# Patient Record
Sex: FEMALE | Race: BLACK OR AFRICAN AMERICAN | Employment: OTHER | URBAN - METROPOLITAN AREA
[De-identification: names, ages, dates, MRNs, and addresses within clinical notes are randomized per-mention and may not be internally consistent; named-entity substitution may affect disease eponyms.]

---

## 2017-01-04 RX ORDER — OXYCODONE HYDROCHLORIDE 5 MG/1
5 TABLET ORAL EVERY 8 HOURS PRN
Qty: 40 TABLET | Refills: 0 | Status: SHIPPED | OUTPATIENT
Start: 2017-01-04 | End: 2017-02-03 | Stop reason: SDUPTHER

## 2017-02-03 RX ORDER — OXYCODONE HYDROCHLORIDE 5 MG/1
5 TABLET ORAL EVERY 8 HOURS PRN
Qty: 40 TABLET | Refills: 0 | Status: SHIPPED | OUTPATIENT
Start: 2017-02-03 | End: 2017-02-06 | Stop reason: SDUPTHER

## 2017-02-06 RX ORDER — OXYCODONE HYDROCHLORIDE 5 MG/1
5 TABLET ORAL EVERY 8 HOURS PRN
Qty: 40 TABLET | Refills: 0 | Status: SHIPPED | OUTPATIENT
Start: 2017-02-06 | End: 2017-02-13 | Stop reason: CLARIF

## 2017-04-04 ENCOUNTER — TELEPHONE (OUTPATIENT)
Dept: ADMINISTRATIVE | Age: 72
End: 2017-04-04

## 2017-04-12 ENCOUNTER — OFFICE VISIT (OUTPATIENT)
Dept: ORTHOPEDIC SURGERY | Age: 72
End: 2017-04-12
Payer: MEDICARE

## 2017-04-12 VITALS — WEIGHT: 268 LBS | HEIGHT: 65 IN | BODY MASS INDEX: 44.65 KG/M2

## 2017-04-12 DIAGNOSIS — Z96.653 STATUS POST TOTAL BILATERAL KNEE REPLACEMENT: Primary | ICD-10-CM

## 2017-04-12 PROCEDURE — 4040F PNEUMOC VAC/ADMIN/RCVD: CPT | Performed by: ORTHOPAEDIC SURGERY

## 2017-04-12 PROCEDURE — 3014F SCREEN MAMMO DOC REV: CPT | Performed by: ORTHOPAEDIC SURGERY

## 2017-04-12 PROCEDURE — 3017F COLORECTAL CA SCREEN DOC REV: CPT | Performed by: ORTHOPAEDIC SURGERY

## 2017-04-12 PROCEDURE — G8427 DOCREV CUR MEDS BY ELIG CLIN: HCPCS | Performed by: ORTHOPAEDIC SURGERY

## 2017-04-12 PROCEDURE — 1036F TOBACCO NON-USER: CPT | Performed by: ORTHOPAEDIC SURGERY

## 2017-04-12 PROCEDURE — 1123F ACP DISCUSS/DSCN MKR DOCD: CPT | Performed by: ORTHOPAEDIC SURGERY

## 2017-04-12 PROCEDURE — G8400 PT W/DXA NO RESULTS DOC: HCPCS | Performed by: ORTHOPAEDIC SURGERY

## 2017-04-12 PROCEDURE — 1090F PRES/ABSN URINE INCON ASSESS: CPT | Performed by: ORTHOPAEDIC SURGERY

## 2017-04-12 PROCEDURE — 99212 OFFICE O/P EST SF 10 MIN: CPT | Performed by: ORTHOPAEDIC SURGERY

## 2017-04-12 PROCEDURE — G8417 CALC BMI ABV UP PARAM F/U: HCPCS | Performed by: ORTHOPAEDIC SURGERY

## 2017-04-12 RX ORDER — OXYCODONE HYDROCHLORIDE 5 MG/1
5 TABLET ORAL EVERY 8 HOURS PRN
Qty: 40 TABLET | Refills: 0 | Status: SHIPPED | OUTPATIENT
Start: 2017-04-12 | End: 2017-05-15 | Stop reason: SDUPTHER

## 2017-04-19 RX ORDER — DULOXETIN HYDROCHLORIDE 30 MG/1
CAPSULE, DELAYED RELEASE ORAL
Qty: 180 CAPSULE | Refills: 0 | Status: SHIPPED | OUTPATIENT
Start: 2017-04-19 | End: 2017-07-25 | Stop reason: SDUPTHER

## 2017-04-19 RX ORDER — OXYBUTYNIN CHLORIDE 10 MG/1
TABLET, EXTENDED RELEASE ORAL
Qty: 90 TABLET | Refills: 0 | Status: SHIPPED | OUTPATIENT
Start: 2017-04-19 | End: 2018-03-08

## 2017-04-28 ENCOUNTER — HOSPITAL ENCOUNTER (OUTPATIENT)
Dept: PHYSICAL THERAPY | Age: 72
Setting detail: THERAPIES SERIES
Discharge: HOME | End: 2017-04-28
Payer: MEDICARE

## 2017-04-28 PROCEDURE — G8978 MOBILITY CURRENT STATUS: HCPCS

## 2017-04-28 PROCEDURE — G8979 MOBILITY GOAL STATUS: HCPCS

## 2017-04-28 PROCEDURE — 97162 PT EVAL MOD COMPLEX 30 MIN: CPT

## 2017-04-28 ASSESSMENT — PAIN DESCRIPTION - ORIENTATION: ORIENTATION: RIGHT;LEFT

## 2017-04-28 ASSESSMENT — PAIN SCALES - GENERAL: PAINLEVEL_OUTOF10: 3

## 2017-04-28 ASSESSMENT — PAIN DESCRIPTION - DESCRIPTORS: DESCRIPTORS: ACHING

## 2017-04-28 ASSESSMENT — PAIN DESCRIPTION - FREQUENCY: FREQUENCY: INTERMITTENT

## 2017-04-28 ASSESSMENT — PAIN DESCRIPTION - LOCATION: LOCATION: KNEE

## 2017-05-01 DIAGNOSIS — Z96.653 STATUS POST TOTAL BILATERAL KNEE REPLACEMENT: Primary | ICD-10-CM

## 2017-05-04 ENCOUNTER — HOSPITAL ENCOUNTER (OUTPATIENT)
Dept: PHYSICAL THERAPY | Age: 72
Setting detail: THERAPIES SERIES
Discharge: HOME | End: 2017-05-04
Payer: MEDICARE

## 2017-05-04 PROCEDURE — 97113 AQUATIC THERAPY/EXERCISES: CPT

## 2017-05-04 ASSESSMENT — PAIN SCALES - GENERAL: PAINLEVEL_OUTOF10: 6

## 2017-05-04 ASSESSMENT — PAIN DESCRIPTION - ORIENTATION: ORIENTATION: LOWER

## 2017-05-04 ASSESSMENT — PAIN DESCRIPTION - LOCATION: LOCATION: BACK

## 2017-05-10 ENCOUNTER — APPOINTMENT (OUTPATIENT)
Dept: PHYSICAL THERAPY | Age: 72
End: 2017-05-10
Payer: MEDICARE

## 2017-05-10 ENCOUNTER — HOSPITAL ENCOUNTER (OUTPATIENT)
Dept: PHYSICAL THERAPY | Age: 72
Setting detail: THERAPIES SERIES
Discharge: HOME | End: 2017-05-10
Payer: MEDICARE

## 2017-05-10 PROCEDURE — 97113 AQUATIC THERAPY/EXERCISES: CPT

## 2017-05-10 ASSESSMENT — PAIN SCALES - GENERAL: PAINLEVEL_OUTOF10: 5

## 2017-05-10 ASSESSMENT — PAIN DESCRIPTION - ORIENTATION: ORIENTATION: LOWER

## 2017-05-10 ASSESSMENT — PAIN DESCRIPTION - LOCATION: LOCATION: BACK

## 2017-05-10 ASSESSMENT — PAIN DESCRIPTION - PAIN TYPE: TYPE: CHRONIC PAIN

## 2017-05-15 ENCOUNTER — HOSPITAL ENCOUNTER (OUTPATIENT)
Dept: PHYSICAL THERAPY | Age: 72
Setting detail: THERAPIES SERIES
Discharge: HOME | End: 2017-05-15
Payer: MEDICARE

## 2017-05-15 PROCEDURE — 97113 AQUATIC THERAPY/EXERCISES: CPT

## 2017-05-15 RX ORDER — OXYCODONE HYDROCHLORIDE 5 MG/1
5 TABLET ORAL EVERY 8 HOURS PRN
Qty: 40 TABLET | Refills: 0 | Status: SHIPPED | OUTPATIENT
Start: 2017-05-15 | End: 2017-06-19 | Stop reason: SDUPTHER

## 2017-05-15 RX ORDER — DIAZEPAM 5 MG/1
5 TABLET ORAL EVERY 8 HOURS PRN
Qty: 30 TABLET | Refills: 0 | Status: SHIPPED | OUTPATIENT
Start: 2017-05-15 | End: 2018-03-08

## 2017-05-15 ASSESSMENT — PAIN SCALES - GENERAL: PAINLEVEL_OUTOF10: 7

## 2017-05-15 ASSESSMENT — PAIN DESCRIPTION - LOCATION: LOCATION: BACK

## 2017-05-15 ASSESSMENT — PAIN DESCRIPTION - PAIN TYPE: TYPE: CHRONIC PAIN

## 2017-05-15 ASSESSMENT — PAIN DESCRIPTION - ORIENTATION: ORIENTATION: LOWER

## 2017-05-16 DIAGNOSIS — Z13.9 SCREENING: ICD-10-CM

## 2017-05-16 DIAGNOSIS — J06.9 VIRAL UPPER RESPIRATORY TRACT INFECTION: ICD-10-CM

## 2017-05-17 ENCOUNTER — HOSPITAL ENCOUNTER (OUTPATIENT)
Dept: PHYSICAL THERAPY | Age: 72
Setting detail: THERAPIES SERIES
Discharge: HOME | End: 2017-05-17
Payer: MEDICARE

## 2017-05-17 PROCEDURE — 97113 AQUATIC THERAPY/EXERCISES: CPT

## 2017-05-17 RX ORDER — LORATADINE 10 MG/1
TABLET ORAL
Qty: 90 TABLET | Refills: 1 | Status: SHIPPED | OUTPATIENT
Start: 2017-05-17 | End: 2018-05-16 | Stop reason: ALTCHOICE

## 2017-05-17 ASSESSMENT — PAIN DESCRIPTION - ORIENTATION: ORIENTATION: LOWER

## 2017-05-17 ASSESSMENT — PAIN DESCRIPTION - LOCATION: LOCATION: BACK

## 2017-05-17 ASSESSMENT — PAIN DESCRIPTION - DIRECTION: RADIATING_TOWARDS: (B) HIPS

## 2017-05-17 ASSESSMENT — PAIN SCALES - GENERAL: PAINLEVEL_OUTOF10: 5

## 2017-05-17 ASSESSMENT — PAIN DESCRIPTION - PAIN TYPE: TYPE: CHRONIC PAIN

## 2017-05-18 ENCOUNTER — HOSPITAL ENCOUNTER (OUTPATIENT)
Dept: PHYSICAL THERAPY | Age: 72
Setting detail: THERAPIES SERIES
Discharge: HOME | End: 2017-05-18
Payer: MEDICARE

## 2017-05-22 ENCOUNTER — HOSPITAL ENCOUNTER (OUTPATIENT)
Dept: PHYSICAL THERAPY | Age: 72
Setting detail: THERAPIES SERIES
Discharge: HOME | End: 2017-05-22
Payer: MEDICARE

## 2017-05-22 DIAGNOSIS — M25.562 PAIN IN BOTH KNEES, UNSPECIFIED CHRONICITY: Primary | ICD-10-CM

## 2017-05-22 DIAGNOSIS — M25.561 PAIN IN BOTH KNEES, UNSPECIFIED CHRONICITY: Primary | ICD-10-CM

## 2017-05-22 PROCEDURE — 97113 AQUATIC THERAPY/EXERCISES: CPT

## 2017-05-22 PROCEDURE — 97164 PT RE-EVAL EST PLAN CARE: CPT

## 2017-05-22 PROCEDURE — G8979 MOBILITY GOAL STATUS: HCPCS

## 2017-05-22 PROCEDURE — G8978 MOBILITY CURRENT STATUS: HCPCS

## 2017-05-22 ASSESSMENT — PAIN DESCRIPTION - PAIN TYPE: TYPE: CHRONIC PAIN

## 2017-05-22 ASSESSMENT — PAIN SCALES - GENERAL
PAINLEVEL_OUTOF10: 7
PAINLEVEL_OUTOF10: 6

## 2017-05-22 ASSESSMENT — PAIN DESCRIPTION - DESCRIPTORS: DESCRIPTORS: DULL;CONSTANT

## 2017-05-22 ASSESSMENT — PAIN DESCRIPTION - LOCATION
LOCATION: BACK;HIP
LOCATION: BACK

## 2017-05-22 ASSESSMENT — PAIN DESCRIPTION - DIRECTION: RADIATING_TOWARDS: R HIP/BUTTOCK

## 2017-05-22 ASSESSMENT — PAIN DESCRIPTION - ORIENTATION
ORIENTATION: RIGHT;LOWER
ORIENTATION: LOWER;RIGHT

## 2017-05-22 ASSESSMENT — PAIN DESCRIPTION - FREQUENCY: FREQUENCY: CONTINUOUS

## 2017-05-23 ENCOUNTER — HOSPITAL ENCOUNTER (OUTPATIENT)
Dept: PHYSICAL THERAPY | Age: 72
Setting detail: THERAPIES SERIES
Discharge: HOME | End: 2017-05-23
Payer: MEDICARE

## 2017-05-23 PROCEDURE — 97113 AQUATIC THERAPY/EXERCISES: CPT

## 2017-05-23 ASSESSMENT — PAIN DESCRIPTION - ORIENTATION: ORIENTATION: LOWER;RIGHT

## 2017-05-23 ASSESSMENT — PAIN SCALES - GENERAL: PAINLEVEL_OUTOF10: 7

## 2017-05-23 ASSESSMENT — PAIN DESCRIPTION - PAIN TYPE: TYPE: CHRONIC PAIN

## 2017-05-23 ASSESSMENT — PAIN DESCRIPTION - LOCATION: LOCATION: BACK;HIP

## 2017-05-25 ENCOUNTER — HOSPITAL ENCOUNTER (OUTPATIENT)
Dept: PHYSICAL THERAPY | Age: 72
Setting detail: THERAPIES SERIES
Discharge: HOME | End: 2017-05-25
Payer: MEDICARE

## 2017-05-25 PROCEDURE — 97113 AQUATIC THERAPY/EXERCISES: CPT

## 2017-05-25 ASSESSMENT — PAIN DESCRIPTION - PAIN TYPE: TYPE: CHRONIC PAIN

## 2017-05-25 ASSESSMENT — PAIN SCALES - GENERAL: PAINLEVEL_OUTOF10: 3

## 2017-05-25 ASSESSMENT — PAIN DESCRIPTION - ORIENTATION: ORIENTATION: LOWER;RIGHT

## 2017-05-25 ASSESSMENT — PAIN DESCRIPTION - LOCATION: LOCATION: BACK

## 2017-05-31 ENCOUNTER — HOSPITAL ENCOUNTER (OUTPATIENT)
Dept: PHYSICAL THERAPY | Age: 72
Setting detail: THERAPIES SERIES
Discharge: HOME | End: 2017-05-31
Payer: MEDICARE

## 2017-05-31 ENCOUNTER — TELEPHONE (OUTPATIENT)
Dept: ORTHOPEDIC SURGERY | Age: 72
End: 2017-05-31

## 2017-06-01 ENCOUNTER — OFFICE VISIT (OUTPATIENT)
Dept: ORTHOPEDIC SURGERY | Age: 72
End: 2017-06-01
Payer: MEDICARE

## 2017-06-01 ENCOUNTER — HOSPITAL ENCOUNTER (OUTPATIENT)
Dept: PHYSICAL THERAPY | Age: 72
Setting detail: THERAPIES SERIES
Discharge: HOME | End: 2017-06-01
Payer: MEDICARE

## 2017-06-01 VITALS — BODY MASS INDEX: 44.65 KG/M2 | WEIGHT: 268 LBS | HEIGHT: 65 IN

## 2017-06-01 DIAGNOSIS — D17.23 LIPOMA OF RIGHT LOWER EXTREMITY: Primary | ICD-10-CM

## 2017-06-01 PROCEDURE — 1123F ACP DISCUSS/DSCN MKR DOCD: CPT | Performed by: ORTHOPAEDIC SURGERY

## 2017-06-01 PROCEDURE — 4040F PNEUMOC VAC/ADMIN/RCVD: CPT | Performed by: ORTHOPAEDIC SURGERY

## 2017-06-01 PROCEDURE — 99212 OFFICE O/P EST SF 10 MIN: CPT | Performed by: ORTHOPAEDIC SURGERY

## 2017-06-01 PROCEDURE — 1090F PRES/ABSN URINE INCON ASSESS: CPT | Performed by: ORTHOPAEDIC SURGERY

## 2017-06-01 PROCEDURE — 1036F TOBACCO NON-USER: CPT | Performed by: ORTHOPAEDIC SURGERY

## 2017-06-01 PROCEDURE — G8417 CALC BMI ABV UP PARAM F/U: HCPCS | Performed by: ORTHOPAEDIC SURGERY

## 2017-06-01 PROCEDURE — G8400 PT W/DXA NO RESULTS DOC: HCPCS | Performed by: ORTHOPAEDIC SURGERY

## 2017-06-01 PROCEDURE — 3017F COLORECTAL CA SCREEN DOC REV: CPT | Performed by: ORTHOPAEDIC SURGERY

## 2017-06-01 PROCEDURE — G8427 DOCREV CUR MEDS BY ELIG CLIN: HCPCS | Performed by: ORTHOPAEDIC SURGERY

## 2017-06-01 PROCEDURE — 3014F SCREEN MAMMO DOC REV: CPT | Performed by: ORTHOPAEDIC SURGERY

## 2017-06-01 RX ORDER — PREDNISONE 10 MG/1
TABLET ORAL
Refills: 0 | Status: ON HOLD | COMMUNITY
Start: 2017-05-18 | End: 2017-08-16 | Stop reason: HOSPADM

## 2017-06-12 ENCOUNTER — TELEPHONE (OUTPATIENT)
Dept: ORTHOPEDIC SURGERY | Age: 72
End: 2017-06-12

## 2017-06-12 DIAGNOSIS — M17.0 BILATERAL PRIMARY OSTEOARTHRITIS OF KNEE: ICD-10-CM

## 2017-06-12 DIAGNOSIS — M25.561 PAIN IN BOTH KNEES, UNSPECIFIED CHRONICITY: Primary | ICD-10-CM

## 2017-06-12 DIAGNOSIS — M25.562 PAIN IN BOTH KNEES, UNSPECIFIED CHRONICITY: Primary | ICD-10-CM

## 2017-06-14 ENCOUNTER — HOSPITAL ENCOUNTER (OUTPATIENT)
Dept: CT IMAGING | Age: 72
Discharge: HOME | End: 2017-06-14
Payer: MEDICARE

## 2017-06-14 DIAGNOSIS — J47.9 ACQUIRED BRONCHIECTASIS (HCC): ICD-10-CM

## 2017-06-14 PROCEDURE — 71250 CT THORAX DX C-: CPT

## 2017-06-22 RX ORDER — OXYCODONE HYDROCHLORIDE 5 MG/1
5 TABLET ORAL EVERY 8 HOURS PRN
Qty: 40 TABLET | Refills: 0 | Status: SHIPPED | OUTPATIENT
Start: 2017-06-22 | End: 2017-07-24 | Stop reason: SDUPTHER

## 2017-06-26 ENCOUNTER — TELEPHONE (OUTPATIENT)
Dept: INTERNAL MEDICINE CLINIC | Age: 72
End: 2017-06-26

## 2017-06-26 RX ORDER — BUPROPION HYDROCHLORIDE 300 MG/1
300 TABLET ORAL EVERY MORNING
Qty: 30 TABLET | Refills: 3 | Status: ON HOLD | OUTPATIENT
Start: 2017-06-26 | End: 2017-08-16 | Stop reason: HOSPADM

## 2017-07-05 ENCOUNTER — TELEPHONE (OUTPATIENT)
Dept: ORTHOPEDIC SURGERY | Age: 72
End: 2017-07-05

## 2017-07-24 RX ORDER — OXYCODONE HYDROCHLORIDE 5 MG/1
5 TABLET ORAL EVERY 8 HOURS PRN
Qty: 40 TABLET | Refills: 0 | Status: SHIPPED | OUTPATIENT
Start: 2017-07-24 | End: 2017-08-10 | Stop reason: SDUPTHER

## 2017-07-26 RX ORDER — DULOXETIN HYDROCHLORIDE 30 MG/1
CAPSULE, DELAYED RELEASE ORAL
Qty: 180 CAPSULE | Refills: 0 | Status: SHIPPED | OUTPATIENT
Start: 2017-07-26 | End: 2017-12-08 | Stop reason: SDUPTHER

## 2017-08-11 RX ORDER — OXYCODONE HYDROCHLORIDE 5 MG/1
5 TABLET ORAL EVERY 8 HOURS PRN
Qty: 40 TABLET | Refills: 0 | Status: SHIPPED | OUTPATIENT
Start: 2017-08-11 | End: 2017-09-12 | Stop reason: SDUPTHER

## 2017-08-16 ENCOUNTER — HOSPITAL ENCOUNTER (OUTPATIENT)
Age: 72
Setting detail: OUTPATIENT SURGERY
Discharge: HOME | End: 2017-08-16
Attending: INTERNAL MEDICINE | Admitting: INTERNAL MEDICINE
Payer: MEDICARE

## 2017-08-16 VITALS
BODY MASS INDEX: 44.65 KG/M2 | TEMPERATURE: 97.5 F | RESPIRATION RATE: 16 BRPM | DIASTOLIC BLOOD PRESSURE: 50 MMHG | WEIGHT: 268 LBS | HEART RATE: 77 BPM | SYSTOLIC BLOOD PRESSURE: 132 MMHG | OXYGEN SATURATION: 96 % | HEIGHT: 65 IN

## 2017-08-16 PROBLEM — J47.0 BRONCHIECTASIS WITH ACUTE LOWER RESPIRATORY INFECTION (HCC): Status: ACTIVE | Noted: 2017-08-16

## 2017-08-16 LAB
BAL DIFF COMMENT: ABNORMAL
COLUMNAR EPIS BAL: PRESENT
EOSINOPHILS BAL: 3 % (ref 0–1)
LYMPHOCYTES, BAL: 25 % (ref 8–12)
MACROPHAGES, BAL: 27 % (ref 85–95)
SEGMENTED NEUTROPHILS, BAL: 45 % (ref 0–10)

## 2017-08-16 PROCEDURE — 88312 SPECIAL STAINS GROUP 1: CPT

## 2017-08-16 PROCEDURE — 3609010800 HC BRONCHOSCOPY ALVEOLAR LAVAGE: Performed by: INTERNAL MEDICINE

## 2017-08-16 PROCEDURE — 6360000002 HC RX W HCPCS: Performed by: INTERNAL MEDICINE

## 2017-08-16 PROCEDURE — 87205 SMEAR GRAM STAIN: CPT

## 2017-08-16 PROCEDURE — 88305 TISSUE EXAM BY PATHOLOGIST: CPT

## 2017-08-16 PROCEDURE — 94664 DEMO&/EVAL PT USE INHALER: CPT

## 2017-08-16 PROCEDURE — 88112 CYTOPATH CELL ENHANCE TECH: CPT

## 2017-08-16 PROCEDURE — 2580000003 HC RX 258: Performed by: INTERNAL MEDICINE

## 2017-08-16 PROCEDURE — 94640 AIRWAY INHALATION TREATMENT: CPT

## 2017-08-16 PROCEDURE — 87015 SPECIMEN INFECT AGNT CONCNTJ: CPT

## 2017-08-16 PROCEDURE — 89051 BODY FLUID CELL COUNT: CPT

## 2017-08-16 PROCEDURE — 2500000003 HC RX 250 WO HCPCS: Performed by: INTERNAL MEDICINE

## 2017-08-16 PROCEDURE — 87116 MYCOBACTERIA CULTURE: CPT

## 2017-08-16 PROCEDURE — 87206 SMEAR FLUORESCENT/ACID STAI: CPT

## 2017-08-16 PROCEDURE — 7100000010 HC PHASE II RECOVERY - FIRST 15 MIN: Performed by: INTERNAL MEDICINE

## 2017-08-16 PROCEDURE — 7100000011 HC PHASE II RECOVERY - ADDTL 15 MIN: Performed by: INTERNAL MEDICINE

## 2017-08-16 PROCEDURE — 88108 CYTOPATH CONCENTRATE TECH: CPT

## 2017-08-16 PROCEDURE — 6370000000 HC RX 637 (ALT 250 FOR IP)

## 2017-08-16 PROCEDURE — 99152 MOD SED SAME PHYS/QHP 5/>YRS: CPT | Performed by: INTERNAL MEDICINE

## 2017-08-16 PROCEDURE — 87070 CULTURE OTHR SPECIMN AEROBIC: CPT

## 2017-08-16 PROCEDURE — 87102 FUNGUS ISOLATION CULTURE: CPT

## 2017-08-16 RX ORDER — GLYCOPYRROLATE 0.2 MG/ML
0.1 INJECTION INTRAMUSCULAR; INTRAVENOUS ONCE
Status: COMPLETED | OUTPATIENT
Start: 2017-08-16 | End: 2017-08-16

## 2017-08-16 RX ORDER — ALBUTEROL SULFATE 2.5 MG/3ML
2.5 SOLUTION RESPIRATORY (INHALATION) ONCE
Status: COMPLETED | OUTPATIENT
Start: 2017-08-16 | End: 2017-08-16

## 2017-08-16 RX ORDER — SODIUM CHLORIDE, SODIUM LACTATE, POTASSIUM CHLORIDE, CALCIUM CHLORIDE 600; 310; 30; 20 MG/100ML; MG/100ML; MG/100ML; MG/100ML
INJECTION, SOLUTION INTRAVENOUS CONTINUOUS
Status: DISCONTINUED | OUTPATIENT
Start: 2017-08-16 | End: 2017-08-16 | Stop reason: HOSPADM

## 2017-08-16 RX ORDER — LIDOCAINE HYDROCHLORIDE 40 MG/ML
4 INJECTION, SOLUTION RETROBULBAR; TOPICAL ONCE
Status: COMPLETED | OUTPATIENT
Start: 2017-08-16 | End: 2017-08-16

## 2017-08-16 RX ORDER — MIDAZOLAM HYDROCHLORIDE 1 MG/ML
INJECTION INTRAMUSCULAR; INTRAVENOUS PRN
Status: DISCONTINUED | OUTPATIENT
Start: 2017-08-16 | End: 2017-08-16 | Stop reason: HOSPADM

## 2017-08-16 RX ORDER — LIDOCAINE HYDROCHLORIDE 10 MG/ML
INJECTION, SOLUTION INFILTRATION; PERINEURAL PRN
Status: DISCONTINUED | OUTPATIENT
Start: 2017-08-16 | End: 2017-08-16 | Stop reason: HOSPADM

## 2017-08-16 RX ADMIN — GLYCOPYRROLATE 0.1 MG: 0.2 INJECTION, SOLUTION INTRAMUSCULAR; INTRAVENOUS at 11:01

## 2017-08-16 RX ADMIN — LIDOCAINE HYDROCHLORIDE 4 ML: 40 INJECTION, SOLUTION RETROBULBAR; TOPICAL at 11:05

## 2017-08-16 RX ADMIN — ALBUTEROL SULFATE 2.5 MG: 2.5 SOLUTION RESPIRATORY (INHALATION) at 11:05

## 2017-08-16 RX ADMIN — SODIUM CHLORIDE, POTASSIUM CHLORIDE, SODIUM LACTATE AND CALCIUM CHLORIDE: 600; 310; 30; 20 INJECTION, SOLUTION INTRAVENOUS at 11:01

## 2017-08-16 ASSESSMENT — PAIN SCALES - GENERAL
PAINLEVEL_OUTOF10: 0
PAINLEVEL_OUTOF10: 0

## 2017-08-16 ASSESSMENT — PAIN - FUNCTIONAL ASSESSMENT: PAIN_FUNCTIONAL_ASSESSMENT: 0-10

## 2017-08-17 LAB
DIRECT EXAM: NORMAL
DIRECT EXAM: NORMAL
FLUID DIFF COMMENT: NORMAL
Lab: NORMAL
RBC, BAL: 900 /MM3
SPECIMEN DESCRIPTION: NORMAL
SPECIMEN DESCRIPTION: NORMAL
SPECIMEN TYPE: NORMAL
STATUS: NORMAL
SURGICAL PATHOLOGY REPORT: NORMAL
WBC, BAL: 1300 /MM3

## 2017-08-18 LAB
CULTURE: ABNORMAL
CULTURE: ABNORMAL
DIRECT EXAM: ABNORMAL
DIRECT EXAM: ABNORMAL
Lab: ABNORMAL
SPECIMEN DESCRIPTION: ABNORMAL
SPECIMEN DESCRIPTION: ABNORMAL
STATUS: ABNORMAL

## 2017-08-31 ENCOUNTER — HOSPITAL ENCOUNTER (OUTPATIENT)
Dept: CT IMAGING | Age: 72
Discharge: HOME | End: 2017-08-31
Payer: MEDICARE

## 2017-08-31 DIAGNOSIS — J47.9 BRONCHIECTASIS WITHOUT COMPLICATION (HCC): ICD-10-CM

## 2017-08-31 DIAGNOSIS — J30.89 ALLERGIC RHINITIS DUE TO OTHER ALLERGEN: ICD-10-CM

## 2017-08-31 PROCEDURE — 70486 CT MAXILLOFACIAL W/O DYE: CPT

## 2017-09-01 ENCOUNTER — TELEPHONE (OUTPATIENT)
Dept: PRIMARY CARE CLINIC | Age: 72
End: 2017-09-01

## 2017-09-13 RX ORDER — OXYCODONE HYDROCHLORIDE 5 MG/1
5 TABLET ORAL EVERY 8 HOURS PRN
Qty: 40 TABLET | Refills: 0 | Status: SHIPPED | OUTPATIENT
Start: 2017-09-13 | End: 2017-10-03 | Stop reason: SDUPTHER

## 2017-09-18 LAB
CULTURE: NORMAL
CULTURE: NORMAL
Lab: NORMAL
SPECIMEN DESCRIPTION: NORMAL
SPECIMEN DESCRIPTION: NORMAL
STATUS: NORMAL

## 2017-09-23 ENCOUNTER — TELEPHONE (OUTPATIENT)
Dept: INTERNAL MEDICINE CLINIC | Age: 72
End: 2017-09-23

## 2017-10-02 LAB
CULTURE: NORMAL
CULTURE: NORMAL
DIRECT EXAM: NORMAL
Lab: NORMAL
SPECIMEN DESCRIPTION: NORMAL
SPECIMEN DESCRIPTION: NORMAL
STATUS: NORMAL

## 2017-10-04 RX ORDER — OXYCODONE HYDROCHLORIDE 5 MG/1
5 TABLET ORAL EVERY 8 HOURS PRN
Qty: 40 TABLET | Refills: 0 | Status: SHIPPED | OUTPATIENT
Start: 2017-10-04 | End: 2017-10-09 | Stop reason: SDUPTHER

## 2017-10-09 RX ORDER — OXYCODONE HYDROCHLORIDE 5 MG/1
5 TABLET ORAL EVERY 8 HOURS PRN
Qty: 10 TABLET | Refills: 0 | Status: SHIPPED | OUTPATIENT
Start: 2017-10-09 | End: 2017-11-20 | Stop reason: SDUPTHER

## 2017-10-09 RX ORDER — OXYCODONE HYDROCHLORIDE 5 MG/1
TABLET ORAL
Qty: 40 TABLET | Refills: 0 | OUTPATIENT
Start: 2017-10-09

## 2017-10-09 NOTE — TELEPHONE ENCOUNTER
Patient's last refill was sent to her mail in prescription service.  Patient is requesting enough medication to get her through until the mailed prescription arrives

## 2017-11-20 RX ORDER — OXYCODONE HYDROCHLORIDE 5 MG/1
5 TABLET ORAL 2 TIMES DAILY
Qty: 40 TABLET | Refills: 0 | Status: SHIPPED | OUTPATIENT
Start: 2017-11-20 | End: 2017-12-15 | Stop reason: SDUPTHER

## 2017-12-08 ENCOUNTER — OFFICE VISIT (OUTPATIENT)
Dept: INTERNAL MEDICINE CLINIC | Age: 72
End: 2017-12-08
Payer: MEDICARE

## 2017-12-08 VITALS
DIASTOLIC BLOOD PRESSURE: 82 MMHG | SYSTOLIC BLOOD PRESSURE: 138 MMHG | HEART RATE: 85 BPM | HEIGHT: 65 IN | WEIGHT: 268 LBS | RESPIRATION RATE: 21 BRPM | BODY MASS INDEX: 44.65 KG/M2 | OXYGEN SATURATION: 97 %

## 2017-12-08 DIAGNOSIS — J44.9 CHRONIC OBSTRUCTIVE PULMONARY DISEASE, UNSPECIFIED COPD TYPE (HCC): Primary | ICD-10-CM

## 2017-12-08 DIAGNOSIS — M48.061 SPINAL STENOSIS, LUMBAR REGION, WITHOUT NEUROGENIC CLAUDICATION: Chronic | ICD-10-CM

## 2017-12-08 DIAGNOSIS — E66.01 MORBID OBESITY WITH BMI OF 40.0-44.9, ADULT (HCC): ICD-10-CM

## 2017-12-08 DIAGNOSIS — F32.89 OTHER DEPRESSION: ICD-10-CM

## 2017-12-08 DIAGNOSIS — Z12.39 SCREENING BREAST EXAMINATION: ICD-10-CM

## 2017-12-08 DIAGNOSIS — J84.10 PULMONARY FIBROSIS (HCC): ICD-10-CM

## 2017-12-08 DIAGNOSIS — G47.33 OSA TREATED WITH BIPAP: ICD-10-CM

## 2017-12-08 DIAGNOSIS — Z99.81 SUPPLEMENTAL OXYGEN DEPENDENT: ICD-10-CM

## 2017-12-08 DIAGNOSIS — M79.7 FIBROMYALGIA: ICD-10-CM

## 2017-12-08 DIAGNOSIS — Z79.891 CHRONIC PRESCRIPTION OPIATE USE: ICD-10-CM

## 2017-12-08 DIAGNOSIS — Z78.0 POST-MENOPAUSAL: ICD-10-CM

## 2017-12-08 DIAGNOSIS — G89.4 CHRONIC PAIN SYNDROME: ICD-10-CM

## 2017-12-08 DIAGNOSIS — I50.9 CHRONIC CONGESTIVE HEART FAILURE, UNSPECIFIED CONGESTIVE HEART FAILURE TYPE: ICD-10-CM

## 2017-12-08 PROCEDURE — 1090F PRES/ABSN URINE INCON ASSESS: CPT | Performed by: FAMILY MEDICINE

## 2017-12-08 PROCEDURE — 1036F TOBACCO NON-USER: CPT | Performed by: FAMILY MEDICINE

## 2017-12-08 PROCEDURE — G8419 CALC BMI OUT NRM PARAM NOF/U: HCPCS | Performed by: FAMILY MEDICINE

## 2017-12-08 PROCEDURE — G8427 DOCREV CUR MEDS BY ELIG CLIN: HCPCS | Performed by: FAMILY MEDICINE

## 2017-12-08 PROCEDURE — 99214 OFFICE O/P EST MOD 30 MIN: CPT | Performed by: FAMILY MEDICINE

## 2017-12-08 PROCEDURE — 3017F COLORECTAL CA SCREEN DOC REV: CPT | Performed by: FAMILY MEDICINE

## 2017-12-08 PROCEDURE — G8400 PT W/DXA NO RESULTS DOC: HCPCS | Performed by: FAMILY MEDICINE

## 2017-12-08 PROCEDURE — 3014F SCREEN MAMMO DOC REV: CPT | Performed by: FAMILY MEDICINE

## 2017-12-08 PROCEDURE — 1123F ACP DISCUSS/DSCN MKR DOCD: CPT | Performed by: FAMILY MEDICINE

## 2017-12-08 PROCEDURE — G8926 SPIRO NO PERF OR DOC: HCPCS | Performed by: FAMILY MEDICINE

## 2017-12-08 PROCEDURE — G8484 FLU IMMUNIZE NO ADMIN: HCPCS | Performed by: FAMILY MEDICINE

## 2017-12-08 PROCEDURE — 4040F PNEUMOC VAC/ADMIN/RCVD: CPT | Performed by: FAMILY MEDICINE

## 2017-12-08 PROCEDURE — 3023F SPIROM DOC REV: CPT | Performed by: FAMILY MEDICINE

## 2017-12-08 RX ORDER — OXYBUTYNIN CHLORIDE 10 MG/1
10 TABLET, EXTENDED RELEASE ORAL DAILY
Qty: 90 TABLET | Refills: 0 | Status: CANCELLED | OUTPATIENT
Start: 2017-12-08

## 2017-12-08 RX ORDER — FLUTICASONE PROPIONATE 50 MCG
SPRAY, SUSPENSION (ML) NASAL
Refills: 0 | COMMUNITY
Start: 2017-09-07 | End: 2018-07-23

## 2017-12-08 RX ORDER — DULOXETIN HYDROCHLORIDE 30 MG/1
CAPSULE, DELAYED RELEASE ORAL
Qty: 180 CAPSULE | Refills: 0 | Status: SHIPPED | OUTPATIENT
Start: 2017-12-08 | End: 2018-03-13 | Stop reason: SDUPTHER

## 2017-12-08 RX ORDER — BUPROPION HYDROCHLORIDE 300 MG/1
TABLET ORAL
Refills: 0 | COMMUNITY
Start: 2017-09-13 | End: 2018-03-08

## 2017-12-08 RX ORDER — METOPROLOL SUCCINATE 50 MG
50 TABLET, EXTENDED RELEASE 24 HR ORAL 2 TIMES DAILY
COMMUNITY
Start: 2017-10-24 | End: 2018-11-02 | Stop reason: ALTCHOICE

## 2017-12-08 ASSESSMENT — ENCOUNTER SYMPTOMS
COUGH: 1
GASTROINTESTINAL NEGATIVE: 1
EYES NEGATIVE: 1

## 2017-12-08 ASSESSMENT — PATIENT HEALTH QUESTIONNAIRE - PHQ9
1. LITTLE INTEREST OR PLEASURE IN DOING THINGS: 0
SUM OF ALL RESPONSES TO PHQ QUESTIONS 1-9: 0
SUM OF ALL RESPONSES TO PHQ9 QUESTIONS 1 & 2: 0
2. FEELING DOWN, DEPRESSED OR HOPELESS: 0

## 2017-12-08 NOTE — PROGRESS NOTES
Subjective:      Patient ID: Lurdes Nick is a 67 y.o. female. Cough   This is a chronic problem. The current episode started more than 1 month ago. The problem has been waxing and waning. The cough is non-productive. Associated symptoms include nasal congestion and postnasal drip. The symptoms are aggravated by dust, pollens and stress. She has tried steroid inhaler for the symptoms. The treatment provided moderate relief. Her past medical history is significant for asthma and environmental allergies. Pharyngitis   Associated symptoms include coughing. Review of Systems   Constitutional: Negative. HENT: Positive for postnasal drip. Eyes: Negative. Respiratory: Positive for cough. Cardiovascular: Negative. Gastrointestinal: Negative. Endocrine: Negative. Musculoskeletal: Negative. Skin: Negative. Allergic/Immunologic: Positive for environmental allergies. Neurological: Negative. Hematological: Negative. Psychiatric/Behavioral: Positive for dysphoric mood. The patient is nervous/anxious. Past family and social history unremarkable. Objective:   Physical Exam   Constitutional: She is oriented to person, place, and time. She appears well-developed and well-nourished. HENT:   Head: Normocephalic and atraumatic. Right Ear: External ear normal.   Left Ear: External ear normal.   Mouth/Throat: Oropharynx is clear and moist.   Eyes: Conjunctivae and EOM are normal. Pupils are equal, round, and reactive to light. Neck: Normal range of motion. Neck supple. Cardiovascular: Normal rate, regular rhythm, normal heart sounds and intact distal pulses. Pulmonary/Chest: Effort normal and breath sounds normal.   Abdominal: Soft. Bowel sounds are normal.   Genitourinary: Vagina normal and uterus normal.   Musculoskeletal: Normal range of motion. Neurological: She is alert and oriented to person, place, and time. She has normal reflexes. Skin: Skin is warm and dry. follow-up with pain management. She is opiate dependent. Avoid constipation. Morbid obesity with sleep apnea. Advised to comply with BiPAP. Anxiety and depression. Continue current regimen of medication. I advised her in view of underlying comorbidities and chronic pain syndrome, she will benefit from establishing with mental health services. Med list reviewed advised to continue  Observe and call for any concern  Further recommendations to follow labs  This note is created with a voice recognition program and while intend to generate a document that accurately reflects the content of the visit, no guarantee can be provided that every mistake has been identified and corrected by editing.

## 2017-12-08 NOTE — PROGRESS NOTES
Chronic Disease Visit Information    BP Readings from Last 3 Encounters:   12/08/17 138/82   08/16/17 (!) 132/50   11/08/16 140/80          Hemoglobin A1C (%)   Date Value   11/08/2016 5.3   02/24/2016 5.4   05/25/2012 5.7     LDL Cholesterol (mg/dL)   Date Value   06/07/2015 144 (H)     HDL (mg/dL)   Date Value   06/07/2015 40 (L)     BUN (mg/dL)   Date Value   07/15/2016 16     CREATININE (mg/dL)   Date Value   07/15/2016 1.02 (H)     Glucose (mg/dL)   Date Value   07/15/2016 91   05/26/2012 105            Have you changed or started any medications since your last visit including any over-the-counter medicines, vitamins, or herbal medicines? no   Are you having any side effects from any of your medications? -  no  Have you stopped taking any of your medications? Is so, why? -  no    Have you seen any other physician or provider since your last visit? No  Have you had any other diagnostic tests since your last visit? No  Have you been seen in the emergency room and/or had an admission to a hospital since we last saw you? No  Have you had your annual diabetic retinal (eye) exam? Yes - Records Requested  Have you had your routine dental cleaning in the past 6 months? no    Have you activated your [employer] account? If not, what are your barriers?  Yes     Patient Care Team:  Schuyler Zee MD as PCP - General (Family Medicine)  Schuyler Zee MD as PCP - S Attributed Provider  Luz Elena Duarte MD as Consulting Physician (Gastroenterology)         Medical History Review  Past Medical, Family, and Social History reviewed and does contribute to the patient presenting condition    Health Maintenance   Topic Date Due    DTaP/Tdap/Td vaccine (1 - Tdap) 11/17/1963    Breast cancer screen  11/17/1994    Zostavax vaccine  11/17/2004    DEXA (modify frequency per FRAX score)  11/17/2009    Pneumococcal low/med risk (2 of 2 - PCV13) 05/27/2013    Flu vaccine (1) 09/01/2017    Lipid screen  06/07/2020    Colon cancer screen colonoscopy  04/28/2024

## 2017-12-15 RX ORDER — OXYCODONE HYDROCHLORIDE 5 MG/1
5 TABLET ORAL 2 TIMES DAILY
Qty: 40 TABLET | Refills: 0 | Status: SHIPPED | OUTPATIENT
Start: 2017-12-15 | End: 2018-01-10 | Stop reason: SDUPTHER

## 2018-01-02 ENCOUNTER — TELEPHONE (OUTPATIENT)
Dept: ORTHOPEDIC SURGERY | Age: 73
End: 2018-01-02

## 2018-01-02 DIAGNOSIS — R29.898 WEAKNESS OF BOTH ARMS: ICD-10-CM

## 2018-01-02 DIAGNOSIS — R29.898 WEAKNESS OF BOTH LOWER EXTREMITIES: ICD-10-CM

## 2018-01-02 DIAGNOSIS — M17.12 PRIMARY OSTEOARTHRITIS OF LEFT KNEE: ICD-10-CM

## 2018-01-02 DIAGNOSIS — M16.0 PRIMARY OSTEOARTHRITIS OF BOTH HIPS: Primary | ICD-10-CM

## 2018-01-02 DIAGNOSIS — M79.7 FIBROMYALGIA: ICD-10-CM

## 2018-01-02 NOTE — TELEPHONE ENCOUNTER
Patient would like Dr. Trenton Herron to initiate in home physical therapy. Is very difficult for patient to exit her home due to stairs. Has some exercise equipment at home for upper body strength but doesn't feel is able to use safely and would like a therapist to instruct her in the use. Patient has incontinence issues and a chronic cough also contributing to her inability to leave home. Patient just wants to walk again.

## 2018-01-10 DIAGNOSIS — Z96.653 HISTORY OF TOTAL BILATERAL KNEE REPLACEMENT: Primary | ICD-10-CM

## 2018-01-10 RX ORDER — OXYCODONE HYDROCHLORIDE 5 MG/1
5 TABLET ORAL 2 TIMES DAILY
Qty: 60 TABLET | Refills: 0 | Status: SHIPPED | OUTPATIENT
Start: 2018-01-10 | End: 2018-02-14 | Stop reason: SDUPTHER

## 2018-02-14 DIAGNOSIS — Z96.653 HISTORY OF TOTAL BILATERAL KNEE REPLACEMENT: ICD-10-CM

## 2018-02-14 RX ORDER — OXYCODONE HYDROCHLORIDE 5 MG/1
5 TABLET ORAL 2 TIMES DAILY
Qty: 60 TABLET | Refills: 0 | Status: SHIPPED | OUTPATIENT
Start: 2018-02-14 | End: 2018-03-15 | Stop reason: SDUPTHER

## 2018-02-28 ENCOUNTER — TELEPHONE (OUTPATIENT)
Dept: ORTHOPEDIC SURGERY | Age: 73
End: 2018-02-28

## 2018-02-28 DIAGNOSIS — M48.061 SPINAL STENOSIS, LUMBAR REGION, WITHOUT NEUROGENIC CLAUDICATION: Primary | Chronic | ICD-10-CM

## 2018-03-08 ENCOUNTER — HOSPITAL ENCOUNTER (OUTPATIENT)
Age: 73
Discharge: HOME | End: 2018-03-10
Payer: MEDICARE

## 2018-03-08 ENCOUNTER — HOSPITAL ENCOUNTER (OUTPATIENT)
Dept: PAIN MANAGEMENT | Age: 73
Discharge: HOME | End: 2018-03-08
Payer: MEDICARE

## 2018-03-08 ENCOUNTER — HOSPITAL ENCOUNTER (OUTPATIENT)
Dept: GENERAL RADIOLOGY | Age: 73
Discharge: HOME | End: 2018-03-10
Payer: MEDICARE

## 2018-03-08 VITALS
HEIGHT: 64 IN | HEART RATE: 71 BPM | BODY MASS INDEX: 45.75 KG/M2 | DIASTOLIC BLOOD PRESSURE: 72 MMHG | SYSTOLIC BLOOD PRESSURE: 151 MMHG | RESPIRATION RATE: 18 BRPM | WEIGHT: 268 LBS | TEMPERATURE: 98.3 F

## 2018-03-08 DIAGNOSIS — M48.061 SPINAL STENOSIS, LUMBAR REGION, WITHOUT NEUROGENIC CLAUDICATION: Chronic | ICD-10-CM

## 2018-03-08 DIAGNOSIS — M48.061 SPINAL STENOSIS, LUMBAR REGION, WITHOUT NEUROGENIC CLAUDICATION: Primary | Chronic | ICD-10-CM

## 2018-03-08 PROCEDURE — 99214 OFFICE O/P EST MOD 30 MIN: CPT | Performed by: PAIN MEDICINE

## 2018-03-08 PROCEDURE — 72120 X-RAY BEND ONLY L-S SPINE: CPT

## 2018-03-08 PROCEDURE — 99203 OFFICE O/P NEW LOW 30 MIN: CPT

## 2018-03-08 ASSESSMENT — ENCOUNTER SYMPTOMS
COUGH: 0
BACK PAIN: 1
NAIL CHANGES: 0

## 2018-03-08 NOTE — PROGRESS NOTES
including physical therapy and the pain is worsening, has done well with epidurals in the past.   Will obtain updated X-ray L spine and set her up with caudal epidural.  If no benefit, consider MRI L spine.       Recommendations to be sent to referring physician: Dr Flip Price M.D.

## 2018-03-14 RX ORDER — DULOXETIN HYDROCHLORIDE 30 MG/1
CAPSULE, DELAYED RELEASE ORAL
Qty: 180 CAPSULE | Refills: 0 | Status: SHIPPED | OUTPATIENT
Start: 2018-03-14 | End: 2018-07-30 | Stop reason: SDUPTHER

## 2018-03-15 ENCOUNTER — TELEPHONE (OUTPATIENT)
Dept: FAMILY MEDICINE CLINIC | Age: 73
End: 2018-03-15

## 2018-03-15 DIAGNOSIS — Z96.653 HISTORY OF TOTAL BILATERAL KNEE REPLACEMENT: ICD-10-CM

## 2018-03-15 RX ORDER — OXYCODONE HYDROCHLORIDE 5 MG/1
5 TABLET ORAL 2 TIMES DAILY
Qty: 60 TABLET | Refills: 0 | Status: SHIPPED | OUTPATIENT
Start: 2018-03-15 | End: 2018-04-13 | Stop reason: SDUPTHER

## 2018-03-15 NOTE — TELEPHONE ENCOUNTER
Telephone Outcome: Other - see note. Other - see note. Pt is in therapy due to inability to ambulate, declined current appt. Telephone Outcome: Other - See note regarding therapy.

## 2018-03-23 ENCOUNTER — TELEPHONE (OUTPATIENT)
Dept: ORTHOPEDIC SURGERY | Age: 73
End: 2018-03-23

## 2018-03-28 ENCOUNTER — HOSPITAL ENCOUNTER (OUTPATIENT)
Dept: PAIN MANAGEMENT | Age: 73
Discharge: HOME | End: 2018-03-28
Payer: MEDICARE

## 2018-03-28 VITALS
HEIGHT: 64 IN | OXYGEN SATURATION: 97 % | BODY MASS INDEX: 45.75 KG/M2 | RESPIRATION RATE: 16 BRPM | HEART RATE: 75 BPM | TEMPERATURE: 97.9 F | DIASTOLIC BLOOD PRESSURE: 84 MMHG | WEIGHT: 268 LBS | SYSTOLIC BLOOD PRESSURE: 164 MMHG

## 2018-03-28 PROCEDURE — 6360000002 HC RX W HCPCS

## 2018-03-28 PROCEDURE — 2500000003 HC RX 250 WO HCPCS

## 2018-03-28 PROCEDURE — 62323 NJX INTERLAMINAR LMBR/SAC: CPT | Performed by: ANESTHESIOLOGY

## 2018-03-28 PROCEDURE — 62323 NJX INTERLAMINAR LMBR/SAC: CPT

## 2018-03-28 PROCEDURE — 2580000003 HC RX 258: Performed by: ANESTHESIOLOGY

## 2018-03-28 PROCEDURE — 6360000002 HC RX W HCPCS: Performed by: ANESTHESIOLOGY

## 2018-03-28 RX ORDER — MIDAZOLAM HYDROCHLORIDE 1 MG/ML
0.5 INJECTION INTRAMUSCULAR; INTRAVENOUS 4 TIMES DAILY PRN
Status: DISCONTINUED | OUTPATIENT
Start: 2018-03-28 | End: 2018-03-29 | Stop reason: HOSPADM

## 2018-03-28 RX ORDER — LIDOCAINE HYDROCHLORIDE 10 MG/ML
0.3 INJECTION, SOLUTION EPIDURAL; INFILTRATION; INTRACAUDAL; PERINEURAL ONCE
Status: DISCONTINUED | OUTPATIENT
Start: 2018-03-28 | End: 2018-03-29 | Stop reason: HOSPADM

## 2018-03-28 RX ORDER — SODIUM CHLORIDE, SODIUM LACTATE, POTASSIUM CHLORIDE, CALCIUM CHLORIDE 600; 310; 30; 20 MG/100ML; MG/100ML; MG/100ML; MG/100ML
500 INJECTION, SOLUTION INTRAVENOUS CONTINUOUS
Status: DISCONTINUED | OUTPATIENT
Start: 2018-03-28 | End: 2018-03-29 | Stop reason: HOSPADM

## 2018-03-28 RX ORDER — MIDAZOLAM HYDROCHLORIDE 1 MG/ML
INJECTION INTRAMUSCULAR; INTRAVENOUS
Status: COMPLETED | OUTPATIENT
Start: 2018-03-28 | End: 2018-03-28

## 2018-03-28 RX ORDER — FENTANYL CITRATE 50 UG/ML
INJECTION, SOLUTION INTRAMUSCULAR; INTRAVENOUS
Status: COMPLETED | OUTPATIENT
Start: 2018-03-28 | End: 2018-03-28

## 2018-03-28 RX ORDER — FENTANYL CITRATE 50 UG/ML
50 INJECTION, SOLUTION INTRAMUSCULAR; INTRAVENOUS PRN
Status: DISCONTINUED | OUTPATIENT
Start: 2018-03-28 | End: 2018-03-29 | Stop reason: HOSPADM

## 2018-03-28 RX ADMIN — FENTANYL CITRATE 25 MCG: 50 INJECTION INTRAMUSCULAR; INTRAVENOUS at 09:52

## 2018-03-28 RX ADMIN — MIDAZOLAM HYDROCHLORIDE 0.5 MG: 1 INJECTION, SOLUTION INTRAMUSCULAR; INTRAVENOUS at 09:54

## 2018-03-28 RX ADMIN — FENTANYL CITRATE 50 MCG: 50 INJECTION INTRAMUSCULAR; INTRAVENOUS at 09:33

## 2018-03-28 RX ADMIN — MIDAZOLAM HYDROCHLORIDE 1 MG: 1 INJECTION, SOLUTION INTRAMUSCULAR; INTRAVENOUS at 09:34

## 2018-03-28 RX ADMIN — FENTANYL CITRATE 25 MCG: 50 INJECTION INTRAMUSCULAR; INTRAVENOUS at 09:54

## 2018-03-28 RX ADMIN — SODIUM CHLORIDE, POTASSIUM CHLORIDE, SODIUM LACTATE AND CALCIUM CHLORIDE 500 ML: 600; 310; 30; 20 INJECTION, SOLUTION INTRAVENOUS at 09:22

## 2018-03-28 RX ADMIN — MIDAZOLAM HYDROCHLORIDE 0.5 MG: 1 INJECTION, SOLUTION INTRAMUSCULAR; INTRAVENOUS at 09:52

## 2018-03-28 ASSESSMENT — PAIN - FUNCTIONAL ASSESSMENT: PAIN_FUNCTIONAL_ASSESSMENT: 0-10

## 2018-03-28 ASSESSMENT — PAIN DESCRIPTION - DESCRIPTORS: DESCRIPTORS: ACHING;SHARP

## 2018-03-29 NOTE — PROCEDURES
circumstances. Her ASA classification  is 4 and Mallampati score 3.         Aleksandar Lewis    D: 03/28/2018 10:12:10       T: 03/28/2018 12:45:54     [email or web address]  Job#: [id number]     Doc#: [id number]    CC:  Amy Desai

## 2018-04-11 ENCOUNTER — OFFICE VISIT (OUTPATIENT)
Dept: ORTHOPEDIC SURGERY | Age: 73
End: 2018-04-11
Payer: MEDICARE

## 2018-04-11 DIAGNOSIS — M25.522 LEFT ELBOW PAIN: Primary | ICD-10-CM

## 2018-04-11 PROCEDURE — 99213 OFFICE O/P EST LOW 20 MIN: CPT | Performed by: STUDENT IN AN ORGANIZED HEALTH CARE EDUCATION/TRAINING PROGRAM

## 2018-04-11 RX ORDER — IBUPROFEN 600 MG/1
600 TABLET ORAL
Qty: 120 TABLET | Refills: 2 | Status: ON HOLD | OUTPATIENT
Start: 2018-04-11 | End: 2018-06-16 | Stop reason: HOSPADM

## 2018-04-11 ASSESSMENT — ENCOUNTER SYMPTOMS
BACK PAIN: 1
COLOR CHANGE: 0
COUGH: 0
ABDOMINAL PAIN: 0
SHORTNESS OF BREATH: 0

## 2018-04-13 DIAGNOSIS — Z96.653 HISTORY OF TOTAL BILATERAL KNEE REPLACEMENT: ICD-10-CM

## 2018-04-13 RX ORDER — OXYCODONE HYDROCHLORIDE 5 MG/1
5 TABLET ORAL 2 TIMES DAILY
Qty: 60 TABLET | Refills: 0 | Status: SHIPPED | OUTPATIENT
Start: 2018-04-13 | End: 2018-05-14 | Stop reason: SDUPTHER

## 2018-04-18 ENCOUNTER — HOSPITAL ENCOUNTER (OUTPATIENT)
Dept: CT IMAGING | Age: 73
Discharge: HOME | End: 2018-04-20
Payer: MEDICARE

## 2018-04-18 DIAGNOSIS — Z77.090 ASBESTOS EXPOSURE: ICD-10-CM

## 2018-04-18 PROCEDURE — 71250 CT THORAX DX C-: CPT

## 2018-04-20 ENCOUNTER — HOSPITAL ENCOUNTER (OUTPATIENT)
Dept: PAIN MANAGEMENT | Age: 73
Discharge: HOME | End: 2018-04-20
Payer: MEDICARE

## 2018-04-20 VITALS
HEART RATE: 70 BPM | HEIGHT: 64 IN | WEIGHT: 268 LBS | DIASTOLIC BLOOD PRESSURE: 61 MMHG | BODY MASS INDEX: 45.75 KG/M2 | SYSTOLIC BLOOD PRESSURE: 106 MMHG

## 2018-04-20 DIAGNOSIS — M48.061 SPINAL STENOSIS, LUMBAR REGION, WITHOUT NEUROGENIC CLAUDICATION: Primary | Chronic | ICD-10-CM

## 2018-04-20 PROCEDURE — 99215 OFFICE O/P EST HI 40 MIN: CPT

## 2018-04-20 PROCEDURE — 99214 OFFICE O/P EST MOD 30 MIN: CPT | Performed by: NURSE PRACTITIONER

## 2018-04-20 ASSESSMENT — ENCOUNTER SYMPTOMS
CONSTIPATION: 0
BACK PAIN: 1
COUGH: 0
SHORTNESS OF BREATH: 0

## 2018-05-02 ENCOUNTER — TELEPHONE (OUTPATIENT)
Dept: ORTHOPEDIC SURGERY | Age: 73
End: 2018-05-02

## 2018-05-14 DIAGNOSIS — Z96.653 HISTORY OF TOTAL BILATERAL KNEE REPLACEMENT: ICD-10-CM

## 2018-05-15 RX ORDER — OXYCODONE HYDROCHLORIDE 5 MG/1
TABLET ORAL
Qty: 60 TABLET | Refills: 0 | Status: ON HOLD | OUTPATIENT
Start: 2018-05-15 | End: 2018-06-16 | Stop reason: HOSPADM

## 2018-05-16 ENCOUNTER — HOSPITAL ENCOUNTER (OUTPATIENT)
Dept: PAIN MANAGEMENT | Age: 73
Discharge: HOME | End: 2018-05-16
Payer: MEDICARE

## 2018-05-16 VITALS
SYSTOLIC BLOOD PRESSURE: 149 MMHG | TEMPERATURE: 98.5 F | DIASTOLIC BLOOD PRESSURE: 74 MMHG | HEART RATE: 68 BPM | RESPIRATION RATE: 18 BRPM

## 2018-05-16 DIAGNOSIS — M48.061 SPINAL STENOSIS, LUMBAR REGION, WITHOUT NEUROGENIC CLAUDICATION: Primary | Chronic | ICD-10-CM

## 2018-05-16 PROCEDURE — 99213 OFFICE O/P EST LOW 20 MIN: CPT | Performed by: ANESTHESIOLOGY

## 2018-05-16 PROCEDURE — 99215 OFFICE O/P EST HI 40 MIN: CPT

## 2018-05-16 PROCEDURE — 80307 DRUG TEST PRSMV CHEM ANLYZR: CPT

## 2018-05-16 RX ORDER — ALBUTEROL SULFATE 90 UG/1
2 AEROSOL, METERED RESPIRATORY (INHALATION) 4 TIMES DAILY PRN
COMMUNITY
End: 2020-04-20 | Stop reason: SDUPTHER

## 2018-05-16 ASSESSMENT — PAIN DESCRIPTION - ONSET: ONSET: PROGRESSIVE

## 2018-05-16 ASSESSMENT — PAIN DESCRIPTION - DESCRIPTORS: DESCRIPTORS: CONSTANT;SHOOTING;THROBBING

## 2018-05-16 ASSESSMENT — PAIN DESCRIPTION - LOCATION: LOCATION: BACK;ELBOW;KNEE

## 2018-05-16 ASSESSMENT — PAIN DESCRIPTION - PAIN TYPE: TYPE: CHRONIC PAIN

## 2018-05-16 ASSESSMENT — PAIN SCALES - GENERAL: PAINLEVEL_OUTOF10: 9

## 2018-05-16 ASSESSMENT — PAIN DESCRIPTION - PROGRESSION: CLINICAL_PROGRESSION: GRADUALLY WORSENING

## 2018-05-16 ASSESSMENT — PAIN DESCRIPTION - FREQUENCY: FREQUENCY: CONTINUOUS

## 2018-05-16 ASSESSMENT — PAIN DESCRIPTION - ORIENTATION: ORIENTATION: RIGHT;LEFT;LOWER

## 2018-05-18 LAB
6-ACETYLMORPHINE, UR: NOT DETECTED
7-AMINOCLONAZEPAM, URINE: NOT DETECTED
ALPHA-OH-ALPRAZ, URINE: NOT DETECTED
ALPRAZOLAM, URINE: NOT DETECTED
AMPHETAMINES, URINE: NOT DETECTED
BARBITURATES, URINE: NOT DETECTED
BENZOYLECGONINE, UR: NOT DETECTED
BUPRENORPHINE URINE: NOT DETECTED
CARISOPRODOL, UR: NOT DETECTED
CLONAZEPAM, URINE: NOT DETECTED
CODEINE, URINE: NOT DETECTED
CREATININE URINE: 71 MG/DL (ref 20–400)
DIAZEPAM, URINE: NOT DETECTED
EER PAIN MGT DRUG PANEL, HIGH RES/EMIT U: NORMAL
ETHYL GLUCURONIDE UR: NOT DETECTED
FENTANYL URINE: NOT DETECTED
HYDROCODONE, URINE: NOT DETECTED
HYDROMORPHONE, URINE: NOT DETECTED
LORAZEPAM, URINE: NOT DETECTED
MARIJUANA METAB, UR: NOT DETECTED
MDA, UR: NOT DETECTED
MDEA, EVE, UR: NOT DETECTED
MDMA URINE: NOT DETECTED
MEPERIDINE METAB, UR: NOT DETECTED
METHADONE, URINE: NOT DETECTED
METHAMPHETAMINE, URINE: NOT DETECTED
METHYLPHENIDATE: NOT DETECTED
MIDAZOLAM, URINE: NOT DETECTED
MORPHINE URINE: NOT DETECTED
NORBUPRENORPHINE, URINE: NOT DETECTED
NORDIAZEPAM, URINE: NOT DETECTED
NORFENTANYL, URINE: NOT DETECTED
NORHYDROCODONE, URINE: NOT DETECTED
NOROXYCODONE, URINE: PRESENT
NOROXYMORPHONE, URINE: PRESENT
OXAZEPAM, URINE: NOT DETECTED
OXYCODONE URINE: PRESENT
OXYMORPHONE, URINE: NOT DETECTED
PAIN MGT DRUG PANEL, HI RES, UR: NORMAL
PCP,URINE: NOT DETECTED
PHENTERMINE, UR: NOT DETECTED
PROPOXYPHENE, URINE: NOT DETECTED
TAPENTADOL, URINE: NOT DETECTED
TAPENTADOL-O-SULFATE, URINE: NOT DETECTED
TEMAZEPAM, URINE: NOT DETECTED
TRAMADOL, URINE: NOT DETECTED
ZOLPIDEM, URINE: NOT DETECTED

## 2018-05-23 ENCOUNTER — HOSPITAL ENCOUNTER (OUTPATIENT)
Dept: PAIN MANAGEMENT | Age: 73
Discharge: HOME | End: 2018-05-23
Payer: MEDICARE

## 2018-05-23 VITALS
HEIGHT: 64 IN | OXYGEN SATURATION: 95 % | SYSTOLIC BLOOD PRESSURE: 173 MMHG | BODY MASS INDEX: 47.8 KG/M2 | TEMPERATURE: 97.8 F | HEART RATE: 73 BPM | RESPIRATION RATE: 16 BRPM | DIASTOLIC BLOOD PRESSURE: 89 MMHG | WEIGHT: 280 LBS

## 2018-05-23 DIAGNOSIS — M54.9 BACK PAIN, UNSPECIFIED BACK LOCATION, UNSPECIFIED BACK PAIN LATERALITY, UNSPECIFIED CHRONICITY: ICD-10-CM

## 2018-05-23 PROCEDURE — 2580000003 HC RX 258: Performed by: ANESTHESIOLOGY

## 2018-05-23 PROCEDURE — 62323 NJX INTERLAMINAR LMBR/SAC: CPT

## 2018-05-23 PROCEDURE — 6360000002 HC RX W HCPCS: Performed by: ANESTHESIOLOGY

## 2018-05-23 PROCEDURE — 62323 NJX INTERLAMINAR LMBR/SAC: CPT | Performed by: ANESTHESIOLOGY

## 2018-05-23 RX ORDER — MIDAZOLAM HYDROCHLORIDE 1 MG/ML
0.5 INJECTION INTRAMUSCULAR; INTRAVENOUS 3 TIMES DAILY PRN
Status: DISCONTINUED | OUTPATIENT
Start: 2018-05-23 | End: 2018-05-24 | Stop reason: HOSPADM

## 2018-05-23 RX ORDER — FENTANYL CITRATE 50 UG/ML
50 INJECTION, SOLUTION INTRAMUSCULAR; INTRAVENOUS
Status: DISCONTINUED | OUTPATIENT
Start: 2018-05-23 | End: 2018-05-24 | Stop reason: HOSPADM

## 2018-05-23 RX ORDER — SODIUM CHLORIDE, SODIUM LACTATE, POTASSIUM CHLORIDE, CALCIUM CHLORIDE 600; 310; 30; 20 MG/100ML; MG/100ML; MG/100ML; MG/100ML
500 INJECTION, SOLUTION INTRAVENOUS CONTINUOUS
Status: DISCONTINUED | OUTPATIENT
Start: 2018-05-23 | End: 2018-05-24 | Stop reason: HOSPADM

## 2018-05-23 RX ADMIN — SODIUM CHLORIDE, POTASSIUM CHLORIDE, SODIUM LACTATE AND CALCIUM CHLORIDE 500 ML: 600; 310; 30; 20 INJECTION, SOLUTION INTRAVENOUS at 11:57

## 2018-05-23 RX ADMIN — MIDAZOLAM HYDROCHLORIDE 1 MG: 1 INJECTION, SOLUTION INTRAMUSCULAR; INTRAVENOUS at 12:11

## 2018-05-23 RX ADMIN — FENTANYL CITRATE 50 MCG: 50 INJECTION, SOLUTION INTRAMUSCULAR; INTRAVENOUS at 12:11

## 2018-05-23 ASSESSMENT — PAIN - FUNCTIONAL ASSESSMENT
PAIN_FUNCTIONAL_ASSESSMENT: 0-10

## 2018-05-23 ASSESSMENT — PAIN DESCRIPTION - DESCRIPTORS: DESCRIPTORS: CONSTANT;ACHING;THROBBING

## 2018-06-13 ENCOUNTER — HOSPITAL ENCOUNTER (OUTPATIENT)
Dept: PAIN MANAGEMENT | Age: 73
Discharge: HOME | End: 2018-06-13
Payer: MEDICARE

## 2018-06-13 VITALS
WEIGHT: 278 LBS | DIASTOLIC BLOOD PRESSURE: 54 MMHG | TEMPERATURE: 97.9 F | HEIGHT: 65 IN | RESPIRATION RATE: 16 BRPM | SYSTOLIC BLOOD PRESSURE: 117 MMHG | HEART RATE: 83 BPM | BODY MASS INDEX: 46.32 KG/M2

## 2018-06-13 DIAGNOSIS — Z96.653 STATUS POST TOTAL BILATERAL KNEE REPLACEMENT: ICD-10-CM

## 2018-06-13 DIAGNOSIS — Z96.653 HISTORY OF TOTAL BILATERAL KNEE REPLACEMENT: ICD-10-CM

## 2018-06-13 DIAGNOSIS — Z51.81 MEDICATION MONITORING ENCOUNTER: Chronic | ICD-10-CM

## 2018-06-13 DIAGNOSIS — M48.061 SPINAL STENOSIS, LUMBAR REGION, WITHOUT NEUROGENIC CLAUDICATION: Primary | Chronic | ICD-10-CM

## 2018-06-13 PROCEDURE — 99214 OFFICE O/P EST MOD 30 MIN: CPT

## 2018-06-13 PROCEDURE — 99214 OFFICE O/P EST MOD 30 MIN: CPT | Performed by: NURSE PRACTITIONER

## 2018-06-13 RX ORDER — OXYCODONE HYDROCHLORIDE 5 MG/1
5 TABLET ORAL EVERY 8 HOURS PRN
Qty: 90 TABLET | Refills: 0 | Status: SHIPPED | OUTPATIENT
Start: 2018-06-13 | End: 2018-07-17 | Stop reason: SDUPTHER

## 2018-06-13 RX ORDER — GABAPENTIN 100 MG/1
100 CAPSULE ORAL 3 TIMES DAILY
Qty: 90 CAPSULE | Refills: 3 | Status: SHIPPED | OUTPATIENT
Start: 2018-06-13 | End: 2018-07-23

## 2018-06-13 ASSESSMENT — ENCOUNTER SYMPTOMS
CONSTIPATION: 0
BACK PAIN: 1
COUGH: 0
SHORTNESS OF BREATH: 0

## 2018-06-14 ENCOUNTER — HOSPITAL ENCOUNTER (INPATIENT)
Age: 73
LOS: 2 days | Discharge: HOME HEALTH | DRG: 292 | End: 2018-06-16
Attending: EMERGENCY MEDICINE | Admitting: INTERNAL MEDICINE
Payer: MEDICARE

## 2018-06-14 ENCOUNTER — APPOINTMENT (OUTPATIENT)
Dept: GENERAL RADIOLOGY | Age: 73
DRG: 292 | End: 2018-06-14
Payer: MEDICARE

## 2018-06-14 DIAGNOSIS — N30.00 ACUTE CYSTITIS WITHOUT HEMATURIA: ICD-10-CM

## 2018-06-14 DIAGNOSIS — I50.9 ACUTE ON CHRONIC CONGESTIVE HEART FAILURE, UNSPECIFIED CONGESTIVE HEART FAILURE TYPE: Primary | ICD-10-CM

## 2018-06-14 LAB
-: ABNORMAL
ABSOLUTE EOS #: 0.3 K/UL (ref 0–0.4)
ABSOLUTE IMMATURE GRANULOCYTE: ABNORMAL K/UL (ref 0–0.3)
ABSOLUTE LYMPH #: 2.2 K/UL (ref 1–4.8)
ABSOLUTE MONO #: 0.6 K/UL (ref 0.1–1.3)
AMORPHOUS: ABNORMAL
ANION GAP SERPL CALCULATED.3IONS-SCNC: 11 MMOL/L (ref 9–17)
BACTERIA: ABNORMAL
BASOPHILS # BLD: 1 % (ref 0–2)
BASOPHILS ABSOLUTE: 0 K/UL (ref 0–0.2)
BILIRUBIN URINE: NEGATIVE
BNP INTERPRETATION: ABNORMAL
BUN BLDV-MCNC: 25 MG/DL (ref 8–23)
BUN/CREAT BLD: ABNORMAL (ref 9–20)
CALCIUM SERPL-MCNC: 9.4 MG/DL (ref 8.6–10.4)
CASTS UA: ABNORMAL /LPF
CHLORIDE BLD-SCNC: 103 MMOL/L (ref 98–107)
CO2: 26 MMOL/L (ref 20–31)
COLOR: YELLOW
COMMENT UA: ABNORMAL
CREAT SERPL-MCNC: 1.1 MG/DL (ref 0.5–0.9)
CRYSTALS, UA: ABNORMAL /HPF
DIFFERENTIAL TYPE: ABNORMAL
EKG ATRIAL RATE: 65 BPM
EKG P AXIS: 61 DEGREES
EKG P-R INTERVAL: 176 MS
EKG Q-T INTERVAL: 456 MS
EKG QRS DURATION: 82 MS
EKG QTC CALCULATION (BAZETT): 474 MS
EKG R AXIS: -11 DEGREES
EKG T AXIS: 50 DEGREES
EKG VENTRICULAR RATE: 65 BPM
EOSINOPHILS RELATIVE PERCENT: 5 % (ref 0–4)
EPITHELIAL CELLS UA: ABNORMAL /HPF
GFR AFRICAN AMERICAN: 59 ML/MIN
GFR NON-AFRICAN AMERICAN: 49 ML/MIN
GFR SERPL CREATININE-BSD FRML MDRD: ABNORMAL ML/MIN/{1.73_M2}
GFR SERPL CREATININE-BSD FRML MDRD: ABNORMAL ML/MIN/{1.73_M2}
GLUCOSE BLD-MCNC: 90 MG/DL (ref 70–99)
GLUCOSE URINE: NEGATIVE
HCT VFR BLD CALC: 34.9 % (ref 36–46)
HEMOGLOBIN: 11.7 G/DL (ref 12–16)
IMMATURE GRANULOCYTES: ABNORMAL %
KETONES, URINE: NEGATIVE
LEUKOCYTE ESTERASE, URINE: ABNORMAL
LYMPHOCYTES # BLD: 37 % (ref 24–44)
MCH RBC QN AUTO: 27.1 PG (ref 26–34)
MCHC RBC AUTO-ENTMCNC: 33.6 G/DL (ref 31–37)
MCV RBC AUTO: 80.5 FL (ref 80–100)
MONOCYTES # BLD: 9 % (ref 1–7)
MUCUS: ABNORMAL
NITRITE, URINE: NEGATIVE
NRBC AUTOMATED: ABNORMAL PER 100 WBC
OTHER OBSERVATIONS UA: ABNORMAL
PDW BLD-RTO: 15.3 % (ref 11.5–14.9)
PH UA: 6.5 (ref 5–8)
PLATELET # BLD: 283 K/UL (ref 150–450)
PLATELET ESTIMATE: ABNORMAL
PMV BLD AUTO: 8.9 FL (ref 6–12)
POTASSIUM SERPL-SCNC: 4.3 MMOL/L (ref 3.7–5.3)
PRO-BNP: 1640 PG/ML
PROTEIN UA: NEGATIVE
RBC # BLD: 4.33 M/UL (ref 4–5.2)
RBC # BLD: ABNORMAL 10*6/UL
RBC UA: ABNORMAL /HPF
RENAL EPITHELIAL, UA: ABNORMAL /HPF
SEG NEUTROPHILS: 48 % (ref 36–66)
SEGMENTED NEUTROPHILS ABSOLUTE COUNT: 2.9 K/UL (ref 1.3–9.1)
SODIUM BLD-SCNC: 140 MMOL/L (ref 135–144)
SPECIFIC GRAVITY UA: 1.01 (ref 1–1.03)
TRICHOMONAS: ABNORMAL
TROPONIN INTERP: NORMAL
TROPONIN INTERP: NORMAL
TROPONIN T: <0.03 NG/ML
TROPONIN T: <0.03 NG/ML
TURBIDITY: ABNORMAL
URINE HGB: NEGATIVE
UROBILINOGEN, URINE: NORMAL
WBC # BLD: 6.1 K/UL (ref 3.5–11)
WBC # BLD: ABNORMAL 10*3/UL
WBC UA: ABNORMAL /HPF
YEAST: ABNORMAL

## 2018-06-14 PROCEDURE — 6370000000 HC RX 637 (ALT 250 FOR IP): Performed by: EMERGENCY MEDICINE

## 2018-06-14 PROCEDURE — 6360000002 HC RX W HCPCS: Performed by: EMERGENCY MEDICINE

## 2018-06-14 PROCEDURE — 81001 URINALYSIS AUTO W/SCOPE: CPT

## 2018-06-14 PROCEDURE — 99285 EMERGENCY DEPT VISIT HI MDM: CPT

## 2018-06-14 PROCEDURE — 80048 BASIC METABOLIC PNL TOTAL CA: CPT

## 2018-06-14 PROCEDURE — 6370000000 HC RX 637 (ALT 250 FOR IP): Performed by: INTERNAL MEDICINE

## 2018-06-14 PROCEDURE — 96374 THER/PROPH/DIAG INJ IV PUSH: CPT

## 2018-06-14 PROCEDURE — 84484 ASSAY OF TROPONIN QUANT: CPT

## 2018-06-14 PROCEDURE — 93005 ELECTROCARDIOGRAM TRACING: CPT

## 2018-06-14 PROCEDURE — 83880 ASSAY OF NATRIURETIC PEPTIDE: CPT

## 2018-06-14 PROCEDURE — 96365 THER/PROPH/DIAG IV INF INIT: CPT

## 2018-06-14 PROCEDURE — 36415 COLL VENOUS BLD VENIPUNCTURE: CPT

## 2018-06-14 PROCEDURE — 1200000000 HC SEMI PRIVATE

## 2018-06-14 PROCEDURE — 6360000002 HC RX W HCPCS: Performed by: INTERNAL MEDICINE

## 2018-06-14 PROCEDURE — 96372 THER/PROPH/DIAG INJ SC/IM: CPT

## 2018-06-14 PROCEDURE — G0378 HOSPITAL OBSERVATION PER HR: HCPCS

## 2018-06-14 PROCEDURE — 85025 COMPLETE CBC W/AUTO DIFF WBC: CPT

## 2018-06-14 PROCEDURE — 2580000003 HC RX 258: Performed by: INTERNAL MEDICINE

## 2018-06-14 PROCEDURE — 71046 X-RAY EXAM CHEST 2 VIEWS: CPT

## 2018-06-14 PROCEDURE — 96375 TX/PRO/DX INJ NEW DRUG ADDON: CPT

## 2018-06-14 PROCEDURE — 94762 N-INVAS EAR/PLS OXIMTRY CONT: CPT

## 2018-06-14 RX ORDER — FUROSEMIDE 10 MG/ML
20 INJECTION INTRAMUSCULAR; INTRAVENOUS ONCE
Status: COMPLETED | OUTPATIENT
Start: 2018-06-14 | End: 2018-06-14

## 2018-06-14 RX ORDER — SODIUM CHLORIDE 0.9 % (FLUSH) 0.9 %
10 SYRINGE (ML) INJECTION EVERY 12 HOURS SCHEDULED
Status: DISCONTINUED | OUTPATIENT
Start: 2018-06-14 | End: 2018-06-16 | Stop reason: HOSPADM

## 2018-06-14 RX ORDER — POTASSIUM CHLORIDE 7.45 MG/ML
10 INJECTION INTRAVENOUS PRN
Status: DISCONTINUED | OUTPATIENT
Start: 2018-06-14 | End: 2018-06-16 | Stop reason: HOSPADM

## 2018-06-14 RX ORDER — ASPIRIN 81 MG/1
81 TABLET ORAL DAILY
Status: DISCONTINUED | OUTPATIENT
Start: 2018-06-14 | End: 2018-06-16 | Stop reason: HOSPADM

## 2018-06-14 RX ORDER — SODIUM CHLORIDE 0.9 % (FLUSH) 0.9 %
10 SYRINGE (ML) INJECTION PRN
Status: DISCONTINUED | OUTPATIENT
Start: 2018-06-14 | End: 2018-06-16 | Stop reason: HOSPADM

## 2018-06-14 RX ORDER — CEPHALEXIN 250 MG/1
500 CAPSULE ORAL ONCE
Status: COMPLETED | OUTPATIENT
Start: 2018-06-14 | End: 2018-06-14

## 2018-06-14 RX ORDER — POTASSIUM CHLORIDE 20MEQ/15ML
40 LIQUID (ML) ORAL PRN
Status: DISCONTINUED | OUTPATIENT
Start: 2018-06-14 | End: 2018-06-16 | Stop reason: HOSPADM

## 2018-06-14 RX ORDER — FUROSEMIDE 10 MG/ML
20 INJECTION INTRAMUSCULAR; INTRAVENOUS DAILY
Status: DISCONTINUED | OUTPATIENT
Start: 2018-06-15 | End: 2018-06-16 | Stop reason: HOSPADM

## 2018-06-14 RX ORDER — ACETAMINOPHEN 325 MG/1
650 TABLET ORAL EVERY 4 HOURS PRN
Status: DISCONTINUED | OUTPATIENT
Start: 2018-06-14 | End: 2018-06-16 | Stop reason: HOSPADM

## 2018-06-14 RX ORDER — METOPROLOL SUCCINATE 50 MG/1
50 TABLET, EXTENDED RELEASE ORAL DAILY
Status: DISCONTINUED | OUTPATIENT
Start: 2018-06-14 | End: 2018-06-16 | Stop reason: HOSPADM

## 2018-06-14 RX ORDER — POTASSIUM CHLORIDE 7.45 MG/ML
20 INJECTION INTRAVENOUS PRN
Status: DISCONTINUED | OUTPATIENT
Start: 2018-06-14 | End: 2018-06-16 | Stop reason: HOSPADM

## 2018-06-14 RX ORDER — GABAPENTIN 100 MG/1
100 CAPSULE ORAL 3 TIMES DAILY
Status: DISCONTINUED | OUTPATIENT
Start: 2018-06-14 | End: 2018-06-16 | Stop reason: HOSPADM

## 2018-06-14 RX ORDER — DULOXETIN HYDROCHLORIDE 30 MG/1
30 CAPSULE, DELAYED RELEASE ORAL DAILY
Status: DISCONTINUED | OUTPATIENT
Start: 2018-06-14 | End: 2018-06-16 | Stop reason: HOSPADM

## 2018-06-14 RX ORDER — OXYCODONE HYDROCHLORIDE 5 MG/1
5 TABLET ORAL EVERY 8 HOURS PRN
Status: DISCONTINUED | OUTPATIENT
Start: 2018-06-14 | End: 2018-06-16 | Stop reason: HOSPADM

## 2018-06-14 RX ORDER — POTASSIUM CHLORIDE 20 MEQ/1
40 TABLET, EXTENDED RELEASE ORAL PRN
Status: DISCONTINUED | OUTPATIENT
Start: 2018-06-14 | End: 2018-06-16 | Stop reason: HOSPADM

## 2018-06-14 RX ADMIN — ENOXAPARIN SODIUM 30 MG: 30 INJECTION SUBCUTANEOUS at 23:32

## 2018-06-14 RX ADMIN — DULOXETINE HYDROCHLORIDE 30 MG: 30 CAPSULE, DELAYED RELEASE ORAL at 23:32

## 2018-06-14 RX ADMIN — METOPROLOL SUCCINATE 50 MG: 50 TABLET, EXTENDED RELEASE ORAL at 23:32

## 2018-06-14 RX ADMIN — OXYCODONE HYDROCHLORIDE 5 MG: 5 TABLET ORAL at 23:32

## 2018-06-14 RX ADMIN — CEPHALEXIN 500 MG: 250 CAPSULE ORAL at 19:16

## 2018-06-14 RX ADMIN — CEFTRIAXONE SODIUM 1 G: 1 INJECTION, POWDER, FOR SOLUTION INTRAMUSCULAR; INTRAVENOUS at 23:33

## 2018-06-14 RX ADMIN — Medication 10 ML: at 23:32

## 2018-06-14 RX ADMIN — FUROSEMIDE 20 MG: 10 INJECTION, SOLUTION INTRAVENOUS at 18:42

## 2018-06-14 ASSESSMENT — ENCOUNTER SYMPTOMS
ABDOMINAL PAIN: 0
WHEEZING: 0
SHORTNESS OF BREATH: 1
COUGH: 1
COLOR CHANGE: 0
NAUSEA: 0
RHINORRHEA: 1
VOMITING: 0

## 2018-06-14 ASSESSMENT — PAIN SCALES - GENERAL
PAINLEVEL_OUTOF10: 6
PAINLEVEL_OUTOF10: 6

## 2018-06-15 LAB
ANION GAP SERPL CALCULATED.3IONS-SCNC: 11 MMOL/L (ref 9–17)
BUN BLDV-MCNC: 23 MG/DL (ref 8–23)
BUN/CREAT BLD: ABNORMAL (ref 9–20)
CALCIUM SERPL-MCNC: 8.7 MG/DL (ref 8.6–10.4)
CHLORIDE BLD-SCNC: 102 MMOL/L (ref 98–107)
CO2: 26 MMOL/L (ref 20–31)
CREAT SERPL-MCNC: 1.11 MG/DL (ref 0.5–0.9)
GFR AFRICAN AMERICAN: 58 ML/MIN
GFR NON-AFRICAN AMERICAN: 48 ML/MIN
GFR SERPL CREATININE-BSD FRML MDRD: ABNORMAL ML/MIN/{1.73_M2}
GFR SERPL CREATININE-BSD FRML MDRD: ABNORMAL ML/MIN/{1.73_M2}
GLUCOSE BLD-MCNC: 113 MG/DL (ref 70–99)
LV EF: 53 %
LVEF MODALITY: NORMAL
POTASSIUM SERPL-SCNC: 4.3 MMOL/L (ref 3.7–5.3)
SODIUM BLD-SCNC: 139 MMOL/L (ref 135–144)

## 2018-06-15 PROCEDURE — 2580000003 HC RX 258: Performed by: INTERNAL MEDICINE

## 2018-06-15 PROCEDURE — 6360000004 HC RX CONTRAST MEDICATION: Performed by: INTERNAL MEDICINE

## 2018-06-15 PROCEDURE — 36415 COLL VENOUS BLD VENIPUNCTURE: CPT

## 2018-06-15 PROCEDURE — G0378 HOSPITAL OBSERVATION PER HR: HCPCS

## 2018-06-15 PROCEDURE — C8929 TTE W OR WO FOL WCON,DOPPLER: HCPCS

## 2018-06-15 PROCEDURE — 93306 TTE W/DOPPLER COMPLETE: CPT

## 2018-06-15 PROCEDURE — 6360000002 HC RX W HCPCS: Performed by: EMERGENCY MEDICINE

## 2018-06-15 PROCEDURE — 6370000000 HC RX 637 (ALT 250 FOR IP): Performed by: INTERNAL MEDICINE

## 2018-06-15 PROCEDURE — 99223 1ST HOSP IP/OBS HIGH 75: CPT | Performed by: INTERNAL MEDICINE

## 2018-06-15 PROCEDURE — 96372 THER/PROPH/DIAG INJ SC/IM: CPT

## 2018-06-15 PROCEDURE — 96376 TX/PRO/DX INJ SAME DRUG ADON: CPT

## 2018-06-15 PROCEDURE — 6360000002 HC RX W HCPCS: Performed by: INTERNAL MEDICINE

## 2018-06-15 PROCEDURE — 1200000000 HC SEMI PRIVATE

## 2018-06-15 PROCEDURE — 80048 BASIC METABOLIC PNL TOTAL CA: CPT

## 2018-06-15 RX ADMIN — Medication 10 ML: at 22:05

## 2018-06-15 RX ADMIN — METOPROLOL SUCCINATE 50 MG: 50 TABLET, EXTENDED RELEASE ORAL at 08:52

## 2018-06-15 RX ADMIN — OXYCODONE HYDROCHLORIDE 5 MG: 5 TABLET ORAL at 22:04

## 2018-06-15 RX ADMIN — OXYCODONE HYDROCHLORIDE 5 MG: 5 TABLET ORAL at 09:07

## 2018-06-15 RX ADMIN — CEFTRIAXONE SODIUM 1 G: 1 INJECTION, POWDER, FOR SOLUTION INTRAMUSCULAR; INTRAVENOUS at 11:21

## 2018-06-15 RX ADMIN — ENOXAPARIN SODIUM 30 MG: 30 INJECTION SUBCUTANEOUS at 08:53

## 2018-06-15 RX ADMIN — Medication 10 ML: at 08:53

## 2018-06-15 RX ADMIN — ASPIRIN 81 MG: 81 TABLET, COATED ORAL at 08:52

## 2018-06-15 RX ADMIN — DULOXETINE HYDROCHLORIDE 30 MG: 30 CAPSULE, DELAYED RELEASE ORAL at 08:52

## 2018-06-15 RX ADMIN — GABAPENTIN 100 MG: 100 CAPSULE ORAL at 15:36

## 2018-06-15 RX ADMIN — PERFLUTREN 2.2 MG: 6.52 INJECTION, SUSPENSION INTRAVENOUS at 11:05

## 2018-06-15 RX ADMIN — ENOXAPARIN SODIUM 30 MG: 30 INJECTION SUBCUTANEOUS at 22:04

## 2018-06-15 RX ADMIN — FUROSEMIDE 20 MG: 10 INJECTION, SOLUTION INTRAVENOUS at 08:52

## 2018-06-15 ASSESSMENT — PAIN SCALES - GENERAL
PAINLEVEL_OUTOF10: 6
PAINLEVEL_OUTOF10: 0
PAINLEVEL_OUTOF10: 0
PAINLEVEL_OUTOF10: 6
PAINLEVEL_OUTOF10: 0

## 2018-06-15 ASSESSMENT — PAIN DESCRIPTION - DESCRIPTORS
DESCRIPTORS: ACHING
DESCRIPTORS_2: HEADACHE
DESCRIPTORS: CRAMPING;DULL;ACHING
DESCRIPTORS: CRAMPING;DULL;ACHING

## 2018-06-15 ASSESSMENT — PAIN DESCRIPTION - LOCATION
LOCATION_2: HEAD
LOCATION: BACK

## 2018-06-15 ASSESSMENT — PAIN DESCRIPTION - FREQUENCY: FREQUENCY: CONTINUOUS

## 2018-06-15 ASSESSMENT — PAIN DESCRIPTION - PAIN TYPE
TYPE: CHRONIC PAIN

## 2018-06-15 ASSESSMENT — PAIN DESCRIPTION - INTENSITY: RATING_2: 6

## 2018-06-16 VITALS
WEIGHT: 270 LBS | RESPIRATION RATE: 14 BRPM | BODY MASS INDEX: 46.1 KG/M2 | HEIGHT: 64 IN | HEART RATE: 77 BPM | TEMPERATURE: 97.8 F | OXYGEN SATURATION: 98 % | SYSTOLIC BLOOD PRESSURE: 143 MMHG | DIASTOLIC BLOOD PRESSURE: 70 MMHG

## 2018-06-16 PROBLEM — N30.00 ACUTE CYSTITIS WITHOUT HEMATURIA: Status: ACTIVE | Noted: 2018-06-16

## 2018-06-16 LAB
ANION GAP SERPL CALCULATED.3IONS-SCNC: 12 MMOL/L (ref 9–17)
BUN BLDV-MCNC: 23 MG/DL (ref 8–23)
BUN/CREAT BLD: ABNORMAL (ref 9–20)
CALCIUM SERPL-MCNC: 8.9 MG/DL (ref 8.6–10.4)
CHLORIDE BLD-SCNC: 102 MMOL/L (ref 98–107)
CO2: 26 MMOL/L (ref 20–31)
CREAT SERPL-MCNC: 1.04 MG/DL (ref 0.5–0.9)
GFR AFRICAN AMERICAN: >60 ML/MIN
GFR NON-AFRICAN AMERICAN: 52 ML/MIN
GFR SERPL CREATININE-BSD FRML MDRD: ABNORMAL ML/MIN/{1.73_M2}
GFR SERPL CREATININE-BSD FRML MDRD: ABNORMAL ML/MIN/{1.73_M2}
GLUCOSE BLD-MCNC: 111 MG/DL (ref 70–99)
POTASSIUM SERPL-SCNC: 4.3 MMOL/L (ref 3.7–5.3)
SODIUM BLD-SCNC: 140 MMOL/L (ref 135–144)

## 2018-06-16 PROCEDURE — 80048 BASIC METABOLIC PNL TOTAL CA: CPT

## 2018-06-16 PROCEDURE — 6360000002 HC RX W HCPCS: Performed by: INTERNAL MEDICINE

## 2018-06-16 PROCEDURE — 6360000002 HC RX W HCPCS: Performed by: EMERGENCY MEDICINE

## 2018-06-16 PROCEDURE — 2580000003 HC RX 258: Performed by: INTERNAL MEDICINE

## 2018-06-16 PROCEDURE — 99239 HOSP IP/OBS DSCHRG MGMT >30: CPT | Performed by: INTERNAL MEDICINE

## 2018-06-16 PROCEDURE — 96372 THER/PROPH/DIAG INJ SC/IM: CPT

## 2018-06-16 PROCEDURE — 6370000000 HC RX 637 (ALT 250 FOR IP): Performed by: INTERNAL MEDICINE

## 2018-06-16 PROCEDURE — 96376 TX/PRO/DX INJ SAME DRUG ADON: CPT

## 2018-06-16 PROCEDURE — 36415 COLL VENOUS BLD VENIPUNCTURE: CPT

## 2018-06-16 PROCEDURE — G0378 HOSPITAL OBSERVATION PER HR: HCPCS

## 2018-06-16 RX ORDER — FUROSEMIDE 20 MG/1
20 TABLET ORAL EVERY OTHER DAY
Qty: 60 TABLET | Refills: 3 | Status: SHIPPED | OUTPATIENT
Start: 2018-06-16 | End: 2018-10-31 | Stop reason: DRUGHIGH

## 2018-06-16 RX ORDER — CEPHALEXIN 500 MG/1
500 CAPSULE ORAL 3 TIMES DAILY
Qty: 15 CAPSULE | Refills: 0 | Status: SHIPPED | OUTPATIENT
Start: 2018-06-16 | End: 2018-08-23 | Stop reason: ALTCHOICE

## 2018-06-16 RX ADMIN — DULOXETINE HYDROCHLORIDE 30 MG: 30 CAPSULE, DELAYED RELEASE ORAL at 08:34

## 2018-06-16 RX ADMIN — METOPROLOL SUCCINATE 50 MG: 50 TABLET, EXTENDED RELEASE ORAL at 08:33

## 2018-06-16 RX ADMIN — CEFTRIAXONE SODIUM 1 G: 1 INJECTION, POWDER, FOR SOLUTION INTRAMUSCULAR; INTRAVENOUS at 12:07

## 2018-06-16 RX ADMIN — Medication 10 ML: at 08:35

## 2018-06-16 RX ADMIN — ENOXAPARIN SODIUM 30 MG: 30 INJECTION SUBCUTANEOUS at 08:33

## 2018-06-16 RX ADMIN — OXYCODONE HYDROCHLORIDE 5 MG: 5 TABLET ORAL at 08:41

## 2018-06-16 RX ADMIN — GABAPENTIN 100 MG: 100 CAPSULE ORAL at 08:33

## 2018-06-16 RX ADMIN — GABAPENTIN 100 MG: 100 CAPSULE ORAL at 14:44

## 2018-06-16 RX ADMIN — FUROSEMIDE 20 MG: 10 INJECTION, SOLUTION INTRAVENOUS at 08:34

## 2018-06-16 RX ADMIN — ASPIRIN 81 MG: 81 TABLET, COATED ORAL at 08:33

## 2018-06-16 ASSESSMENT — PAIN DESCRIPTION - LOCATION: LOCATION: BACK

## 2018-06-16 ASSESSMENT — PAIN DESCRIPTION - PAIN TYPE: TYPE: CHRONIC PAIN

## 2018-06-16 ASSESSMENT — PAIN SCALES - GENERAL: PAINLEVEL_OUTOF10: 4

## 2018-06-19 ENCOUNTER — TELEPHONE (OUTPATIENT)
Dept: INTERNAL MEDICINE CLINIC | Age: 73
End: 2018-06-19

## 2018-06-25 ENCOUNTER — TELEPHONE (OUTPATIENT)
Dept: INTERNAL MEDICINE CLINIC | Age: 73
End: 2018-06-25

## 2018-07-17 ENCOUNTER — APPOINTMENT (OUTPATIENT)
Dept: GENERAL RADIOLOGY | Age: 73
End: 2018-07-17
Payer: MEDICARE

## 2018-07-17 ENCOUNTER — HOSPITAL ENCOUNTER (EMERGENCY)
Age: 73
Discharge: HOME | End: 2018-07-17
Attending: EMERGENCY MEDICINE
Payer: MEDICARE

## 2018-07-17 ENCOUNTER — HOSPITAL ENCOUNTER (OUTPATIENT)
Dept: PAIN MANAGEMENT | Age: 73
Discharge: HOME | End: 2018-07-17
Payer: MEDICARE

## 2018-07-17 VITALS
HEART RATE: 65 BPM | OXYGEN SATURATION: 98 % | DIASTOLIC BLOOD PRESSURE: 59 MMHG | SYSTOLIC BLOOD PRESSURE: 114 MMHG | TEMPERATURE: 97.8 F | RESPIRATION RATE: 19 BRPM

## 2018-07-17 DIAGNOSIS — Z96.653 HISTORY OF TOTAL BILATERAL KNEE REPLACEMENT: ICD-10-CM

## 2018-07-17 DIAGNOSIS — I49.3 PVC'S (PREMATURE VENTRICULAR CONTRACTIONS): Primary | ICD-10-CM

## 2018-07-17 LAB
ABSOLUTE EOS #: 0.31 K/UL (ref 0–0.4)
ABSOLUTE IMMATURE GRANULOCYTE: 0 K/UL (ref 0–0.3)
ABSOLUTE LYMPH #: 2.86 K/UL (ref 1–4.8)
ABSOLUTE MONO #: 0.49 K/UL (ref 0.1–0.8)
ANION GAP SERPL CALCULATED.3IONS-SCNC: 13 MMOL/L (ref 9–17)
BASOPHILS # BLD: 1 % (ref 0–2)
BASOPHILS ABSOLUTE: 0.06 K/UL (ref 0–0.2)
BUN BLDV-MCNC: 28 MG/DL (ref 8–23)
BUN/CREAT BLD: ABNORMAL (ref 9–20)
CALCIUM SERPL-MCNC: 8.9 MG/DL (ref 8.6–10.4)
CHLORIDE BLD-SCNC: 101 MMOL/L (ref 98–107)
CO2: 22 MMOL/L (ref 20–31)
CREAT SERPL-MCNC: 1.23 MG/DL (ref 0.5–0.9)
DIFFERENTIAL TYPE: ABNORMAL
EKG ATRIAL RATE: 70 BPM
EKG P AXIS: 69 DEGREES
EKG P-R INTERVAL: 174 MS
EKG Q-T INTERVAL: 430 MS
EKG QRS DURATION: 88 MS
EKG QTC CALCULATION (BAZETT): 464 MS
EKG R AXIS: -4 DEGREES
EKG T AXIS: 58 DEGREES
EKG VENTRICULAR RATE: 70 BPM
EOSINOPHILS RELATIVE PERCENT: 5 % (ref 1–4)
GFR AFRICAN AMERICAN: 52 ML/MIN
GFR NON-AFRICAN AMERICAN: 43 ML/MIN
GFR SERPL CREATININE-BSD FRML MDRD: ABNORMAL ML/MIN/{1.73_M2}
GFR SERPL CREATININE-BSD FRML MDRD: ABNORMAL ML/MIN/{1.73_M2}
GLUCOSE BLD-MCNC: 82 MG/DL (ref 70–99)
HCT VFR BLD CALC: 36.2 % (ref 36.3–47.1)
HEMOGLOBIN: 11.1 G/DL (ref 11.9–15.1)
IMMATURE GRANULOCYTES: 0 %
LYMPHOCYTES # BLD: 47 % (ref 24–44)
MCH RBC QN AUTO: 24.7 PG (ref 25.2–33.5)
MCHC RBC AUTO-ENTMCNC: 30.7 G/DL (ref 28.4–34.8)
MCV RBC AUTO: 80.6 FL (ref 82.6–102.9)
MONOCYTES # BLD: 8 % (ref 3–12)
MORPHOLOGY: NORMAL
NRBC AUTOMATED: 0 PER 100 WBC
PDW BLD-RTO: 14.4 % (ref 11.8–14.4)
PLATELET # BLD: ABNORMAL K/UL (ref 138–453)
PLATELET ESTIMATE: ABNORMAL
PLATELET, FLUORESCENCE: NORMAL K/UL (ref 138–453)
PLATELET, IMMATURE FRACTION: NORMAL % (ref 1.1–10.3)
PMV BLD AUTO: ABNORMAL FL (ref 8.1–13.5)
POC TROPONIN I: 0 NG/ML (ref 0–0.1)
POC TROPONIN I: 0.01 NG/ML (ref 0–0.1)
POC TROPONIN INTERP: NORMAL
POC TROPONIN INTERP: NORMAL
POTASSIUM SERPL-SCNC: 4.4 MMOL/L (ref 3.7–5.3)
RBC # BLD: 4.49 M/UL (ref 3.95–5.11)
RBC # BLD: ABNORMAL 10*6/UL
SEG NEUTROPHILS: 39 % (ref 36–65)
SEGMENTED NEUTROPHILS ABSOLUTE COUNT: 2.38 K/UL (ref 1.8–7.7)
SODIUM BLD-SCNC: 136 MMOL/L (ref 135–144)
WBC # BLD: 6.1 K/UL (ref 3.5–11.3)
WBC # BLD: ABNORMAL 10*3/UL

## 2018-07-17 PROCEDURE — 93005 ELECTROCARDIOGRAM TRACING: CPT

## 2018-07-17 PROCEDURE — 2580000003 HC RX 258: Performed by: EMERGENCY MEDICINE

## 2018-07-17 PROCEDURE — 84484 ASSAY OF TROPONIN QUANT: CPT

## 2018-07-17 PROCEDURE — 99212 OFFICE O/P EST SF 10 MIN: CPT

## 2018-07-17 PROCEDURE — 71045 X-RAY EXAM CHEST 1 VIEW: CPT

## 2018-07-17 PROCEDURE — 6370000000 HC RX 637 (ALT 250 FOR IP): Performed by: EMERGENCY MEDICINE

## 2018-07-17 PROCEDURE — 85055 RETICULATED PLATELET ASSAY: CPT

## 2018-07-17 PROCEDURE — G0384 LEV 5 HOSP TYPE B ED VISIT: HCPCS

## 2018-07-17 PROCEDURE — 85025 COMPLETE CBC W/AUTO DIFF WBC: CPT

## 2018-07-17 PROCEDURE — 80048 BASIC METABOLIC PNL TOTAL CA: CPT

## 2018-07-17 RX ORDER — ASPIRIN 81 MG/1
81 TABLET, CHEWABLE ORAL ONCE
Status: COMPLETED | OUTPATIENT
Start: 2018-07-17 | End: 2018-07-17

## 2018-07-17 RX ORDER — OXYCODONE HYDROCHLORIDE 5 MG/1
5 TABLET ORAL EVERY 8 HOURS PRN
Qty: 90 TABLET | Refills: 0 | Status: SHIPPED | OUTPATIENT
Start: 2018-07-23 | End: 2018-07-24 | Stop reason: DRUGHIGH

## 2018-07-17 RX ORDER — 0.9 % SODIUM CHLORIDE 0.9 %
1000 INTRAVENOUS SOLUTION INTRAVENOUS ONCE
Status: COMPLETED | OUTPATIENT
Start: 2018-07-17 | End: 2018-07-17

## 2018-07-17 RX ADMIN — SODIUM CHLORIDE 1000 ML: 9 INJECTION, SOLUTION INTRAVENOUS at 16:34

## 2018-07-17 RX ADMIN — ASPIRIN 81 MG 81 MG: 81 TABLET ORAL at 16:34

## 2018-07-17 ASSESSMENT — ENCOUNTER SYMPTOMS
BACK PAIN: 0
CHEST TIGHTNESS: 0
SHORTNESS OF BREATH: 1
CONSTIPATION: 0
RHINORRHEA: 0
ABDOMINAL PAIN: 0
BLOOD IN STOOL: 0
DIARRHEA: 0
VOMITING: 0
NAUSEA: 0
SORE THROAT: 0

## 2018-07-17 NOTE — ED PROVIDER NOTES
QTc Calculation (Bazett) 464 ms    P Axis 69 degrees    R Axis -4 degrees    T Axis 58 degrees       IMPRESSION: 71-year-old female with history of abnormal heart rhythm presents with complaints of abnormal heart rhythm with noted bigeminy while she was in pain clinic. Patient denies any associated new symptoms such as chest pain or shortness of breath. Stable vitals. Patient does not appear to be in acute distress. Benign exam.  We'll obtain CBC, BMP, troponin, chest x-ray and EKG. We'll give patient saline and will give her her home dose of aspirin. Patient without any complaints at this time. We'll give patient her daily 81 mg of aspirin and will give her saline. We'll obtain chest x-ray, EKG, troponin ×2, CBC, BMP. EKG remarkable for PVCs and creatinine is slightly elevated at 1.23 but no other acute abnormalities noted except for cardiomegaly on chest x-ray without evidence of heart failure. Patient updated on results and plans for discharge and is in agreement. Patient still without any complaints at this time. Patient advised to follow up with PCP and cardiologist and to return to the ED for any worsening symptoms. Patient voiced understanding and agreed with plan. RADIOLOGY:  Xr Chest Portable    Result Date: 7/17/2018  EXAMINATION: SINGLE XRAY VIEW OF THE CHEST 7/17/2018 5:05 pm COMPARISON: 06/14/2018 HISTORY: ORDERING SYSTEM PROVIDED HISTORY: sob TECHNOLOGIST PROVIDED HISTORY: Reason for exam:->sob FINDINGS: Body habitus and portable technique limit evaluation. The heart appears mildly enlarged. There is no pulmonary vascular congestion. No large pleural effusion identified, and there is no obvious pneumothorax. Cardiomegaly. No x-ray evidence of failure however body habitus and portable technique moderately limit this evaluation.        EKG  EKG Interpretation    Interpreted by me    Rhythm: normal sinus with frequent PVCs  Rate: normal  Axis: normal  Ectopy: none  Conduction: normal  ST Segments: no acute change  T Waves: no acute change  Q Waves: none    Clinical Impression: Normal sinus rhythm with frequent PVCs    All EKG's are interpreted by the Emergency Department Physician who either signs or Co-signs this chart in the absence of a cardiologist.    EMERGENCY DEPARTMENT COURSE:  Refer to [facility]    PROCEDURES:  None    CONSULTS:  None    CRITICAL CARE:  None    FINAL IMPRESSION      1. PVC's (premature ventricular contractions)          DISPOSITION / PLAN     DISPOSITION Decision To Discharge 07/17/2018 07:14:18 PM      PATIENT REFERRED TO:  Trenton Dunlap MD   [address]  [phone]    Call in 1 day  As needed, If symptoms worsen      DISCHARGE MEDICATIONS:  Discharge Medication List as of 7/17/2018  7:16 PM          Amrik Pantoja MD  Emergency Medicine Resident    (Please note that portions of this note were completed with a voice recognition program.  Efforts were made to edit the dictations but occasionally words are mis-transcribed.)       Amrik Pantoja MD  Resident  07/17/18 [id number]

## 2018-07-17 NOTE — ED PROVIDER NOTES
SENT TO ED FROM PAIN MANAGEMENT WITH FREQ VPC'S AND SILVA STALEY, MILD SOB(CHRONIC), NO CHEST PAIN. PATIENT INDICATED THESE SX ARE 'NORMAL' FOR HER. W/U REVIEWED-MILD CHRONIC ANEMIA, MILD ANNI. NO OTHER CLINICALLY SIGNIFICANT ABNORMALITIES. SECOND TROP PENDING, PROB DISCHARGE. Maryellen Cyr MD  07/17/18 [id number]       Maryellen Cyr MD  07/17/18 0491  SECOND TROP-0  DISCHARGE. F/U WITH DR. DE LEON-CALL IN AM. RETURN IF SX WORSEN OR PROGRESS.      Maryellen Cyr MD  07/17/18 [phone]

## 2018-07-23 ENCOUNTER — OFFICE VISIT (OUTPATIENT)
Dept: INTERNAL MEDICINE CLINIC | Age: 73
End: 2018-07-23
Payer: MEDICARE

## 2018-07-23 VITALS
DIASTOLIC BLOOD PRESSURE: 80 MMHG | WEIGHT: 270 LBS | SYSTOLIC BLOOD PRESSURE: 139 MMHG | BODY MASS INDEX: 44.98 KG/M2 | HEIGHT: 65 IN

## 2018-07-23 DIAGNOSIS — I10 ESSENTIAL HYPERTENSION: ICD-10-CM

## 2018-07-23 DIAGNOSIS — G47.33 OBSTRUCTIVE SLEEP APNEA SYNDROME: ICD-10-CM

## 2018-07-23 DIAGNOSIS — Z23 NEED FOR VACCINATION: Primary | ICD-10-CM

## 2018-07-23 DIAGNOSIS — J47.9 BRONCHIECTASIS WITHOUT COMPLICATION (HCC): ICD-10-CM

## 2018-07-23 DIAGNOSIS — M54.50 CHRONIC MIDLINE LOW BACK PAIN WITHOUT SCIATICA: ICD-10-CM

## 2018-07-23 DIAGNOSIS — R05.3 CHRONIC COUGH: ICD-10-CM

## 2018-07-23 DIAGNOSIS — E66.01 MORBID OBESITY WITH BMI OF 45.0-49.9, ADULT (HCC): ICD-10-CM

## 2018-07-23 DIAGNOSIS — N18.9 CHRONIC KIDNEY DISEASE, UNSPECIFIED CKD STAGE: ICD-10-CM

## 2018-07-23 DIAGNOSIS — Z77.090 ASBESTOS EXPOSURE: ICD-10-CM

## 2018-07-23 DIAGNOSIS — G89.29 CHRONIC MIDLINE LOW BACK PAIN WITHOUT SCIATICA: ICD-10-CM

## 2018-07-23 DIAGNOSIS — M79.7 FIBROMYALGIA: ICD-10-CM

## 2018-07-23 PROCEDURE — G0009 ADMIN PNEUMOCOCCAL VACCINE: HCPCS | Performed by: INTERNAL MEDICINE

## 2018-07-23 PROCEDURE — 90670 PCV13 VACCINE IM: CPT | Performed by: INTERNAL MEDICINE

## 2018-07-23 PROCEDURE — 99204 OFFICE O/P NEW MOD 45 MIN: CPT | Performed by: INTERNAL MEDICINE

## 2018-07-23 RX ORDER — GABAPENTIN 100 MG/1
100 CAPSULE ORAL DAILY
COMMUNITY
End: 2018-10-22

## 2018-07-23 ASSESSMENT — ENCOUNTER SYMPTOMS
ABDOMINAL PAIN: 0
CHOKING: 0
EYE ITCHING: 0
APNEA: 0
COLOR CHANGE: 0
BACK PAIN: 0
EYE DISCHARGE: 0
ABDOMINAL DISTENTION: 0
WHEEZING: 1
CONSTIPATION: 0
DIARRHEA: 0
CHEST TIGHTNESS: 0
SHORTNESS OF BREATH: 1
EYE PAIN: 0
EYE REDNESS: 0
COUGH: 1
BLOOD IN STOOL: 0

## 2018-07-23 NOTE — PROGRESS NOTES
Right eye exhibits no discharge. Left eye exhibits no discharge. No scleral icterus. Neck: Normal range of motion. Neck supple. No JVD present. No tracheal deviation present. No thyromegaly present. Cardiovascular: Normal rate and normal heart sounds. Exam reveals no gallop. No murmur heard. Pulmonary/Chest: Effort normal and breath sounds normal. No stridor. No respiratory distress. She has no wheezes. She has no rales. She exhibits no tenderness. Abdominal: Soft. Bowel sounds are normal. She exhibits no distension. There is no tenderness. There is no rebound and no guarding. Musculoskeletal: Normal range of motion. She exhibits no edema. Neurological: She is alert and oriented to person, place, and time. Skin: She is not diaphoretic.        Assessment / Plan:

## 2018-07-23 NOTE — PROGRESS NOTES
Subjective:      Patient ID: Fred Saez is a 67 y.o. female. Chronic Disease Visit Information    BP Readings from Last 3 Encounters:   07/17/18 (!) 114/59   06/16/18 (!) 143/70   06/13/18 (!) 117/54          Hemoglobin A1C (%)   Date Value   11/08/2016 5.3   02/24/2016 5.4   05/25/2012 5.7     LDL Cholesterol (mg/dL)   Date Value   06/07/2015 144 (H)     HDL (mg/dL)   Date Value   06/07/2015 40 (L)     BUN (mg/dL)   Date Value   07/17/2018 28 (H)     CREATININE (mg/dL)   Date Value   07/17/2018 1.23 (H)     Glucose (mg/dL)   Date Value   07/17/2018 82   05/26/2012 105            Have you changed or started any medications since your last visit including any over-the-counter medicines, vitamins, or herbal medicines? no   Are you having any side effects from any of your medications? -  no  Have you stopped taking any of your medications? Is so, why? -  no    Have you seen any other physician or provider since your last visit? No  Have you had any other diagnostic tests since your last visit? No  Have you been seen in the emergency room and/or had an admission to a hospital since we last saw you? No  Have you had your annual diabetic retinal (eye) exam? No  Have you had your routine dental cleaning in the past 6 months? no    Have you activated your [employer] account? If not, what are your barriers?  Yes     Patient Care Team:  Migdalia Brady MD as PCP - General (Internal Medicine)  Florence Mejia MD as Consulting Physician (Gastroenterology)  Adele Barahona MD as Consulting Physician (Cardiology)  Amy Duke MD as Consulting Physician (Pulmonology)         Medical History Review  Past Medical, Family, and Social History reviewed and does contribute to the patient presenting condition  Chief Complaint   Patient presents with    Establish Care    Hypertension     management     COPD     baseline sob     Edema     lower extremity edema      Health Maintenance   Topic Date Due    Shingles Vaccine (1 of 2 - Psychiatric/Behavioral: Negative for agitation, behavioral problems, confusion, decreased concentration and dysphoric mood. Objective:   Physical Exam   Constitutional: She is oriented to person, place, and time. She appears well-developed. Morbid obesity present    HENT:   Head: Normocephalic and atraumatic. Mouth/Throat: No oropharyngeal exudate. Eyes: Conjunctivae are normal. Pupils are equal, round, and reactive to light. Right eye exhibits no discharge. Left eye exhibits no discharge. No scleral icterus. Neck: Normal range of motion. Neck supple. No JVD present. No tracheal deviation present. No thyromegaly present. Cardiovascular: Normal rate and normal heart sounds. Exam reveals no gallop. No murmur heard. Pulmonary/Chest: Effort normal and breath sounds normal. No stridor. No respiratory distress. She has no wheezes. She has no rales. She exhibits no tenderness. Abdominal: Soft. Bowel sounds are normal. She exhibits no distension. There is no tenderness. There is no rebound and no guarding. Musculoskeletal: Normal range of motion. She exhibits no edema. Neurological: She is alert and oriented to person, place, and time. Skin: She is not diaphoretic. Social History     Social History    Marital status:      Spouse name: N/A    Number of children: N/A    Years of education: N/A     Social History Main Topics    Smoking status: Former Smoker     Packs/day: 1.00     Years: 18.00     Quit date: 5/13/1993    Smokeless tobacco: Never Used    Alcohol use No    Drug use: No    Sexual activity: Not Currently     Other Topics Concern    None     Social History Narrative    None        Family History   Problem Relation Age of Onset    Stomach Cancer Brother         stomach    High Blood Pressure Mother     Heart Disease Sister         irregular heartbeat          Assessment / Plan:   1. Need for vaccination    - Pneumococcal conjugate vaccine 13-valent    2.  Morbid

## 2018-07-24 ENCOUNTER — HOSPITAL ENCOUNTER (OUTPATIENT)
Dept: PAIN MANAGEMENT | Age: 73
Discharge: HOME | End: 2018-07-24
Payer: MEDICARE

## 2018-07-24 VITALS
HEART RATE: 80 BPM | RESPIRATION RATE: 20 BRPM | BODY MASS INDEX: 44.98 KG/M2 | DIASTOLIC BLOOD PRESSURE: 82 MMHG | SYSTOLIC BLOOD PRESSURE: 114 MMHG | TEMPERATURE: 97.1 F | HEIGHT: 65 IN | WEIGHT: 270 LBS

## 2018-07-24 DIAGNOSIS — Z99.81 SUPPLEMENTAL OXYGEN DEPENDENT: ICD-10-CM

## 2018-07-24 DIAGNOSIS — Z51.81 MEDICATION MONITORING ENCOUNTER: Chronic | ICD-10-CM

## 2018-07-24 DIAGNOSIS — M48.061 SPINAL STENOSIS, LUMBAR REGION, WITHOUT NEUROGENIC CLAUDICATION: Chronic | ICD-10-CM

## 2018-07-24 DIAGNOSIS — G47.33 OBSTRUCTIVE SLEEP APNEA SYNDROME: ICD-10-CM

## 2018-07-24 DIAGNOSIS — M79.7 FIBROMYALGIA: Primary | ICD-10-CM

## 2018-07-24 DIAGNOSIS — E66.01 MORBID OBESITY WITH BMI OF 45.0-49.9, ADULT (HCC): ICD-10-CM

## 2018-07-24 PROCEDURE — 99214 OFFICE O/P EST MOD 30 MIN: CPT | Performed by: ANESTHESIOLOGY

## 2018-07-24 PROCEDURE — 99214 OFFICE O/P EST MOD 30 MIN: CPT

## 2018-07-24 ASSESSMENT — PAIN DESCRIPTION - LOCATION: LOCATION: BACK

## 2018-07-24 ASSESSMENT — PAIN DESCRIPTION - FREQUENCY: FREQUENCY: CONTINUOUS

## 2018-07-24 ASSESSMENT — PAIN DESCRIPTION - PAIN TYPE: TYPE: CHRONIC PAIN

## 2018-07-24 ASSESSMENT — PAIN DESCRIPTION - ORIENTATION: ORIENTATION: LOWER

## 2018-07-24 ASSESSMENT — ENCOUNTER SYMPTOMS: BACK PAIN: 1

## 2018-07-24 ASSESSMENT — PAIN DESCRIPTION - ONSET: ONSET: PROGRESSIVE

## 2018-07-24 ASSESSMENT — PAIN DESCRIPTION - DESCRIPTORS: DESCRIPTORS: DULL;ACHING;THROBBING

## 2018-07-24 ASSESSMENT — PAIN DESCRIPTION - PROGRESSION: CLINICAL_PROGRESSION: GRADUALLY WORSENING

## 2018-07-24 ASSESSMENT — PAIN SCALES - GENERAL: PAINLEVEL_OUTOF10: 8

## 2018-07-24 NOTE — PROGRESS NOTES
[facility]  Patient Pain Assessment   Follow Up  No att. providers found    Primary Care Physician: Edwina Castellano MD    Chief complaint: No chief complaint on file. Chari Butterfield HISTORY OF PRESENT ILLNESS:  Pop Fernandez is 67 y.o. female with    HPI         Current Pain Assessment  Pain Assessment  Pain Assessment: 0-10  Pain Level: 8  Pain Type: Chronic pain  Pain Location: Back  Pain Orientation: Lower  Pain Radiating Towards: back into buttocks  Pain Descriptors: Dull, Aching, Throbbing  Pain Frequency: Continuous  Pain Onset: Progressive  Clinical Progression: Gradually worsening  Effect of Pain on Daily Activities: 3/5  using walker  has aide 3x a week        Associated Symptoms  1 Associated symptoms: {Numbness:33900}  2. Red Flags:    Chills {YES/NO:19732}              Weight Loss :{YES/NO:19732}              Loss of Bladder Control :{YES/NO:19732}              Loss of Bowel Control: {YES/NO:19732}  3. BMI 45.7      Previous management history:  1. Previous diagnostic workup: X-ray  3/2018  2. Previous non interventional treatments tried:                  Physical Therapy: Yes  Still going for knees               Chiropractic Treatments: No  3. Previous Medications tried:  - NSAID's : No  - Neurontin: Yes  100mg  qd  - Lyrica :No  - Trycyclic antidepressant: Shabnam Basil / Pamelor): No   - Cymbalta: Yes 30mg bid  - Opioids (Ultram / Vicodin / Percocet / Morphine / Dilaudid / Oramorph/ Fentanyl etc.):Yes  bouchra 5mg tid  4. Previous Interventional pain procedures tried: 5/2018  caudal  5. Legal Issues related to pain complaint:No  6. Last UDS :5/2018  7. Was it as anticipated? :Yes          Past Medical History      Diagnosis Date    Bronchitis     Fibromyalgia     GERD (gastroesophageal reflux disease)     Hx of blood clots     left leg and lung    Hyperlipidemia     Hypertension     Incontinence of urine     Irregular heartbeat     DR. [place]    Osteoarthritis     Sleep apnea     no machine    SOB (shortness of breath)     Urine frequency     UTI (urinary tract infection)     hospitalized early july 2014 for kidney infection       Surgical History  Past Surgical History:   Procedure Laterality Date    BRONCHOSCOPY Left 8/16/2017    BRONCHOSCOPY ALVEOLAR LAVAGE LOWER LOBE performed by Glenice Goodell, MD at [address]  x 3    no stents    COLONOSCOPY  4 28 14    polyps biopsies     FOOT SURGERY Left     calcium deposit removal from top of foot    HYSTERECTOMY      JOINT REPLACEMENT Bilateral 5/27/2016    bilat total knees    NERVE BLOCK  5/13/2013    caudal celestone 6mg    NERVE BLOCK  5/28/13    Caudal #2, Celestone 9mg    NERVE BLOCK  2/14/14    rt hip inj celestone 9 mg    NERVE BLOCK  07/14/2014    caudal# 3- celestone 9 mg    NERVE BLOCK  7-21-14    caudal epidural #2, decadron 7mg, fentanyl 25mcg    NERVE BLOCK  10/2/14    duramorph 1.5  decadron 5mg    NERVE BLOCK  11/9/2015    caudal# 1 celestone 9mg    NERVE BLOCK  11/16/15    caudal #2  decadron 10mg    NERVE BLOCK  11/23/15    duramorph 1mg celestone 9mg    NERVE BLOCK  03/28/2018    CAUDAL EPIDURAL STEROID BLOCK  DECADRON 10 MG     NERVE BLOCK  05/23/2018    caudal # decadron 7mg    SINUS SURGERY      TOTAL HIP ARTHROPLASTY Right 5/14/15    total hip replacement       Medications  Current Outpatient Prescriptions   Medication Sig Dispense Refill    gabapentin (NEURONTIN) 100 MG capsule Take 100 mg by mouth daily. Dhara Sarmiento oxyCODONE (ROXICODONE) 5 MG immediate release tablet Take 1 tablet by mouth every 8 hours as needed for Pain for up to 30 days. Fill 07-23-18.  Earliest Fill Date: 7/23/18 90 tablet 0    furosemide (LASIX) 20 MG tablet Take 1 tablet by mouth every other day 60 tablet 3    albuterol sulfate  (90 Base) MCG/ACT inhaler Inhale 2 puffs into the lungs 4 times daily as needed for Wheezing       DULoxetine (CYMBALTA) 30 MG extended release capsule TAKE 1 CAPSULE TWICE

## 2018-07-24 NOTE — PROGRESS NOTES
The patient is a 67 y.o. Non-/non  female who presents with chronic pain. Subjective    76-year-old woman with chronic knee and chronic lower back pain  Past medical history significant for morbid obesity, obstructive sleep apnea and supplemental oxygen dependency  He has been on chronic opioid therapy and have received caudal epidural injection in past    On her last prescription refill her opioid dose was escalated from twice a day to 3 times a day. Today she reports her average pain score is 8 out of 10  She states that these medication takes the edge off  She denies any side effects of the medication    Pain is mainly located in the lumbar area with radiation down both legs. She is minimally ambulatory with the help of a walker. No previous physical therapy for many years  Only diagnostic workup is about 6 years ago which showed severe multilevel lumbar spinal stenosis    She had 2 epidural caudal injection one in March 2018 and one May 2018. She is insisting on a repeat caudal epidural injection    Past Medical History:   Diagnosis Date    Bronchitis     Fibromyalgia     GERD (gastroesophageal reflux disease)     Hx of blood clots     left leg and lung    Hyperlipidemia     Hypertension     Incontinence of urine     Irregular heartbeat     DR. [place]    Osteoarthritis     Sleep apnea     no machine    SOB (shortness of breath)     Urine frequency     UTI (urinary tract infection)     hospitalized early july 2014 for kidney infection   ,  Past Surgical History:   Procedure Laterality Date    BRONCHOSCOPY Left 8/16/2017    BRONCHOSCOPY ALVEOLAR LAVAGE LOWER LOBE performed by Zach Escobar MD at [address]  x 3    no stents    COLONOSCOPY  4 28 14    polyps biopsies     FOOT SURGERY Left     calcium deposit removal from top of foot    HYSTERECTOMY      JOINT REPLACEMENT Bilateral 5/27/2016    bilat total knees    NERVE BLOCK  5/13/2013 Current Outpatient Prescriptions   Medication Sig Dispense Refill    gabapentin (NEURONTIN) 100 MG capsule Take 100 mg by mouth daily. [place] furosemide (LASIX) 20 MG tablet Take 1 tablet by mouth every other day 60 tablet 3    albuterol sulfate  (90 Base) MCG/ACT inhaler Inhale 2 puffs into the lungs 4 times daily as needed for Wheezing       DULoxetine (CYMBALTA) 30 MG extended release capsule TAKE 1 CAPSULE TWICE A  capsule 0    TOPROL XL 50 MG extended release tablet Take 50 mg by mouth 2 times daily       aspirin 81 MG tablet Take 81 mg by mouth daily      cephALEXin (KEFLEX) 500 MG capsule Take 1 capsule by mouth 3 times daily 15 capsule 0     No current facility-administered medications for this encounter. ROS    Objective   Assessment & Plan     Very high-risk patient for opioid therapy considering her advanced age, morbid obesity, obstructive sleep apnea and significant pulmonary disease with oxygen supplementation dependency    She continued to report very high pain score despite of recent escalation in the dose of opioids. He is insisting on a repeat epidural injection. 1. Fibromyalgia    2. Spinal stenosis, lumbar region, without neurogenic claudication    3. Medication monitoring encounter    4. Supplemental oxygen dependent    5. Obstructive sleep apnea syndrome    6. Morbid obesity with BMI of 45.0-49.9, adult ([facility] [address]      I had a very lengthy discussion with patient trying to explain it to her the risk associated with continuation of opioid in her situation. She passionately argued for continuation of opioids. Despite of my numerous attempt to explain her the medical reasoning behind this decisions she continued to argue for opioids. I have explained to the patient I will not support the continuation of opioids that the higher dose we will reduce the dose back to twice a day.     With regard to epidural injection I explained to the patient that she already had 2 injection within last 4 months therefore I would recommend to wait for a few months but she got upset about it therefore we agreed to schedule her for a epidural injection considering her multilevel severe spinal stenosis most severe at L3 4 level therefore I would recommend a bilateral L4 epidural steroid injection       Electronically signed by Perlita Miles MD on 7/24/2018 at 1:03 PM

## 2018-07-30 RX ORDER — DULOXETIN HYDROCHLORIDE 30 MG/1
CAPSULE, DELAYED RELEASE ORAL
Qty: 180 CAPSULE | Refills: 3 | Status: SHIPPED | OUTPATIENT
Start: 2018-07-30 | End: 2019-11-07 | Stop reason: SDUPTHER

## 2018-07-30 NOTE — TELEPHONE ENCOUNTER
Medication: duloxetine  Last visit: 7/23/18  Next visit: 9/24/2018  Last refill: previous PCP  Pharmacy: [facility]

## 2018-07-31 ENCOUNTER — TELEPHONE (OUTPATIENT)
Dept: ORTHOPEDIC SURGERY | Age: 73
End: 2018-07-31

## 2018-07-31 DIAGNOSIS — M17.12 PRIMARY OSTEOARTHRITIS OF LEFT KNEE: ICD-10-CM

## 2018-07-31 DIAGNOSIS — M48.061 SPINAL STENOSIS, LUMBAR REGION, WITHOUT NEUROGENIC CLAUDICATION: Chronic | ICD-10-CM

## 2018-07-31 DIAGNOSIS — Z96.653 HISTORY OF TOTAL BILATERAL KNEE REPLACEMENT: Primary | ICD-10-CM

## 2018-07-31 DIAGNOSIS — M16.0 PRIMARY OSTEOARTHRITIS OF BOTH HIPS: ICD-10-CM

## 2018-07-31 DIAGNOSIS — E66.01 MORBID OBESITY WITH BMI OF 45.0-49.9, ADULT (HCC): ICD-10-CM

## 2018-08-22 ENCOUNTER — HOSPITAL ENCOUNTER (OUTPATIENT)
Dept: PAIN MANAGEMENT | Age: 73
Discharge: HOME | End: 2018-08-22
Payer: MEDICARE

## 2018-08-22 VITALS — HEART RATE: 68 BPM | RESPIRATION RATE: 18 BRPM

## 2018-08-22 DIAGNOSIS — Z51.81 MEDICATION MONITORING ENCOUNTER: Chronic | ICD-10-CM

## 2018-08-22 DIAGNOSIS — Z96.653 HISTORY OF TOTAL BILATERAL KNEE REPLACEMENT: ICD-10-CM

## 2018-08-22 DIAGNOSIS — M48.061 SPINAL STENOSIS, LUMBAR REGION, WITHOUT NEUROGENIC CLAUDICATION: Primary | Chronic | ICD-10-CM

## 2018-08-22 PROCEDURE — 99214 OFFICE O/P EST MOD 30 MIN: CPT | Performed by: NURSE PRACTITIONER

## 2018-08-22 PROCEDURE — 99213 OFFICE O/P EST LOW 20 MIN: CPT

## 2018-08-22 PROCEDURE — 99214 OFFICE O/P EST MOD 30 MIN: CPT

## 2018-08-22 RX ORDER — OXYCODONE HYDROCHLORIDE 5 MG/1
5 TABLET ORAL 2 TIMES DAILY PRN
Qty: 60 TABLET | Refills: 0 | Status: SHIPPED | OUTPATIENT
Start: 2018-08-23 | End: 2018-09-12 | Stop reason: SDUPTHER

## 2018-08-22 RX ORDER — OXYCODONE HYDROCHLORIDE 5 MG/1
5 CAPSULE ORAL 3 TIMES DAILY PRN
COMMUNITY
End: 2018-08-22 | Stop reason: SDUPTHER

## 2018-08-22 ASSESSMENT — ENCOUNTER SYMPTOMS
SHORTNESS OF BREATH: 0
COUGH: 0
BACK PAIN: 1
CONSTIPATION: 0
BOWEL INCONTINENCE: 0

## 2018-08-22 NOTE — BH NOTE
appetite. Further stated that they receive social support from her sisters and her brother as well as her children  and that they find enjoyment in playing cards, decorating her home, watching her great grand children  and studying scripture. Reported that She  is not currently employed. Reported that pain medication is  Helpful, \"when I have enough. \".  Patient reports functioning is improved by medication. 4) Current Medications:   Prior to Admission medications    Medication Sig Start Date End Date Taking? Authorizing Provider   DULoxetine (CYMBALTA) 30 MG extended release capsule TAKE 1 CAPSULE TWICE A DAY 7/30/18   Jessica Frazier MD   gabapentin (NEURONTIN) 100 MG capsule Take 100 mg by mouth daily. Rocío Root Historical Provider, MD   furosemide (LASIX) 20 MG tablet Take 1 tablet by mouth every other day 6/16/18   Virgilio Curry MD   cephALEXin (KEFLEX) 500 MG capsule Take 1 capsule by mouth 3 times daily 6/16/18   Virgilio Curry MD   albuterol sulfate  (90 Base) MCG/ACT inhaler Inhale 2 puffs into the lungs 4 times daily as needed for Wheezing     Historical Provider, MD   TOPROL XL 50 MG extended release tablet Take 50 mg by mouth 2 times daily  10/24/17   Historical Provider, MD   aspirin 81 MG tablet Take 81 mg by mouth daily    Historical Provider, MD       5) Other substance Use:  Use in file:  reports that she quit smoking about 25 years ago. She has a 18.00 pack-year smoking history. She has never used smokeless tobacco. She reports that she does not drink alcohol or use drugs. Reported today: Described alcohol consumption as occurring not at all. Reported that last alcohol consumption was about 30 years ago. Patient denied use of nicotine products, current use is: none. Caffeine use is 0 cups per day. Patient denied current illicit drug use and reported history of illicit drug use to be: none. Patient denied history of alcohol or drug-related issues.   Patient denied history of alcohol or drug-related treatment. The patient states that they \"very rarely\" run out of pain medication early at the end of the month. Patient does not report a desire to take medication in higher doses or more frequently than prescribed, compulsive use, loss of control, and continued use despite harm. 6) She reports that they do not currently engage in counseling services. Further they reported a history of mental health treatment and denied a history of psychiatric hospitalization. 7) Given the information at hand and it should be noted that this evaluation is based only on the patient's self-report. She will be assigned at a care level of medium (yellow) due to a lack of recent history with her. If there are no problems that arise in the next three months consideration should be given to changing her care level to low. .     It should be noted that the above is provided by Pt. Self report. RECOMMENDATIONS:      1)  She should be scheduled for their next followup appointment in 3 months.                       Electronically signed by Melvenia Curling, PhD on 8/22/2018 at 1:28 PM

## 2018-08-22 NOTE — PROGRESS NOTES
Patient is here today to review medication contract. Chief Complaint: back pain    PMH: Pt reports chronic history of low back pain that radiates into right hip and buttocks at times She is s/p bilateral knee and right hip replaced. She has tried NSAIDS heat chiropractic care and with little relief. Currently in home PT to increase strength and help with ambulation and balance. She has had injections in the past that were helpful and most recently had a caudal injection. She reports 40%. Feels duramorph that she has had in the past was more effective, but did not receive this time due to history of sleep apnea and non compliance with Cpap. Per Dr. Merary Arellano last POC, dose of oxycodone was to be reduced to BID at last visit, however TID dose was written. Will adjust today. She was scheduled for [facility] last month but canceled the appointment. She would like to reschedule this today. Back Pain   This is a chronic problem. The current episode started more than 1 year ago. The problem occurs constantly. The problem has been gradually worsening since onset. The pain is present in the lumbar spine and sacro-iliac. The quality of the pain is described as aching. The pain does not radiate. The pain is at a severity of 7/10. The pain is moderate. The symptoms are aggravated by bending, lying down, standing and twisting. Pertinent negatives include no bladder incontinence, bowel incontinence, chest pain, fever, leg pain, numbness or tingling. Risk factors include lack of exercise and sedentary lifestyle. She has tried ice, heat and NSAIDs for the symptoms. The treatment provided mild relief. Patient denies any new neurological symptoms. No bowel or bladder incontinence, no weakness, and no falling. Pill count: appropriate    Morphine equivalent: 15    Controlled Substances Monitoring:     RX Monitoring 8/22/2018   Attestation The Prescription Monitoring Report for this patient was reviewed today. Documentation Possible medication side effects, risk of tolerance/dependence & alternative treatments discussed. ;No signs of potential drug abuse or diversion identified. Medication Contracts Existing medication contract. Past Medical History:   Diagnosis Date    Bronchitis     Fibromyalgia     GERD (gastroesophageal reflux disease)     Hx of blood clots     left leg and lung    Hyperlipidemia     Hypertension     Incontinence of urine     Irregular heartbeat     DR. Cady Dixon    Osteoarthritis     Sleep apnea     no machine    SOB (shortness of breath)     Urine frequency     UTI (urinary tract infection)     hospitalized early july 2014 for kidney infection       Past Surgical History:   Procedure Laterality Date    BRONCHOSCOPY Left 8/16/2017    BRONCHOSCOPY ALVEOLAR LAVAGE LOWER LOBE performed by Glenice Goodell, MD at [address]  x 3    no stents    COLONOSCOPY  4 28 14    polyps biopsies     FOOT SURGERY Left     calcium deposit removal from top of foot    HYSTERECTOMY      JOINT REPLACEMENT Bilateral 5/27/2016    bilat total knees    NERVE BLOCK  5/13/2013    caudal celestone 6mg    NERVE BLOCK  5/28/13    Caudal #2, Celestone 9mg    NERVE BLOCK  2/14/14    rt hip inj celestone 9 mg    NERVE BLOCK  07/14/2014    caudal# 3- celestone 9 mg    NERVE BLOCK  7-21-14    caudal epidural #2, decadron 7mg, fentanyl 25mcg    NERVE BLOCK  10/2/14    duramorph 1.5  decadron 5mg    NERVE BLOCK  11/9/2015    caudal# 1 celestone 9mg    NERVE BLOCK  11/16/15    caudal #2  decadron 10mg    NERVE BLOCK  11/23/15    duramorph 1mg celestone 9mg    NERVE BLOCK  03/28/2018    CAUDAL EPIDURAL STEROID BLOCK  DECADRON 10 MG     NERVE BLOCK  05/23/2018    caudal # decadron 7mg    SINUS SURGERY      TOTAL HIP ARTHROPLASTY Right 5/14/15    total hip replacement       Allergies   Allergen Reactions    Rosuvastatin Calcium Anaphylaxis     Hair fell out    Statins Anaphylaxis Hair fell out    Bactrim [Sulfamethoxazole-Trimethoprim] Diarrhea    Caffeine Other (See Comments)     pain  pain    Dye [Iodides] Other (See Comments)     Iv dye= blood clots in arm    Ioxaglate Other (See Comments)     Iv dye= blood clots in arm    Pcn [Penicillins] Rash         Current Outpatient Prescriptions:     oxyCODONE 5 MG capsule, Take 5 mg by mouth 3 times daily as needed for Pain. ., Disp: , Rfl:     DULoxetine (CYMBALTA) 30 MG extended release capsule, TAKE 1 CAPSULE TWICE A DAY, Disp: 180 capsule, Rfl: 3    gabapentin (NEURONTIN) 100 MG capsule, Take 100 mg by mouth daily. ., Disp: , Rfl:     furosemide (LASIX) 20 MG tablet, Take 1 tablet by mouth every other day, Disp: 60 tablet, Rfl: 3    cephALEXin (KEFLEX) 500 MG capsule, Take 1 capsule by mouth 3 times daily, Disp: 15 capsule, Rfl: 0    albuterol sulfate  (90 Base) MCG/ACT inhaler, Inhale 2 puffs into the lungs 4 times daily as needed for Wheezing , Disp: , Rfl:     TOPROL XL 50 MG extended release tablet, Take 50 mg by mouth 2 times daily , Disp: , Rfl:     aspirin 81 MG tablet, Take 81 mg by mouth daily, Disp: , Rfl:     Family History   Problem Relation Age of Onset    Stomach Cancer Brother         stomach    High Blood Pressure Mother     Heart Disease Sister         irregular heartbeat       Social History     Social History    Marital status:      Spouse name: N/A    Number of children: N/A    Years of education: N/A     Occupational History    Not on file. Social History Main Topics    Smoking status: Former Smoker     Packs/day: 1.00     Years: 18.00     Quit date: 5/13/1993    Smokeless tobacco: Never Used    Alcohol use No    Drug use: No    Sexual activity: Not Currently     Other Topics Concern    Not on file     Social History Narrative    No narrative on file       Review of Systems:  Review of Systems   Constitution: Negative for chills and fever.    Cardiovascular: Negative for chest pain and irregular heartbeat. Respiratory: Negative for cough and shortness of breath. Musculoskeletal: Positive for back pain. Gastrointestinal: Negative for bowel incontinence and constipation. Genitourinary: Negative for bladder incontinence. Neurological: Negative for disturbances in coordination, loss of balance, numbness and tingling. Physical Exam:  Pulse 68   Resp 18 /67  T 97.4    Physical Exam   Constitutional: She is oriented to person, place, and time and well-developed, well-nourished, and in no distress. HENT:   Head: Normocephalic. Eyes: EOM are normal.   Neck: Normal range of motion. Pulmonary/Chest: Effort normal.   Musculoskeletal: Normal range of motion. Lumbar back: She exhibits tenderness and pain. Neurological: She is alert and oriented to person, place, and time. Skin: Skin is warm and dry. Psychiatric: Affect normal.       Record/Diagnostics Review:    XR Lumbar 2018 -      Moderate degenerative change of the lumbar spine is seen throughout. Evaluation is limited due to technical factors.  In particular, the flexion  view is nondiagnostic.  Cross-sectional imaging may be of value in this  patient.     Last DEYVI 5/2018 - appropriate    Assessment:  Problem List Items Addressed This Visit     Spinal stenosis, lumbar region, without neurogenic claudication - Primary (Chronic)    Medication monitoring encounter (Chronic)    History of total bilateral knee replacement    Relevant Medications    oxyCODONE (ROXICODONE) 5 MG immediate release tablet (Start on 8/23/2018)          Treatment Plan:  DISCUSSION: Treatment options discussed with patient and all questions answered to patient's satisfaction. Patient relates current medications are helping the pain. Patient reports taking pain medications as prescribed, denies obtaining medications from different sources and denies use of illegal drugs.  Patient denies side effects from medications like nausea, vomiting, constipation or drowsiness. Patient reports current activities of daily living are possible due to medications and would like to continue them. As always, we encourage daily stretching and strengthening exercises, and recommend minimizing use of pain medications unless patient cannot get through daily activities due to pain. TREATMENT OPTIONS:   Contract requirements met. Continue opioid therapy. Script written for oxycodone BID  Pt is medium risk with stress care. Due 11/2018 for next evaluation  Bilateral L4 epidural steroid injection. Pre-procedural instructions are reviewed and signed. Procedure is scheduled.   Follow up appointment made for 4 weeks

## 2018-08-23 ENCOUNTER — OFFICE VISIT (OUTPATIENT)
Dept: INTERNAL MEDICINE CLINIC | Age: 73
End: 2018-08-23
Payer: MEDICARE

## 2018-08-23 VITALS
WEIGHT: 268 LBS | SYSTOLIC BLOOD PRESSURE: 136 MMHG | BODY MASS INDEX: 44.65 KG/M2 | DIASTOLIC BLOOD PRESSURE: 78 MMHG | HEIGHT: 65 IN

## 2018-08-23 DIAGNOSIS — J42 CHRONIC BRONCHITIS, UNSPECIFIED CHRONIC BRONCHITIS TYPE (HCC): ICD-10-CM

## 2018-08-23 DIAGNOSIS — J42 ACUTE EXACERBATION OF CHRONIC BRONCHITIS (HCC): ICD-10-CM

## 2018-08-23 DIAGNOSIS — Z74.09 MOBILITY IMPAIRED: Primary | ICD-10-CM

## 2018-08-23 DIAGNOSIS — J20.9 ACUTE EXACERBATION OF CHRONIC BRONCHITIS (HCC): ICD-10-CM

## 2018-08-23 PROCEDURE — 99213 OFFICE O/P EST LOW 20 MIN: CPT | Performed by: INTERNAL MEDICINE

## 2018-08-23 ASSESSMENT — PATIENT HEALTH QUESTIONNAIRE - PHQ9
SUM OF ALL RESPONSES TO PHQ QUESTIONS 1-9: 0
SUM OF ALL RESPONSES TO PHQ9 QUESTIONS 1 & 2: 0
SUM OF ALL RESPONSES TO PHQ QUESTIONS 1-9: 0
2. FEELING DOWN, DEPRESSED OR HOPELESS: 0
1. LITTLE INTEREST OR PLEASURE IN DOING THINGS: 0

## 2018-08-23 ASSESSMENT — ENCOUNTER SYMPTOMS
WHEEZING: 1
EYE REDNESS: 0
BACK PAIN: 1
EYE REDNESS: 0
BACK PAIN: 0
ABDOMINAL DISTENTION: 0
APNEA: 0
EYE DISCHARGE: 0
CONSTIPATION: 0
EYE ITCHING: 0
CHEST TIGHTNESS: 0
CHEST TIGHTNESS: 0
CHOKING: 0
DIARRHEA: 0
CONSTIPATION: 0
COUGH: 1
EYE PAIN: 0
SHORTNESS OF BREATH: 0
ABDOMINAL PAIN: 0
COUGH: 0
BLOOD IN STOOL: 0
EYE ITCHING: 0
APNEA: 0
COLOR CHANGE: 0
EYE PAIN: 0
COLOR CHANGE: 0
ABDOMINAL DISTENTION: 0
DIARRHEA: 0
BLOOD IN STOOL: 0
EYE DISCHARGE: 0
ABDOMINAL PAIN: 0
CHOKING: 0
SHORTNESS OF BREATH: 1

## 2018-08-23 NOTE — PROGRESS NOTES
Subjective:      Patient ID: Danette Awan is a 67 y.o. female. Chronic Disease Visit Information    BP Readings from Last 3 Encounters:   07/24/18 114/82   07/23/18 139/80   07/17/18 (!) 114/59          Hemoglobin A1C (%)   Date Value   11/08/2016 5.3   02/24/2016 5.4   05/25/2012 5.7     LDL Cholesterol (mg/dL)   Date Value   06/07/2015 144 (H)     HDL (mg/dL)   Date Value   06/07/2015 40 (L)     BUN (mg/dL)   Date Value   07/17/2018 28 (H)     CREATININE (mg/dL)   Date Value   07/17/2018 1.23 (H)     Glucose (mg/dL)   Date Value   07/17/2018 82   05/26/2012 105            Have you changed or started any medications since your last visit including any over-the-counter medicines, vitamins, or herbal medicines? no   Are you having any side effects from any of your medications? -  no  Have you stopped taking any of your medications? Is so, why? -  no    Have you seen any other physician or provider since your last visit? No  Have you had any other diagnostic tests since your last visit? No  Have you been seen in the emergency room and/or had an admission to a hospital since we last saw you? No  Have you had your annual diabetic retinal (eye) exam? No  Have you had your routine dental cleaning in the past 6 months? no    Have you activated your [email or web address] account? If not, what are your barriers? Yes     Patient Care Team:  Edyta Gao MD as PCP - General (Internal Medicine)  Eneida Coughlin MD as PCP - S Attributed Provider  Yessi Ibrahim MD as Consulting Physician (Gastroenterology)  Chuck Keith MD as Consulting Physician (Cardiology)  Latoya Barragan MD as Consulting Physician (Pulmonology)     Chief Complaint   Patient presents with    COPD     pt is here for a power mobility exam. pt would like a power scooter. She is capable of using the power scooter in and out of her home. Home is handicap accessable. pt is unable to use a cane, walker or manual wheelchair due to lack of upper body strength.  pt has had

## 2018-08-28 ENCOUNTER — HOSPITAL ENCOUNTER (OUTPATIENT)
Dept: PAIN MANAGEMENT | Age: 73
Discharge: HOME | End: 2018-08-28
Payer: MEDICARE

## 2018-08-28 VITALS
HEIGHT: 64 IN | HEART RATE: 54 BPM | OXYGEN SATURATION: 96 % | BODY MASS INDEX: 45.75 KG/M2 | WEIGHT: 268 LBS | TEMPERATURE: 99.3 F | RESPIRATION RATE: 16 BRPM | SYSTOLIC BLOOD PRESSURE: 136 MMHG | DIASTOLIC BLOOD PRESSURE: 59 MMHG

## 2018-08-28 DIAGNOSIS — G89.29 CHRONIC BACK PAIN, UNSPECIFIED BACK LOCATION, UNSPECIFIED BACK PAIN LATERALITY: ICD-10-CM

## 2018-08-28 DIAGNOSIS — M54.9 CHRONIC BACK PAIN, UNSPECIFIED BACK LOCATION, UNSPECIFIED BACK PAIN LATERALITY: ICD-10-CM

## 2018-08-28 PROCEDURE — 6360000002 HC RX W HCPCS: Performed by: ANESTHESIOLOGY

## 2018-08-28 PROCEDURE — 64483 NJX AA&/STRD TFRM EPI L/S 1: CPT

## 2018-08-28 PROCEDURE — 64483 NJX AA&/STRD TFRM EPI L/S 1: CPT | Performed by: ANESTHESIOLOGY

## 2018-08-28 PROCEDURE — 99152 MOD SED SAME PHYS/QHP 5/>YRS: CPT | Performed by: ANESTHESIOLOGY

## 2018-08-28 RX ORDER — FENTANYL CITRATE 50 UG/ML
INJECTION, SOLUTION INTRAMUSCULAR; INTRAVENOUS
Status: COMPLETED | OUTPATIENT
Start: 2018-08-28 | End: 2018-08-28

## 2018-08-28 RX ADMIN — FENTANYL CITRATE 25 MCG: 50 INJECTION INTRAMUSCULAR; INTRAVENOUS at 10:11

## 2018-08-28 ASSESSMENT — PAIN DESCRIPTION - PROGRESSION: CLINICAL_PROGRESSION: GRADUALLY WORSENING

## 2018-08-28 ASSESSMENT — PAIN DESCRIPTION - PAIN TYPE: TYPE: CHRONIC PAIN

## 2018-08-28 ASSESSMENT — PAIN SCALES - GENERAL: PAINLEVEL_OUTOF10: 7

## 2018-08-28 ASSESSMENT — PAIN DESCRIPTION - LOCATION: LOCATION: BACK

## 2018-08-28 ASSESSMENT — PAIN - FUNCTIONAL ASSESSMENT
PAIN_FUNCTIONAL_ASSESSMENT: 0-10
PAIN_FUNCTIONAL_ASSESSMENT: 0-10

## 2018-08-28 ASSESSMENT — PAIN DESCRIPTION - FREQUENCY: FREQUENCY: CONTINUOUS

## 2018-08-28 ASSESSMENT — PAIN DESCRIPTION - ORIENTATION: ORIENTATION: LOWER

## 2018-08-28 ASSESSMENT — PAIN DESCRIPTION - ONSET: ONSET: PROGRESSIVE

## 2018-08-28 NOTE — OP NOTE
Patient Name: Shadia Garza   YOB: 1945  Room/Bed: Room/bed info not found  Medical Record Number: [id number]  Date: 8/28/2018       Preoperative Diagnosis:    Lumbar spinal stenosis with neurogenic claudication    Postoperative diagnosis:   Lumbar spinal stenosis with neurogenic claudication    Procedure Performed:  Transforaminal lumbar epidural steroid injection at the levels of L3 on the Bilateral side under fluoroscopic guidance. Procedure: The Patient was seen in the preop area, chart was reviewed, informed consent was obtained. Patient was taken to procedure room and was placed in prone position. Vital signs were monitored through out the  Procedure. A time out was completed. The skin over the back was prepped and draped in sterile manner. The target point was marked in ipsilateral obliques just below the pedicle at the target levels. Skin and deep tissues were anesthetized with 1 % lidocaine. A 20-gauge, spinal needle was advanced  under fluoroscopy guidance in AP / Oblique and lateral views, such that the tip of the needle lies in the neuroforamina at about the 6 o'clock position under the pedicle of the target vertebra. This was confirmed with AP and lateral views of the fluoroscopy. Then after negative aspiration contrast dye Omnipaque-180 was injected with live fluoroscopy in AP views that showed  spread of the contrast in the epidural space  and no vascular runoff or intrathecal spread. Finally 3 ml of treatment solution containing 5 ml of  1 % PF lidocaine and 1 ml of dexamethasone 10 mg / ml was injected. The same procedure was then repeated at left at L 3level with same technique. The remaining 3 ml of treatment solution was injected at that. The total dose of steroid injected today was dexamethasone 10 mg. The needle was removed and a Band-Aid was placed over the needle  insertion site.   The patient's vital signs remained stable and the patient tolerated the

## 2018-09-12 ENCOUNTER — HOSPITAL ENCOUNTER (OUTPATIENT)
Dept: PAIN MANAGEMENT | Age: 73
Discharge: HOME | End: 2018-09-12
Payer: MEDICARE

## 2018-09-12 VITALS
SYSTOLIC BLOOD PRESSURE: 135 MMHG | TEMPERATURE: 98.3 F | BODY MASS INDEX: 44.39 KG/M2 | WEIGHT: 260 LBS | DIASTOLIC BLOOD PRESSURE: 61 MMHG | RESPIRATION RATE: 18 BRPM | HEART RATE: 54 BPM | HEIGHT: 64 IN

## 2018-09-12 DIAGNOSIS — M48.062 SPINAL STENOSIS OF LUMBAR REGION WITH NEUROGENIC CLAUDICATION: ICD-10-CM

## 2018-09-12 DIAGNOSIS — Z51.81 MEDICATION MONITORING ENCOUNTER: Primary | Chronic | ICD-10-CM

## 2018-09-12 DIAGNOSIS — Z96.653 HISTORY OF TOTAL BILATERAL KNEE REPLACEMENT: ICD-10-CM

## 2018-09-12 PROCEDURE — 99213 OFFICE O/P EST LOW 20 MIN: CPT | Performed by: NURSE PRACTITIONER

## 2018-09-12 PROCEDURE — 99214 OFFICE O/P EST MOD 30 MIN: CPT

## 2018-09-12 RX ORDER — OXYCODONE HYDROCHLORIDE 5 MG/1
5 TABLET ORAL 2 TIMES DAILY PRN
Qty: 60 TABLET | Refills: 0 | Status: SHIPPED | OUTPATIENT
Start: 2018-09-22 | End: 2018-10-22 | Stop reason: SDUPTHER

## 2018-09-12 ASSESSMENT — ENCOUNTER SYMPTOMS
BACK PAIN: 1
COUGH: 0
CONSTIPATION: 0
SHORTNESS OF BREATH: 0

## 2018-09-12 NOTE — PROGRESS NOTES
Allergen Reactions    Rosuvastatin Calcium Anaphylaxis     Hair fell out    Statins Anaphylaxis     Hair fell out    Bactrim [Sulfamethoxazole-Trimethoprim] Diarrhea    Caffeine Other (See Comments)     pain  pain    Dye [Iodides] Other (See Comments)     Iv dye= blood clots in arm    Ioxaglate Other (See Comments)     Iv dye= blood clots in arm    Dicloxacillin Rash    Pcn [Penicillins] Rash         Current Outpatient Prescriptions:     oxyCODONE (ROXICODONE) 5 MG immediate release tablet, Take 1 tablet by mouth 2 times daily as needed for Pain for up to 30 days. ., Disp: 60 tablet, Rfl: 0    DULoxetine (CYMBALTA) 30 MG extended release capsule, TAKE 1 CAPSULE TWICE A DAY, Disp: 180 capsule, Rfl: 3    gabapentin (NEURONTIN) 100 MG capsule, Take 100 mg by mouth daily. ., Disp: , Rfl:     furosemide (LASIX) 20 MG tablet, Take 1 tablet by mouth every other day, Disp: 60 tablet, Rfl: 3    albuterol sulfate  (90 Base) MCG/ACT inhaler, Inhale 2 puffs into the lungs 4 times daily as needed for Wheezing , Disp: , Rfl:     TOPROL XL 50 MG extended release tablet, Take 50 mg by mouth 2 times daily , Disp: , Rfl:     aspirin 81 MG tablet, Take 81 mg by mouth daily, Disp: , Rfl:     Family History   Problem Relation Age of Onset    Stomach Cancer Brother         stomach    High Blood Pressure Mother     Heart Disease Sister         irregular heartbeat       Social History     Social History    Marital status:      Spouse name: N/A    Number of children: N/A    Years of education: N/A     Occupational History    Not on file.      Social History Main Topics    Smoking status: Former Smoker     Packs/day: 1.00     Years: 18.00     Quit date: 5/13/1993    Smokeless tobacco: Never Used    Alcohol use No    Drug use: No    Sexual activity: Not Currently     Other Topics Concern    Not on file     Social History Narrative    No narrative on file       Review of Systems:  Review of Systems

## 2018-09-20 ENCOUNTER — TELEPHONE (OUTPATIENT)
Dept: INTERNAL MEDICINE CLINIC | Age: 73
End: 2018-09-20

## 2018-09-28 ENCOUNTER — HOSPITAL ENCOUNTER (EMERGENCY)
Age: 73
Discharge: HOME | End: 2018-09-28
Attending: EMERGENCY MEDICINE
Payer: MEDICARE

## 2018-09-28 ENCOUNTER — APPOINTMENT (OUTPATIENT)
Dept: GENERAL RADIOLOGY | Age: 73
End: 2018-09-28
Payer: MEDICARE

## 2018-09-28 ENCOUNTER — APPOINTMENT (OUTPATIENT)
Dept: CT IMAGING | Age: 73
End: 2018-09-28
Payer: MEDICARE

## 2018-09-28 VITALS
TEMPERATURE: 98 F | DIASTOLIC BLOOD PRESSURE: 63 MMHG | RESPIRATION RATE: 20 BRPM | BODY MASS INDEX: 44.39 KG/M2 | OXYGEN SATURATION: 98 % | HEART RATE: 69 BPM | WEIGHT: 260 LBS | HEIGHT: 64 IN | SYSTOLIC BLOOD PRESSURE: 151 MMHG

## 2018-09-28 DIAGNOSIS — S32.018A OTHER CLOSED FRACTURE OF FIRST LUMBAR VERTEBRA, INITIAL ENCOUNTER (HCC): Primary | ICD-10-CM

## 2018-09-28 PROCEDURE — 71101 X-RAY EXAM UNILAT RIBS/CHEST: CPT

## 2018-09-28 PROCEDURE — 70450 CT HEAD/BRAIN W/O DYE: CPT

## 2018-09-28 PROCEDURE — 72131 CT LUMBAR SPINE W/O DYE: CPT

## 2018-09-28 PROCEDURE — 72128 CT CHEST SPINE W/O DYE: CPT

## 2018-09-28 PROCEDURE — 72125 CT NECK SPINE W/O DYE: CPT

## 2018-09-28 PROCEDURE — 6370000000 HC RX 637 (ALT 250 FOR IP): Performed by: EMERGENCY MEDICINE

## 2018-09-28 PROCEDURE — 73502 X-RAY EXAM HIP UNI 2-3 VIEWS: CPT

## 2018-09-28 PROCEDURE — 99284 EMERGENCY DEPT VISIT MOD MDM: CPT

## 2018-09-28 RX ORDER — HYDROCODONE BITARTRATE AND ACETAMINOPHEN 5; 325 MG/1; MG/1
1 TABLET ORAL ONCE
Status: COMPLETED | OUTPATIENT
Start: 2018-09-28 | End: 2018-09-28

## 2018-09-28 RX ADMIN — HYDROCODONE BITARTRATE AND ACETAMINOPHEN 1 TABLET: 5; 325 TABLET ORAL at 16:13

## 2018-09-28 ASSESSMENT — ENCOUNTER SYMPTOMS
SORE THROAT: 0
COLOR CHANGE: 0
VOMITING: 0
COUGH: 0
ABDOMINAL PAIN: 0
BACK PAIN: 1
NAUSEA: 0
SHORTNESS OF BREATH: 0
RHINORRHEA: 0
EYE PAIN: 0

## 2018-09-28 ASSESSMENT — PAIN DESCRIPTION - LOCATION
LOCATION: CHEST;RIB CAGE
LOCATION: CHEST;RIB CAGE

## 2018-09-28 ASSESSMENT — PAIN DESCRIPTION - ORIENTATION
ORIENTATION: LEFT
ORIENTATION: LEFT

## 2018-09-28 ASSESSMENT — PAIN SCALES - GENERAL
PAINLEVEL_OUTOF10: 10
PAINLEVEL_OUTOF10: 5
PAINLEVEL_OUTOF10: 10
PAINLEVEL_OUTOF10: 10

## 2018-09-28 ASSESSMENT — PAIN DESCRIPTION - FREQUENCY: FREQUENCY: INTERMITTENT

## 2018-09-28 ASSESSMENT — PAIN DESCRIPTION - PAIN TYPE
TYPE: ACUTE PAIN
TYPE: ACUTE PAIN

## 2018-09-28 ASSESSMENT — PAIN DESCRIPTION - DESCRIPTORS: DESCRIPTORS: SHARP

## 2018-09-28 NOTE — ED NOTES
Writer noted that Pt is off the floor for testing at this time.       Tessa Mortensen RN  09/28/18 1614

## 2018-09-28 NOTE — ED PROVIDER NOTES
[address]  Emergency Department Encounter  Emergency Medicine Resident     Pt Name: Mateus Holt  MRN: [id number]  [place] 1945  Date of evaluation: 9/28/18  PCP:  Antoni Soto MD    [address] [place] [address]       Chief Complaint   Patient presents with   [facility] Fall    Back Pain    Rib Injury       HISTORY OF PRESENT ILLNESS  (Location/Symptom, Timing/Onset, Context/Setting, Quality, Duration, Modifying Factors, Severity.)      Mateus Holt is a 67 y.o. female who presents with chief complaint of Left chest pain, neck pain and lower back pain. States that at about 12:30 this afternoon, while ambulating with her walker, she got tripped up and some carpet and fell forward. Patient cannot recall any trauma. Denies any loss of consciousness. Denies any weakness, numbness or tingling to her upper or lower extremities. Currently complaining of neck pain and lower back pain. Denies any history of surgery to her spine. Also complaining of pain to the left chest, which started after the fall. Denies any saddle anesthesia. Has been able to urinate without difficulty since the fall. PAST MEDICAL / SURGICAL / SOCIAL / FAMILY HISTORY      has a past medical history of Abnormality of rib determined by X-ray; Acute cystitis without hematuria; Acute exacerbation of chronic bronchitis ([address] Acute on chronic diastolic heart failure ([address] Adjustment disorder with mixed anxiety and depressed mood; Anemia; Back pain; Bronchiectasis with acute lower respiratory infection ([address] Bronchitis; Chronic bronchitis ([address] Chronic cough; Degenerative joint disease (DJD) of hip; Dyslipidemia; Encephalopathy acute; Essential hypertension; Fibromyalgia; GERD (gastroesophageal reflux disease); History of total bilateral knee replacement; Hx of blood clots; Hyperlipidemia; Hypertension; Incontinence of urine; Irregular heartbeat; Medication monitoring encounter;  Morbid obesity with BMI of 45.0-49.9, adult ([address] Obstructive sleep apnea syndrome; Osteoarthritis; Primary osteoarthritis of left knee; PVC (premature ventricular contraction); S/P right and left heart catheterization; Sleep apnea; SOB (shortness of breath); Spinal stenosis of lumbar region with neurogenic claudication; Supplemental oxygen dependent; Urine frequency; and UTI (urinary tract infection). has a past surgical history that includes Hysterectomy; Nerve Block (5/13/2013); Nerve Block (5/28/13); sinus surgery; Foot surgery (Left); Nerve Block (2/14/14); Colonoscopy (4 28 14); Nerve Block (07/14/2014); Nerve Block (7-21-14); Nerve Block (10/2/14); Nerve Block (11/9/2015); Nerve Block (11/16/15); Nerve Block (11/23/15); Total hip arthroplasty (Right, 5/14/15); bronchoscopy (Left, 8/16/2017); Cardiac catheterization (x 3); joint replacement (Bilateral, 5/27/2016); Nerve Block (03/28/2018); Nerve Block (05/23/2018); and Nerve Block (08/28/2018). Social History     Social History    Marital status:      Spouse name: N/A    Number of children: N/A    Years of education: N/A     Occupational History    Not on file. Social History Main Topics    Smoking status: Former Smoker     Packs/day: 1.00     Years: 18.00     Quit date: 5/13/1993    Smokeless tobacco: Never Used    Alcohol use No    Drug use: No    Sexual activity: Not Currently     Other Topics Concern    Not on file     Social History Narrative    No narrative on file       Family History   Problem Relation Age of Onset    Stomach Cancer Brother         stomach    High Blood Pressure Mother     Heart Disease Sister         irregular heartbeat       Allergies:  Rosuvastatin calcium; Statins; Bactrim [sulfamethoxazole-trimethoprim]; Caffeine; Dye [iodides]; Ioxaglate; Dicloxacillin; and Pcn [penicillins]    Home Medications:  Prior to Admission medications    Medication Sig Start Date End Date Taking?  Authorizing Provider   oxyCODONE (ROXICODONE) 5 MG immediate release tablet Take 1 tablet by mouth 2 times daily as needed for Pain for up to 30 days. . 9/22/18 10/22/18   Radha KARIN Mckeon - CNP   DULoxetine (CYMBALTA) 30 MG extended release capsule TAKE 1 CAPSULE TWICE A DAY 7/30/18   Gustavo Spence MD   gabapentin (NEURONTIN) 100 MG capsule Take 100 mg by mouth daily. Ernesto Frankymontez Historical Provider, MD   furosemide (LASIX) 20 MG tablet Take 1 tablet by mouth every other day 6/16/18   Celine Ennis MD   albuterol sulfate  (90 Base) MCG/ACT inhaler Inhale 2 puffs into the lungs 4 times daily as needed for Wheezing     Historical Provider, MD   TOPROL XL 50 MG extended release tablet Take 50 mg by mouth 2 times daily  10/24/17   Historical Provider, MD   aspirin 81 MG tablet Take 81 mg by mouth daily    Historical Provider, MD       REVIEW OF SYSTEMS    (2-9 systems for level 4, 10 or more for level 5)      Review of Systems   Constitutional: Negative for chills and fever. HENT: Negative for rhinorrhea and sore throat. Eyes: Negative for pain and visual disturbance. Respiratory: Negative for cough and shortness of breath. Cardiovascular: Negative for chest pain and palpitations. Gastrointestinal: Negative for abdominal pain, nausea and vomiting. Genitourinary: Negative for difficulty urinating and dysuria. Musculoskeletal: Positive for back pain and neck pain. Negative for arthralgias and myalgias. Skin: Negative for color change and wound. Neurological: Negative for weakness, numbness and headaches. Psychiatric/Behavioral: Negative for behavioral problems and dysphoric mood. PHYSICAL EXAM   (up to 7 for level 4, 8 or more for level 5)      INITIAL VITALS:   BP (!) 151/63   Pulse 69   Temp 98 °F (36.7 °C) (Temporal)   Resp 20   Ht 5' 4\" (1.626 m)   Wt 260 lb (117.9 kg)   SpO2 98%   BMI 44.63 kg/m²     Physical Exam   Constitutional: She is oriented to person, place, and time. She appears well-developed and well-nourished. No distress.    HENT: history of acute fall, back pain, and rib pain FINDINGS: CT head: BRAIN/VENTRICLES: Mild diffuse prominence of the sulci, cisterns, and extra-axial spaces. Atherosclerotic calcification of the bilateral distal vertebral and parasellar carotid arteries. Foramen magnum and 4th ventricle appear patent. Mild enlargement of the ventricles which are midline in position. No abnormal extra-axial fluid collections identified. No clear evidence for acute intracranial hemorrhage, mass, mass effect, or midline shift. Dolichoectasia of the vertebrobasilar system. Mild periventricular and subcortical white matter hypoattenuation, most consistent with mild chronic small vessel ischemic white matter disease ORBITS: The visualized portion of the orbits demonstrate no acute abnormality. SINUSES: Mild opacity of the inferior left mastoid air cells. Visualized paranasal sinuses are clear. Visualized right-sided mastoid air cells well pneumatized. SOFT TISSUES/SKULL: No acute abnormality of the visualized skull or soft tissues. CT cervical spine: Cervical spine limited in evaluation due to streak artifact primarily at the C4 to T1 levels. BONES/ALIGNMENT: Cervical spine imaged from the skull base to the mid T3 vertebral body level. Relative preservation of the vertebral body heights. Odontoid appears grossly intact. Lateral masses are symmetric in appearance. Occipital condyles articulate properly with the lateral masses. Articular pillars appear grossly intact on the coronal reconstructions. Axial images demonstrate no clear evidence for acute fracture within the cervical spine. DEGENERATIVE CHANGES: Severe multilevel intervertebral disc space narrowing with moderate to severe multilevel hypertrophic osteophyte spur formation. Mild kyphosis of the cervical spine. There is 4 mm anterolisthesis of C2 on C3. Diffuse osteopenia. Mild to moderate multilevel facet arthrosis.  SOFT TISSUES: There is no prevertebral soft tissue kyphosis of the cervical spine. There is 4 mm anterolisthesis of C2 on C3. Diffuse osteopenia. Mild to moderate multilevel facet arthrosis. SOFT TISSUES: There is no prevertebral soft tissue swelling. Atherosclerotic calcification of the thoracic aorta and proximal great vessels. Atherosclerotic calcification of the bilateral carotid bifurcations. Mild atelectasis and respiratory motion at the visualized lung apices. CT head: 1. No clear evidence for acute intracranial hemorrhage or midline shift. 2. Mild atrophy. Mild chronic small vessel ischemic white matter disease. Atherosclerotic calcification of the vasculature. 3. Mild opacity of the inferior left mastoid air cells. CT cervical spine: 1. Kyphosis of the cervical spine. There is 4 mm anterolisthesis of C2 on C3. 2. Moderate to severe degenerative changes in the cervical spine. Diffuse osteopenia. 3. No clear evidence for acute fracture within the cervical spine. 4. Atherosclerotic calcification of the vasculature. Ct Thoracic Spine Wo Contrast    Result Date: 9/28/2018  EXAMINATION: CT OF THE LUMBAR SPINE WITHOUT CONTRAST; CT OF THE THORACIC SPINE WITHOUT CONTRAST  9/28/2018 TECHNIQUE: CT of the lumbar spine was performed without the administration of intravenous contrast. Multiplanar reformatted images are provided for review. Dose modulation, iterative reconstruction, and/or weight based adjustment of the mA/kV was utilized to reduce the radiation dose to as low as reasonably achievable.; CT of the thoracic spine was performed without the administration of intravenous contrast. Multiplanar reformatted images are provided for review. Dose modulation, iterative reconstruction, and/or weight based adjustment of the mA/kV was utilized to reduce the radiation dose to as low as reasonably achievable.  COMPARISON: None HISTORY: ORDERING SYSTEM PROVIDED HISTORY: fall TECHNOLOGIST PROVIDED HISTORY: fall Ordering Physician Provided Reason for Exam: S/P Superior endplate compression of L1. Although this was present on prior plain film imaging in March 2018, it has worsened, and there are findings suggesting acute exacerbation of the pre-existing compression. Severe degenerative disc disease is noted with severe bilateral neural foraminal narrowing and canal stenosis as above. CT THORACIC SPINE: Grade 1 anterolisthesis T2 upon T3. Diffuse moderate degenerative and degenerative disc changes without evidence of acute fracture. Bilateral neural foraminal narrowing T3-T4 and T4-T5. Ct Lumbar Spine Wo Contrast    Result Date: 9/28/2018  EXAMINATION: CT OF THE LUMBAR SPINE WITHOUT CONTRAST; CT OF THE THORACIC SPINE WITHOUT CONTRAST  9/28/2018 TECHNIQUE: CT of the lumbar spine was performed without the administration of intravenous contrast. Multiplanar reformatted images are provided for review. Dose modulation, iterative reconstruction, and/or weight based adjustment of the mA/kV was utilized to reduce the radiation dose to as low as reasonably achievable.; CT of the thoracic spine was performed without the administration of intravenous contrast. Multiplanar reformatted images are provided for review. Dose modulation, iterative reconstruction, and/or weight based adjustment of the mA/kV was utilized to reduce the radiation dose to as low as reasonably achievable. COMPARISON: None HISTORY: ORDERING SYSTEM PROVIDED HISTORY: fall TECHNOLOGIST PROVIDED HISTORY: fall Ordering Physician Provided Reason for Exam: S/P Fall -  Back Pain and Rib Pain. Acuity: Acute Type of Exam: Initial Relevant Medical/Surgical History: No relevant surgical hx to area. FINDINGS: CT LUMBAR SPINE: BONES/ALIGNMENT: An 8 mm anterolisthesis L4 upon L5 is noted. Osteopenia limits assessment.   There is 20% superior endplate compression of L1 is noted increased over prior plain film of the lumbar spine of March 8, 2018, with faint lucency in this area on axial images suspicious for acute

## 2018-10-02 ENCOUNTER — OFFICE VISIT (OUTPATIENT)
Dept: ORTHOPEDIC SURGERY | Age: 73
End: 2018-10-02
Payer: MEDICARE

## 2018-10-02 VITALS — BODY MASS INDEX: 43.32 KG/M2 | HEIGHT: 65 IN | WEIGHT: 260 LBS

## 2018-10-02 DIAGNOSIS — M54.2 NECK PAIN: ICD-10-CM

## 2018-10-02 DIAGNOSIS — M43.10 ACQUIRED SPONDYLOLISTHESIS: ICD-10-CM

## 2018-10-02 DIAGNOSIS — M54.5 ACUTE LOW BACK PAIN, UNSPECIFIED BACK PAIN LATERALITY, WITH SCIATICA PRESENCE UNSPECIFIED: ICD-10-CM

## 2018-10-02 DIAGNOSIS — S32.010A CLOSED COMPRESSION FRACTURE OF FIRST LUMBAR VERTEBRA, INITIAL ENCOUNTER: ICD-10-CM

## 2018-10-02 DIAGNOSIS — M43.22 CERVICAL SPINE ANKYLOSIS: ICD-10-CM

## 2018-10-02 DIAGNOSIS — M48.02 CERVICAL SPINAL STENOSIS: Primary | ICD-10-CM

## 2018-10-02 DIAGNOSIS — M48.061 SPINAL STENOSIS OF LUMBAR REGION WITHOUT NEUROGENIC CLAUDICATION: ICD-10-CM

## 2018-10-02 PROBLEM — M54.50 ACUTE LOW BACK PAIN: Status: ACTIVE | Noted: 2018-10-02

## 2018-10-02 PROCEDURE — 99204 OFFICE O/P NEW MOD 45 MIN: CPT | Performed by: ORTHOPAEDIC SURGERY

## 2018-10-02 ASSESSMENT — ENCOUNTER SYMPTOMS: BACK PAIN: 1

## 2018-10-08 ENCOUNTER — TELEPHONE (OUTPATIENT)
Dept: ORTHOPEDIC SURGERY | Age: 73
End: 2018-10-08

## 2018-10-08 NOTE — TELEPHONE ENCOUNTER
Patients MRI Cervical and Lumbar both denied.  Scheduled a [id number] for tomorrow 10/9 at 9:15 with Dr. Almaz Dan

## 2018-10-10 ENCOUNTER — HOSPITAL ENCOUNTER (OUTPATIENT)
Dept: PAIN MANAGEMENT | Age: 73
Discharge: HOME | End: 2018-10-10
Payer: MEDICARE

## 2018-10-10 VITALS
SYSTOLIC BLOOD PRESSURE: 136 MMHG | HEART RATE: 67 BPM | RESPIRATION RATE: 16 BRPM | DIASTOLIC BLOOD PRESSURE: 68 MMHG | TEMPERATURE: 98.6 F

## 2018-10-10 DIAGNOSIS — S32.010S CLOSED COMPRESSION FRACTURE OF FIRST LUMBAR VERTEBRA, SEQUELA: ICD-10-CM

## 2018-10-10 DIAGNOSIS — Z99.81 SUPPLEMENTAL OXYGEN DEPENDENT: ICD-10-CM

## 2018-10-10 DIAGNOSIS — G89.29 ENCOUNTER FOR CHRONIC PAIN MANAGEMENT: Chronic | ICD-10-CM

## 2018-10-10 DIAGNOSIS — M48.062 SPINAL STENOSIS OF LUMBAR REGION WITH NEUROGENIC CLAUDICATION: Chronic | ICD-10-CM

## 2018-10-10 DIAGNOSIS — Z79.891 CHRONIC PRESCRIPTION OPIATE USE: Chronic | ICD-10-CM

## 2018-10-10 DIAGNOSIS — G47.33 OBSTRUCTIVE SLEEP APNEA SYNDROME: ICD-10-CM

## 2018-10-10 DIAGNOSIS — E66.01 MORBID OBESITY WITH BMI OF 45.0-49.9, ADULT (HCC): Primary | ICD-10-CM

## 2018-10-10 PROCEDURE — 99214 OFFICE O/P EST MOD 30 MIN: CPT

## 2018-10-10 PROCEDURE — 99214 OFFICE O/P EST MOD 30 MIN: CPT | Performed by: ANESTHESIOLOGY

## 2018-10-10 ASSESSMENT — PAIN DESCRIPTION - ONSET: ONSET: ON-GOING

## 2018-10-10 ASSESSMENT — PAIN SCALES - GENERAL: PAINLEVEL_OUTOF10: 8

## 2018-10-10 ASSESSMENT — PAIN DESCRIPTION - DESCRIPTORS: DESCRIPTORS: ACHING;SHARP;CONSTANT

## 2018-10-10 ASSESSMENT — PAIN DESCRIPTION - PROGRESSION: CLINICAL_PROGRESSION: GRADUALLY WORSENING

## 2018-10-10 ASSESSMENT — PAIN DESCRIPTION - PAIN TYPE: TYPE: CHRONIC PAIN

## 2018-10-10 ASSESSMENT — PAIN DESCRIPTION - FREQUENCY: FREQUENCY: CONTINUOUS

## 2018-10-10 ASSESSMENT — PAIN DESCRIPTION - LOCATION: LOCATION: BACK

## 2018-10-10 ASSESSMENT — PAIN DESCRIPTION - ORIENTATION: ORIENTATION: LOWER

## 2018-10-10 ASSESSMENT — ENCOUNTER SYMPTOMS
APNEA: 0
ABDOMINAL DISTENTION: 0

## 2018-10-10 NOTE — PROGRESS NOTES
prescribed dose and today she is out of her medication 12 days had offered her refill date. Current Pain Assessment  Pain Assessment  Pain Assessment: 0-10  Pain Level: 8  Pain Type: Chronic pain  Pain Location: Back  Pain Orientation: Lower  Pain Descriptors: Aching, Sharp, Constant  Pain Frequency: Continuous  Pain Onset: On-going  Clinical Progression: Gradually worsening        Associated Symptoms  1 Associated symptoms: weakness  2. Red Flags:    Chills NA              Weight Loss :No              Loss of Bladder Control :No              Loss of BowelControl: No  3. BMI 44.1       Previous management history:  1. Previous diagnostic workup: MRI has to get neck and back mri from fall on the 28th   2. Previous non interventionaltreatments tried:                  Physical Therapy:Yes stopped after fall                Chiropractic Treatments: NA  3. Previous Medications tried:  - NSAID's: Yes  - Neurontin: Yes  -Lyrica :No  - Trycyclic antidepressant:(Ellavil / Pamelor): Yes   - Cymbalta:Yes on   - Opioids (Ultram / Vicodin / Percocet / Morphine / Dilaudid / Oramorph/ Fentanyl etc.):Yes  4. Previous Interventional pain procedures tried: natalia transforminal 8/28 2 days help  5. Legal Issues related to pain complaint:NA  6. Last UDS :5/2018   7. Was it as anticipated?:No          Past Medical History      Diagnosis Date    Abnormality of rib determined by X-ray 1/28/2016    Acute cystitis without hematuria 6/16/2018    Acute exacerbation of chronic bronchitis (HCC)     Acute on chronic diastolic heart failure ([facility] 1/28/2016    Adjustment disorder with mixed anxiety and depressed mood 6/26/2015    Mild     Anemia 3/5/2014    Back pain     Bronchiectasis with acute lower respiratory infection ([facility] 8/16/2017    Bronchitis     Chronic bronchitis (HCC) 1/28/2016    Chronic cough 7/23/2018    Degenerative joint disease (DJD) of hip 5/14/2015    Dyslipidemia 3/5/2014    Encephalopathy acute 5/29/2016    Essential hypertension 1/28/2016    Fibromyalgia     GERD (gastroesophageal reflux disease)     History of total bilateral knee replacement 5/28/2016    Hx of blood clots     left leg and lung    Hyperlipidemia     Hypertension     Incontinence of urine     Irregular heartbeat     DR. Debbie Rebolledo Medication monitoring encounter 6/13/2018    Morbid obesity with BMI of 45.0-49.9, adult ([facility] 1/28/2016    Obstructive sleep apnea syndrome     Osteoarthritis     Primary osteoarthritis of left knee 5/27/2016    PVC (premature ventricular contraction) 1/28/2016    S/P right and left heart catheterization 10/2/2015    Sleep apnea     no machine    SOB (shortness of breath)     Spinal stenosis of lumbar region with neurogenic claudication 5/13/2013    Supplemental oxygen dependent 12/8/2017    Urine frequency     UTI (urinary tract infection)     hospitalized early july 2014 for kidney infection       Surgical History  Past Surgical History:   Procedure Laterality Date    BRONCHOSCOPY Left 8/16/2017    BRONCHOSCOPY ALVEOLAR LAVAGE LOWER LOBE performed by Mary Richards MD at [address]  x 3    no stents    COLONOSCOPY  4 28 14    polyps biopsies     FOOT SURGERY Left     calcium deposit removal from top of foot    HYSTERECTOMY      JOINT REPLACEMENT Bilateral 5/27/2016    bilat total knees    NERVE BLOCK  5/13/2013    caudal celestone 6mg    NERVE BLOCK  5/28/13    Caudal #2, Celestone 9mg    NERVE BLOCK  2/14/14    rt hip inj celestone 9 mg    NERVE BLOCK  07/14/2014    caudal# 3- celestone 9 mg    NERVE BLOCK  7-21-14    caudal epidural #2, decadron 7mg, fentanyl 25mcg    NERVE BLOCK  10/2/14    duramorph 1.5  decadron 5mg    NERVE BLOCK  11/9/2015    caudal# 1 celestone 9mg    NERVE BLOCK  11/16/15    caudal #2  decadron 10mg    NERVE BLOCK  11/23/15    duramorph 1mg celestone 9mg    NERVE BLOCK  03/28/2018    CAUDAL EPIDURAL STEROID BLOCK  DECADRON 10 MG     Negative for apnea. Gastrointestinal: Negative for abdominal distention. Endocrine: Negative for cold intolerance. Neurological: Negative for dizziness. Psychiatric/Behavioral: Negative for agitation. Objective    n/a  Assessment & Plan     1. Morbid obesity with BMI of 45.0-49.9, adult ([facility]    2. Obstructive sleep apnea syndrome    3. Closed compression fracture of first lumbar vertebra, sequela    4. Spinal stenosis of lumbar region with neurogenic claudication    5. Supplemental oxygen dependent    6. Chronic prescription opiate use    7. Encounter for chronic pain management      - Again today I discussed in detail with patient that she is a high-risk patient for opioid-related complication considering for pulmonary reserve, obstructive sleep apnea, advanced age and morbid obesity.    - Refill is not due until September 22, I have expanded the patient I will not provide any early refill, she will only be prescribed oxycodone 5 mg twice a day, I do not recommend any further dose escalation of opioid    - She is advised to follow up with the spine surgeon to discuss kyphoplasty for L1 compression fracture and cervical and lumbar spine interventions. I think the a safer way to manage her pain will be an intrathecal pain pump option which she is advised to discuss with Dr. Alicja Grey    This note was created using voice recognition software. There may be inaccuracies of transcription  that are inadvertently overlooked prior to the signature. There is anyquestions about the transcription please contact me.

## 2018-10-10 NOTE — SIGNIFICANT EVENT
Dr Clinton Gutierrez saw patient today does not agree with increasing pain meds patient no longer wants to come here contract terminated as of today  The patient will follow up with  for possible kyphoplasty and neck surgery in near future

## 2018-10-16 ENCOUNTER — TELEPHONE (OUTPATIENT)
Dept: ORTHOPEDIC SURGERY | Age: 73
End: 2018-10-16

## 2018-10-16 ENCOUNTER — TELEPHONE (OUTPATIENT)
Dept: INTERNAL MEDICINE CLINIC | Age: 73
End: 2018-10-16

## 2018-10-22 ENCOUNTER — HOSPITAL ENCOUNTER (OUTPATIENT)
Dept: PAIN MANAGEMENT | Age: 73
Discharge: HOME | End: 2018-10-22
Payer: MEDICARE

## 2018-10-22 ENCOUNTER — TELEPHONE (OUTPATIENT)
Dept: INTERNAL MEDICINE CLINIC | Age: 73
End: 2018-10-22

## 2018-10-22 VITALS
OXYGEN SATURATION: 98 % | DIASTOLIC BLOOD PRESSURE: 84 MMHG | SYSTOLIC BLOOD PRESSURE: 147 MMHG | RESPIRATION RATE: 20 BRPM

## 2018-10-22 DIAGNOSIS — S32.010A CLOSED COMPRESSION FRACTURE OF FIRST LUMBAR VERTEBRA, INITIAL ENCOUNTER: Primary | ICD-10-CM

## 2018-10-22 DIAGNOSIS — Z96.653 STATUS POST TOTAL BILATERAL KNEE REPLACEMENT: ICD-10-CM

## 2018-10-22 DIAGNOSIS — M47.817 LUMBOSACRAL SPONDYLOSIS WITHOUT MYELOPATHY: ICD-10-CM

## 2018-10-22 DIAGNOSIS — M51.36 DDD (DEGENERATIVE DISC DISEASE), LUMBAR: ICD-10-CM

## 2018-10-22 DIAGNOSIS — M48.062 SPINAL STENOSIS OF LUMBAR REGION WITH NEUROGENIC CLAUDICATION: ICD-10-CM

## 2018-10-22 DIAGNOSIS — M80.00XA AGE-RELATED OSTEOPOROSIS WITH CURRENT PATHOLOGICAL FRACTURE, INITIAL ENCOUNTER: ICD-10-CM

## 2018-10-22 DIAGNOSIS — Z87.09 HISTORY OF ASBESTOSIS: ICD-10-CM

## 2018-10-22 DIAGNOSIS — M79.7 FIBROMYALGIA: ICD-10-CM

## 2018-10-22 DIAGNOSIS — Z96.653 HISTORY OF TOTAL BILATERAL KNEE REPLACEMENT: ICD-10-CM

## 2018-10-22 DIAGNOSIS — E66.01 MORBID OBESITY WITH BMI OF 45.0-49.9, ADULT (HCC): ICD-10-CM

## 2018-10-22 PROCEDURE — 99204 OFFICE O/P NEW MOD 45 MIN: CPT

## 2018-10-22 PROCEDURE — 99204 OFFICE O/P NEW MOD 45 MIN: CPT | Performed by: PAIN MEDICINE

## 2018-10-22 RX ORDER — OXYCODONE HYDROCHLORIDE 5 MG/1
5 TABLET ORAL EVERY 8 HOURS PRN
Qty: 90 TABLET | Refills: 0 | Status: SHIPPED | OUTPATIENT
Start: 2018-10-22 | End: 2018-11-12 | Stop reason: SDUPTHER

## 2018-10-22 RX ORDER — GABAPENTIN 100 MG/1
CAPSULE ORAL
COMMUNITY
End: 2018-10-22

## 2018-10-22 RX ORDER — OFLOXACIN 3 MG/ML
SOLUTION/ DROPS OPHTHALMIC
Refills: 0 | COMMUNITY
Start: 2018-09-15 | End: 2018-10-31

## 2018-10-22 RX ORDER — OXYCODONE HYDROCHLORIDE 5 MG/1
5 TABLET ORAL EVERY 8 HOURS PRN
Qty: 90 TABLET | Refills: 0 | Status: SHIPPED | OUTPATIENT
Start: 2018-10-22 | End: 2018-10-31 | Stop reason: SDUPTHER

## 2018-10-22 RX ORDER — IBUPROFEN 600 MG/1
600 TABLET ORAL EVERY 6 HOURS PRN
COMMUNITY
End: 2020-02-03

## 2018-10-22 ASSESSMENT — ENCOUNTER SYMPTOMS
TROUBLE SWALLOWING: 0
CHEST TIGHTNESS: 0
PHOTOPHOBIA: 0
CHOKING: 0
VOMITING: 0
SINUS PRESSURE: 1
EYE ITCHING: 0
NAUSEA: 0
SINUS PAIN: 1
SORE THROAT: 1
ANAL BLEEDING: 0
EYE DISCHARGE: 0
RECTAL PAIN: 0
EYE REDNESS: 0
BACK PAIN: 1
COUGH: 1
VOICE CHANGE: 0
WHEEZING: 1
SHORTNESS OF BREATH: 1
DIARRHEA: 0
APNEA: 0
ABDOMINAL DISTENTION: 0
STRIDOR: 0
BLOOD IN STOOL: 0
FACIAL SWELLING: 0
RHINORRHEA: 1
CONSTIPATION: 0
ABDOMINAL PAIN: 0
COLOR CHANGE: 0
EYE PAIN: 0

## 2018-10-22 ASSESSMENT — PAIN DESCRIPTION - PROGRESSION: CLINICAL_PROGRESSION: GRADUALLY WORSENING

## 2018-10-22 ASSESSMENT — PAIN DESCRIPTION - FREQUENCY: FREQUENCY: CONTINUOUS

## 2018-10-22 ASSESSMENT — PAIN DESCRIPTION - LOCATION: LOCATION: BACK

## 2018-10-22 ASSESSMENT — PAIN SCALES - GENERAL: PAINLEVEL_OUTOF10: 8

## 2018-10-22 ASSESSMENT — PAIN DESCRIPTION - PAIN TYPE: TYPE: CHRONIC PAIN

## 2018-10-22 ASSESSMENT — PAIN DESCRIPTION - ONSET: ONSET: ON-GOING

## 2018-10-22 ASSESSMENT — PAIN DESCRIPTION - ORIENTATION: ORIENTATION: LOWER

## 2018-10-22 NOTE — PROGRESS NOTES
approximately 50% pain relief for up to one year duration. Injections were all prior to back surgery. Patient has not received an injection since back surgery. Patient interested in kyphoplasty. States that she discussed this option with Dr. Rosario Maya but that he recommended cervical surgery prior to kyphoplasty. Patient is uninterested in neck surgery at this time and wishes to proceed with kyphoplasty. Patient acknowledges that she was supposed to complete an MRI scan but could not due to upper respiratory symptoms. Patient states that she is not on any blood thinners and that was told to stop aspirin after her fall on 9/28. OARRS compliant? yes  Concern for prescription abuse?no    Current Pain Assessment  Pain Assessment  Pain Assessment: 0-10  Pain Level: 8  Pain Type: Chronic pain  Pain Location: Back  Pain Orientation: Lower  Pain Descriptors: Aching, Constant, Cramping, Crushing, Discomfort, Dull, Numbness, Pressure, Radiating, Sharp  Pain Frequency: Continuous  Pain Onset: On-going  Clinical Progression: Gradually worsening  Effect of Pain on Daily Activities:  (unable to walk without having pain)  Patient's Stated Pain Goal: 2  Pain Intervention(s): Medication (see eMar), Heat applied  RASS Score (Ventilated): Alert and calm  POSS Score (Patient Ctrl Analgesia): 1                    ADVERSE MEDICATION EFFECTS:   Constipation: no  Bowel Regimen: No  Diet: common adult  Appetite:  ok  Sedation:  no  Urinary Retention: no    FOCUSED PAIN SCALE:  Highest : 10  Lowest : 7  Average: Range-varies with activity  When and What  was your last procedure:   Had injections in the past   Was your procedureeffective: only lasted a short time    ACTIVITY/SOCIAL/EMOTIONAL:  Sleep Pattern: 6 hours per night. difficulty falling asleep  Energy Level: Tired/Fatigued  Currently attending Physical Therapy:  No  Home Exercises: none due to pain  Mobility: uses a walker and wheelchair  Currently seeing a 5/27/2016    PVC (premature ventricular contraction) 1/28/2016    S/P right and left heart catheterization 10/2/2015    Sleep apnea     no machine    SOB (shortness of breath)     Spinal stenosis of lumbar region with neurogenic claudication 5/13/2013    Supplemental oxygen dependent 12/8/2017    Urine frequency     UTI (urinary tract infection)     hospitalized early july 2014 for kidney infection       Surgical History  Past Surgical History:   Procedure Laterality Date    BRONCHOSCOPY Left 8/16/2017    BRONCHOSCOPY ALVEOLAR LAVAGE LOWER LOBE performed by Lio Cuevas [facility]  x 3    no stents    COLONOSCOPY  4 28 14    polyps biopsies     FOOT SURGERY Left     calcium deposit removal from top of foot    HYSTERECTOMY      JOINT REPLACEMENT Bilateral 5/27/2016    bilat total knees    NERVE BLOCK  5/13/2013    caudal celestone 6mg    NERVE BLOCK  5/28/13    Caudal #2, Celestone 9mg    NERVE BLOCK  2/14/14    rt hip inj celestone 9 mg    NERVE BLOCK  07/14/2014    caudal# 3- celestone 9 mg    NERVE BLOCK  7-21-14    caudal epidural #2, decadron 7mg, fentanyl 25mcg    NERVE BLOCK  10/2/14    duramorph 1.5  decadron 5mg    NERVE BLOCK  11/9/2015    caudal# 1 celestone 9mg    NERVE BLOCK  11/16/15    caudal #2  decadron 10mg    NERVE BLOCK  11/23/15    duramorph 1mg celestone 9mg    NERVE BLOCK  03/28/2018    CAUDAL EPIDURAL STEROID BLOCK  DECADRON 10 MG     NERVE BLOCK  05/23/2018    caudal # decadron 7mg    NERVE BLOCK  08/28/2018    natalia transforminal # 1, decadron 10mg gadoteridol, LONG NEEDLES    SINUS SURGERY      TOTAL HIP ARTHROPLASTY Right 5/14/15    total hip replacement       Medications  Current Outpatient Prescriptions   Medication Sig Dispense Refill    ibuprofen (ADVIL;MOTRIN) 600 MG tablet Take 600 mg by mouth every 6 hours as needed for Pain      ofloxacin (OCUFLOX) 0.3 % solution   0    oxyCODONE (ROXICODONE) 5 MG immediate release prescribers. Patient is  forthcoming regarding prescriptions for pain medication in the past  Controlled Substances Monitoring: The following screens were also reviewed  SOAPP- the score is 1. (Values:cates patient is  <4minimal potential  4-7 Moderate potential  >7 High potential  for drug addiction  Counselling/Preventive measures for pain  Control:    [x]  Spine strengthening exercises are discussed with patient in detail. No orders of the defined types were placed in this encounter. Decision Making Process : Patient's health history and referral records thoroughly reviewed before focused physical examination and discussion with patient. Over 50% of today's visit is spent on examining the patient and counseling. Level of complexity of date to be reviewed is Moderate. The chart date reviewed include the following: Imaging Reports. Summary of Care. Time spent reviewing with patient the below reports:   Medication safety, Treatment options. Level of diagnosis and management options of this case is multiple: involving the following management options: Interventions as needed, medication management as appropriate, future visits, activity modification, heat/ice as needed, Urine drug screen as required. [x]The patient's questions were answered to the best of my abilities. Schedule kyphoplasty at L1. Follow-up/Return in  2 weeks  with Usman Pantoja M.D.  for further plan of treatment. This note was created using voice recognition software. There may be inaccuracies of transcription  that are inadvertently overlooked prior to the signature. There is any questions about the transcription please contact me. Electronically signed by Nika Mann MD on 10/22/2018 at 11:26 AM    NAME:  Rell Childress  MRN: [id number]   YOB: 1945   Date: 10/22/2018   Age: 67 y.o.   Gender: female       Rell Childress is 67 y.o. ,female, referred because of Back Pain        I Performed a history and physical examination of the patient and discussed management with the resident/ Physician Assistant/ Nurse Practitioner. I reviewed the Resident/ Physician Assistant/ Nurse Practitioner note and agree with the documented findings and plan of care. Any areas of disagreement are noted on the chart. I was present for any key portions of any procedure. I have documented in the chart those procedures where I was not present during key Portions. I agree with the chief complaint, past medical history, past surgical history, Social & family history, Allergies, medications as documented unless noted below. I have personally evaluated this patient and have completed at least one if not all key elements of the (History, physical exam and plan of care). Additional findings are added to the note above    Diagnosis:   1. Closed compression fracture of first lumbar vertebra, initial encounter ([facility] [address]    2. Spinal stenosis of lumbar region with neurogenic claudication    3. DDD (degenerative disc disease), lumbar    4. Lumbosacral spondylosis without myelopathy    5. Morbid obesity with BMI of 45.0-49.9, adult ([facility] [address]    6. History of asbestosis    7. Fibromyalgia    8. Status post total bilateral knee replacement    9. History of total bilateral knee replacement    10. Age-related osteoporosis with current pathological fracture, initial encounter        Plan of Care:   Patient's   [] x-ray    [x] CT scan    [] MRI  Were/was  Reviewed. These findings are consistent with the patient's symptoms and physical examination.       [x] Patient's findings on the x-ray were explained to the patient using a bone modal.    Other reports reviewed include    [] Bone scan   [] EMG and nerve conduction studies   [x] Referral reports-  I also discussed with him the following treatment options Including advantages and disadvantages of each:    [x] Physical therapy    [x] Interventional pain treatment    [] Medication

## 2018-10-23 ENCOUNTER — OFFICE VISIT (OUTPATIENT)
Dept: PULMONOLOGY | Age: 73
End: 2018-10-23
Payer: MEDICARE

## 2018-10-23 VITALS
WEIGHT: 259.92 LBS | RESPIRATION RATE: 14 BRPM | HEART RATE: 97 BPM | SYSTOLIC BLOOD PRESSURE: 132 MMHG | BODY MASS INDEX: 44.38 KG/M2 | DIASTOLIC BLOOD PRESSURE: 72 MMHG | HEIGHT: 64 IN | OXYGEN SATURATION: 95 %

## 2018-10-23 DIAGNOSIS — J96.11 CHRONIC RESPIRATORY FAILURE WITH HYPOXIA (HCC): ICD-10-CM

## 2018-10-23 DIAGNOSIS — R06.09 DYSPNEA ON EXERTION: ICD-10-CM

## 2018-10-23 DIAGNOSIS — G47.33 OBSTRUCTIVE SLEEP APNEA: Primary | ICD-10-CM

## 2018-10-23 DIAGNOSIS — J47.9 BRONCHIECTASIS WITHOUT COMPLICATION (HCC): ICD-10-CM

## 2018-10-23 DIAGNOSIS — E66.01 MORBID OBESITY WITH BMI OF 40.0-44.9, ADULT (HCC): ICD-10-CM

## 2018-10-23 PROCEDURE — 99205 OFFICE O/P NEW HI 60 MIN: CPT | Performed by: INTERNAL MEDICINE

## 2018-10-23 NOTE — PROGRESS NOTES
purulent sputum, she claims that when she has cough and is mostly dry without much sputum production, she denies waking up at night with wheezing chest tightness cough or shortness of breath. She is on home oxygen and she claims that she does not use oxygen all the time most of the time and she use oxygen it is at night and occasionally during the daytime, according to patient she had methacholine challenge test done in the past and she was told that she does not have any asthma     She had pulmonary function test done last year which shows normal spirometry and normal lung volume but diffusion capacity was reduced and according to report diffusion capacity was normal when corrected for alveolar volume. She had echo done last year and according to echocardiogram she has normal LV function and she has normal RV systolic function. She had CT scan of the chest done multiple times in the past and previous CT scan of the chest from 2015 2017 and 2018 shows chronic changes in the lungs mostly in the left lower lung and lingula with areas of bronchiectasis in left lower lobe and slightly in right lower lobe.       Past Medical History:        Diagnosis Date    Abnormality of rib determined by X-ray 1/28/2016    Acute cystitis without hematuria 6/16/2018    Acute exacerbation of chronic bronchitis (HCC)     Acute on chronic diastolic heart failure ([address] 1/28/2016    Adjustment disorder with mixed anxiety and depressed mood 6/26/2015    Mild     Anemia 3/5/2014    Back pain     Bronchiectasis with acute lower respiratory infection ([address] 8/16/2017    Bronchitis     Chronic bronchitis (HCC) 1/28/2016    Chronic cough 7/23/2018    Degenerative joint disease (DJD) of hip 5/14/2015    Dyslipidemia 3/5/2014    Encephalopathy acute 5/29/2016    Essential hypertension 1/28/2016    Fibromyalgia     GERD (gastroesophageal reflux disease)     History of total bilateral knee replacement 5/28/2016    Hx of blood Disease Sister         irregular heartbeat       Immunizations:    Immunization History   Administered Date(s) Administered    Pneumococcal 13-valent Conjugate ([id number]) 07/23/2018    Pneumococcal Polysaccharide ([id number]) 05/27/2012         REVIEW OF SYSTEMS:  CONSTITUTIONAL:  positive for  fatigue  negative for  fevers, chills, sweats, malaise, anorexia and weight loss  EYES:  negative for  double vision, blurred vision, dry eyes, eye discharge, visual disturbance, irritation, redness and icterus  HEENT:  negative for  hearing loss, tinnitus, nasal congestion, epistaxis, sore mouth, sore throat, hoarseness and voice change  RESPIRATORY:  positive for  dry cough and dyspnea  negative for  cough with sputum, wheezing, hemoptysis, chest pain, pleuritic pain and cyanosis  CARDIOVASCULAR:  positive for  dyspnea, orthopnea  negative for  chest pain, palpitations, PND, exertional chest pressure/discomfort, early saiety, edema, syncope  GASTROINTESTINAL:  negative for nausea, vomiting, change in bowel habits, diarrhea, constipation, abdominal pain, abdominal distention, jaundice, dysphagia, reflux, odynophagia, hematemesis and hemtochezia  GENITOURINARY:  negative for frequency, dysuria, nocturia, urinary incontinence, hesitancy, decreased stream and hematuria  HEMATOLOGIC/LYMPHATIC:  negative for easy bruising, bleeding, lymphadenopathy and petechiae  ALLERGIC/IMMUNOLOGIC:  negative for recurrent infections, urticaria, hay fever, angioedema, anaphylaxis and drug reactions  ENDOCRINE:  negative for heat intolerance, cold intolerance, weight changes and change in bowel habits  MUSCULOSKELETAL:  Positive for back pain, knee pain, joints pain, gait abnormalities, muscle weakness negative for  myalgias, arthralgias and joint swelling  NEUROLOGICAL:  negative for headaches, dizziness, seizures, memory problems, speech problems, visual disturbance, coordination problems, tremor, dysphagia, numbness, syncope and

## 2018-10-26 ENCOUNTER — TELEPHONE (OUTPATIENT)
Dept: ORTHOPEDIC SURGERY | Age: 73
End: 2018-10-26

## 2018-10-29 ENCOUNTER — HOSPITAL ENCOUNTER (OUTPATIENT)
Age: 73
Discharge: HOME | End: 2018-10-30
Attending: PAIN MEDICINE | Admitting: PAIN MEDICINE
Payer: MEDICARE

## 2018-10-29 ENCOUNTER — ANESTHESIA EVENT (OUTPATIENT)
Dept: OPERATING ROOM | Age: 73
End: 2018-10-29
Payer: MEDICARE

## 2018-10-29 ENCOUNTER — APPOINTMENT (OUTPATIENT)
Dept: GENERAL RADIOLOGY | Age: 73
End: 2018-10-29
Attending: PAIN MEDICINE
Payer: MEDICARE

## 2018-10-29 ENCOUNTER — ANESTHESIA (OUTPATIENT)
Dept: OPERATING ROOM | Age: 73
End: 2018-10-29
Payer: MEDICARE

## 2018-10-29 VITALS — DIASTOLIC BLOOD PRESSURE: 62 MMHG | SYSTOLIC BLOOD PRESSURE: 116 MMHG | OXYGEN SATURATION: 62 %

## 2018-10-29 DIAGNOSIS — S32.010G CLOSED COMPRESSION FRACTURE OF L1 LUMBAR VERTEBRA WITH DELAYED HEALING, SUBSEQUENT ENCOUNTER: Primary | ICD-10-CM

## 2018-10-29 DIAGNOSIS — M80.00XP AGE-RELATED OSTEOPOROSIS WITH CURRENT PATHOLOGICAL FRACTURE WITH MALUNION, SUBSEQUENT ENCOUNTER: ICD-10-CM

## 2018-10-29 LAB
ANION GAP SERPL CALCULATED.3IONS-SCNC: 12 MMOL/L (ref 9–17)
BUN BLDV-MCNC: 27 MG/DL (ref 8–23)
BUN/CREAT BLD: ABNORMAL (ref 9–20)
CALCIUM SERPL-MCNC: 9.2 MG/DL (ref 8.6–10.4)
CHLORIDE BLD-SCNC: 103 MMOL/L (ref 98–107)
CO2: 24 MMOL/L (ref 20–31)
CREAT SERPL-MCNC: 1.06 MG/DL (ref 0.5–0.9)
EKG ATRIAL RATE: 82 BPM
EKG P AXIS: 66 DEGREES
EKG P-R INTERVAL: 186 MS
EKG Q-T INTERVAL: 422 MS
EKG QRS DURATION: 88 MS
EKG QTC CALCULATION (BAZETT): 493 MS
EKG R AXIS: -6 DEGREES
EKG T AXIS: 60 DEGREES
EKG VENTRICULAR RATE: 82 BPM
GFR AFRICAN AMERICAN: >60 ML/MIN
GFR NON-AFRICAN AMERICAN: 51 ML/MIN
GFR SERPL CREATININE-BSD FRML MDRD: ABNORMAL ML/MIN/{1.73_M2}
GFR SERPL CREATININE-BSD FRML MDRD: ABNORMAL ML/MIN/{1.73_M2}
GLUCOSE BLD-MCNC: 129 MG/DL (ref 70–99)
HCT VFR BLD CALC: 34.9 % (ref 36–46)
HEMOGLOBIN: 11.5 G/DL (ref 12–16)
MCH RBC QN AUTO: 26 PG (ref 26–34)
MCHC RBC AUTO-ENTMCNC: 33 G/DL (ref 31–37)
MCV RBC AUTO: 78.6 FL (ref 80–100)
NRBC AUTOMATED: ABNORMAL PER 100 WBC
PDW BLD-RTO: 15.4 % (ref 11.5–14.9)
PLATELET # BLD: 289 K/UL (ref 150–450)
PMV BLD AUTO: 9.6 FL (ref 6–12)
POTASSIUM SERPL-SCNC: 4.5 MMOL/L (ref 3.7–5.3)
RBC # BLD: 4.44 M/UL (ref 4–5.2)
SODIUM BLD-SCNC: 139 MMOL/L (ref 135–144)
WBC # BLD: 4.4 K/UL (ref 3.5–11)

## 2018-10-29 PROCEDURE — 2580000003 HC RX 258: Performed by: PAIN MEDICINE

## 2018-10-29 PROCEDURE — 85027 COMPLETE CBC AUTOMATED: CPT

## 2018-10-29 PROCEDURE — C1713 ANCHOR/SCREW BN/BN,TIS/BN: HCPCS | Performed by: PAIN MEDICINE

## 2018-10-29 PROCEDURE — 72100 X-RAY EXAM L-S SPINE 2/3 VWS: CPT

## 2018-10-29 PROCEDURE — 36415 COLL VENOUS BLD VENIPUNCTURE: CPT

## 2018-10-29 PROCEDURE — 6360000002 HC RX W HCPCS: Performed by: NURSE ANESTHETIST, CERTIFIED REGISTERED

## 2018-10-29 PROCEDURE — 2500000003 HC RX 250 WO HCPCS: Performed by: NURSE ANESTHETIST, CERTIFIED REGISTERED

## 2018-10-29 PROCEDURE — 80048 BASIC METABOLIC PNL TOTAL CA: CPT

## 2018-10-29 PROCEDURE — 3209999900 FLUORO FOR SURGICAL PROCEDURES

## 2018-10-29 PROCEDURE — 6370000000 HC RX 637 (ALT 250 FOR IP): Performed by: PAIN MEDICINE

## 2018-10-29 PROCEDURE — 6360000002 HC RX W HCPCS: Performed by: PAIN MEDICINE

## 2018-10-29 PROCEDURE — 3700000000 HC ANESTHESIA ATTENDED CARE: Performed by: PAIN MEDICINE

## 2018-10-29 PROCEDURE — 7100000001 HC PACU RECOVERY - ADDTL 15 MIN: Performed by: PAIN MEDICINE

## 2018-10-29 PROCEDURE — 3600000012 HC SURGERY LEVEL 2 ADDTL 15MIN: Performed by: PAIN MEDICINE

## 2018-10-29 PROCEDURE — A9579 GAD-BASE MR CONTRAST NOS,1ML: HCPCS | Performed by: PAIN MEDICINE

## 2018-10-29 PROCEDURE — 2500000003 HC RX 250 WO HCPCS: Performed by: PAIN MEDICINE

## 2018-10-29 PROCEDURE — C1894 INTRO/SHEATH, NON-LASER: HCPCS | Performed by: PAIN MEDICINE

## 2018-10-29 PROCEDURE — 88311 DECALCIFY TISSUE: CPT

## 2018-10-29 PROCEDURE — 3700000001 HC ADD 15 MINUTES (ANESTHESIA): Performed by: PAIN MEDICINE

## 2018-10-29 PROCEDURE — 7100000000 HC PACU RECOVERY - FIRST 15 MIN: Performed by: PAIN MEDICINE

## 2018-10-29 PROCEDURE — 93005 ELECTROCARDIOGRAM TRACING: CPT

## 2018-10-29 PROCEDURE — 2709999900 HC NON-CHARGEABLE SUPPLY: Performed by: PAIN MEDICINE

## 2018-10-29 PROCEDURE — 2720000010 HC SURG SUPPLY STERILE: Performed by: PAIN MEDICINE

## 2018-10-29 PROCEDURE — 6360000004 HC RX CONTRAST MEDICATION: Performed by: PAIN MEDICINE

## 2018-10-29 PROCEDURE — 88307 TISSUE EXAM BY PATHOLOGIST: CPT

## 2018-10-29 PROCEDURE — 3600000002 HC SURGERY LEVEL 2 BASE: Performed by: PAIN MEDICINE

## 2018-10-29 PROCEDURE — 22514 PERQ VERTEBRAL AUGMENTATION: CPT | Performed by: PAIN MEDICINE

## 2018-10-29 DEVICE — CEMENT C01A KYPHX HV-R BONE CEMENT EN
Type: IMPLANTABLE DEVICE | Site: SPINE LUMBAR | Status: FUNCTIONAL
Brand: KYPHON® HV-R® BONE CEMENT

## 2018-10-29 RX ORDER — SODIUM CHLORIDE, SODIUM LACTATE, POTASSIUM CHLORIDE, CALCIUM CHLORIDE 600; 310; 30; 20 MG/100ML; MG/100ML; MG/100ML; MG/100ML
INJECTION, SOLUTION INTRAVENOUS CONTINUOUS
Status: DISCONTINUED | OUTPATIENT
Start: 2018-10-29 | End: 2018-10-30

## 2018-10-29 RX ORDER — IBUPROFEN 600 MG/1
600 TABLET ORAL EVERY 6 HOURS PRN
Status: DISCONTINUED | OUTPATIENT
Start: 2018-10-29 | End: 2018-10-30 | Stop reason: HOSPADM

## 2018-10-29 RX ORDER — PROPOFOL 10 MG/ML
INJECTION, EMULSION INTRAVENOUS PRN
Status: DISCONTINUED | OUTPATIENT
Start: 2018-10-29 | End: 2018-10-29 | Stop reason: SDUPTHER

## 2018-10-29 RX ORDER — LABETALOL HYDROCHLORIDE 5 MG/ML
5 INJECTION, SOLUTION INTRAVENOUS EVERY 10 MIN PRN
Status: DISCONTINUED | OUTPATIENT
Start: 2018-10-29 | End: 2018-10-29 | Stop reason: HOSPADM

## 2018-10-29 RX ORDER — GLYCOPYRROLATE 1 MG/5 ML
SYRINGE (ML) INTRAVENOUS PRN
Status: DISCONTINUED | OUTPATIENT
Start: 2018-10-29 | End: 2018-10-29 | Stop reason: SDUPTHER

## 2018-10-29 RX ORDER — DOCUSATE SODIUM 100 MG/1
100 CAPSULE, LIQUID FILLED ORAL 2 TIMES DAILY
Status: DISCONTINUED | OUTPATIENT
Start: 2018-10-29 | End: 2018-10-30 | Stop reason: HOSPADM

## 2018-10-29 RX ORDER — DULOXETIN HYDROCHLORIDE 30 MG/1
30 CAPSULE, DELAYED RELEASE ORAL DAILY
Status: DISCONTINUED | OUTPATIENT
Start: 2018-10-29 | End: 2018-10-30 | Stop reason: HOSPADM

## 2018-10-29 RX ORDER — HYDROCODONE BITARTRATE AND ACETAMINOPHEN 5; 325 MG/1; MG/1
2 TABLET ORAL PRN
Status: DISCONTINUED | OUTPATIENT
Start: 2018-10-29 | End: 2018-10-29 | Stop reason: HOSPADM

## 2018-10-29 RX ORDER — METOCLOPRAMIDE HYDROCHLORIDE 5 MG/ML
10 INJECTION INTRAMUSCULAR; INTRAVENOUS
Status: DISCONTINUED | OUTPATIENT
Start: 2018-10-29 | End: 2018-10-29 | Stop reason: HOSPADM

## 2018-10-29 RX ORDER — OXYCODONE HYDROCHLORIDE 5 MG/1
5 TABLET ORAL EVERY 4 HOURS PRN
Status: DISCONTINUED | OUTPATIENT
Start: 2018-10-29 | End: 2018-10-30 | Stop reason: HOSPADM

## 2018-10-29 RX ORDER — DIPHENHYDRAMINE HYDROCHLORIDE 50 MG/ML
12.5 INJECTION INTRAMUSCULAR; INTRAVENOUS
Status: DISCONTINUED | OUTPATIENT
Start: 2018-10-29 | End: 2018-10-29 | Stop reason: HOSPADM

## 2018-10-29 RX ORDER — DEXAMETHASONE SODIUM PHOSPHATE 4 MG/ML
INJECTION, SOLUTION INTRA-ARTICULAR; INTRALESIONAL; INTRAMUSCULAR; INTRAVENOUS; SOFT TISSUE PRN
Status: DISCONTINUED | OUTPATIENT
Start: 2018-10-29 | End: 2018-10-29 | Stop reason: SDUPTHER

## 2018-10-29 RX ORDER — SODIUM CHLORIDE 0.9 % (FLUSH) 0.9 %
10 SYRINGE (ML) INJECTION PRN
Status: DISCONTINUED | OUTPATIENT
Start: 2018-10-29 | End: 2018-10-29

## 2018-10-29 RX ORDER — ASPIRIN 81 MG/1
81 TABLET ORAL DAILY
Status: DISCONTINUED | OUTPATIENT
Start: 2018-10-29 | End: 2018-10-30 | Stop reason: HOSPADM

## 2018-10-29 RX ORDER — ACETAMINOPHEN 325 MG/1
650 TABLET ORAL EVERY 4 HOURS PRN
Status: DISCONTINUED | OUTPATIENT
Start: 2018-10-29 | End: 2018-10-30 | Stop reason: HOSPADM

## 2018-10-29 RX ORDER — FENTANYL CITRATE 50 UG/ML
50 INJECTION, SOLUTION INTRAMUSCULAR; INTRAVENOUS EVERY 5 MIN PRN
Status: DISCONTINUED | OUTPATIENT
Start: 2018-10-29 | End: 2018-10-29 | Stop reason: HOSPADM

## 2018-10-29 RX ORDER — MIDAZOLAM HYDROCHLORIDE 1 MG/ML
INJECTION INTRAMUSCULAR; INTRAVENOUS PRN
Status: DISCONTINUED | OUTPATIENT
Start: 2018-10-29 | End: 2018-10-29 | Stop reason: SDUPTHER

## 2018-10-29 RX ORDER — FUROSEMIDE 20 MG/1
20 TABLET ORAL EVERY OTHER DAY
Status: DISCONTINUED | OUTPATIENT
Start: 2018-10-29 | End: 2018-10-30 | Stop reason: HOSPADM

## 2018-10-29 RX ORDER — FENTANYL CITRATE 50 UG/ML
INJECTION, SOLUTION INTRAMUSCULAR; INTRAVENOUS PRN
Status: DISCONTINUED | OUTPATIENT
Start: 2018-10-29 | End: 2018-10-29 | Stop reason: SDUPTHER

## 2018-10-29 RX ORDER — EPHEDRINE SULFATE 50 MG/ML
INJECTION, SOLUTION INTRAVENOUS PRN
Status: DISCONTINUED | OUTPATIENT
Start: 2018-10-29 | End: 2018-10-29 | Stop reason: SDUPTHER

## 2018-10-29 RX ORDER — HYDRALAZINE HYDROCHLORIDE 20 MG/ML
5 INJECTION INTRAMUSCULAR; INTRAVENOUS EVERY 10 MIN PRN
Status: DISCONTINUED | OUTPATIENT
Start: 2018-10-29 | End: 2018-10-29 | Stop reason: HOSPADM

## 2018-10-29 RX ORDER — MORPHINE SULFATE 2 MG/ML
2 INJECTION, SOLUTION INTRAMUSCULAR; INTRAVENOUS EVERY 5 MIN PRN
Status: DISCONTINUED | OUTPATIENT
Start: 2018-10-29 | End: 2018-10-29 | Stop reason: HOSPADM

## 2018-10-29 RX ORDER — HYDROCODONE BITARTRATE AND ACETAMINOPHEN 5; 325 MG/1; MG/1
1 TABLET ORAL PRN
Status: DISCONTINUED | OUTPATIENT
Start: 2018-10-29 | End: 2018-10-29 | Stop reason: HOSPADM

## 2018-10-29 RX ORDER — MEPERIDINE HYDROCHLORIDE 50 MG/ML
12.5 INJECTION INTRAMUSCULAR; INTRAVENOUS; SUBCUTANEOUS EVERY 5 MIN PRN
Status: DISCONTINUED | OUTPATIENT
Start: 2018-10-29 | End: 2018-10-29 | Stop reason: HOSPADM

## 2018-10-29 RX ORDER — ONDANSETRON 2 MG/ML
4 INJECTION INTRAMUSCULAR; INTRAVENOUS
Status: DISCONTINUED | OUTPATIENT
Start: 2018-10-29 | End: 2018-10-29 | Stop reason: HOSPADM

## 2018-10-29 RX ORDER — OFLOXACIN 3 MG/ML
1 SOLUTION/ DROPS OPHTHALMIC 4 TIMES DAILY
Status: DISCONTINUED | OUTPATIENT
Start: 2018-10-29 | End: 2018-10-30 | Stop reason: HOSPADM

## 2018-10-29 RX ORDER — SUCCINYLCHOLINE CHLORIDE 20 MG/ML
INJECTION INTRAMUSCULAR; INTRAVENOUS PRN
Status: DISCONTINUED | OUTPATIENT
Start: 2018-10-29 | End: 2018-10-29 | Stop reason: SDUPTHER

## 2018-10-29 RX ORDER — ALBUTEROL SULFATE 90 UG/1
2 AEROSOL, METERED RESPIRATORY (INHALATION) 4 TIMES DAILY PRN
Status: DISCONTINUED | OUTPATIENT
Start: 2018-10-29 | End: 2018-10-30 | Stop reason: HOSPADM

## 2018-10-29 RX ORDER — METOPROLOL SUCCINATE 50 MG/1
50 TABLET, EXTENDED RELEASE ORAL 2 TIMES DAILY
Status: DISCONTINUED | OUTPATIENT
Start: 2018-10-29 | End: 2018-10-30 | Stop reason: HOSPADM

## 2018-10-29 RX ORDER — ONDANSETRON 2 MG/ML
INJECTION INTRAMUSCULAR; INTRAVENOUS PRN
Status: DISCONTINUED | OUTPATIENT
Start: 2018-10-29 | End: 2018-10-29 | Stop reason: SDUPTHER

## 2018-10-29 RX ORDER — FENTANYL CITRATE 50 UG/ML
25 INJECTION, SOLUTION INTRAMUSCULAR; INTRAVENOUS EVERY 5 MIN PRN
Status: DISCONTINUED | OUTPATIENT
Start: 2018-10-29 | End: 2018-10-29 | Stop reason: HOSPADM

## 2018-10-29 RX ORDER — BUPIVACAINE HYDROCHLORIDE AND EPINEPHRINE 5; 5 MG/ML; UG/ML
INJECTION, SOLUTION EPIDURAL; INTRACAUDAL; PERINEURAL PRN
Status: DISCONTINUED | OUTPATIENT
Start: 2018-10-29 | End: 2018-10-29 | Stop reason: HOSPADM

## 2018-10-29 RX ADMIN — DEXAMETHASONE SODIUM PHOSPHATE 4 MG: 4 INJECTION, SOLUTION INTRAMUSCULAR; INTRAVENOUS at 11:00

## 2018-10-29 RX ADMIN — Medication 1 LOZENGE: at 23:02

## 2018-10-29 RX ADMIN — SODIUM CHLORIDE, POTASSIUM CHLORIDE, SODIUM LACTATE AND CALCIUM CHLORIDE: 600; 310; 30; 20 INJECTION, SOLUTION INTRAVENOUS at 10:39

## 2018-10-29 RX ADMIN — Medication 0.4 MG: at 10:45

## 2018-10-29 RX ADMIN — GADOTERIDOL 15 ML: 279.3 INJECTION, SOLUTION INTRAVENOUS at 12:19

## 2018-10-29 RX ADMIN — PROPOFOL 50 MG: 10 INJECTION, EMULSION INTRAVENOUS at 10:55

## 2018-10-29 RX ADMIN — SODIUM CHLORIDE, POTASSIUM CHLORIDE, SODIUM LACTATE AND CALCIUM CHLORIDE: 600; 310; 30; 20 INJECTION, SOLUTION INTRAVENOUS at 13:25

## 2018-10-29 RX ADMIN — EPHEDRINE SULFATE 10 MG: 50 INJECTION INTRAMUSCULAR; INTRAVENOUS; SUBCUTANEOUS at 11:00

## 2018-10-29 RX ADMIN — FENTANYL CITRATE 100 MCG: 50 INJECTION, SOLUTION INTRAMUSCULAR; INTRAVENOUS at 10:45

## 2018-10-29 RX ADMIN — MIDAZOLAM 2 MG: 1 INJECTION INTRAMUSCULAR; INTRAVENOUS at 10:39

## 2018-10-29 RX ADMIN — Medication 1 LOZENGE: at 14:58

## 2018-10-29 RX ADMIN — SODIUM CHLORIDE, POTASSIUM CHLORIDE, SODIUM LACTATE AND CALCIUM CHLORIDE: 600; 310; 30; 20 INJECTION, SOLUTION INTRAVENOUS at 19:59

## 2018-10-29 RX ADMIN — PHENYLEPHRINE HYDROCHLORIDE 100 MCG: 10 INJECTION INTRAVENOUS at 11:15

## 2018-10-29 RX ADMIN — SUCCINYLCHOLINE CHLORIDE 120 MG: 20 INJECTION INTRAMUSCULAR; INTRAVENOUS at 10:45

## 2018-10-29 RX ADMIN — EPHEDRINE SULFATE 10 MG: 50 INJECTION INTRAMUSCULAR; INTRAVENOUS; SUBCUTANEOUS at 11:05

## 2018-10-29 RX ADMIN — PHENYLEPHRINE HYDROCHLORIDE 100 MCG: 10 INJECTION INTRAVENOUS at 11:20

## 2018-10-29 RX ADMIN — PROPOFOL 200 MG: 10 INJECTION, EMULSION INTRAVENOUS at 10:45

## 2018-10-29 RX ADMIN — SODIUM CHLORIDE, POTASSIUM CHLORIDE, SODIUM LACTATE AND CALCIUM CHLORIDE: 600; 310; 30; 20 INJECTION, SOLUTION INTRAVENOUS at 09:02

## 2018-10-29 RX ADMIN — Medication 3 G: at 10:39

## 2018-10-29 RX ADMIN — OXYCODONE HYDROCHLORIDE 5 MG: 5 TABLET ORAL at 23:02

## 2018-10-29 RX ADMIN — PHENYLEPHRINE HYDROCHLORIDE 100 MCG: 10 INJECTION INTRAVENOUS at 11:06

## 2018-10-29 RX ADMIN — DOCUSATE SODIUM 100 MG: 100 CAPSULE, LIQUID FILLED ORAL at 19:59

## 2018-10-29 RX ADMIN — SODIUM CHLORIDE, POTASSIUM CHLORIDE, SODIUM LACTATE AND CALCIUM CHLORIDE: 600; 310; 30; 20 INJECTION, SOLUTION INTRAVENOUS at 11:20

## 2018-10-29 RX ADMIN — ONDANSETRON 4 MG: 2 INJECTION INTRAMUSCULAR; INTRAVENOUS at 11:00

## 2018-10-29 RX ADMIN — HYDROMORPHONE HYDROCHLORIDE 0.5 MG: 1 INJECTION, SOLUTION INTRAMUSCULAR; INTRAVENOUS; SUBCUTANEOUS at 16:30

## 2018-10-29 RX ADMIN — DULOXETINE HYDROCHLORIDE 30 MG: 30 CAPSULE, DELAYED RELEASE ORAL at 19:59

## 2018-10-29 RX ADMIN — METOPROLOL SUCCINATE 50 MG: 50 TABLET, EXTENDED RELEASE ORAL at 19:59

## 2018-10-29 RX ADMIN — OFLOXACIN 1 DROP: 3 SOLUTION OPHTHALMIC at 18:36

## 2018-10-29 ASSESSMENT — PAIN SCALES - GENERAL
PAINLEVEL_OUTOF10: 5
PAINLEVEL_OUTOF10: 2
PAINLEVEL_OUTOF10: 9
PAINLEVEL_OUTOF10: 5
PAINLEVEL_OUTOF10: 7
PAINLEVEL_OUTOF10: 5
PAINLEVEL_OUTOF10: 0

## 2018-10-29 ASSESSMENT — PULMONARY FUNCTION TESTS
PIF_VALUE: 20
PIF_VALUE: 18
PIF_VALUE: 20
PIF_VALUE: 21
PIF_VALUE: 20
PIF_VALUE: 0
PIF_VALUE: 20
PIF_VALUE: 20
PIF_VALUE: 2
PIF_VALUE: 20
PIF_VALUE: 20
PIF_VALUE: 0
PIF_VALUE: 2
PIF_VALUE: 20
PIF_VALUE: 13
PIF_VALUE: 20
PIF_VALUE: 0
PIF_VALUE: 2
PIF_VALUE: 20
PIF_VALUE: 20
PIF_VALUE: 7
PIF_VALUE: 20
PIF_VALUE: 8
PIF_VALUE: 20
PIF_VALUE: 40
PIF_VALUE: 2
PIF_VALUE: 20
PIF_VALUE: 2
PIF_VALUE: 21
PIF_VALUE: 20
PIF_VALUE: 2
PIF_VALUE: 18
PIF_VALUE: 20
PIF_VALUE: 18
PIF_VALUE: 20
PIF_VALUE: 3
PIF_VALUE: 2
PIF_VALUE: 21
PIF_VALUE: 20
PIF_VALUE: 2
PIF_VALUE: 20
PIF_VALUE: 3
PIF_VALUE: 18
PIF_VALUE: 20
PIF_VALUE: 20
PIF_VALUE: 14
PIF_VALUE: 3
PIF_VALUE: 20
PIF_VALUE: 1

## 2018-10-29 ASSESSMENT — PAIN DESCRIPTION - LOCATION
LOCATION: BACK

## 2018-10-29 ASSESSMENT — ENCOUNTER SYMPTOMS
BACK PAIN: 1
ORTHOPNEA: 0
SHORTNESS OF BREATH: 1
EYES NEGATIVE: 1
RESPIRATORY NEGATIVE: 1
HEARTBURN: 1

## 2018-10-29 ASSESSMENT — COPD QUESTIONNAIRES: CAT_SEVERITY: NO INTERVAL CHANGE

## 2018-10-29 ASSESSMENT — PAIN DESCRIPTION - PAIN TYPE
TYPE: SURGICAL PAIN
TYPE: ACUTE PAIN

## 2018-10-29 ASSESSMENT — PAIN DESCRIPTION - ORIENTATION: ORIENTATION: LOWER

## 2018-10-29 ASSESSMENT — PAIN - FUNCTIONAL ASSESSMENT: PAIN_FUNCTIONAL_ASSESSMENT: 0-10

## 2018-10-29 ASSESSMENT — PAIN DESCRIPTION - DESCRIPTORS: DESCRIPTORS: ACHING

## 2018-10-29 NOTE — DISCHARGE INSTR - COC
duramorph 1.5  decadron 5mg    NERVE BLOCK  11/9/2015    caudal# 1 celestone 9mg    NERVE BLOCK  11/16/15    caudal #2  decadron 10mg    NERVE BLOCK  11/23/15    duramorph 1mg celestone 9mg    NERVE BLOCK  03/28/2018    CAUDAL EPIDURAL STEROID BLOCK  DECADRON 10 MG     NERVE BLOCK  05/23/2018    caudal # decadron 7mg    NERVE BLOCK  08/28/2018    natalia transforminal # 1, decadron 10mg gadoteridol, LONG NEEDLES    MA PERQ VERT AGMNTJ CAVITY CRTJ UNI/BI CANNULJ LMBR N/A 10/29/2018    KYPHOPLASTY L1 performed by Brenda Medrano MD at [address] Right 5/14/15    total hip replacement       Immunization History:   Immunization History   Administered Date(s) Administered    Pneumococcal 13-valent Conjugate ([id number]) 07/23/2018    Pneumococcal Polysaccharide ([id number]) 05/27/2012       Active Problems:  Patient Active Problem List   Diagnosis Code    Spinal stenosis of lumbar region with neurogenic claudication M48.062    Dyslipidemia E78.5    Anemia D64.9    Fibromyalgia M79.7    Degenerative joint disease (DJD) of hip M16.9    Adjustment disorder with mixed anxiety and depressed mood F43.23    S/P right and left heart catheterization Z98.890    SOB (shortness of breath) R06.02    Acute on chronic diastolic heart failure (HCC) I50.33    Chronic bronchitis ([facility] [address] J42    Essential hypertension I10    Morbid obesity with BMI of 45.0-49.9, adult ([facility] [address] E66.01, Z68.42    PVC (premature ventricular contraction) I49.3    Abnormality of rib determined by X-ray Q76.6    Acute exacerbation of chronic bronchitis (HCC) J20.9, J42    Chronic bronchitis (HCC) J42    Sleep apnea G47.30    History of total bilateral knee replacement Z96.653    Encephalopathy acute G93.40    Obstructive sleep apnea syndrome G47.33    Primary osteoarthritis of left knee M17.12    Bronchiectasis with acute lower respiratory infection ([facility] [address] J47.0    Supplemental oxygen

## 2018-10-29 NOTE — ANESTHESIA PRE PROCEDURE
Department of Anesthesiology  Preprocedure Note       Name:  Manoj Puente   Age:  67 y.o.  :  1945                                          MRN:  [id number]         Date:  10/29/2018      Surgeon: Manuel Peterson):  Edwige Rey MD    Procedure: KYPHOPLASTY L1 (N/A )    Medications prior to admission:   Prior to Admission medications    Medication Sig Start Date End Date Taking? Authorizing Provider   ofloxacin (OCUFLOX) 0.3 % solution  9/15/18  Yes Historical Provider, MD   ibuprofen (ADVIL;MOTRIN) 600 MG tablet Take 600 mg by mouth every 6 hours as needed for Pain   Yes Historical Provider, MD   oxyCODONE (ROXICODONE) 5 MG immediate release tablet Take 1 tablet by mouth every 8 hours as needed for Pain for up to 30 days. . 10/22/18 11/21/18 Yes Edwige Rey MD   oxyCODONE (ROXICODONE) 5 MG immediate release tablet Take 1 tablet by mouth every 8 hours as needed for Pain for up to 30 days. . 10/22/18 11/21/18 Yes Venessa Mckeon, APRN - CNP   DULoxetine (CYMBALTA) 30 MG extended release capsule TAKE 1 CAPSULE TWICE A DAY 18  Yes Parisa Goodrich MD   furosemide (LASIX) 20 MG tablet Take 1 tablet by mouth every other day 18  Yes Michelle Ewing MD   albuterol sulfate  (90 Base) MCG/ACT inhaler Inhale 2 puffs into the lungs 4 times daily as needed for Wheezing    Yes Historical Provider, MD   TOPROL XL 50 MG extended release tablet Take 50 mg by mouth 2 times daily  10/24/17  Yes Historical Provider, MD   aspirin 81 MG tablet Take 81 mg by mouth daily   Yes Historical Provider, MD       Current medications:    Current Facility-Administered Medications   Medication Dose Route Frequency Provider Last Rate Last Dose    lactated ringers infusion   Intravenous Continuous Edwige Rey MD        sodium chloride flush 0.9 % injection 10 mL  10 mL Intravenous PRN Edwige Rey MD        ceFAZolin (ANCEF) 2 g in dextrose 5 % 50 mL IVPB  2 g Intravenous [facility]aze Yancey 40.0-44.9, adult [facility]) Z68.41    Chronic prescription opiate use Z79.891       Past Medical History:        Diagnosis Date    Abnormality of rib determined by X-ray 1/28/2016    Acute cystitis without hematuria 6/16/2018    Acute exacerbation of chronic bronchitis (HCC)     [facility] 1/28/2016    Adjustment disorder with mixed anxiety and depressed mood 6/26/2015    Mild     Anemia 3/5/2014    Back pain     [facility] 8/16/2017    Bronchitis     Chronic bronchitis (HCC) 1/28/2016    Chronic cough 7/23/2018    Degenerative joint disease (DJD) of hip 5/14/2015    Dyslipidemia 3/5/2014    Encephalopathy acute 5/29/2016    Essential hypertension 1/28/2016    Fibromyalgia     GERD (gastroesophageal reflux disease)     History of total bilateral knee replacement 5/28/2016    Hx of blood clots     left leg and lung    Hyperlipidemia     Hypertension     Incontinence of urine     Irregular heartbeat     DR. Heide Crews Medication monitoring encounter 6/13/2018    Morbid obesity with BMI of 45.0-49.9, adult ([facility] 1/28/2016    Obstructive sleep apnea syndrome     Osteoarthritis     Primary osteoarthritis of left knee 5/27/2016    PVC (premature ventricular contraction) 1/28/2016    S/P right and left heart catheterization 10/2/2015    Sleep apnea     no machine    SOB (shortness of breath)     Spinal stenosis of lumbar region with neurogenic claudication 5/13/2013    Supplemental oxygen dependent 12/8/2017    Urine frequency     UTI (urinary tract infection)     hospitalized early july 2014 for kidney infection       Past Surgical History:        Procedure Laterality Date    BRONCHOSCOPY Left 8/16/2017    BRONCHOSCOPY ALVEOLAR LAVAGE LOWER LOBE performed by Santiago Gayle MD at [address] [facility]  x 3    no stents    COLONOSCOPY  4 28 14    polyps biopsies     FOOT SURGERY Left calcium deposit removal from top of foot    HYSTERECTOMY      JOINT REPLACEMENT Bilateral 5/27/2016    bilat total knees    NERVE BLOCK  5/13/2013    caudal celestone 6mg    NERVE BLOCK  5/28/13    Caudal #2, Celestone 9mg    NERVE BLOCK  2/14/14    rt hip inj celestone 9 mg    NERVE BLOCK  07/14/2014    caudal# 3- celestone 9 mg    NERVE BLOCK  7-21-14    caudal epidural #2, decadron 7mg, fentanyl 25mcg    NERVE BLOCK  10/2/14    duramorph 1.5  decadron 5mg    NERVE BLOCK  11/9/2015    caudal# 1 celestone 9mg    NERVE BLOCK  11/16/15    caudal #2  decadron 10mg    NERVE BLOCK  11/23/15    duramorph 1mg celestone 9mg    NERVE BLOCK  03/28/2018    CAUDAL EPIDURAL STEROID BLOCK  DECADRON 10 MG     NERVE BLOCK  05/23/2018    caudal # decadron 7mg    NERVE BLOCK  08/28/2018    natalia transforminal # 1, decadron 10mg gadoteridol, LONG NEEDLES    SINUS SURGERY      TOTAL HIP ARTHROPLASTY Right 5/14/15    total hip replacement       Social History:    Social History   Substance Use Topics    Smoking status: Former Smoker     Packs/day: 1.00     Years: 18.00     Quit date: 5/13/1993    Smokeless tobacco: Never Used    Alcohol use No                                Counseling given: Not Answered      Vital Signs (Current):   Vitals:    10/29/18 0838   BP: (!) 121/49   Pulse: 68   Resp: 18   Temp: 98.2 °F (36.8 °C)   TempSrc: Oral   SpO2: 94%   Weight: 268 lb (121.6 kg)   Height: 5' 4.5\" (1.638 m)                                              BP Readings from Last 3 Encounters:   10/29/18 (!) 121/49   10/23/18 132/72   10/22/18 (!) 147/84       NPO Status:                                                                                 BMI:   Wt Readings from Last 3 Encounters:   10/29/18 268 lb (121.6 kg)   10/23/18 259 lb 14.8 oz (117.9 kg)   10/02/18 260 lb (117.9 kg)     Body mass index is 45.29 kg/m².     CBC:   Lab Results   Component Value Date    WBC 6.1 07/17/2018    RBC 4.49 07/17/2018    RBC 4.24 opioids intended and Prophylactic antiemetics administered. Anesthetic plan and risks discussed with patient. Plan discussed with CRNA.                   Ria Mcmillan MD   10/29/2018

## 2018-10-30 VITALS
DIASTOLIC BLOOD PRESSURE: 48 MMHG | TEMPERATURE: 98.4 F | OXYGEN SATURATION: 94 % | HEIGHT: 65 IN | RESPIRATION RATE: 14 BRPM | SYSTOLIC BLOOD PRESSURE: 111 MMHG | BODY MASS INDEX: 44.65 KG/M2 | HEART RATE: 73 BPM | WEIGHT: 268 LBS

## 2018-10-30 LAB
ABSOLUTE EOS #: 0.1 K/UL (ref 0–0.4)
ABSOLUTE IMMATURE GRANULOCYTE: ABNORMAL K/UL (ref 0–0.3)
ABSOLUTE LYMPH #: 2.5 K/UL (ref 1–4.8)
ABSOLUTE MONO #: 0.5 K/UL (ref 0.1–1.3)
ALBUMIN SERPL-MCNC: 3.5 G/DL (ref 3.5–5.2)
ALBUMIN/GLOBULIN RATIO: ABNORMAL (ref 1–2.5)
ALP BLD-CCNC: 71 U/L (ref 35–104)
ALT SERPL-CCNC: 8 U/L (ref 5–33)
ANION GAP SERPL CALCULATED.3IONS-SCNC: 10 MMOL/L (ref 9–17)
AST SERPL-CCNC: 22 U/L
BASOPHILS # BLD: 1 % (ref 0–2)
BASOPHILS ABSOLUTE: 0.1 K/UL (ref 0–0.2)
BILIRUB SERPL-MCNC: 0.25 MG/DL (ref 0.3–1.2)
BUN BLDV-MCNC: 26 MG/DL (ref 8–23)
BUN/CREAT BLD: ABNORMAL (ref 9–20)
CALCIUM SERPL-MCNC: 9 MG/DL (ref 8.6–10.4)
CHLORIDE BLD-SCNC: 101 MMOL/L (ref 98–107)
CO2: 26 MMOL/L (ref 20–31)
CREAT SERPL-MCNC: 1.41 MG/DL (ref 0.5–0.9)
DIFFERENTIAL TYPE: ABNORMAL
EOSINOPHILS RELATIVE PERCENT: 2 % (ref 0–4)
GFR AFRICAN AMERICAN: 44 ML/MIN
GFR NON-AFRICAN AMERICAN: 37 ML/MIN
GFR SERPL CREATININE-BSD FRML MDRD: ABNORMAL ML/MIN/{1.73_M2}
GFR SERPL CREATININE-BSD FRML MDRD: ABNORMAL ML/MIN/{1.73_M2}
GLUCOSE BLD-MCNC: 135 MG/DL (ref 70–99)
HCT VFR BLD CALC: 31.9 % (ref 36–46)
HEMOGLOBIN: 10.2 G/DL (ref 12–16)
IMMATURE GRANULOCYTES: ABNORMAL %
LYMPHOCYTES # BLD: 34 % (ref 24–44)
MCH RBC QN AUTO: 25.3 PG (ref 26–34)
MCHC RBC AUTO-ENTMCNC: 32 G/DL (ref 31–37)
MCV RBC AUTO: 79.2 FL (ref 80–100)
MONOCYTES # BLD: 7 % (ref 1–7)
NRBC AUTOMATED: ABNORMAL PER 100 WBC
PDW BLD-RTO: 15.3 % (ref 11.5–14.9)
PLATELET # BLD: 257 K/UL (ref 150–450)
PLATELET ESTIMATE: ABNORMAL
PMV BLD AUTO: 9.1 FL (ref 6–12)
POTASSIUM SERPL-SCNC: 4.1 MMOL/L (ref 3.7–5.3)
PTH INTACT: 72.17 PG/ML (ref 15–65)
RBC # BLD: 4.03 M/UL (ref 4–5.2)
RBC # BLD: ABNORMAL 10*6/UL
SEG NEUTROPHILS: 56 % (ref 36–66)
SEGMENTED NEUTROPHILS ABSOLUTE COUNT: 4.2 K/UL (ref 1.3–9.1)
SODIUM BLD-SCNC: 137 MMOL/L (ref 135–144)
SURGICAL PATHOLOGY REPORT: NORMAL
T4 TOTAL: 8.1 UG/DL (ref 4.5–12)
TOTAL PROTEIN: 7 G/DL (ref 6.4–8.3)
TSH SERPL DL<=0.05 MIU/L-ACNC: 1.45 MIU/L (ref 0.3–5)
VITAMIN D 25-HYDROXY: 22.9 NG/ML (ref 30–100)
WBC # BLD: 7.4 K/UL (ref 3.5–11)
WBC # BLD: ABNORMAL 10*3/UL

## 2018-10-30 PROCEDURE — 6370000000 HC RX 637 (ALT 250 FOR IP): Performed by: PAIN MEDICINE

## 2018-10-30 PROCEDURE — 99232 SBSQ HOSP IP/OBS MODERATE 35: CPT | Performed by: NURSE PRACTITIONER

## 2018-10-30 PROCEDURE — 36415 COLL VENOUS BLD VENIPUNCTURE: CPT

## 2018-10-30 PROCEDURE — 82306 VITAMIN D 25 HYDROXY: CPT

## 2018-10-30 PROCEDURE — 83970 ASSAY OF PARATHORMONE: CPT

## 2018-10-30 PROCEDURE — 99219 PR INITIAL OBSERVATION CARE/DAY 50 MINUTES: CPT | Performed by: INTERNAL MEDICINE

## 2018-10-30 PROCEDURE — 84443 ASSAY THYROID STIM HORMONE: CPT

## 2018-10-30 PROCEDURE — 84436 ASSAY OF TOTAL THYROXINE: CPT

## 2018-10-30 PROCEDURE — 80053 COMPREHEN METABOLIC PANEL: CPT

## 2018-10-30 PROCEDURE — 85025 COMPLETE CBC W/AUTO DIFF WBC: CPT

## 2018-10-30 PROCEDURE — 6360000002 HC RX W HCPCS: Performed by: PAIN MEDICINE

## 2018-10-30 PROCEDURE — 99024 POSTOP FOLLOW-UP VISIT: CPT | Performed by: PAIN MEDICINE

## 2018-10-30 RX ADMIN — DULOXETINE HYDROCHLORIDE 30 MG: 30 CAPSULE, DELAYED RELEASE ORAL at 08:17

## 2018-10-30 RX ADMIN — OFLOXACIN 1 DROP: 3 SOLUTION OPHTHALMIC at 12:47

## 2018-10-30 RX ADMIN — METOPROLOL SUCCINATE 50 MG: 50 TABLET, EXTENDED RELEASE ORAL at 08:17

## 2018-10-30 RX ADMIN — OXYCODONE HYDROCHLORIDE 5 MG: 5 TABLET ORAL at 16:46

## 2018-10-30 RX ADMIN — OXYCODONE HYDROCHLORIDE 5 MG: 5 TABLET ORAL at 05:56

## 2018-10-30 RX ADMIN — ASPIRIN 81 MG: 81 TABLET, COATED ORAL at 08:17

## 2018-10-30 RX ADMIN — OFLOXACIN 1 DROP: 3 SOLUTION OPHTHALMIC at 08:17

## 2018-10-30 RX ADMIN — HYDROMORPHONE HYDROCHLORIDE 0.25 MG: 1 INJECTION, SOLUTION INTRAMUSCULAR; INTRAVENOUS; SUBCUTANEOUS at 10:17

## 2018-10-30 ASSESSMENT — PAIN DESCRIPTION - LOCATION
LOCATION: BACK
LOCATION: BACK

## 2018-10-30 ASSESSMENT — PAIN SCALES - GENERAL
PAINLEVEL_OUTOF10: 7
PAINLEVEL_OUTOF10: 5
PAINLEVEL_OUTOF10: 6
PAINLEVEL_OUTOF10: 7

## 2018-10-30 ASSESSMENT — ENCOUNTER SYMPTOMS
GASTROINTESTINAL NEGATIVE: 1
BACK PAIN: 1

## 2018-10-30 ASSESSMENT — PAIN DESCRIPTION - PAIN TYPE
TYPE: SURGICAL PAIN
TYPE: SURGICAL PAIN

## 2018-10-30 NOTE — PROGRESS NOTES
shortness of breath and wheezing. Cardiovascular: Negative for chest pain and palpitations. Gastrointestinal: Negative for abdominal distention and constipation. Endocrine: Negative for cold intolerance, polydipsia and polyphagia. Genitourinary: Negative for dysuria and hematuria. Musculoskeletal: Positive for back pain. Skin: Negative. Neurological: Negative for dizziness and seizures. Psychiatric/Behavioral: Negative for agitation and confusion. The patient is not nervous/anxious. PHYSICAL EXAM:  Vital Signs:  BP (!) 151/64   Pulse 81   Temp 97.9 °F (36.6 °C) (Oral)   Resp 16   Ht 5' 4.5\" (1.638 m)   Wt 268 lb (121.6 kg)   SpO2 95%   BMI 45.29 kg/m²     Physical Exam   Constitutional: She appears well-developed and well-nourished. Morbidly obese. HENT:   Head: Normocephalic and atraumatic. Eyes: Conjunctivae are normal.   Neck: Normal range of motion. Neck supple. Cardiovascular: Normal rate and regular rhythm. Pulmonary/Chest: Effort normal. No respiratory distress. Abdominal: Soft. She exhibits no distension. Musculoskeletal: Edema: 3+ b/l LE. Lumbar back: She exhibits pain. Neurological: She displays no atrophy. No cranial nerve deficit or sensory deficit. She exhibits normal muscle tone. Coordination normal.   Reflex Scores:       Patellar reflexes are 0 on the right side and 0 on the left side. Achilles reflexes are 0 on the right side and 0 on the left side. Skin: Skin is warm and dry. No erythema. No pallor. Psychiatric: She has a normal mood and affect. Her behavior is normal. Judgment and thought content normal.     Right Ankle Exam     Muscle Strength   The patient has normal right ankle strength. Left Ankle Exam     Muscle Strength   The patient has normal left ankle strength. Right Knee Exam     Muscle Strength     The patient has normal right knee strength.       Left Knee Exam     Muscle Strength     The patient has normal IMPRESSION  1. Closed compression fracture of L1 lumbar vertebra with delayed healing, subsequent encounter    2. Age-related osteoporosis with current pathological fracture with malunion, subsequent encounter    Status post kyphoplasty L1    RECOMMENDATIONS:  At present patient is doing well. She is a able to ambulate with less pain. There are no signs of infection or inflammation or excessive drainage from the incisions. Patient has been evaluated by -she will be followed in his office for medical management. As she is doing well we will discharge her. She'll be seen again in the pain clinic in about 2 weeks for follow-up and further planning. Discharge instructions are given to the patient. She will be seen again in the pain clinic in about 2 weeks for follow-up and further planning.       Electronically signed Bo Appiah MD on 10/30/2018 at 12:57 PM

## 2018-10-30 NOTE — FLOWSHEET NOTE
Subjective: Patient was on the phone and told her sister I have to go the  is here  . Patient stated \"I had asked God why can't I walk and my son told me the story about Quiana Saab having a thorn in his flesh and God did not remove it but gave him isaak to get through it, that's where I am at, I want to be the best that I can be with what I have. \"  · Objective: The patient was very welcoming and entered into conversations about her [employer] family, her family, and herself. · Assessment: Patient stated that the [employer] that she attends, [employer] people have  in the past few months and three of her work buddies have . Patient stated that one of  her friends death has been really hard to process. · Plan: Patient stated that she will go home this afternoon. Prayer was said at the end of the visit. 10/30/18 1157   Encounter Summary   Services provided to: Patient   Referral/Consult From: Rounding   Continue Visiting (10/30/18)   Complexity of Encounter Moderate   Length of Encounter 45 minutes   Spiritual Assessment Completed Yes   Spiritual/Yazidism   Type Spiritual support   Assessment Calm; Approachable; Loneliness   Intervention Active listening;Explored feelings, thoughts, concerns;Explored coping resources;Nurtured hope;Prayer;Scripture;Provided reading materials/devotional materials;Sustaining presence/ Ministry of presence; Discussed relationship with God;Discussed belief system/Yarsanism practices/bailey;Discussed illness/injury and it's impact   Outcome Receptive; Hopeful;Expressed feelings/needs/concerns; Shared life review;Engaged in conversation;Expressed feelings of gladys, peace, and/or awe;Expressed gratitude; Connection/belonging

## 2018-10-30 NOTE — PROGRESS NOTES
home.    Assessment:      Diagnosis:   1. Closed compression fracture of first lumbar vertebra,  Subsequent encounter [facility])   S/p kyphoplasty L1 vertebrae   2. Spinal stenosis of lumbar region with neurogenic claudication    3. DDD (degenerative disc disease), lumbar    4.  Lumbosacral spondylosis without myelopathy

## 2018-10-31 ENCOUNTER — TELEPHONE (OUTPATIENT)
Dept: PHARMACY | Facility: CLINIC | Age: 73
End: 2018-10-31

## 2018-10-31 DIAGNOSIS — Z98.890 S/P KYPHOPLASTY: Primary | ICD-10-CM

## 2018-10-31 PROCEDURE — 1111F DSCHRG MED/CURRENT MED MERGE: CPT | Performed by: PHARMACIST

## 2018-10-31 RX ORDER — GABAPENTIN 100 MG/1
1 CAPSULE ORAL DAILY
Refills: 0 | COMMUNITY
Start: 2018-09-20 | End: 2019-02-25 | Stop reason: SDUPTHER

## 2018-10-31 ASSESSMENT — ENCOUNTER SYMPTOMS
SHORTNESS OF BREATH: 0
PHOTOPHOBIA: 0
ABDOMINAL DISTENTION: 0
WHEEZING: 0
COUGH: 0
BACK PAIN: 1
CONSTIPATION: 0

## 2018-11-02 RX ORDER — METOPROLOL TARTRATE 50 MG/1
50 TABLET, FILM COATED ORAL 2 TIMES DAILY
COMMUNITY
End: 2021-03-10 | Stop reason: SDUPTHER

## 2018-11-02 NOTE — TELEPHONE ENCOUNTER
CLINICAL PHARMACY NOTE - Post-Discharge Transitions of Care (Anisha Golden)    Oswaldo Barcenas, RN from [facility] returned writer's call - they do want her to be on metoprolol tartrate. Updated medication list. Outreach to patient - explained she has correct medication at home and encouraged her to make an appointment with cardiology soon. She verbalized understanding.      Kassy Haque, PharmD, Lilibeth Marte, [address]  Clinical Pharmacy Specialist  O: [phone]  C: [address], [address]
in arm    Dicloxacillin Rash    Pcn [Penicillins] Rash      Identified Potential Medication Interactions:     No clinically significant interactions identified via tidy Interaction Analysis as category D or higher. Renal Dosing:     Estimated Creatinine Clearance: 46 mL/min (A) (based on SCr of 1.41 mg/dL (H)). eGFR: 37 mL/min/1.73 m^2   No renal adjustments necessary. ASSESSMENT/PLAN:     Medication reconciliation completed. Home medication list updated. · Patient states she was previously on metoprolol succinate - most recently she was sent metoprolol tartrate from pharmacy. She states this was called in by cardiologist and she does not know why it was changed as she had not yet seen her new cardiologist. Left message on RN line at [facility] to inquire reason for medication change.      Follow up appointment(s) and dates  Date Time Provider Mayo Betts   11/12/2018 1:00 PM Deana Rose MD [facility], PharmD, Paula Mcgraw, [address] [facility] Specialist  O: [phone]  C: [address] [place] [phone], [address]    =======================================================    For Pharmacy Admin Tracking Only  TCM Call Made?: Yes  [facility] Select Patient?: Yes  Total # of Interventions Recommended: 2 - Updated Order #: 1 - med rec, metop  Total # Interventions Accepted: 2  Intervention Severity:   - Level 1 Intervention Present?: No   - Level 2 #: 0   - Level 3 #: 0  Outreach Status: Review Complete  Care Coordinator Outreach to Patient?: No  Provider Contacted?: Yes - Office Called  Waiting on response from: n/a  Time Spent (min): 60

## 2018-11-05 ENCOUNTER — TELEPHONE (OUTPATIENT)
Dept: PAIN MANAGEMENT | Age: 73
End: 2018-11-05

## 2018-11-12 ENCOUNTER — HOSPITAL ENCOUNTER (OUTPATIENT)
Dept: PAIN MANAGEMENT | Age: 73
Discharge: HOME | End: 2018-11-12
Payer: MEDICARE

## 2018-11-12 ENCOUNTER — HOSPITAL ENCOUNTER (OUTPATIENT)
Dept: GENERAL RADIOLOGY | Age: 73
Discharge: HOME | End: 2018-11-14
Payer: MEDICARE

## 2018-11-12 ENCOUNTER — HOSPITAL ENCOUNTER (OUTPATIENT)
Age: 73
Discharge: HOME | End: 2018-11-14
Payer: MEDICARE

## 2018-11-12 VITALS
BODY MASS INDEX: 44.65 KG/M2 | SYSTOLIC BLOOD PRESSURE: 134 MMHG | OXYGEN SATURATION: 95 % | TEMPERATURE: 98.3 F | HEART RATE: 76 BPM | RESPIRATION RATE: 20 BRPM | HEIGHT: 65 IN | WEIGHT: 268 LBS | DIASTOLIC BLOOD PRESSURE: 80 MMHG

## 2018-11-12 DIAGNOSIS — G89.29 ENCOUNTER FOR CHRONIC PAIN MANAGEMENT: ICD-10-CM

## 2018-11-12 DIAGNOSIS — M48.062 SPINAL STENOSIS OF LUMBAR REGION WITH NEUROGENIC CLAUDICATION: Primary | ICD-10-CM

## 2018-11-12 DIAGNOSIS — M48.062 SPINAL STENOSIS OF LUMBAR REGION WITH NEUROGENIC CLAUDICATION: ICD-10-CM

## 2018-11-12 DIAGNOSIS — Z51.81 MEDICATION MONITORING ENCOUNTER: ICD-10-CM

## 2018-11-12 DIAGNOSIS — M80.00XA AGE-RELATED OSTEOPOROSIS WITH CURRENT PATHOLOGICAL FRACTURE, INITIAL ENCOUNTER: ICD-10-CM

## 2018-11-12 DIAGNOSIS — S32.010S CLOSED COMPRESSION FRACTURE OF FIRST LUMBAR VERTEBRA, SEQUELA: ICD-10-CM

## 2018-11-12 DIAGNOSIS — M51.36 DDD (DEGENERATIVE DISC DISEASE), LUMBAR: ICD-10-CM

## 2018-11-12 DIAGNOSIS — M47.817 LUMBOSACRAL SPONDYLOSIS WITHOUT MYELOPATHY: ICD-10-CM

## 2018-11-12 DIAGNOSIS — E66.01 MORBID OBESITY WITH BMI OF 45.0-49.9, ADULT (HCC): ICD-10-CM

## 2018-11-12 DIAGNOSIS — Z96.653 HISTORY OF TOTAL BILATERAL KNEE REPLACEMENT: ICD-10-CM

## 2018-11-12 PROCEDURE — 72110 X-RAY EXAM L-2 SPINE 4/>VWS: CPT

## 2018-11-12 PROCEDURE — 99214 OFFICE O/P EST MOD 30 MIN: CPT | Performed by: PAIN MEDICINE

## 2018-11-12 PROCEDURE — 72220 X-RAY EXAM SACRUM TAILBONE: CPT

## 2018-11-12 PROCEDURE — 99214 OFFICE O/P EST MOD 30 MIN: CPT

## 2018-11-12 RX ORDER — OXYCODONE HYDROCHLORIDE 5 MG/1
5 TABLET ORAL EVERY 8 HOURS PRN
Qty: 90 TABLET | Refills: 0 | Status: SHIPPED | OUTPATIENT
Start: 2018-11-22 | End: 2018-12-19 | Stop reason: SDUPTHER

## 2018-11-12 ASSESSMENT — ENCOUNTER SYMPTOMS
EYES NEGATIVE: 1
TROUBLE SWALLOWING: 0
APNEA: 0
FACIAL SWELLING: 0
SINUS PRESSURE: 1
COUGH: 1
SORE THROAT: 0
SINUS PAIN: 1
WHEEZING: 1
CHOKING: 0
CHEST TIGHTNESS: 0
VOICE CHANGE: 0
SHORTNESS OF BREATH: 1
STRIDOR: 0
RHINORRHEA: 1
GASTROINTESTINAL NEGATIVE: 1
BACK PAIN: 1

## 2018-11-12 ASSESSMENT — PAIN DESCRIPTION - LOCATION: LOCATION: BACK

## 2018-11-12 ASSESSMENT — PAIN DESCRIPTION - PAIN TYPE: TYPE: CHRONIC PAIN

## 2018-11-12 ASSESSMENT — PAIN DESCRIPTION - DIRECTION: RADIATING_TOWARDS: BILATERAL LEGS

## 2018-11-12 ASSESSMENT — PAIN DESCRIPTION - PROGRESSION: CLINICAL_PROGRESSION: GRADUALLY IMPROVING

## 2018-11-12 ASSESSMENT — PAIN DESCRIPTION - FREQUENCY: FREQUENCY: CONTINUOUS

## 2018-11-12 ASSESSMENT — PAIN DESCRIPTION - ORIENTATION: ORIENTATION: LOWER

## 2018-11-12 ASSESSMENT — PAIN DESCRIPTION - ONSET: ONSET: ON-GOING

## 2018-11-12 ASSESSMENT — PAIN DESCRIPTION - DESCRIPTORS: DESCRIPTORS: ACHING

## 2018-11-12 ASSESSMENT — PAIN SCALES - GENERAL: PAINLEVEL_OUTOF10: 7

## 2018-11-12 NOTE — PROGRESS NOTES
by Latanya [facility] Acute exacerbation of chronic bronchitis (HCC)    Chronic bronchitis (HCC)    Sleep apnea    History of total bilateral knee replacement    Encephalopathy acute    Obstructive sleep apnea syndrome    Primary osteoarthritis of left knee    Bronchiectasis with acute lower respiratory infection (HCC)    Supplemental oxygen dependent    Back pain    Encounter for chronic pain management    Acute cystitis without hematuria    Chronic cough    Acquired spondylolisthesis    Cervical spine ankylosis    Acute low back pain    Neck pain    Closed compression fracture of L1 lumbar vertebra (HCC)    Cervical spinal stenosis    BMI 40.0-44.9, adult ([facility])    Chronic prescription opiate use    Age-related osteoporosis with current pathological fracture    S/P kyphoplasty    Morbid obesity ([address]        ASSESSMENT    Dyan Lopez is a 67 y.o. female with     1. Spinal stenosis of lumbar region with neurogenic claudication    2. DDD (degenerative disc disease), lumbar    3. Lumbosacral spondylosis without myelopathy    4. Morbid obesity with BMI of 45.0-49.9, adult ([facility])    5. Age-related osteoporosis with current pathological fracture, initial encounter    6. Encounter for chronic pain management    7. Medication monitoring encounter    8. Closed compression fracture of first lumbar vertebra, sequela    9. History of total bilateral knee replacement           PLAN    We will continue current pain medications  Current medications are being tolerated without any Adverse side effects. Orders Placed This Encounter   Medications    oxyCODONE (ROXICODONE) 5 MG immediate release tablet     Sig: Take 1 tablet by mouth every 8 hours as needed for Pain for up to 30 days. Sadiq Navarrete Date: 18     Dispense:  90 tablet     Refill:  0    Misc.  Devices (WALKER) MISC     Si each by Does not apply route daily Upright walker with wheels and seat     Dispense:  1 each     Refill:  0     Urine drug

## 2018-11-13 ENCOUNTER — TELEPHONE (OUTPATIENT)
Dept: INTERNAL MEDICINE CLINIC | Age: 73
End: 2018-11-13

## 2018-11-13 ASSESSMENT — ENCOUNTER SYMPTOMS
DIARRHEA: 0
RECTAL PAIN: 0
NAUSEA: 0
PHOTOPHOBIA: 0
ABDOMINAL PAIN: 0

## 2018-12-19 ENCOUNTER — HOSPITAL ENCOUNTER (OUTPATIENT)
Dept: PAIN MANAGEMENT | Age: 73
Discharge: HOME | End: 2018-12-19
Payer: MEDICARE

## 2018-12-19 VITALS
WEIGHT: 268 LBS | TEMPERATURE: 97.7 F | BODY MASS INDEX: 44.65 KG/M2 | RESPIRATION RATE: 20 BRPM | SYSTOLIC BLOOD PRESSURE: 129 MMHG | HEIGHT: 65 IN | DIASTOLIC BLOOD PRESSURE: 70 MMHG | OXYGEN SATURATION: 95 % | HEART RATE: 88 BPM

## 2018-12-19 DIAGNOSIS — M80.00XA AGE-RELATED OSTEOPOROSIS WITH CURRENT PATHOLOGICAL FRACTURE, INITIAL ENCOUNTER: ICD-10-CM

## 2018-12-19 DIAGNOSIS — M51.36 DDD (DEGENERATIVE DISC DISEASE), LUMBAR: ICD-10-CM

## 2018-12-19 DIAGNOSIS — M48.062 SPINAL STENOSIS OF LUMBAR REGION WITH NEUROGENIC CLAUDICATION: Primary | ICD-10-CM

## 2018-12-19 DIAGNOSIS — Z96.653 HISTORY OF TOTAL BILATERAL KNEE REPLACEMENT: ICD-10-CM

## 2018-12-19 DIAGNOSIS — M47.817 LUMBOSACRAL SPONDYLOSIS WITHOUT MYELOPATHY: ICD-10-CM

## 2018-12-19 DIAGNOSIS — Z79.891 CHRONIC PRESCRIPTION OPIATE USE: ICD-10-CM

## 2018-12-19 DIAGNOSIS — Z51.81 MEDICATION MONITORING ENCOUNTER: ICD-10-CM

## 2018-12-19 DIAGNOSIS — Z96.653 STATUS POST TOTAL BILATERAL KNEE REPLACEMENT: ICD-10-CM

## 2018-12-19 DIAGNOSIS — Z99.81 SUPPLEMENTAL OXYGEN DEPENDENT: ICD-10-CM

## 2018-12-19 DIAGNOSIS — E66.01 MORBID OBESITY WITH BMI OF 45.0-49.9, ADULT (HCC): ICD-10-CM

## 2018-12-19 DIAGNOSIS — S32.010A CLOSED COMPRESSION FRACTURE OF FIRST LUMBAR VERTEBRA, INITIAL ENCOUNTER: ICD-10-CM

## 2018-12-19 DIAGNOSIS — G89.29 ENCOUNTER FOR CHRONIC PAIN MANAGEMENT: ICD-10-CM

## 2018-12-19 PROCEDURE — 99213 OFFICE O/P EST LOW 20 MIN: CPT | Performed by: NURSE PRACTITIONER

## 2018-12-19 PROCEDURE — 99213 OFFICE O/P EST LOW 20 MIN: CPT

## 2018-12-19 RX ORDER — OXYCODONE HYDROCHLORIDE 5 MG/1
5 TABLET ORAL EVERY 8 HOURS PRN
Qty: 90 TABLET | Refills: 0 | Status: SHIPPED | OUTPATIENT
Start: 2018-12-29 | End: 2019-01-21 | Stop reason: SDUPTHER

## 2018-12-19 ASSESSMENT — ENCOUNTER SYMPTOMS
GASTROINTESTINAL NEGATIVE: 1
SHORTNESS OF BREATH: 1
BACK PAIN: 1

## 2018-12-19 NOTE — PROGRESS NOTES
Reflex Scores:       Patellar reflexes are 0 on the right side and 0 on the left side. Achilles reflexes are 1+ on the right side and 1+ on the left side. In wheelchair   Skin: Skin is warm, dry and intact.             EXAMINATION:   5 XRAY VIEWS OF THE LUMBAR SPINE; 3 XRAY VIEWS OF THE SACRUM/COCCYX       11/12/2018 2:20 pm       COMPARISON:   Lumbar spine radiographs dated 03/08/2018 and CT lumbar spine dated 09/28/2018       HISTORY:   ORDERING SYSTEM PROVIDED HISTORY: Spinal stenosis of lumbar region with   neurogenic claudication   TECHNOLOGIST PROVIDED HISTORY:   H/ osteoporosis with recent fall  s/p kyphoplasty L1   Ordering Physician Provided Reason for Exam: fall, low back pain   Acuity: Unknown   Type of Exam: Unknown       FINDINGS:   Lumbar spine: Kyphoplasty cement is in place at the L1 level.  No significant   interval height loss at L1.  The other lumbar vertebral body heights are   preserved.  There is grade 1 anterolisthesis at L4-L5, measuring 0.5 cm,   unchanged.  There is severe disc space narrowing and endplate spurring   throughout the lumbar spine, worst at the lower lumbar levels.  Moderate to   severe multilevel facet arthropathy is noted as well.       Sacrum/coccyx: Arcuate lines of the sacrum are preserved.  Sacroiliac joints   are symmetric.  Sacrococcygeal alignment is within normal limits.  There is a   right total hip arthroplasty in place, incompletely visualized.  Moderate to   severe osteoarthritis is seen at the left hip.  There are scattered vascular   calcifications.           Impression   1.  Kyphoplasty changes at L1.  No evidence of acute compression fracture or   malalignment.       2.  Moderate to severe multilevel degenerative changes in the lumbar spine.       3.  No evidence of acute fracture or malalignment involving the sacrum/coccyx.       4.  Partially visualized right total hip arthroplasty.  Moderate to severe   osteoarthritis at the left hip.         (Start on 12/29/2018)    Medication monitoring encounter (Chronic)    Chronic prescription opiate use (Chronic)    Morbid obesity with BMI of 45.0-49.9, adult (HCC)    Status post total bilateral knee replacement    Relevant Medications    oxyCODONE (ROXICODONE) 5 MG immediate release tablet (Start on 12/29/2018)    Supplemental oxygen dependent    Closed compression fracture of L1 lumbar vertebra (HCC)    Relevant Medications    oxyCODONE (ROXICODONE) 5 MG immediate release tablet (Start on 12/29/2018)    Age-related osteoporosis with current pathological fracture    DDD (degenerative disc disease), lumbar    Relevant Medications    oxyCODONE (ROXICODONE) 5 MG immediate release tablet (Start on 12/29/2018)      Other Visit Diagnoses     Lumbosacral spondylosis without myelopathy        Relevant Medications    oxyCODONE (ROXICODONE) 5 MG immediate release tablet (Start on 12/29/2018)          Treatment Plan:  DISCUSSION: Treatment options discussed withpatient and all questions answered to patient's satisfaction. Possible side effects, risk of tolerance and or dependence and alternative treatments discussed    Obtaining appropriate analgesic effect of treatment   No signs of potential drug abuse or diversion identified    [x] Ill effects of being on chronic pain medications such as sleepdisturbances, respiratory depression, hormonal changes, withdrawal symptoms, chronic opioid dependence and tolerance as well as risk of taking opioids with Benzodiazepines and taking opioids along with alcohol,  werediscussed with patient. I had asked the patient to minimize medication use and utilize pain medications only for uncontrolled rest pain or pain with exertional activities. I advised patient not to self-escalate painmedications without consulting with us.   At each of patient's future visits we will try to taper pain medications, while adjusting the adjunct medications, and re-evaluating for Physical Therapy to improve spinal andjoint strength. We will continue to have discussions to decrease pain medications as tolerated. Counseled patient on effects their pain medication and /or their medical condition mayhave on their  ability to drive or operate machinery. Instructed not to drive or operate machinery if drowsy     I also discussed with the patient regarding the dangers of combining narcotic pain medication with tranquilizers, alcohol or illegal drugs or taking the medication any way other thanprescribed. The dangers were discussed  including respiratory depression and death. Patient was told to tell  all  physicians regarding the medications he is getting from pain clinic. Patient is warned not to take any unprescribed medications over-the-countermedications that can depress breathing . Patient is advised to talk to the pharmacist or physicians if planning to take any over-the-counter medications before  takeing them. Patient is strongly advised to avoidtranquilizers or  relaxants, illegal drugs  or any medications that can depress breathing  Patient is also advised to tell us if there is any changes in their medications from other physicians.     Schedule lumbar epidural injection, pain a 7, radicular   TREATMENT OPTIONS:     Return ASAP  Lumbar epidural injection  Medication Agreement Requirements Met  Continue Opioid therapy  Script written for oxycodone  Follow up appointment made

## 2018-12-20 ENCOUNTER — TELEPHONE (OUTPATIENT)
Dept: PHARMACY | Facility: CLINIC | Age: 73
End: 2018-12-20

## 2018-12-20 DIAGNOSIS — T14.8XXA FRACTURE: Primary | ICD-10-CM

## 2018-12-20 DIAGNOSIS — Z78.0 POST-MENOPAUSAL: ICD-10-CM

## 2019-01-07 ENCOUNTER — HOSPITAL ENCOUNTER (OUTPATIENT)
Dept: GENERAL RADIOLOGY | Age: 74
Discharge: HOME | End: 2019-01-09
Payer: MEDICARE

## 2019-01-07 ENCOUNTER — HOSPITAL ENCOUNTER (OUTPATIENT)
Dept: PAIN MANAGEMENT | Age: 74
Discharge: HOME | End: 2019-01-07
Payer: MEDICARE

## 2019-01-07 VITALS
RESPIRATION RATE: 16 BRPM | HEIGHT: 65 IN | SYSTOLIC BLOOD PRESSURE: 137 MMHG | DIASTOLIC BLOOD PRESSURE: 77 MMHG | OXYGEN SATURATION: 95 % | WEIGHT: 268 LBS | HEART RATE: 77 BPM | TEMPERATURE: 97.9 F | BODY MASS INDEX: 44.65 KG/M2

## 2019-01-07 DIAGNOSIS — M47.817 LUMBOSACRAL SPONDYLOSIS WITHOUT MYELOPATHY: ICD-10-CM

## 2019-01-07 DIAGNOSIS — M48.062 SPINAL STENOSIS OF LUMBAR REGION WITH NEUROGENIC CLAUDICATION: ICD-10-CM

## 2019-01-07 DIAGNOSIS — M54.16 LUMBAR RADICULOPATHY: Primary | ICD-10-CM

## 2019-01-07 DIAGNOSIS — M54.16 LUMBAR RADICULOPATHY: ICD-10-CM

## 2019-01-07 DIAGNOSIS — M51.36 DDD (DEGENERATIVE DISC DISEASE), LUMBAR: ICD-10-CM

## 2019-01-07 PROCEDURE — 62323 NJX INTERLAMINAR LMBR/SAC: CPT

## 2019-01-07 PROCEDURE — 6360000002 HC RX W HCPCS

## 2019-01-07 PROCEDURE — 3209999900 FLUORO FOR SURGICAL PROCEDURES

## 2019-01-07 PROCEDURE — 6360000004 HC RX CONTRAST MEDICATION

## 2019-01-07 PROCEDURE — 62327 NJX INTERLAMINAR LMBR/SAC: CPT

## 2019-01-07 ASSESSMENT — PAIN DESCRIPTION - ONSET: ONSET: ON-GOING

## 2019-01-07 ASSESSMENT — PAIN DESCRIPTION - PAIN TYPE: TYPE: CHRONIC PAIN

## 2019-01-07 ASSESSMENT — ACTIVITIES OF DAILY LIVING (ADL): EFFECT OF PAIN ON DAILY ACTIVITIES: PAIN INCREASES WITH WALKING

## 2019-01-07 ASSESSMENT — PAIN DESCRIPTION - LOCATION: LOCATION: BACK

## 2019-01-07 ASSESSMENT — PAIN DESCRIPTION - FREQUENCY: FREQUENCY: CONTINUOUS

## 2019-01-07 ASSESSMENT — PAIN DESCRIPTION - ORIENTATION: ORIENTATION: LOWER

## 2019-01-07 ASSESSMENT — PAIN SCALES - GENERAL: PAINLEVEL_OUTOF10: 4

## 2019-01-07 ASSESSMENT — PAIN DESCRIPTION - DESCRIPTORS: DESCRIPTORS: ACHING;SHARP;SHOOTING

## 2019-01-07 ASSESSMENT — PAIN DESCRIPTION - PROGRESSION: CLINICAL_PROGRESSION: GRADUALLY WORSENING

## 2019-01-08 ENCOUNTER — TELEPHONE (OUTPATIENT)
Dept: PAIN MANAGEMENT | Age: 74
End: 2019-01-08

## 2019-01-21 ENCOUNTER — HOSPITAL ENCOUNTER (OUTPATIENT)
Dept: PAIN MANAGEMENT | Age: 74
Discharge: HOME | End: 2019-01-21
Payer: MEDICARE

## 2019-01-21 DIAGNOSIS — Z51.81 MEDICATION MONITORING ENCOUNTER: Chronic | ICD-10-CM

## 2019-01-21 DIAGNOSIS — S32.010S CLOSED COMPRESSION FRACTURE OF FIRST LUMBAR VERTEBRA, SEQUELA: ICD-10-CM

## 2019-01-21 DIAGNOSIS — M48.062 SPINAL STENOSIS OF LUMBAR REGION WITH NEUROGENIC CLAUDICATION: Chronic | ICD-10-CM

## 2019-01-21 DIAGNOSIS — Z96.653 HISTORY OF TOTAL BILATERAL KNEE REPLACEMENT: ICD-10-CM

## 2019-01-21 DIAGNOSIS — Z98.890 S/P KYPHOPLASTY: ICD-10-CM

## 2019-01-21 DIAGNOSIS — M51.36 DDD (DEGENERATIVE DISC DISEASE), LUMBAR: Primary | ICD-10-CM

## 2019-01-21 PROCEDURE — 99213 OFFICE O/P EST LOW 20 MIN: CPT

## 2019-01-21 PROCEDURE — 99213 OFFICE O/P EST LOW 20 MIN: CPT | Performed by: NURSE PRACTITIONER

## 2019-01-21 RX ORDER — OXYCODONE HYDROCHLORIDE 5 MG/1
5 TABLET ORAL EVERY 8 HOURS PRN
Qty: 90 TABLET | Refills: 0 | Status: SHIPPED | OUTPATIENT
Start: 2019-01-28 | End: 2019-02-25 | Stop reason: SDUPTHER

## 2019-01-21 ASSESSMENT — ENCOUNTER SYMPTOMS
CONSTIPATION: 0
BACK PAIN: 1
SHORTNESS OF BREATH: 0
COUGH: 0

## 2019-02-25 ENCOUNTER — HOSPITAL ENCOUNTER (OUTPATIENT)
Dept: PAIN MANAGEMENT | Age: 74
Discharge: HOME | End: 2019-02-25
Payer: MEDICARE

## 2019-02-25 DIAGNOSIS — M48.062 SPINAL STENOSIS OF LUMBAR REGION WITH NEUROGENIC CLAUDICATION: Chronic | ICD-10-CM

## 2019-02-25 DIAGNOSIS — Z51.81 MEDICATION MONITORING ENCOUNTER: Chronic | ICD-10-CM

## 2019-02-25 DIAGNOSIS — Z96.653 HISTORY OF TOTAL BILATERAL KNEE REPLACEMENT: ICD-10-CM

## 2019-02-25 DIAGNOSIS — M51.36 DDD (DEGENERATIVE DISC DISEASE), LUMBAR: ICD-10-CM

## 2019-02-25 DIAGNOSIS — M43.10 ACQUIRED SPONDYLOLISTHESIS: ICD-10-CM

## 2019-02-25 DIAGNOSIS — M48.02 CERVICAL SPINAL STENOSIS: Primary | ICD-10-CM

## 2019-02-25 DIAGNOSIS — M79.7 FIBROMYALGIA: ICD-10-CM

## 2019-02-25 PROCEDURE — 99214 OFFICE O/P EST MOD 30 MIN: CPT

## 2019-02-25 PROCEDURE — 99213 OFFICE O/P EST LOW 20 MIN: CPT | Performed by: NURSE PRACTITIONER

## 2019-02-25 RX ORDER — OXYCODONE HYDROCHLORIDE 5 MG/1
5 TABLET ORAL EVERY 8 HOURS PRN
Qty: 90 TABLET | Refills: 0 | Status: SHIPPED | OUTPATIENT
Start: 2019-02-28 | End: 2019-03-27 | Stop reason: SDUPTHER

## 2019-02-25 RX ORDER — GABAPENTIN 100 MG/1
100 CAPSULE ORAL DAILY
Qty: 90 CAPSULE | Refills: 0 | Status: SHIPPED | OUTPATIENT
Start: 2019-02-28 | End: 2019-06-21 | Stop reason: SDUPTHER

## 2019-02-25 ASSESSMENT — ENCOUNTER SYMPTOMS
CONSTIPATION: 0
COUGH: 0
BACK PAIN: 1
SHORTNESS OF BREATH: 0

## 2019-03-07 ENCOUNTER — HOSPITAL ENCOUNTER (OUTPATIENT)
Age: 74
Setting detail: SPECIMEN
Discharge: HOME | End: 2019-03-07
Payer: MEDICARE

## 2019-03-07 ENCOUNTER — OFFICE VISIT (OUTPATIENT)
Dept: INTERNAL MEDICINE CLINIC | Age: 74
End: 2019-03-07
Payer: MEDICARE

## 2019-03-07 VITALS
OXYGEN SATURATION: 97 % | HEART RATE: 56 BPM | TEMPERATURE: 98 F | RESPIRATION RATE: 20 BRPM | SYSTOLIC BLOOD PRESSURE: 116 MMHG | DIASTOLIC BLOOD PRESSURE: 78 MMHG

## 2019-03-07 DIAGNOSIS — R73.09 ELEVATED GLUCOSE: ICD-10-CM

## 2019-03-07 DIAGNOSIS — D64.9 ANEMIA, UNSPECIFIED TYPE: ICD-10-CM

## 2019-03-07 DIAGNOSIS — E66.01 MORBID OBESITY WITH BMI OF 45.0-49.9, ADULT (HCC): ICD-10-CM

## 2019-03-07 DIAGNOSIS — M79.642 CHRONIC HAND PAIN, LEFT: ICD-10-CM

## 2019-03-07 DIAGNOSIS — E66.01 CLASS 3 SEVERE OBESITY WITH SERIOUS COMORBIDITY AND BODY MASS INDEX (BMI) OF 45.0 TO 49.9 IN ADULT, UNSPECIFIED OBESITY TYPE (HCC): ICD-10-CM

## 2019-03-07 DIAGNOSIS — G89.29 CHRONIC HAND PAIN, LEFT: ICD-10-CM

## 2019-03-07 DIAGNOSIS — R05.9 COUGH: ICD-10-CM

## 2019-03-07 DIAGNOSIS — I10 ESSENTIAL HYPERTENSION: Primary | ICD-10-CM

## 2019-03-07 DIAGNOSIS — R35.0 URINARY FREQUENCY: ICD-10-CM

## 2019-03-07 LAB
-: NORMAL
AMORPHOUS: NORMAL
BACTERIA: NORMAL
BILIRUBIN URINE: NEGATIVE
CASTS UA: NORMAL /LPF (ref 0–8)
COLOR: YELLOW
COMMENT UA: ABNORMAL
CRYSTALS, UA: NORMAL /HPF
EPITHELIAL CELLS UA: NORMAL /HPF (ref 0–5)
GLUCOSE URINE: NEGATIVE
KETONES, URINE: NEGATIVE
LEUKOCYTE ESTERASE, URINE: NEGATIVE
MUCUS: NORMAL
NITRITE, URINE: NEGATIVE
OTHER OBSERVATIONS UA: NORMAL
PH UA: 6.5 (ref 5–8)
PROTEIN UA: ABNORMAL
RBC UA: NORMAL /HPF (ref 0–4)
RENAL EPITHELIAL, UA: NORMAL /HPF
SPECIFIC GRAVITY UA: 1.01 (ref 1–1.03)
TRICHOMONAS: NORMAL
TURBIDITY: CLEAR
URINE HGB: NEGATIVE
UROBILINOGEN, URINE: NORMAL
WBC UA: NORMAL /HPF (ref 0–5)
YEAST: NORMAL

## 2019-03-07 PROCEDURE — 99214 OFFICE O/P EST MOD 30 MIN: CPT | Performed by: NURSE PRACTITIONER

## 2019-03-07 RX ORDER — DOCUSATE SODIUM 100 MG/1
100 CAPSULE, LIQUID FILLED ORAL DAILY PRN
COMMUNITY
End: 2021-10-02

## 2019-03-07 ASSESSMENT — PATIENT HEALTH QUESTIONNAIRE - PHQ9
SUM OF ALL RESPONSES TO PHQ QUESTIONS 1-9: 0
1. LITTLE INTEREST OR PLEASURE IN DOING THINGS: 0
SUM OF ALL RESPONSES TO PHQ QUESTIONS 1-9: 0
2. FEELING DOWN, DEPRESSED OR HOPELESS: 0
SUM OF ALL RESPONSES TO PHQ9 QUESTIONS 1 & 2: 0

## 2019-03-07 ASSESSMENT — ENCOUNTER SYMPTOMS
SHORTNESS OF BREATH: 0
CONSTIPATION: 1
ABDOMINAL PAIN: 0
NAUSEA: 0
WHEEZING: 1
VOMITING: 0

## 2019-03-09 ASSESSMENT — ENCOUNTER SYMPTOMS
EYE REDNESS: 0
COUGH: 1
COLOR CHANGE: 0

## 2019-03-21 ENCOUNTER — TELEPHONE (OUTPATIENT)
Dept: INTERNAL MEDICINE CLINIC | Age: 74
End: 2019-03-21

## 2019-03-27 ENCOUNTER — HOSPITAL ENCOUNTER (OUTPATIENT)
Dept: PAIN MANAGEMENT | Age: 74
Discharge: HOME | End: 2019-03-27
Payer: MEDICARE

## 2019-03-27 VITALS
HEIGHT: 65 IN | DIASTOLIC BLOOD PRESSURE: 73 MMHG | RESPIRATION RATE: 20 BRPM | BODY MASS INDEX: 44.65 KG/M2 | HEART RATE: 59 BPM | WEIGHT: 268 LBS | TEMPERATURE: 97.8 F | SYSTOLIC BLOOD PRESSURE: 125 MMHG | OXYGEN SATURATION: 96 %

## 2019-03-27 DIAGNOSIS — Z51.81 MEDICATION MONITORING ENCOUNTER: Chronic | ICD-10-CM

## 2019-03-27 DIAGNOSIS — S32.010A CLOSED COMPRESSION FRACTURE OF FIRST LUMBAR VERTEBRA, INITIAL ENCOUNTER: ICD-10-CM

## 2019-03-27 DIAGNOSIS — Z96.653 STATUS POST TOTAL BILATERAL KNEE REPLACEMENT: ICD-10-CM

## 2019-03-27 DIAGNOSIS — M54.16 LUMBAR RADICULOPATHY: ICD-10-CM

## 2019-03-27 DIAGNOSIS — M54.5 ACUTE MIDLINE LOW BACK PAIN, WITH SCIATICA PRESENCE UNSPECIFIED: ICD-10-CM

## 2019-03-27 DIAGNOSIS — M48.02 CERVICAL SPINAL STENOSIS: ICD-10-CM

## 2019-03-27 DIAGNOSIS — S32.010S CLOSED COMPRESSION FRACTURE OF FIRST LUMBAR VERTEBRA, SEQUELA: ICD-10-CM

## 2019-03-27 DIAGNOSIS — M51.36 DDD (DEGENERATIVE DISC DISEASE), LUMBAR: ICD-10-CM

## 2019-03-27 DIAGNOSIS — Z96.653 HISTORY OF TOTAL BILATERAL KNEE REPLACEMENT: Primary | ICD-10-CM

## 2019-03-27 DIAGNOSIS — M48.062 SPINAL STENOSIS OF LUMBAR REGION WITH NEUROGENIC CLAUDICATION: ICD-10-CM

## 2019-03-27 DIAGNOSIS — M80.00XA AGE-RELATED OSTEOPOROSIS WITH CURRENT PATHOLOGICAL FRACTURE, INITIAL ENCOUNTER: ICD-10-CM

## 2019-03-27 DIAGNOSIS — Z98.890 S/P KYPHOPLASTY: ICD-10-CM

## 2019-03-27 DIAGNOSIS — G89.29 ENCOUNTER FOR CHRONIC PAIN MANAGEMENT: ICD-10-CM

## 2019-03-27 DIAGNOSIS — M79.7 FIBROMYALGIA: ICD-10-CM

## 2019-03-27 DIAGNOSIS — M47.817 LUMBOSACRAL SPONDYLOSIS WITHOUT MYELOPATHY: ICD-10-CM

## 2019-03-27 DIAGNOSIS — M80.00XS AGE-RELATED OSTEOPOROSIS WITH CURRENT PATHOLOGICAL FRACTURE, SEQUELA: ICD-10-CM

## 2019-03-27 DIAGNOSIS — M43.10 ACQUIRED SPONDYLOLISTHESIS: ICD-10-CM

## 2019-03-27 PROCEDURE — 99213 OFFICE O/P EST LOW 20 MIN: CPT

## 2019-03-27 PROCEDURE — 99213 OFFICE O/P EST LOW 20 MIN: CPT | Performed by: NURSE PRACTITIONER

## 2019-03-27 RX ORDER — OXYCODONE HYDROCHLORIDE 5 MG/1
5 TABLET ORAL EVERY 8 HOURS PRN
Qty: 90 TABLET | Refills: 0 | Status: SHIPPED | OUTPATIENT
Start: 2019-03-30 | End: 2019-04-25 | Stop reason: SDUPTHER

## 2019-03-27 ASSESSMENT — ENCOUNTER SYMPTOMS
HEARTBURN: 1
BACK PAIN: 1

## 2019-04-15 ENCOUNTER — HOSPITAL ENCOUNTER (OUTPATIENT)
Age: 74
Discharge: HOME | End: 2019-04-15
Payer: MEDICARE

## 2019-04-15 DIAGNOSIS — D64.9 ANEMIA, UNSPECIFIED TYPE: ICD-10-CM

## 2019-04-15 DIAGNOSIS — R73.09 ELEVATED GLUCOSE: ICD-10-CM

## 2019-04-15 DIAGNOSIS — E66.01 CLASS 3 SEVERE OBESITY WITH SERIOUS COMORBIDITY AND BODY MASS INDEX (BMI) OF 45.0 TO 49.9 IN ADULT, UNSPECIFIED OBESITY TYPE (HCC): ICD-10-CM

## 2019-04-15 DIAGNOSIS — I10 ESSENTIAL HYPERTENSION: ICD-10-CM

## 2019-04-15 LAB
-: ABNORMAL
ALBUMIN SERPL-MCNC: 4 G/DL (ref 3.5–5.2)
ALBUMIN/GLOBULIN RATIO: ABNORMAL (ref 1–2.5)
ALP BLD-CCNC: 83 U/L (ref 35–104)
ALT SERPL-CCNC: 9 U/L (ref 5–33)
AMORPHOUS: ABNORMAL
ANION GAP SERPL CALCULATED.3IONS-SCNC: 12 MMOL/L (ref 9–17)
AST SERPL-CCNC: 14 U/L
BACTERIA: ABNORMAL
BILIRUB SERPL-MCNC: 0.42 MG/DL (ref 0.3–1.2)
BILIRUBIN URINE: NEGATIVE
BUN BLDV-MCNC: 21 MG/DL (ref 8–23)
BUN/CREAT BLD: ABNORMAL (ref 9–20)
CALCIUM SERPL-MCNC: 9.5 MG/DL (ref 8.6–10.4)
CASTS UA: ABNORMAL /LPF
CHLORIDE BLD-SCNC: 103 MMOL/L (ref 98–107)
CHOLESTEROL/HDL RATIO: 5.4
CHOLESTEROL: 254 MG/DL
CO2: 27 MMOL/L (ref 20–31)
COLOR: YELLOW
COMMENT UA: ABNORMAL
CREAT SERPL-MCNC: 1.05 MG/DL (ref 0.5–0.9)
CRYSTALS, UA: ABNORMAL /HPF
EPITHELIAL CELLS UA: ABNORMAL /HPF
ESTIMATED AVERAGE GLUCOSE: 108 MG/DL
GFR AFRICAN AMERICAN: >60 ML/MIN
GFR NON-AFRICAN AMERICAN: 51 ML/MIN
GFR SERPL CREATININE-BSD FRML MDRD: ABNORMAL ML/MIN/{1.73_M2}
GFR SERPL CREATININE-BSD FRML MDRD: ABNORMAL ML/MIN/{1.73_M2}
GLUCOSE BLD-MCNC: 107 MG/DL (ref 70–99)
GLUCOSE URINE: NEGATIVE
HBA1C MFR BLD: 5.4 % (ref 4–6)
HCT VFR BLD CALC: 37.1 % (ref 36–46)
HDLC SERPL-MCNC: 47 MG/DL
HEMOGLOBIN: 12.2 G/DL (ref 12–16)
KETONES, URINE: NEGATIVE
LDL CHOLESTEROL: 186 MG/DL (ref 0–130)
LEUKOCYTE ESTERASE, URINE: NEGATIVE
MCH RBC QN AUTO: 26.2 PG (ref 26–34)
MCHC RBC AUTO-ENTMCNC: 32.8 G/DL (ref 31–37)
MCV RBC AUTO: 79.9 FL (ref 80–100)
MUCUS: ABNORMAL
NITRITE, URINE: NEGATIVE
NRBC AUTOMATED: ABNORMAL PER 100 WBC
OTHER OBSERVATIONS UA: ABNORMAL
PDW BLD-RTO: 14.9 % (ref 11.5–14.9)
PH UA: 7 (ref 5–8)
PLATELET # BLD: 311 K/UL (ref 150–450)
PMV BLD AUTO: 9.4 FL (ref 6–12)
POTASSIUM SERPL-SCNC: 4.1 MMOL/L (ref 3.7–5.3)
PROTEIN UA: ABNORMAL
RBC # BLD: 4.65 M/UL (ref 4–5.2)
RBC UA: ABNORMAL /HPF
RENAL EPITHELIAL, UA: ABNORMAL /HPF
SODIUM BLD-SCNC: 142 MMOL/L (ref 135–144)
SPECIFIC GRAVITY UA: 1.01 (ref 1–1.03)
TOTAL PROTEIN: 7.9 G/DL (ref 6.4–8.3)
TRICHOMONAS: ABNORMAL
TRIGL SERPL-MCNC: 104 MG/DL
TSH SERPL DL<=0.05 MIU/L-ACNC: 2.61 MIU/L (ref 0.3–5)
TURBIDITY: ABNORMAL
URINE HGB: NEGATIVE
UROBILINOGEN, URINE: NORMAL
VLDLC SERPL CALC-MCNC: ABNORMAL MG/DL (ref 1–30)
WBC # BLD: 5.4 K/UL (ref 3.5–11)
WBC UA: ABNORMAL /HPF
YEAST: ABNORMAL

## 2019-04-15 PROCEDURE — 84443 ASSAY THYROID STIM HORMONE: CPT

## 2019-04-15 PROCEDURE — 81001 URINALYSIS AUTO W/SCOPE: CPT

## 2019-04-15 PROCEDURE — 80053 COMPREHEN METABOLIC PANEL: CPT

## 2019-04-15 PROCEDURE — 83036 HEMOGLOBIN GLYCOSYLATED A1C: CPT

## 2019-04-15 PROCEDURE — 80061 LIPID PANEL: CPT

## 2019-04-15 PROCEDURE — 36415 COLL VENOUS BLD VENIPUNCTURE: CPT

## 2019-04-15 PROCEDURE — 85027 COMPLETE CBC AUTOMATED: CPT

## 2019-04-25 ENCOUNTER — HOSPITAL ENCOUNTER (OUTPATIENT)
Dept: PAIN MANAGEMENT | Age: 74
Discharge: HOME | End: 2019-04-25
Payer: MEDICARE

## 2019-04-25 VITALS
HEIGHT: 65 IN | RESPIRATION RATE: 16 BRPM | HEART RATE: 80 BPM | BODY MASS INDEX: 44.65 KG/M2 | SYSTOLIC BLOOD PRESSURE: 165 MMHG | OXYGEN SATURATION: 94 % | WEIGHT: 268 LBS | DIASTOLIC BLOOD PRESSURE: 86 MMHG | TEMPERATURE: 98 F

## 2019-04-25 DIAGNOSIS — M54.5 ACUTE MIDLINE LOW BACK PAIN, WITH SCIATICA PRESENCE UNSPECIFIED: ICD-10-CM

## 2019-04-25 DIAGNOSIS — M48.062 SPINAL STENOSIS OF LUMBAR REGION WITH NEUROGENIC CLAUDICATION: Chronic | ICD-10-CM

## 2019-04-25 DIAGNOSIS — Z51.81 MEDICATION MONITORING ENCOUNTER: Chronic | ICD-10-CM

## 2019-04-25 DIAGNOSIS — M51.36 DDD (DEGENERATIVE DISC DISEASE), LUMBAR: ICD-10-CM

## 2019-04-25 DIAGNOSIS — M16.0 PRIMARY OSTEOARTHRITIS OF BOTH HIPS: ICD-10-CM

## 2019-04-25 DIAGNOSIS — Z96.653 STATUS POST TOTAL BILATERAL KNEE REPLACEMENT: Primary | ICD-10-CM

## 2019-04-25 DIAGNOSIS — M80.00XS AGE-RELATED OSTEOPOROSIS WITH CURRENT PATHOLOGICAL FRACTURE, SEQUELA: ICD-10-CM

## 2019-04-25 DIAGNOSIS — Z96.653 HISTORY OF TOTAL BILATERAL KNEE REPLACEMENT: ICD-10-CM

## 2019-04-25 PROCEDURE — 99213 OFFICE O/P EST LOW 20 MIN: CPT | Performed by: NURSE PRACTITIONER

## 2019-04-25 PROCEDURE — 99213 OFFICE O/P EST LOW 20 MIN: CPT

## 2019-04-25 RX ORDER — OXYCODONE HYDROCHLORIDE 5 MG/1
5 TABLET ORAL EVERY 8 HOURS PRN
Qty: 90 TABLET | Refills: 0 | Status: SHIPPED | OUTPATIENT
Start: 2019-04-29 | End: 2019-05-24 | Stop reason: SDUPTHER

## 2019-04-25 RX ORDER — OFLOXACIN 3 MG/ML
SOLUTION/ DROPS OPHTHALMIC DAILY PRN
COMMUNITY
Start: 2019-04-13 | End: 2021-09-23

## 2019-04-25 RX ORDER — NEOMYCIN SULFATE, POLYMYXIN B SULFATE, AND DEXAMETHASONE 3.5; 10000; 1 MG/G; [USP'U]/G; MG/G
OINTMENT OPHTHALMIC
COMMUNITY
Start: 2019-04-13 | End: 2019-06-11

## 2019-04-25 ASSESSMENT — ENCOUNTER SYMPTOMS
EYES NEGATIVE: 1
BACK PAIN: 1
CONSTIPATION: 1
COUGH: 1
SHORTNESS OF BREATH: 1

## 2019-04-25 NOTE — PROGRESS NOTES
Rick 88 PROGRESS NOTE      Patient here today to review Medication Agreement    Chief Complaint:  Low back pain      HPI: She c/o low back pain , states has had a flare up of her pain. She has history of kyphoplasty. of L1. She also c/o neck and upper back pain. She would like to get back into. She is not sleeping well due to pain . No Ed visits. Activity the same. Back Pain   This is a chronic problem. The current episode started more than 1 year ago. The problem occurs constantly. The problem has been gradually worsening since onset. The pain is present in the lumbar spine. Quality: sharp. The pain does not radiate. The pain is at a severity of 7/10. The pain is moderate. The pain is the same all the time. Exacerbated by: weather, activity. Stiffness is present all day. Associated symptoms include numbness. Risk factors include menopause, obesity and sedentary lifestyle. She has tried heat and analgesics for the symptoms. The treatment provided mild relief. Patient denies any new neurological symptoms. No bowel or bladder incontinence, no weakness, and no falling. Treatment goals:  Functional status: reduce pain 4 or below      Aberrancy:   Any alcoholic beverages no      Any illegal drugs   no      Analgesia:pain  7      Adverse  Effects :BM daily    ADL;s :        Pill count: appropriate    Morphine equivalent dose as reported on OARRS:22.50  Attestation: The Prescription Monitoring Report for this patient was reviewed today. KARIN Sawant CNP)  Chronic Pain Routine Monitoring: No signs of potential drug abuse or diversion identified: otherwise, see note documentation, Obtaining appropriate analgesic effect of treatment. (KARIN Sawant CNP)  Chronic Pain > 80 MEDD: Obtained or confirmed a written medication contract was on file. KARIN Sawant CNP)  Review ofOARRS does not show any aberrant prescription behavior.  Medication is helping the patient stay active. Patient denies any side effects and reports adequate analgesia. No sign of misuse/abuse.             When was thelast UDS:    5-16-18         Was the UDS appropriate:yes        Record/Diagnostics Review:       As above, I did review the imaging        5/18/2018 10:15 PM - Jordin, Oseipn Incoming Lab Results From [facility]      Component Value Ref Range & Units Status Collected Lab   6-Acetylmorphine, Ur Not Detected    Final 05/16/2018  4:04 PM [facility]   7-Aminoclonazepam, Urine Not Detected    Final 05/16/2018  4:04 PM [facility]   Alpha-OH-Alpraz, Urine Not Detected    Final 05/16/2018  4:04 PM [facility]   Alprazolam, Urine Not Detected    Final 05/16/2018  4:04 PM [facility]   Amphetamines, urine Not Detected    Final 05/16/2018  4:04 PM [facility]   Barbiturates, Ur Not Detected    Final 05/16/2018  4:04 PM [facility]   Benzoylecgonine, Ur Not Detected    Final 05/16/2018  4:04 PM [facility]   Buprenorphine Urine Not Detected    Final 05/16/2018  4:04 PM [facility]   Carisoprodol, Ur Not Detected    Final 05/16/2018  4:04 PM [facility]   (NOTE)   The carisoprodol immunoassay has cross-reactivity to carisoprodol   and meprobamate.     Clonazepam, Urine Not Detected    Final 05/16/2018  4:04 PM [facility]   Codeine, Urine Not Detected    Final 05/16/2018  4:04 PM [facility]   MDA, Ur Not Detected    Final 05/16/2018  4:04 PM [facility]   Diazepam, Urine Not Detected    Final 05/16/2018  4:04 PM [facility]   Ethyl Glucuronide Ur Not Detected    Final 05/16/2018  4:04 PM [facility]   Fentanyl, Ur Not Detected    Final 05/16/2018  4:04 PM [facility]   Hydrocodone, Urine Not Detected    Final 05/16/2018  4:04 PM [facility]   Hydromorphone, Urine Not Detected    Final 05/16/2018  4:04 PM [facility]   Lorazepam, Urine Not Detected    Final 05/16/2018  4:04 PM [facility]   Marijuana Metab, Ur Not Detected    Final 05/16/2018  4:04 PM [facility]   MDEA, TONI, Ur Not Detected    Final 05/16/2018  4:04 PM [facility]   MDMA, Urine Not Detected    Final 05/16/2018  4:04 PM [facility]   Meperidine Metab, Ur Not Detected    Final 05/16/2018  4:04 PM [facility]   Methadone, Urine Not Detected    Final 05/16/2018  4:04 PM [facility]   Methamphetamine, Urine Not Detected    Final 05/16/2018  4:04 PM [facility]   Methylphenidate Not Detected    Final 05/16/2018  4:04 PM [facility]   Midazolam, Urine Not Detected    Final 05/16/2018  4:04 PM [facility]   Morphine Urine Not Detected    Final 05/16/2018  4:04 PM [facility]   Norbuprenorphine, Urine Not Detected    Final 05/16/2018  4:04 PM [facility]   Nordiazepam, Urine Not Detected    Final 05/16/2018  4:04 PM [facility]   Norfentanyl, Urine Not Detected    Final 05/16/2018  4:04 PM [facility]   NORHYDROCODONE, URINE Not Detected    Final 05/16/2018  4:04 PM [facility]   Noroxycodone, Urine Present    Final 05/16/2018  4:04 PM [facility]   NOROXYMORPHONE, URINE Present    Final 05/16/2018  4:04 PM [facility]   Oxazepam, Urine Not Detected    Final 05/16/2018  4:04 PM [facility]   Oxycodone Urine Present    Final 05/16/2018  4:04 PM [facility]   Oxymorphone, Urine Not Detected    Final 05/16/2018  4:04 PM [facility]   PCP, Urine Not Detected    Final 05/16/2018  4:04 PM [facility]   Phentermine, Ur Not Detected    Final 05/16/2018  4:04 PM [facility]   Propoxyphene, Urine Not Detected    Final 05/16/2018  4:04 PM [facility]   Tapentadol-O-Sulfate, Urine Not Detected    Final 05/16/2018  4:04 PM [facility]   Tapentadol, Urine Not Detected    Final 05/16/2018  4:04 PM [facility]   Temazepam, Urine Not Detected    Final 05/16/2018  4:04 PM [facility]   Tramadol, Urine Not Detected    Final 05/16/2018  4:04 PM [facility]   Zolpidem, Urine Not Detected    Final 05/16/2018  4:04 PM [facility]   Creatinine, Ur 71.0  20.0 - 400.0 mg/dL Final 05/16/2018  4:04 PM [facility]   Pain Mgt Drug Panel, Hi Res, Ur See Below    Final 05/16/2018  4:04 PM [facility]   (NOTE)   Methodology: Qualitative Enzyme Immunoassay and Qualitative Liquid   Chromatography-Time of Flight-Mass Spectrometry or Tandem Mass   Spectrometry, Quantitative Spectrophotometry   The absence of expected drug(s) and/or drug metabolite(s) may   indicate non-compliance, inappropriate timing of specimen   collection relative to drug administration, poor drug absorption,   diluted/adulterated urine, or limitations of testing. The   concentration must be greater than or equal to the cutoff to be   reported as present.  If specific drug concentrations are   required, contact the laboratory within two weeks of specimen   collection to request quantification by a second analytical   technique. Interpretive questions should be directed to the   laboratory. Results based on immunoassay detection that do not match clinical   expectations should be   interpreted with caution. Confirmatory testing by mass   spectrometry for immunoassay-based results is available, if                   Past Medical History:   Diagnosis Date    Abnormality of rib determined by X-ray 1/28/2016    Acute cystitis without hematuria 6/16/2018    Acute exacerbation of chronic bronchitis (HCC)     Acute on chronic diastolic heart failure ([facility] [address] 1/28/2016    Adjustment disorder with mixed anxiety and depressed mood 6/26/2015    Mild     Anemia 3/5/2014    Back pain     Bronchiectasis with acute lower respiratory infection ([facility] [address] 8/16/2017    Bronchitis     Chronic bronchitis (HCC) 1/28/2016    Chronic cough 7/23/2018    Degenerative joint disease (DJD) of hip 5/14/2015    Dyslipidemia 3/5/2014    Encephalopathy acute 5/29/2016    Essential hypertension 1/28/2016    Fibromyalgia     GERD (gastroesophageal reflux disease)     History of total bilateral knee replacement 5/28/2016    Hx of blood clots     left leg and lung    Hyperlipidemia     Hypertension     Incontinence of urine     Irregular heartbeat     DR. Ashlee Alvarez Medication monitoring encounter 6/13/2018    Morbid obesity with BMI of 45.0-49.9, adult ([facility] [address] 1/28/2016    Obstructive sleep apnea syndrome     Osteoarthritis     Primary osteoarthritis of left knee 5/27/2016    PVC (premature ventricular contraction) 1/28/2016    S/P right and left heart catheterization 10/2/2015    Sleep apnea     no machine    SOB (shortness of breath)     Spinal stenosis of lumbar region with neurogenic claudication 5/13/2013    Supplemental oxygen dependent 12/8/2017    Urine frequency     UTI (urinary tract infection)     hospitalized early july 2014 for kidney infection       Past Surgical History:   Procedure Laterality Date    BRONCHOSCOPY Left 8/16/2017    BRONCHOSCOPY ALVEOLAR LAVAGE LOWER LOBE performed by Maria A Rueda MD at [address]  x 3    no stents    COLONOSCOPY  4 28 14    polyps biopsies     FOOT SURGERY Left     calcium deposit removal from top of foot    HYSTERECTOMY      JOINT REPLACEMENT Bilateral 5/27/2016    bilat total knees    NERVE BLOCK  5/13/2013    caudal celestone 6mg    NERVE BLOCK  5/28/13    Caudal #2, Celestone 9mg    NERVE BLOCK  2/14/14    rt hip inj celestone 9 mg    NERVE BLOCK  07/14/2014    caudal# 3- celestone 9 mg    NERVE BLOCK  7-21-14    caudal epidural #2, decadron 7mg, fentanyl 25mcg    NERVE BLOCK  10/2/14    duramorph 1.5  decadron 5mg    NERVE BLOCK  11/9/2015    caudal# 1 celestone 9mg    NERVE BLOCK  11/16/15    caudal #2  decadron 10mg    NERVE BLOCK  11/23/15    duramorph 1mg celestone 9mg    NERVE BLOCK  03/28/2018    CAUDAL EPIDURAL STEROID BLOCK  DECADRON 10 MG     NERVE BLOCK  05/23/2018    caudal # decadron 7mg    NERVE BLOCK  08/28/2018    natalia transforminal # 1, decadron 10mg gadoteridol, LONG NEEDLES    AL PERQ VERT AGMNTJ CAVITY CRTJ UNI/BI CANNULJ LMBR N/A 10/29/2018    KYPHOPLASTY L1 performed by Sarah Fraire MD at [address] Right 5/14/15    total hip replacement       Allergies   Allergen Reactions    Rosuvastatin Calcium Anaphylaxis     Hair fell out    Statins Anaphylaxis     Hair fell out    Bactrim [Sulfamethoxazole-Trimethoprim] Diarrhea    Caffeine Other (See Comments)     pain  pain    Dye [Iodides] Other (See Comments)     Iv dye= blood clots in arm    Ioxaglate Other (See Comments)     Iv dye= blood clots in arm    Dicloxacillin Rash    Pcn [Penicillins] Rash         Current Outpatient Medications:     neomycin-polymyxin-dexameth 3.5-30469-7.1 OINT, , Disp: , Rfl:     ofloxacin (OCUFLOX) 0.3 % solution, , Disp: , Rfl:     [START ON 4/29/2019] oxyCODONE (ROXICODONE) 5 MG immediate release tablet, Take 1 tablet by mouth every 8 hours as needed for Pain for up to 30 days. , Disp: 90 tablet, Rfl: 0    gabapentin (NEURONTIN) 100 MG capsule, Take 1 capsule by mouth daily for 30 days. ., Disp: 90 capsule, Rfl: 0    metoprolol tartrate (LOPRESSOR) 50 MG tablet, Take 50 mg by mouth 2 times daily, Disp: , Rfl:     DULoxetine (CYMBALTA) 30 MG extended release capsule, TAKE 1 CAPSULE TWICE A DAY, Disp: 180 capsule, Rfl: 3    aspirin 81 MG tablet, Take 81 mg by mouth daily, Disp: , Rfl:     docusate sodium (COLACE) 100 MG capsule, Take 100 mg by mouth daily as needed for Constipation, Disp: , Rfl:     Misc.  Devices (WALKER) MISC, 1 each by Does not apply route daily Upright walker with wheels and seat, Disp: 1 each, Rfl: 0    ibuprofen (ADVIL;MOTRIN) 600 MG tablet, Take 600 mg by mouth every 6 hours as needed for Pain, Disp: , Rfl:     albuterol sulfate  (90 Base) MCG/ACT inhaler, Inhale 2 puffs into the lungs 4 times daily as needed for Wheezing , Disp: , Rfl:     Family History   Problem Relation Age of Onset    Stomach Cancer Brother         stomach    High Blood Pressure Mother     Heart Disease Sister         irregular heartbeat       Social History     Socioeconomic History    Marital status:      Spouse name: Not on file    Number of children: Not on file    Years of education: Not on file    Highest education level: Not on file   Occupational History    Not on file   Social Needs    Financial resource strain: Not on file    Food insecurity:     Worry: Not on file     Inability: Not on file    Transportation needs:     Medical: Not on file     Non-medical: Not on file   Tobacco Use    Smoking status: Former Smoker     Packs/day: 1.00     Years: 18.00     Pack years: 18.00     Last attempt to quit: 1993     Years since quittin.9    Smokeless tobacco: Never Used   Substance and Sexual Activity    Alcohol use: No    Drug use: No    Sexual activity: Not Currently   Lifestyle    Physical activity:     Days per week: Not on file     Minutes per session: Not on file    Stress: Not on file   Relationships    Social connections:     Talks on phone: Not on file     Gets together: Not on file     Attends Tenriism service: Not on file     Active member of club or organization: Not on file     Attends meetings of clubs or organizations: Not on file     Relationship status: Not on file    Intimate partner violence:     Fear of current or ex partner: Not on file     Emotionally abused: Not on file     Physically abused: Not on file     Forced sexual activity: Not on file   Other Topics Concern    Not on file   Social History Narrative    Not on file       Review of Systems:  Review of Systems   Constitution: Negative. HENT: Positive for congestion. Eyes: Negative. Cardiovascular: Positive for leg swelling. Respiratory: Positive for cough and shortness of breath. Uses oxygen   Endocrine: Negative. Hematologic/Lymphatic: Negative. Skin: Negative. Musculoskeletal: Positive for back pain and joint pain. Gastrointestinal: Positive for constipation. Genitourinary: Positive for nocturia. Neurological: Positive for numbness. Psychiatric/Behavioral: The patient is nervous/anxious. She has multiple joint pain. Physical Exam:  BP (!) 165/86   Pulse 80   Temp 98 °F (36.7 °C) (Oral)   Resp 16   Ht 5' 4.5\" (1.638 m)   Wt 268 lb (121.6 kg)   SpO2 94%   BMI 45.29 kg/m²     Physical Exam   Constitutional: She appears well-developed.    obese   HENT: Head: Normocephalic. Neck: Normal range of motion. Pulmonary/Chest: Effort normal.   Musculoskeletal:        Right shoulder: She exhibits decreased range of motion and tenderness. Left shoulder: She exhibits decreased range of motion and tenderness. Cervical back: She exhibits tenderness. Lumbar back: She exhibits decreased range of motion and tenderness. Neurological: She is alert. Gait abnormal.   Reflex Scores:       Patellar reflexes are 0 on the right side and 0 on the left side. Achilles reflexes are 1+ on the right side and 1+ on the left side. Skin: Skin is warm, dry and intact. Psychiatric: She has a normal mood and affect.  Her speech is normal and behavior is normal. Judgment normal. Cognition and memory are normal.         Assessment:    Problem List Items Addressed This Visit     Status post total bilateral knee replacement - Primary    Relevant Medications    oxyCODONE (ROXICODONE) 5 MG immediate release tablet (Start on 4/29/2019)    Spinal stenosis of lumbar region with neurogenic claudication (Chronic)    Relevant Medications    oxyCODONE (ROXICODONE) 5 MG immediate release tablet (Start on 4/29/2019)    Other Relevant Orders    [facility]    Medication monitoring encounter (Chronic)    Degenerative joint disease (DJD) of hip    Relevant Medications    oxyCODONE (ROXICODONE) 5 MG immediate release tablet (Start on 4/29/2019)    Other Relevant Orders    [address]    DDD (degenerative disc disease), lumbar    Relevant Medications    oxyCODONE (ROXICODONE) 5 MG immediate release tablet (Start on 4/29/2019)    Back pain    Relevant Medications    oxyCODONE (ROXICODONE) 5 MG immediate release tablet (Start on 4/29/2019)    Age-related osteoporosis with current pathological fracture      Other Visit Diagnoses     History of total bilateral knee replacement        Relevant Medications    oxyCODONE (ROXICODONE) 5 MG physicians if planning to take any over-the-counter medications before  takeing them. Patient is strongly advised to avoid tranquilizers or  relaxants, illegal drugs  or any medications that can depress breathing  Patient is also advised to tell us if there is any changes in their medications from other physicians.     1. Script for PT improve strength and mobility    Advised of elevated B/P    TREATMENT OPTIONS:   Medication agreement updated  Script for PT  Return in 4 weeks  Medication Agreement Requirements Met  Continue Opioid therapy  Script written for oxycodone  Follow up appointment made

## 2019-05-22 NOTE — TELEPHONE ENCOUNTER
Patient wanted to know if she could have pain blockers or something for pain other than pills. Standing

## 2019-05-24 ENCOUNTER — HOSPITAL ENCOUNTER (OUTPATIENT)
Dept: PAIN MANAGEMENT | Age: 74
Discharge: HOME | End: 2019-05-24
Payer: MEDICARE

## 2019-05-24 VITALS
TEMPERATURE: 98.6 F | BODY MASS INDEX: 44.65 KG/M2 | WEIGHT: 268 LBS | HEIGHT: 65 IN | OXYGEN SATURATION: 96 % | RESPIRATION RATE: 16 BRPM | SYSTOLIC BLOOD PRESSURE: 116 MMHG | DIASTOLIC BLOOD PRESSURE: 59 MMHG | HEART RATE: 66 BPM

## 2019-05-24 DIAGNOSIS — M16.0 PRIMARY OSTEOARTHRITIS OF BOTH HIPS: ICD-10-CM

## 2019-05-24 DIAGNOSIS — M51.36 DDD (DEGENERATIVE DISC DISEASE), LUMBAR: ICD-10-CM

## 2019-05-24 DIAGNOSIS — M17.12 PRIMARY OSTEOARTHRITIS OF LEFT KNEE: ICD-10-CM

## 2019-05-24 DIAGNOSIS — M54.5 ACUTE MIDLINE LOW BACK PAIN, WITH SCIATICA PRESENCE UNSPECIFIED: ICD-10-CM

## 2019-05-24 DIAGNOSIS — Z98.890 S/P KYPHOPLASTY: ICD-10-CM

## 2019-05-24 DIAGNOSIS — Z51.81 MEDICATION MONITORING ENCOUNTER: Primary | Chronic | ICD-10-CM

## 2019-05-24 DIAGNOSIS — M79.7 FIBROMYALGIA: ICD-10-CM

## 2019-05-24 DIAGNOSIS — Z96.653 HISTORY OF TOTAL BILATERAL KNEE REPLACEMENT: ICD-10-CM

## 2019-05-24 DIAGNOSIS — M48.062 SPINAL STENOSIS OF LUMBAR REGION WITH NEUROGENIC CLAUDICATION: ICD-10-CM

## 2019-05-24 PROCEDURE — 99213 OFFICE O/P EST LOW 20 MIN: CPT | Performed by: NURSE PRACTITIONER

## 2019-05-24 PROCEDURE — 99213 OFFICE O/P EST LOW 20 MIN: CPT

## 2019-05-24 RX ORDER — OXYCODONE HYDROCHLORIDE 5 MG/1
5 TABLET ORAL EVERY 8 HOURS PRN
Qty: 90 TABLET | Refills: 0 | Status: SHIPPED | OUTPATIENT
Start: 2019-05-29 | End: 2019-06-24 | Stop reason: SDUPTHER

## 2019-05-24 ASSESSMENT — ENCOUNTER SYMPTOMS
COUGH: 1
GASTROINTESTINAL NEGATIVE: 1
BLURRED VISION: 1
BACK PAIN: 1

## 2019-05-24 NOTE — PROGRESS NOTES
confirmed a written medication contract was on file. Rock Osorio, APRN - CNP)  Review ofOARRS does not show any aberrant prescription behavior. Medication is helping the patient stay active. Patient denies any side effects and reports adequate analgesia. No sign of misuse/abuse.               When was thelast UDS:    5-16-18         Was the UDS appropriate:yes        Record/Diagnostics Review:       As above, I did review the imaging        5/18/2018 10:15 PM - Jordin, [facility] Incoming Lab Results From [facility]      Component Value Ref Range & Units Status Collected Lab   6-Acetylmorphine, Ur Not Detected    Final 05/16/2018  4:04 PM [facility]   7-Aminoclonazepam, Urine Not Detected    Final 05/16/2018  4:04 PM [facility]   Alpha-OH-Alpraz, Urine Not Detected    Final 05/16/2018  4:04 PM [facility]   Alprazolam, Urine Not Detected    Final 05/16/2018  4:04 PM [facility]   Amphetamines, urine Not Detected    Final 05/16/2018  4:04 PM [facility]   Barbiturates, Ur Not Detected    Final 05/16/2018  4:04 PM [facility]   Benzoylecgonine, Ur Not Detected    Final 05/16/2018  4:04 PM [facility]   Buprenorphine Urine Not Detected    Final 05/16/2018  4:04 PM [facility]   Carisoprodol, Ur Not Detected    Final 05/16/2018  4:04 PM [facility]   (NOTE)   The carisoprodol immunoassay has cross-reactivity to carisoprodol   and meprobamate.     Clonazepam, Urine Not Detected    Final 05/16/2018  4:04 PM [facility]   Codeine, Urine Not Detected    Final 05/16/2018  4:04 PM [facility]   MDA, Ur Not Detected    Final 05/16/2018  4:04 PM [facility]   Diazepam, Urine Not Detected    Final 05/16/2018  4:04 PM [facility]   Ethyl Glucuronide Ur Not Detected    Final 05/16/2018  4:04 PM [facility]   Fentanyl, Ur Not Detected    Final 05/16/2018  4:04 PM [facility]   Hydrocodone, Urine Not Detected    Final 05/16/2018  4:04 PM [facility]   Hydromorphone, Urine Not Detected    Final 05/16/2018  4:04 PM [facility]   Lorazepam, Urine Not Detected    Final 05/16/2018  4:04 PM [facility]   Marijuana Metab, Ur Not Detected    Final 05/16/2018  4:04 PM [facility] MDEA, TONI, Ur Not Detected    Final 05/16/2018  4:04 PM [facility]   MDMA, Urine Not Detected    Final 05/16/2018  4:04 PM [facility]   Meperidine Metab, Ur Not Detected    Final 05/16/2018  4:04 PM [facility]   Methadone, Urine Not Detected    Final 05/16/2018  4:04 PM [facility]   Methamphetamine, Urine Not Detected    Final 05/16/2018  4:04 PM [facility]   Methylphenidate Not Detected    Final 05/16/2018  4:04 PM [facility]   Midazolam, Urine Not Detected    Final 05/16/2018  4:04 PM [facility]   Morphine Urine Not Detected    Final 05/16/2018  4:04 PM [facility]   Norbuprenorphine, Urine Not Detected    Final 05/16/2018  4:04 PM [facility]   Nordiazepam, Urine Not Detected    Final 05/16/2018  4:04 PM [facility]   Norfentanyl, Urine Not Detected    Final 05/16/2018  4:04 PM [facility]   NORHYDROCODONE, URINE Not Detected    Final 05/16/2018  4:04 PM [facility]   Noroxycodone, Urine Present    Final 05/16/2018  4:04 PM [facility]   NOROXYMORPHONE, URINE Present    Final 05/16/2018  4:04 PM [facility]   Oxazepam, Urine Not Detected    Final 05/16/2018  4:04 PM [facility]   Oxycodone Urine Present    Final 05/16/2018  4:04 PM [facility]   Oxymorphone, Urine Not Detected    Final 05/16/2018  4:04 PM [facility]   PCP, Urine Not Detected    Final 05/16/2018  4:04 PM [facility]   Phentermine, Ur Not Detected    Final 05/16/2018  4:04 PM [facility]   Propoxyphene, Urine Not Detected    Final 05/16/2018  4:04 PM [facility]   Tapentadol-O-Sulfate, Urine Not Detected    Final 05/16/2018  4:04 PM [facility]   Tapentadol, Urine Not Detected    Final 05/16/2018  4:04 PM [facility]   Temazepam, Urine Not Detected    Final 05/16/2018  4:04 PM [facility]   Tramadol, Urine Not Detected    Final 05/16/2018  4:04 PM [facility]   Zolpidem, Urine Not Detected    Final 05/16/2018  4:04 PM [facility]   Creatinine, Ur 71.0  20.0 - 400.0 mg/dL Final 05/16/2018  4:04 PM [facility]   Pain Mgt Drug Panel, Hi Res, Ur See Below    Final 05/16/2018  4:04 PM [facility]   (NOTE)   Methodology: Qualitative Enzyme Immunoassay and Qualitative Liquid   Chromatography-Time of Flight-Mass Spectrometry or Tandem Mass   Spectrometry, Quantitative Spectrophotometry   The absence of expected drug(s) and/or drug metabolite(s) may   indicate non-compliance, inappropriate timing of specimen   collection relative to drug administration, poor drug absorption,   diluted/adulterated urine, or limitations of testing. The   concentration must be greater than or equal to the cutoff to be   reported as present.  If specific drug concentrations are   required, contact the laboratory within two weeks of specimen   collection to request quantification by a second analytical   technique. Interpretive questions should be directed to the   laboratory. Results based on immunoassay detection that do not match clinical   expectations should be   interpreted with caution. Confirmatory testing by mass   spectrometry for immunoassay-based results is available, if                                Past Medical History:   Diagnosis Date    Abnormality of rib determined by X-ray 1/28/2016    Acute cystitis without hematuria 6/16/2018    Acute exacerbation of chronic bronchitis (HCC)     Acute on chronic diastolic heart failure ([facility] 1/28/2016    Adjustment disorder with mixed anxiety and depressed mood 6/26/2015    Mild     Anemia 3/5/2014    Back pain     Bronchiectasis with acute lower respiratory infection ([facility] 8/16/2017    Bronchitis     Chronic bronchitis (HCC) 1/28/2016    Chronic cough 7/23/2018    Degenerative joint disease (DJD) of hip 5/14/2015    Dyslipidemia 3/5/2014    Encephalopathy acute 5/29/2016    Essential hypertension 1/28/2016    Fibromyalgia     GERD (gastroesophageal reflux disease)     History of total bilateral knee replacement 5/28/2016    Hx of blood clots     left leg and lung    Hyperlipidemia     Hypertension     Incontinence of urine     Irregular heartbeat     DR. Onur Bhakta Medication monitoring encounter 6/13/2018    Morbid obesity with BMI of 45.0-49.9, adult ([facility] 1/28/2016    Obstructive sleep apnea syndrome     Osteoarthritis     Primary osteoarthritis of left knee 5/27/2016    PVC (premature ventricular contraction) 1/28/2016    S/P right and left heart catheterization 10/2/2015    Sleep apnea     no machine    SOB (shortness of breath)     Spinal stenosis of lumbar region with neurogenic claudication 5/13/2013    Supplemental oxygen dependent 12/8/2017    Urine frequency     UTI (urinary tract infection)     hospitalized early july 2014 for kidney infection       Past Surgical History:   Procedure Laterality Date    BRONCHOSCOPY Left 8/16/2017    BRONCHOSCOPY ALVEOLAR LAVAGE LOWER LOBE performed by Raquel Paul MD at [address]  x 3    no stents    COLONOSCOPY  4 28 14    polyps biopsies     FOOT SURGERY Left     calcium deposit removal from top of foot    HYSTERECTOMY      JOINT REPLACEMENT Bilateral 5/27/2016    bilat total knees    NERVE BLOCK  5/13/2013    caudal celestone 6mg    NERVE BLOCK  5/28/13    Caudal #2, Celestone 9mg    NERVE BLOCK  2/14/14    rt hip inj celestone 9 mg    NERVE BLOCK  07/14/2014    caudal# 3- celestone 9 mg    NERVE BLOCK  7-21-14    caudal epidural #2, decadron 7mg, fentanyl 25mcg    NERVE BLOCK  10/2/14    duramorph 1.5  decadron 5mg    NERVE BLOCK  11/9/2015    caudal# 1 celestone 9mg    NERVE BLOCK  11/16/15    caudal #2  decadron 10mg    NERVE BLOCK  11/23/15    duramorph 1mg celestone 9mg    NERVE BLOCK  03/28/2018    CAUDAL EPIDURAL STEROID BLOCK  DECADRON 10 MG     NERVE BLOCK  05/23/2018    caudal # decadron 7mg    NERVE BLOCK  08/28/2018    natalia transforminal # 1, decadron 10mg gadoteridol, LONG NEEDLES    TX PERQ VERT AGMNTJ CAVITY CRTJ UNI/BI CANNULJ LMBR N/A 10/29/2018    KYPHOPLASTY L1 performed by Candace Talley MD at [address] Right 5/14/15    total hip replacement       Allergies   Allergen Reactions    Rosuvastatin Calcium Anaphylaxis Hair fell out    Statins Anaphylaxis     Hair fell out    Bactrim [Sulfamethoxazole-Trimethoprim] Diarrhea    Caffeine Other (See Comments)     pain  pain    Dye [Iodides] Other (See Comments)     Iv dye= blood clots in arm    Ioxaglate Other (See Comments)     Iv dye= blood clots in arm    Dicloxacillin Rash    Pcn [Penicillins] Rash         Current Outpatient Medications:     [START ON 5/29/2019] oxyCODONE (ROXICODONE) 5 MG immediate release tablet, Take 1 tablet by mouth every 8 hours as needed for Pain for up to 30 days. , Disp: 90 tablet, Rfl: 0    neomycin-polymyxin-dexameth 3.5-75799-8.1 OINT, , Disp: , Rfl:     ofloxacin (OCUFLOX) 0.3 % solution, , Disp: , Rfl:     gabapentin (NEURONTIN) 100 MG capsule, Take 1 capsule by mouth daily for 30 days. ., Disp: 90 capsule, Rfl: 0    metoprolol tartrate (LOPRESSOR) 50 MG tablet, Take 50 mg by mouth 2 times daily, Disp: , Rfl:     DULoxetine (CYMBALTA) 30 MG extended release capsule, TAKE 1 CAPSULE TWICE A DAY, Disp: 180 capsule, Rfl: 3    aspirin 81 MG tablet, Take 81 mg by mouth daily, Disp: , Rfl:     docusate sodium (COLACE) 100 MG capsule, Take 100 mg by mouth daily as needed for Constipation, Disp: , Rfl:     Misc.  Devices (WALKER) MISC, 1 each by Does not apply route daily Upright walker with wheels and seat, Disp: 1 each, Rfl: 0    ibuprofen (ADVIL;MOTRIN) 600 MG tablet, Take 600 mg by mouth every 6 hours as needed for Pain, Disp: , Rfl:     albuterol sulfate  (90 Base) MCG/ACT inhaler, Inhale 2 puffs into the lungs 4 times daily as needed for Wheezing , Disp: , Rfl:     Family History   Problem Relation Age of Onset    Stomach Cancer Brother         stomach    High Blood Pressure Mother     Heart Disease Sister         irregular heartbeat       Social History     Socioeconomic History    Marital status:      Spouse name: Not on file    Number of children: Not on file    Years of education: Not on file   Radha [place] lb (121.6 kg)   SpO2 96%   BMI 45.29 kg/m²     Physical Exam   Constitutional: She appears well-developed. obese   HENT:   Head: Normocephalic. Neck: Normal range of motion. Neck supple. Pulmonary/Chest: Effort normal.   Musculoskeletal:        Left shoulder: She exhibits tenderness. Lumbar back: She exhibits decreased range of motion and tenderness. Neurological: She is alert. Gait abnormal.   Reflex Scores:       Patellar reflexes are 0 on the right side and 0 on the left side. Achilles reflexes are 1+ on the right side and 1+ on the left side. In a wheelchair   Skin: Skin is warm, dry and intact. Psychiatric: She has a normal mood and affect.  Her speech is normal and behavior is normal. Judgment and thought content normal. Cognition and memory are normal.         Assessment:      Problem List Items Addressed This Visit     Spinal stenosis of lumbar region with neurogenic claudication (Chronic)    Relevant Medications    oxyCODONE (ROXICODONE) 5 MG immediate release tablet (Start on 5/29/2019)    Other Relevant Orders    NV INJECT ANES/STEROID FORAMEN LUMBAR/SACRAL W IMG GUIDE ,1 LEVEL    S/P kyphoplasty    Primary osteoarthritis of left knee    Relevant Medications    oxyCODONE (ROXICODONE) 5 MG immediate release tablet (Start on 5/29/2019)    Medication monitoring encounter - Primary (Chronic)    History of total bilateral knee replacement    Relevant Medications    oxyCODONE (ROXICODONE) 5 MG immediate release tablet (Start on 5/29/2019)    Fibromyalgia    Degenerative joint disease (DJD) of hip    Relevant Medications    oxyCODONE (ROXICODONE) 5 MG immediate release tablet (Start on 5/29/2019)    DDD (degenerative disc disease), lumbar    Relevant Medications    oxyCODONE (ROXICODONE) 5 MG immediate release tablet (Start on 5/29/2019)    Back pain    Relevant Medications    oxyCODONE (ROXICODONE) 5 MG immediate release tablet (Start on 5/29/2019)            Treatment Plan:  DISCUSSION: Treatment options discussed withpatient and all questions answered to patient's satisfaction. Possible side effects, risk of tolerance and or dependence and alternative treatments discussed    Obtaining appropriate analgesic effect of treatment   No signs of potential drug abuse or diversion identified    [x] Ill effects of being on chronic pain medications such as sleep disturbances, respiratory depression, hormonal changes, withdrawal symptoms, chronic opioid dependence and tolerance as well as risk of taking opioids with Benzodiazepines and taking opioids along with alcohol,  werediscussed with patient. I had asked the patient to minimize medication use and utilize pain medications only for uncontrolled rest pain or pain with exertional activities. I advised patient not to self-escalate painmedications without consulting with us. At each of patient's future visits we will try to taper pain medications, while adjusting the adjunct medications, and re-evaluating for Physical Therapy to improve spinal andjoint strength. We will continue to have discussions to decrease pain medications as tolerated. Counseled patient on effects their pain medication and /or their medical condition mayhave on their  ability to drive or operate machinery. Instructed not to drive or operate machinery if drowsy     I also discussed with the patient regarding the dangers of combining narcotic pain medication with tranquilizers, alcohol or illegal drugs or taking the medication any way other than prescribed. The dangers were discussed  including respiratory depression and death. Patient was told to tell  all  physicians regarding the medications he is getting from pain clinic. Patient is warned not to take any unprescribed medications over-the-countermedications that can depress breathing .  Patient is advised to talk to the pharmacist or physicians if planning to take any over-the-counter medications before takeing them. Patient is strongly advised to avoid tranquilizers or  relaxants, illegal drugs  or any medications that can depress breathing  Patient is also advised to tell us if there is any changes in their medications from other physicians. 1. Schedule Lumbar epidural L5, S1  X1.  Pain increased,   pain down left leg, activity limited    TREATMENT OPTIONS:     Return ASAP  Lumbar epidural x1  Medication Agreement Requirements Met  Continue Opioid therapy  Script written for oxycodoneFollow up appointment made

## 2019-06-10 ENCOUNTER — HOSPITAL ENCOUNTER (OUTPATIENT)
Dept: PAIN MANAGEMENT | Age: 74
Discharge: HOME | End: 2019-06-10
Payer: MEDICARE

## 2019-06-10 ENCOUNTER — HOSPITAL ENCOUNTER (OUTPATIENT)
Dept: GENERAL RADIOLOGY | Age: 74
Discharge: HOME | End: 2019-06-12
Payer: MEDICARE

## 2019-06-10 VITALS
DIASTOLIC BLOOD PRESSURE: 76 MMHG | TEMPERATURE: 98 F | HEART RATE: 66 BPM | OXYGEN SATURATION: 95 % | RESPIRATION RATE: 16 BRPM | SYSTOLIC BLOOD PRESSURE: 132 MMHG | HEIGHT: 64 IN | WEIGHT: 268 LBS | BODY MASS INDEX: 45.75 KG/M2

## 2019-06-10 DIAGNOSIS — M43.10 ACQUIRED SPONDYLOLISTHESIS: ICD-10-CM

## 2019-06-10 DIAGNOSIS — M54.16 LUMBAR RADICULOPATHY: Primary | ICD-10-CM

## 2019-06-10 DIAGNOSIS — M47.817 LUMBOSACRAL SPONDYLOSIS WITHOUT MYELOPATHY: ICD-10-CM

## 2019-06-10 DIAGNOSIS — M48.062 SPINAL STENOSIS OF LUMBAR REGION WITH NEUROGENIC CLAUDICATION: ICD-10-CM

## 2019-06-10 DIAGNOSIS — M54.16 LUMBAR RADICULOPATHY: ICD-10-CM

## 2019-06-10 DIAGNOSIS — M51.36 DDD (DEGENERATIVE DISC DISEASE), LUMBAR: ICD-10-CM

## 2019-06-10 PROCEDURE — 3209999900 FLUORO FOR SURGICAL PROCEDURES

## 2019-06-10 PROCEDURE — 62327 NJX INTERLAMINAR LMBR/SAC: CPT

## 2019-06-10 PROCEDURE — 6360000002 HC RX W HCPCS: Performed by: PAIN MEDICINE

## 2019-06-10 PROCEDURE — 62323 NJX INTERLAMINAR LMBR/SAC: CPT | Performed by: PAIN MEDICINE

## 2019-06-10 RX ORDER — TRIAMCINOLONE ACETONIDE 40 MG/ML
INJECTION, SUSPENSION INTRA-ARTICULAR; INTRAMUSCULAR
Status: COMPLETED | OUTPATIENT
Start: 2019-06-10 | End: 2019-06-10

## 2019-06-10 RX ADMIN — TRIAMCINOLONE ACETONIDE 80 MG: 40 INJECTION, SUSPENSION INTRA-ARTICULAR; INTRAMUSCULAR at 09:31

## 2019-06-10 ASSESSMENT — PAIN DESCRIPTION - DIRECTION: RADIATING_TOWARDS: INTO RIGHT HIP

## 2019-06-10 ASSESSMENT — ACTIVITIES OF DAILY LIVING (ADL): EFFECT OF PAIN ON DAILY ACTIVITIES: PAIN INCREASES WITH WALKING

## 2019-06-10 ASSESSMENT — PAIN DESCRIPTION - ORIENTATION: ORIENTATION: LOWER;RIGHT

## 2019-06-10 ASSESSMENT — PAIN DESCRIPTION - PROGRESSION: CLINICAL_PROGRESSION: GRADUALLY WORSENING

## 2019-06-10 ASSESSMENT — PAIN DESCRIPTION - PAIN TYPE: TYPE: CHRONIC PAIN

## 2019-06-10 ASSESSMENT — PAIN - FUNCTIONAL ASSESSMENT
PAIN_FUNCTIONAL_ASSESSMENT: 0-10
PAIN_FUNCTIONAL_ASSESSMENT: PREVENTS OR INTERFERES SOME ACTIVE ACTIVITIES AND ADLS

## 2019-06-10 ASSESSMENT — PAIN DESCRIPTION - DESCRIPTORS: DESCRIPTORS: ACHING;SHARP;SHOOTING

## 2019-06-10 ASSESSMENT — PAIN DESCRIPTION - ONSET: ONSET: ON-GOING

## 2019-06-10 ASSESSMENT — PAIN SCALES - GENERAL: PAINLEVEL_OUTOF10: 6

## 2019-06-10 ASSESSMENT — PAIN DESCRIPTION - LOCATION: LOCATION: BACK

## 2019-06-10 ASSESSMENT — PAIN DESCRIPTION - FREQUENCY: FREQUENCY: CONTINUOUS

## 2019-06-10 NOTE — PROGRESS NOTES
Discharge criteria met. Post procedure dressing dry and intact. Sensory and motor function intact as per pre-procedure. Patient alert and oriented x3  Instructions and follow up reviewed with pt at patient at discharge. Discharged home transported by wheelchair, accompanied by family .   Discharged @[id number]

## 2019-06-10 NOTE — PROCEDURES
Pre-Procedure Note    Patient Name: Rita Tadeo   YOB: 1945  Room/Bed: Room/bed info not found  Medical Record Number: [id number]  Date: 6/10/2019       Indication:    1. Lumbar radiculopathy    2. Lumbosacral spondylosis without myelopathy    3. Acquired spondylolisthesis    4. Spinal stenosis of lumbar region with neurogenic claudication    5. DDD (degenerative disc disease), lumbar        Consent: On file. Vital Signs:   Vitals:    06/10/19 0930   BP: (!) 141/87   Pulse: 65   Resp: 12   Temp:    SpO2: 94%       Past Medical History:   has a past medical history of Abnormality of rib determined by X-ray, Acute cystitis without hematuria, Acute exacerbation of chronic bronchitis (HCC), Acute on chronic diastolic heart failure ([address] Adjustment disorder with mixed anxiety and depressed mood, Anemia, Back pain, Bronchiectasis with acute lower respiratory infection ([address] Bronchitis, Chronic bronchitis ([address] Chronic cough, Degenerative joint disease (DJD) of hip, Dyslipidemia, Encephalopathy acute, Essential hypertension, Fibromyalgia, GERD (gastroesophageal reflux disease), History of total bilateral knee replacement, Hx of blood clots, Hyperlipidemia, Hypertension, Incontinence of urine, Irregular heartbeat, Medication monitoring encounter, Morbid obesity with BMI of 45.0-49.9, adult ([address] Obstructive sleep apnea syndrome, Osteoarthritis, Primary osteoarthritis of left knee, PVC (premature ventricular contraction), S/P right and left heart catheterization, Sleep apnea, SOB (shortness of breath), Spinal stenosis of lumbar region with neurogenic claudication, Supplemental oxygen dependent, Urine frequency, and UTI (urinary tract infection). Past Surgical History:   has a past surgical history that includes Hysterectomy; Nerve Block (5/13/2013); Nerve Block (5/28/13); sinus surgery; Foot surgery (Left); Nerve Block (2/14/14); Colonoscopy (4 28 14); Nerve Block (07/14/2014);  Nerve Block (7-21-14); Nerve Block (10/2/14); Nerve Block (11/9/2015); Nerve Block (11/16/15); Nerve Block (11/23/15); Total hip arthroplasty (Right, 5/14/15); bronchoscopy (Left, 8/16/2017); Cardiac catheterization (x 3); Nerve Block (03/28/2018); Nerve Block (05/23/2018); Nerve Block (08/28/2018); pr perq vert agmntj cavity crtj uni/bi cannulj lmbr (N/A, 10/29/2018); and joint replacement (Bilateral, 5/27/2016). Pre-Sedation Documentation and Exam:   Vital signs have been reviewed (see flow sheet for vitals). Mallampati Airway Assessment:  normal    ASA Classification:  Class 3 - A patient with severe systemic disease that limits activity but is not incapacitating    Sedation/ Anesthesia Plan:   intravenous sedation   as needed. Medications Planned:   midazolam (Versed) / Fentanyl  Intravenously  as needed. Patient is an appropriate candidate for plan of sedation: yes  Patient's History and Physical examination was reviewed and there is no change. Electronically signed by Silvano Darden MD on 6/10/2019 at 9:39 AM        Preoperative Diagnosis:    1. Lumbar radiculopathy    2. Lumbosacral spondylosis without myelopathy    3. Acquired spondylolisthesis    4. Spinal stenosis of lumbar region with neurogenic claudication    5. DDD (degenerative disc disease), lumbar        Postoperative Diagnosis:    1. Lumbar radiculopathy    2. Lumbosacral spondylosis without myelopathy    3. Acquired spondylolisthesis    4. Spinal stenosis of lumbar region with neurogenic claudication    5. DDD (degenerative disc disease), lumbar        Procedure Performed:  Lumbar epidural steroid injection under fluoroscopy guidance without IV sedation. Indication for the Procedure: The patient failed conservative management  for pain in the low back radiating to lower extremities. The patient has undergone lumbar epidural steroid injections and the last procedure gave 50% relief.     As the patient is not responding to conservative management and it is interfering with activities of daily living we decided to proceed with lumbar epidural steroid injection. The procedure and risks were discussed with the patient and an informed consent was obtained  Current Pain Assessment  Pain Assessment  Pain Assessment: 0-10  Pain Level: 6  Patient's Stated Pain Goal: 2(to decrease pain to a 2)  Pain Type: Chronic pain  Pain Location: Back  Pain Orientation: Lower, Right  Pain Radiating Towards: into right hip  Pain Descriptors: Aching, Sharp, Shooting  Pain Frequency: Continuous  Pain Onset: On-going  Clinical Progression: Gradually worsening  Effect of Pain on Daily Activities: pain increases with walking  Functional Pain Assessment: Prevents or interferes some active activities and ADLs     Procedure:    . Patient's vital signs including BP, EKG and SaO2 were monitored by the RN and they remained stable during the procedure. A meaningful communication was kept up with the patient throughout  the procedure. The patient is placed in prone position. Skin over the back was prepped and draped in sterile manner. Then using fluoroscopy the L5, S1 interspace was observed and the skin and deep tissues in the right paramedian area were infiltrated with 5 ml of 1% lidocaine. The #20-gauge, 3-1/2 inch Tuohy needle was inserted through the skin wheal and the epidural space was identified using loss of resistance technique with normal saline. This was confirmed with AP and lateral views using fluoroscopy. Contrast was not used due to allergy   . Then after negative aspiration a total of 80 mg of triamcinolone with 8 ml of normal saline was injected into the epidural space. The needle is removed and a Band-Aid was placed over the needle insertion site. Patient's vital signs remained stable and the patient tolerated the procedure well.   The patient was discharged home in stable condition and will be followed in the pain clinic in the next few weeks for

## 2019-06-11 ENCOUNTER — OFFICE VISIT (OUTPATIENT)
Dept: INTERNAL MEDICINE CLINIC | Age: 74
End: 2019-06-11
Payer: MEDICARE

## 2019-06-11 VITALS
BODY MASS INDEX: 45.77 KG/M2 | WEIGHT: 268.08 LBS | SYSTOLIC BLOOD PRESSURE: 140 MMHG | HEART RATE: 66 BPM | DIASTOLIC BLOOD PRESSURE: 78 MMHG | HEIGHT: 64 IN | RESPIRATION RATE: 18 BRPM

## 2019-06-11 DIAGNOSIS — E78.5 HYPERLIPIDEMIA, UNSPECIFIED HYPERLIPIDEMIA TYPE: ICD-10-CM

## 2019-06-11 DIAGNOSIS — I10 ESSENTIAL HYPERTENSION: Primary | ICD-10-CM

## 2019-06-11 DIAGNOSIS — E66.01 CLASS 3 SEVERE OBESITY WITH SERIOUS COMORBIDITY AND BODY MASS INDEX (BMI) OF 45.0 TO 49.9 IN ADULT, UNSPECIFIED OBESITY TYPE (HCC): ICD-10-CM

## 2019-06-11 DIAGNOSIS — G62.9 NEUROPATHY: ICD-10-CM

## 2019-06-11 DIAGNOSIS — J42 CHRONIC BRONCHITIS, UNSPECIFIED CHRONIC BRONCHITIS TYPE (HCC): ICD-10-CM

## 2019-06-11 DIAGNOSIS — R53.1 GENERALIZED WEAKNESS: ICD-10-CM

## 2019-06-11 PROCEDURE — 99214 OFFICE O/P EST MOD 30 MIN: CPT | Performed by: NURSE PRACTITIONER

## 2019-06-11 ASSESSMENT — ENCOUNTER SYMPTOMS
SHORTNESS OF BREATH: 1
ABDOMINAL PAIN: 0
SINUS PRESSURE: 0
EYE PAIN: 0
TROUBLE SWALLOWING: 0
EYE REDNESS: 0
WHEEZING: 0
CONSTIPATION: 1
COUGH: 0
BACK PAIN: 1

## 2019-06-11 NOTE — PROGRESS NOTES
Chronic Disease Visit Information    BP Readings from Last 3 Encounters:   06/11/19 (!) 140/78   06/10/19 132/76   05/24/19 (!) 116/59          Hemoglobin A1C (%)   Date Value   04/15/2019 5.4   11/08/2016 5.3   02/24/2016 5.4     LDL Cholesterol (mg/dL)   Date Value   04/15/2019 186 (H)     HDL (mg/dL)   Date Value   04/15/2019 47     BUN (mg/dL)   Date Value   04/15/2019 21     CREATININE (mg/dL)   Date Value   04/15/2019 1.05 (H)     Glucose (mg/dL)   Date Value   04/15/2019 107 (H)   05/26/2012 105            Have you changed or started any medications since your last visit including any over-the-counter medicines, vitamins, or herbal medicines? no   Are you having any side effects from any of your medications? -  no  Have you stopped taking any of your medications? Is so, why? -  no    Have you seen any other physician or provider since your last visit? Yes - Records Obtained  Have you had any other diagnostic tests since your last visit? Yes - Records Obtained  Have you been seen in the emergency room and/or had an admission to a hospital since we last saw you? No  Have you had your annual diabetic retinal (eye) exam? No  Have you had your routine dental cleaning in the past 6 months? no    Have you activated your [employer] account? If not, what are your barriers?  Yes     Patient Care Team:  sEdras Carrillo MD as PCP - General (Internal Medicine)  Esdras Carrillo MD as PCP - [facility]  Clara Oneill MD as Consulting Physician (Gastroenterology)  Tyrone Medeiros MD as Consulting Physician (Cardiology)  Mary Obrien MD as Consulting Physician (Pulmonology)         Medical History Review  Past Medical, Family, and Social History reviewed and does contribute to the patient presenting condition    Health Maintenance   Topic Date Due    DTaP/Tdap/Td vaccine (1 - Tdap) 11/17/1963    Breast cancer screen  11/17/1994    DEXA (modify frequency per FRAX score)  11/17/2009    Shingles Vaccine (1 of 2) 08/09/2019 (Originally 11/17/1994)    Flu vaccine (Season Ended) 09/01/2019    Potassium monitoring  04/15/2020    Creatinine monitoring  04/15/2020    Lipid screen  04/15/2024    Colon cancer screen colonoscopy  04/28/2024    Pneumococcal 65+ years Vaccine  Completed         [facility]  [facility]  [address] [address] [address] [facility] [address]  Dept: [phone]  Dept Fax: [phone]    Office Progress/Follow Up Note  Date of patient's visit: 6/11/2019   Patient's Name:  Shereen Gaytan  YOB: 1945            Patient Care Team:  Ana Hernández MD as PCP - General (Internal Medicine)  Ana Hernández MD as PCP - [facility]  Lexy Bergman MD as Consulting Physician (Gastroenterology)  Harlan Cruz MD as Consulting Physician (Cardiology)  Mary Keys MD as Consulting Physician (Pulmonology)    REASON FOR VISIT: 3 Month Follow-Up; Hypertension; and Health Maintenance (Patient declines at this time)        HISTORY OF PRESENT ILLNESS:      History was obtained from the patient. Shereen Gaytan is a 75 y.o. is here for routine follow up of chronic conditions: HTN, Obesity, hyperlipidemia, chronic bronchitis. She is trying to lose weight and has done a 3 day fast, stating that she drank only lemon juice in water, and didn't feel weak or hungry. States she slept better with more energy, and felt closer to God. She states she has cut out bread, pasta, rice, sugar for the most part. She made these changes about 1 week ago. She wants changes made at her apartment to increase safety. She is walking about 10-20 feet with her wheeled walker. She has a scooter which she does not use because she fell off of it and now is scared to use it. She has a grab bar outside her shower but does not have one inside. She has a shower chair but it does not fit in her tub. She requires assistance to shower safely. She is able to dress herself but it takes a lot of time.  She states she is working with PT. She sees pain management for chronic fibromyalgia, generalized pain, lumbar radiculopathy, DDD. She denies headaches, dizziness, vision changes. She has some herbal weight loss pills that she got \"from [employer]. \"  She also has several forms that she wants completed to help with assistance for her home safety concerns.     Patient Active Problem List   Diagnosis    Spinal stenosis of lumbar region with neurogenic claudication    Dyslipidemia    Anemia    Fibromyalgia    Degenerative joint disease (DJD) of hip    Adjustment disorder with mixed anxiety and depressed mood    S/P right and left heart catheterization    SOB (shortness of breath)    Acute on chronic diastolic heart failure (HCC)    Chronic bronchitis ([facility])    Essential hypertension    Morbid obesity with BMI of 45.0-49.9, adult ([facility])    PVC (premature ventricular contraction)    Abnormality of rib determined by X-ray    Acute exacerbation of chronic bronchitis ([facility])    Chronic bronchitis ([facility])    Sleep apnea    History of total bilateral knee replacement    Encephalopathy acute    Obstructive sleep apnea syndrome    Primary osteoarthritis of left knee    Bronchiectasis with acute lower respiratory infection ([address]    Supplemental oxygen dependent    Back pain    Medication monitoring encounter    Acute cystitis without hematuria    Chronic cough    Acquired spondylolisthesis    Cervical spine ankylosis    Acute low back pain    Neck pain    Closed compression fracture of L1 lumbar vertebra    Cervical spinal stenosis    BMI 40.0-44.9, adult ([facility])    Chronic prescription opiate use    Age-related osteoporosis with current pathological fracture    S/P kyphoplasty    Morbid obesity ([facility])    DDD (degenerative disc disease), lumbar    Lumbar radiculopathy       Allergies   Allergen Reactions    Rosuvastatin Calcium Anaphylaxis     Hair fell out    Statins Anaphylaxis     Hair fell out    Bactrim [Sulfamethoxazole-Trimethoprim] Diarrhea    Caffeine Other (See Comments)     pain  pain    Dye [Iodides] Other (See Comments)     Iv dye= blood clots in arm    Ioxaglate Other (See Comments)     Iv dye= blood clots in arm    Dicloxacillin Rash    Pcn [Penicillins] Rash         MEDICATIONS:     Current Outpatient Medications   Medication Sig Dispense Refill    oxyCODONE (ROXICODONE) 5 MG immediate release tablet Take 1 tablet by mouth every 8 hours as needed for Pain for up to 30 days. 90 tablet 0    ofloxacin (OCUFLOX) 0.3 % solution Place into both eyes daily as needed       docusate sodium (COLACE) 100 MG capsule Take 100 mg by mouth daily as needed for Constipation      Misc. Devices (WALKER) MISC 1 each by Does not apply route daily Upright walker with wheels and seat 1 each 0    metoprolol tartrate (LOPRESSOR) 50 MG tablet Take 50 mg by mouth 2 times daily      ibuprofen (ADVIL;MOTRIN) 600 MG tablet Take 600 mg by mouth every 6 hours as needed for Pain      DULoxetine (CYMBALTA) 30 MG extended release capsule TAKE 1 CAPSULE TWICE A  capsule 3    albuterol sulfate  (90 Base) MCG/ACT inhaler Inhale 2 puffs into the lungs 4 times daily as needed for Wheezing       aspirin 81 MG tablet Take 81 mg by mouth daily      gabapentin (NEURONTIN) 100 MG capsule Take 1 capsule by mouth daily for 30 days. . 90 capsule 0     No current facility-administered medications for this visit. SOCIAL HISTORY    Reviewed and updated. Enrique Root  reports that she quit smoking about 26 years ago. She has a 18.00 pack-year smoking history. She has never used smokeless tobacco.    FAMILY HISTORY:    Reviewed and updated. family history includes Heart Disease in her sister; High Blood Pressure in her mother; Stomach Cancer in her brother. REVIEW OF SYSTEMS:      Review of Systems   Constitutional: Negative for chills and fatigue.    HENT: Negative for congestion, ear discharge, ear pain, sinus pressure and trouble swallowing. Eyes: Negative for pain, redness and visual disturbance. Respiratory: Positive for shortness of breath. Negative for cough and wheezing. Uses oxygen prn   Cardiovascular: Positive for leg swelling (occasional, good right now). Negative for chest pain and palpitations. Gastrointestinal: Positive for constipation (uses stool softener, prune juice). Negative for abdominal pain. Endocrine: Negative for polydipsia, polyphagia and polyuria. Genitourinary: Negative for difficulty urinating and dysuria. Musculoskeletal: Positive for arthralgias, back pain, gait problem and myalgias. Sees pain management   Neurological: Positive for numbness (neuropathy pain). Negative for dizziness and headaches. Psychiatric/Behavioral: Negative for sleep disturbance. The patient is not nervous/anxious. PHYSICAL EXAM:      Vitals:    06/11/19 1334 06/11/19 1353   BP: (!) 144/78 (!) 140/78   Site: Left Upper Arm Right Upper Arm   Position: Sitting Sitting   Cuff Size: Large Adult Large Adult   Pulse: 66    Resp: 18    Weight: 268 lb 1.3 oz (121.6 kg)    Height: 5' 4.02\" (1.626 m)      BP Readings from Last 3 Encounters:   06/11/19 (!) 140/78   06/10/19 132/76   05/24/19 (!) 116/59        Physical Exam   Constitutional: She is oriented to person, place, and time. She appears well-developed and well-nourished. No distress. Morbidly obese   HENT:   Head: Normocephalic and atraumatic. Right Ear: External ear normal.   Left Ear: External ear normal.   Nose: Nose normal.   Mouth/Throat: Oropharynx is clear and moist.   Eyes: Pupils are equal, round, and reactive to light. Conjunctivae are normal. Right eye exhibits no discharge. Left eye exhibits no discharge. Neck: Normal range of motion. Cardiovascular: Normal rate and regular rhythm. Murmur heard. Trace natalia lower extremity edema   Pulmonary/Chest: Effort normal. She has decreased breath sounds. She has no wheezes.  She has no rhonchi. Abdominal: Soft. Bowel sounds are normal. There is no tenderness. Musculoskeletal:        Thoracic back: She exhibits normal range of motion and no tenderness. Lumbar back: She exhibits normal range of motion and no tenderness. Right hand: Normal.        Left hand: She exhibits normal range of motion, no tenderness and no swelling. Requires help with transfers, using WC in office   Lymphadenopathy:     She has no cervical adenopathy. Neurological: She is alert and oriented to person, place, and time. No sensory deficit. Gait abnormal.   Skin: Skin is warm and dry. No rash noted. No erythema. Psychiatric: She has a normal mood and affect. Her behavior is normal.        LABORATORY FINDINGS:    CBC:   Lab Results   Component Value Date    WBC 5.4 04/15/2019    HGB 12.2 04/15/2019     04/15/2019     05/26/2012     BMP:   Lab Results   Component Value Date     04/15/2019    K 4.1 04/15/2019     04/15/2019    CO2 27 04/15/2019    BUN 21 04/15/2019    CREATININE 1.05 04/15/2019    GLUCOSE 107 04/15/2019    GLUCOSE 105 05/26/2012     HEMOGLOBIN A1C:   Lab Results   Component Value Date    LABA1C 5.4 04/15/2019     FASTING LIPID PANEL:   Lab Results   Component Value Date    CHOL 254 (H) 04/15/2019    HDL 47 04/15/2019    LDLCHOLESTEROL 186 (H) 04/15/2019    TRIG 104 04/15/2019       ASSESSMENT AND PLAN:      Visit Diagnoses and Associated Orders     Essential hypertension    -  Primary    BP at goal, continue toprol, healthy diet, regular exercise as able. Class 3 severe obesity with serious comorbidity and body mass index (BMI) of 45.0 to 49.9 in adult, unspecified obesity type [facility])        Patient making diet changes, working with PT for home exercise regimen.           Neuropathy        Unchanged, continue gabapentin         Chronic bronchitis, unspecified chronic bronchitis type ([address]        Stable, continue rescue inhaler         Hyperlipidemia,

## 2019-06-21 RX ORDER — GABAPENTIN 100 MG/1
100 CAPSULE ORAL DAILY
Qty: 90 CAPSULE | Refills: 0 | Status: SHIPPED | OUTPATIENT
Start: 2019-06-21 | End: 2019-08-23 | Stop reason: SDUPTHER

## 2019-06-21 NOTE — TELEPHONE ENCOUNTER
Medication: gabapentin  Last visit: 06/11/19  Next visit: 8/13/2019  Last refill: 02/28/19  Pharmacy: [facility]

## 2019-06-24 ENCOUNTER — HOSPITAL ENCOUNTER (OUTPATIENT)
Dept: PAIN MANAGEMENT | Age: 74
Discharge: HOME | End: 2019-06-24
Payer: MEDICARE

## 2019-06-24 VITALS
TEMPERATURE: 98.2 F | RESPIRATION RATE: 16 BRPM | DIASTOLIC BLOOD PRESSURE: 79 MMHG | BODY MASS INDEX: 45.75 KG/M2 | OXYGEN SATURATION: 97 % | SYSTOLIC BLOOD PRESSURE: 134 MMHG | HEART RATE: 76 BPM | HEIGHT: 64 IN | WEIGHT: 268 LBS

## 2019-06-24 DIAGNOSIS — Z79.891 CHRONIC PRESCRIPTION OPIATE USE: Chronic | ICD-10-CM

## 2019-06-24 DIAGNOSIS — M79.7 FIBROMYALGIA: ICD-10-CM

## 2019-06-24 DIAGNOSIS — M43.22 CERVICAL SPINE ANKYLOSIS: ICD-10-CM

## 2019-06-24 DIAGNOSIS — M43.10 ACQUIRED SPONDYLOLISTHESIS: ICD-10-CM

## 2019-06-24 DIAGNOSIS — Z96.653 HISTORY OF TOTAL BILATERAL KNEE REPLACEMENT: ICD-10-CM

## 2019-06-24 DIAGNOSIS — Z51.81 MEDICATION MONITORING ENCOUNTER: Primary | Chronic | ICD-10-CM

## 2019-06-24 DIAGNOSIS — M48.062 SPINAL STENOSIS OF LUMBAR REGION WITH NEUROGENIC CLAUDICATION: ICD-10-CM

## 2019-06-24 DIAGNOSIS — M54.5 ACUTE MIDLINE LOW BACK PAIN, WITH SCIATICA PRESENCE UNSPECIFIED: ICD-10-CM

## 2019-06-24 PROCEDURE — 99213 OFFICE O/P EST LOW 20 MIN: CPT

## 2019-06-24 PROCEDURE — 99213 OFFICE O/P EST LOW 20 MIN: CPT | Performed by: NURSE PRACTITIONER

## 2019-06-24 RX ORDER — OXYCODONE HYDROCHLORIDE 5 MG/1
5 TABLET ORAL EVERY 8 HOURS PRN
Qty: 90 TABLET | Refills: 0 | Status: SHIPPED | OUTPATIENT
Start: 2019-06-28 | End: 2019-07-25 | Stop reason: SDUPTHER

## 2019-06-24 ASSESSMENT — ENCOUNTER SYMPTOMS
CONSTIPATION: 1
COUGH: 1
BACK PAIN: 1

## 2019-06-24 NOTE — PROGRESS NOTES
Rick Sánchez PROGRESS NOTE      Patient here today to review Medication Agreementand follow up after injection    Chief Complaint:low back pain      HPI: She returns following lumbar epidural injection L5-S1 on 6-10-19 with 50% or more relief. She had no side effects. She is more active since the injection. She has not been sleeping well. She has no ED visits. Patient denies any new neurological symptoms. No bowel or bladder incontinence, no weakness, and no falling. Treatment goals:  Functional status:       Aberrancy:   Any alcoholic beverages       Any illegal drugs         Analgesia: Adverse  Effects :    ADL;s :        Pill count: appropriate    Morphine equivalent dose as reported on [place]ARRS:  Periodic Controlled Substance Monitoring: Possible medication side effects, risk of tolerance/dependence & alternative treatments discussed., No signs of potential drug abuse or diversion identified., Obtaining appropriate analgesic effect of treatment., Assessed functional status. Brian Heck, APRN - CNP)  Review ofOARRS does not show any aberrant prescription behavior. Medication is helping the patient stay active. Patient denies any side effects and reports adequate analgesia. No sign of misuse/abuse.         When was thelast UDS:             Was the UDS appropriate:      Record/Diagnostics Review:      As above, I did review the imaging          Past Medical History:   Diagnosis Date    Abnormality of rib determined by X-ray 1/28/2016    Acute cystitis without hematuria 6/16/2018    Acute exacerbation of chronic bronchitis ([address]     Acute on chronic diastolic heart failure ([address] 1/28/2016    Adjustment disorder with mixed anxiety and depressed mood 6/26/2015    Mild     Anemia 3/5/2014    Back pain     Bronchiectasis with acute lower respiratory infection ([address] 8/16/2017    Bronchitis     Chronic bronchitis (HCC) 1/28/2016    Chronic cough 7/23/2018    Degenerative joint disease (DJD) of hip 5/14/2015    Dyslipidemia 3/5/2014    Encephalopathy acute 5/29/2016    Essential hypertension 1/28/2016    Fibromyalgia     GERD (gastroesophageal reflux disease)     History of total bilateral knee replacement 5/28/2016    Hx of blood clots     left leg and lung    Hyperlipidemia     Hypertension     Incontinence of urine     Irregular heartbeat     DR. Martinez Patches Medication monitoring encounter 6/13/2018    Morbid obesity with BMI of 45.0-49.9, adult ([facility] 1/28/2016    Obstructive sleep apnea syndrome     Osteoarthritis     Primary osteoarthritis of left knee 5/27/2016    PVC (premature ventricular contraction) 1/28/2016    S/P right and left heart catheterization 10/2/2015    Sleep apnea     no machine    SOB (shortness of breath)     Spinal stenosis of lumbar region with neurogenic claudication 5/13/2013    Supplemental oxygen dependent 12/8/2017    Urine frequency     UTI (urinary tract infection)     hospitalized early july 2014 for kidney infection       Past Surgical History:   Procedure Laterality Date    BRONCHOSCOPY Left 8/16/2017    BRONCHOSCOPY ALVEOLAR LAVAGE LOWER LOBE performed by Mary Keys MD at [address]  x 3    no stents    COLONOSCOPY  4 28 14    polyps biopsies     FOOT SURGERY Left     calcium deposit removal from top of foot    HYSTERECTOMY      JOINT REPLACEMENT Bilateral 5/27/2016    bilat total knees    NERVE BLOCK  5/13/2013    caudal celestone 6mg    NERVE BLOCK  5/28/13    Caudal #2, Celestone 9mg    NERVE BLOCK  2/14/14    rt hip inj celestone 9 mg    NERVE BLOCK  07/14/2014    caudal# 3- celestone 9 mg    NERVE BLOCK  7-21-14    caudal epidural #2, decadron 7mg, fentanyl 25mcg    NERVE BLOCK  10/2/14    duramorph 1.5  decadron 5mg    NERVE BLOCK  11/9/2015    caudal# 1 celestone 9mg    NERVE BLOCK  11/16/15    caudal #2  decadron 10mg    NERVE BLOCK  11/23/15    duramorph 1mg celestone 9mg    NERVE BLOCK  03/28/2018    CAUDAL EPIDURAL STEROID BLOCK  DECADRON 10 MG     NERVE BLOCK  05/23/2018    caudal # decadron 7mg    NERVE BLOCK  08/28/2018    natalia transforminal # 1, decadron 10mg gadoteridol, LONG NEEDLES    OR PERQ VERT AGMNTJ CAVITY CRTJ UNI/BI CANNULJ LMBR N/A 10/29/2018    KYPHOPLASTY L1 performed by Maris Maldonado MD at [address] Right 5/14/15    total hip replacement       Allergies   Allergen Reactions    Rosuvastatin Calcium Anaphylaxis     Hair fell out    Statins Anaphylaxis     Hair fell out    Bactrim [Sulfamethoxazole-Trimethoprim] Diarrhea    Caffeine Other (See Comments)     pain  pain    Dye [Iodides] Other (See Comments)     Iv dye= blood clots in arm    Ioxaglate Other (See Comments)     Iv dye= blood clots in arm    Dicloxacillin Rash    Pcn [Penicillins] Rash         Current Outpatient Medications:     gabapentin (NEURONTIN) 100 MG capsule, Take 1 capsule by mouth daily for 90 days. , Disp: 90 capsule, Rfl: 0    oxyCODONE (ROXICODONE) 5 MG immediate release tablet, Take 1 tablet by mouth every 8 hours as needed for Pain for up to 30 days. , Disp: 90 tablet, Rfl: 0    ofloxacin (OCUFLOX) 0.3 % solution, Place into both eyes daily as needed , Disp: , Rfl:     metoprolol tartrate (LOPRESSOR) 50 MG tablet, Take 50 mg by mouth 2 times daily, Disp: , Rfl:     DULoxetine (CYMBALTA) 30 MG extended release capsule, TAKE 1 CAPSULE TWICE A DAY, Disp: 180 capsule, Rfl: 3    aspirin 81 MG tablet, Take 81 mg by mouth daily, Disp: , Rfl:     docusate sodium (COLACE) 100 MG capsule, Take 100 mg by mouth daily as needed for Constipation, Disp: , Rfl:     Misc.  Devices (WALKER) MISC, 1 each by Does not apply route daily Upright walker with wheels and seat, Disp: 1 each, Rfl: 0    ibuprofen (ADVIL;MOTRIN) 600 MG tablet, Take 600 mg by mouth every 6 hours as needed for Pain, Disp: , Rfl:     albuterol sulfate  (90 Base) MCG/ACT inhaler, Inhale 2 puffs into the lungs 4 times daily as needed for Wheezing , Disp: , Rfl:     Family History   Problem Relation Age of Onset    Stomach Cancer Brother         stomach    High Blood Pressure Mother     Heart Disease Sister         irregular heartbeat       Social History     Socioeconomic History    Marital status:      Spouse name: Not on file    Number of children: Not on file    Years of education: Not on file    Highest education level: Not on file   Occupational History    Not on file   Social Needs    Financial resource strain: Not on file    Food insecurity:     Worry: Not on file     Inability: Not on file    Transportation needs:     Medical: Not on file     Non-medical: Not on file   Tobacco Use    Smoking status: Former Smoker     Packs/day: 1.00     Years: 18.00     Pack years: 18.00     Last attempt to quit: 1993     Years since quittin.1    Smokeless tobacco: Never Used   Substance and Sexual Activity    Alcohol use: No    Drug use: No    Sexual activity: Not Currently   Lifestyle    Physical activity:     Days per week: Not on file     Minutes per session: Not on file    Stress: Not on file   Relationships    Social connections:     Talks on phone: Not on file     Gets together: Not on file     Attends Yazidism service: Not on file     Active member of club or organization: Not on file     Attends meetings of clubs or organizations: Not on file     Relationship status: Not on file    Intimate partner violence:     Fear of current or ex partner: Not on file     Emotionally abused: Not on file     Physically abused: Not on file     Forced sexual activity: Not on file   Other Topics Concern    Not on file   Social History Narrative    Not on file       Review of Systems:  ROS      Physical Exam:  There were no vitals taken for this visit.     Physical Exam      Assessment:            Treatment Plan:  DISCUSSION: Treatment options discussed avoid tranquilizers or  relaxants, illegal drugs  or any medications that can depress breathing  Patient is also advised to tell us if there is any changes in their medications from other physicians.         TREATMENT OPTIONS:     Return in 4 weeks  Medication Agreement Requirements Met  Continue Opioid therapy  Script written for  Follow up appointment made

## 2019-06-24 NOTE — PROGRESS NOTES
Rick Sánchez PROGRESS NOTE      Patient here today to review Medication Agreementand follow up after injection    Chief Complaint:low back pain      HPI: She returns following lumbar epidural injection L5-S1 on 6-10-19 with 50% or more relief. She had no side effects. She is more active since the injection. She has not been sleeping well. She has no ED visits. Her pain is managed with the oxycodone. Back Pain   This is a chronic problem. The current episode started more than 1 year ago. The problem occurs intermittently. The pain is present in the lumbar spine. Quality: dull. The pain is at a severity of 2/10. The pain is the same all the time. Exacerbated by: activity. Associated symptoms include headaches and numbness. (Right leg) Risk factors include obesity and menopause. She has tried heat and analgesics for the symptoms. The treatment provided mild relief. Patient denies any new neurological symptoms. No bowel or bladder incontinence, no weakness, and no falling. Treatment goals:  Functional status: reduce pain 4      Aberrancy:   Any alcoholic beverages  no     Any illegal drugs no         Analgesia:pain2      Adverse  Effects :  None     ADL;s :activty limited        Pill count: appropriate    Morphine equivalent dose as reported on OARRS:22.50  Periodic Controlled Substance Monitoring: Possible medication side effects, risk of tolerance/dependence & alternative treatments discussed., No signs of potential drug abuse or diversion identified., Obtaining appropriate analgesic effect of treatment., Assessed functional status. Altagracia Cavanaugh, APRN - CNP)  Review ofOARRS does not show any aberrant prescription behavior. Medication is helping the patient stay active. Patient denies any side effects and reports adequate analgesia.  No sign of misuse/abuse.             When was thelast UDS:    5-16-18         Was the UDS appropriate:yes        Record/Diagnostics Review:       As above, I Not Detected    Final 05/16/2018  4:04 PM [facility]   Morphine Urine Not Detected    Final 05/16/2018  4:04 PM [facility]   Norbuprenorphine, Urine Not Detected    Final 05/16/2018  4:04 PM [facility]   Nordiazepam, Urine Not Detected    Final 05/16/2018  4:04 PM [facility]   Norfentanyl, Urine Not Detected    Final 05/16/2018  4:04 PM [facility]   NORHYDROCODONE, URINE Not Detected    Final 05/16/2018  4:04 PM [facility]   Noroxycodone, Urine Present    Final 05/16/2018  4:04 PM [facility]   NOROXYMORPHONE, URINE Present    Final 05/16/2018  4:04 PM [facility]   Oxazepam, Urine Not Detected    Final 05/16/2018  4:04 PM [facility]   Oxycodone Urine Present    Final 05/16/2018  4:04 PM [facility]   Oxymorphone, Urine Not Detected    Final 05/16/2018  4:04 PM [facility]   PCP, Urine Not Detected    Final 05/16/2018  4:04 PM [facility]   Phentermine, Ur Not Detected    Final 05/16/2018  4:04 PM [facility]   Propoxyphene, Urine Not Detected    Final 05/16/2018  4:04 PM [facility]   Tapentadol-O-Sulfate, Urine Not Detected    Final 05/16/2018  4:04 PM [facility]   Tapentadol, Urine Not Detected    Final 05/16/2018  4:04 PM [facility]   Temazepam, Urine Not Detected    Final 05/16/2018  4:04 PM [facility]   Tramadol, Urine Not Detected    Final 05/16/2018  4:04 PM [facility]   Zolpidem, Urine Not Detected    Final 05/16/2018  4:04 PM [facility]   Creatinine, Ur 71.0  20.0 - 400.0 mg/dL Final 05/16/2018  4:04 PM [facility]   Pain Mgt Drug Panel, Hi Res, Ur See Below    Final 05/16/2018  4:04 PM [facility]   (NOTE)   Methodology: Qualitative Enzyme Immunoassay and Qualitative Liquid   Chromatography-Time of Flight-Mass Spectrometry or Tandem Mass   Spectrometry, Quantitative Spectrophotometry   The absence of expected drug(s) and/or drug metabolite(s) may   indicate non-compliance, inappropriate timing of specimen   collection relative to drug administration, poor drug absorption,   diluted/adulterated urine, or limitations of testing.  The   concentration must be greater than or equal to the cutoff to be   reported as present.  If specific drug concentrations are   required, contact the laboratory within two weeks of specimen   collection to request quantification by a second analytical   technique. Interpretive questions should be directed to the   laboratory. Results based on immunoassay detection that do not match clinical   expectations should be   interpreted with caution. Confirmatory testing by mass   spectrometry for immunoassay-based results is available, if                   Past Medical History:   Diagnosis Date    Abnormality of rib determined by X-ray 1/28/2016    Acute cystitis without hematuria 6/16/2018    Acute exacerbation of chronic bronchitis (HCC)     Acute on chronic diastolic heart failure ([address] 1/28/2016    Adjustment disorder with mixed anxiety and depressed mood 6/26/2015    Mild     Anemia 3/5/2014    Back pain     Bronchiectasis with acute lower respiratory infection ([address] 8/16/2017    Bronchitis     Chronic bronchitis (HCC) 1/28/2016    Chronic cough 7/23/2018    Degenerative joint disease (DJD) of hip 5/14/2015    Dyslipidemia 3/5/2014    Encephalopathy acute 5/29/2016    Essential hypertension 1/28/2016    Fibromyalgia     GERD (gastroesophageal reflux disease)     History of total bilateral knee replacement 5/28/2016    Hx of blood clots     left leg and lung    Hyperlipidemia     Hypertension     Incontinence of urine     Irregular heartbeat     DR. Ashlee Alvarez Medication monitoring encounter 6/13/2018    Morbid obesity with BMI of 45.0-49.9, adult ([address] 1/28/2016    Obstructive sleep apnea syndrome     Osteoarthritis     Primary osteoarthritis of left knee 5/27/2016    PVC (premature ventricular contraction) 1/28/2016    S/P right and left heart catheterization 10/2/2015    Sleep apnea     no machine    SOB (shortness of breath)     Spinal stenosis of lumbar region with neurogenic claudication 5/13/2013    Supplemental oxygen dependent 12/8/2017    Urine frequency     UTI (urinary tract (ROXICODONE) 5 MG immediate release tablet, Take 1 tablet by mouth every 8 hours as needed for Pain for up to 30 days. , Disp: 90 tablet, Rfl: 0    gabapentin (NEURONTIN) 100 MG capsule, Take 1 capsule by mouth daily for 90 days. , Disp: 90 capsule, Rfl: 0    ofloxacin (OCUFLOX) 0.3 % solution, Place into both eyes daily as needed , Disp: , Rfl:     metoprolol tartrate (LOPRESSOR) 50 MG tablet, Take 50 mg by mouth 2 times daily, Disp: , Rfl:     DULoxetine (CYMBALTA) 30 MG extended release capsule, TAKE 1 CAPSULE TWICE A DAY, Disp: 180 capsule, Rfl: 3    aspirin 81 MG tablet, Take 81 mg by mouth daily, Disp: , Rfl:     docusate sodium (COLACE) 100 MG capsule, Take 100 mg by mouth daily as needed for Constipation, Disp: , Rfl:     Misc.  Devices (WALKER) MISC, 1 each by Does not apply route daily Upright walker with wheels and seat, Disp: 1 each, Rfl: 0    ibuprofen (ADVIL;MOTRIN) 600 MG tablet, Take 600 mg by mouth every 6 hours as needed for Pain, Disp: , Rfl:     albuterol sulfate  (90 Base) MCG/ACT inhaler, Inhale 2 puffs into the lungs 4 times daily as needed for Wheezing , Disp: , Rfl:     Family History   Problem Relation Age of Onset    Stomach Cancer Brother         stomach    High Blood Pressure Mother     Heart Disease Sister         irregular heartbeat       Social History     Socioeconomic History    Marital status:      Spouse name: Not on file    Number of children: Not on file    Years of education: Not on file    Highest education level: Not on file   Occupational History    Not on file   Social Needs    Financial resource strain: Not on file    Food insecurity:     Worry: Not on file     Inability: Not on file    Transportation needs:     Medical: Not on file     Non-medical: Not on file   Tobacco Use    Smoking status: Former Smoker     Packs/day: 1.00     Years: 18.00     Pack years: 18.00     Last attempt to quit: 1993     Years since quittin.1    Smokeless tobacco: Never Used   Substance and Sexual Activity    Alcohol use: No    Drug use: No    Sexual activity: Not Currently   Lifestyle    Physical activity:     Days per week: Not on file     Minutes per session: Not on file    Stress: Not on file   Relationships    Social connections:     Talks on phone: Not on file     Gets together: Not on file     Attends Yazidi service: Not on file     Active member of club or organization: Not on file     Attends meetings of clubs or organizations: Not on file     Relationship status: Not on file    Intimate partner violence:     Fear of current or ex partner: Not on file     Emotionally abused: Not on file     Physically abused: Not on file     Forced sexual activity: Not on file   Other Topics Concern    Not on file   Social History Narrative    Not on file       Review of Systems:  Review of Systems   Constitutional: Negative. HENT: Negative. Eyes: Positive for visual disturbance. Respiratory: Positive for cough. Short of breath with exertion   Cardiovascular: Negative. Gastrointestinal: Positive for constipation. Endocrine: Negative. Genitourinary:        Nocturia   Musculoskeletal: Positive for arthralgias and back pain. Skin: Negative. Neurological: Positive for numbness and headaches. Hematological: Negative. Psychiatric/Behavioral: Negative. Physical Exam:  /79   Pulse 76   Temp 98.2 °F (36.8 °C) (Oral)   Resp 16   Ht 5' 4\" (1.626 m)   Wt 268 lb (121.6 kg)   SpO2 97%   BMI 46.00 kg/m²     Physical Exam  Physical Exam   Constitutional: She appears well-developed. overweight   HENT:   Head: Normocephalic. Neck: Normal range of motion. Neck supple. Pulmonary/Chest: Effort normal.   Musculoskeletal:        Lumbar back: She exhibits decreased range of motion and tenderness. Neurological: She is alert. She has normal strength.  Gait abnormal.   Reflex Scores:       Patellar reflexes are 0 on the right side and 0 on the left side. Achilles reflexes are 1+ on the right side. Skin: Skin is warm, dry and intact. Psychiatric: She has a normal mood and affect. Her speech is normal and behavior is normal. Judgment and thought content normal. Cognition and memory are normal.       Assessment:      Problem List Items Addressed This Visit     Spinal stenosis of lumbar region with neurogenic claudication (Chronic)    Medication monitoring encounter - Primary (Chronic)    History of total bilateral knee replacement    Relevant Medications    oxyCODONE (ROXICODONE) 5 MG immediate release tablet (Start on 6/28/2019)    Fibromyalgia    Chronic prescription opiate use (Chronic)    Cervical spine ankylosis    Back pain    Acquired spondylolisthesis            Treatment Plan:  DISCUSSION: Treatment options discussed withpatient and all questions answered to patient's satisfaction. Possible side effects, risk of tolerance and or dependence and alternative treatments discussed    Obtaining appropriate analgesic effect of treatment   No signs of potential drug abuse or diversion identified    [x] Ill effects of being on chronic pain medications such as sleep disturbances, respiratory depression, hormonal changes, withdrawal symptoms, chronic opioid dependence and tolerance as well as risk of taking opioids with Benzodiazepines and taking opioids along with alcohol,  werediscussed with patient. I had asked the patient to minimize medication use and utilize pain medications only for uncontrolled rest pain or pain with exertional activities. I advised patient not to self-escalate painmedications without consulting with us. At each of patient's future visits we will try to taper pain medications, while adjusting the adjunct medications, and re-evaluating for Physical Therapy to improve spinal andjoint strength. We will continue to have discussions to decrease pain medications as tolerated.       Counseled

## 2019-06-26 ENCOUNTER — OFFICE VISIT (OUTPATIENT)
Dept: INTERNAL MEDICINE CLINIC | Age: 74
End: 2019-06-26
Payer: MEDICARE

## 2019-06-26 VITALS
HEART RATE: 61 BPM | TEMPERATURE: 98.9 F | DIASTOLIC BLOOD PRESSURE: 64 MMHG | OXYGEN SATURATION: 98 % | BODY MASS INDEX: 45.98 KG/M2 | HEIGHT: 64 IN | SYSTOLIC BLOOD PRESSURE: 110 MMHG

## 2019-06-26 DIAGNOSIS — J44.1 COPD WITH EXACERBATION (HCC): Primary | ICD-10-CM

## 2019-06-26 DIAGNOSIS — J01.00 ACUTE NON-RECURRENT MAXILLARY SINUSITIS: ICD-10-CM

## 2019-06-26 PROCEDURE — 99213 OFFICE O/P EST LOW 20 MIN: CPT | Performed by: PHYSICIAN ASSISTANT

## 2019-06-26 RX ORDER — LEVOFLOXACIN 500 MG/1
500 TABLET, FILM COATED ORAL DAILY
Qty: 7 TABLET | Refills: 0 | Status: SHIPPED | OUTPATIENT
Start: 2019-06-26 | End: 2019-07-03

## 2019-06-26 ASSESSMENT — ENCOUNTER SYMPTOMS
VOMITING: 0
COLOR CHANGE: 0
ABDOMINAL PAIN: 0
SHORTNESS OF BREATH: 0
SINUS PRESSURE: 1
SORE THROAT: 1
EYE PAIN: 0
NAUSEA: 0
COUGH: 1
WHEEZING: 0
RHINORRHEA: 0
CHEST TIGHTNESS: 0
SINUS PAIN: 0
TROUBLE SWALLOWING: 0
EYE REDNESS: 0

## 2019-06-26 NOTE — PROGRESS NOTES
Visit Information    Have you changed or started any medications since your last visit including any over-the-counter medicines, vitamins, or herbal medicines? no   Are you having any side effects from any of your medications? -  no  Have you stopped taking any of your medications? Is so, why? -  no    Have you seen any other physician or provider since your last visit? No  Have you had any other diagnostic tests since your last visit? No  Have you been seen in the emergency room and/or had an admission to a hospital since we last saw you? No  Have you had your routine dental cleaning in the past 6 months? no    Have you activated your [employer] account? If not, what are your barriers?  Yes     Patient Care Team:  Young Duane, MD as PCP - General (Internal Medicine)  Young Duane, MD as PCP - [facility]  Miki Norwood MD as Consulting Physician (Gastroenterology)  Bren Leonard MD as Consulting Physician (Cardiology)  Raquel Paul MD as Consulting Physician (Pulmonology)    Medical History Review  Past Medical, Family, and Social History reviewed and does not contribute to the patient presenting condition    Health Maintenance   Topic Date Due    DTaP/Tdap/Td vaccine (1 - Tdap) 11/17/1963    Breast cancer screen  11/17/1994    DEXA (modify frequency per FRAX score)  11/17/2009    Annual Wellness Visit (AWV)  06/25/2016    Shingles Vaccine (1 of 2) 08/09/2019 (Originally 11/17/1994)    Flu vaccine (Season Ended) 09/01/2019    Potassium monitoring  04/15/2020    Creatinine monitoring  04/15/2020    Lipid screen  04/15/2024    Colon cancer screen colonoscopy  04/28/2024    Pneumococcal 65+ years Vaccine  Completed

## 2019-06-26 NOTE — PROGRESS NOTES
[facility] PHYSICIANS  [facility]  [address] [address] [address] [facility] [address]  Dept: [phone]  Dept Fax: [phone]    OfficeProgress/Follow Up Note  Date of patient's visit: 6/26/2019  Patient's Name:  Rafael Boles YOB: 1945            Patient Care Team:  Kelsea Sullivan MD as PCP - General (Internal Medicine)  Kelsea Sullivan MD as PCP - [facility]  Tae Waterman MD as Consulting Physician (Gastroenterology)  Tiffanie Monreal MD as Consulting Physician (Cardiology)  Jillian Boland MD as Consulting Physician (Pulmonology)    REASON FOR VISIT:Same day visit    HISTORY OF PRESENT ILLNESS:      Chief Complaint   Patient presents with    Otalgia    Chest Congestion     Pt will like to have chest xray done    Cough     History was obtained from the patient. Rafael Boles is a 75 y.o. female here today for above. Cough, congestion. Symptoms present for the past 1-2 weeks. Cough is mildly productive, yellow/green sputum. Associated symptoms include ear pain, sore throat, sinus pressure. Patient denies chest pain, shortness of breath to baseline. No fever or chills. MEDICATIONS:      Current Outpatient Medications   Medication Sig Dispense Refill    levofloxacin (LEVAQUIN) 500 MG tablet Take 1 tablet by mouth daily for 7 days 7 tablet 0    [START ON 6/28/2019] oxyCODONE (ROXICODONE) 5 MG immediate release tablet Take 1 tablet by mouth every 8 hours as needed for Pain for up to 30 days. 90 tablet 0    gabapentin (NEURONTIN) 100 MG capsule Take 1 capsule by mouth daily for 90 days. 90 capsule 0    ofloxacin (OCUFLOX) 0.3 % solution Place into both eyes daily as needed       docusate sodium (COLACE) 100 MG capsule Take 100 mg by mouth daily as needed for Constipation      Misc.  Devices (WALKER) MISC 1 each by Does not apply route daily Upright walker with wheels and seat 1 each 0    metoprolol tartrate (LOPRESSOR) 50 MG tablet Take 50 mg by mouth 2 times daily      ibuprofen (ADVIL;MOTRIN) 600 MG tablet Take 600 mg by mouth every 6 hours as needed for Pain      DULoxetine (CYMBALTA) 30 MG extended release capsule TAKE 1 CAPSULE TWICE A  capsule 3    albuterol sulfate  (90 Base) MCG/ACT inhaler Inhale 2 puffs into the lungs 4 times daily as needed for Wheezing       aspirin 81 MG tablet Take 81 mg by mouth daily       No current facility-administered medications for this visit. ALLERGIES:      Allergies   Allergen Reactions    Rosuvastatin Calcium Anaphylaxis     Hair fell out    Statins Anaphylaxis     Hair fell out    Bactrim [Sulfamethoxazole-Trimethoprim] Diarrhea    Caffeine Other (See Comments)     pain  pain    Dye [Iodides] Other (See Comments)     Iv dye= blood clots in arm    Ioxaglate Other (See Comments)     Iv dye= blood clots in arm    Dicloxacillin Rash    Pcn [Penicillins] Rash     SOCIAL HISTORY   Reviewed and no change from previous record. Greer Bence  reports that she quit smoking about 26 years ago. She has a 18.00 pack-year smoking history. She has never used smokeless tobacco.    FAMILY HISTORY:    Reviewed and No change from previous visit    REVIEW OF SYSTEMS:    Review of Systems   Constitutional: Negative for chills, fatigue and fever. HENT: Positive for congestion, ear pain, sinus pressure and sore throat. Negative for drooling, ear discharge, hearing loss, postnasal drip, rhinorrhea, sinus pain and trouble swallowing. Eyes: Negative for pain and redness. Respiratory: Positive for cough. Negative for chest tightness, shortness of breath and wheezing. Cardiovascular: Negative for chest pain and leg swelling. Gastrointestinal: Negative for abdominal pain, nausea and vomiting. Musculoskeletal: Negative for neck pain and neck stiffness. Skin: Negative for color change and rash. Neurological: Negative for dizziness, weakness, light-headedness, numbness and headaches.    Hematological: Negative for adenopathy. PHYSICAL EXAM:      Vitals:    06/26/19 1411   BP: 110/64   Pulse: 61   Temp: 98.9 °F (37.2 °C)   SpO2: 98%   Height: 5' 4.02\" (1.626 m)     General - alert, well appearing, in no acute distress  Skin - normal coloration and turgor, no rashes  Head - tenderness to palpation of maxillary sinuses  Eyes - pupils equal and reactive, extraocular eye movements intact  Ears - bilateral TM's and external ear canals normal  Nose - normal and patent, no erythema, discharge or polyps  Mouth - mucous membranes are moist, pharynx normal without lesions  Neck - supple, no significant adenopathy  Lymphatics - no palpable lymphadenopathy, no hepatosplenomegaly  Chest - faint expiratory wheezes, no crackles, rales or rhonchi, symmetric air entry, no hypoxia or respiratory distress  Heart - normal rate, regular rhythm, no murmur  Abdomen - soft, nontender, nondistended  Neurological - alert, oriented, normal speech, no focal sensory or motor deficit  Extremities - no pedal edema    ASSESSMENT AND PLAN:      1. COPD with exacerbation ([address]  - supportive care, rest, hydration, analgesics as needed   - levofloxacin (LEVAQUIN) 500 MG tablet; Take 1 tablet by mouth daily for 7 days  Dispense: 7 tablet; Refill: 0  - patient declines oral steroids at this time, advised they may benefit her breathing  - XR CHEST STANDARD (2 VW); Future    2. Acute non-recurrent maxillary sinusitis  - levofloxacin (LEVAQUIN) 500 MG tablet; Take 1 tablet by mouth daily for 7 days  Dispense: 7 tablet; Refill: 0    FOLLOW UP AND INSTRUCTIONS:   Return if symptoms worsen or fail to improve. Discussed use, benefit, and side effects of prescribed medications. All patient questions answered. Patient voiced understanding.      Patient given educational materials - see patient instructions    Bull RAINEY [facility]  6/26/2019, 4:38 PM    Please note that this chart wasgenerated using voice recognition Dragon dictation software. Although every effort was made to ensure the accuracy of this automated transcription, some errors in transcription may have occurred.

## 2019-06-27 ENCOUNTER — HOSPITAL ENCOUNTER (OUTPATIENT)
Age: 74
Discharge: HOME | End: 2019-06-29
Payer: MEDICARE

## 2019-06-27 ENCOUNTER — HOSPITAL ENCOUNTER (OUTPATIENT)
Dept: GENERAL RADIOLOGY | Age: 74
Discharge: HOME | End: 2019-06-29
Payer: MEDICARE

## 2019-06-27 DIAGNOSIS — J44.1 COPD WITH EXACERBATION (HCC): ICD-10-CM

## 2019-06-27 PROCEDURE — 71046 X-RAY EXAM CHEST 2 VIEWS: CPT

## 2019-07-25 ENCOUNTER — HOSPITAL ENCOUNTER (OUTPATIENT)
Dept: PAIN MANAGEMENT | Age: 74
Discharge: HOME | End: 2019-07-25
Payer: MEDICARE

## 2019-07-25 DIAGNOSIS — Z96.653 HISTORY OF TOTAL BILATERAL KNEE REPLACEMENT: ICD-10-CM

## 2019-07-25 RX ORDER — OXYCODONE HYDROCHLORIDE 5 MG/1
5 TABLET ORAL EVERY 8 HOURS PRN
Qty: 90 TABLET | Refills: 0 | Status: SHIPPED | OUTPATIENT
Start: 2019-07-29 | End: 2019-08-27 | Stop reason: SDUPTHER

## 2019-07-31 ENCOUNTER — TELEPHONE (OUTPATIENT)
Dept: INTERNAL MEDICINE CLINIC | Age: 74
End: 2019-07-31

## 2019-08-05 ENCOUNTER — HOSPITAL ENCOUNTER (OUTPATIENT)
Dept: PHYSICAL THERAPY | Age: 74
Setting detail: THERAPIES SERIES
Discharge: HOME | End: 2019-08-05
Payer: MEDICARE

## 2019-08-05 PROCEDURE — 97163 PT EVAL HIGH COMPLEX 45 MIN: CPT

## 2019-08-05 PROCEDURE — 97110 THERAPEUTIC EXERCISES: CPT

## 2019-08-08 ENCOUNTER — HOSPITAL ENCOUNTER (OUTPATIENT)
Dept: PHYSICAL THERAPY | Age: 74
Setting detail: THERAPIES SERIES
Discharge: HOME | End: 2019-08-08
Payer: MEDICARE

## 2019-08-08 ENCOUNTER — TELEPHONE (OUTPATIENT)
Dept: INTERNAL MEDICINE CLINIC | Age: 74
End: 2019-08-08

## 2019-08-08 PROCEDURE — 97112 NEUROMUSCULAR REEDUCATION: CPT

## 2019-08-08 PROCEDURE — 97110 THERAPEUTIC EXERCISES: CPT

## 2019-08-12 NOTE — PROGRESS NOTES
[address]   [address]   Phone: [phone]   Fax: [phone]    Physical Therapy Daily Treatment Note    Date:  2019  Patient Name:  Kelley Arizmendi    :    MRN: [id number]  Physician: Pepe Hendrickson Ave: Taylor Figueroa [facility]  Medical Diagnosis: O00.096 spinal stenosis with claudication                    Rehab Codes: Onset Date: ~3-4 years per patient intake                 Next 's appt.: 19  Visit# / total visits: 2  Cancels/No Shows: 0/0    Subjective:    Pain:  [x] Yes  [] No        Location: low back; (R) lateral hip; (B) LEs         Pain Rating: (0-10 scale) 3-4/10;5/10;0-1 today up to 5/10 when symptomatic in legs  Pain altered Tx:  [] Yes  [] No  Action:    Objective:  Modalities:   Precautions:  Exercises:  Exercise Reps/ Time Weight/ Level Comments   sidelying abduction 10x   (B)   clamshells 15x   (B)   LTR 15x       Hip flexion seated 15x        sit to stand  10       Abdominal AWILDA 15 5\"    Posterior pelvic tilt 15 5\"    [address] 10 5\" With therapist (A); awareness of (R) ALYSON                 Other:    Specific Instructions for next treatment:    Treatment Charges: Mins Units   []  Modalities     [x]  Ther Exercise 30 2   []  Manual Therapy     []  Ther Activities     []  Aquatics     [x]  Neuromuscular 10 1   []  Other     Total Treatment time 40 3       Assessment:  Added postural stabilization program per flow sheet. Tolerated exercises well, no increased back pain. Limited with (R) sided ER and more limited with (L) sidelying hip abduction due to weakness with exercises today. [x] Progressing toward goals. [] No change.      [] Other:    STG: (to be met in 6 treatments)  1. ? Pain: Patient with improved pain levels to 2-3/10  2. ? ROM: Improved and normalized hip ROM without pain  3. ? Strength: Improved ankle strength to 4/5 improved hip strength to 4/5 with clinical

## 2019-08-15 ENCOUNTER — HOSPITAL ENCOUNTER (OUTPATIENT)
Dept: PHYSICAL THERAPY | Age: 74
Setting detail: THERAPIES SERIES
Discharge: HOME | End: 2019-08-15
Payer: MEDICARE

## 2019-08-15 PROCEDURE — 97110 THERAPEUTIC EXERCISES: CPT

## 2019-08-15 NOTE — PROGRESS NOTES
[facility]   [facility] [address] [facility] [address]   Phone: [phone]   Fax: [phone]    Physical Therapy Daily Treatment Note    Date:  2019  Patient Name:  Kamila Patton    :    MRN: [id number]  Physician: Pepe Hendrickson [address]: [address]   Medical Diagnosis: U69.718 spinal stenosis with claudication                    Rehab Codes: Onset Date: ~3-4 years per patient intake                 Next 's appt.: 19  Visit# / total visits: 3/12  Cancels/No Shows: 0/0    Subjective:    Pain:  [x] Yes  [] No        Location: low back; (R) lateral hip; (B) LEs         Pain Rating: (0-10 scale) 4/10;4/10;0-1 today up to 4/10 when symptomatic in legs  Pain altered Tx:  [] Yes  [x] No  Action:    Objective:  Modalities:   Precautions:  Exercises:  Exercise Reps/ Time Weight/ Level Comments   sidelying abduction 10x   (B)   clamshells 15x   (B)   LTR 15x       Hip flexion seated 15x        sit to stand  10       Abdominal AWILDA 15 5\"    Posterior pelvic tilt 15 5\"    [address] 10 5\" With therapist (A); awareness of (R) ALYSON   SLR 10     SAQ 10     Standing Marches 10     Standing Hip Flex 10       Other:    Specific Instructions for next treatment:    Treatment Charges: Mins Units   []  Modalities     [x]  Ther Exercise 45 3   []  Manual Therapy     []  Ther Activities     []  Aquatics     []  Neuromuscular     []  Other     Total Treatment time 45 3       Assessment: Added standing marches and hip flex to increase B hip strength. Patient noted R knee hyperextension to remain in upright posture. Added B SAQ and SLR to increase quad strength required to improve posture and quality of gait. Verbal and tactile cues provided with side-lying exercises to improve posture and technique. Patient noted slight pain increase post exercises this date. [x] Progressing toward goals. [] No change.      [] Other:    STG: (to be met in 6

## 2019-08-20 ENCOUNTER — HOSPITAL ENCOUNTER (OUTPATIENT)
Dept: PHYSICAL THERAPY | Age: 74
Setting detail: THERAPIES SERIES
Discharge: HOME | End: 2019-08-20
Payer: MEDICARE

## 2019-08-22 ENCOUNTER — HOSPITAL ENCOUNTER (OUTPATIENT)
Dept: PHYSICAL THERAPY | Age: 74
Setting detail: THERAPIES SERIES
Discharge: HOME | End: 2019-08-22
Payer: MEDICARE

## 2019-08-22 NOTE — PROGRESS NOTES
[address]   Outpatient Physical Therapy  Cancel/No Show Note    Date: 19    Patient Name: Michele Jackson        MRN: [id number]  Account: [de-identified] : 1945    Physician: Jonah Reese  Medical Diagnosis: M48.062 spinal stenosis with claudication                    Rehab Codes: Onset Date: ~3-4 years per patient intake                 Next 's appt.: 19  Visit# / total visits: 312               Cancels/No Shows: 2/0     Patient cancelled due to being in to being ill.         Keila Sharpe, PTA

## 2019-08-23 RX ORDER — GABAPENTIN 100 MG/1
CAPSULE ORAL
Qty: 90 CAPSULE | Refills: 0 | Status: SHIPPED | OUTPATIENT
Start: 2019-08-23 | End: 2020-03-18

## 2019-08-27 ENCOUNTER — HOSPITAL ENCOUNTER (OUTPATIENT)
Dept: PAIN MANAGEMENT | Age: 74
Discharge: HOME | End: 2019-08-27
Payer: MEDICARE

## 2019-08-27 VITALS
OXYGEN SATURATION: 96 % | RESPIRATION RATE: 16 BRPM | SYSTOLIC BLOOD PRESSURE: 145 MMHG | HEART RATE: 60 BPM | DIASTOLIC BLOOD PRESSURE: 67 MMHG | WEIGHT: 268 LBS | TEMPERATURE: 98 F | HEIGHT: 64 IN | BODY MASS INDEX: 45.75 KG/M2

## 2019-08-27 DIAGNOSIS — Z98.890 S/P KYPHOPLASTY: ICD-10-CM

## 2019-08-27 DIAGNOSIS — Z96.653 HISTORY OF TOTAL BILATERAL KNEE REPLACEMENT: ICD-10-CM

## 2019-08-27 DIAGNOSIS — M79.7 FIBROMYALGIA: ICD-10-CM

## 2019-08-27 DIAGNOSIS — Z51.81 MEDICATION MONITORING ENCOUNTER: Primary | Chronic | ICD-10-CM

## 2019-08-27 DIAGNOSIS — M54.16 LUMBAR RADICULOPATHY: ICD-10-CM

## 2019-08-27 DIAGNOSIS — M54.5 ACUTE MIDLINE LOW BACK PAIN, WITH SCIATICA PRESENCE UNSPECIFIED: ICD-10-CM

## 2019-08-27 DIAGNOSIS — M48.062 SPINAL STENOSIS OF LUMBAR REGION WITH NEUROGENIC CLAUDICATION: Chronic | ICD-10-CM

## 2019-08-27 DIAGNOSIS — M51.36 DDD (DEGENERATIVE DISC DISEASE), LUMBAR: ICD-10-CM

## 2019-08-27 PROCEDURE — 80307 DRUG TEST PRSMV CHEM ANLYZR: CPT

## 2019-08-27 PROCEDURE — 99213 OFFICE O/P EST LOW 20 MIN: CPT

## 2019-08-27 PROCEDURE — 99213 OFFICE O/P EST LOW 20 MIN: CPT | Performed by: NURSE PRACTITIONER

## 2019-08-27 RX ORDER — OXYCODONE HYDROCHLORIDE 5 MG/1
5 TABLET ORAL EVERY 8 HOURS PRN
Qty: 90 TABLET | Refills: 0 | Status: SHIPPED | OUTPATIENT
Start: 2019-08-28 | End: 2019-09-27 | Stop reason: SDUPTHER

## 2019-08-27 ASSESSMENT — ENCOUNTER SYMPTOMS
COUGH: 1
BACK PAIN: 1
HEARTBURN: 1
CONSTIPATION: 1

## 2019-08-27 NOTE — DISCHARGE INSTR - COC
***  Oxygen Therapy:  {Therapy; copd oxygen:60771}  Ventilator:    {MH CC Vent NFCM:452050597}    Rehab Therapies: {THERAPEUTIC INTERVENTION:6712807342}  Weight Bearing Status/Restrictions: 50Gaetano ALTMAN Weight Bearin}  Other Medical Equipment (for information only, NOT a DME order):  {EQUIPMENT:339318770}  Other Treatments: ***    Patient's personal belongings (please select all that are sent with patient):  {P DME Belongings:707496204}    RN SIGNATURE:  {Esignature:517827427}    CASE MANAGEMENT/SOCIAL WORK SECTION    Inpatient Status Date: ***    Readmission Risk Assessment Score:  Readmission Risk              Risk of Unplanned Readmission:        0           Discharging to Facility/ Agency   · Name:   · Address:  · Phone:  · Fax:    Dialysis Facility (if applicable)   · Name:  · Address:  · Dialysis Schedule:  · Phone:  · Fax:    / signature: {Esignature:615657476}    PHYSICIAN SECTION    Prognosis: {Prognosis:8465144209}    Condition at Discharge: [address]adez Patient Condition:906351066}    Rehab Potential (if transferring to Rehab): {Prognosis:4888489848}    Recommended Labs or Other Treatments After Discharge: ***    Physician Certification: I certify the above information and transfer of Michele Jackson  is necessary for the continuing treatment of the diagnosis listed and that she requires {Admit to Appropriate Level of Care:78697} for {GREATER/LESS:881417811} 30 days.      Update Admission H&P: {CHP DME Changes in IJUQW:039937322}    PHYSICIAN SIGNATURE:  {Esignature:161727350}

## 2019-08-27 NOTE — PROGRESS NOTES
clots in arm    Ioxaglate Other (See Comments)     Iv dye= blood clots in arm    Dicloxacillin Rash    Pcn [Penicillins] Rash         Current Outpatient Medications:     [START ON 8/28/2019] oxyCODONE (ROXICODONE) 5 MG immediate release tablet, Take 1 tablet by mouth every 8 hours as needed for Pain for up to 30 days. , Disp: 90 tablet, Rfl: 0    gabapentin (NEURONTIN) 100 MG capsule, take 1 capsule by mouth once daily, Disp: 90 capsule, Rfl: 0    metoprolol tartrate (LOPRESSOR) 50 MG tablet, Take 50 mg by mouth 2 times daily, Disp: , Rfl:     aspirin 81 MG tablet, Take 81 mg by mouth daily, Disp: , Rfl:     ofloxacin (OCUFLOX) 0.3 % solution, Place into both eyes daily as needed , Disp: , Rfl:     docusate sodium (COLACE) 100 MG capsule, Take 100 mg by mouth daily as needed for Constipation, Disp: , Rfl:     Misc.  Devices (WALKER) MISC, 1 each by Does not apply route daily Upright walker with wheels and seat, Disp: 1 each, Rfl: 0    ibuprofen (ADVIL;MOTRIN) 600 MG tablet, Take 600 mg by mouth every 6 hours as needed for Pain, Disp: , Rfl:     DULoxetine (CYMBALTA) 30 MG extended release capsule, TAKE 1 CAPSULE TWICE A DAY, Disp: 180 capsule, Rfl: 3    albuterol sulfate  (90 Base) MCG/ACT inhaler, Inhale 2 puffs into the lungs 4 times daily as needed for Wheezing , Disp: , Rfl:     Family History   Problem Relation Age of Onset    Stomach Cancer Brother         stomach    High Blood Pressure Mother     Heart Disease Sister         irregular heartbeat       Social History     Socioeconomic History    Marital status:      Spouse name: Not on file    Number of children: Not on file    Years of education: Not on file    Highest education level: Not on file   Occupational History    Not on file   Social Needs    Financial resource strain: Not on file    Food insecurity:     Worry: Not on file     Inability: Not on file    Transportation needs:     Medical: Not on file Non-medical: Not on file   Tobacco Use    Smoking status: Former Smoker     Packs/day: 1.00     Years: 18.00     Pack years: 18.00     Last attempt to quit: 1993     Years since quittin.3    Smokeless tobacco: Never Used   Substance and Sexual Activity    Alcohol use: No    Drug use: No    Sexual activity: Not Currently   Lifestyle    Physical activity:     Days per week: Not on file     Minutes per session: Not on file    Stress: Not on file   Relationships    Social connections:     Talks on phone: Not on file     Gets together: Not on file     Attends [employer] service: Not on file     Active member of club or organization: Not on file     Attends meetings of clubs or organizations: Not on file     Relationship status: Not on file    Intimate partner violence:     Fear of current or ex partner: Not on file     Emotionally abused: Not on file     Physically abused: Not on file     Forced sexual activity: Not on file   Other Topics Concern    Not on file   Social History Narrative    Not on file       Review of Systems:  Review of Systems   Constitution: Negative. HENT: Negative. Eyes: Positive for visual disturbance. Cardiovascular: Positive for leg swelling. Respiratory: Positive for cough. Hematologic/Lymphatic: Negative. Musculoskeletal: Positive for back pain and joint pain. Gastrointestinal: Positive for constipation and heartburn. Neurological: Positive for dizziness and numbness. Walker   Psychiatric/Behavioral: Negative. Physical Exam:  BP (!) 145/67   Pulse 60   Temp 98 °F (36.7 °C) (Oral)   Resp 16   Ht 5' 4\" (1.626 m)   Wt 268 lb (121.6 kg)   SpO2 96%   BMI 46.00 kg/m²     Physical Exam   Constitutional: She appears well-developed. obese   HENT:   Head: Normocephalic. Neck: Normal range of motion. Neck supple.    Cardiovascular:   Pedal edema bilaterally   Pulmonary/Chest: Effort normal.   Musculoskeletal:        Left shoulder: She

## 2019-08-31 LAB
6-ACETYLMORPHINE, UR: NOT DETECTED
7-AMINOCLONAZEPAM, URINE: NOT DETECTED
ALPHA-OH-ALPRAZ, URINE: NOT DETECTED
ALPRAZOLAM, URINE: NOT DETECTED
AMPHETAMINES, URINE: NOT DETECTED
BARBITURATES, URINE: NOT DETECTED
BENZOYLECGONINE, UR: NOT DETECTED
BUPRENORPHINE URINE: NOT DETECTED
CARISOPRODOL, UR: NOT DETECTED
CLONAZEPAM, URINE: NOT DETECTED
CODEINE, URINE: NOT DETECTED
CREATININE URINE: 120.6 MG/DL (ref 20–400)
DIAZEPAM, URINE: NOT DETECTED
DRUGS EXPECTED, UR: NORMAL
EER HI RES INTERP UR: NORMAL
ETHYL GLUCURONIDE UR: NOT DETECTED
FENTANYL URINE: NOT DETECTED
HYDROCODONE, URINE: NOT DETECTED
HYDROMORPHONE, URINE: NOT DETECTED
LORAZEPAM, URINE: NOT DETECTED
MARIJUANA METAB, UR: NOT DETECTED
MDA, UR: NOT DETECTED
MDEA, EVE, UR: NOT DETECTED
MDMA URINE: NOT DETECTED
MEPERIDINE METAB, UR: NOT DETECTED
METHADONE, URINE: NOT DETECTED
METHAMPHETAMINE, URINE: NOT DETECTED
METHYLPHENIDATE: NOT DETECTED
MIDAZOLAM, URINE: NOT DETECTED
MORPHINE URINE: NOT DETECTED
NORBUPRENORPHINE, URINE: NOT DETECTED
NORDIAZEPAM, URINE: NOT DETECTED
NORFENTANYL, URINE: NOT DETECTED
NORHYDROCODONE, URINE: NOT DETECTED
NOROXYCODONE, URINE: PRESENT
NOROXYMORPHONE, URINE: PRESENT
OXAZEPAM, URINE: NOT DETECTED
OXYCODONE URINE: PRESENT
OXYMORPHONE, URINE: NOT DETECTED
PAIN MANAGEMENT DRUG PANEL INTERP, URINE: NORMAL
PAIN MGT DRUG PANEL, HI RES, UR: NORMAL
PCP,URINE: NOT DETECTED
PHENTERMINE, UR: NOT DETECTED
PROPOXYPHENE, URINE: NOT DETECTED
TAPENTADOL, URINE: NOT DETECTED
TAPENTADOL-O-SULFATE, URINE: NOT DETECTED
TEMAZEPAM, URINE: NOT DETECTED
TRAMADOL, URINE: NOT DETECTED
ZOLPIDEM, URINE: NOT DETECTED

## 2019-09-27 ENCOUNTER — HOSPITAL ENCOUNTER (OUTPATIENT)
Dept: PAIN MANAGEMENT | Age: 74
Discharge: HOME | End: 2019-09-27
Payer: MEDICARE

## 2019-09-27 VITALS
OXYGEN SATURATION: 98 % | RESPIRATION RATE: 16 BRPM | SYSTOLIC BLOOD PRESSURE: 137 MMHG | HEART RATE: 79 BPM | TEMPERATURE: 98.8 F | DIASTOLIC BLOOD PRESSURE: 65 MMHG

## 2019-09-27 DIAGNOSIS — Z51.81 MEDICATION MONITORING ENCOUNTER: Chronic | ICD-10-CM

## 2019-09-27 DIAGNOSIS — Z96.653 HISTORY OF TOTAL BILATERAL KNEE REPLACEMENT: ICD-10-CM

## 2019-09-27 DIAGNOSIS — M54.16 LUMBAR RADICULOPATHY: ICD-10-CM

## 2019-09-27 DIAGNOSIS — Z79.891 CHRONIC PRESCRIPTION OPIATE USE: Chronic | ICD-10-CM

## 2019-09-27 DIAGNOSIS — M51.36 DDD (DEGENERATIVE DISC DISEASE), LUMBAR: ICD-10-CM

## 2019-09-27 DIAGNOSIS — Z98.890 S/P KYPHOPLASTY: ICD-10-CM

## 2019-09-27 DIAGNOSIS — F32.89 OTHER DEPRESSION: ICD-10-CM

## 2019-09-27 DIAGNOSIS — M48.062 SPINAL STENOSIS OF LUMBAR REGION WITH NEUROGENIC CLAUDICATION: Primary | Chronic | ICD-10-CM

## 2019-09-27 DIAGNOSIS — M79.7 FIBROMYALGIA: ICD-10-CM

## 2019-09-27 PROCEDURE — 99214 OFFICE O/P EST MOD 30 MIN: CPT

## 2019-09-27 PROCEDURE — 99214 OFFICE O/P EST MOD 30 MIN: CPT | Performed by: NURSE PRACTITIONER

## 2019-09-27 RX ORDER — OXYCODONE HYDROCHLORIDE 5 MG/1
5 TABLET ORAL EVERY 8 HOURS PRN
Qty: 90 TABLET | Refills: 0 | Status: SHIPPED | OUTPATIENT
Start: 2019-09-29 | End: 2019-10-24 | Stop reason: SDUPTHER

## 2019-09-27 ASSESSMENT — ENCOUNTER SYMPTOMS
SHORTNESS OF BREATH: 0
BACK PAIN: 1
COUGH: 0
CONSTIPATION: 0

## 2019-09-27 NOTE — PROGRESS NOTES
Patient is here today to review medication contract. Chief Complaint:  Back pain    PMH: Pt reports chronic history of low back pain that radiates into right hip and buttocks at times She is s/p bilateral knee and right hip replaced. She has tried NSAIDS heat chiropractic care and with little relief. Just completed in home PT to increase strength and help with ambulation and balance. She had recent fall on 9/28/18 with resulting L1 compression fracture. She had kyphoplasty of L1 which did help her back pain.  She had lumbar epidural on 6/19 and reports moderate relief. Currently in PT. Patient reports feeling depressed due to her pain. She states the depression is making her eat more that usual and she is not sleeping well. She is requesting medication to \"curb her appetite\". Discussed stresscare with patient and she would like a referral.     Back Pain   This is a chronic problem. The current episode started more than 1 year ago. The problem occurs constantly. The problem is unchanged. The pain is present in the lumbar spine. The quality of the pain is described as aching. Radiates to: down both legs to feet. The pain is at a severity of 6/10. The pain is moderate. The symptoms are aggravated by position, sitting and standing. Pertinent negatives include no chest pain or fever. She has tried analgesics and bed rest for the symptoms. The treatment provided mild relief. Patient denies any new neurological symptoms. No bowel or bladder incontinence, no weakness, and no falling. Pill count: appropriate    Morphine equivalent: 22.5    Periodic Controlled Substance Monitoring: Possible medication side effects, risk of tolerance/dependence & alternative treatments discussed., No signs of potential drug abuse or diversion identified. , Assessed functional status., Obtaining appropriate analgesic effect of treatment.  Sonido Yee, APRN - CNP)      Past Medical History:   Diagnosis Date    Abnormality of rib determined by X-ray 1/28/2016    Acute cystitis without hematuria 6/16/2018    Acute exacerbation of chronic bronchitis (HCC)     Acute on chronic diastolic heart failure ([facility] 1/28/2016    Adjustment disorder with mixed anxiety and depressed mood 6/26/2015    Mild     Anemia 3/5/2014    Back pain     Bronchiectasis with acute lower respiratory infection ([facility] 8/16/2017    Bronchitis     Chronic bronchitis (HCC) 1/28/2016    Chronic cough 7/23/2018    Degenerative joint disease (DJD) of hip 5/14/2015    Dyslipidemia 3/5/2014    Encephalopathy acute 5/29/2016    Essential hypertension 1/28/2016    Fibromyalgia     GERD (gastroesophageal reflux disease)     History of total bilateral knee replacement 5/28/2016    Hx of blood clots     left leg and lung    Hyperlipidemia     Hypertension     Incontinence of urine     Irregular heartbeat     DR. Becca Reece Medication monitoring encounter 6/13/2018    Morbid obesity with BMI of 45.0-49.9, adult ([facility] 1/28/2016    Obstructive sleep apnea syndrome     Osteoarthritis     Primary osteoarthritis of left knee 5/27/2016    PVC (premature ventricular contraction) 1/28/2016    S/P right and left heart catheterization 10/2/2015    Sleep apnea     no machine    SOB (shortness of breath)     Spinal stenosis of lumbar region with neurogenic claudication 5/13/2013    Supplemental oxygen dependent 12/8/2017    Urine frequency     UTI (urinary tract infection)     hospitalized early july 2014 for kidney infection       Past Surgical History:   Procedure Laterality Date    BRONCHOSCOPY Left 8/16/2017    BRONCHOSCOPY ALVEOLAR LAVAGE LOWER LOBE performed by Martínez Arnold MD at [address] [place] [address]  x 3    no stents    COLONOSCOPY  4 28 14    polyps biopsies     FOOT SURGERY Left     calcium deposit removal from top of foot    HYSTERECTOMY      JOINT REPLACEMENT Bilateral 5/27/2016    bilat total knees    NERVE BLOCK Upright walker with wheels and seat, Disp: 1 each, Rfl: 0    metoprolol tartrate (LOPRESSOR) 50 MG tablet, Take 50 mg by mouth 2 times daily, Disp: , Rfl:     ibuprofen (ADVIL;MOTRIN) 600 MG tablet, Take 600 mg by mouth every 6 hours as needed for Pain, Disp: , Rfl:     DULoxetine (CYMBALTA) 30 MG extended release capsule, TAKE 1 CAPSULE TWICE A DAY, Disp: 180 capsule, Rfl: 3    albuterol sulfate  (90 Base) MCG/ACT inhaler, Inhale 2 puffs into the lungs 4 times daily as needed for Wheezing , Disp: , Rfl:     aspirin 81 MG tablet, Take 81 mg by mouth daily, Disp: , Rfl:     Family History   Problem Relation Age of Onset    Stomach Cancer Brother         stomach    High Blood Pressure Mother     Heart Disease Sister         irregular heartbeat       Social History     Socioeconomic History    Marital status:      Spouse name: Not on file    Number of children: Not on file    Years of education: Not on file    Highest education level: Not on file   Occupational History    Not on file   Social Needs    Financial resource strain: Not on file    Food insecurity:     Worry: Not on file     Inability: Not on file    Transportation needs:     Medical: Not on file     Non-medical: Not on file   Tobacco Use    Smoking status: Former Smoker     Packs/day: 1.00     Years: 18.00     Pack years: 18.00     Last attempt to quit: 1993     Years since quittin.3    Smokeless tobacco: Never Used   Substance and Sexual Activity    Alcohol use: No    Drug use: No    Sexual activity: Not Currently   Lifestyle    Physical activity:     Days per week: Not on file     Minutes per session: Not on file    Stress: Not on file   Relationships    Social connections:     Talks on phone: Not on file     Gets together: Not on file     Attends [employer] service: Not on file     Active member of club or organization: Not on file     Attends meetings of clubs or organizations: Not on file     Relationship status: Not on file    Intimate partner violence:     Fear of current or ex partner: Not on file     Emotionally abused: Not on file     Physically abused: Not on file     Forced sexual activity: Not on file   Other Topics Concern    Not on file   Social History Narrative    Not on file       Review of Systems:  Review of Systems   Constitution: Negative for chills and fever. Cardiovascular: Negative for chest pain and irregular heartbeat. Respiratory: Negative for cough and shortness of breath. Musculoskeletal: Positive for back pain. Gastrointestinal: Negative for constipation. Neurological: Negative for disturbances in coordination and loss of balance. Psychiatric/Behavioral: Positive for depression. Negative for suicidal ideas and thoughts of violence. The patient has insomnia. Physical Exam:  /65   Pulse 79   Temp 98.8 °F (37.1 °C) (Oral)   Resp 16   SpO2 98%     Physical Exam   Constitutional: She is oriented to person, place, and time. HENT:   Head: Normocephalic. Eyes: EOM are normal.   Neck: Normal range of motion. Pulmonary/Chest: Effort normal.   Musculoskeletal: Normal range of motion. Lumbar back: She exhibits tenderness and pain. Neurological: She is alert and oriented to person, place, and time. Skin: Skin is warm and dry.        Record/Diagnostics Review:    Last tiffanie 8/2019 and was appropriate     Assessment:  Problem List Items Addressed This Visit     Spinal stenosis of lumbar region with neurogenic claudication - Primary (Chronic)    Medication monitoring encounter (Chronic)    History of total bilateral knee replacement    Relevant Medications    oxyCODONE (ROXICODONE) 5 MG immediate release tablet (Start on 9/29/2019)    S/P kyphoplasty    DDD (degenerative disc disease), lumbar    Relevant Medications    oxyCODONE (ROXICODONE) 5 MG immediate release tablet (Start on 9/29/2019)    Lumbar radiculopathy             Treatment Plan:  Patient relates

## 2019-10-03 ENCOUNTER — TELEPHONE (OUTPATIENT)
Dept: PULMONOLOGY | Age: 74
End: 2019-10-03

## 2019-10-24 ENCOUNTER — HOSPITAL ENCOUNTER (OUTPATIENT)
Dept: PAIN MANAGEMENT | Age: 74
Discharge: HOME | End: 2019-10-24
Payer: MEDICARE

## 2019-10-24 VITALS
TEMPERATURE: 98.7 F | DIASTOLIC BLOOD PRESSURE: 68 MMHG | SYSTOLIC BLOOD PRESSURE: 147 MMHG | OXYGEN SATURATION: 97 % | HEART RATE: 65 BPM | WEIGHT: 268 LBS | BODY MASS INDEX: 45.75 KG/M2 | HEIGHT: 64 IN | RESPIRATION RATE: 16 BRPM

## 2019-10-24 DIAGNOSIS — M54.50 ACUTE MIDLINE LOW BACK PAIN, UNSPECIFIED WHETHER SCIATICA PRESENT: ICD-10-CM

## 2019-10-24 DIAGNOSIS — Z51.81 MEDICATION MONITORING ENCOUNTER: Chronic | ICD-10-CM

## 2019-10-24 DIAGNOSIS — Z96.653 HISTORY OF TOTAL BILATERAL KNEE REPLACEMENT: ICD-10-CM

## 2019-10-24 DIAGNOSIS — M79.7 FIBROMYALGIA: ICD-10-CM

## 2019-10-24 DIAGNOSIS — M16.0 PRIMARY OSTEOARTHRITIS OF BOTH HIPS: ICD-10-CM

## 2019-10-24 DIAGNOSIS — M48.062 SPINAL STENOSIS OF LUMBAR REGION WITH NEUROGENIC CLAUDICATION: Primary | Chronic | ICD-10-CM

## 2019-10-24 PROCEDURE — 99213 OFFICE O/P EST LOW 20 MIN: CPT

## 2019-10-24 PROCEDURE — 99213 OFFICE O/P EST LOW 20 MIN: CPT | Performed by: NURSE PRACTITIONER

## 2019-10-24 RX ORDER — OXYCODONE HYDROCHLORIDE 5 MG/1
5 TABLET ORAL EVERY 8 HOURS PRN
Qty: 90 TABLET | Refills: 0 | Status: SHIPPED | OUTPATIENT
Start: 2019-10-29 | End: 2019-11-21 | Stop reason: SDUPTHER

## 2019-10-24 ASSESSMENT — ENCOUNTER SYMPTOMS
CONSTIPATION: 1
HEARTBURN: 1
COUGH: 1
EYES NEGATIVE: 1
SHORTNESS OF BREATH: 1
BACK PAIN: 1

## 2019-11-07 RX ORDER — DULOXETIN HYDROCHLORIDE 30 MG/1
CAPSULE, DELAYED RELEASE ORAL
Qty: 180 CAPSULE | Refills: 4 | Status: SHIPPED | OUTPATIENT
Start: 2019-11-07 | End: 2020-12-21

## 2019-11-21 ENCOUNTER — HOSPITAL ENCOUNTER (OUTPATIENT)
Dept: PAIN MANAGEMENT | Age: 74
Discharge: HOME | End: 2019-11-21
Payer: MEDICARE

## 2019-11-21 VITALS
WEIGHT: 268 LBS | BODY MASS INDEX: 45.75 KG/M2 | SYSTOLIC BLOOD PRESSURE: 142 MMHG | HEIGHT: 64 IN | RESPIRATION RATE: 14 BRPM | HEART RATE: 70 BPM | DIASTOLIC BLOOD PRESSURE: 70 MMHG

## 2019-11-21 DIAGNOSIS — Z51.81 MEDICATION MONITORING ENCOUNTER: Chronic | ICD-10-CM

## 2019-11-21 DIAGNOSIS — M79.7 FIBROMYALGIA: ICD-10-CM

## 2019-11-21 DIAGNOSIS — M54.16 LUMBAR RADICULOPATHY: ICD-10-CM

## 2019-11-21 DIAGNOSIS — M51.36 DDD (DEGENERATIVE DISC DISEASE), LUMBAR: ICD-10-CM

## 2019-11-21 DIAGNOSIS — Z96.653 HISTORY OF TOTAL BILATERAL KNEE REPLACEMENT: ICD-10-CM

## 2019-11-21 DIAGNOSIS — M48.062 SPINAL STENOSIS OF LUMBAR REGION WITH NEUROGENIC CLAUDICATION: Primary | Chronic | ICD-10-CM

## 2019-11-21 PROCEDURE — 99213 OFFICE O/P EST LOW 20 MIN: CPT

## 2019-11-21 PROCEDURE — 99214 OFFICE O/P EST MOD 30 MIN: CPT | Performed by: NURSE PRACTITIONER

## 2019-11-21 RX ORDER — OXYCODONE HYDROCHLORIDE 5 MG/1
5 TABLET ORAL EVERY 8 HOURS PRN
Qty: 90 TABLET | Refills: 0 | Status: SHIPPED | OUTPATIENT
Start: 2019-12-02 | End: 2020-01-02 | Stop reason: SDUPTHER

## 2019-11-21 ASSESSMENT — ENCOUNTER SYMPTOMS
BACK PAIN: 1
COUGH: 0
SHORTNESS OF BREATH: 0
CONSTIPATION: 0

## 2019-12-06 ENCOUNTER — OFFICE VISIT (OUTPATIENT)
Dept: INTERNAL MEDICINE CLINIC | Age: 74
End: 2019-12-06
Payer: MEDICARE

## 2019-12-06 VITALS
SYSTOLIC BLOOD PRESSURE: 124 MMHG | BODY MASS INDEX: 45.98 KG/M2 | TEMPERATURE: 98.1 F | HEART RATE: 64 BPM | OXYGEN SATURATION: 95 % | HEIGHT: 64 IN | DIASTOLIC BLOOD PRESSURE: 82 MMHG

## 2019-12-06 DIAGNOSIS — R20.2 NUMBNESS AND TINGLING: ICD-10-CM

## 2019-12-06 DIAGNOSIS — M54.12 CERVICAL RADICULOPATHY: ICD-10-CM

## 2019-12-06 DIAGNOSIS — J20.9 ACUTE BRONCHITIS, UNSPECIFIED ORGANISM: Primary | ICD-10-CM

## 2019-12-06 DIAGNOSIS — R20.0 NUMBNESS AND TINGLING: ICD-10-CM

## 2019-12-06 DIAGNOSIS — F32.A DEPRESSION, UNSPECIFIED DEPRESSION TYPE: ICD-10-CM

## 2019-12-06 PROCEDURE — 99214 OFFICE O/P EST MOD 30 MIN: CPT | Performed by: INTERNAL MEDICINE

## 2019-12-06 RX ORDER — AZITHROMYCIN 250 MG/1
250 TABLET, FILM COATED ORAL SEE ADMIN INSTRUCTIONS
Qty: 6 TABLET | Refills: 0 | Status: SHIPPED | OUTPATIENT
Start: 2019-12-06 | End: 2019-12-11

## 2019-12-06 RX ORDER — PREDNISONE 20 MG/1
20 TABLET ORAL DAILY
Qty: 5 TABLET | Refills: 0 | Status: SHIPPED | OUTPATIENT
Start: 2019-12-06 | End: 2019-12-11

## 2019-12-06 ASSESSMENT — ENCOUNTER SYMPTOMS
EYE ITCHING: 0
ABDOMINAL DISTENTION: 0
EYE DISCHARGE: 0
DIARRHEA: 0
CHOKING: 0
APNEA: 0
COUGH: 1
BLOOD IN STOOL: 0
EYE REDNESS: 0
EYE PAIN: 0
COLOR CHANGE: 0
BACK PAIN: 1
CHEST TIGHTNESS: 0
ABDOMINAL PAIN: 1
CONSTIPATION: 0
SHORTNESS OF BREATH: 0

## 2019-12-12 ENCOUNTER — TELEPHONE (OUTPATIENT)
Dept: INTERNAL MEDICINE CLINIC | Age: 74
End: 2019-12-12

## 2019-12-12 DIAGNOSIS — N39.0 URINARY TRACT INFECTION WITHOUT HEMATURIA, SITE UNSPECIFIED: Primary | ICD-10-CM

## 2019-12-17 ENCOUNTER — TELEPHONE (OUTPATIENT)
Dept: INTERNAL MEDICINE CLINIC | Age: 74
End: 2019-12-17

## 2019-12-17 ENCOUNTER — HOSPITAL ENCOUNTER (OUTPATIENT)
Age: 74
Setting detail: SPECIMEN
Discharge: HOME | End: 2019-12-17
Payer: MEDICARE

## 2019-12-17 ENCOUNTER — HOSPITAL ENCOUNTER (OUTPATIENT)
Dept: MRI IMAGING | Facility: CLINIC | Age: 74
Discharge: HOME | End: 2019-12-19
Payer: MEDICARE

## 2019-12-17 DIAGNOSIS — R20.0 NUMBNESS AND TINGLING: ICD-10-CM

## 2019-12-17 DIAGNOSIS — M54.12 CERVICAL RADICULOPATHY: ICD-10-CM

## 2019-12-17 DIAGNOSIS — R20.2 NUMBNESS AND TINGLING: ICD-10-CM

## 2019-12-17 DIAGNOSIS — N39.0 URINARY TRACT INFECTION WITHOUT HEMATURIA, SITE UNSPECIFIED: ICD-10-CM

## 2019-12-17 DIAGNOSIS — R93.7 ABNORMAL MRI, SPINE: Primary | ICD-10-CM

## 2019-12-17 LAB
-: ABNORMAL
AMORPHOUS: ABNORMAL
BACTERIA: ABNORMAL
BILIRUBIN URINE: NEGATIVE
CASTS UA: ABNORMAL /LPF (ref 0–8)
COLOR: YELLOW
COMMENT UA: ABNORMAL
CRYSTALS, UA: ABNORMAL /HPF
EPITHELIAL CELLS UA: ABNORMAL /HPF (ref 0–5)
FOLATE: 8.7 NG/ML
GLUCOSE URINE: NEGATIVE
KETONES, URINE: NEGATIVE
LEUKOCYTE ESTERASE, URINE: ABNORMAL
MUCUS: ABNORMAL
NITRITE, URINE: NEGATIVE
OTHER OBSERVATIONS UA: ABNORMAL
PH UA: 6.5 (ref 5–8)
PROTEIN UA: ABNORMAL
RBC UA: ABNORMAL /HPF (ref 0–4)
RENAL EPITHELIAL, UA: ABNORMAL /HPF
SPECIFIC GRAVITY UA: 1.01 (ref 1–1.03)
TRICHOMONAS: ABNORMAL
TURBIDITY: ABNORMAL
URINE HGB: NEGATIVE
UROBILINOGEN, URINE: NORMAL
VITAMIN B-12: 801 PG/ML (ref 232–1245)
WBC UA: ABNORMAL /HPF (ref 0–5)
YEAST: ABNORMAL

## 2019-12-17 PROCEDURE — 72141 MRI NECK SPINE W/O DYE: CPT

## 2019-12-18 ENCOUNTER — TELEPHONE (OUTPATIENT)
Dept: INTERNAL MEDICINE CLINIC | Age: 74
End: 2019-12-18

## 2019-12-19 ENCOUNTER — TELEPHONE (OUTPATIENT)
Dept: INTERNAL MEDICINE CLINIC | Age: 74
End: 2019-12-19

## 2019-12-19 ENCOUNTER — HOSPITAL ENCOUNTER (OUTPATIENT)
Dept: PAIN MANAGEMENT | Age: 74
Discharge: HOME | End: 2019-12-19
Payer: MEDICARE

## 2019-12-19 VITALS
BODY MASS INDEX: 45.75 KG/M2 | TEMPERATURE: 98.4 F | OXYGEN SATURATION: 98 % | WEIGHT: 268 LBS | SYSTOLIC BLOOD PRESSURE: 123 MMHG | HEART RATE: 59 BPM | RESPIRATION RATE: 16 BRPM | DIASTOLIC BLOOD PRESSURE: 71 MMHG | HEIGHT: 64 IN

## 2019-12-19 DIAGNOSIS — Z79.891 CHRONIC PRESCRIPTION OPIATE USE: Chronic | ICD-10-CM

## 2019-12-19 DIAGNOSIS — N39.0 URINARY TRACT INFECTION WITHOUT HEMATURIA, SITE UNSPECIFIED: Primary | ICD-10-CM

## 2019-12-19 DIAGNOSIS — M51.36 DDD (DEGENERATIVE DISC DISEASE), LUMBAR: ICD-10-CM

## 2019-12-19 DIAGNOSIS — M48.062 SPINAL STENOSIS OF LUMBAR REGION WITH NEUROGENIC CLAUDICATION: Primary | Chronic | ICD-10-CM

## 2019-12-19 DIAGNOSIS — Z51.81 MEDICATION MONITORING ENCOUNTER: Chronic | ICD-10-CM

## 2019-12-19 DIAGNOSIS — M79.7 FIBROMYALGIA: ICD-10-CM

## 2019-12-19 DIAGNOSIS — M54.50 ACUTE MIDLINE LOW BACK PAIN, UNSPECIFIED WHETHER SCIATICA PRESENT: ICD-10-CM

## 2019-12-19 DIAGNOSIS — Z96.653 HISTORY OF TOTAL BILATERAL KNEE REPLACEMENT: ICD-10-CM

## 2019-12-19 DIAGNOSIS — Z98.890 S/P KYPHOPLASTY: ICD-10-CM

## 2019-12-19 LAB
CULTURE: ABNORMAL
Lab: ABNORMAL
SPECIMEN DESCRIPTION: ABNORMAL

## 2019-12-19 PROCEDURE — 99213 OFFICE O/P EST LOW 20 MIN: CPT | Performed by: NURSE PRACTITIONER

## 2019-12-19 PROCEDURE — 99213 OFFICE O/P EST LOW 20 MIN: CPT

## 2019-12-19 RX ORDER — CIPROFLOXACIN 500 MG/1
500 TABLET, FILM COATED ORAL 2 TIMES DAILY
Qty: 10 TABLET | Refills: 0 | Status: SHIPPED | OUTPATIENT
Start: 2019-12-19 | End: 2019-12-24

## 2019-12-19 ASSESSMENT — ENCOUNTER SYMPTOMS
SHORTNESS OF BREATH: 1
HEARTBURN: 1
BACK PAIN: 1

## 2019-12-27 ENCOUNTER — HOSPITAL ENCOUNTER (OUTPATIENT)
Dept: PHYSICAL THERAPY | Age: 74
Setting detail: THERAPIES SERIES
Discharge: HOME | End: 2019-12-27

## 2020-01-01 ENCOUNTER — HOSPITAL ENCOUNTER (EMERGENCY)
Age: 75
Discharge: HOME | End: 2020-01-01
Attending: EMERGENCY MEDICINE
Payer: MEDICARE

## 2020-01-01 ENCOUNTER — APPOINTMENT (OUTPATIENT)
Dept: GENERAL RADIOLOGY | Age: 75
End: 2020-01-01
Payer: MEDICARE

## 2020-01-01 VITALS
RESPIRATION RATE: 18 BRPM | SYSTOLIC BLOOD PRESSURE: 173 MMHG | HEIGHT: 64 IN | TEMPERATURE: 97.8 F | BODY MASS INDEX: 45.75 KG/M2 | WEIGHT: 268 LBS | HEART RATE: 55 BPM | DIASTOLIC BLOOD PRESSURE: 77 MMHG | OXYGEN SATURATION: 94 %

## 2020-01-01 PROCEDURE — 96372 THER/PROPH/DIAG INJ SC/IM: CPT

## 2020-01-01 PROCEDURE — 6360000002 HC RX W HCPCS: Performed by: EMERGENCY MEDICINE

## 2020-01-01 PROCEDURE — 6370000000 HC RX 637 (ALT 250 FOR IP): Performed by: EMERGENCY MEDICINE

## 2020-01-01 PROCEDURE — 99283 EMERGENCY DEPT VISIT LOW MDM: CPT

## 2020-01-01 PROCEDURE — 73630 X-RAY EXAM OF FOOT: CPT

## 2020-01-01 PROCEDURE — 73502 X-RAY EXAM HIP UNI 2-3 VIEWS: CPT

## 2020-01-01 RX ORDER — HYDROCODONE BITARTRATE AND ACETAMINOPHEN 5; 325 MG/1; MG/1
1 TABLET ORAL ONCE
Status: COMPLETED | OUTPATIENT
Start: 2020-01-01 | End: 2020-01-01

## 2020-01-01 RX ADMIN — HYDROCODONE BITARTRATE AND ACETAMINOPHEN 1 TABLET: 5; 325 TABLET ORAL at 20:08

## 2020-01-01 RX ADMIN — ENOXAPARIN SODIUM 120 MG: 120 INJECTION SUBCUTANEOUS at 20:08

## 2020-01-01 ASSESSMENT — PAIN SCALES - GENERAL
PAINLEVEL_OUTOF10: 7
PAINLEVEL_OUTOF10: 7

## 2020-01-02 ENCOUNTER — CARE COORDINATION (OUTPATIENT)
Dept: CARE COORDINATION | Age: 75
End: 2020-01-02

## 2020-01-02 DIAGNOSIS — Z96.653 HISTORY OF TOTAL BILATERAL KNEE REPLACEMENT: ICD-10-CM

## 2020-01-02 RX ORDER — OXYCODONE HYDROCHLORIDE 5 MG/1
5 TABLET ORAL EVERY 8 HOURS PRN
Qty: 90 TABLET | Refills: 0 | Status: SHIPPED | OUTPATIENT
Start: 2020-01-07 | End: 2020-02-03 | Stop reason: SDUPTHER

## 2020-01-02 NOTE — CARE COORDINATION
Ambulatory Care Coordination ED Follow up Call       Reason for ED Visit:  Left foot pain   Care Management Risk Score: CMRS 10    Patient was called to follow up with most recent ER visit. There was no answer. A message was left on voicemail to have patient call back regarding ER visit. Office number given [phone].         FU appts/Provider:    Future Appointments   Date Time Provider Mayo Roxane   1/20/2020  2:00 PM Danny Mcintyre MD [address]   2/13/2020  1:15 PM Edwina Mehta MD [address] Maintenance Due   Topic Date Due    DTaP/Tdap/Td vaccine (1 - Tdap) 11/17/1956    Shingles Vaccine (1 of 2) 11/17/1994    DEXA (modify frequency per FRAX score)  11/17/2009    Annual Wellness Visit (AWV)  05/29/2019    Flu vaccine (1) 09/01/2019

## 2020-01-02 NOTE — ED PROVIDER NOTES
11/23/15    duramorph 1mg celestone 9mg    NERVE BLOCK  03/28/2018    CAUDAL EPIDURAL STEROID BLOCK  DECADRON 10 MG     NERVE BLOCK  05/23/2018    caudal # decadron 7mg    NERVE BLOCK  08/28/2018    natalia transforminal # 1, decadron 10mg gadoteridol, LONG NEEDLES    OR PERQ VERT AGMNTJ CAVITY CRTJ UNI/BI CANNULJ LMBR N/A 10/29/2018    KYPHOPLASTY L1 performed by Phil Reyez MD at [address] Right 5/14/15    total hip replacement     CURRENT MEDICATIONS       Discharge Medication List as of 1/1/2020  9:57 PM      CONTINUE these medications which have NOT CHANGED    Details   oxyCODONE (ROXICODONE) 5 MG immediate release tablet Take 1 tablet by mouth every 8 hours as needed for Pain for up to 30 days. , Disp-90 tablet, R-0Normal      DULoxetine (CYMBALTA) 30 MG extended release capsule TAKE 1 CAPSULE TWICE A DAY, Disp-180 capsule, R-4Normal      gabapentin (NEURONTIN) 100 MG capsule take 1 capsule by mouth once daily, Disp-90 capsule, R-0Normal      ofloxacin (OCUFLOX) 0.3 % solution Place into both eyes daily as needed Historical Med      docusate sodium (COLACE) 100 MG capsule Take 100 mg by mouth daily as needed for ConstipationHistorical Med      Misc. Devices (WALKER) MISC DAILY Starting Mon 11/12/2018, Disp-1 each, R-0, NormalUpright walker with wheels and seat      metoprolol tartrate (LOPRESSOR) 50 MG tablet Take 50 mg by mouth 2 times dailyHistorical Med      ibuprofen (ADVIL;MOTRIN) 600 MG tablet Take 600 mg by mouth every 6 hours as needed for PainHistorical Med      albuterol sulfate  (90 Base) MCG/ACT inhaler Inhale 2 puffs into the lungs 4 times daily as needed for Wheezing Historical Med      aspirin 81 MG tablet Take 81 mg by mouth daily           ALLERGIES     is allergic to rosuvastatin calcium; statins; bactrim [sulfamethoxazole-trimethoprim]; caffeine; dye [iodides]; food; ioxaglate; dicloxacillin; and pcn [penicillins].   FAMILY HISTORY She indicated that her mother is . She indicated that her father is . She indicated that the status of her sister is unknown. She indicated that her brother is . SOCIAL HISTORY       Social History     Tobacco Use    Smoking status: Former Smoker     Packs/day: 1.00     Years: 18.00     Pack years: 18.00     Last attempt to quit: 1993     Years since quittin.6    Smokeless tobacco: Never Used   Substance Use Topics    Alcohol use: No    Drug use: No     PHYSICAL EXAM     INITIAL VITALS: BP (!) 173/77   Pulse 55   Temp 97.8 °F (36.6 °C) (Oral)   Resp 18   Ht 5' 4\" (1.626 m)   Wt 268 lb (121.6 kg)   SpO2 94%   BMI 46.00 kg/m²    Physical Exam    MEDICAL DECISION MAKING:            Labs Reviewed - No data to display  EMERGENCY DEPARTMENTCOURSE:         Vitals:    Vitals:    20   BP: (!) 173/77    Pulse:  55   Resp:  18   Temp:  97.8 °F (36.6 °C)   TempSrc:  Oral   SpO2:  94%   Weight:  268 lb (121.6 kg)   Height:  5' 4\" (1.626 m)       The patient was given the following medications while in the emergency department:  Orders Placed This Encounter   Medications    HYDROcodone-acetaminophen (NORCO) 5-325 MG per tablet 1 tablet    enoxaparin (LOVENOX) injection 120 mg     CONSULTS:  None    FINAL IMPRESSION      1.  Left foot pain          DISPOSITION/PLAN   DISPOSITION Decision To Discharge 2020 09:56:57 PM      PATIENT REFERRED TO:  Margarita Bolton, [address] [place] [address]  [address]  [phone]      for your foot    Zonia Olsen MD  [facility], [address] [facility]  [address]  [phone]      for your hip    DISCHARGE MEDICATIONS:  Discharge Medication List as of 2020  9:57 Ed Mendoza MD  Attending Emergency Physician                   Linda Hernandez MD  [phone]

## 2020-01-03 ENCOUNTER — TELEPHONE (OUTPATIENT)
Dept: INTERNAL MEDICINE CLINIC | Age: 75
End: 2020-01-03

## 2020-02-03 ENCOUNTER — HOSPITAL ENCOUNTER (OUTPATIENT)
Dept: PAIN MANAGEMENT | Age: 75
Discharge: HOME | End: 2020-02-03
Payer: MEDICARE

## 2020-02-03 VITALS
HEIGHT: 64 IN | DIASTOLIC BLOOD PRESSURE: 73 MMHG | WEIGHT: 268 LBS | HEART RATE: 54 BPM | OXYGEN SATURATION: 96 % | TEMPERATURE: 98.4 F | BODY MASS INDEX: 45.75 KG/M2 | RESPIRATION RATE: 16 BRPM | SYSTOLIC BLOOD PRESSURE: 141 MMHG

## 2020-02-03 PROCEDURE — 99213 OFFICE O/P EST LOW 20 MIN: CPT

## 2020-02-03 PROCEDURE — 99214 OFFICE O/P EST MOD 30 MIN: CPT | Performed by: PAIN MEDICINE

## 2020-02-03 RX ORDER — OXYCODONE HYDROCHLORIDE 5 MG/1
5 TABLET ORAL EVERY 8 HOURS PRN
Qty: 90 TABLET | Refills: 0 | Status: SHIPPED | OUTPATIENT
Start: 2020-02-09 | End: 2020-03-03 | Stop reason: SDUPTHER

## 2020-02-03 ASSESSMENT — ACTIVITIES OF DAILY LIVING (ADL): EFFECT OF PAIN ON DAILY ACTIVITIES: UNABLE TO WALK MORE THAN ONE BLOCK WITHOUT SEVERE PAIN

## 2020-02-03 ASSESSMENT — PAIN DESCRIPTION - ONSET: ONSET: ON-GOING

## 2020-02-03 ASSESSMENT — ENCOUNTER SYMPTOMS
CONSTIPATION: 1
EYES NEGATIVE: 1
ALLERGIC/IMMUNOLOGIC NEGATIVE: 1
BACK PAIN: 1
SHORTNESS OF BREATH: 1

## 2020-02-03 ASSESSMENT — PAIN SCALES - GENERAL: PAINLEVEL_OUTOF10: 7

## 2020-02-03 ASSESSMENT — PAIN DESCRIPTION - PROGRESSION: CLINICAL_PROGRESSION: GRADUALLY WORSENING

## 2020-02-03 ASSESSMENT — PAIN DESCRIPTION - LOCATION: LOCATION: BACK;HIP;LEG

## 2020-02-03 ASSESSMENT — PAIN - FUNCTIONAL ASSESSMENT: PAIN_FUNCTIONAL_ASSESSMENT: PREVENTS OR INTERFERES SOME ACTIVE ACTIVITIES AND ADLS

## 2020-02-03 ASSESSMENT — PAIN DESCRIPTION - PAIN TYPE: TYPE: CHRONIC PAIN

## 2020-02-03 ASSESSMENT — PAIN DESCRIPTION - ORIENTATION: ORIENTATION: RIGHT;LOWER;LEFT

## 2020-02-03 ASSESSMENT — PAIN DESCRIPTION - FREQUENCY: FREQUENCY: CONTINUOUS

## 2020-02-03 NOTE — PROGRESS NOTES
[facility] Pain Management  Patient Pain Assessment  RECHECK - Dr. Jr Reyes    Primary Care Physician: María Andrew MD    Chief complaint:   Chief Complaint   Patient presents with    Lower Back Pain   . HISTORY OF PRESENT ILLNESS:  Kathie Barrow is 75 y.o. female with    Patient returned to the pain clinic with a chief complaint of pain involving the low back. She reports she had a fall in December and since then her back pain has gotten worse. She had a lumbar epidural steroid injection on 6/10/2019 which helped her pain to a certain extent. This patient is known to have osteoporosis and had undergone kyphoplasty of L1 on 10/20/2018. She had back surgery in the past.  Back Pain   This is a chronic problem. The current episode started more than 1 year ago (Worse since the fall in December 21st ,2019). The problem occurs constantly. The problem has been gradually worsening since onset. The pain is present in the sacro-iliac, thoracic spine and lumbar spine. The quality of the pain is described as aching (Sharp, jabbing, nagging). Radiates to: Towards both hips worse on the right. The pain is at a severity of 7/10 (5-9). The pain is severe. The pain is worse during the day. The symptoms are aggravated by bending, position, standing and twisting (Walking lifting her ADLs ,with any movement). Pertinent negatives include no abdominal pain, bladder incontinence, bowel incontinence, chest pain, dysuria, numbness, tingling or weakness. Risk factors include lack of exercise, obesity and history of osteoporosis. RX Monitoring 2/3/2020   Attestation -   Acute Pain Prescriptions Severe pain not adequately treated with lower dose. Periodic Controlled Substance Monitoring Possible medication side effects, risk of tolerance/dependence & alternative treatments discussed. ;No signs of potential drug abuse or diversion identified. ;Assessed functional status. ;Obtaining appropriate analgesic : 8/27/19  Was Serum/UDS as anticipated? yes  Brought pill bottles in :yes   Was Pill count appropriate? :yes   Are currently pregnant? no  Recent ER visits: Yes 12/31/19             Past Medical History      Diagnosis Date    Abnormality of rib determined by X-ray 1/28/2016    Acute cystitis without hematuria 6/16/2018    Acute exacerbation of chronic bronchitis (HCC)     Acute on chronic diastolic heart failure ([facility] [address] 1/28/2016    Adjustment disorder with mixed anxiety and depressed mood 6/26/2015    Mild     Anemia 3/5/2014    Back pain     Bronchiectasis with acute lower respiratory infection ([facility] [address] 8/16/2017    Bronchitis     Chronic bronchitis (HCC) 1/28/2016    Chronic cough 7/23/2018    Degenerative joint disease (DJD) of hip 5/14/2015    Dyslipidemia 3/5/2014    Encephalopathy acute 5/29/2016    Essential hypertension 1/28/2016    Fibromyalgia     GERD (gastroesophageal reflux disease)     History of total bilateral knee replacement 5/28/2016    Hx of blood clots     left leg and lung    Hyperlipidemia     Hypertension     Incontinence of urine     Irregular heartbeat     DR. Mary Saleh Medication monitoring encounter 6/13/2018    Morbid obesity with BMI of 45.0-49.9, adult ([facility] [address] 1/28/2016    Obstructive sleep apnea syndrome     Osteoarthritis     Primary osteoarthritis of left knee 5/27/2016    PVC (premature ventricular contraction) 1/28/2016    S/P right and left heart catheterization 10/2/2015    Sleep apnea     no machine    SOB (shortness of breath)     Spinal stenosis of lumbar region with neurogenic claudication 5/13/2013    Supplemental oxygen dependent 12/8/2017    Urine frequency     UTI (urinary tract infection)     hospitalized early july 2014 for kidney infection       Surgical History  Past Surgical History:   Procedure Laterality Date    BRONCHOSCOPY Left 8/16/2017    BRONCHOSCOPY ALVEOLAR LAVAGE LOWER LOBE performed by Benji Bernal MD at [address] [place] [address] 108 (90 Base) MCG/ACT inhaler Inhale 2 puffs into the lungs 4 times daily as needed for Wheezing       aspirin 81 MG tablet Take 81 mg by mouth daily      gabapentin (NEURONTIN) 100 MG capsule take 1 capsule by mouth once daily 90 capsule 0     No current facility-administered medications for this encounter. Allergies  Rosuvastatin calcium; Statins; Bactrim [sulfamethoxazole-trimethoprim]; Caffeine; Dye [iodides]; Food; Ioxaglate; Dicloxacillin; and Pcn [penicillins]    Family History  family history includes Heart Disease in her sister; High Blood Pressure in her mother; Stomach Cancer in her brother.     Social History  Social History     Socioeconomic History    Marital status:      Spouse name: None    Number of children: None    Years of education: None    Highest education level: None   Occupational History    None   Social Needs    Financial resource strain: None    Food insecurity:     Worry: None     Inability: None    Transportation needs:     Medical: None     Non-medical: None   Tobacco Use    Smoking status: Former Smoker     Packs/day: 1.00     Years: 18.00     Pack years: 18.00     Last attempt to quit: 1993     Years since quittin.7    Smokeless tobacco: Never Used   Substance and Sexual Activity    Alcohol use: No    Drug use: No    Sexual activity: Not Currently   Lifestyle    Physical activity:     Days per week: None     Minutes per session: None    Stress: None   Relationships    Social connections:     Talks on phone: None     Gets together: None     Attends [employer] service: None     Active member of club or organization: None     Attends meetings of clubs or organizations: None     Relationship status: None    Intimate partner violence:     Fear of current or ex partner: None     Emotionally abused: None     Physically abused: None     Forced sexual activity: None   Other Topics Concern    None   Social History Narrative    None      reports no Tenderness   The patient is experiencing tenderness in the lumbar and sacroiliac. Range of Motion   Back extension: Limited and painful. Back flexion: Limited and painful. Back lateral bend right: Limited and painful. Back lateral bend left: Limited and painful. Back rotation right: Limited and painful. Back rotation left: Limited and painful. Tests   Straight leg raise right: positive  Straight leg raise left: positive    Other   Sensation: normal  Gait: antalgic   Erythema: no back redness  Scars: present    Comments:  Point tenderness present over the lower thoracic upper lumbar spines. Nurses Notes and Vital Signs reviewed. DATA  Labs:  Benzodiazepines   Date Value Ref Range Status   06/29/2012 NEGATIVE NEG Final     Comment:           (Positive cutoff 200 ng/mL)                 Benzodiazepine Screen, Urine   Date Value Ref Range Status   09/11/2014 NEGATIVE NEG Final     Comment:           (Positive cutoff 100 ng/mL)                      Imaging:  Radiology Images and Reports reviewed where indicated and necessary  EXAMINATION:   MRI OF THE CERVICAL SPINE WITHOUT CONTRAST 12/17/2019 1:52 pm       TECHNIQUE:   Multiplanar multisequence MRI of the cervical spine was performed without the   administration of intravenous contrast.       COMPARISON:   CT on 09/28/2018.       HISTORY:   ORDERING SYSTEM PROVIDED HISTORY: Cervical radiculopathy   TECHNOLOGIST PROVIDED HISTORY:   Reason for Exam: patient states neck pain, bilteral hand numbness, right leg   pain   Acuity: Acute   Type of Exam: Initial       FINDINGS:   BONES/ALIGNMENT: There is grade 1 anterolisthesis of C1 on T1, and T2 on T3.    There is retrolisthesis of C4, and C6.       There are no areas of marrow edema to suggest an acute fracture.       SPINAL CORD: The visualized posterior fossa structures are unremarkable.  The   cervical spinal cord demonstrates normal signal intensity without evidence of   an expansile mass lesion.       SOFT TISSUES: The carotid arteries indent the posterior wall of the pharynx.       There is nonspecific prevertebral edema noted anterior to the C1-C3 vertebral   bodies measuring 2 mm in thickness.  This may be reactive.  No adjacent   fracture is identified.       There is a small lymph node noted deep to the left longus capitis muscle   measuring approximately 3 x 4 x 10 mm, presumably reactive.  No other   paraspinal masses are identified.       C2-C3: There is bilateral facet arthropathy.  The neural foramen are patent. Ligamentum flavum thickening is noted.  Minimal central spinal canal   narrowing.       C3-C4: There is a left paracentral disc protrusion-osteophyte complex   measuring 3 mm.  Mild central spinal canal narrowing is noted.  Asymmetric   left-sided facet arthropathy results in severe left foraminal narrowing. Mild right foraminal narrowing.       C4-C5: There is a 4 mm central disc-osteophyte complex contacting the ventral   margin of the cervical spinal cord.  Mild central spinal canal narrowing. Asymmetric left-sided uncovertebral spurring with severe left and moderate   right foraminal narrowing.       C5-C6: There is a left paracentral osteophyte complex indenting the anterior   thecal sac.  Ligamentum flavum thickening is noted.  Mild central spinal   canal narrowing.  Asymmetric left-sided uncovertebral spurring with moderate   bilateral foraminal narrowing.       C6-C7: There is a 2 mm broad-based disc-osteophyte complex indenting the   anterior thecal sac.  Ligamentum flavum thickening is noted.  Mild-moderate   central spinal canal stenosis.  Asymmetric left-sided uncovertebral spurring   with moderate bilateral foraminal narrowing.       C7-T1: No significant disc herniation or central spinal canal stenosis. Bilateral facet arthropathy.  Mild left foraminal narrowing.  No right   foraminal narrowing.           Impression   1.  Non-specific prevertebral edema extending from C1-C3.  This may be   reactive, an underlying infectious process could not be excluded.  No   underlying fracture. 2. Multilevel degenerative disc disease in the cervical spine mild central   spinal canal narrowing at C3-C4 through C5-C6.  Mild-moderate central spinal   canal narrowing is noted at C6-C7. 3. Multilevel foraminal narrowing, as above.  This is greatest on the left at   C3-C4, and left at C4-C5. X-ray of the hip 1/1/2020  1.  No acute displaced fracture in the pelvis or left hip.       2.  Suggestion of osteopenia.       3.  Focal osseous irregularity in the base of the 5th metatarsal of the left   foot could relate to degenerative changes or prior injury.  Acute fracture in   this location is difficult to exclude, correlate for focal tenderness.       4.  Degenerative osseous changes in the pelvis and left foot.  Severe left   hip degenerative changes.          Patient Active Problem List   Diagnosis    Spinal stenosis of lumbar region with neurogenic claudication    Dyslipidemia    Anemia    Fibromyalgia    Degenerative joint disease (DJD) of hip    Adjustment disorder with mixed anxiety and depressed mood    S/P right and left heart catheterization    SOB (shortness of breath)    Acute on chronic diastolic heart failure (HCC)    Essential hypertension    Morbid obesity with BMI of 45.0-49.9, adult ([facility])    PVC (premature ventricular contraction)    Abnormality of rib determined by X-ray    Chronic bronchitis ([facility])    Sleep apnea    History of total bilateral knee replacement    Encephalopathy acute    Obstructive sleep apnea syndrome    Primary osteoarthritis of left knee    Bronchiectasis with acute lower respiratory infection ([address]    Supplemental oxygen dependent    Back pain    Medication monitoring encounter    Acute cystitis without hematuria    Chronic cough    Acquired spondylolisthesis    Cervical spine ankylosis    Acute low back pain    Neck pain    Closed compression fracture of L1 lumbar vertebra    Cervical spinal stenosis    BMI 40.0-44.9, adult ([facility])    Chronic prescription opiate use    Age-related osteoporosis with current pathological fracture    S/P kyphoplasty    Morbid obesity ([facility] [address]    DDD (degenerative disc disease), lumbar    Lumbar radiculopathy    Depression        ASSESSMENT    Robert Reyes is a 75 y.o. female with    1. Osteopenia of lumbar spine    2. Closed fracture of lumbar vertebra, unspecified fracture morphology, unspecified lumbar vertebral level, initial encounter ([facility] [address]    3. Closed fracture of thoracic vertebra, unspecified fracture morphology, unspecified thoracic vertebral level, initial encounter ([facility] [address]    4. History of total bilateral knee replacement    5. Spinal stenosis of lumbar region with neurogenic claudication    6. Primary osteoarthritis of both hips    7. SOB (shortness of breath)    8. Morbid obesity with BMI of 45.0-49.9, adult ([address]    9. Age-related osteoporosis with current pathological fracture with delayed healing, subsequent encounter    10. DDD (degenerative disc disease), lumbar    11. Lumbar radiculopathy           PLAN    We will continue current pain medications  Current medications are being tolerated without any Adverse side effects. Orders Placed This Encounter   Medications    oxyCODONE (ROXICODONE) 5 MG immediate release tablet     Sig: Take 1 tablet by mouth every 8 hours as needed for Pain for up to 30 days. Dispense:  90 tablet     Refill:  0     Reduce doses taken as pain becomes manageable     Urine drug screens have been appropriate. No aberrant activity noted. Analgesia is achieved. Activities of daily living are possible because of medications. Safe use of medications explained to patient. Counselling/Preventive measures for pain  Control:    [x]  Spine strengthening exercises are discussed with patient in detail.      [x] Ill effects of being on chronic pain the history of osteopenia and previous compression fracture. Patient is advised to get the x-ray on the way home so we can review them and act accordingly. Orders Placed This Encounter   Procedures    XR LUMBAR SPINE (MIN 4 VIEWS)     Standing Status:   Future     Standing Expiration Date:   2/2/2021    XR THORACIC SPINE (2 VIEWS)     Standing Status:   Future     Standing Expiration Date:   2/3/2021       Decision Making Process : Patient's health history and referral records thoroughly reviewed before focused physical examination and discussion with patient. I have spent 25 Over 50% of today's visit is spent on examining the patient and counseling and coordinating the care. Level of complexity of date to be reviewed is Moderate. The chart date reviewed include the following: Imaging Reports. Summary of Care. Time spent reviewing with patient the below reports:   Medication safety, Treatment options. Level of diagnosis and management options of this case is multiple: involving the following management options: Interventions as needed, medication management as appropriate, future visits, activity modification, heat/ice as needed, Urine drug screen as required. [x]The patient's questions were answered to the best of my abilities. This note was created using voice recognition software. There may be inaccuracies of transcription  that are inadvertently overlooked prior to the signature. There is any questions about the transcription please contact me. Return in  4 weeks or earlier depending on the x-ray findings with physician / CNP  for further plan of treatment.          Electronically signed by John Jensen MD on 2/4/2020 at 4:47 AM

## 2020-02-04 ASSESSMENT — ENCOUNTER SYMPTOMS
VOMITING: 0
COUGH: 0
SORE THROAT: 0
EYE REDNESS: 0
NAUSEA: 0
BOWEL INCONTINENCE: 0
ABDOMINAL PAIN: 0
COLOR CHANGE: 0

## 2020-02-11 ENCOUNTER — HOSPITAL ENCOUNTER (OUTPATIENT)
Dept: GENERAL RADIOLOGY | Age: 75
Discharge: HOME | End: 2020-02-13
Payer: MEDICARE

## 2020-02-11 ENCOUNTER — HOSPITAL ENCOUNTER (OUTPATIENT)
Age: 75
Discharge: HOME | End: 2020-02-13
Payer: MEDICARE

## 2020-02-11 PROCEDURE — 72072 X-RAY EXAM THORAC SPINE 3VWS: CPT

## 2020-02-11 PROCEDURE — 72110 X-RAY EXAM L-2 SPINE 4/>VWS: CPT

## 2020-02-11 NOTE — RESULT ENCOUNTER NOTE
FINDINGS:  Thoracic spine: Demineralized skeleton and multifocal pulmonary opacities  somewhat limits the evaluation of the thoracic spine. Within this limit,  multilevel degenerative changes of thoracic spine. Intact pedicles on  frontal view. Vertebral body heights appear maintained.     Lumbar spine: L1 compression fracture with vertebral body augmentation  cement. No new compression fracture. Minimal anterolisthesis of L4 on L5  measuring 0.6 cm. Disc degeneration with endplate osteophyte formation. Facet arthropathy diffusely.     Possible abdominal aortic aneurysm on radiography measuring 3.0 cm AP. Correlation with prior lumbar spine CT.     IMPRESSION:  Multilevel disc degeneration throughout the lumbar spine without evidence of  acute abnormality. If concern for acute fracture exists, CT versus MRI  should be considered.     Probable abdominal aortic aneurysm.   Nonemergent CT or ultrasound correlation  should be considered.     Electronically signed by Anand Carranza MD on 2/11/2020 at 3:54 PM

## 2020-02-12 ENCOUNTER — TELEPHONE (OUTPATIENT)
Dept: INTERNAL MEDICINE CLINIC | Age: 75
End: 2020-02-12

## 2020-02-12 NOTE — TELEPHONE ENCOUNTER
----- Message from Kate Nix MD sent at 2/12/2020  4:01 PM EST -----  no fractures seen  FINDINGS:  Thoracic spine: Demineralized skeleton and multifocal pulmonary opacities  somewhat limits the evaluation of the thoracic spine. Within this limit,  multilevel degenerative changes of thoracic spine. Intact pedicles on  frontal view. Vertebral body heights appear maintained.     Lumbar spine: L1 compression fracture with vertebral body augmentation  cement. No new compression fracture. Minimal anterolisthesis of L4 on L5  measuring 0.6 cm. Disc degeneration with endplate osteophyte formation. Facet arthropathy diffusely.     Possible abdominal aortic aneurysm on radiography measuring 3.0 cm AP. Correlation with prior lumbar spine CT.     IMPRESSION:  Multilevel disc degeneration throughout the lumbar spine without evidence of  acute abnormality. If concern for acute fracture exists, CT versus MRI  should be considered.     Probable abdominal aortic aneurysm.   Nonemergent CT or ultrasound correlation  should be considered.   Electronically signed by Kate Nix MD on 2/12/2020 at 4:01 PM

## 2020-02-18 ENCOUNTER — HOSPITAL ENCOUNTER (OUTPATIENT)
Dept: VASCULAR LAB | Age: 75
Discharge: HOME | End: 2020-02-18
Payer: MEDICARE

## 2020-02-18 PROCEDURE — 93978 VASCULAR STUDY: CPT

## 2020-02-19 ENCOUNTER — TELEPHONE (OUTPATIENT)
Dept: INTERNAL MEDICINE CLINIC | Age: 75
End: 2020-02-19

## 2020-02-19 NOTE — TELEPHONE ENCOUNTER
----- Message from KARIN Nguyen CNP sent at 2/19/2020  9:47 AM EST -----  Regarding: AAA  Dr. Wolf Vides,   Pls see the AAA results. I don't know why the result was sent to me.  Thanks.   ----- Message -----  From: Chiki Brenner Incoming Cardio Results From [facility]  Sent: 2/18/2020   3:45 PM EST  To: KARIN Nguyen CNP

## 2020-02-19 NOTE — TELEPHONE ENCOUNTER
Referral Ordered  Called to inform pt of test results their was no answer or voicemail to leave message

## 2020-02-20 ENCOUNTER — TELEPHONE (OUTPATIENT)
Dept: VASCULAR SURGERY | Age: 75
End: 2020-02-20

## 2020-02-20 NOTE — TELEPHONE ENCOUNTER
ULVM to pts phone. Called emergency contact pts daughter Devin Mckeon and LVM for pt to schedule consult.

## 2020-03-03 ENCOUNTER — HOSPITAL ENCOUNTER (OUTPATIENT)
Dept: PAIN MANAGEMENT | Age: 75
Discharge: HOME | End: 2020-03-03
Payer: MEDICARE

## 2020-03-03 VITALS
RESPIRATION RATE: 16 BRPM | HEIGHT: 64 IN | DIASTOLIC BLOOD PRESSURE: 60 MMHG | WEIGHT: 268 LBS | TEMPERATURE: 99 F | HEART RATE: 52 BPM | BODY MASS INDEX: 45.75 KG/M2 | OXYGEN SATURATION: 98 % | SYSTOLIC BLOOD PRESSURE: 135 MMHG

## 2020-03-03 PROCEDURE — 99213 OFFICE O/P EST LOW 20 MIN: CPT

## 2020-03-03 PROCEDURE — 99214 OFFICE O/P EST MOD 30 MIN: CPT | Performed by: PAIN MEDICINE

## 2020-03-03 RX ORDER — OXYCODONE HYDROCHLORIDE 5 MG/1
5 TABLET ORAL EVERY 8 HOURS PRN
Qty: 90 TABLET | Refills: 0 | Status: SHIPPED | OUTPATIENT
Start: 2020-03-10 | End: 2020-04-04 | Stop reason: SDUPTHER

## 2020-03-03 ASSESSMENT — PAIN DESCRIPTION - ONSET: ONSET: ON-GOING

## 2020-03-03 ASSESSMENT — PAIN DESCRIPTION - FREQUENCY: FREQUENCY: CONTINUOUS

## 2020-03-03 ASSESSMENT — PAIN DESCRIPTION - DESCRIPTORS: DESCRIPTORS: CONSTANT;ACHING;NAGGING

## 2020-03-03 ASSESSMENT — ENCOUNTER SYMPTOMS
EYES NEGATIVE: 1
RESPIRATORY NEGATIVE: 1
ALLERGIC/IMMUNOLOGIC NEGATIVE: 1
BACK PAIN: 1

## 2020-03-03 ASSESSMENT — PAIN SCALES - GENERAL: PAINLEVEL_OUTOF10: 7

## 2020-03-03 ASSESSMENT — PAIN DESCRIPTION - LOCATION: LOCATION: BACK;HIP;LEG

## 2020-03-03 ASSESSMENT — PAIN DESCRIPTION - PROGRESSION: CLINICAL_PROGRESSION: GRADUALLY WORSENING

## 2020-03-03 ASSESSMENT — PAIN DESCRIPTION - DIRECTION: RADIATING_TOWARDS: BILAT HIPS

## 2020-03-03 ASSESSMENT — PAIN DESCRIPTION - PAIN TYPE: TYPE: CHRONIC PAIN

## 2020-03-03 ASSESSMENT — PAIN - FUNCTIONAL ASSESSMENT: PAIN_FUNCTIONAL_ASSESSMENT: PREVENTS OR INTERFERES SOME ACTIVE ACTIVITIES AND ADLS

## 2020-03-03 ASSESSMENT — PAIN DESCRIPTION - ORIENTATION: ORIENTATION: RIGHT;LEFT;MID;LOWER;UPPER

## 2020-03-03 NOTE — PROGRESS NOTES
activities of daily living ar possible due to medications and would like to continue them. RX Monitoring 3/4/2020   Attestation -   Acute Pain Prescriptions Severe pain not adequately treated with lower dose. Periodic Controlled Substance Monitoring Possible medication side effects, risk of tolerance/dependence & alternative treatments discussed. ;No signs of potential drug abuse or diversion identified. ;Assessed functional status. ;Obtaining appropriate analgesic effect of treatment. Chronic Pain > 50 MEDD Obtained or confirmed \"Consent for Opioid Use\" on file. ;Re-evaluated the status of the patient's underlying condition causing pain. Chronic Pain > 80 MEDD Obtained or confirmed \"Medication Contract\" on file.        Current Pain Assessment  Pain Assessment  Pain Assessment: 0-10  Pain Level: 7  Patient's Stated Pain Goal: (to decrease pain with increased activity)  Pain Type: Chronic pain  Pain Location: Back, Hip, Leg  Pain Orientation: Right, Left, Mid, Lower, Upper  Pain Radiating Towards: bilat hips  Pain Descriptors: Constant, Aching, Nagging  Pain Frequency: Continuous  Pain Onset: On-going  Clinical Progression: Gradually worsening  Effect of Pain on Daily Activities: painful with all movements  Functional Pain Assessment: Prevents or interferes some active activities and ADLs  Non-Pharmaceutical Pain Intervention(s): Repositioned, Rest                    ADVERSE MEDICATION EFFECTS:   Constipation: no  Bowel Regimen: Yes  Diet: common adult  Appetite:  ok  Sedation:  no  Urinary Retention: no    FOCUSED PAINSCALE:  Highest : 9  Lowest :5  Average: Range-7  When and What  was your last procedure:    6/10/19 LESI   Was your procedure effective:  Yes, 60%    ACTIVITY/SOCIAL/EMOTIONAL:  Sleep Pattern: 2 hours per night. nightime awakenings  For frequent urination   Energy Level:  Tired/Fatigued  Currently attending Physical Therapy:  No  Home Exercises: rarely too painful  Mobility: wheelchair bound  Do you use assistive devices? Yes, wheelchair  Have you fallen in the last 30 days?:  No, last fall 12/21/19  Currently seeing a Psychiatrist or Psychologist:  No  Emotional Issues: normal   Mood: appropriate     ABERRANT BEHAVIORS SINCE LAST VISIT:  Have you ever been treated in another Pain Clinic yes, [facility]  Refills for prescriptions appropriate: yes  Lost rx/pills:no  Taking more medication than prescribed:  yes  Are you receiving PAIN medications from  other doctors: no  Last Urine/Serum Drug Screen : 8/27/19  Was Serum/UDS as anticipated? yes  Brought pill bottles in :yes   Was Pill count appropriate? :yes   Are currently pregnant?not applicable  Recent ER visits: No             Past Medical History      Diagnosis Date    Abnormality of rib determined by X-ray 1/28/2016    Acute cystitis without hematuria 6/16/2018    Acute exacerbation of chronic bronchitis (HCC)     Acute on chronic diastolic heart failure ([facility] [address] 1/28/2016    Adjustment disorder with mixed anxiety and depressed mood 6/26/2015    Mild     Anemia 3/5/2014    Back pain     Bronchiectasis with acute lower respiratory infection ([facility] [address] 8/16/2017    Bronchitis     Chronic bronchitis (HCC) 1/28/2016    Chronic cough 7/23/2018    Degenerative joint disease (DJD) of hip 5/14/2015    Dyslipidemia 3/5/2014    Encephalopathy acute 5/29/2016    Essential hypertension 1/28/2016    Fibromyalgia     GERD (gastroesophageal reflux disease)     History of total bilateral knee replacement 5/28/2016    Hx of blood clots     left leg and lung    Hyperlipidemia     Hypertension     Incontinence of urine     Irregular heartbeat     DR. Samy Garza Medication monitoring encounter 6/13/2018    Morbid obesity with BMI of 45.0-49.9, adult ([facility] [address] 1/28/2016    Obstructive sleep apnea syndrome     Osteoarthritis     Primary osteoarthritis of left knee 5/27/2016    PVC (premature ventricular contraction) 1/28/2016    S/P right and left heart 90 tablet 0    DULoxetine (CYMBALTA) 30 MG extended release capsule TAKE 1 CAPSULE TWICE A  capsule 4    gabapentin (NEURONTIN) 100 MG capsule take 1 capsule by mouth once daily 90 capsule 0    ofloxacin (OCUFLOX) 0.3 % solution Place into both eyes daily as needed       docusate sodium (COLACE) 100 MG capsule Take 100 mg by mouth daily as needed for Constipation      Misc. Devices (WALKER) MISC 1 each by Does not apply route daily Upright walker with wheels and seat 1 each 0    metoprolol tartrate (LOPRESSOR) 50 MG tablet Take 50 mg by mouth 2 times daily      albuterol sulfate  (90 Base) MCG/ACT inhaler Inhale 2 puffs into the lungs 4 times daily as needed for Wheezing       aspirin 81 MG tablet Take 81 mg by mouth daily       No current facility-administered medications for this encounter. Allergies  Rosuvastatin calcium; Statins; Bactrim [sulfamethoxazole-trimethoprim]; Caffeine; Dye [iodides]; Food; Ioxaglate; Dicloxacillin; and Pcn [penicillins]    Family History  family history includes Heart Disease in her sister; High Blood Pressure in her mother; Stomach Cancer in her brother.     Social History  Social History     Socioeconomic History    Marital status:      Spouse name: None    Number of children: None    Years of education: None    Highest education level: None   Occupational History    None   Social Needs    Financial resource strain: None    Food insecurity:     Worry: None     Inability: None    Transportation needs:     Medical: None     Non-medical: None   Tobacco Use    Smoking status: Former Smoker     Packs/day: 1.00     Years: 18.00     Pack years: 18.00     Last attempt to quit: 1993     Years since quittin.8    Smokeless tobacco: Never Used   Substance and Sexual Activity    Alcohol use: No    Drug use: No    Sexual activity: Not Currently   Lifestyle    Physical activity:     Days per week: None     Minutes per session: None    Stress: None   Relationships    Social connections:     Talks on phone: None     Gets together: None     Attends Restoration service: None     Active member of club or organization: None     Attends meetings of clubs or organizations: None     Relationship status: None    Intimate partner violence:     Fear of current or ex partner: None     Emotionally abused: None     Physically abused: None     Forced sexual activity: None   Other Topics Concern    None   Social History Narrative    None      reports no history of drug use. REVIEW OF SYSTEMS:  Review of Systems   Constitutional: Negative. Negative for appetite change, fever and unexpected weight change. HENT: Negative. Negative for congestion and ear pain. Eyes: Negative. Negative for photophobia, redness and visual disturbance. Respiratory: Negative. Negative for cough and shortness of breath. Cardiovascular: Negative. Negative for chest pain. Gastrointestinal: Negative for abdominal pain, bowel incontinence, constipation, nausea and rectal pain. AAA   Endocrine: Negative. Negative for heat intolerance and polyuria. Genitourinary: Positive for frequency. Negative for bladder incontinence and dysuria. Musculoskeletal: Positive for back pain. Negative for arthralgias. Skin: Negative. Allergic/Immunologic: Negative. Neurological: Negative. Negative for tingling, weakness and numbness. Hematological: Negative. Psychiatric/Behavioral: Negative. Negative for sleep disturbance and suicidal ideas. The patient is not nervous/anxious. GENERAL PHYSICAL EXAM:  Vitals: /60   Pulse 52   Temp 99 °F (37.2 °C) (Oral)   Resp 16   Ht 5' 4\" (1.626 m)   Wt 268 lb (121.6 kg)   SpO2 98%   BMI 46.00 kg/m² , Body mass index is 46 kg/m². Physical Exam  Constitutional:       Appearance: Normal appearance. She is well-developed. She is obese.       Comments: Morbidly obese   HENT:      Head: Normocephalic and atraumatic. Nose: Nose normal.      Mouth/Throat:      Mouth: Mucous membranes are moist.   Eyes:      Extraocular Movements: Extraocular movements intact. Conjunctiva/sclera: Conjunctivae normal.      Pupils: Pupils are equal, round, and reactive to light. Neck:      Musculoskeletal: Normal range of motion and neck supple. Thyroid: No thyromegaly. Trachea: No tracheal deviation. Cardiovascular:      Rate and Rhythm: Normal rate and regular rhythm. Pulses: Normal pulses. Pulmonary:      Effort: Pulmonary effort is normal. No respiratory distress. Abdominal:      General: There is no distension. Musculoskeletal:         General: Tenderness present. Right ankle: Tenderness. Left ankle: Tenderness. Lumbar back: She exhibits tenderness and pain. Right lower leg: She exhibits tenderness. Edema present. Left lower leg: She exhibits tenderness. Edema present. Skin:     General: Skin is warm and dry. Findings: No erythema. Neurological:      Mental Status: She is alert and oriented to person, place, and time. Cranial Nerves: Cranial nerves are intact. No cranial nerve deficit. Sensory: Sensation is intact. No sensory deficit. Motor: Motor function is intact. No weakness, tremor, atrophy or abnormal muscle tone. Coordination: Coordination normal.      Gait: Gait abnormal.      Deep Tendon Reflexes: Reflexes normal.      Reflex Scores:       Patellar reflexes are 1+ on the right side and 1+ on the left side. Achilles reflexes are 0 on the right side and 0 on the left side. Comments: Uses wheelchair to help with ambulation   Psychiatric:         Mood and Affect: Mood is depressed. Mood is not anxious. Speech: Speech normal.         Behavior: Behavior normal.         Thought Content:  Thought content normal.         Judgment: Judgment normal.      Right Ankle Exam     Muscle Strength   The patient has normal right ankle strength. Left Ankle Exam     Muscle Strength   The patient has normal left ankle strength. Right Knee Exam     Muscle Strength   The patient has normal right knee strength. Left Knee Exam     Muscle Strength   The patient has normal left knee strength. Right Hip Exam     Muscle Strength   The patient has normal right hip strength. Left Hip Exam     Muscle Strength   The patient has normal left hip strength. Back Exam     Tenderness   The patient is experiencing tenderness in the lumbar and sacroiliac. Range of Motion   Back extension: Limited and painful. Back flexion: Limited and painful. Back lateral bend right: Limited and painful. Back lateral bend left: Limited and painful. Back rotation right: Limited and painful. Back rotation left: Limited and painful. Tests   Straight leg raise right: positive  Straight leg raise left: positive    Other   Sensation: normal  Gait: antalgic   Erythema: no back redness  Scars: present    Comments:  Tenderness present along the spine from thoracic to lumbar areas. Nurses Notes and Vital Signs reviewed.     DATA  Labs:  Benzodiazepines   Date Value Ref Range Status   06/29/2012 NEGATIVE NEG Final     Comment:           (Positive cutoff 200 ng/mL)                 Benzodiazepine Screen, Urine   Date Value Ref Range Status   09/11/2014 NEGATIVE NEG Final     Comment:           (Positive cutoff 100 ng/mL)                      Imaging:  Radiology Images and Reports reviewed where indicated and necessary    Patient Active Problem List   Diagnosis    Spinal stenosis of lumbar region with neurogenic claudication    Dyslipidemia    Anemia    Fibromyalgia    Degenerative joint disease (DJD) of hip    Adjustment disorder with mixed anxiety and depressed mood    S/P right and left heart catheterization    SOB (shortness of breath)    Acute on chronic diastolic heart failure ([address]    Essential hypertension    Morbid obesity with BMI of 45.0-49.9, adult ([facility])    PVC (premature ventricular contraction)    Abnormality of rib determined by X-ray    Chronic bronchitis ([facility])    Sleep apnea    History of total bilateral knee replacement    Encephalopathy acute    Obstructive sleep apnea syndrome    Primary osteoarthritis of left knee    Bronchiectasis with acute lower respiratory infection ([facility])    Supplemental oxygen dependent    Back pain    Medication monitoring encounter    Acute cystitis without hematuria    Chronic cough    Acquired spondylolisthesis    Cervical spine ankylosis    Acute low back pain    Neck pain    Closed compression fracture of L1 lumbar vertebra    Cervical spinal stenosis    BMI 40.0-44.9, adult ([facility])    Chronic prescription opiate use    Age-related osteoporosis with current pathological fracture    S/P kyphoplasty    Morbid obesity ([facility] [address]    DDD (degenerative disc disease), lumbar    Lumbar radiculopathy    Depression    Osteopenia of lumbar spine        ASSESSMENT    Javier Laura is a 75 y.o. female with    1. Lumbar radiculopathy    2. DDD (degenerative disc disease), lumbar    3. History of total bilateral knee replacement    4. Spinal stenosis of lumbar region with neurogenic claudication    5. Morbid obesity with BMI of 45.0-49.9, adult ([facility] [address]    6. Acquired spondylolisthesis    7. Medication monitoring encounter           PLAN    We will continue current pain medications  Current medications are being tolerated without any Adverse side effects. Orders Placed This Encounter   Medications    oxyCODONE (ROXICODONE) 5 MG immediate release tablet     Sig: Take 1 tablet by mouth every 8 hours as needed for Pain for up to 30 days. Dispense:  90 tablet     Refill:  0     Reduce doses taken as pain becomes manageable     Urine drug screens have been appropriate. No aberrant activity noted. Analgesia is achieved.    Activities of daily living are possible because of medications. Safe use of medications explained to patient. Counselling/Preventive measures for pain  Control:    [x]  Spine strengthening exercises are discussed with patient in detail. [x] Ill effects of being on chronic pain medications such as sleep disturbances, hormonal changes, withdrawal symptoms,  chronic opioid dependence and tolerance were discussed with patient. I had asked the patient to minimize medication use and utilize pain medications only for uncontrolled rest pain or pain with exertional activities. I advised patient not to self escalate pain medications without consulting with us. At each of patient's future visits we will try to taper pain medications, while adjusting the adjunct medications, and re-evaluating for Physical Therapy to improve spinal and joint strength. We will continue to have discussions to decrease pain medications as tolerated. I also discussed with the patient regarding the dangers of combining narcotic pain medication with tranquilizers, alcohol or illegal drugs or taking the medication any other than prescribed. The dangers including the respiratory depression and death. Patient was told to tell  to all  physicians regarding the medications he is getting from pain clinic. Patient is warned not to take any unprescribed medications over-the-counter medications that can depress breathing . Patient is advised to talk to the pharmacist or physicians if planning to take any over-the-counter medications before  takeing them. Patient is strongly advised to avoid tranquilizers or  Relaxants for any medications that can depress breathing or recreational drugs. Patient is also advised to tell us if there is any changes in his medications from other physicians. We discussed the same at today's visit and have not been to implement it, as the patient's pain is not under control with current medications.    The following treatment plan was developed after discussion with patient:    We discussed Lumbar Epidural steroid Injections x 1  at L4 - L5. Patient tried and failed NSAIDS,Home exercises, Physical Therapy, Chiropractic manipulations without relief. Patient exhibited signs of radiculopathy with positive straight leg raising test on bilaterally    Patient has not had prior Lumbar Surgery. We will see the patient in 2 weeks after the procedures and re-evaluate symptoms. Orders Placed This Encounter   Procedures    Lumbar Epidural Steroid Injection/Caudal     Standing Status:   Future     Standing Expiration Date:   3/3/2021   [facility] [place]     Standing Status:   Future     Standing Expiration Date:   3/3/2021    Saline lock IV     Standing Status:   Future     Standing Expiration Date:   9/3/2021       Decision Making Process : Patient's health history and referral records thoroughly reviewed before focused physical examination and discussion with patient. I have spent 25 Over 50% of today's visit is spent on examining the patient and counseling and coordinating the care. Level of complexity of date to be reviewed is Moderate. The chart date reviewed include the following: Imaging Reports. Summary of Care. Time spent reviewing with patient the below reports:   Medication safety, Treatment options. Level of diagnosis and management options of this case is multiple: involving the following management options: Interventions as needed, medication management as appropriate, future visits, activity modification, heat/ice as needed, Urine drug screen as required. [x]The patient's questions were answered to the best of my abilities. This note was created using voice recognition software. There may be inaccuracies of transcription  that are inadvertently overlooked prior to the signature. There is any questions about the transcription please contact me.     Electronically signed by Era Corona MD on 3/4/2020 at 6:31 AM

## 2020-03-04 ASSESSMENT — ENCOUNTER SYMPTOMS
RECTAL PAIN: 0
NAUSEA: 0
COUGH: 0
EYE REDNESS: 0
BOWEL INCONTINENCE: 0
CONSTIPATION: 0
SHORTNESS OF BREATH: 0
PHOTOPHOBIA: 0
ABDOMINAL PAIN: 0

## 2020-03-09 ENCOUNTER — INITIAL CONSULT (OUTPATIENT)
Dept: VASCULAR SURGERY | Age: 75
End: 2020-03-09
Payer: MEDICARE

## 2020-03-09 VITALS
HEART RATE: 63 BPM | BODY MASS INDEX: 45.75 KG/M2 | WEIGHT: 268 LBS | OXYGEN SATURATION: 96 % | HEIGHT: 64 IN | DIASTOLIC BLOOD PRESSURE: 68 MMHG | SYSTOLIC BLOOD PRESSURE: 138 MMHG

## 2020-03-09 PROCEDURE — 99204 OFFICE O/P NEW MOD 45 MIN: CPT | Performed by: SURGERY

## 2020-03-09 ASSESSMENT — ENCOUNTER SYMPTOMS
ABDOMINAL PAIN: 0
SHORTNESS OF BREATH: 0
COLOR CHANGE: 0
CHEST TIGHTNESS: 0
BACK PAIN: 1
ALLERGIC/IMMUNOLOGIC NEGATIVE: 1

## 2020-03-09 NOTE — PROGRESS NOTES
Division of Vascular Surgery        New Consult      Physician Requesting Consult:  Dr. Rahul Cota    Reason for Consult:   AAA    Chief Complaint:      AAA    History of Present Illness:      Leigh Sims is a 75 y.o. woman who presents with incidental finding of abdominal aortic aneurysm on evaluation of spine x-rays and underwent aortic duplex for confirmation. She has chronic back issues and has undergone multiple surgeries. She uses a walker to get around now and has been doing so for the past 4 years. She denies any severe abdominal or back pain. Says her sister had an aneurysm but unclear where it was located. Medical History:     Past Medical History:   Diagnosis Date    Abnormality of rib determined by X-ray 1/28/2016    Acute cystitis without hematuria 6/16/2018    Acute exacerbation of chronic bronchitis (HCC)     Acute on chronic diastolic heart failure ([address] 1/28/2016    Adjustment disorder with mixed anxiety and depressed mood 6/26/2015    Mild     Anemia 3/5/2014    Back pain     Bronchiectasis with acute lower respiratory infection ([address] 8/16/2017    Bronchitis     Chronic bronchitis (HCC) 1/28/2016    Chronic cough 7/23/2018    Degenerative joint disease (DJD) of hip 5/14/2015    Dyslipidemia 3/5/2014    Encephalopathy acute 5/29/2016    Essential hypertension 1/28/2016    Fibromyalgia     GERD (gastroesophageal reflux disease)     History of total bilateral knee replacement 5/28/2016    Hx of blood clots     left leg and lung    Hyperlipidemia     Hypertension     Incontinence of urine     Irregular heartbeat     DR. Saloni Troncoso Medication monitoring encounter 6/13/2018    Morbid obesity with BMI of 45.0-49.9, adult ([facility] [address] 1/28/2016    Obstructive sleep apnea syndrome     Osteoarthritis     Primary osteoarthritis of left knee 5/27/2016    PVC (premature ventricular contraction) 1/28/2016    S/P right and left heart catheterization 10/2/2015    Sleep apnea     no machine    SOB (shortness of breath)     Spinal stenosis of lumbar region with neurogenic claudication 5/13/2013    Supplemental oxygen dependent 12/8/2017    Urine frequency     UTI (urinary tract infection)     hospitalized early july 2014 for kidney infection       Surgical History:     Past Surgical History:   Procedure Laterality Date    BRONCHOSCOPY Left 8/16/2017    BRONCHOSCOPY ALVEOLAR LAVAGE LOWER LOBE performed by Russell Barker MD at [address]  x 3    no stents    COLONOSCOPY  4 28 14    polyps biopsies     FOOT SURGERY Left     calcium deposit removal from top of foot    HYSTERECTOMY      JOINT REPLACEMENT Bilateral 5/27/2016    bilat total knees    NERVE BLOCK  5/13/2013    caudal celestone 6mg    NERVE BLOCK  5/28/13    Caudal #2, Celestone 9mg    NERVE BLOCK  2/14/14    rt hip inj celestone 9 mg    NERVE BLOCK  07/14/2014    caudal# 3- celestone 9 mg    NERVE BLOCK  7-21-14    caudal epidural #2, decadron 7mg, fentanyl 25mcg    NERVE BLOCK  10/2/14    duramorph 1.5  decadron 5mg    NERVE BLOCK  11/9/2015    caudal# 1 celestone 9mg    NERVE BLOCK  11/16/15    caudal #2  decadron 10mg    NERVE BLOCK  11/23/15    duramorph 1mg celestone 9mg    NERVE BLOCK  03/28/2018    CAUDAL EPIDURAL STEROID BLOCK  DECADRON 10 MG     NERVE BLOCK  05/23/2018    caudal # decadron 7mg    NERVE BLOCK  08/28/2018    natalia transforminal # 1, decadron 10mg gadoteridol, LONG NEEDLES    [place] PERQ VERT AGMNTJ CAVITY CRTJ UNI/BI CANNULJ LMBR N/A 10/29/2018    KYPHOPLASTY L1 performed by Andrea Burns MD at [address] Right 5/14/15    total hip replacement       Family History:     Family History   Problem Relation Age of Onset    Stomach Cancer Brother         stomach    High Blood Pressure Mother     Heart Disease Sister         irregular heartbeat       Allergies:       Rosuvastatin calcium; Statins;  Bactrim

## 2020-03-16 ENCOUNTER — HOSPITAL ENCOUNTER (OUTPATIENT)
Dept: PAIN MANAGEMENT | Age: 75
Discharge: HOME | End: 2020-03-16
Payer: MEDICARE

## 2020-03-16 ENCOUNTER — HOSPITAL ENCOUNTER (OUTPATIENT)
Dept: GENERAL RADIOLOGY | Age: 75
Discharge: HOME | End: 2020-03-18
Payer: MEDICARE

## 2020-03-16 VITALS
RESPIRATION RATE: 14 BRPM | HEIGHT: 64 IN | BODY MASS INDEX: 45.75 KG/M2 | SYSTOLIC BLOOD PRESSURE: 143 MMHG | TEMPERATURE: 98.6 F | DIASTOLIC BLOOD PRESSURE: 58 MMHG | WEIGHT: 268 LBS | HEART RATE: 61 BPM | OXYGEN SATURATION: 96 %

## 2020-03-16 PROCEDURE — 62323 NJX INTERLAMINAR LMBR/SAC: CPT | Performed by: PAIN MEDICINE

## 2020-03-16 PROCEDURE — 3209999900 FLUORO FOR SURGICAL PROCEDURES

## 2020-03-16 PROCEDURE — 62323 NJX INTERLAMINAR LMBR/SAC: CPT

## 2020-03-16 PROCEDURE — 6360000002 HC RX W HCPCS: Performed by: PAIN MEDICINE

## 2020-03-16 RX ORDER — TRIAMCINOLONE ACETONIDE 40 MG/ML
INJECTION, SUSPENSION INTRA-ARTICULAR; INTRAMUSCULAR
Status: COMPLETED | OUTPATIENT
Start: 2020-03-16 | End: 2020-03-16

## 2020-03-16 RX ADMIN — TRIAMCINOLONE ACETONIDE 80 MG: 40 INJECTION, SUSPENSION INTRA-ARTICULAR; INTRAMUSCULAR at 10:46

## 2020-03-16 ASSESSMENT — PAIN DESCRIPTION - FREQUENCY: FREQUENCY: CONTINUOUS

## 2020-03-16 ASSESSMENT — PAIN - FUNCTIONAL ASSESSMENT
PAIN_FUNCTIONAL_ASSESSMENT: PREVENTS OR INTERFERES SOME ACTIVE ACTIVITIES AND ADLS
PAIN_FUNCTIONAL_ASSESSMENT: 0-10

## 2020-03-16 ASSESSMENT — PAIN DESCRIPTION - ORIENTATION: ORIENTATION: LOWER;RIGHT;LEFT

## 2020-03-16 ASSESSMENT — PAIN DESCRIPTION - PROGRESSION: CLINICAL_PROGRESSION: GRADUALLY WORSENING

## 2020-03-16 ASSESSMENT — PAIN DESCRIPTION - ONSET: ONSET: ON-GOING

## 2020-03-16 ASSESSMENT — PAIN DESCRIPTION - LOCATION: LOCATION: BACK

## 2020-03-16 ASSESSMENT — PAIN SCALES - GENERAL: PAINLEVEL_OUTOF10: 5

## 2020-03-16 ASSESSMENT — PAIN DESCRIPTION - PAIN TYPE: TYPE: CHRONIC PAIN

## 2020-03-16 ASSESSMENT — PAIN DESCRIPTION - DESCRIPTORS: DESCRIPTORS: ACHING;NAGGING

## 2020-03-16 NOTE — PROGRESS NOTES
Discharge criteria met. Patient alert and oriented x3  Post procedure dressing dry and intact. Sensory and motor function intact as per pre-procedure. Instructions and follow up reviewed with pt at patient at discharge. Patient discharged by wheelchair @ 11:05am with daughter. Daughter to take pt home and care for her today. No change in gait from prior to procedure.

## 2020-03-16 NOTE — PROCEDURES
Pre-Procedure Note    Patient Name: Khalif Jones   YOB: 1945  Room/Bed: Room/bed info not found  Medical Record Number: [id number]  Date: 3/16/2020       Indication:    1. Lumbar radiculopathy    2. DDD (degenerative disc disease), lumbar        Consent: On file. Vital Signs:   Vitals:    03/16/20 1059   BP: (!) 143/58   Pulse:    Resp:    Temp:    SpO2:        Past Medical History:   has a past medical history of Abnormality of rib determined by X-ray, Acute cystitis without hematuria, Acute exacerbation of chronic bronchitis (HCC), Acute on chronic diastolic heart failure (HCC), Adjustment disorder with mixed anxiety and depressed mood, Anemia, Back pain, Bronchiectasis with acute lower respiratory infection ([facility] Bronchitis, Chronic bronchitis (HCC), Chronic cough, Degenerative joint disease (DJD) of hip, Dyslipidemia, Encephalopathy acute, Essential hypertension, Fibromyalgia, GERD (gastroesophageal reflux disease), History of total bilateral knee replacement, Hx of blood clots, Hyperlipidemia, Hypertension, Incontinence of urine, Irregular heartbeat, Medication monitoring encounter, Morbid obesity with BMI of 45.0-49.9, adult ([facility] [address] Obstructive sleep apnea syndrome, Osteoarthritis, Primary osteoarthritis of left knee, PVC (premature ventricular contraction), S/P right and left heart catheterization, Sleep apnea, SOB (shortness of breath), Spinal stenosis of lumbar region with neurogenic claudication, Supplemental oxygen dependent, Urine frequency, and UTI (urinary tract infection). Past Surgical History:   has a past surgical history that includes Hysterectomy; Nerve Block (5/13/2013); Nerve Block (5/28/13); sinus surgery; Foot surgery (Left); Nerve Block (2/14/14); Colonoscopy (4 28 14); Nerve Block (07/14/2014); Nerve Block (7-21-14); Nerve Block (10/2/14); Nerve Block (11/9/2015); Nerve Block (11/16/15); Nerve Block (11/23/15);  Total hip arthroplasty (Right, 5/14/15); bronchoscopy (Left, 8/16/2017); Cardiac catheterization (x 3); Nerve Block (03/28/2018); Nerve Block (05/23/2018); Nerve Block (08/28/2018); pr perq vert agmntj cavity crtj uni/bi cannulj lmbr (N/A, 10/29/2018); and joint replacement (Bilateral, 5/27/2016). Pre-Sedation Documentation and Exam:   Vital signs have been reviewed (see flow sheet for vitals). Mallampati Airway Assessment:  normal    ASA Classification:  Class 3 - A patient with severe systemic disease that limits activity but is not incapacitating    Sedation/ Anesthesia Plan:   intravenous sedation   as needed. Medications Planned:   midazolam (Versed) / Fentanyl  Intravenously  as needed. Patient is an appropriate candidate for plan of sedation: yes  Patient's History and Physical examination was reviewed and there is no change. Electronically signed by Axel Anderson MD on 3/16/2020 at 11:03 AM        Preoperative Diagnosis:    1. Lumbar radiculopathy    2. DDD (degenerative disc disease), lumbar        Postoperative Diagnosis:    1. Lumbar radiculopathy    2. DDD (degenerative disc disease), lumbar        Procedure Performed:  Lumbar epidural steroid injection under fluoroscopy guidance without IV sedation. Indication for the Procedure: The patient failed conservative management  for pain in the low back radiating to lower extremities. The patient has undergone lumbar epidural steroid injections and the last procedure gave 50% relief. As the patient is not responding to conservative management and it is interfering with activities of daily living we decided to proceed with lumbar epidural steroid injection.  The procedure and risks were discussed with the patient and an informed consent was obtained  Current Pain Assessment  Pain Assessment  Pain Assessment: 0-10  Pain Level: 0  Patient's Stated Pain Goal: No pain  Pain Type: Chronic pain  Pain Location: Back  Pain Orientation: Lower, Right, Left  Pain Radiating Towards: bilateral hips worse left , legs at times  Pain Descriptors: Aching, Nagging  Pain Frequency: Continuous  Pain Onset: On-going  Clinical Progression: Gradually improving  Effect of Pain on Daily Activities: pain increase with activity  Functional Pain Assessment: Prevents or interferes some active activities and ADLs  Non-Pharmaceutical Pain Intervention(s): Rest, Repositioned  POSS Score (Patient Ctrl Analgesia): 1     Procedure:     Patient's vital signs including BP, EKG and SaO2 were monitored by the RN and they remained stable during the procedure. A meaningful communication was kept up with the patient throughout  the procedure. The patient is placed in prone position. Skin over the back was prepped and draped in sterile manner. Then using fluoroscopy the L5, S1 interspace was observed and the skin and deep tissues in the left paramedian area were infiltrated with 4 ml of 1% lidocaine. The #17G 6 inch Tuohy needle was inserted through the skin wheal and the epidural space was identified using loss of resistance technique with normal saline. This was confirmed with AP and lateral views using fluoroscopy . Contrast was not used due to allergy /   Then after negative aspiration a total of 80 mg of triamcinolone with 8 ml of normal saline was injected into the epidural space. The needle is removed and a Band-Aid was placed over the needle insertion site. Patient's vital signs remained stable and the patient tolerated the procedure well. The patient was discharged home in stable condition and will be followed in the pain clinic in the next few weeks for further planning.     Electronically signed by John Jensen MD on 3/16/2020 at 11:03 AM

## 2020-03-18 RX ORDER — GABAPENTIN 100 MG/1
CAPSULE ORAL
Qty: 90 CAPSULE | Refills: 0 | Status: ON HOLD | OUTPATIENT
Start: 2020-03-18 | End: 2020-10-02

## 2020-03-18 NOTE — TELEPHONE ENCOUNTER
Medication Requested: Gabapentin    Last visit: 12/06/19  Next visit: Visit date not found  Last refill: 08/23/19    Med contract on file:  [] yes   [x] no    Last urine drug screen: 08/27/19  Consistent with medication(s):    [] yes   [] no    Last OARRS ran: 03/04/20    Quantity of medication remaining:     Who will be picking rx up:    Pharmacy if escribed: [facility]

## 2020-04-03 ENCOUNTER — TELEPHONE (OUTPATIENT)
Dept: INTERNAL MEDICINE CLINIC | Age: 75
End: 2020-04-03

## 2020-04-04 RX ORDER — OXYCODONE HYDROCHLORIDE 5 MG/1
5 TABLET ORAL EVERY 8 HOURS PRN
Qty: 90 TABLET | Refills: 0 | Status: SHIPPED | OUTPATIENT
Start: 2020-04-09 | End: 2020-05-04 | Stop reason: SDUPTHER

## 2020-04-20 ENCOUNTER — TELEPHONE (OUTPATIENT)
Dept: ADMINISTRATIVE | Age: 75
End: 2020-04-20

## 2020-04-20 RX ORDER — ALBUTEROL SULFATE 90 UG/1
2 AEROSOL, METERED RESPIRATORY (INHALATION) 4 TIMES DAILY PRN
Qty: 1 INHALER | Refills: 2 | Status: SHIPPED | OUTPATIENT
Start: 2020-04-20 | End: 2022-08-10 | Stop reason: SDUPTHER

## 2020-04-20 NOTE — TELEPHONE ENCOUNTER
Patient states that after requesting a refill from her pharmacy, pharmacy called and told her they could not get the refill because she is no longer a patient of Dr. Mariluz Ashford. Patient is requesting a call back in reference to this. She requests that a message be left if she does not answer.

## 2020-05-01 ENCOUNTER — VIRTUAL VISIT (OUTPATIENT)
Dept: INTERNAL MEDICINE CLINIC | Age: 75
End: 2020-05-01
Payer: MEDICARE

## 2020-05-01 PROCEDURE — 99442 PR PHYS/QHP TELEPHONE EVALUATION 11-20 MIN: CPT | Performed by: PHYSICIAN ASSISTANT

## 2020-05-04 ENCOUNTER — HOSPITAL ENCOUNTER (OUTPATIENT)
Dept: PAIN MANAGEMENT | Age: 75
Discharge: HOME | End: 2020-05-04
Payer: MEDICARE

## 2020-05-04 PROCEDURE — 99443 PR PHYS/QHP TELEPHONE EVALUATION 21-30 MIN: CPT | Performed by: PAIN MEDICINE

## 2020-05-04 PROCEDURE — 99213 OFFICE O/P EST LOW 20 MIN: CPT

## 2020-05-04 RX ORDER — OXYCODONE HYDROCHLORIDE 5 MG/1
5 TABLET ORAL EVERY 8 HOURS PRN
Qty: 90 TABLET | Refills: 0 | Status: CANCELLED | OUTPATIENT
Start: 2020-05-09 | End: 2020-06-08

## 2020-05-04 RX ORDER — OXYCODONE HYDROCHLORIDE 5 MG/1
5 TABLET ORAL EVERY 8 HOURS PRN
Qty: 90 TABLET | Refills: 0 | Status: SHIPPED | OUTPATIENT
Start: 2020-05-09 | End: 2020-06-04 | Stop reason: SDUPTHER

## 2020-05-04 ASSESSMENT — ENCOUNTER SYMPTOMS
BACK PAIN: 1
ABDOMINAL PAIN: 0
BOWEL INCONTINENCE: 0

## 2020-05-04 NOTE — PROGRESS NOTES
with pain: Prevents or limits ADLs  Mood changes,none  Patient currently unemployed. Physical therapy did not help the pain. Are you under psychological counseling at present: No  Goals for treatment include:  Decrease in pain  Enjoy daily and recreational activities, return to previous status. Patient relates current medications are helping the pain. Patient reports taking pain medications as prescribed, denies obtaining medications from different sources and denies use of illegal drugs. Patient denies side effects from medications like nausea, vomiting, constipation or drowsiness. Patient reports current activities of daily living ar possible due to medications and would like to continue them. ADVERSE MEDICATION EFFECTS:   Nausea and vomiting: no   Constipation: no-Undercontrol-: not applicable  Dizziness/drowsy/sleepy--no  Urinary Retention: no    ACTIVITY/SOCIAL/EMOTIONAL:  Sleep Pattern: 7 hours per night.  generally restful sleep  Home Exercises: daily Stretching and strengthening  Activity:unchanged  Emotional Issues: normal.   Currently seeing a Psychiatrist or Psychologist:  No      ABERRANT BEHAVIORS SINCE LAST VISIT  Lost rx/pills:------------------------------------------ no  Taking  medication as prescribed: ----------- yes  Urine Drug Screen ---------------------------------  yes  Recent ER visits: -------------------------------------No  Pill count is appropriate: ---------------------------not applicable   Refills for prescriptions appropriate:---------- yes      Past Medical History:   Diagnosis Date    Abnormality of rib determined by X-ray 1/28/2016    Acute cystitis without hematuria 6/16/2018    Acute exacerbation of chronic bronchitis ([facility]     Acute on chronic diastolic heart failure ([facility] 1/28/2016    Adjustment disorder with mixed anxiety and depressed mood 6/26/2015    Mild     Anemia 3/5/2014    Back pain     Bronchiectasis with acute lower respiratory infection ([facility] 8/16/2017    Bronchitis     Chronic bronchitis (HCC) 1/28/2016    Chronic cough 7/23/2018    Degenerative joint disease (DJD) of hip 5/14/2015    Dyslipidemia 3/5/2014    Encephalopathy acute 5/29/2016    Essential hypertension 1/28/2016    Fibromyalgia     GERD (gastroesophageal reflux disease)     History of total bilateral knee replacement 5/28/2016    Hx of blood clots     left leg and lung    Hyperlipidemia     Hypertension     Incontinence of urine     Irregular heartbeat     DR. Stacie López Medication monitoring encounter 6/13/2018    Morbid obesity with BMI of 45.0-49.9, adult ([facility] 1/28/2016    Obstructive sleep apnea syndrome     Osteoarthritis     Primary osteoarthritis of left knee 5/27/2016    PVC (premature ventricular contraction) 1/28/2016    S/P right and left heart catheterization 10/2/2015    Sleep apnea     no machine    SOB (shortness of breath)     Spinal stenosis of lumbar region with neurogenic claudication 5/13/2013    Supplemental oxygen dependent 12/8/2017    Urine frequency     UTI (urinary tract infection)     hospitalized early july 2014 for kidney infection       Past Surgical History:   Procedure Laterality Date    BRONCHOSCOPY Left 8/16/2017    BRONCHOSCOPY ALVEOLAR LAVAGE LOWER LOBE performed by Monse Roberto MD at [address] [facility] [place]  x 3    no stents    COLONOSCOPY  4 28 14    polyps biopsies     FOOT SURGERY Left     calcium deposit removal from top of foot    HYSTERECTOMY      JOINT REPLACEMENT Bilateral 5/27/2016    bilat total knees    NERVE BLOCK  5/13/2013    caudal celestone 6mg    NERVE BLOCK  5/28/13    Caudal #2, Celestone 9mg    NERVE BLOCK  2/14/14    rt hip inj celestone 9 mg    NERVE BLOCK  07/14/2014    caudal# 3- celestone 9 mg    NERVE BLOCK  7-21-14    caudal epidural #2, decadron 7mg, fentanyl 25mcg    NERVE BLOCK  10/2/14    duramorph 1.5  decadron 5mg    NERVE BLOCK  11/9/2015    caudal# 1 celestone 9mg  NERVE BLOCK  11/16/15    caudal #2  decadron 10mg    NERVE BLOCK  11/23/15    duramorph 1mg celestone 9mg    NERVE BLOCK  2018    CAUDAL EPIDURAL STEROID BLOCK  DECADRON 10 MG     NERVE BLOCK  2018    caudal # decadron 7mg    NERVE BLOCK  2018    natalia transforminal # 1, decadron 10mg gadoteridol, LONG NEEDLES    [place] PERQ VERT AGMNTJ CAVITY CRTJ UNI/BI CANNULJ LMBR N/A 10/29/2018    KYPHOPLASTY L1 performed by Jason Galaviz MD at [address] Right 5/14/15    total hip replacement       Family History   Problem Relation Age of Onset    Stomach Cancer Brother         stomach    High Blood Pressure Mother     Heart Disease Sister         irregular heartbeat       Social History     Socioeconomic History    Marital status:      Spouse name: None    Number of children: None    Years of education: None    Highest education level: None   Occupational History    None   Social Needs    Financial resource strain: None    Food insecurity     Worry: None     Inability: None    Transportation needs     Medical: None     Non-medical: None   Tobacco Use    Smoking status: Former Smoker     Packs/day: 1.00     Years: 18.00     Pack years: 18.00     Last attempt to quit: 1993     Years since quittin.0    Smokeless tobacco: Never Used   Substance and Sexual Activity    Alcohol use: No    Drug use: No    Sexual activity: Not Currently   Lifestyle    Physical activity     Days per week: None     Minutes per session: None    Stress: None   Relationships    Social connections     Talks on phone: None     Gets together: None     Attends Cheondoism service: None     Active member of club or organization: None     Attends meetings of clubs or organizations: None     Relationship status: None    Intimate partner violence     Fear of current or ex partner: None     Emotionally abused: None     Physically abused: None     Forced sexual activity: None   Other Topics Concern    None   Social History Narrative    None       Allergies   Allergen Reactions    Rosuvastatin Calcium Anaphylaxis     Hair fell out    Statins Anaphylaxis     Hair fell out    Bactrim [Sulfamethoxazole-Trimethoprim] Diarrhea    Caffeine Other (See Comments)     pain  pain    Dye [Iodides] Other (See Comments)     Iv dye= blood clots in arm    Food      Tomatoes, pain    Ioxaglate Other (See Comments)     Iv dye= blood clots in arm    Dicloxacillin Rash    Pcn [Penicillins] Rash       Current Outpatient Medications on File Prior to Encounter   Medication Sig Dispense Refill    albuterol sulfate  (90 Base) MCG/ACT inhaler Inhale 2 puffs into the lungs 4 times daily as needed for Wheezing 1 Inhaler 2    gabapentin (NEURONTIN) 100 MG capsule take 1 capsule by mouth once daily 90 capsule 0    DULoxetine (CYMBALTA) 30 MG extended release capsule TAKE 1 CAPSULE TWICE A  capsule 4    ofloxacin (OCUFLOX) 0.3 % solution Place into both eyes daily as needed       docusate sodium (COLACE) 100 MG capsule Take 100 mg by mouth daily as needed for Constipation      Misc. Devices (WALKER) MISC 1 each by Does not apply route daily Upright walker with wheels and seat 1 each 0    metoprolol tartrate (LOPRESSOR) 50 MG tablet Take 50 mg by mouth 2 times daily      aspirin 81 MG tablet Take 81 mg by mouth daily       No current facility-administered medications on file prior to encounter. Review of Systems   Constitutional: Negative for activity change, appetite change, fatigue and fever. HENT: Negative for congestion and ear pain. Eyes: Negative for photophobia, redness and visual disturbance. Respiratory: Negative for cough and shortness of breath. History of asthma   Cardiovascular: Negative for chest pain and palpitations. Gastrointestinal: Negative for abdominal pain, bowel incontinence, constipation, nausea and vomiting. sleep disturbances, hormonal changes, withdrawal symptoms,  chronic opioid dependence and tolerance were discussed with patient. I had asked the patient to minimize medication use and utilize pain medications only for uncontrolled rest pain or pain with exertional activities. I advised patient not to self escalate pain medications without consulting with us. At each of patient's future visits we will try to taper pain medications, while adjusting the adjunct medications, and re-evaluating for Physical Therapy to improve spinal and joint strength. We will continue to have discussions to decrease pain medications as tolerated. I also discussed with the patient regarding the dangers of combining narcotic pain medication with tranquilizers, alcohol or illegal drugs or taking the medication any other than prescribed. The dangers including the respiratory depression and death. Patient was told to tell  to all  physicians regarding the medications he is getting from pain clinic. Patient is warned not to take any unprescribed medications over-the-counter medications that can depress breathing . Patient is advised to talk to the pharmacist or physicians if planning to take any over-the-counter medications before  takeing them. Patient is strongly advised to avoid tranquilizers or  Relaxants for any medications that can depress breathing or recreational drugs. Patient is also advised to tell us if there is any changes in his medications from other physicians. We discussed the same at today's visit and have not been to implement it, as the patient's pain is not under control with current medications. Decision Making Process : Patient's health history and referral records thoroughly reviewed before focused physical examination and discussion with patient. I have spent 25 mins. Over 50% of today's visit is spent on examining the patient and counseling and coordinating the care.      Level of complexity of date to be Initiated Episode with an Established Patient who has not had a related appointment within my department in the past 7 days or scheduled within the next 24 hours.     Electronically signed by Priya Vasquez MD on 5/6/2020 at 4:09 AM

## 2020-05-06 ASSESSMENT — ENCOUNTER SYMPTOMS
ALLERGIC/IMMUNOLOGIC NEGATIVE: 1
EYE REDNESS: 0
PHOTOPHOBIA: 0
COUGH: 0
NAUSEA: 0
VOMITING: 0
CONSTIPATION: 0
SHORTNESS OF BREATH: 0

## 2020-06-04 ENCOUNTER — HOSPITAL ENCOUNTER (OUTPATIENT)
Dept: PAIN MANAGEMENT | Age: 75
Discharge: HOME | End: 2020-06-04
Payer: MEDICARE

## 2020-06-04 PROCEDURE — 99213 OFFICE O/P EST LOW 20 MIN: CPT

## 2020-06-04 PROCEDURE — 99442 PR PHYS/QHP TELEPHONE EVALUATION 11-20 MIN: CPT | Performed by: NURSE PRACTITIONER

## 2020-06-04 RX ORDER — LEVOCETIRIZINE DIHYDROCHLORIDE 5 MG/1
TABLET, FILM COATED ORAL
COMMUNITY
Start: 2020-05-15 | End: 2020-08-24 | Stop reason: SDUPTHER

## 2020-06-04 RX ORDER — IBUPROFEN 200 MG
1 CAPSULE ORAL DAILY
Status: ON HOLD | COMMUNITY
End: 2021-09-23

## 2020-06-04 RX ORDER — FLUTICASONE PROPIONATE 50 MCG
SPRAY, SUSPENSION (ML) NASAL
COMMUNITY
Start: 2020-05-15 | End: 2021-09-23

## 2020-06-04 RX ORDER — OXYCODONE HYDROCHLORIDE 5 MG/1
5 TABLET ORAL EVERY 8 HOURS PRN
Qty: 90 TABLET | Refills: 0 | Status: SHIPPED | OUTPATIENT
Start: 2020-06-09 | End: 2020-07-06 | Stop reason: SDUPTHER

## 2020-06-04 ASSESSMENT — ENCOUNTER SYMPTOMS
CONSTIPATION: 1
RESPIRATORY NEGATIVE: 1
BACK PAIN: 1
EYES NEGATIVE: 1

## 2020-06-04 NOTE — PROGRESS NOTES
Irregular heartbeat     DR. Amy Niño Medication monitoring encounter 6/13/2018    Morbid obesity with BMI of 45.0-49.9, adult ([address] 1/28/2016    Obstructive sleep apnea syndrome     Osteoarthritis     Primary osteoarthritis of left knee 5/27/2016    PVC (premature ventricular contraction) 1/28/2016    S/P right and left heart catheterization 10/2/2015    Sleep apnea     no machine    SOB (shortness of breath)     Spinal stenosis of lumbar region with neurogenic claudication 5/13/2013    Supplemental oxygen dependent 12/8/2017    Urine frequency     UTI (urinary tract infection)     hospitalized early july 2014 for kidney infection       Past Surgical History:   Procedure Laterality Date    BRONCHOSCOPY Left 8/16/2017    BRONCHOSCOPY ALVEOLAR LAVAGE LOWER LOBE performed by Jin Headley MD at [address] [place] [address]  x 3    no stents    COLONOSCOPY  4 28 14    polyps biopsies     FOOT SURGERY Left     calcium deposit removal from top of foot    HYSTERECTOMY      JOINT REPLACEMENT Bilateral 5/27/2016    bilat total knees    NERVE BLOCK  5/13/2013    caudal celestone 6mg    NERVE BLOCK  5/28/13    Caudal #2, Celestone 9mg    NERVE BLOCK  2/14/14    rt hip inj celestone 9 mg    NERVE BLOCK  07/14/2014    caudal# 3- celestone 9 mg    NERVE BLOCK  7-21-14    caudal epidural #2, decadron 7mg, fentanyl 25mcg    NERVE BLOCK  10/2/14    duramorph 1.5  decadron 5mg    NERVE BLOCK  11/9/2015    caudal# 1 celestone 9mg    NERVE BLOCK  11/16/15    caudal #2  decadron 10mg    NERVE BLOCK  11/23/15    duramorph 1mg celestone 9mg    NERVE BLOCK  03/28/2018    CAUDAL EPIDURAL STEROID BLOCK  DECADRON 10 MG     NERVE BLOCK  05/23/2018    caudal # decadron 7mg    NERVE BLOCK  08/28/2018    natalia transforminal # 1, decadron 10mg gadoteridol, LONG NEEDLES    [place] PERQ VERT AGMNTJ CAVITY CRTJ UNI/BI CANNULJ LMBR N/A 10/29/2018    KYPHOPLASTY L1 performed by Matt Turner MD at [address] SINUS SURGERY      TOTAL HIP ARTHROPLASTY Right 5/14/15    total hip replacement       Allergies   Allergen Reactions    Rosuvastatin Calcium Anaphylaxis     Hair fell out    Statins Anaphylaxis     Hair fell out    Bactrim [Sulfamethoxazole-Trimethoprim] Diarrhea    Caffeine Other (See Comments)     pain  pain    Dye [Iodides] Other (See Comments)     Iv dye= blood clots in arm    Food      Tomatoes, pain    Ioxaglate Other (See Comments)     Iv dye= blood clots in arm    Dicloxacillin Rash    Pcn [Penicillins] Rash         Current Outpatient Medications:     fluticasone (FLONASE) 50 MCG/ACT nasal spray, instill 2 sprays into each nostril once daily, Disp: , Rfl:     levocetirizine (XYZAL) 5 MG tablet, take 1 tablet by mouth once daily AT NIGHT, Disp: , Rfl:     oxyCODONE (ROXICODONE) 5 MG immediate release tablet, Take 1 tablet by mouth every 8 hours as needed for Pain for up to 30 days. , Disp: 90 tablet, Rfl: 0    gabapentin (NEURONTIN) 100 MG capsule, take 1 capsule by mouth once daily, Disp: 90 capsule, Rfl: 0    DULoxetine (CYMBALTA) 30 MG extended release capsule, TAKE 1 CAPSULE TWICE A DAY, Disp: 180 capsule, Rfl: 4    metoprolol tartrate (LOPRESSOR) 50 MG tablet, Take 50 mg by mouth 2 times daily, Disp: , Rfl:     aspirin 81 MG tablet, Take 81 mg by mouth daily, Disp: , Rfl:     calcium carbonate (OYSTER SHELL CALCIUM 500 MG) 1250 (500 Ca) MG tablet, Take 1 tablet by mouth daily With magnesium, zinc, Disp: , Rfl:     Potassium 99 MG TABS, Take by mouth, Disp: , Rfl:     albuterol sulfate  (90 Base) MCG/ACT inhaler, Inhale 2 puffs into the lungs 4 times daily as needed for Wheezing, Disp: 1 Inhaler, Rfl: 2    ofloxacin (OCUFLOX) 0.3 % solution, Place into both eyes daily as needed , Disp: , Rfl:     docusate sodium (COLACE) 100 MG capsule, Take 100 mg by mouth daily as needed for Constipation, Disp: , Rfl:     Misc.  Devices (WALKER) MISC, 1 each by Does not apply route daily

## 2020-06-18 ENCOUNTER — HOSPITAL ENCOUNTER (EMERGENCY)
Age: 75
Discharge: HOME | End: 2020-06-18
Attending: EMERGENCY MEDICINE
Payer: MEDICARE

## 2020-06-18 VITALS
DIASTOLIC BLOOD PRESSURE: 68 MMHG | TEMPERATURE: 98.6 F | OXYGEN SATURATION: 95 % | RESPIRATION RATE: 14 BRPM | SYSTOLIC BLOOD PRESSURE: 124 MMHG | HEART RATE: 77 BPM

## 2020-06-18 PROCEDURE — 90715 TDAP VACCINE 7 YRS/> IM: CPT | Performed by: PHYSICIAN ASSISTANT

## 2020-06-18 PROCEDURE — 99282 EMERGENCY DEPT VISIT SF MDM: CPT

## 2020-06-18 PROCEDURE — 90471 IMMUNIZATION ADMIN: CPT | Performed by: PHYSICIAN ASSISTANT

## 2020-06-18 PROCEDURE — 12001 RPR S/N/AX/GEN/TRNK 2.5CM/<: CPT

## 2020-06-18 PROCEDURE — 6360000002 HC RX W HCPCS: Performed by: PHYSICIAN ASSISTANT

## 2020-06-18 RX ADMIN — TETANUS TOXOID, REDUCED DIPHTHERIA TOXOID AND ACELLULAR PERTUSSIS VACCINE, ADSORBED 0.5 ML: 5; 2.5; 8; 8; 2.5 SUSPENSION INTRAMUSCULAR at 13:42

## 2020-06-18 ASSESSMENT — PAIN SCALES - GENERAL: PAINLEVEL_OUTOF10: 5

## 2020-06-18 NOTE — ED PROVIDER NOTES
ago. She has a 18.00 pack-year smoking history. She has never used smokeless tobacco. She reports that she does not drink alcohol or use drugs. lives at home with others     PHYSICAL EXAM    (up to 7 for level 4, 8 or more for level 5)     ED Triage Vitals [06/18/20 1312]   BP Temp Temp Source Pulse Resp SpO2 Height Weight   124/68 98.6 °F (37 °C) Oral 77 14 95 % -- --       Physical Exam   Nursing note and vitals reviewed. Constitutional: well-developed, well-nourished, nontoxic, well appearing, not distressed  HEENT:  normocephalic atraumatic, external ears normal appearance, no nasal deformity, no neck masses or edema, patient protecting airway, no stridor, phonating well  Eyes: pupils equal, sclera non-icteric, no discharge  Cardiovascular: no JVD  Respiratory: non-labored breathing, effort normal, no accessory muscle use visulized, no audible wheezing  Gastrointestinal: Abdomen not distended  Musculoskeletal: Examination of left hand reveals FROM. 5/5 strength. Good  strength. Finger to thumb opposition intact. Distal sensation intact. 2/2 radial pulse. Brisk cap refill. Skin: 1cm laceration over 1st web space of left hand. Wound appears to be sealed. No visible foreign bodies. No bleeding. Neuro: alert and oriented times 3, GCS 15, normal coordination, no dysarthria or aphasia  Psych: normal mood and affect, cooperative, normal thought content              DIAGNOSTIC RESULTS         RADIOLOGY:   All plain film, CT, MRI, and formal ultrasound images (except ED bedside ultrasound) are read by the radiologist, see reports below, unless otherwise noted in MDM or here. No results found. LABS:  Labs Reviewed - No data to display    All other labs were within normal range or not returned as of this dictation.     EMERGENCY DEPARTMENT COURSE and DIFFERENTIAL DIAGNOSIS/MDM:   Vitals:    Vitals:    06/18/20 1312   BP: 124/68   Pulse: 77   Resp: 14   Temp: 98.6 °F (37 °C)   TempSrc: Oral

## 2020-06-19 ENCOUNTER — CARE COORDINATION (OUTPATIENT)
Dept: CARE COORDINATION | Age: 75
End: 2020-06-19

## 2020-07-06 ENCOUNTER — HOSPITAL ENCOUNTER (OUTPATIENT)
Dept: PAIN MANAGEMENT | Age: 75
Discharge: HOME | End: 2020-07-06
Payer: MEDICARE

## 2020-07-06 PROCEDURE — 99442 PR PHYS/QHP TELEPHONE EVALUATION 11-20 MIN: CPT | Performed by: NURSE PRACTITIONER

## 2020-07-06 PROCEDURE — 99213 OFFICE O/P EST LOW 20 MIN: CPT

## 2020-07-06 RX ORDER — OXYCODONE HYDROCHLORIDE 5 MG/1
5 TABLET ORAL EVERY 8 HOURS PRN
Qty: 90 TABLET | Refills: 0 | Status: SHIPPED | OUTPATIENT
Start: 2020-07-09 | End: 2020-08-05 | Stop reason: SDUPTHER

## 2020-07-06 ASSESSMENT — ENCOUNTER SYMPTOMS
COUGH: 0
BACK PAIN: 1
SHORTNESS OF BREATH: 0
CONSTIPATION: 0

## 2020-07-06 NOTE — PROGRESS NOTES
1/28/2016    Adjustment disorder with mixed anxiety and depressed mood 6/26/2015    Mild     Anemia 3/5/2014    Back pain     Bronchiectasis with acute lower respiratory infection ([facility] [address] 8/16/2017    Bronchitis     Chronic bronchitis (HCC) 1/28/2016    Chronic cough 7/23/2018    Degenerative joint disease (DJD) of hip 5/14/2015    Dyslipidemia 3/5/2014    Encephalopathy acute 5/29/2016    Essential hypertension 1/28/2016    Fibromyalgia     GERD (gastroesophageal reflux disease)     History of total bilateral knee replacement 5/28/2016    Hx of blood clots     left leg and lung    Hyperlipidemia     Hypertension     Incontinence of urine     Irregular heartbeat     DR. Angela Reynolds Medication monitoring encounter 6/13/2018    Morbid obesity with BMI of 45.0-49.9, adult ([facility] [address] 1/28/2016    Obstructive sleep apnea syndrome     Osteoarthritis     Primary osteoarthritis of left knee 5/27/2016    PVC (premature ventricular contraction) 1/28/2016    S/P right and left heart catheterization 10/2/2015    Sleep apnea     no machine    SOB (shortness of breath)     Spinal stenosis of lumbar region with neurogenic claudication 5/13/2013    Supplemental oxygen dependent 12/8/2017    Urine frequency     UTI (urinary tract infection)     hospitalized early july 2014 for kidney infection       Past Surgical History:   Procedure Laterality Date    BRONCHOSCOPY Left 8/16/2017    BRONCHOSCOPY ALVEOLAR LAVAGE LOWER LOBE performed by Alley Mueller MD at [address]  x 3    no stents    COLONOSCOPY  4 28 14    polyps biopsies     FOOT SURGERY Left     calcium deposit removal from top of foot    HYSTERECTOMY      JOINT REPLACEMENT Bilateral 5/27/2016    bilat total knees    NERVE BLOCK  5/13/2013    caudal celestone 6mg    NERVE BLOCK  5/28/13    Caudal #2, Celestone 9mg    NERVE BLOCK  2/14/14    rt hip inj celestone 9 mg    NERVE BLOCK  07/14/2014    caudal# 3- celestone 9 mg  NERVE BLOCK  7-21-14    caudal epidural #2, decadron 7mg, fentanyl 25mcg    NERVE BLOCK  10/2/14    duramorph 1.5  decadron 5mg    NERVE BLOCK  11/9/2015    caudal# 1 celestone 9mg    NERVE BLOCK  11/16/15    caudal #2  decadron 10mg    NERVE BLOCK  11/23/15    duramorph 1mg celestone 9mg    NERVE BLOCK  03/28/2018    CAUDAL EPIDURAL STEROID BLOCK  DECADRON 10 MG     NERVE BLOCK  05/23/2018    caudal # decadron 7mg    NERVE BLOCK  08/28/2018    natalia transforminal # 1, decadron 10mg gadoteridol, LONG NEEDLES    MO PERQ VERT AGMNTJ CAVITY CRTJ UNI/BI CANNULJ LMBR N/A 10/29/2018    KYPHOPLASTY L1 performed by Yamileth Lui MD at [address] Right 5/14/15    total hip replacement       Allergies   Allergen Reactions    Rosuvastatin Calcium Anaphylaxis     Hair fell out    Statins Anaphylaxis     Hair fell out    Bactrim [Sulfamethoxazole-Trimethoprim] Diarrhea    Caffeine Other (See Comments)     pain  pain    Dye [Iodides] Other (See Comments)     Iv dye= blood clots in arm    Food      Tomatoes, pain    Ioxaglate Other (See Comments)     Iv dye= blood clots in arm    Dicloxacillin Rash    Pcn [Penicillins] Rash         Current Outpatient Medications:     fluticasone (FLONASE) 50 MCG/ACT nasal spray, instill 2 sprays into each nostril once daily, Disp: , Rfl:     levocetirizine (XYZAL) 5 MG tablet, take 1 tablet by mouth once daily AT NIGHT, Disp: , Rfl:     calcium carbonate (OYSTER SHELL CALCIUM 500 MG) 1250 (500 Ca) MG tablet, Take 1 tablet by mouth daily With magnesium, zinc, Disp: , Rfl:     Potassium 99 MG TABS, Take by mouth, Disp: , Rfl:     oxyCODONE (ROXICODONE) 5 MG immediate release tablet, Take 1 tablet by mouth every 8 hours as needed for Pain for up to 30 days. , Disp: 90 tablet, Rfl: 0    albuterol sulfate  (90 Base) MCG/ACT inhaler, Inhale 2 puffs into the lungs 4 times daily as needed for Wheezing, Disp: 1 Inhaler, Rfl: 2    gabapentin (NEURONTIN) 100 MG capsule, take 1 capsule by mouth once daily, Disp: 90 capsule, Rfl: 0    DULoxetine (CYMBALTA) 30 MG extended release capsule, TAKE 1 CAPSULE TWICE A DAY, Disp: 180 capsule, Rfl: 4    ofloxacin (OCUFLOX) 0.3 % solution, Place into both eyes daily as needed , Disp: , Rfl:     docusate sodium (COLACE) 100 MG capsule, Take 100 mg by mouth daily as needed for Constipation, Disp: , Rfl:     Misc.  Devices (WALKER) MISC, 1 each by Does not apply route daily Upright walker with wheels and seat, Disp: 1 each, Rfl: 0    metoprolol tartrate (LOPRESSOR) 50 MG tablet, Take 50 mg by mouth 2 times daily, Disp: , Rfl:     aspirin 81 MG tablet, Take 81 mg by mouth daily, Disp: , Rfl:     Family History   Problem Relation Age of Onset    Stomach Cancer Brother         stomach    High Blood Pressure Mother     Heart Disease Sister         irregular heartbeat       Social History     Socioeconomic History    Marital status:      Spouse name: Not on file    Number of children: Not on file    Years of education: Not on file    Highest education level: Not on file   Occupational History    Not on file   Social Needs    Financial resource strain: Not on file    Food insecurity     Worry: Not on file     Inability: Not on file    Transportation needs     Medical: Not on file     Non-medical: Not on file   Tobacco Use    Smoking status: Former Smoker     Packs/day: 1.00     Years: 18.00     Pack years: 18.00     Last attempt to quit: 1993     Years since quittin.1    Smokeless tobacco: Never Used   Substance and Sexual Activity    Alcohol use: No    Drug use: No    Sexual activity: Not Currently   Lifestyle    Physical activity     Days per week: Not on file     Minutes per session: Not on file    Stress: Not on file   Relationships    Social connections     Talks on phone: Not on file     Gets together: Not on file     Attends Quaker service: Not on file Active member of club or organization: Not on file     Attends meetings of clubs or organizations: Not on file     Relationship status: Not on file    Intimate partner violence     Fear of current or ex partner: Not on file     Emotionally abused: Not on file     Physically abused: Not on file     Forced sexual activity: Not on file   Other Topics Concern    Not on file   Social History Narrative    Not on file       Review of Systems:  Review of Systems   Constitution: Negative for chills and fever. Cardiovascular: Negative for chest pain and palpitations. Respiratory: Negative for cough and shortness of breath. Musculoskeletal: Positive for back pain. Gastrointestinal: Negative for constipation. Neurological: Negative for disturbances in coordination, loss of balance, numbness, paresthesias and tingling. Physical Exam:  There were no vitals taken for this visit. Physical Exam  Neurological:      Mental Status: She is alert. Psychiatric:         Mood and Affect: Mood normal.         Record/Diagnostics Review:    Last tiffanie 8/2019 and was appropriate     Assessment:  Problem List Items Addressed This Visit     Spinal stenosis of lumbar region with neurogenic claudication - Primary (Chronic)    Relevant Orders    [address]    Medication monitoring encounter (Chronic)    Chronic prescription opiate use (Chronic)    Fibromyalgia    Relevant Orders    [facility]    History of total bilateral knee replacement    Relevant Medications    oxyCODONE (ROXICODONE) 5 MG immediate release tablet (Start on 7/9/2020)    Cervical spinal stenosis    S/P kyphoplasty    DDD (degenerative disc disease), lumbar    Relevant Medications    oxyCODONE (ROXICODONE) 5 MG immediate release tablet (Start on 7/9/2020)    Lumbar radiculopathy    Osteopenia of lumbar spine             Treatment Plan:  Patient relates current medications are helping the pain.  Patient reports taking pain medications as prescribed, denies obtaining medications from different sources and denies use of illegal drugs. Patient denies side effects from medications like nausea, vomiting, constipation or drowsiness. Patient reports current activities of daily living are possible due to medications and would like to continue them. As always, we encourage daily stretching and strengthening exercises, and recommend minimizing use of pain medications unless patient cannot get through daily activities due to pain. Contract requirements met. Continue opioid therapy. Script written for oxycodone  Aquatic therapy two times a week for six weeks  Follow up appointment made for 4 weeks    Tony Alonso is a 75 y.o. female being evaluated by a Virtual Visit (telephone visit) encounter to address concerns as mentioned above. A caregiver was present when appropriate. Due to this being a TeleHealth encounter (During BNUJQ-02 public health emergency), evaluation of the following organ systems was limited: Vitals/Constitutional/EENT/Resp/CV/GI//MS/Neuro/Skin/Heme-Lymph-Imm. Pursuant to the emergency declaration under the [address] [place] [address] authority and the [employer] and [employer] Act, this Virtual Visit was conducted with patient's (and/or legal guardian's) consent, to reduce the patient's risk of exposure to COVID-19 and provide necessary medical care. The patient (and/or legal guardian) has also been advised to contact this office for worsening conditions or problems, and seek emergency medical treatment and/or call 911 if deemed necessary. Total time spent for this encounter: 20 minutes    Services were provided through a telephone discussion virtually to substitute for in-person clinic visit. Patient and provider were located at their individual homes.     --KARIN Renteria CNP on 7/6/2020 at 10:33 AM    An

## 2020-07-22 ENCOUNTER — HOSPITAL ENCOUNTER (OUTPATIENT)
Dept: PHYSICAL THERAPY | Age: 75
Setting detail: THERAPIES SERIES
Discharge: HOME | End: 2020-07-22
Payer: MEDICARE

## 2020-07-22 PROCEDURE — 97161 PT EVAL LOW COMPLEX 20 MIN: CPT

## 2020-07-22 ASSESSMENT — PAIN DESCRIPTION - FREQUENCY: FREQUENCY: CONTINUOUS

## 2020-07-22 ASSESSMENT — PAIN DESCRIPTION - DESCRIPTORS: DESCRIPTORS: CONSTANT

## 2020-07-22 ASSESSMENT — PAIN DESCRIPTION - LOCATION: LOCATION: BACK

## 2020-07-22 ASSESSMENT — PAIN DESCRIPTION - ORIENTATION: ORIENTATION: LOWER

## 2020-07-22 ASSESSMENT — PAIN SCALES - GENERAL: PAINLEVEL_OUTOF10: 4

## 2020-07-22 NOTE — PROGRESS NOTES
[address] [facility] Exercise Log  Aquatic, Hip & DLS Program- Phase 1    Date of Eval:  7/22/20                              Primary PT:Ivan  Diagnosis:fibromyalgia,lumbar stenosis  Things to Focus On (goals): walk without walker  Surgical Precautions: R THR/B TKR  Medical Precautions:  [] C-9 dates  [] Occ Med   [x] Medicare    please train instruct in use of QC    Date        Visit #        Walk F/L/R        Marching        Squats        Step-Ups F/L        Heel-toe raises        SLR F/L/R        Knee/Flex/Ext        F/L Lunges        Kickboard Ex. Iso Abd. Push-pull        Paddling                Balance                        Stretches        Achllies        Hamstring                                .                 Pain Rating

## 2020-07-22 NOTE — PROGRESS NOTES
Physical Therapy  Initial Assessment  Date: 2020  Patient Name: Kelechi Mancini  MRN: [id number]  : 1945     Treatment Diagnosis: weakness BLE M62.551 M62.552 difficulty walking R26.2 NEC    Restrictions  Restrictions/Precautions  Restrictions/Precautions: Fall Risk  Implants present? : Metal implants  Position Activity Restriction  Other position/activity restrictions: BTKR/RTHR    Subjective   General  Chart Reviewed: Yes  Patient assessed for rehabilitation services?: Yes  Additional Pertinent Hx: difficulty walking for the last 4 years after having had B TKR  Family / Caregiver Present: Yes  Referring Practitioner: Rodríguez Francis  Referral Date : 20  Diagnosis: lumbar stenosis M48.062 fibromyalgia M79.7  Follows Commands: Within Functional Limits  PT Visit Information  Onset Date: 16  PT Insurance Information: aetna medicare  Total # of Visits Approved: 12  Total # of Visits to Date: 1  Subjective  Subjective: complains of low back pain today  Pain Screening  Patient Currently in Pain: Yes  Pain Assessment  Pain Assessment: 0-10  Pain Level: 4  Pain Location: Back  Pain Orientation: Lower  Pain Descriptors: Constant  Pain Frequency: Continuous  Vital Signs  Patient Currently in Pain: Yes    Vision/Hearing  Vision  Vision: Impaired(wear glasses to read)  Hearing  Hearing: Within functional limits    Orientation  Orientation  Overall Orientation Status: Within Normal Limits    Social/Functional History  Social/Functional History  Lives With: Alone  Type of Home: Apartment  Home Layout: One level  Home Access: Level entry  ADL Assistance: Needs assistance  Homemaking Assistance: Needs assistance  Ambulation Assistance: Independent  Transfer Assistance: Independent  Active : Yes  Mode of Transportation: Car  Occupation: Retired    Objective  AROM RLE (degrees)  RLE AROM: WFL  AROM LLE (degrees)  LLE AROM : WFL  Strength RLE  Comment: hip3-/5 knee/ankle 4-/5  Strength LLE  Comment: hip 3-/5 knee/ankle 4-/5  Transfers  Sit to Stand: Independent  Stand to sit: Independent  Ambulation  Ambulation?: Yes  Ambulation 1  Surface: level tile  Device: Rolling Walker  Assistance: Contact guard assistance  Gait Deviations: Slow Oralia  Comments: TUG 38 secs  Stairs/Curb  Stairs?: No    Assessment   Conditions Requiring Skilled Therapeutic Intervention  Body structures, Functions, Activity limitations: Decreased functional mobility ; Decreased ADL status; Decreased strength;Decreased balance; Increased pain  Assessment: low back pain limiting function  Treatment Diagnosis: weakness BLE M62.551 M62.552 difficulty walking R26.2 NEC  Prognosis: Good  Decision Making: Low Complexity  History: had BTKR 4 years ago,since then had difficulty walking  Exam: weakness BLE  Clinical Presentation: TUG 38 secs oswestry score 60%  Barriers to Learning: none  REQUIRES PT FOLLOW UP: Yes  Discharge Recommendations: Home independently  Activity Tolerance  Activity Tolerance: Patient Tolerated treatment well         Plan   Plan  Times per week: 2x/week  Plan weeks: 6 weeks  Specific instructions for Next Treatment: Aquatic PT DLSP level 1  Current Treatment Recommendations: Aquatics, Home Exercise Program, Strengthening  Plan Comment: instructed in use of QC    OutComes Score  Oswestry CMS Modifier: CL (07/22/20 1500)  Oswestry Disability Scores %: 60 (07/22/20 1500)    Goals  Short term goals  Time Frame for Short term goals: 6 visits  Short term goal 1: decrease low back pain by 50% so patient can tolerate standing /walking better  Short term goal 2: increase strength BLE by 1/2 grade  Short term goal 3: improve TUG from 38 secs to 30 secs or better  Short term goal 4: indep with HEP  Long term goals  Time Frame for Long term goals : 12 visits  Long term goal 1: improve oswestry score from 60 to 50% or better  Long term goal 2: improve TUG to 10 secs to prevent falls  Long term goal 3: be able to Gait using a

## 2020-07-22 NOTE — PROGRESS NOTES
[facility] Fall Risk Assessment    Risk Factor Scale  Score   History of Falls [x] Yes  [] No 25  0 25   Secondary Diagnosis [x] Yes  [] No 15  0 15   Ambulatory Aid [] Furniture  [x] Crutches/cane/walker  [] None/bedrest/wheelchair/nurse 30  15  0 15   IV/Heparin Lock [] Yes  [x] No 20  0 0   Gait/Transferring [] Impaired  [x] Weak  [] Normal/bedrest/immobile 20  10  0 10   Mental Status [] Forgets limitations  [x] Oriented to own ability 15  0 0      Total:65     Based on the Assessment score: check the appropriate box.     []  No intervention needed   Low =   Score of 0-24    []  Use standard prevention interventions Moderate =  Score of 24-44   [] Give patient handout and discuss fall prevention strategies   [] Establish goal of education for patient/family RE: fall prevention strategies    [x]  Use high risk prevention interventions High = Score of 45 and higher   [x] Give patient handout and discuss fall prevention strategies   [x] Establish goal of education for patient/family Re: fall prevention strategies   [x] Discuss lifeline / other resources    Electronically signed by:   Kasandra Lima PT  Date: 7/22/2020

## 2020-07-27 ENCOUNTER — HOSPITAL ENCOUNTER (OUTPATIENT)
Dept: PHYSICAL THERAPY | Age: 75
Setting detail: THERAPIES SERIES
Discharge: HOME | End: 2020-07-27
Payer: MEDICARE

## 2020-07-27 PROCEDURE — 97113 AQUATIC THERAPY/EXERCISES: CPT

## 2020-07-27 ASSESSMENT — PAIN SCALES - GENERAL: PAINLEVEL_OUTOF10: 6

## 2020-07-27 ASSESSMENT — PAIN DESCRIPTION - PAIN TYPE: TYPE: CHRONIC PAIN

## 2020-07-27 ASSESSMENT — PAIN DESCRIPTION - LOCATION: LOCATION: BACK

## 2020-07-27 ASSESSMENT — PAIN DESCRIPTION - ORIENTATION: ORIENTATION: LOWER

## 2020-07-27 NOTE — PROGRESS NOTES
[address]   Outpatient Physical Therapy  [address] [facility]. [address]  Phone: [phone]  Fax: [phone]  Daily Progress Note    Date: 20    Patient Name: Davin Romero        MRN: [id number]  Account: [de-identified] : 1945      General Information:  Additional Pertinent Hx: difficulty walking for the last 4 years after having had B TKR  Referring Practitioner: Emiliana Lamb  Referral Date : 20  Diagnosis: lumbar stenosis Q61.704 fibromyalgia M79.7  Onset Date: 16  PT Insurance Information: aetna medicare  Total # of Visits Approved: 12  Total # of Visits to Date: 2    Subjective:  Subjective: Patient reports she has been nervous about starting aquatic therapy- states lower back is very sore today; unable to walk or stand without walker     Pain:  Patient Currently in Pain: Yes  Pain Assessment: 0-10  Pain Level: 6  Pain Type: Chronic pain  Pain Location: Back  Pain Orientation: Lower     Objective:  [facility] Exercise Log  Aquatic, Hip & DLS Program- Phase 1    Date of Eval:  20                              Primary PT:Ivan  Diagnosis:fibromyalgia,lumbar stenosis  Things to Focus On (goals): walk without walker  Surgical Precautions: R THR/B TKR  Medical Precautions:  [] C-9 dates  [] Occ Med   [x] Medicare    please train instruct in use of QC    ** Enter using chair; exited using steps with MOD A**    Date 20       Visit # 2/12       Walk F/L/R 1/2 Lap F @ Rail       LE Exercise Low Box       Marching 10x       Squats 10x3\"       Step-Ups F/L        Heel-toe raises 10x       SLR F/L/R 10x       Knee/Flex/Ext        F/L Lunges        Kickboard Ex. Iso Abd. Push-pull        Paddling                UE Format        Horiz Abd/Add 10x       Curls 10x       PNF 10x       Flex/Ext 10x               Stretches        Achllies        Hamstring                Seated Chair:        Biking 1'       . Pain Rating 6         Comment:  Comments: Initiated aquatic therapy with emphasis on core stability and postural awareness. Encouraged patient to avoid increasing pain    Assessment: Body structures, Functions, Activity limitations: Decreased functional mobility ; Decreased ADL status; Decreased strength;Decreased balance; Increased pain  Treatment Diagnosis: weakness BLE M62.551 M62.552 difficulty walking R26.2 NEC  Prognosis: Good  Activity Tolerance: Patient Tolerated treatment well  Comments: Patient fearful of water but demonstrated fair effort. Used chair lift to enter but requested steps to exit- Required Moderate assist to exit.  Emphasis on technique throughout treatment    Plan:  Plan: Continue with current plan(Assess response and progress as able)    Therapy Time:  Time In: 1335  Time Out: 1402  Minutes: 27       Treatment Charges: Minutes Units   []  Ultrasound     []  Electrical-Stim     []  Iontophoresis     []  Traction     []  Massage       []  Eval     []  Gait     []  Vasopneumatic Device     []  Ther Exercise       []  Manual Therapy       []  Ther Activities       [x]  Aquatics 25 2   []  Neuro Re-Ed       []  Other       Total Treatment Time: 25 2       Beverley Mejia PTA

## 2020-07-29 ENCOUNTER — HOSPITAL ENCOUNTER (OUTPATIENT)
Dept: PHYSICAL THERAPY | Age: 75
Setting detail: THERAPIES SERIES
Discharge: HOME | End: 2020-07-29
Payer: MEDICARE

## 2020-07-29 PROCEDURE — 97113 AQUATIC THERAPY/EXERCISES: CPT

## 2020-07-29 ASSESSMENT — PAIN DESCRIPTION - LOCATION: LOCATION: BACK

## 2020-07-29 ASSESSMENT — PAIN DESCRIPTION - ORIENTATION: ORIENTATION: LOWER

## 2020-07-29 ASSESSMENT — PAIN SCALES - GENERAL: PAINLEVEL_OUTOF10: 6

## 2020-07-29 ASSESSMENT — PAIN DESCRIPTION - PAIN TYPE: TYPE: CHRONIC PAIN

## 2020-07-29 NOTE — PROGRESS NOTES
Chair:        Biking 1' 2'      . Pain Rating 6 6        Comment:  Comments: Reviewed postural awareness and core stability. Patient remains fearful of water- needs encouragement    Assessment: Body structures, Functions, Activity limitations: Decreased functional mobility ; Decreased ADL status; Decreased strength;Decreased balance; Increased pain  Treatment Diagnosis: weakness BLE M62.551 M62.552 difficulty walking R26.2 NEC  Prognosis: Good  Activity Tolerance: Patient Tolerated treatment well  Comments: Improved tolerance to water- still fearful but able to complete more activity. Chair lift used to enter and exit pool.      Plan:  Plan: Continue with current plan(Progress gait)    Therapy Time:  Time In: 1338  Time Out: 1403  Minutes: 25  Timed Code Treatment Minutes: 23 Minutes    Treatment Charges: Minutes Units   []  Ultrasound     []  Electrical-Stim     []  Iontophoresis     []  Traction     []  Massage       []  Eval     []  Gait     []  Vasopneumatic Device     []  Ther Exercise       []  Manual Therapy       []  Ther Activities       [x]  Aquatics 23 2   []  Neuro Re-Ed       []  Other       Total Treatment Time: 23 2       Linwood Pinto PTA

## 2020-08-03 ENCOUNTER — HOSPITAL ENCOUNTER (OUTPATIENT)
Dept: PHYSICAL THERAPY | Age: 75
Setting detail: THERAPIES SERIES
Discharge: HOME | End: 2020-08-03
Payer: MEDICARE

## 2020-08-05 ENCOUNTER — HOSPITAL ENCOUNTER (OUTPATIENT)
Dept: PAIN MANAGEMENT | Age: 75
Discharge: HOME | End: 2020-08-05
Payer: MEDICARE

## 2020-08-05 ENCOUNTER — HOSPITAL ENCOUNTER (OUTPATIENT)
Dept: PHYSICAL THERAPY | Age: 75
Setting detail: THERAPIES SERIES
Discharge: HOME | End: 2020-08-05
Payer: MEDICARE

## 2020-08-05 PROCEDURE — 99441 PR PHYS/QHP TELEPHONE EVALUATION 5-10 MIN: CPT | Performed by: NURSE PRACTITIONER

## 2020-08-05 PROCEDURE — 99213 OFFICE O/P EST LOW 20 MIN: CPT

## 2020-08-05 RX ORDER — OXYCODONE HYDROCHLORIDE 5 MG/1
5 TABLET ORAL EVERY 8 HOURS PRN
Qty: 90 TABLET | Refills: 0 | Status: SHIPPED | OUTPATIENT
Start: 2020-08-08 | End: 2020-09-04 | Stop reason: SDUPTHER

## 2020-08-05 RX ORDER — NAPROXEN SODIUM 220 MG
220 TABLET ORAL 2 TIMES DAILY WITH MEALS
COMMUNITY
End: 2021-09-10

## 2020-08-05 ASSESSMENT — ENCOUNTER SYMPTOMS
COUGH: 0
BACK PAIN: 1
CONSTIPATION: 0
SHORTNESS OF BREATH: 0

## 2020-08-05 NOTE — PROGRESS NOTES
Patient completed a telephone visit today to review medication contract. Chief Complaint: back pain    [facility]  Pt reports chronic history of low back pain that radiates into right hip and buttocks at times She is s/p bilateral knee and right hip replaced. She has tried NSAIDS heat chiropractic care and with little relief. Just completed in home PT to increase strength and help with ambulation and balance. She had recent fall on 9/28/18 with resulting L1 compression fracture. She had kyphoplasty of L1 which did help her back pain.  She had lumbar epidural on and reports moderate relief. She goes to the chiropractor regularly with relief. She started aquatic PT and states it is not helping. She is requesting to repeat LESI. She is also requesting PT in her home. She has already discussed with her insurance and states she needs an order. Back Pain   This is a chronic problem. The current episode started more than 1 year ago. The problem occurs constantly. The problem has been gradually worsening since onset. The pain is present in the lumbar spine. The quality of the pain is described as aching and shooting. Radiates to: into buttocks. The pain is at a severity of 7/10. The pain is moderate. The symptoms are aggravated by position, standing and sitting. Pertinent negatives include no chest pain or fever. She has tried analgesics, bed rest, heat, home exercises and NSAIDs for the symptoms. The treatment provided mild relief. Patient denies any new neurological symptoms. No bowel or bladder incontinence, no weakness, and no falling. Pill count: appropriate due 8/8    Morphine equivalent: 22.5    Periodic Controlled Substance Monitoring: Possible medication side effects, risk of tolerance/dependence & alternative treatments discussed., No signs of potential drug abuse or diversion identified. , Assessed functional status.  Leticia Rodriguez, KARIN - CNP)      Past Medical History:   Diagnosis Date    Abnormality of rib determined by X-ray 1/28/2016    Acute cystitis without hematuria 6/16/2018    Acute exacerbation of chronic bronchitis (HCC)     Acute on chronic diastolic heart failure ([facility] 1/28/2016    Adjustment disorder with mixed anxiety and depressed mood 6/26/2015    Mild     Anemia 3/5/2014    Back pain     Bronchiectasis with acute lower respiratory infection ([facility] 8/16/2017    Bronchitis     Chronic bronchitis (HCC) 1/28/2016    Chronic cough 7/23/2018    Degenerative joint disease (DJD) of hip 5/14/2015    Dyslipidemia 3/5/2014    Encephalopathy acute 5/29/2016    Essential hypertension 1/28/2016    Fibromyalgia     GERD (gastroesophageal reflux disease)     History of total bilateral knee replacement 5/28/2016    Hx of blood clots     left leg and lung    Hyperlipidemia     Hypertension     Incontinence of urine     Irregular heartbeat     DR. Sohail Ruiz Medication monitoring encounter 6/13/2018    Morbid obesity with BMI of 45.0-49.9, adult [facility] 1/28/2016    Obstructive sleep apnea syndrome     Osteoarthritis     Primary osteoarthritis of left knee 5/27/2016    PVC (premature ventricular contraction) 1/28/2016    S/P right and left heart catheterization 10/2/2015    Sleep apnea     no machine    SOB (shortness of breath)     Spinal stenosis of lumbar region with neurogenic claudication 5/13/2013    Supplemental oxygen dependent 12/8/2017    Urine frequency     UTI (urinary tract infection)     hospitalized early july 2014 for kidney infection       Past Surgical History:   Procedure Laterality Date    BRONCHOSCOPY Left 8/16/2017    BRONCHOSCOPY ALVEOLAR LAVAGE LOWER LOBE performed by Maximo De Anda MD at [address]  x 3    no stents    COLONOSCOPY  4 28 14    polyps biopsies     FOOT SURGERY Left     calcium deposit removal from top of foot    HYSTERECTOMY      JOINT REPLACEMENT Bilateral 5/27/2016    bilat total knees    NERVE BLOCK 5/13/2013    caudal celestone 6mg    NERVE BLOCK  5/28/13    Caudal #2, Celestone 9mg    NERVE BLOCK  2/14/14    rt hip inj celestone 9 mg    NERVE BLOCK  07/14/2014    caudal# 3- celestone 9 mg    NERVE BLOCK  7-21-14    caudal epidural #2, decadron 7mg, fentanyl 25mcg    NERVE BLOCK  10/2/14    duramorph 1.5  decadron 5mg    NERVE BLOCK  11/9/2015    caudal# 1 celestone 9mg    NERVE BLOCK  11/16/15    caudal #2  decadron 10mg    NERVE BLOCK  11/23/15    duramorph 1mg celestone 9mg    NERVE BLOCK  03/28/2018    CAUDAL EPIDURAL STEROID BLOCK  DECADRON 10 MG     NERVE BLOCK  05/23/2018    caudal # decadron 7mg    NERVE BLOCK  08/28/2018    natalia transforminal # 1, decadron 10mg gadoteridol, LONG NEEDLES    MS PERQ VERT AGMNTJ CAVITY CRTJ UNI/BI CANNULJ LMBR N/A 10/29/2018    KYPHOPLASTY L1 performed by Krista Garcia MD at [address] Right 5/14/15    total hip replacement       Allergies   Allergen Reactions    Rosuvastatin Calcium Anaphylaxis     Hair fell out    Statins Anaphylaxis     Hair fell out    Bactrim [Sulfamethoxazole-Trimethoprim] Diarrhea    Caffeine Other (See Comments)     pain  pain    Dye [Iodides] Other (See Comments)     Iv dye= blood clots in arm    Food      Tomatoes, pain    Ioxaglate Other (See Comments)     Iv dye= blood clots in arm    Dicloxacillin Rash    Pcn [Penicillins] Rash         Current Outpatient Medications:     oxyCODONE (ROXICODONE) 5 MG immediate release tablet, Take 1 tablet by mouth every 8 hours as needed for Pain for up to 30 days. , Disp: 90 tablet, Rfl: 0    fluticasone (FLONASE) 50 MCG/ACT nasal spray, instill 2 sprays into each nostril once daily, Disp: , Rfl:     levocetirizine (XYZAL) 5 MG tablet, take 1 tablet by mouth once daily AT NIGHT, Disp: , Rfl:     calcium carbonate (OYSTER SHELL CALCIUM 500 MG) 1250 (500 Ca) MG tablet, Take 1 tablet by mouth daily With magnesium, zinc, Disp: , Rfl:    Potassium 99 MG TABS, Take by mouth, Disp: , Rfl:     albuterol sulfate  (90 Base) MCG/ACT inhaler, Inhale 2 puffs into the lungs 4 times daily as needed for Wheezing, Disp: 1 Inhaler, Rfl: 2    gabapentin (NEURONTIN) 100 MG capsule, take 1 capsule by mouth once daily, Disp: 90 capsule, Rfl: 0    DULoxetine (CYMBALTA) 30 MG extended release capsule, TAKE 1 CAPSULE TWICE A DAY, Disp: 180 capsule, Rfl: 4    ofloxacin (OCUFLOX) 0.3 % solution, Place into both eyes daily as needed , Disp: , Rfl:     docusate sodium (COLACE) 100 MG capsule, Take 100 mg by mouth daily as needed for Constipation, Disp: , Rfl:     Misc.  Devices (WALKER) MISC, 1 each by Does not apply route daily Upright walker with wheels and seat, Disp: 1 each, Rfl: 0    metoprolol tartrate (LOPRESSOR) 50 MG tablet, Take 50 mg by mouth 2 times daily, Disp: , Rfl:     aspirin 81 MG tablet, Take 81 mg by mouth daily, Disp: , Rfl:     Family History   Problem Relation Age of Onset    Stomach Cancer Brother         stomach    High Blood Pressure Mother     Heart Disease Sister         irregular heartbeat       Social History     Socioeconomic History    Marital status:      Spouse name: Not on file    Number of children: Not on file    Years of education: Not on file    Highest education level: Not on file   Occupational History    Not on file   Social Needs    Financial resource strain: Not on file    Food insecurity     Worry: Not on file     Inability: Not on file    Transportation needs     Medical: Not on file     Non-medical: Not on file   Tobacco Use    Smoking status: Former Smoker     Packs/day: 1.00     Years: 18.00     Pack years: 18.00     Last attempt to quit: 1993     Years since quittin.2    Smokeless tobacco: Never Used   Substance and Sexual Activity    Alcohol use: No    Drug use: No    Sexual activity: Not Currently   Lifestyle    Physical activity     Days per week: Not on file     Minutes per session: Not on file    Stress: Not on file   Relationships    Social connections     Talks on phone: Not on file     Gets together: Not on file     Attends Yarsanism service: Not on file     Active member of club or organization: Not on file     Attends meetings of clubs or organizations: Not on file     Relationship status: Not on file    Intimate partner violence     Fear of current or ex partner: Not on file     Emotionally abused: Not on file     Physically abused: Not on file     Forced sexual activity: Not on file   Other Topics Concern    Not on file   Social History Narrative    Not on file       Review of Systems:  Review of Systems   Constitution: Negative for chills and fever. Cardiovascular: Negative for chest pain and palpitations. Respiratory: Negative for cough and shortness of breath. Musculoskeletal: Positive for back pain. Gastrointestinal: Negative for constipation. Neurological: Negative for disturbances in coordination and loss of balance. Physical Exam:  There were no vitals taken for this visit. Physical Exam  Neurological:      Mental Status: She is alert.    Psychiatric:         Mood and Affect: Mood normal.         Record/Diagnostics Review:    Last tiffanie 8/2019 and was appropriate     Assessment:  Problem List Items Addressed This Visit     Spinal stenosis of lumbar region with neurogenic claudication - Primary (Chronic)    Relevant Orders    PT eval and treat    Lumbar Epidural Steroid Injection/Caudal    Saline lock IV    FLUORO FOR SURGICAL PROCEDURES    DRUG SCREEN, PAIN    Medication monitoring encounter (Chronic)    Relevant Orders    DRUG SCREEN, PAIN    History of total bilateral knee replacement    Relevant Medications    oxyCODONE (ROXICODONE) 5 MG immediate release tablet (Start on 8/8/2020)    Other Relevant Orders    Lumbar Epidural Steroid Injection/Caudal    DDD (degenerative disc disease), lumbar    Relevant Medications    naproxen sodium medical treatment and/or call 911 if deemed necessary. Total time spent for this encounter: 10 minutes    Services were provided through a telephone discussion virtually to substitute for in-person clinic visit. Patient and provider were located at their individual homes. --KARIN Rivera - CNP on 8/5/2020 at 10:12 AM    An electronic signature was used to authenticate this note.

## 2020-08-05 NOTE — PROGRESS NOTES
[x] [facility] ([facility]) @ [facility]  [address]  [place], [place]all[place] [facility]  Phone [phone]  Fax [phone]    Physical Therapy Discharge Note    Date: 2020      Patient: Cathy Bueno  :   MRN: [id number]    Physician: TRINIDAD King     Diagnosis: lumbar stenosis/fibromyalgia Onset Date: 16    Rehab Diagnosis:weakness BLE difficulty walking  ICD-10 Codes: Total visits attended:3/12  Cancels/No shows:  Date of initial visit: 20                  [] Patient recovered from conditions. Treatment goals were met. [] Patient received maximum benefit. No further therapy indicated at this time. [x] Patient demonstrated improvement from condition with  0 Of  4 Short term goals met. [x]Patient demonstrated improvement from condition with 0   Of 3  Long term goals met. [] Patient to continue exercise/home instructions independently. [] Therapy interrupted due to:    [] Patient has 2 or more no shows/cancels, is discontinued per our policy. [] Patient has completed prescribed number of treatment sessions. [] Other:      Pain level at evaluation was      4 /10 and at discharge was     6  /10    It Is My Understanding That The:  [] Patient returned to work. [] Patient demonstrated improved level of function. [] Patient returned to previous functional level.   [] Patient's current functional status is unknown due to no-shows  [x] Other: Patient requested to be discontinued from PT at this time due to too much pain,going back to pain management    Recommendations/Comments: to discontinue PT                  Treatment Included:     [] Therapeutic Exercise   74042  [] Iontophoresis: 4 mg/mL Dexamethasone Sodium Phosphate  mAmin  41004   [] Therapeutic Activity  74382 [] Vasopneumatic cold with compression  68350    [] Gait Training   43268 [] Ultrasound   [id number]   [] Neuromuscular Re-education  32882 [] Electrical Stimulation Unattended  32946   [] Manual Therapy  65347 [] Electrical Stimulation Attended  [id number]   [x] Instruction in HEP  [] Lumbar/Cervical Traction  [id number]   [x] Aquatic Therapy   [id number] [] Cold/hotpack    [] Massage   [id number]      [] Dry Needling, 1 or 2 muscles  49935   [] Biofeedback, first 15 minutes   04050  [] Biofeedback, additional 15 minutes   76171 [] Dry Needling, 3 or more muscles  35491                If you have any questions or concerns regarding this patient's care, please contact us.    Thank you for your referral.      Electronically signed by: Jeanne Chowdhury PT

## 2020-08-05 NOTE — FLOWSHEET NOTE
[address]   Outpatient Physical Therapy  Cancel/No Show Note    Date: 20    Patient Name: Roseanne Maier        MRN: [id number]  Account: [de-identified] : 1945      General Information:  Referring Practitioner: KARIN Alejandre-CNP  Referral Date : 20  Diagnosis: lumbar stenosis T36.437 fibromyalgia M79.7  Onset Date: 16  PT Insurance Information: [employer]na medicare  Total # of Visits Approved: 12  Total # of Visits to Date: 3  No Show: 0  Canceled Appointment: 2        Comments: Pt cancelled appointment today and all future appointments. wants to be D/C.  going back to doctor.     Tamela Carl, PTA

## 2020-08-24 ENCOUNTER — OFFICE VISIT (OUTPATIENT)
Dept: INTERNAL MEDICINE CLINIC | Age: 75
End: 2020-08-24
Payer: MEDICARE

## 2020-08-24 VITALS
HEART RATE: 73 BPM | WEIGHT: 236 LBS | BODY MASS INDEX: 40.29 KG/M2 | OXYGEN SATURATION: 96 % | TEMPERATURE: 98 F | HEIGHT: 64 IN | DIASTOLIC BLOOD PRESSURE: 70 MMHG | SYSTOLIC BLOOD PRESSURE: 136 MMHG

## 2020-08-24 PROCEDURE — 99214 OFFICE O/P EST MOD 30 MIN: CPT | Performed by: PHYSICIAN ASSISTANT

## 2020-08-24 RX ORDER — LEVOCETIRIZINE DIHYDROCHLORIDE 5 MG/1
TABLET, FILM COATED ORAL
Qty: 30 TABLET | Refills: 2 | Status: SHIPPED | OUTPATIENT
Start: 2020-08-24 | End: 2021-09-08 | Stop reason: SDUPTHER

## 2020-08-24 RX ORDER — NEOMYCIN SULFATE, POLYMYXIN B SULFATE, AND DEXAMETHASONE 3.5; 10000; 1 MG/G; [USP'U]/G; MG/G
OINTMENT OPHTHALMIC
COMMUNITY
Start: 2020-07-13 | End: 2021-09-23

## 2020-08-24 NOTE — PROGRESS NOTES
[address] [address]  Dept: [phone]  Dept Fax: [phone]    OfficeProgress/Follow Up Note  Date of patient's visit: 8/25/2020  Patient's Name:  Yadira Diana YOB: 1945            Patient Care Team:  Vitaliy Landaverde MD as PCP - General (Internal Medicine)  Vitaliy Landaverde MD as PCP - [facility] [place] [facility]  Bishop Telma MD as Consulting Physician (Gastroenterology)  Ciara Lopez MD as Consulting Physician (Cardiology)  Kate Groves MD as Consulting Physician (Pulmonology)    REASON FOR VISIT:  Mobility examination    HISTORY OF PRESENT ILLNESS:      Chief Complaint   Patient presents with    [facility]     Allergies    Hand Pain     bilat hands gripping items is hard, has been dropping things     History was obtained from the patient. Yadira Diana is a 75 y.o. female here today for mobility examination. Patient with history of degenerative disc disease, spinal stenosis cervical and lumbar with neurogenic claudication, degenerative joint disease multiple sites, fibromyalgia, congestive heart failure, chronic obstructive pulmonary disease. Patient is currently non ambulatory, reportedly using wheeled walker with seat and attempt to propel self with legs. Patient reports difficulty transporting self to kitchen, bathroom interfering with ability to care for self. Patient not able to use walker or cane due to lower extremity weakness/pain. Patient not able to use manual wheelchair due to upper extremity weakness, CMC arthritis, dyspnea with exertion. Patient willing and motivated to use device, able to safely do so.      Patient Active Problem List   Diagnosis    Spinal stenosis of lumbar region with neurogenic claudication    Dyslipidemia    Anemia    Fibromyalgia    Degenerative joint disease (DJD) of hip    Adjustment disorder with mixed anxiety and depressed mood    S/P right and left heart catheterization    SOB (shortness of breath)    Acute on chronic diastolic heart failure ([facility]    Essential hypertension    Morbid obesity with BMI of 45.0-49.9, adult ([facility])    PVC (premature ventricular contraction)    Abnormality of rib determined by X-ray    Chronic bronchitis ([facility]    Sleep apnea    History of total bilateral knee replacement    Encephalopathy acute    Obstructive sleep apnea syndrome    Primary osteoarthritis of left knee    Bronchiectasis with acute lower respiratory infection ([facility]    Supplemental oxygen dependent    Back pain    Medication monitoring encounter    Acute cystitis without hematuria    Chronic cough    Acquired spondylolisthesis    Cervical spine ankylosis    Acute low back pain    Neck pain    Closed compression fracture of L1 lumbar vertebra    Cervical spinal stenosis    BMI 40.0-44.9, adult ([facility])    Chronic prescription opiate use    Age-related osteoporosis with current pathological fracture    S/P kyphoplasty    Morbid obesity ([facility]    DDD (degenerative disc disease), lumbar    Lumbar radiculopathy    Depression    Osteopenia of lumbar spine     Health Maintenance Due   Topic Date Due    Shingles Vaccine (1 of 2) 11/17/1994    DEXA (modify frequency per FRAX score)  11/17/1999    Annual Wellness Visit (AWV)  05/29/2019    Potassium monitoring  04/15/2020    Creatinine monitoring  04/15/2020     MEDICATIONS:      Current Outpatient Medications   Medication Sig Dispense Refill    neomycin-polymyxin-dexameth 3.5-71931-1.1 OINT       levocetirizine (XYZAL) 5 MG tablet take 1 tablet by mouth once daily AT NIGHT 30 tablet 2    oxyCODONE (ROXICODONE) 5 MG immediate release tablet Take 1 tablet by mouth every 8 hours as needed for Pain for up to 30 days.  90 tablet 0    fluticasone (FLONASE) 50 MCG/ACT nasal spray instill 2 sprays into each nostril once daily      calcium carbonate (OYSTER SHELL CALCIUM 500 MG) 1250 (500 Ca) MG tablet Take 1 tablet by mouth daily With magnesium, zinc      Potassium 99 MG TABS Take by mouth      albuterol sulfate  (90 Base) MCG/ACT inhaler Inhale 2 puffs into the lungs 4 times daily as needed for Wheezing 1 Inhaler 2    gabapentin (NEURONTIN) 100 MG capsule take 1 capsule by mouth once daily 90 capsule 0    DULoxetine (CYMBALTA) 30 MG extended release capsule TAKE 1 CAPSULE TWICE A  capsule 4    ofloxacin (OCUFLOX) 0.3 % solution Place into both eyes daily as needed       docusate sodium (COLACE) 100 MG capsule Take 100 mg by mouth daily as needed for Constipation      Misc. Devices (WALKER) MISC 1 each by Does not apply route daily Upright walker with wheels and seat 1 each 0    metoprolol tartrate (LOPRESSOR) 50 MG tablet Take 50 mg by mouth 2 times daily      aspirin 81 MG tablet Take 81 mg by mouth daily      naproxen sodium (ANAPROX) 220 MG tablet Take 220 mg by mouth 2 times daily (with meals)       No current facility-administered medications for this visit. ALLERGIES:      Allergies   Allergen Reactions    Rosuvastatin Calcium Anaphylaxis     Hair fell out    Statins Anaphylaxis     Hair fell out    Bactrim [Sulfamethoxazole-Trimethoprim] Diarrhea    Caffeine Other (See Comments)     pain  pain    Dye [Iodides] Other (See Comments)     Iv dye= blood clots in arm    Food      Tomatoes, pain    Ioxaglate Other (See Comments)     Iv dye= blood clots in arm    Dicloxacillin Rash    Pcn [Penicillins] Rash         SOCIAL HISTORY    Reviewed and no change from previous record. Jun Patiño  reports that she quit smoking about 27 years ago. She has a 18.00 pack-year smoking history. She has never used smokeless tobacco.    FAMILY HISTORY:    Reviewed and no change from previous visit    REVIEW OF SYSTEMS:    Review of Systems   Constitutional: Negative for chills, diaphoresis, fatigue, fever and unexpected weight change.    HENT: Negative for congestion, ear discharge, ear pain, hearing loss, rhinorrhea, sinus pain, sore throat, trouble swallowing and voice change. Eyes: Negative for pain, discharge, itching and visual disturbance. Respiratory: Positive for shortness of breath (exertional). Negative for cough, chest tightness and wheezing. Cardiovascular: Negative for chest pain, palpitations and leg swelling. Gastrointestinal: Negative for abdominal pain, blood in stool, constipation, diarrhea, nausea and vomiting. Endocrine: Negative for cold intolerance and heat intolerance. Genitourinary: Negative for difficulty urinating, dysuria, flank pain, frequency, hematuria and urgency. Musculoskeletal: Positive for arthralgias, back pain, gait problem and neck pain. Negative for neck stiffness. Skin: Negative for color change, pallor, rash and wound. Neurological: Positive for weakness and numbness. Negative for dizziness, tremors, seizures, syncope, facial asymmetry, speech difficulty, light-headedness and headaches. Hematological: Negative for adenopathy. Does not bruise/bleed easily. Psychiatric/Behavioral: Negative for dysphoric mood. The patient is not nervous/anxious.       PHYSICAL EXAM:      Vitals:    08/24/20 1319   BP: 136/70   Pulse: 73   Temp: 98 °F (36.7 °C)   SpO2: 96%   Weight: 236 lb (107 kg)   Height: 5' 4\" (1.626 m)     BP Readings from Last 3 Encounters:   08/24/20 136/70   06/18/20 124/68   03/16/20 (!) 143/58      Wt Readings from Last 3 Encounters:   08/24/20 236 lb (107 kg)   03/16/20 268 lb (121.6 kg)   03/09/20 268 lb (121.6 kg)     General - alert, well appearing, and in no distress, in wheelchair   Skin - normal coloration and turgor, no rash  Eyes - pupils equal and reactive, extraocular eye movements intact  Mouth - mucous membranes are moist, pharynx normal without lesions  Neck - supple, no significant adenopathy, no palpable masses   Lymphatics - no palpable lymphadenopathy, no hepatosplenomegaly  Chest - clear to auscultation, given educational materials - see patient instructions    PETTY [facility]  8/25/2020, 9:40 AM    Please note that this chart was generated using voice recognition Dragon dictation software. Although everyeffort was made to ensure the accuracy of this automated transcription, some errors in transcription may have occurred.

## 2020-08-25 ASSESSMENT — ENCOUNTER SYMPTOMS
SORE THROAT: 0
CONSTIPATION: 0
SINUS PAIN: 0
COUGH: 0
VOICE CHANGE: 0
TROUBLE SWALLOWING: 0
BACK PAIN: 1
SHORTNESS OF BREATH: 1
VOMITING: 0
EYE ITCHING: 0
NAUSEA: 0
ABDOMINAL PAIN: 0
DIARRHEA: 0
EYE DISCHARGE: 0
CHEST TIGHTNESS: 0
BLOOD IN STOOL: 0
WHEEZING: 0
EYE PAIN: 0
RHINORRHEA: 0
COLOR CHANGE: 0

## 2020-08-26 RX ORDER — CIPROFLOXACIN 500 MG/1
TABLET, FILM COATED ORAL
Qty: 10 TABLET | Refills: 0 | OUTPATIENT
Start: 2020-08-26

## 2020-09-04 ENCOUNTER — HOSPITAL ENCOUNTER (OUTPATIENT)
Dept: PAIN MANAGEMENT | Age: 75
Discharge: HOME | End: 2020-09-04
Payer: MEDICARE

## 2020-09-04 PROCEDURE — 99213 OFFICE O/P EST LOW 20 MIN: CPT

## 2020-09-04 PROCEDURE — 99443 PR PHYS/QHP TELEPHONE EVALUATION 21-30 MIN: CPT | Performed by: PAIN MEDICINE

## 2020-09-04 RX ORDER — OXYCODONE HYDROCHLORIDE 5 MG/1
5 TABLET ORAL EVERY 8 HOURS PRN
Qty: 90 TABLET | Refills: 0 | Status: ON HOLD | OUTPATIENT
Start: 2020-09-07 | End: 2020-10-02 | Stop reason: SDUPTHER

## 2020-09-04 ASSESSMENT — ENCOUNTER SYMPTOMS
CONSTIPATION: 0
PHOTOPHOBIA: 0
EYE REDNESS: 0
BOWEL INCONTINENCE: 0
ABDOMINAL PAIN: 0
COUGH: 0
NAUSEA: 0
VOMITING: 0
BACK PAIN: 1
ALLERGIC/IMMUNOLOGIC NEGATIVE: 1
SHORTNESS OF BREATH: 0

## 2020-09-04 NOTE — PROGRESS NOTES
history of osteoporosis. Alleviating factors:ice , heat and creams  Lifestyle changes experienced with pain: Prevents or limits ADLs, Increases w/activity.  , Increases w/prolonged sitting/standing/walking  Mood changes,none  Patient currently unemployed. Physical therapy did not help the pain. Are you under psychological counseling at present: No  Goals for treatment include:  Decrease in pain  Enjoy daily and recreational activities, return to previous status. Patient relates current medications are helping the pain. Patient reports taking pain medications as prescribed, denies obtaining medications from different sources and denies use of illegal drugs. Patient denies side effects from medications like nausea, vomiting, constipation or drowsiness. Patient reports current activities of daily living ar possible due to medications and would like to continue them. ACTIVITY/SOCIAL/EMOTIONAL:  Sleep Pattern: 6 hours per night.  generally restful sleep  Home Exercises: daily Stretching  Activity:not significantly changed  Emotional Issues: normal.   Currently seeing a Psychiatrist or Psychologist:  No     ADVERSE MEDICATION EFFECTS:   Nausea and vomiting: no   Constipation: no-Undercontrol-: yes  Dizziness/drowsy/sleepy--no  Urinary Retention: no    ABERRANT BEHAVIORS SINCE LAST VISIT  Lost rx/pills:------------------------------------------ no  Taking  medication as prescribed: ----------- yes  Urine Drug Screen ---------------------------------  yes  Recent ER visits: -------------------------------------No  Pill count is appropriate: ---------------------------yes   Refills for prescriptions appropriate:---------- yes      Past Medical History:   Diagnosis Date    Abnormality of rib determined by X-ray 1/28/2016    Acute cystitis without hematuria 6/16/2018    [facility] exacerbation of chronic [facility] [address]     Acute on chronic diastolic heart failure ([facility] [address] 1/28/2016    Adjustment disorder with mixed anxiety and depressed mood 6/26/2015    Mild     Anemia 3/5/2014    Back pain     Bronchiectasis with acute lower respiratory infection ([facility] [address] 8/16/2017    Bronchitis     Chronic bronchitis (HCC) 1/28/2016    Chronic cough 7/23/2018    Degenerative joint disease (DJD) of hip 5/14/2015    Dyslipidemia 3/5/2014    Encephalopathy acute 5/29/2016    Essential hypertension 1/28/2016    Fibromyalgia     GERD (gastroesophageal reflux disease)     History of total bilateral knee replacement 5/28/2016    Hx of blood clots     left leg and lung    Hyperlipidemia     Hypertension     Incontinence of urine     Irregular heartbeat     DR. Waylon Calvert Medication monitoring encounter 6/13/2018    Morbid obesity with BMI of 45.0-49.9, adult ([facility] [address] 1/28/2016    Obstructive sleep apnea syndrome     Osteoarthritis     Primary osteoarthritis of left knee 5/27/2016    PVC (premature ventricular contraction) 1/28/2016    S/P right and left heart catheterization 10/2/2015    Sleep apnea     no machine    SOB (shortness of breath)     Spinal stenosis of lumbar region with neurogenic claudication 5/13/2013    Supplemental oxygen dependent 12/8/2017    Urine frequency     UTI (urinary tract infection)     hospitalized early july 2014 for kidney infection       Past Surgical History:   Procedure Laterality Date    BRONCHOSCOPY Left 8/16/2017    BRONCHOSCOPY ALVEOLAR LAVAGE LOWER LOBE performed by Keven Vidal MD at [address]  x 3    no stents    COLONOSCOPY  4 28 14    polyps biopsies     FOOT SURGERY Left     calcium deposit removal from top of foot    HYSTERECTOMY      JOINT REPLACEMENT Bilateral 5/27/2016    bilat total knees    NERVE BLOCK  5/13/2013    caudal celestone 6mg    NERVE BLOCK  5/28/13    Caudal #2, Celestone 9mg    NERVE BLOCK  2/14/14    rt hip inj celestone 9 mg    NERVE BLOCK  07/14/2014    caudal# 3- celestone 9 mg    NERVE BLOCK  7-21-14    caudal epidural #2, decadron 7mg, fentanyl 25mcg    NERVE BLOCK  10/2/14    duramorph 1.5  decadron 5mg    NERVE BLOCK  2015    caudal# 1 celestone 9mg    NERVE BLOCK  11/16/15    caudal #2  decadron 10mg    NERVE BLOCK  11/23/15    duramorph 1mg celestone 9mg    NERVE BLOCK  2018    CAUDAL EPIDURAL STEROID BLOCK  DECADRON 10 MG     NERVE BLOCK  2018    caudal # decadron 7mg    NERVE BLOCK  2018    natalia transforminal # 1, decadron 10mg gadoteridol, LONG NEEDLES    IL PERQ VERT AGMNTJ CAVITY CRTJ UNI/BI CANNULJ LMBR N/A 10/29/2018    KYPHOPLASTY L1 performed by 30 Arabella Hernandez MD at [address] Right 5/14/15    total hip replacement       Family History   Problem Relation Age of Onset    Stomach Cancer Brother         stomach    High Blood Pressure Mother     Heart Disease Sister         irregular heartbeat       Social History     Socioeconomic History    Marital status:      Spouse name: Not on file    Number of children: Not on file    Years of education: Not on file    Highest education level: Not on file   Occupational History    Not on file   Social Needs    Financial resource strain: Not on file    Food insecurity     Worry: Not on file     Inability: Not on file   [employer] needs     Medical: Not on file     Non-medical: Not on file   Tobacco Use    Smoking status: Former Smoker     Packs/day: 1.00     Years: 18.00     Pack years: 18.00     Last attempt to quit: 1993     Years since quittin.3    Smokeless tobacco: Never Used   Substance and Sexual Activity    Alcohol use: No    Drug use: No    Sexual activity: Not Currently   Lifestyle    Physical activity     Days per week: Not on file     Minutes per session: Not on file    Stress: Not on file   Relationships    Social connections     Talks on phone: Not on file     Gets together: Not on file     Attends [employer] service: Not on file needed for Constipation      Misc. Devices (WALKER) MISC 1 each by Does not apply route daily Upright walker with wheels and seat 1 each 0    metoprolol tartrate (LOPRESSOR) 50 MG tablet Take 50 mg by mouth 2 times daily      aspirin 81 MG tablet Take 81 mg by mouth daily       No current facility-administered medications on file prior to encounter. Review of Systems   Constitutional: Negative for activity change, appetite change, fatigue and fever. HENT: Negative for congestion and ear pain. Eyes: Negative for photophobia, redness and visual disturbance. Respiratory: Negative for cough and shortness of breath. History of asthma   Cardiovascular: Negative for chest pain and palpitations. Gastrointestinal: Negative for abdominal pain, bowel incontinence, constipation, nausea and vomiting. Endocrine: Negative for heat intolerance and polyuria. Genitourinary: Negative for bladder incontinence, dysuria and hematuria. Musculoskeletal: Positive for arthralgias and back pain. Skin: Negative for rash and wound. Allergic/Immunologic: Negative. Neurological: Negative for tingling, weakness and numbness. Hematological: Negative. Psychiatric/Behavioral: Negative for self-injury, sleep disturbance and suicidal ideas. Physical Exam  Neurological:      Mental Status: She is alert and oriented to person, place, and time.    Psychiatric:         Mood and Affect: Mood normal.            DATA:  LAB.:  8/31/2019 12:52 PM - Jordin, Oseipn Incoming Lab Results From [facility]     Component  Value  Ref Range & Units  Status  Collected  Lab    Pain Management Drug Panel Interp, Urine  Consistent   Final  08/27/2019  2:45 PM  [facility]    (NOTE)   ________________________________________________________________   DRUGS EXPECTED:   OXYCODONE [8/27/19]   ________________________________________________________________   CONSISTENT with medications provided:   OXYCODONE : based on oxycodone, noroxycodone, noroxymorphone        X-Ray reports:  EXAMINATION:    3 XRAY VIEWS OF THE LUMBAR SPINE; THREE XRAY VIEWS OF THE THORACIC SPINE        2/11/2020 1:09 pm        COMPARISON:    CT lumbar spine from 09/28/2018        HISTORY:    ORDERING SYSTEM PROVIDED HISTORY: Osteopenia of lumbar spine    TECHNOLOGIST PROVIDED HISTORY:    Reason for Exam: SEVERE BACK PAIN UPPER AND LOWER, FALL IN DECEMBER 2019    Acuity: Unknown    Type of Exam: Ongoing        FINDINGS:    Thoracic spine: Demineralized skeleton and multifocal pulmonary opacities    somewhat limits the evaluation of the thoracic spine. Within this limit,    multilevel degenerative changes of thoracic spine. Intact pedicles on    frontal view. Vertebral body heights appear maintained. Lumbar spine: L1 compression fracture with vertebral body augmentation    cement. No new compression fracture. Minimal anterolisthesis of L4 on L5    measuring 0.6 cm. Disc degeneration with endplate osteophyte formation. Facet arthropathy diffusely. Possible abdominal aortic aneurysm on radiography measuring 3.0 cm AP. Correlation with prior lumbar spine CT. Impression    Multilevel disc degeneration throughout the lumbar spine without evidence of    acute abnormality. If concern for acute fracture exists, CT versus MRI    should be considered. Probable abdominal aortic aneurysm. Nonemergent CT or ultrasound correlation    should be considered. Clinical  impression:  1. Spinal stenosis of lumbar region with neurogenic claudication    2. Lumbar radiculopathy    3. DDD (degenerative disc disease), lumbar    4. Osteopenia of lumbar spine    5. Chronic prescription opiate use    6. S/P kyphoplasty    7. Cervical spinal stenosis    8. Cervical spine ankylosis    9. Closed fracture of lumbar vertebra, unspecified fracture morphology, unspecified lumbar vertebral level, initial encounter ([facility] [address]    10.  Morbid obesity with BMI of 45.0-49.9, adult ([facility] [address]    11. History of total bilateral knee replacement    12. Fibromyalgia    13. Medication monitoring encounter        Plan of care: We will continue current pain medications  Current medications are being tolerated without any Adverse side effects. Orders Placed This Encounter   Medications    oxyCODONE (ROXICODONE) 5 MG immediate release tablet     Sig: Take 1 tablet by mouth every 8 hours as needed for Pain for up to 30 days. Dispense:  90 tablet     Refill:  0     Reduce doses taken as pain becomes manageable     Urine drug screens have been appropriate. No aberrant activity noted. Analgesia is achieved. Activities of daily living are possible because of medications. Safe use of medications explained to patient. PDMP Monitoring:    Last PDMP Lazmelissa Dandy as Reviewed [facility]):  Review User Review Instant Review Result   Lilly Amor 9/4/2020 10:31 AM Reviewed PDMP [1]     Counselling/Preventive measures for pain  Control:    [x]  Spine strengthening exercises are discussed with patient in detail. [x] Ill effects of being on chronic pain medications such as sleep disturbances, hormonal changes, withdrawal symptoms,  chronic opioid dependence and tolerance were discussed with patient. I had asked the patient to minimize medication use and utilize pain medications only for uncontrolled rest pain or pain with exertional activities. I advised patient not to self escalate pain medications without consulting with us. At each of patient's future visits we will try to taper pain medications, while adjusting the adjunct medications, and re-evaluating for Physical Therapy to improve spinal and joint strength. We will continue to have discussions to decrease pain medications as tolerated. I also discussed with the patient regarding the dangers of combining narcotic pain medication with tranquilizers, alcohol or illegal drugs or taking the medication any other than prescribed.  The dangers including the respiratory depression and death. Patient was told to tell  to all  physicians regarding the medications he is getting from pain clinic. Patient is warned not to take any unprescribed medications over-the-counter medications that can depress breathing . Patient is advised to talk to the pharmacist or physicians if planning to take any over-the-counter medications before  takeing them. Patient is strongly advised to avoid tranquilizers or  Relaxants for any medications that can depress breathing or recreational drugs. Patient is also advised to tell us if there is any changes in his medications from other physicians. We discussed the same at today's visit and have not been to implement it, as the patient's pain is not under control with current medications. Decision Making Process : Patient's health history and referral records thoroughly reviewed before focused physical examination and discussion with patient. I have spent 25 mins. Over 50% of today's visit is spent on examining the patient and counseling and coordinating the care. Level of complexity of date to be reviewed is Moderate. The chart date reviewed include the following: Imaging Reports. Summary of Care. Time spent reviewing with patient the below reports:   Medication safety, Treatment options. Level of diagnosis and management options of this case is multiple: involving the following management options: Interventions as needed, medication management as appropriate, future visits, activity modification, heat/ice as needed, Urine drug screen as required. [x]The patient's questions were answered to the best of my abilities. This note was created using voice recognition software. There may be inaccuracies of transcription  that are inadvertently overlooked prior to the signature. There is any questions about the transcription please contact me.     Return in  4 weeks  with physician / CNP  for further plan of

## 2020-09-28 ENCOUNTER — NURSE TRIAGE (OUTPATIENT)
Dept: OTHER | Facility: CLINIC | Age: 75
End: 2020-09-28

## 2020-09-28 NOTE — TELEPHONE ENCOUNTER
Writer contacted pt after receiving a phone call from [facility] w/ concerns abt pts welfare. When [facility] spoke to pt earlier today pt sounded depressed and discovered pt had a recent fall. Writer attempted to send pt to ED however pt refused. Writer attempted to schedule appt for pt to be seen however pt states she is unable to walk. Writer informed pt that our office has a wheelchair but pt still refused. Nixonr was able to schedule a VV special visit w/ Jose on 9/29/20 @ 11:15 am.    Pt states she fell recently x 1 wk is having back, stomach & hip pain.   Pt has testing susan'd 9/30/20 for VASCULAR  AORTA DUPLEX @ [facility] and is also in contact w/ Dr. Lilo Moss as well
was connected to triage in response to information provided to the E[facility]. Please do not respond through this encounter as the response is not directed to a shared pool.

## 2020-09-29 ENCOUNTER — APPOINTMENT (OUTPATIENT)
Dept: CT IMAGING | Age: 75
End: 2020-09-29
Payer: MEDICARE

## 2020-09-29 ENCOUNTER — HOSPITAL ENCOUNTER (OUTPATIENT)
Age: 75
Setting detail: OBSERVATION
Discharge: OTHER INSTITUTION | End: 2020-09-30
Attending: EMERGENCY MEDICINE | Admitting: INTERNAL MEDICINE
Payer: MEDICARE

## 2020-09-29 PROBLEM — M79.605 LEFT LEG PAIN: Status: ACTIVE | Noted: 2020-09-29

## 2020-09-29 LAB
ABSOLUTE EOS #: 0.4 K/UL (ref 0–0.4)
ABSOLUTE IMMATURE GRANULOCYTE: ABNORMAL K/UL (ref 0–0.3)
ABSOLUTE LYMPH #: 2.5 K/UL (ref 1–4.8)
ABSOLUTE MONO #: 0.5 K/UL (ref 0.1–1.3)
ALBUMIN SERPL-MCNC: 3.8 G/DL (ref 3.5–5.2)
ALBUMIN/GLOBULIN RATIO: ABNORMAL (ref 1–2.5)
ALP BLD-CCNC: 68 U/L (ref 35–104)
ALT SERPL-CCNC: 11 U/L (ref 5–33)
ANION GAP SERPL CALCULATED.3IONS-SCNC: 11 MMOL/L (ref 9–17)
AST SERPL-CCNC: 16 U/L
BASOPHILS # BLD: 1 % (ref 0–2)
BASOPHILS ABSOLUTE: 0 K/UL (ref 0–0.2)
BILIRUB SERPL-MCNC: 0.33 MG/DL (ref 0.3–1.2)
BUN BLDV-MCNC: 26 MG/DL (ref 8–23)
BUN/CREAT BLD: ABNORMAL (ref 9–20)
CALCIUM SERPL-MCNC: 9.2 MG/DL (ref 8.6–10.4)
CHLORIDE BLD-SCNC: 104 MMOL/L (ref 98–107)
CO2: 24 MMOL/L (ref 20–31)
CREAT SERPL-MCNC: 1.14 MG/DL (ref 0.5–0.9)
DIFFERENTIAL TYPE: ABNORMAL
EOSINOPHILS RELATIVE PERCENT: 5 % (ref 0–4)
GFR AFRICAN AMERICAN: 56 ML/MIN
GFR NON-AFRICAN AMERICAN: 46 ML/MIN
GFR SERPL CREATININE-BSD FRML MDRD: ABNORMAL ML/MIN/{1.73_M2}
GFR SERPL CREATININE-BSD FRML MDRD: ABNORMAL ML/MIN/{1.73_M2}
GLUCOSE BLD-MCNC: 115 MG/DL (ref 70–99)
HCT VFR BLD CALC: 32.6 % (ref 36–46)
HEMOGLOBIN: 10.6 G/DL (ref 12–16)
IMMATURE GRANULOCYTES: ABNORMAL %
LACTIC ACID: 1.1 MMOL/L (ref 0.5–2.2)
LYMPHOCYTES # BLD: 35 % (ref 24–44)
MCH RBC QN AUTO: 26.5 PG (ref 26–34)
MCHC RBC AUTO-ENTMCNC: 32.7 G/DL (ref 31–37)
MCV RBC AUTO: 81.1 FL (ref 80–100)
MONOCYTES # BLD: 7 % (ref 1–7)
NRBC AUTOMATED: ABNORMAL PER 100 WBC
PDW BLD-RTO: 15 % (ref 11.5–14.9)
PLATELET # BLD: 248 K/UL (ref 150–450)
PLATELET ESTIMATE: ABNORMAL
PMV BLD AUTO: 9.1 FL (ref 6–12)
POTASSIUM SERPL-SCNC: 3.7 MMOL/L (ref 3.7–5.3)
RBC # BLD: 4.02 M/UL (ref 4–5.2)
RBC # BLD: ABNORMAL 10*6/UL
SEG NEUTROPHILS: 52 % (ref 36–66)
SEGMENTED NEUTROPHILS ABSOLUTE COUNT: 3.8 K/UL (ref 1.3–9.1)
SODIUM BLD-SCNC: 139 MMOL/L (ref 135–144)
TOTAL PROTEIN: 7 G/DL (ref 6.4–8.3)
WBC # BLD: 7.2 K/UL (ref 3.5–11)
WBC # BLD: ABNORMAL 10*3/UL

## 2020-09-29 PROCEDURE — G0378 HOSPITAL OBSERVATION PER HR: HCPCS

## 2020-09-29 PROCEDURE — 83605 ASSAY OF LACTIC ACID: CPT

## 2020-09-29 PROCEDURE — 6360000002 HC RX W HCPCS: Performed by: EMERGENCY MEDICINE

## 2020-09-29 PROCEDURE — 99285 EMERGENCY DEPT VISIT HI MDM: CPT

## 2020-09-29 PROCEDURE — 85025 COMPLETE CBC W/AUTO DIFF WBC: CPT

## 2020-09-29 PROCEDURE — 80053 COMPREHEN METABOLIC PANEL: CPT

## 2020-09-29 PROCEDURE — 96374 THER/PROPH/DIAG INJ IV PUSH: CPT

## 2020-09-29 PROCEDURE — 73700 CT LOWER EXTREMITY W/O DYE: CPT

## 2020-09-29 PROCEDURE — 74176 CT ABD & PELVIS W/O CONTRAST: CPT

## 2020-09-29 PROCEDURE — 36415 COLL VENOUS BLD VENIPUNCTURE: CPT

## 2020-09-29 RX ORDER — POTASSIUM CHLORIDE 7.45 MG/ML
10 INJECTION INTRAVENOUS PRN
Status: DISCONTINUED | OUTPATIENT
Start: 2020-09-29 | End: 2020-10-02 | Stop reason: HOSPADM

## 2020-09-29 RX ORDER — FLUTICASONE PROPIONATE 50 MCG
2 SPRAY, SUSPENSION (ML) NASAL DAILY
Status: DISCONTINUED | OUTPATIENT
Start: 2020-09-30 | End: 2020-10-02 | Stop reason: HOSPADM

## 2020-09-29 RX ORDER — CETIRIZINE HYDROCHLORIDE 10 MG/1
5 TABLET ORAL DAILY
Status: DISCONTINUED | OUTPATIENT
Start: 2020-09-30 | End: 2020-10-02 | Stop reason: HOSPADM

## 2020-09-29 RX ORDER — METOPROLOL TARTRATE 50 MG/1
50 TABLET, FILM COATED ORAL 2 TIMES DAILY
Status: DISCONTINUED | OUTPATIENT
Start: 2020-09-29 | End: 2020-10-02 | Stop reason: HOSPADM

## 2020-09-29 RX ORDER — DOCUSATE SODIUM 100 MG/1
100 CAPSULE, LIQUID FILLED ORAL DAILY PRN
Status: DISCONTINUED | OUTPATIENT
Start: 2020-09-29 | End: 2020-10-02 | Stop reason: HOSPADM

## 2020-09-29 RX ORDER — ALBUTEROL SULFATE 2.5 MG/3ML
2.5 SOLUTION RESPIRATORY (INHALATION) EVERY 4 HOURS PRN
Status: DISCONTINUED | OUTPATIENT
Start: 2020-09-29 | End: 2020-10-02 | Stop reason: HOSPADM

## 2020-09-29 RX ORDER — IBUPROFEN 200 MG
1250 CAPSULE ORAL DAILY
Status: DISCONTINUED | OUTPATIENT
Start: 2020-09-30 | End: 2020-09-29 | Stop reason: RX

## 2020-09-29 RX ORDER — ONDANSETRON 2 MG/ML
4 INJECTION INTRAMUSCULAR; INTRAVENOUS EVERY 6 HOURS PRN
Status: DISCONTINUED | OUTPATIENT
Start: 2020-09-29 | End: 2020-10-02 | Stop reason: HOSPADM

## 2020-09-29 RX ORDER — POTASSIUM CHLORIDE 20 MEQ/1
40 TABLET, EXTENDED RELEASE ORAL PRN
Status: DISCONTINUED | OUTPATIENT
Start: 2020-09-29 | End: 2020-10-02 | Stop reason: HOSPADM

## 2020-09-29 RX ORDER — POLYETHYLENE GLYCOL 3350 17 G/17G
17 POWDER, FOR SOLUTION ORAL DAILY PRN
Status: DISCONTINUED | OUTPATIENT
Start: 2020-09-29 | End: 2020-10-02 | Stop reason: HOSPADM

## 2020-09-29 RX ORDER — SODIUM CHLORIDE 0.9 % (FLUSH) 0.9 %
10 SYRINGE (ML) INJECTION EVERY 12 HOURS SCHEDULED
Status: DISCONTINUED | OUTPATIENT
Start: 2020-09-29 | End: 2020-10-02 | Stop reason: HOSPADM

## 2020-09-29 RX ORDER — MAGNESIUM SULFATE 1 G/100ML
1 INJECTION INTRAVENOUS PRN
Status: DISCONTINUED | OUTPATIENT
Start: 2020-09-29 | End: 2020-10-02 | Stop reason: HOSPADM

## 2020-09-29 RX ORDER — OXYCODONE HYDROCHLORIDE 5 MG/1
5 TABLET ORAL EVERY 8 HOURS PRN
Status: DISCONTINUED | OUTPATIENT
Start: 2020-09-29 | End: 2020-10-02 | Stop reason: HOSPADM

## 2020-09-29 RX ORDER — SODIUM CHLORIDE 0.9 % (FLUSH) 0.9 %
10 SYRINGE (ML) INJECTION PRN
Status: DISCONTINUED | OUTPATIENT
Start: 2020-09-29 | End: 2020-10-02 | Stop reason: HOSPADM

## 2020-09-29 RX ORDER — ASPIRIN 81 MG/1
81 TABLET ORAL DAILY
Status: DISCONTINUED | OUTPATIENT
Start: 2020-09-30 | End: 2020-10-02 | Stop reason: HOSPADM

## 2020-09-29 RX ORDER — DULOXETIN HYDROCHLORIDE 30 MG/1
30 CAPSULE, DELAYED RELEASE ORAL 2 TIMES DAILY
Status: DISCONTINUED | OUTPATIENT
Start: 2020-09-29 | End: 2020-10-02 | Stop reason: HOSPADM

## 2020-09-29 RX ORDER — ACETAMINOPHEN 650 MG/1
650 SUPPOSITORY RECTAL EVERY 6 HOURS PRN
Status: DISCONTINUED | OUTPATIENT
Start: 2020-09-29 | End: 2020-10-02 | Stop reason: HOSPADM

## 2020-09-29 RX ORDER — ACETAMINOPHEN 325 MG/1
650 TABLET ORAL EVERY 6 HOURS PRN
Status: DISCONTINUED | OUTPATIENT
Start: 2020-09-29 | End: 2020-10-02 | Stop reason: HOSPADM

## 2020-09-29 RX ORDER — PROMETHAZINE HYDROCHLORIDE 25 MG/1
12.5 TABLET ORAL EVERY 6 HOURS PRN
Status: DISCONTINUED | OUTPATIENT
Start: 2020-09-29 | End: 2020-10-02 | Stop reason: HOSPADM

## 2020-09-29 RX ORDER — NAPROXEN SODIUM 220 MG
220 TABLET ORAL 2 TIMES DAILY WITH MEALS
Status: DISCONTINUED | OUTPATIENT
Start: 2020-09-30 | End: 2020-09-29 | Stop reason: RX

## 2020-09-29 RX ADMIN — HYDROMORPHONE HYDROCHLORIDE 1 MG: 1 INJECTION, SOLUTION INTRAMUSCULAR; INTRAVENOUS; SUBCUTANEOUS at 21:04

## 2020-09-29 ASSESSMENT — PAIN SCALES - GENERAL
PAINLEVEL_OUTOF10: 6
PAINLEVEL_OUTOF10: 0
PAINLEVEL_OUTOF10: 6
PAINLEVEL_OUTOF10: 6

## 2020-09-29 ASSESSMENT — ENCOUNTER SYMPTOMS
PHOTOPHOBIA: 0
ABDOMINAL PAIN: 0
COUGH: 0

## 2020-09-29 ASSESSMENT — PAIN DESCRIPTION - PAIN TYPE: TYPE: CHRONIC PAIN

## 2020-09-29 ASSESSMENT — PAIN DESCRIPTION - ORIENTATION: ORIENTATION: LEFT

## 2020-09-29 ASSESSMENT — PAIN DESCRIPTION - LOCATION: LOCATION: HIP;LEG;FOOT

## 2020-09-29 NOTE — ED NOTES
Mode of arrival (squad #, walk in, police, etc) : Medic 40        Chief complaint(s): chronic neuropathy in the left leg. Hip pain. Fall 10 days ago         Arrival Note (brief scenario, treatment PTA, etc). : Pt states she uses a walker at home when it came out from her 10 days ago and she fell on the left hip and leg. Pt started to experience pain in the hip and leg a few days later. Pt is tender in the left leg. Was able to pivot to the stretcher with assistance. C= \"Have you ever felt that you should Cut down on your drinking? \"  No  A= \"Have people Annoyed you by criticizing your drinking? \"  No  G= \"Have you ever felt bad or Guilty about your drinking? \"  No  E= \"Have you ever had a drink as an Eye-opener first thing in the morning to steady your nerves or to help a hangover? \"  No      Deferred []      Reason for deferring: N/A    *If yes to two or more: probable alcohol abuse. Ajay Olsen RN  09/29/20 [id number]

## 2020-09-30 PROBLEM — M25.552 PAIN OF LEFT HIP JOINT: Status: ACTIVE | Noted: 2020-09-29

## 2020-09-30 LAB
ANION GAP SERPL CALCULATED.3IONS-SCNC: 10 MMOL/L (ref 9–17)
BUN BLDV-MCNC: 23 MG/DL (ref 8–23)
BUN/CREAT BLD: ABNORMAL (ref 9–20)
CALCIUM SERPL-MCNC: 8.7 MG/DL (ref 8.6–10.4)
CHLORIDE BLD-SCNC: 105 MMOL/L (ref 98–107)
CO2: 26 MMOL/L (ref 20–31)
CREAT SERPL-MCNC: 1.03 MG/DL (ref 0.5–0.9)
GFR AFRICAN AMERICAN: >60 ML/MIN
GFR NON-AFRICAN AMERICAN: 52 ML/MIN
GFR SERPL CREATININE-BSD FRML MDRD: ABNORMAL ML/MIN/{1.73_M2}
GFR SERPL CREATININE-BSD FRML MDRD: ABNORMAL ML/MIN/{1.73_M2}
GLUCOSE BLD-MCNC: 96 MG/DL (ref 70–99)
HCT VFR BLD CALC: 32.3 % (ref 36–46)
HEMOGLOBIN: 10.4 G/DL (ref 12–16)
INR BLD: 1
MCH RBC QN AUTO: 26.5 PG (ref 26–34)
MCHC RBC AUTO-ENTMCNC: 32.4 G/DL (ref 31–37)
MCV RBC AUTO: 81.8 FL (ref 80–100)
NRBC AUTOMATED: ABNORMAL PER 100 WBC
PDW BLD-RTO: 14.7 % (ref 11.5–14.9)
PLATELET # BLD: 237 K/UL (ref 150–450)
PMV BLD AUTO: 9.2 FL (ref 6–12)
POTASSIUM SERPL-SCNC: 3.9 MMOL/L (ref 3.7–5.3)
PROTHROMBIN TIME: 13.1 SEC (ref 11.8–14.6)
RBC # BLD: 3.95 M/UL (ref 4–5.2)
SODIUM BLD-SCNC: 141 MMOL/L (ref 135–144)
WBC # BLD: 5.8 K/UL (ref 3.5–11)

## 2020-09-30 PROCEDURE — 36415 COLL VENOUS BLD VENIPUNCTURE: CPT

## 2020-09-30 PROCEDURE — 97162 PT EVAL MOD COMPLEX 30 MIN: CPT

## 2020-09-30 PROCEDURE — 96372 THER/PROPH/DIAG INJ SC/IM: CPT

## 2020-09-30 PROCEDURE — 97535 SELF CARE MNGMENT TRAINING: CPT

## 2020-09-30 PROCEDURE — 6370000000 HC RX 637 (ALT 250 FOR IP): Performed by: NURSE PRACTITIONER

## 2020-09-30 PROCEDURE — 99223 1ST HOSP IP/OBS HIGH 75: CPT | Performed by: INTERNAL MEDICINE

## 2020-09-30 PROCEDURE — 85027 COMPLETE CBC AUTOMATED: CPT

## 2020-09-30 PROCEDURE — 80048 BASIC METABOLIC PNL TOTAL CA: CPT

## 2020-09-30 PROCEDURE — G0378 HOSPITAL OBSERVATION PER HR: HCPCS

## 2020-09-30 PROCEDURE — 97166 OT EVAL MOD COMPLEX 45 MIN: CPT

## 2020-09-30 PROCEDURE — 6360000002 HC RX W HCPCS: Performed by: INTERNAL MEDICINE

## 2020-09-30 PROCEDURE — 6370000000 HC RX 637 (ALT 250 FOR IP): Performed by: INTERNAL MEDICINE

## 2020-09-30 PROCEDURE — 2580000003 HC RX 258: Performed by: NURSE PRACTITIONER

## 2020-09-30 PROCEDURE — 97530 THERAPEUTIC ACTIVITIES: CPT

## 2020-09-30 PROCEDURE — 85610 PROTHROMBIN TIME: CPT

## 2020-09-30 PROCEDURE — U0003 INFECTIOUS AGENT DETECTION BY NUCLEIC ACID (DNA OR RNA); SEVERE ACUTE RESPIRATORY SYNDROME CORONAVIRUS 2 (SARS-COV-2) (CORONAVIRUS DISEASE [COVID-19]), AMPLIFIED PROBE TECHNIQUE, MAKING USE OF HIGH THROUGHPUT TECHNOLOGIES AS DESCRIBED BY CMS-2020-01-R: HCPCS

## 2020-09-30 PROCEDURE — 6360000002 HC RX W HCPCS: Performed by: NURSE PRACTITIONER

## 2020-09-30 RX ORDER — METHYLPREDNISOLONE 4 MG/1
4 TABLET ORAL
Status: DISCONTINUED | OUTPATIENT
Start: 2020-10-01 | End: 2020-10-02 | Stop reason: HOSPADM

## 2020-09-30 RX ORDER — AMLODIPINE BESYLATE 5 MG/1
5 TABLET ORAL DAILY
Status: DISCONTINUED | OUTPATIENT
Start: 2020-09-30 | End: 2020-10-02 | Stop reason: HOSPADM

## 2020-09-30 RX ORDER — METHYLPREDNISOLONE 4 MG/1
24 TABLET ORAL ONCE
Status: COMPLETED | OUTPATIENT
Start: 2020-09-30 | End: 2020-09-30

## 2020-09-30 RX ORDER — METHYLPREDNISOLONE 4 MG/1
4 TABLET ORAL NIGHTLY
Status: DISCONTINUED | OUTPATIENT
Start: 2020-10-02 | End: 2020-10-02 | Stop reason: HOSPADM

## 2020-09-30 RX ORDER — METHYLPREDNISOLONE 4 MG/1
8 TABLET ORAL NIGHTLY
Status: DISCONTINUED | OUTPATIENT
Start: 2020-10-01 | End: 2020-10-02 | Stop reason: HOSPADM

## 2020-09-30 RX ADMIN — OXYCODONE HYDROCHLORIDE 5 MG: 5 TABLET ORAL at 00:17

## 2020-09-30 RX ADMIN — DULOXETINE 30 MG: 30 CAPSULE, DELAYED RELEASE ORAL at 00:11

## 2020-09-30 RX ADMIN — ENOXAPARIN SODIUM 30 MG: 30 INJECTION SUBCUTANEOUS at 21:15

## 2020-09-30 RX ADMIN — Medication 10 ML: at 00:15

## 2020-09-30 RX ADMIN — OXYCODONE HYDROCHLORIDE 5 MG: 5 TABLET ORAL at 06:31

## 2020-09-30 RX ADMIN — Medication 10 ML: at 09:28

## 2020-09-30 RX ADMIN — ENOXAPARIN SODIUM 30 MG: 30 INJECTION SUBCUTANEOUS at 08:02

## 2020-09-30 RX ADMIN — METHYLPREDNISOLONE 24 MG: 4 TABLET ORAL at 13:17

## 2020-09-30 RX ADMIN — METOPROLOL TARTRATE 50 MG: 50 TABLET, FILM COATED ORAL at 21:15

## 2020-09-30 RX ADMIN — AMLODIPINE BESYLATE 5 MG: 5 TABLET ORAL at 13:34

## 2020-09-30 RX ADMIN — METOPROLOL TARTRATE 50 MG: 50 TABLET, FILM COATED ORAL at 08:02

## 2020-09-30 RX ADMIN — ASPIRIN 81 MG: 81 TABLET, COATED ORAL at 08:03

## 2020-09-30 RX ADMIN — CETIRIZINE HYDROCHLORIDE 5 MG: 10 TABLET, FILM COATED ORAL at 08:02

## 2020-09-30 RX ADMIN — OXYCODONE HYDROCHLORIDE 5 MG: 5 TABLET ORAL at 14:37

## 2020-09-30 RX ADMIN — METOPROLOL TARTRATE 50 MG: 50 TABLET, FILM COATED ORAL at 00:10

## 2020-09-30 RX ADMIN — DULOXETINE 30 MG: 30 CAPSULE, DELAYED RELEASE ORAL at 08:02

## 2020-09-30 RX ADMIN — ENOXAPARIN SODIUM 30 MG: 30 INJECTION SUBCUTANEOUS at 00:11

## 2020-09-30 RX ADMIN — DULOXETINE 30 MG: 30 CAPSULE, DELAYED RELEASE ORAL at 21:15

## 2020-09-30 ASSESSMENT — ENCOUNTER SYMPTOMS
SHORTNESS OF BREATH: 0
VOMITING: 0
CONSTIPATION: 0
COUGH: 0
DIARRHEA: 0
BACK PAIN: 1
SORE THROAT: 0
NAUSEA: 0
ABDOMINAL PAIN: 0
WHEEZING: 0

## 2020-09-30 ASSESSMENT — PAIN DESCRIPTION - DESCRIPTORS
DESCRIPTORS: NAGGING
DESCRIPTORS: NAGGING

## 2020-09-30 ASSESSMENT — PAIN SCALES - GENERAL
PAINLEVEL_OUTOF10: 5
PAINLEVEL_OUTOF10: 7
PAINLEVEL_OUTOF10: 4
PAINLEVEL_OUTOF10: 8
PAINLEVEL_OUTOF10: 5
PAINLEVEL_OUTOF10: 4
PAINLEVEL_OUTOF10: 7
PAINLEVEL_OUTOF10: 4
PAINLEVEL_OUTOF10: 8

## 2020-09-30 ASSESSMENT — PAIN DESCRIPTION - FREQUENCY
FREQUENCY: CONTINUOUS
FREQUENCY: CONTINUOUS

## 2020-09-30 ASSESSMENT — PAIN DESCRIPTION - LOCATION
LOCATION: KNEE;HIP;BACK
LOCATION: BACK
LOCATION: HAND;FOOT
LOCATION: HAND;FOOT
LOCATION: BACK
LOCATION: KNEE;HIP;BACK

## 2020-09-30 ASSESSMENT — PAIN DESCRIPTION - PAIN TYPE
TYPE: NEUROPATHIC PAIN
TYPE: ACUTE PAIN
TYPE: ACUTE PAIN
TYPE: NEUROPATHIC PAIN

## 2020-09-30 ASSESSMENT — PAIN DESCRIPTION - PROGRESSION: CLINICAL_PROGRESSION: GRADUALLY WORSENING

## 2020-09-30 ASSESSMENT — PAIN DESCRIPTION - ORIENTATION
ORIENTATION: LEFT;LOWER
ORIENTATION: LEFT
ORIENTATION: LEFT;LOWER

## 2020-09-30 ASSESSMENT — PAIN - FUNCTIONAL ASSESSMENT: PAIN_FUNCTIONAL_ASSESSMENT: PREVENTS OR INTERFERES SOME ACTIVE ACTIVITIES AND ADLS

## 2020-09-30 NOTE — H&P
[address] [facility]     HISTORY AND PHYSICAL EXAMINATION            Date:   9/30/2020  Patientname:  Jena Garcia  Date of admission:  9/29/2020  6:37 PM  MRN:   [id number]  Account:  [de-identified]  YOB: 1945  PCP:    Ernie Francois MD  Room:   2062/2062-01  Code Status:    Full Code    CHIEF COMPLAINT     Chief Complaint   Patient presents with    Hip Pain    Leg Pain       HISTORY OF PRESENT ILLNESS  (Character, Onset, Location, Duration,  Exacerbating/RelievingFactors, Radiation,   Associated Symptoms, Severity )      The patient is a 75 y.o. -American female, with a history of COPD, DJD of hip, HTN, fibromyalgia, hypothyroidism, lumbar radiculopathy, morbid obesity, and spinal stenosis, who presents with hip pain and leg pain. According to patient, she fell onto her left knee at home approximately 2 weeks ago. Denies injury from the incident, stating that she developed a small bruise on her left knee, but no other injury was noted. Reports that over the past couple of days she has been having low back pain, pain in her left hip, and a stumbling gait. Patient reports the pain as severe and radiates from lower back down into her left hip and thigh. Patient states that today was the final straw as the pain had become so severe and she feels she is no longer safe at home as it is just a matter of time before she falls and hurts herself. Patient reports 2 prior stents and ECF for rehabilitative services. And states that at this point she may need to return to a facility for intensive therapy. Symptoms are associated with neuropathy of bilateral lower extremities with numbness sensation in her left heel. Denies fever, chills, chest pain, cough, abdominal pain, nausea, vomiting, diarrhea, and urinary symptoms. Symptoms are aggravated by movement and activity and alleviated with rest. Symptoms are reported as constant and moderate.     PAST MEDICAL HISTORY Patient  has a past medical history of Abnormality of rib determined by X-ray, Acute cystitis without hematuria, Acute exacerbation of chronic bronchitis (HCC), Acute on chronic diastolic heart failure ([facility] [address] Adjustment disorder with mixed anxiety and depressed mood, Anemia, Back pain, Bronchiectasis with acute lower respiratory infection ([facility] [address] Bronchitis, Chronic bronchitis ([facility] [address] Chronic cough, Degenerative joint disease (DJD) of hip, Dyslipidemia, Encephalopathy acute, Essential hypertension, Fibromyalgia, GERD (gastroesophageal reflux disease), History of total bilateral knee replacement, Hx of blood clots, Hyperlipidemia, Hypertension, Incontinence of urine, Irregular heartbeat, Medication monitoring encounter, Morbid obesity with BMI of 45.0-49.9, adult ([facility] [address] Obstructive sleep apnea syndrome, Osteoarthritis, Primary osteoarthritis of left knee, PVC (premature ventricular contraction), S/P right and left heart catheterization, Sleep apnea, SOB (shortness of breath), Spinal stenosis of lumbar region with neurogenic claudication, Supplemental oxygen dependent, Urine frequency, and UTI (urinary tract infection). PAST SURGICAL HISTORY    Patient  has a past surgical history that includes Hysterectomy; Nerve Block (5/13/2013); Nerve Block (5/28/13); sinus surgery; Foot surgery (Left); Nerve Block (2/14/14); Colonoscopy (4 28 14); Nerve Block (07/14/2014); Nerve Block (7-21-14); Nerve Block (10/2/14); Nerve Block (11/9/2015); Nerve Block (11/16/15); Nerve Block (11/23/15); Total hip arthroplasty (Right, 5/14/15); bronchoscopy (Left, 8/16/2017); Cardiac catheterization (x 3); Nerve Block (03/28/2018); Nerve Block (05/23/2018); Nerve Block (08/28/2018); pr perq vert agmntj cavity crtj uni/bi cannulj lmbr (N/A, 10/29/2018); and joint replacement (Bilateral, 5/27/2016).      FAMILY HISTORY    Patient family history includes Heart Disease in her sister; High Blood Pressure in her mother; Stomach Cancer in her brother. SOCIAL HISTORY    Patient  reports that she quit smoking about 27 years ago. She has a 18.00 pack-year smoking history. She has never used smokeless tobacco. She reports that she does not drink alcohol or use drugs. HOME MEDICATIONS        Prior to Admission medications    Medication Sig Start Date End Date Taking? Authorizing Provider   oxyCODONE (ROXICODONE) 5 MG immediate release tablet Take 1 tablet by mouth every 8 hours as needed for Pain for up to 30 days.  9/7/20 10/7/20 Yes Esvin Garcia MD   neomycin-polymyxin-dexameth [id number] [address] [place] [address]  7/13/20  Yes Historical Provider, MD   levocetirizine (XYZAL) 5 MG tablet take 1 tablet by mouth once daily AT NIGHT 8/24/20  Yes Jenni Tang PA-C   fluticasone (FLONASE) 50 MCG/ACT nasal spray instill 2 sprays into each nostril once daily 5/15/20  Yes Historical Provider, MD   calcium carbonate (OYSTER SHELL CALCIUM 500 MG) 1250 (500 Ca) MG tablet Take 1 tablet by mouth daily With magnesium, zinc   Yes Historical Provider, MD   Potassium 99 MG TABS Take by mouth   Yes Historical Provider, MD   albuterol sulfate  (90 Base) MCG/ACT inhaler Inhale 2 puffs into the lungs 4 times daily as needed for Wheezing 4/20/20  Yes Navya Ball MD   DULoxetine (CYMBALTA) 30 MG extended release capsule TAKE 1 CAPSULE TWICE A DAY 11/7/19  Yes Navya Ball MD   ofloxacin (OCUFLOX) 0.3 % solution Place into both eyes daily as needed  4/13/19  Yes Historical Provider, MD   metoprolol tartrate (LOPRESSOR) 50 MG tablet Take 50 mg by mouth 2 times daily   Yes Historical Provider, MD   aspirin 81 MG tablet Take 81 mg by mouth daily   Yes Historical Provider, MD   naproxen sodium (ANAPROX) 220 MG tablet Take 220 mg by mouth 2 times daily (with meals)    Historical Provider, MD   gabapentin (NEURONTIN) 100 MG capsule take 1 capsule by mouth once daily 3/18/20 8/24/20  Navya Ball MD   docusate sodium (COLACE) 100 MG capsule Take 100 mg by mouth daily as needed for Constipation    Historical Provider, MD   Misc. Devices [facility]) MISC 1 each by Does not apply route daily Upright walker with wheels and seat 11/12/18   Triny Guerrero MD       ALLERGIES      Rosuvastatin calcium; Statins; Bactrim [sulfamethoxazole-trimethoprim]; Caffeine; Dye [iodides]; Food; Ioxaglate; Dicloxacillin; and Pcn [penicillins]    REVIEW OF SYSTEMS     Review of Systems   Constitutional: Positive for activity change. Negative for chills, diaphoresis and fever. HENT: Negative for congestion and sore throat. Respiratory: Negative for cough, shortness of breath and wheezing. Cardiovascular: Negative for chest pain, palpitations and leg swelling. Gastrointestinal: Negative for abdominal pain, constipation, diarrhea, nausea and vomiting. Genitourinary: Negative for dysuria, frequency and urgency. Musculoskeletal: Positive for arthralgias, back pain and gait problem. Negative for myalgias. Skin: Negative for rash. Neurological: Positive for numbness (Left heel \"from neuropathy\"). Negative for dizziness, weakness and headaches. Psychiatric/Behavioral: The patient is not nervous/anxious. PHYSICAL EXAM      BP (!) 181/71   Pulse 67   Temp 98.2 °F (36.8 °C) (Oral)   Resp 18   Ht 5' 4\" (1.626 m)   Wt 292 lb (132.5 kg)   SpO2 98%   BMI 50.12 kg/m²  Body mass index is 50.12 kg/m². Physical Exam  Constitutional:       General: She is not in acute distress. Appearance: She is well-developed. She is not diaphoretic. HENT:      Head: Normocephalic and atraumatic. Eyes:      Conjunctiva/sclera: Conjunctivae normal.      Pupils: Pupils are equal, round, and reactive to light. Neck:      Musculoskeletal: Normal range of motion and neck supple. Trachea: No tracheal deviation. Cardiovascular:      Rate and Rhythm: Normal rate and regular rhythm. Heart sounds: Normal heart sounds. No murmur. No friction rub. No gallop.     Pulmonary:      Effort: Pulmonary effort is normal. No respiratory distress. Breath sounds: Normal breath sounds. No wheezing or rales. Chest:      Chest wall: No tenderness. Abdominal:      General: Bowel sounds are normal. There is no distension. Palpations: Abdomen is soft. Tenderness: There is no abdominal tenderness. There is no guarding. Musculoskeletal: Normal range of motion. General: No tenderness (Left hip). Lymphadenopathy:      Cervical: No cervical adenopathy. Skin:     General: Skin is warm and dry. Coloration: Skin is not pale. Findings: No erythema or rash. Neurological:      Mental Status: She is alert and oriented to person, place, and time. Motor: No seizure activity. Coordination: Coordination normal.      Comments: No loss of sensation in left heel despite numbness. Psychiatric:         Behavior: Behavior normal.         Thought Content: Thought content normal.       DIAGNOSTICS      EKG: None    Labs:  CBC:   Recent Labs     09/29/20 2104 09/30/20  0537   WBC 7.2 5.8   HGB 10.6* 10.4*    237     BMP:    Recent Labs     09/29/20 2104 09/30/20  0537    141   K 3.7 3.9    105   CO2 24 26   BUN 26* 23   CREATININE 1.14* 1.03*   GLUCOSE 115* 96     S. Calcium:  Recent Labs     09/30/20  0537   CALCIUM 8.7     S. Ionized Calcium:No results for input(s): IONCA in the last 72 hours. S. Magnesium:No results for input(s): MG in the last 72 hours. S. Phosphorus:No results for input(s): PHOS in the last 72 hours. S. Glucose:No results for input(s): POCGLU in the last 72 hours. Glycosylated hemoglobin A1C:   Lab Results   Component Value Date    LABA1C 5.4 04/15/2019     Hepatic:   Recent Labs     09/29/20 2104   AST 16   ALT 11   ALKPHOS 68     CARDIAC ENZY: No results for input(s): CKTOTAL, CKMB, CKMBINDEX, TROPHS, MYOGLOBIN in the last 72 hours. INR: No results for input(s): INR in the last 72 hours.   BNP: No results for input(s): PROBNP in the last 72 hours. ABGs: No results for input(s): PH, PCO2, PO2, HCO3, O2SAT in the last 72 hours. Lipids: No results for input(s): CHOL, TRIG, HDL, LDLCALC in the last 72 hours. Invalid input(s): LDL  Pancreatic functions:No results for input(s): LIPASE, AMYLASE in the last 72 hours. Debbie Willams:   Recent Labs     09/29/20  2104   LACTA 1.1     Thyroid functions:   Lab Results   Component Value Date    TSH 2.61 04/15/2019      U/A:No results for input(s): NITRITE, COLORU, WBCUA, RBCUA, MUCUS, BACTERIA, CLARITYU, SPECGRAV, LEUKOCYTESUR, BLOODU, GLUCOSEU, AMORPHOUS in the last 72 hours. Invalid input(s): Bri Payne    Imaging/Diagonstics:     Ct Abdomen Pelvis Wo Contrast Additional Contrast? None    Result Date: 9/29/2020  EXAMINATION: STONE PROTOCOL CT OF THE ABDOMEN AND PELVIS 9/29/2020 8:14 pm; 9/29/2020 8:11 pm TECHNIQUE: CT of the left hip was performed without the administration of intravenous contrast.  Multiplanar reformatted images are provided for review. Dose modulation, iterative reconstruction, and/or weight based adjustment of the mA/kV was utilized to reduce the radiation dose to as low as reasonably achievable.; CT of the abdomen and pelvis was performed without the administration of intravenous contrast. Multiplanar reformatted images are provided for review. Dose modulation, iterative reconstruction, and/or weight based adjustment of the mA/kV was utilized to reduce the radiation dose to as low as reasonably achievable.  COMPARISON: Left hip radiograph September 28, 2018; pelvis and left hip radiograph January 1, 2020 HISTORY: ORDERING SYSTEM PROVIDED HISTORY: fall pain TECHNOLOGIST PROVIDED HISTORY: fall pain Reason for Exam: pt states she fell 10 days ago; abd pain and left hip pain Acuity: Acute Type of Exam: Initial; ORDERING SYSTEM PROVIDED HISTORY: abdominal pain, fall TECHNOLOGIST PROVIDED HISTORY: abdominal pain, fall Reason for Exam: pt states she fell 10 days ago; abd pain and left hip pain Acuity: Acute Type of Exam: Initial Relevant Medical/Surgical History: surg - hyst, right hip replacement, kyphoplasty FINDINGS: CT RENAL STONE PROTOCOL: LOWER CHEST:  Visualized portion of the lower chest demonstrates no acute abnormality. Calcifications of the partially visualized aortic and mitral valves. Chronic appearing scarring versus fibrotic change at the lung bases. Severe atherosclerotic changes of the imaged descending thoracic aorta. KIDNEYS AND URINARY TRACT: No renal calculi are identified. There is no evidence for hydronephrosis. The ureters are of normal course and caliber. ORGANS: Lack of intravenous contrast limits evaluation of the solid organs and bowel. The solid organs are grossly unremarkable. GI/BOWEL: No bowel obstruction. No evidence of acute appendicitis. PELVIS: The bladder and pelvic organs are unremarkable. PERITONEUM/RETROPERITONEUM: Severe atherosclerotic changes of the imaged abdominal aorta. No retroperitoneal adenopathy identified. No free fluid or free air identified within the abdomen and pelvis. BONES/SOFT TISSUES: No acute osseous or soft tissue abnormality identified. Postoperative changes of right total hip arthroplasty. Multilevel degenerative changes of the spine which are moderate to severe. Note is made of prior kyphoplasty at the L1 level. Grade 1 anterolisthesis of L4 on L5 which appears degenerative. Severe degenerative change of the left hip as described below. Degenerative changes of the pubic symphysis and bilateral sacroiliac joints. No acute soft tissue abnormality identified. Note is made of a small fat attenuating lesion along the distal iliopsoas on the left consistent with small lipoma. CT LEFT HIP: Bones: No acute fracture or dislocation of the left hip identified. No suspicious lytic or sclerotic osseous lesions. Soft Tissue:  No acute soft tissue abnormality at the left hip.   Small well-circumscribed fat attenuating lesion at the distal left iliopsoas consistent with small lipoma. Joint:  Severe osteoarthritis of the left hip with bone on bone articulation, subchondral sclerosis, and prominent subchondral cystic change. Marginal osteophytes also present. No significant joint effusion identified. 1. No acute intra-process identified. 2. Severe atherosclerotic disease. 3. No acute fracture identified. Specifically, no acute fracture of the left hip identified. 4. Severe osteoarthritis of the left hip. Ct Hip Left Wo Contrast    Result Date: 9/29/2020  EXAMINATION: STONE PROTOCOL CT OF THE ABDOMEN AND PELVIS 9/29/2020 8:14 pm; 9/29/2020 8:11 pm TECHNIQUE: CT of the left hip was performed without the administration of intravenous contrast.  Multiplanar reformatted images are provided for review. Dose modulation, iterative reconstruction, and/or weight based adjustment of the mA/kV was utilized to reduce the radiation dose to as low as reasonably achievable.; CT of the abdomen and pelvis was performed without the administration of intravenous contrast. Multiplanar reformatted images are provided for review. Dose modulation, iterative reconstruction, and/or weight based adjustment of the mA/kV was utilized to reduce the radiation dose to as low as reasonably achievable.  COMPARISON: Left hip radiograph September 28, 2018; pelvis and left hip radiograph January 1, 2020 HISTORY: ORDERING SYSTEM PROVIDED HISTORY: fall pain TECHNOLOGIST PROVIDED HISTORY: fall pain Reason for Exam: pt states she fell 10 days ago; abd pain and left hip pain Acuity: Acute Type of Exam: Initial; ORDERING SYSTEM PROVIDED HISTORY: abdominal pain, fall TECHNOLOGIST PROVIDED HISTORY: abdominal pain, fall Reason for Exam: pt states she fell 10 days ago; abd pain and left hip pain Acuity: Acute Type of Exam: Initial Relevant Medical/Surgical History: surg - hyst, right hip replacement, kyphoplasty FINDINGS: CT RENAL STONE PROTOCOL: LOWER CHEST:  Visualized portion of the lower chest demonstrates no acute abnormality. Calcifications of the partially visualized aortic and mitral valves. Chronic appearing scarring versus fibrotic change at the lung bases. Severe atherosclerotic changes of the imaged descending thoracic aorta. KIDNEYS AND URINARY TRACT: No renal calculi are identified. There is no evidence for hydronephrosis. The ureters are of normal course and caliber. ORGANS: Lack of intravenous contrast limits evaluation of the solid organs and bowel. The solid organs are grossly unremarkable. GI/BOWEL: No bowel obstruction. No evidence of acute appendicitis. PELVIS: The bladder and pelvic organs are unremarkable. PERITONEUM/RETROPERITONEUM: Severe atherosclerotic changes of the imaged abdominal aorta. No retroperitoneal adenopathy identified. No free fluid or free air identified within the abdomen and pelvis. BONES/SOFT TISSUES: No acute osseous or soft tissue abnormality identified. Postoperative changes of right total hip arthroplasty. Multilevel degenerative changes of the spine which are moderate to severe. Note is made of prior kyphoplasty at the L1 level. Grade 1 anterolisthesis of L4 on L5 which appears degenerative. Severe degenerative change of the left hip as described below. Degenerative changes of the pubic symphysis and bilateral sacroiliac joints. No acute soft tissue abnormality identified. Note is made of a small fat attenuating lesion along the distal iliopsoas on the left consistent with small lipoma. CT LEFT HIP: Bones: No acute fracture or dislocation of the left hip identified. No suspicious lytic or sclerotic osseous lesions. Soft Tissue:  No acute soft tissue abnormality at the left hip. Small well-circumscribed fat attenuating lesion at the distal left iliopsoas consistent with small lipoma. Joint:  Severe osteoarthritis of the left hip with bone on bone articulation, subchondral sclerosis, and prominent subchondral cystic change. Marginal osteophytes also present. No significant joint effusion identified. 1. No acute intra-process identified. 2. Severe atherosclerotic disease. 3. No acute fracture identified. Specifically, no acute fracture of the left hip identified. 4. Severe osteoarthritis of the left hip. ASSESSMENT  and  PLAN     Principal Problem:    Left leg pain  Active Problems:    Supplemental oxygen dependent    Morbid obesity (HCC)  Resolved Problems:    * No resolved hospital problems. *    Plan:    Left leg pain  -Pain in left hip and thigh after a fall 2 weeks ago  -CT of left hip shows severe atherosclerotic disease and severe osteoarthritis  --No acute abnormality or fracture noted  -PT and OT eval and treat  -Social service consult for discharge planning  --May need ECF at discharge  -Patient does not want orthopedic consult at this time  -Continue home dose pain meds  -Fall precautions    Consultations:     KARIN Hickman - CNP   9/30/2020  6:29 AM    Delmi Coker [address]    Phone [phone]

## 2020-09-30 NOTE — PROGRESS NOTES
Physical Therapy    Facility/Department: [facility] MED SURG  Initial Assessment    NAME: Chance Waggoner  : 1945  MRN: [id number]    Date of Service: 2020    Discharge Recommendations:  Patient would benefit from continued therapy after discharge        Assessment   Body structures, Functions, Activity limitations: Decreased functional mobility ; Decreased safe awareness;Decreased balance;Decreased endurance;Decreased strength;Decreased posture  Assessment: Pt with limited tolerance of session d/t pain and anxiety. Pt demo's need for 2 assist with most mobility d/t poor posture, pain, weakness, and decreased safety awareness. Pt is not safe to go home alone at this time as pt is a high fall risk and needs assistance with mobility. Cont per POC  Treatment Diagnosis: impaird mobility d/t LE pain  Prognosis: Good  Decision Making: Medium Complexity  History: HPI 75 y.o. female presents with complaints of left leg and hip pain. The patient states that she was using her wheeled walker but the walker went out from under her and she fell on her left hip and leg about 10 days ago. Initially she did not have much pain but a few days later she started having pain in her left hip and worsening of her chronic neuropathy in her left leg. She rates her symptoms as moderate to severe in severity, currently 6 out of 10. She has been taking oxycodone at home which does help with the pain, but her symptoms return before her next dose of pain medication is due. She is very concerned that she will fall and injure her leg again. Patient also notes that she gets some generalized umbilical abdominal cramping over the last week or so. She denies any current pain.   Exam: ROM, balance, ADLs, mobility, sensation, social hx  Clinical Presentation: fearful of falling and pain,decreased safety awarenss  PT Education: PT Role;Goals;Transfer Training;Functional Mobility Training;General Safety;Precautions  Barriers to Learning: none  REQUIRES PT FOLLOW UP: Yes  Activity Tolerance  Activity Tolerance: Patient limited by endurance; Patient limited by pain       Patient Diagnosis(es): The encounter diagnosis was Pain of left hip joint. has a past medical history of Abnormality of rib determined by X-ray, Acute cystitis without hematuria, Acute exacerbation of chronic bronchitis (HCC), Acute on chronic diastolic heart failure ([address] Adjustment disorder with mixed anxiety and depressed mood, Anemia, Back pain, Bronchiectasis with acute lower respiratory infection ([address] Bronchitis, Chronic bronchitis ([address] Chronic cough, Degenerative joint disease (DJD) of hip, Dyslipidemia, Encephalopathy acute, Essential hypertension, Fibromyalgia, GERD (gastroesophageal reflux disease), History of total bilateral knee replacement, Hx of blood clots, Hyperlipidemia, Hypertension, Incontinence of urine, Irregular heartbeat, Medication monitoring encounter, Morbid obesity with BMI of 45.0-49.9, adult ([facility] [address] [facility] Obstructive sleep apnea syndrome, Osteoarthritis, Primary osteoarthritis of left knee, PVC (premature ventricular contraction), S/P right and left heart catheterization, Sleep apnea, SOB (shortness of breath), Spinal stenosis of lumbar region with neurogenic claudication, Supplemental oxygen dependent, Urine frequency, and UTI (urinary tract infection). has a past surgical history that includes Hysterectomy; Nerve Block (5/13/2013); Nerve Block (5/28/13); sinus surgery; Foot surgery (Left); Nerve Block (2/14/14); Colonoscopy (4 28 14); Nerve Block (07/14/2014); Nerve Block (7-21-14); Nerve Block (10/2/14); Nerve Block (11/9/2015); Nerve Block (11/16/15); Nerve Block (11/23/15); Total hip arthroplasty (Right, 5/14/15); bronchoscopy (Left, 8/16/2017); Cardiac catheterization (x 3); Nerve Block (03/28/2018); Nerve Block (05/23/2018);  Nerve Block (08/28/2018); pr perq vert agmntj cavity crtj uni/bi cannulj lmbr (N/A, 10/29/2018); and joint replacement (Bilateral, 5/27/2016). Restrictions  Restrictions/Precautions  Restrictions/Precautions: Fall Risk  Required Braces or Orthoses?: No  Implants present? : Metal implants(R ALYSON, B BKA)  Position Activity Restriction  Other position/activity restrictions: decreased safety awareness, h/o fall at home  Vision/Hearing  Vision: Impaired  Vision Exceptions: Wears glasses for reading  Hearing: Within functional limits     Subjective  General  Chart Reviewed: Yes  Patient assessed for rehabilitation services?: Yes  Additional Pertinent Hx: Pt presents to ER with reports of fall approximately 2 weeks ago resulting in increased back and L hip pain causing difficulty with ambulation and ADLs. CT: degenerative changes lumbar spine, SI joints, pubic symphasis, and severe L hip DJD. Pt states she does not feel safe at home d/t limited mobility and increased pain  Response To Previous Treatment: Not applicable  Family / Caregiver Present: No  Diagnosis: L LE pain  Follows Commands: Within Functional Limits  Subjective  Subjective: Pt is resting in bed, easily awakens and agreeable to therapy. RN ok's session and states pt has been wanting to get OOB into chair  Pain Screening  Patient Currently in Pain: Yes  Pain Assessment  Pain Assessment: 0-10  Pain Level: 4  Patient's Stated Pain Goal: No pain  Pain Type: Acute pain  Pain Location: Back  Pain Orientation: Left; Lower  Pain Descriptors: Nagging  Pain Frequency: Continuous  Clinical Progression: Gradually worsening  Functional Pain Assessment: Prevents or interferes some active activities and ADLs  Non-Pharmaceutical Pain Intervention(s): Repositioned; Ambulation/Increased Activity  Response to Pain Intervention: Patient Satisfied  Vital Signs  Patient Currently in Pain: Yes  Pre Treatment Pain Screening  Intervention List: Patient able to continue with treatment    Orientation  Orientation  Overall Orientation Status: Within Functional Limits  Social/Functional History  Social/Functional History  Lives With: Alone  Type of Home: Apartment  Home Layout: One level  Home Access: Level entry  Bathroom Shower/Tub: Walk-in shower, Tub/Shower unit(unable to use walk in shower as shower chair does not fit)  Bathroom Toilet: Handicap height  Bathroom Equipment: Tub transfer bench, Hand-held shower, Grab bars around toilet  Bathroom Accessibility: Walker accessible(unsure if power wheelchair will fit)  Home Equipment: 4 wheeled walker, Electric scooter, Wheelchair-electric, Z Plane Help From: Home health(3x/wk for 2 hrs each, states aide performs cleaning tasks at home)  ADL Assistance: Independent  Homemaking Assistance: Needs assistance(home health aide)  Homemaking Responsibilities: Yes  Ambulation Assistance: Independent(4 wheeled RW in home, although states 4 wheeled RW caused her fall in the kitchen)  Transfer Assistance: Independent  Active : Yes  Mode of Transportation: SUV  Additional Comments: Pt reports that she has a home health aide 3 days per week for 2 hours per visit. Pt's aide provides assist with folding laundry and assist for cleaning such as sweeping, mopping, and dusting. Pt's son lives locally and can provide assist PRN, however he works 12 hours per day. Pt does her own grocery shopping with use of electric scooter at the store. Cognition        Objective     Observation/Palpation  Posture: Poor(flexed trunk onto walker or HHA)  Observation: darkened L prepatellar area with min edema as compared to R side.     AROM RLE (degrees)  RLE AROM: WFL  AROM LLE (degrees)  LLE AROM : WFL  AROM RUE (degrees)  RUE AROM : WFL  AROM LUE (degrees)  LUE AROM : WFL  Strength RLE  Comment: LEs at least 3/5, MMT deferred  Strength LLE  Comment: LEs at least 3/5, MMT deferred  Strength RUE  Comment: per OT  Strength LUE  Comment: per OT  Tone RLE  RLE Tone: Normotonic  Tone LLE  LLE Tone: Normotonic  Sensation  Overall Sensation Status: Impaired  Light

## 2020-09-30 NOTE — PROGRESS NOTES
JANIA spoke to pt regarding discharge. She decided on [place] and [place]. [place] does not have beds. Pt has a [facility]171 [facility] that needs to be paid. Cierra Klein from [facility] did complete onsite visit. [facility] will start pre cert today. However, acceptance will be contingent upon pt paying half the amount.

## 2020-09-30 NOTE — CARE COORDINATION
CASE MANAGEMENT NOTE:    Admission Date:  9/29/2020 Jean Garcia is a 75 y.o.  female    Admitted for : Left leg pain [M79.044]    Met with:  Patient    PCP:  Dr. Galvez Croton:  [address] [place] [address] Dr. Medicare      Current Residence/ Living Arrangements:  independently at home             Current Services PTA:  No    Is patient agreeable to VNS: Yes    Freedom of choice provided:  Yes    List of [address] provided: Yes    VNS chosen:  No    DME:  walker    Home Oxygen: No    Nebulizer: No    CPAP/BIPAP: No    Supplier: N/A    Potential Assistance Needed: Yes    SNF needed: Yes    Freedom of choice and list provided: Yes - Wants [place] or [address]:  [facility]       Does Patient want to use MEDS to BEDS? No    Is patient currently receiving oral anticoagulation therapy? No    Is the Patient an [facility] with Readmission Risk Score greater than 14%? NA  If yes, pt needs a follow up appointment made within 7 days. Family Members/Caregivers that pt would like involved in their care:    Yes    If yes, list name here:  Kely Burns    Transportation Provider:  Patient             Is patient in Isolation/One on One/Altered Mental Status? No  If yes, skip next question. If no, would they like an I-Pad to  use? No  If yes, call [phone]. Discharge Plan:  9/30: Hallie Ybarra - Patient is from home alone. DME - Marlin Roque. Has been falling at home. Wants SNF [facility] - Accepted there and owes $287 - precert required.  [facility] - N/A. BIBIANA NEEDS SIGNED/COMPLETED. Wei Menard        Electronically signed by: Kimberly Mcdaniel RN on 9/30/2020 at 2:40 PM

## 2020-09-30 NOTE — PROGRESS NOTES
Admitted to room 2062 from ED per cart. Oriented to room and call light. Vitals and assessment completed. No distress noted.

## 2020-09-30 NOTE — PROGRESS NOTES
[facility]  DVT Prophylaxis and Vaccine Status  Work List  Mandatory for all patients      Patient must be on both Chemical prophylaxis and Mechanical prophylaxis.  If chemical/mechanical prophylaxis is not ordered, the physician must document a reason for not using prophylaxis     Chemical Prophylaxis  Is patient on chemical prophylaxis: Yes  If no chemical prophylaxis Is a order in for No Chemical VTE prophylaxis no  If no was the physician notified not applicable      Mechanical Prophylaxis  Is patient on mechanical prophylaxis, intermittent pneumatic compression device: No  If no was the physician notified not applicable        Pneumonia Vaccine  Vaccine indicated:  Up-to-date  If indicated was the vaccine given: not applicable    Influenza Vaccine (applicable from October through March):  Vaccine indicated: Vaccination refused  If indicated was the vaccine given: not applicable    Patient Education  Education completed on DVT prophylaxis: yes

## 2020-09-30 NOTE — PROGRESS NOTES
[address]   Occupational Therapy Evaluation  Date: 20  Patient Name: Fady Rinaldi       Room: 0008/6301-53  MRN: [id number]  Account: [de-identified]   : 1945  (75 y.o.) Gender: female     Discharge Recommendations:  Further Occupational Therapy is recommended upon facility discharge. Referring Practitioner: KARIN Jacome CNP  Diagnosis: Pain of left hip joint    Treatment Diagnosis: Impaired self-care status. Past Medical History:  has a past medical history of Abnormality of rib determined by X-ray, Acute cystitis without hematuria, Acute exacerbation of chronic bronchitis (HCC), Acute on chronic diastolic heart failure ([address] Adjustment disorder with mixed anxiety and depressed mood, Anemia, Back pain, Bronchiectasis with acute lower respiratory infection ([address] Bronchitis, Chronic bronchitis ([address] Chronic cough, Degenerative joint disease (DJD) of hip, Dyslipidemia, Encephalopathy acute, Essential hypertension, Fibromyalgia, GERD (gastroesophageal reflux disease), History of total bilateral knee replacement, Hx of blood clots, Hyperlipidemia, Hypertension, Incontinence of urine, Irregular heartbeat, Medication monitoring encounter, Morbid obesity with BMI of 45.0-49.9, adult ([address] Obstructive sleep apnea syndrome, Osteoarthritis, Primary osteoarthritis of left knee, PVC (premature ventricular contraction), S/P right and left heart catheterization, Sleep apnea, SOB (shortness of breath), Spinal stenosis of lumbar region with neurogenic claudication, Supplemental oxygen dependent, Urine frequency, and UTI (urinary tract infection). Past Surgical History:   has a past surgical history that includes Hysterectomy; Nerve Block (2013); Nerve Block (13); sinus surgery; Foot surgery (Left); Nerve Block (14); Colonoscopy (14); Nerve Block (2014); Nerve Block (14); Nerve Block (10/2/14); Nerve Block (2015); Nerve Block (11/16/15);  Nerve tasks at home)  ADL Assistance: Independent  Homemaking Assistance: Needs assistance(home health aide)  Homemaking Responsibilities: Yes  Ambulation Assistance: Independent(4 wheeled RW in home, although states 4 wheeled RW caused her fall in the kitchen)  Transfer Assistance: Independent  Active : Yes  Mode of Transportation: SUV  Additional Comments: Pt reports that she has a home health aide 3 days per week for 2 hours per visit. Pt's aide provides assist with folding laundry and assist for cleaning such as sweeping, mopping, and dusting. Pt's son lives locally and can provide assist PRN, however he works 12 hours per day. Pt does her own grocery shopping with use of electric scooter at the store. Pain Assessment  Pain Assessment: 0-10  Pain Level: 4  Patient's Stated Pain Goal: No pain  Pain Type: Acute pain  Pain Location: Back  Pain Orientation: Left, Lower  Pain Descriptors: Nagging  Pain Frequency: Continuous    Objective  Vision - Basic Assessment  Prior Vision: Wears glasses only for reading   Cognition  Overall Cognitive Status: WFL   Perception  Overall Perceptual Status: WFL  Sensation  Overall Sensation Status: Impaired(reports generalized tingling/numbness)   ADL  Feeding: Setup  Grooming: Supervision, Setup  UE Bathing: Minimal assistance, Setup  LE Bathing: Maximum assistance, Setup  UE Dressing: Minimal assistance, Setup  LE Dressing: Dependent/Total  Toileting: Dependent/Total  Additional Comments: OT facilitated Pt engagement in toileting tasks this date. Pt attempted to engage in clothing management and personal hygiene after voiding, however unable to complete due to decreased balance and strength in bilateral LE. OT provided assist as needed. Pt required assist to don bilateral socks this date. ADL scores based on skilled observations and clinical reasoning unless otherwise noted. Pt utilizing external female catheter initially upon entry.  OT provided education regarding increasing activity tolerance to utilize bedside commode with nursing assist to promote out of bed activity. Pt verbalized Fair understanding however further education warranted. UE Function  LUE Strength  Gross LUE Strength: WFL  L Hand General: 4/5  LUE Strength Comment: Grossly 4/5     LUE Tone: Normotonic     LUE AROM (degrees)  LUE AROM : WFL     Left Hand AROM (degrees)  Left Hand AROM: WFL  RUE Strength  Gross RUE Strength: WFL  R Hand General: 4/5  RUE Strength Comment: Grossly 4/5      RUE Tone: Normotonic     RUE AROM (degrees)  RUE AROM : WFL     Right Hand AROM (degrees)  Right Hand AROM: WFL    Fine Motor Skills  Coordination  Movements Are Fluid And Coordinated: Yes     Mobility  Supine to Sit: 2 Person assistance, Moderate assistance       Balance  Sitting Balance: Contact guard assistance(with R knee blocked for safety)  Standing Balance: Moderate assistance(fluctuated between Moderate and Minimal assist)  Standing Balance  Time: 1-2 minutes x 1, 2-3 minutes x 1  Activity: toileting tasks and pivot  Comment: 1-2 UE support via rolling walker or hand-in-hand assist  Functional Mobility  Functional - Mobility Device: Rolling Walker  Activity: (~3 ft pivot from bedside commode to recliner)  Assist Level: (Minimal assist x 2)  Functional Mobility Comments: with minimal verbal cues for safety with rolling walker management  Bed mobility  Rolling to Right: 2 Person assistance; Moderate assistance  Supine to Sit: 2 Person assistance; Moderate assistance  Comment: Pt seated in recliner at end of session.  Pt demonstrated Poor safety awareness during bed mobility with education provided for safety during mobility with Poor carryover     Transfers  Stand Pivot Transfers: 2 Person assistance, Moderate assistance(arm-in-arm assist from edge of bed to bedside commode)  Sit to stand: 2 Person assistance, Moderate assistance(from edge of bed; Minimal assist from bedside commode)  Stand to sit: 2 Person assistance, Moderate assistance(to bedside commode; Minimal assist x 2 to recliner)  Transfer Comments: Moderate verbal cues for safety and hand placement  Toilet Transfers  Toilet - Technique: Stand pivot, To left  Equipment Used: Extra wide bedside commode  Toilet Transfer: 2 Person assistance, Moderate assistance  Toilet Transfers Comments: Moderate verbal education provided for safety with pivot  Functional Activity Tolerance  Functional Activity Tolerance: Tolerates 30 min exercise with multiple rests   Assessment  Performance deficits / Impairments: Decreased functional mobility , Decreased ADL status, Decreased strength, Decreased safe awareness, Decreased endurance, Decreased sensation, Decreased balance, Decreased high-level IADLs, Decreased posture  Treatment Diagnosis: Impaired self-care status. Prognosis: Good  Decision Making: Medium Complexity  REQUIRES OT FOLLOW UP: Yes  Discharge Recommendations: Patient would benefit from continued therapy after discharge  Activity Tolerance: Patient Tolerated treatment well, Patient limited by pain         Functional Outcome Measures  AM-PAC Daily Activity Inpatient   How much help for putting on and taking off regular lower body clothing?: Total  How much help for Bathing?: A Lot  How much help for Toileting?: Total  How much help for putting on and taking off regular upper body clothing?: A Little  How much help for taking care of personal grooming?: A Little  How much help for eating meals?: A Little  AM-[facility] Inpatient Daily Activity Raw Score: 13  AM-PAC Inpatient ADL T-Scale Score : 32.03  ADL Inpatient CMS 0-100% Score: 63.03  ADL Inpatient CMS G-Code Modifier : CL       Goals  Patient Goals   Patient goals :  To discharge to SNF prior to return home  Short term goals  Time Frame for Short term goals: By discharge  Short term goal 1: Pt will verbalize/demonstrate Good understanding of assistive equipment/durable medical equipment/modified techniques for increased safety and independence with self-care and mobility. Short term goal 2: Pt will perform upper body bathing/dressing with Supervision and lower body bathing/dressing with Moderate assist and use of assistive equipment/modified techniques PRN. Short term goal 3: Pt will perform functional mobility and transfers during self-care consistently with Minimal assist, rolling walker, and Good safety. Short term goal 4: Pt will stand for 4+ minutes with 0-1 UE support, contact guard assist, and no loss of balance while engaging in functional activity of choice. Short term goal 5: Pt will verbalize/demonstrate Good understanding of Fall Prevention strategies for increased safety and independence with self-care and mobility. Short term goal 6: Pt will actively participate in 15-20 minutes of therapeutic exercise/functional activities to promote increased independence with self-care and mobility.     Plan  Safety Devices  Safety Devices in place: Yes  Type of devices: Call light within reach, Gait belt, Patient at risk for falls, Left in chair, Nurse notified(RN notified Pt in recliner; PCT to provide care for bathing at end of session)     Plan  Times per week: 4-6  Times per day: Daily  Current Treatment Recommendations: Self-Care / ADL, Home Management Training, Strengthening, Balance Training, Functional Mobility Training, Endurance Training, Pain Management, Safety Education & Training, Patient/Caregiver Education & Training, Equipment Evaluation, Education, & procurement  OT Education  OT Education: OT Role, Plan of Care, Transfer Training  Patient Education: safety with functional transfers, OT POC, discharge planning  Barriers to Learning: poor safety awareness, anxiety due to fear of falling       OT Individual Minutes  Time In: 1240  Time Out: 0151  Minutes: 48    Electronically signed by Joycelyn Heath OT on 9/30/20 at 10:53 AM EDT

## 2020-09-30 NOTE — ED PROVIDER NOTES
[address]  eMERGENCY dEPARTMENT eNCOUnter      Pt Name: Mara Snider  MRN: [id number]  [place] 1945  Date of evaluation: 9/29/20    CHIEF COMPLAINT       Chief Complaint   Patient presents with    Hip Pain    Leg Pain     HISTORY OF PRESENT ILLNESS   HPI 75 y.o. female presents with complaints of left leg and hip pain. The patient states that she was using her wheeled walker but the walker went out from under her and she fell on her left hip and leg about 10 days ago. Initially she did not have much pain but a few days later she started having pain in her left hip and worsening of her chronic neuropathy in her left leg. She rates her symptoms as moderate to severe in severity, currently 6 out of 10. She has been taking oxycodone at home which does help with the pain, but her symptoms return before her next dose of pain medication is due. She is very concerned that she will fall and injure her leg again. Patient also notes that she gets some generalized umbilical abdominal cramping over the last week or so. She denies any current pain. REVIEW OF SYSTEMS       Review of Systems   Constitutional: Negative for fever. HENT: Negative for congestion. Eyes: Negative for photophobia and visual disturbance. Respiratory: Negative for cough. Cardiovascular: Negative for chest pain. Gastrointestinal: Negative for abdominal pain. Genitourinary: Negative for difficulty urinating. Musculoskeletal: Positive for arthralgias and gait problem. Skin: Negative for rash and wound. Neurological: Positive for numbness. Hematological: Negative for adenopathy.        PAST MEDICAL HISTORY     Past Medical History:   Diagnosis Date    Abnormality of rib determined by X-ray 1/28/2016    Acute cystitis without hematuria 6/16/2018    Acute exacerbation of chronic bronchitis (HCC)     Acute on chronic diastolic heart failure ([facility] [address] 1/28/2016    Adjustment disorder with mixed anxiety and depressed mood 6/26/2015    Mild     Anemia 3/5/2014    Back pain     Bronchiectasis with acute lower respiratory infection ([facility] [address] 8/16/2017    Bronchitis     Chronic bronchitis (HCC) 1/28/2016    Chronic cough 7/23/2018    Degenerative joint disease (DJD) of hip 5/14/2015    Dyslipidemia 3/5/2014    Encephalopathy acute 5/29/2016    Essential hypertension 1/28/2016    Fibromyalgia     GERD (gastroesophageal reflux disease)     History of total bilateral knee replacement 5/28/2016    Hx of blood clots     left leg and lung    Hyperlipidemia     Hypertension     Incontinence of urine     Irregular heartbeat     DR. Apryl Erazo Medication monitoring encounter 6/13/2018    Morbid obesity with BMI of 45.0-49.9, adult ([facility] [address] 1/28/2016    Obstructive sleep apnea syndrome     Osteoarthritis     Primary osteoarthritis of left knee 5/27/2016    PVC (premature ventricular contraction) 1/28/2016    S/P right and left heart catheterization 10/2/2015    Sleep apnea     no machine    SOB (shortness of breath)     Spinal stenosis of lumbar region with neurogenic claudication 5/13/2013    Supplemental oxygen dependent 12/8/2017    Urine frequency     UTI (urinary tract infection)     hospitalized early july 2014 for kidney infection       SURGICAL HISTORY       Past Surgical History:   Procedure Laterality Date    BRONCHOSCOPY Left 8/16/2017    BRONCHOSCOPY ALVEOLAR LAVAGE LOWER LOBE performed by Lissa Teresa MD at [address]  x 3    no stents    COLONOSCOPY  4 28 14    polyps biopsies     FOOT SURGERY Left     calcium deposit removal from top of foot    HYSTERECTOMY      JOINT REPLACEMENT Bilateral 5/27/2016    bilat total knees    NERVE BLOCK  5/13/2013    caudal celestone 6mg    NERVE BLOCK  5/28/13    Caudal #2, Celestone 9mg    NERVE BLOCK  2/14/14    rt hip inj celestone 9 mg    NERVE BLOCK  07/14/2014    caudal# 3- celestone 9 mg    NERVE BLOCK  7-21-14    caudal epidural #2, decadron 7mg, fentanyl 25mcg    NERVE BLOCK  10/2/14    duramorph 1.5  decadron 5mg    NERVE BLOCK  11/9/2015    caudal# 1 celestone 9mg    NERVE BLOCK  11/16/15    caudal #2  decadron 10mg    NERVE BLOCK  11/23/15    duramorph 1mg celestone 9mg    NERVE BLOCK  03/28/2018    CAUDAL EPIDURAL STEROID BLOCK  DECADRON 10 MG     NERVE BLOCK  05/23/2018    caudal # decadron 7mg    NERVE BLOCK  08/28/2018    natalia transforminal # 1, decadron 10mg gadoteridol, LONG NEEDLES    KY PERQ VERT AGMNTJ CAVITY CRTJ UNI/BI CANNULJ LMBR N/A 10/29/2018    KYPHOPLASTY L1 performed by Yasmin Parekh MD at [address] Right 5/14/15    total hip replacement       CURRENT MEDICATIONS       Current Discharge Medication List      CONTINUE these medications which have NOT CHANGED    Details   oxyCODONE (ROXICODONE) 5 MG immediate release tablet Take 1 tablet by mouth every 8 hours as needed for Pain for up to 30 days.   Qty: 90 tablet, Refills: 0    Comments: Reduce doses taken as pain becomes manageable  Associated Diagnoses: History of total bilateral knee replacement      neomycin-polymyxin-dexameth [id number] OINT       levocetirizine (XYZAL) 5 MG tablet take 1 tablet by mouth once daily AT NIGHT  Qty: 30 tablet, Refills: 2      fluticasone (FLONASE) 50 MCG/ACT nasal spray instill 2 sprays into each nostril once daily      calcium carbonate (OYSTER SHELL CALCIUM 500 MG) 1250 (500 Ca) MG tablet Take 1 tablet by mouth daily With magnesium, zinc      Potassium 99 MG TABS Take by mouth      albuterol sulfate  (90 Base) MCG/ACT inhaler Inhale 2 puffs into the lungs 4 times daily as needed for Wheezing  Qty: 1 Inhaler, Refills: 2      DULoxetine (CYMBALTA) 30 MG extended release capsule TAKE 1 CAPSULE TWICE A DAY  Qty: 180 capsule, Refills: 4      ofloxacin (OCUFLOX) 0.3 % solution Place into both eyes daily as needed       metoprolol tartrate (LOPRESSOR) 50 MG tablet Take 50 mg by mouth 2 times daily      aspirin 81 MG tablet Take 81 mg by mouth daily      naproxen sodium (ANAPROX) 220 MG tablet Take 220 mg by mouth 2 times daily (with meals)      gabapentin (NEURONTIN) 100 MG capsule take 1 capsule by mouth once daily  Qty: 90 capsule, Refills: 0      docusate sodium (COLACE) 100 MG capsule Take 100 mg by mouth daily as needed for Constipation      Misc. Devices (WALKER) MISC 1 each by Does not apply route daily Upright walker with wheels and seat  Qty: 1 each, Refills: 0             ALLERGIES     is allergic to rosuvastatin calcium; statins; bactrim [sulfamethoxazole-trimethoprim]; caffeine; dye [iodides]; food; ioxaglate; dicloxacillin; and pcn [penicillins]. FAMILY HISTORY     She indicated that her mother is . She indicated that her father is . She indicated that the status of her sister is unknown. She indicated that her brother is . SOCIAL HISTORY      reports that she quit smoking about 27 years ago. She has a 18.00 pack-year smoking history. She has never used smokeless tobacco. She reports that she does not drink alcohol or use drugs. PHYSICAL EXAM     INITIAL VITALS: BP (!) 174/73   Pulse 61   Temp 97.7 °F (36.5 °C) (Oral)   Resp 16   Ht 5' 4\" (1.626 m)   Wt 292 lb (132.5 kg)   SpO2 96%   BMI 50.12 kg/m²   General: NAD  Head: Normocephalic, atraumatic  Eye: Pupils equal round reactive to light, no conjunctivitis  Heart: Regular rate and rhythm no murmurs  Lungs: Clear to auscultation bilaterally, no respiratory distress  Chest wall: No crepitus, no tenderness palpation  Abdomen: Soft, nontender, nondistended, with no peritoneal signs  MSK: There is tenderness over the left greater trochanter. There is tenderness with flexion and extension there is no tenderness with logrolling of the left hip.     Neurologic: Patient is alert and oriented x3, motor is intact in all 4 extremities, sensation to light touch is intact in the lower Abnormal; Notable for the following components:    Glucose 115 (*)     BUN 26 (*)     CREATININE 1.14 (*)     GFR Non- 46 (*)     GFR  56 (*)     All other components within normal limits   BASIC METABOLIC PANEL W/ REFLEX TO MG FOR LOW K - Abnormal; Notable for the following components:    CREATININE 1.03 (*)     GFR Non- 52 (*)     All other components within normal limits   CBC - Abnormal; Notable for the following components:    RBC 3.95 (*)     Hemoglobin 10.4 (*)     Hematocrit 32.3 (*)     All other components within normal limits   LACTIC ACID   PROTIME-INR       EMERGENCY DEPARTMENT COURSE:   Vitals:    Vitals:    09/29/20 1838 09/29/20 2132 09/29/20 2155 09/30/20 0719   BP: (!) 177/81 (!) 176/74 (!) 181/71 (!) 174/73   Pulse: 64 79 67 61   Resp: 16 14 18 16   Temp: 98.3 °F (36.8 °C) 98.2 °F (36.8 °C) 98.2 °F (36.8 °C) 97.7 °F (36.5 °C)   TempSrc: Oral Oral Oral Oral   SpO2: 97% 96% 98% 96%   Weight: 263 lb (119.3 kg)  292 lb (132.5 kg)    Height: 5' 4\" (1.626 m)  5' 4\" (1.626 m)        The patient was given the following medications while in the emergency department:  Orders Placed This Encounter   Medications    HYDROmorphone (DILAUDID) injection 1 mg    sodium chloride flush 0.9 % injection 10 mL    sodium chloride flush 0.9 % injection 10 mL    OR Linked Order Group     potassium chloride (KLOR-CON M) extended release tablet 40 mEq     potassium bicarb-citric acid (EFFER-K) effervescent tablet 40 mEq     potassium chloride 10 mEq/100 mL IVPB (Peripheral Line)    magnesium sulfate 1 g in dextrose 5% 100 mL IVPB    OR Linked Order Group     acetaminophen (TYLENOL) tablet 650 mg     acetaminophen (TYLENOL) suppository 650 mg    polyethylene glycol (GLYCOLAX) packet 17 g    OR Linked Order Group     promethazine (PHENERGAN) tablet 12.5 mg     ondansetron (ZOFRAN) injection 4 mg    DISCONTD: enoxaparin (LOVENOX) injection 40 mg    enoxaparin (LOVENOX) injection 30 mg    albuterol (PROVENTIL) nebulizer solution 2.5 mg    aspirin EC tablet 81 mg    DISCONTD: calcium carbonate (OYSTER SHELL CALCIUM 500 mg) tablet 1,250 mg    docusate sodium (COLACE) capsule 100 mg    DULoxetine (CYMBALTA) extended release capsule 30 mg    fluticasone (FLONASE) 50 MCG/ACT nasal spray 2 spray    metoprolol tartrate (LOPRESSOR) tablet 50 mg    cetirizine (ZYRTEC) tablet 5 mg    DISCONTD: naproxen sodium (ANAPROX) tablet 220 mg    oxyCODONE (ROXICODONE) immediate release tablet 5 mg     -------------------------  CRITICAL CARE:   CONSULTS: IP CONSULT TO INTERNAL MEDICINE  IP CONSULT TO SOCIAL WORK  PROCEDURES: Procedures     FINAL IMPRESSION      1.  Pain of left hip joint          DISPOSITION/PLAN   DISPOSITION Admitted 09/29/2020 09:13:55 PM      PATIENT REFERRED TO:  Guy Urbina [address] [place], [place] [phone]    In 3 days      [facility] ED  [facility] [address]  [address] [facility]  [phone]    If symptoms worsen      DISCHARGE MEDICATIONS:  Current Discharge Medication List            Deborah Holcomb MD  Attending Emergency Physician                     Deborah Holcomb MD  09/30/20 7460

## 2020-10-01 LAB
ANION GAP SERPL CALCULATED.3IONS-SCNC: 9 MMOL/L (ref 9–17)
BUN BLDV-MCNC: 20 MG/DL (ref 8–23)
BUN/CREAT BLD: ABNORMAL (ref 9–20)
CALCIUM SERPL-MCNC: 9 MG/DL (ref 8.6–10.4)
CHLORIDE BLD-SCNC: 103 MMOL/L (ref 98–107)
CO2: 25 MMOL/L (ref 20–31)
CREAT SERPL-MCNC: 0.84 MG/DL (ref 0.5–0.9)
GFR AFRICAN AMERICAN: >60 ML/MIN
GFR NON-AFRICAN AMERICAN: >60 ML/MIN
GFR SERPL CREATININE-BSD FRML MDRD: ABNORMAL ML/MIN/{1.73_M2}
GFR SERPL CREATININE-BSD FRML MDRD: ABNORMAL ML/MIN/{1.73_M2}
GLUCOSE BLD-MCNC: 112 MG/DL (ref 70–99)
HCT VFR BLD CALC: 33.4 % (ref 36–46)
HEMOGLOBIN: 10.9 G/DL (ref 12–16)
MCH RBC QN AUTO: 26.8 PG (ref 26–34)
MCHC RBC AUTO-ENTMCNC: 32.6 G/DL (ref 31–37)
MCV RBC AUTO: 82.1 FL (ref 80–100)
NRBC AUTOMATED: ABNORMAL PER 100 WBC
PDW BLD-RTO: 14.6 % (ref 11.5–14.9)
PLATELET # BLD: 255 K/UL (ref 150–450)
PMV BLD AUTO: 9.1 FL (ref 6–12)
POTASSIUM SERPL-SCNC: 4 MMOL/L (ref 3.7–5.3)
RBC # BLD: 4.07 M/UL (ref 4–5.2)
SARS-COV-2, RAPID: NORMAL
SARS-COV-2: NORMAL
SARS-COV-2: NOT DETECTED
SODIUM BLD-SCNC: 137 MMOL/L (ref 135–144)
SOURCE: NORMAL
WBC # BLD: 6.6 K/UL (ref 3.5–11)

## 2020-10-01 PROCEDURE — 97530 THERAPEUTIC ACTIVITIES: CPT

## 2020-10-01 PROCEDURE — 6370000000 HC RX 637 (ALT 250 FOR IP): Performed by: INTERNAL MEDICINE

## 2020-10-01 PROCEDURE — 97110 THERAPEUTIC EXERCISES: CPT

## 2020-10-01 PROCEDURE — 2580000003 HC RX 258: Performed by: NURSE PRACTITIONER

## 2020-10-01 PROCEDURE — 6370000000 HC RX 637 (ALT 250 FOR IP): Performed by: NURSE PRACTITIONER

## 2020-10-01 PROCEDURE — 85027 COMPLETE CBC AUTOMATED: CPT

## 2020-10-01 PROCEDURE — 36415 COLL VENOUS BLD VENIPUNCTURE: CPT

## 2020-10-01 PROCEDURE — 6360000002 HC RX W HCPCS: Performed by: NURSE PRACTITIONER

## 2020-10-01 PROCEDURE — 80048 BASIC METABOLIC PNL TOTAL CA: CPT

## 2020-10-01 PROCEDURE — 6360000002 HC RX W HCPCS: Performed by: INTERNAL MEDICINE

## 2020-10-01 PROCEDURE — 96372 THER/PROPH/DIAG INJ SC/IM: CPT

## 2020-10-01 PROCEDURE — 97116 GAIT TRAINING THERAPY: CPT

## 2020-10-01 PROCEDURE — G0378 HOSPITAL OBSERVATION PER HR: HCPCS

## 2020-10-01 PROCEDURE — 99232 SBSQ HOSP IP/OBS MODERATE 35: CPT | Performed by: INTERNAL MEDICINE

## 2020-10-01 RX ADMIN — METOPROLOL TARTRATE 50 MG: 50 TABLET, FILM COATED ORAL at 10:14

## 2020-10-01 RX ADMIN — FLUTICASONE PROPIONATE 2 SPRAY: 50 SPRAY, METERED NASAL at 14:13

## 2020-10-01 RX ADMIN — ENOXAPARIN SODIUM 30 MG: 30 INJECTION SUBCUTANEOUS at 10:15

## 2020-10-01 RX ADMIN — CETIRIZINE HYDROCHLORIDE 5 MG: 10 TABLET, FILM COATED ORAL at 10:15

## 2020-10-01 RX ADMIN — AMLODIPINE BESYLATE 5 MG: 5 TABLET ORAL at 10:14

## 2020-10-01 RX ADMIN — METOPROLOL TARTRATE 50 MG: 50 TABLET, FILM COATED ORAL at 22:35

## 2020-10-01 RX ADMIN — OXYCODONE HYDROCHLORIDE 5 MG: 5 TABLET ORAL at 02:15

## 2020-10-01 RX ADMIN — ASPIRIN 81 MG: 81 TABLET, COATED ORAL at 10:15

## 2020-10-01 RX ADMIN — DULOXETINE 30 MG: 30 CAPSULE, DELAYED RELEASE ORAL at 10:14

## 2020-10-01 RX ADMIN — OXYCODONE HYDROCHLORIDE 5 MG: 5 TABLET ORAL at 10:14

## 2020-10-01 RX ADMIN — OXYCODONE HYDROCHLORIDE 5 MG: 5 TABLET ORAL at 22:41

## 2020-10-01 RX ADMIN — Medication 10 ML: at 22:40

## 2020-10-01 RX ADMIN — METHYLPREDNISOLONE 4 MG: 4 TABLET ORAL at 06:40

## 2020-10-01 RX ADMIN — Medication 10 ML: at 10:16

## 2020-10-01 RX ADMIN — METHYLPREDNISOLONE 4 MG: 4 TABLET ORAL at 14:12

## 2020-10-01 RX ADMIN — ENOXAPARIN SODIUM 30 MG: 30 INJECTION SUBCUTANEOUS at 22:36

## 2020-10-01 RX ADMIN — DULOXETINE 30 MG: 30 CAPSULE, DELAYED RELEASE ORAL at 22:35

## 2020-10-01 ASSESSMENT — PAIN DESCRIPTION - LOCATION
LOCATION: HIP

## 2020-10-01 ASSESSMENT — PAIN DESCRIPTION - PROGRESSION
CLINICAL_PROGRESSION: NOT CHANGED

## 2020-10-01 ASSESSMENT — PAIN DESCRIPTION - ORIENTATION
ORIENTATION: LEFT

## 2020-10-01 ASSESSMENT — PAIN DESCRIPTION - PAIN TYPE
TYPE: ACUTE PAIN

## 2020-10-01 ASSESSMENT — PAIN SCALES - GENERAL
PAINLEVEL_OUTOF10: 1
PAINLEVEL_OUTOF10: 0
PAINLEVEL_OUTOF10: 7
PAINLEVEL_OUTOF10: 8
PAINLEVEL_OUTOF10: 9
PAINLEVEL_OUTOF10: 4

## 2020-10-01 ASSESSMENT — PAIN DESCRIPTION - FREQUENCY: FREQUENCY: INTERMITTENT

## 2020-10-01 ASSESSMENT — PAIN - FUNCTIONAL ASSESSMENT: PAIN_FUNCTIONAL_ASSESSMENT: PREVENTS OR INTERFERES SOME ACTIVE ACTIVITIES AND ADLS

## 2020-10-01 ASSESSMENT — PAIN DESCRIPTION - DESCRIPTORS: DESCRIPTORS: STABBING

## 2020-10-01 NOTE — PROGRESS NOTES
[facility]    Progress Note  Patient assessed for rehabilitation services?: Yes  10/1/2020    2:26 PM    Name:   Kanwal Pennington  MRN:     [id number]     Acct:      [de-identified]   Room:   2062/2062-01  IP Day:  0  Admit Date:  9/29/2020  6:37 PM    PCP:   Navya Ball MD  Code Status:  Full Code    Subjective:     C/C:   Chief Complaint   Patient presents with    Hip Pain    Leg Pain     Principal Problem:    Pain of left hip joint  Active Problems:    Supplemental oxygen dependent    Morbid obesity ([address]  Resolved Problems:    * No resolved hospital problems. *      Interval History Status: improved. Patient has multiple medical problems which include morbid obesity, hypertension aponeurotic aneurysm, history of degenerative disc disease follows with pain management  She admitted with complaint of severe back pain, pain in left hip, with tingling numbness, affecting whole left leg up to toes  No recent fall, injury  Started on Medrol dose, patient feeling better     Significant last 24 hr data reviewed ;   Vitals:    09/30/20 0719 09/30/20 1330 09/30/20 1904 10/01/20 0802   BP: (!) 174/73 (!) 172/70 (!) 147/76 (!) 189/79   Pulse: 61 51 60 59   Resp: 16 16 18 16   Temp: 97.7 °F (36.5 °C) 98.2 °F (36.8 °C) 98.3 °F (36.8 °C) 98.1 °F (36.7 °C)   TempSrc: Oral Oral Oral Oral   SpO2: 96% 98% 97% 97%   Weight:       Height:          Recent Results (from the past 24 hour(s))   COVID-19    Collection Time: 09/30/20  2:30 PM    Specimen: Other   Result Value Ref Range    SARS-CoV-2 Not Detected Not Detected    SARS-CoV-2, Rapid          Source . NASOPHARYNGEAL SWAB     SARS-CoV-2         Basic Metabolic Panel w/ Reflex to MG    Collection Time: 10/01/20  5:33 AM   Result Value Ref Range    Glucose 112 (H) 70 - 99 mg/dL    BUN 20 8 - 23 mg/dL    CREATININE 0.84 0.50 - 0.90 mg/dL    Bun/Cre Ratio NOT REPORTED 9 - 20    Calcium 9.0 8.6 - 10.4 mg/dL    Sodium 137 135 - Reason for Exam: pt states she fell 10 days ago; abd pain and left hip pain Acuity: Acute Type of Exam: Initial Relevant Medical/Surgical History: surg - hyst, right hip replacement, kyphoplasty FINDINGS: CT RENAL STONE PROTOCOL: LOWER CHEST:  Visualized portion of the lower chest demonstrates no acute abnormality. Calcifications of the partially visualized aortic and mitral valves. Chronic appearing scarring versus fibrotic change at the lung bases. Severe atherosclerotic changes of the imaged descending thoracic aorta. KIDNEYS AND URINARY TRACT: No renal calculi are identified. There is no evidence for hydronephrosis. The ureters are of normal course and caliber. ORGANS: Lack of intravenous contrast limits evaluation of the solid organs and bowel. The solid organs are grossly unremarkable. GI/BOWEL: No bowel obstruction. No evidence of acute appendicitis. PELVIS: The bladder and pelvic organs are unremarkable. PERITONEUM/RETROPERITONEUM: Severe atherosclerotic changes of the imaged abdominal aorta. No retroperitoneal adenopathy identified. No free fluid or free air identified within the abdomen and pelvis. BONES/SOFT TISSUES: No acute osseous or soft tissue abnormality identified. Postoperative changes of right total hip arthroplasty. Multilevel degenerative changes of the spine which are moderate to severe. Note is made of prior kyphoplasty at the L1 level. Grade 1 anterolisthesis of L4 on L5 which appears degenerative. Severe degenerative change of the left hip as described below. Degenerative changes of the pubic symphysis and bilateral sacroiliac joints. No acute soft tissue abnormality identified. Note is made of a small fat attenuating lesion along the distal iliopsoas on the left consistent with small lipoma. CT LEFT HIP: Bones: No acute fracture or dislocation of the left hip identified. No suspicious lytic or sclerotic osseous lesions.  Soft Tissue:  No acute soft tissue abnormality at the left hip. Small well-circumscribed fat attenuating lesion at the distal left iliopsoas consistent with small lipoma. Joint:  Severe osteoarthritis of the left hip with bone on bone articulation, subchondral sclerosis, and prominent subchondral cystic change. Marginal osteophytes also present. No significant joint effusion identified. 1. No acute intra-process identified. 2. Severe atherosclerotic disease. 3. No acute fracture identified. Specifically, no acute fracture of the left hip identified. 4. Severe osteoarthritis of the left hip. Ct Hip Left Wo Contrast    Result Date: 9/29/2020  EXAMINATION: STONE PROTOCOL CT OF THE ABDOMEN AND PELVIS 9/29/2020 8:14 pm; 9/29/2020 8:11 pm TECHNIQUE: CT of the left hip was performed without the administration of intravenous contrast.  Multiplanar reformatted images are provided for review. Dose modulation, iterative reconstruction, and/or weight based adjustment of the mA/kV was utilized to reduce the radiation dose to as low as reasonably achievable.; CT of the abdomen and pelvis was performed without the administration of intravenous contrast. Multiplanar reformatted images are provided for review. Dose modulation, iterative reconstruction, and/or weight based adjustment of the mA/kV was utilized to reduce the radiation dose to as low as reasonably achievable.  COMPARISON: Left hip radiograph September 28, 2018; pelvis and left hip radiograph January 1, 2020 HISTORY: ORDERING SYSTEM PROVIDED HISTORY: fall pain TECHNOLOGIST PROVIDED HISTORY: fall pain Reason for Exam: pt states she fell 10 days ago; abd pain and left hip pain Acuity: Acute Type of Exam: Initial; ORDERING SYSTEM PROVIDED HISTORY: abdominal pain, fall TECHNOLOGIST PROVIDED HISTORY: abdominal pain, fall Reason for Exam: pt states she fell 10 days ago; abd pain and left hip pain Acuity: Acute Type of Exam: Initial Relevant Medical/Surgical History: surg - hyst, right hip replacement, kyphoplasty FINDINGS: CT RENAL STONE PROTOCOL: LOWER CHEST:  Visualized portion of the lower chest demonstrates no acute abnormality. Calcifications of the partially visualized aortic and mitral valves. Chronic appearing scarring versus fibrotic change at the lung bases. Severe atherosclerotic changes of the imaged descending thoracic aorta. KIDNEYS AND URINARY TRACT: No renal calculi are identified. There is no evidence for hydronephrosis. The ureters are of normal course and caliber. ORGANS: Lack of intravenous contrast limits evaluation of the solid organs and bowel. The solid organs are grossly unremarkable. GI/BOWEL: No bowel obstruction. No evidence of acute appendicitis. PELVIS: The bladder and pelvic organs are unremarkable. PERITONEUM/RETROPERITONEUM: Severe atherosclerotic changes of the imaged abdominal aorta. No retroperitoneal adenopathy identified. No free fluid or free air identified within the abdomen and pelvis. BONES/SOFT TISSUES: No acute osseous or soft tissue abnormality identified. Postoperative changes of right total hip arthroplasty. Multilevel degenerative changes of the spine which are moderate to severe. Note is made of prior kyphoplasty at the L1 level. Grade 1 anterolisthesis of L4 on L5 which appears degenerative. Severe degenerative change of the left hip as described below. Degenerative changes of the pubic symphysis and bilateral sacroiliac joints. No acute soft tissue abnormality identified. Note is made of a small fat attenuating lesion along the distal iliopsoas on the left consistent with small lipoma. CT LEFT HIP: Bones: No acute fracture or dislocation of the left hip identified. No suspicious lytic or sclerotic osseous lesions. Soft Tissue:  No acute soft tissue abnormality at the left hip. Small well-circumscribed fat attenuating lesion at the distal left iliopsoas consistent with small lipoma.  Joint:  Severe osteoarthritis of the left hip with bone on bone articulation, subchondral sclerosis, and prominent subchondral cystic change. Marginal osteophytes also present. No significant joint effusion identified. 1. No acute intra-process identified. 2. Severe atherosclerotic disease. 3. No acute fracture identified. Specifically, no acute fracture of the left hip identified. 4. Severe osteoarthritis of the left hip. HPI:   See history in H and P      Review of Systems:     Constitutional:  negative for chills, fevers, sweats  Respiratory:  negative for cough, dyspnea on exertion, hemoptysis, shortness of breath, wheezing  Cardiovascular:  negative for chest pain, chest pressure/discomfort, lower extremity edema, palpitations  Gastrointestinal:  negative for abdominal pain, constipation, diarrhea, nausea, vomiting  Neurological:  negative for dizziness, headache  Extremity -lower back pain, pain in left hip, tingling numbness affecting left leg  Data:     Past Medical History:  no change     Social History:  no change    Family History: @no change    Vitals:      I/O (24Hr):     Intake/Output Summary (Last 24 hours) at 10/1/2020 1426  Last data filed at 10/1/2020 0706  Gross per 24 hour   Intake --   Output 1300 ml   Net -1300 ml       Labs:    URINE ANALYSIS: No results found for: LABURIN     CBC:  Lab Results   Component Value Date    WBC 6.6 10/01/2020    HGB 10.9 10/01/2020     10/01/2020     05/26/2012        BMP:    Lab Results   Component Value Date     10/01/2020    K 4.0 10/01/2020     10/01/2020    CO2 25 10/01/2020    BUN 20 10/01/2020    CREATININE 0.84 10/01/2020    GLUCOSE 112 10/01/2020    GLUCOSE 105 05/26/2012      LIVER PROFILE:  Lab Results   Component Value Date    ALT 11 09/29/2020    AST 16 09/29/2020    PROT 7.0 09/29/2020    BILITOT 0.33 09/29/2020    BILIDIR 0.10 04/02/2014    LABALBU 3.8 09/29/2020               Radiology:      Physical Examination:        General appearance:  alert, cooperative and no distress, Continuous Infusions:   PRN Meds: sodium chloride flush, potassium chloride **OR** potassium alternative oral replacement **OR** potassium chloride, magnesium sulfate, acetaminophen **OR** acetaminophen, polyethylene glycol, promethazine **OR** ondansetron, albuterol, docusate sodium, oxyCODONE       John Loera MD  10/1/2020  2:26 PM

## 2020-10-01 NOTE — FLOWSHEET NOTE
Patient sitting in recliner visiting with her daughter. Patient discuss her concerns, regarding getting in touch with her physician while she's in the hospital.  Patient has a strong bailey and she believes in the healing power of Holger. She has very good family support. Chaplains will remain available to offer spiritual and emotional support as needed. 09/30/20 1900   Encounter Summary   Services provided to: Patient and family together  (Daughter Inocencio Small)   Referral/Consult From: [address] [place] [address] Children;Family members   Place of [address] [place] [address]  [address]   (9/30/20)   Complexity of Encounter Low   Length of Encounter 30 minutes   Spiritual Assessment Completed Yes   Routine   Type Initial   Spiritual/Latter-day   Type Spiritual support   Assessment Calm; Approachable   Intervention Active listening;Explored feelings, thoughts, concerns;Nurtured hope;Prayer;Scripture;Sustaining presence/ Ministry of presence; Discussed relationship with God;Discussed belief system/Taoism practices/bailey;Discussed illness/injury and it's impact   Outcome Connection/belonging;Expressed gratitude;Engaged in conversation; Shared life review; Hopeful   Visited by Constance South

## 2020-10-01 NOTE — PROGRESS NOTES
Physical Therapy  Facility/Department: [facility] MED SURG  Daily Treatment Note  NAME: Cindra Soulier  :   MRN: [id number]    Date of Service: 10/1/2020    Discharge Recommendations:  Patient would benefit from continued therapy after discharge        Assessment   Body structures, Functions, Activity limitations: Decreased functional mobility ; Decreased safe awareness;Decreased balance;Decreased endurance;Decreased strength;Decreased posture  Treatment Diagnosis: impaird mobility d/t LE pain  Prognosis: Good  Barriers to Learning: none  REQUIRES PT FOLLOW UP: Yes  Activity Tolerance  Activity Tolerance: Patient limited by endurance; Patient limited by pain     Patient Diagnosis(es): The encounter diagnosis was Pain of left hip joint. has a past medical history of Abnormality of rib determined by X-ray, Acute cystitis without hematuria, Acute exacerbation of chronic bronchitis (HCC), Acute on chronic diastolic heart failure ([address] Adjustment disorder with mixed anxiety and depressed mood, Anemia, Back pain, Bronchiectasis with acute lower respiratory infection ([address] Bronchitis, Chronic bronchitis ([address] Chronic cough, Degenerative joint disease (DJD) of hip, Dyslipidemia, Encephalopathy acute, Essential hypertension, Fibromyalgia, GERD (gastroesophageal reflux disease), History of total bilateral knee replacement, Hx of blood clots, Hyperlipidemia, Hypertension, Incontinence of urine, Irregular heartbeat, Medication monitoring encounter, Morbid obesity with BMI of 45.0-49.9, adult ([address] Obstructive sleep apnea syndrome, Osteoarthritis, Primary osteoarthritis of left knee, PVC (premature ventricular contraction), S/P right and left heart catheterization, Sleep apnea, SOB (shortness of breath), Spinal stenosis of lumbar region with neurogenic claudication, Supplemental oxygen dependent, Urine frequency, and UTI (urinary tract infection). has a past surgical history that includes Hysterectomy;  Nerve Block (5/13/2013); Nerve Block (5/28/13); sinus surgery; Foot surgery (Left); Nerve Block (2/14/14); Colonoscopy (4 28 14); Nerve Block (07/14/2014); Nerve Block (7-21-14); Nerve Block (10/2/14); Nerve Block (11/9/2015); Nerve Block (11/16/15); Nerve Block (11/23/15); Total hip arthroplasty (Right, 5/14/15); bronchoscopy (Left, 8/16/2017); Cardiac catheterization (x 3); Nerve Block (03/28/2018); Nerve Block (05/23/2018); Nerve Block (08/28/2018); pr perq vert agmntj cavity crtj uni/bi cannulj lmbr (N/A, 10/29/2018); and joint replacement (Bilateral, 5/27/2016). Restrictions  Restrictions/Precautions  Restrictions/Precautions: Fall Risk  Required Braces or Orthoses?: No  Implants present? : Metal implants(R ALYSON, B BKA)  Position Activity Restriction  Other position/activity restrictions: decreased safety awareness, h/o fall at home  Subjective   Subjective  Subjective: Patient sitting in bedside chair all pack and hoping that the doctor deciides to let her go home. General Comment  Comments: Patient is very pleasant and cooperative. Pain Assessment  Pain Assessment: 0-10  Pain Level: 3  Patient's Stated Pain Goal: No pain  Pain Type: Acute pain;Surgical pain  Pain Location: Neck(Surgical site)  Response to Pain Intervention: Patient Satisfied       Orientation  Orientation  Overall Orientation Status: Within Functional Limits  Cognition      Objective      Transfers  Sit to Stand: Contact guard assistance  Stand to sit: Stand by assistance  Bed to Chair: Stand by assistance  Ambulation  Ambulation?: Yes  More Ambulation?: No  Ambulation 1  Surface: level tile  Device: No Device  Assistance: Contact guard assistance;Stand by assistance  Quality of Gait: flexed posture, decreased sun, small steps  Gait Deviations: Slow Sun;Decreased step length  Distance: 150' x 2  Comments: Patient required frequent standing rest break. Pt sri well.   Stairs/Curb  Stairs?: Yes  Stairs  # Steps : 5(x 2)  Stairs Height: 4\"(6\")  Rails: Bilateral  Curbs: 6\"  Device: No Device  Assistance: Stand by assistance  Comment: Pt performed steps without problems. Balance  Sitting - Static: Good  Sitting - Dynamic: Good;-  Standing - Static: Good;-  Standing - Dynamic: Fair                           G-Code     OutComes Score                                                     AM-PAC Score             Goals  Short term goals  Time Frame for Short term goals: 7 days  Short term goal 1:  Increase LE strength to [facility]  Short term goal 2: Improve endurance to good  Short term goal 3: Improve sitting and standing balance to Fair (+)  Short term goal 4: Improve bed mobiltiy to Min A x1  Short term goal 5: Improve transfers with RW to Min A x1  Short term goal 6: Improve ambulation with RW to Min A x1 25 ft  Patient Goals   Patient goals : go to SNF    Plan    Plan  Times per week: 6-7x/wk  Times per day: Daily  Current Treatment Recommendations: Strengthening, Transfer Training, Endurance Training, Patient/Caregiver Education & Training, Equipment Evaluation, Education, & procurement, Balance Training, Functional Mobility Training, Safety Education & Training, Positioning, Gait Training  Safety Devices  Type of devices: Patient at risk for falls, Nurse notified, Call light within reach, Left in chair, Gait belt(Nursing staff notified)     Therapy Time   Individual Concurrent Group Co-treatment   Time In 1025         Time Out 1054         Minutes [facility]   Electronically signed by Talia Wilson PTA on 10/1/2020 at 4:31 PM

## 2020-10-01 NOTE — PROGRESS NOTES
[address] [facility]    INPATIENT OCCUPATIONAL THERAPY  PROGRESS NOTE  Date: 10/1/2020  Patient Name: Antoni Romero      Room: [facility]0/4456-24  MRN: [id number]    : 1945  (75 y.o.) Gender: female     Discharge Recommendations:  Further Occupational Therapy is recommended upon facility discharge. Referring Practitioner: KARIN Mak CNP  Diagnosis: Pain of left hip joint  General  Chart Reviewed: Yes, Orders, Progress Notes, History and Physical  Patient assessed for rehabilitation services?: Yes  Family / Caregiver Present: No  Referring Practitioner: KARIN Mak CNP  Diagnosis: Pain of left hip joint    Restrictions  Restrictions/Precautions: Fall Risk  Implants present? : Metal implants(R ALYSON, B BKA)  Other position/activity restrictions: decreased safety awareness, h/o fall at home  Required Braces or Orthoses?: No      Subjective  Subjective: pt states that she has a personal alert necklace at home but does not wear it because it makes her feel less independent. writer explained safety concerns due to hx of falls and pt states. \"I  Know. \" writer unsure of patients carryover after D/C home.   Comments: pt alert and sitting up in chair upon arrival. co-tx with Cris Gayle PTA  Patient Currently in Pain: Yes  Pain Location: Hip  Pain Orientation: Left  Overall Orientation Status: Within Functional Limits     Pain Assessment  Pain Assessment: 0-10  Pain Type: Acute pain  Pain Location: Hip  Pain Orientation: Left  Clinical Progression: Not changed    Objective  Cognition  Overall Cognitive Status: WFL     Balance  Sitting Balance: Modified independent (sitting in recliner)  Standing Balance: Minimal assistance(fluctuated between Minimal and CGA assist)  Standing Balance  Time: ~4 minutes with RW  Activity: func mobility  Comment: no LOB noted  Functional Mobility  Functional - Mobility Device: Rolling Walker  Activity: (room mobility)  Assist Level: Contact guard assistance(CGA x 2)  Functional Mobility Comments: extended time req due to slow pace, max verbal cues for sequencing with rolling walker management. pt demo F return, no LOB noted    ADL  Feeding: Setup  Grooming: Supervision;Setup  UE Bathing: Minimal assistance;Setup  LE Bathing: Maximum assistance;Setup  UE Dressing: Minimal assistance;Setup  LE Dressing: Dependent/Total  Toileting: Dependent/Total  Additional Comments: answers above are based on skilled observation/clinical reasoning unless otherwise noted. pt educated on AE to increase safety and independence during LB dressing/bathing. pt states that she has an aide come to her house 3 times a week for 2 hours per visit. pt limited by decreased bilateral shoulder ROM and generalized weakness     Transfers  Sit to stand: 2 Person assistance;Minimal assistance(from recliner)  Stand to sit: 2 Person assistance;Contact guard assistance(to recliner)  Transfer Comments: Moderate verbal cues for safety and hand placement  Type of ROM/Therapeutic Exercise  Type of ROM/Therapeutic Exercise: Resistive Bands  Comment: red, x 5-10 reps to support mobility and transfers, pt encouraged to complete HEP as able 2-3 times per day. pt demo/verbalizes understanding  Exercises  Shoulder Flexion: X  Horizontal ABduction: X  Horizontal ADduction: X  Elbow Flexion: X  Elbow Extension: X                          Assessment  Performance deficits / Impairments: Decreased functional mobility ; Decreased ADL status; Decreased strength;Decreased safe awareness;Decreased endurance;Decreased sensation;Decreased balance;Decreased high-level IADLs;Decreased posture  Prognosis: Good  Discharge Recommendations: Patient would benefit from continued therapy after discharge  Activity Tolerance: Patient Tolerated treatment well;Patient limited by pain  Safety Devices in place: Yes  Type of devices: Call light within reach;Gait belt;Patient at risk for falls; Left in chair(Casi SOOD in room as writer departs) Patient Education: OT POC, home safety/fall prevention, HEP, RW safety and tech during functional mobility and transfers     Learner:patient  Method: demonstration and explanation       Outcome: acknowledged understanding, demonstrated understanding and needs reinforcement     Plan  Safety Devices  Safety Devices in place: Yes  Type of devices: Call light within reach, Gait belt, Patient at risk for falls, Left in chair(RN notified Pt in recliner; PCT to provide care for bathing at end of session)  Plan  Times per week: 4-6  Times per day: Daily  Current Treatment Recommendations: Self-Care / ADL, Home Management Training, Strengthening, Balance Training, Functional Mobility Training, Endurance Training, Pain Management, Safety Education & Training, Patient/Caregiver Education & Training, Equipment Evaluation, Education, & procurement      Goals  Short term goals  Time Frame for Short term goals: By discharge  Short term goal 1: Pt will verbalize/demonstrate Good understanding of assistive equipment/durable medical equipment/modified techniques for increased safety and independence with self-care and mobility. Short term goal 2: Pt will perform upper body bathing/dressing with Supervision and lower body bathing/dressing with Moderate assist and use of assistive equipment/modified techniques PRN. Short term goal 3: Pt will perform functional mobility and transfers during self-care consistently with Minimal assist, rolling walker, and Good safety. Short term goal 4: Pt will stand for 4+ minutes with 0-1 UE support, contact guard assist, and no loss of balance while engaging in functional activity of choice. Short term goal 5: Pt will verbalize/demonstrate Good understanding of Fall Prevention strategies for increased safety and independence with self-care and mobility.   Short term goal 6: Pt will actively participate in 15-20 minutes of therapeutic exercise/functional activities to promote increased

## 2020-10-01 NOTE — PROGRESS NOTES
SW spoke to pt regarding discharge to [facility]. Pt said she will pay the $85 dollars but not until she is approved. SW spoke to [facility] from [facility]. She is still awaiting pre-cert from Sher[facility]oma. Addendum: Pt was approved for SNF . Anticipate discharge after pt sees Dr Racheal Chaney tomorrow.

## 2020-10-01 NOTE — PROGRESS NOTES
Physical Therapy  Facility/Department: [facility] MED SURG  Daily Treatment Note  NAME: Cindra Soulier  :   MRN: [id number]    Date of Service: 10/1/2020    Discharge Recommendations:  Patient would benefit from continued therapy after discharge        Assessment   Body structures, Functions, Activity limitations: Decreased functional mobility ; Decreased safe awareness;Decreased balance;Decreased endurance;Decreased strength;Decreased posture  Assessment: Pt with limited tolerance of session d/t pain and anxiety. Pt demo's need for 2 assist with most mobility d/t poor posture, pain, weakness, and decreased safety awareness. Pt is not safe to go home alone at this time as pt is a high fall risk and needs assistance with mobility. Cont per POC  Treatment Diagnosis: impaird mobility d/t LE pain  Prognosis: Good  History: HPI 75 y.o. female presents with complaints of left leg and hip pain. The patient states that she was using her wheeled walker but the walker went out from under her and she fell on her left hip and leg about 10 days ago. Initially she did not have much pain but a few days later she started having pain in her left hip and worsening of her chronic neuropathy in her left leg. She rates her symptoms as moderate to severe in severity, currently 6 out of 10. She has been taking oxycodone at home which does help with the pain, but her symptoms return before her next dose of pain medication is due. She is very concerned that she will fall and injure her leg again. Patient also notes that she gets some generalized umbilical abdominal cramping over the last week or so. She denies any current pain. Exam: ROM, balance, ADLs, mobility, sensation, social hx  Clinical Presentation: fearful of falling and pain,decreased safety awarenss  Barriers to Learning: none  REQUIRES PT FOLLOW UP: Yes  Activity Tolerance  Activity Tolerance: Patient limited by endurance; Patient limited by pain     Patient Diagnosis(es): The encounter diagnosis was Pain of left hip joint. has a past medical history of Abnormality of rib determined by X-ray, Acute cystitis without hematuria, Acute exacerbation of chronic bronchitis (HCC), Acute on chronic diastolic heart failure ([facility] [address] Adjustment disorder with mixed anxiety and depressed mood, Anemia, Back pain, Bronchiectasis with acute lower respiratory infection ([facility] Bronchitis, Chronic bronchitis ([facility] Chronic cough, Degenerative joint disease (DJD) of hip, Dyslipidemia, Encephalopathy acute, Essential hypertension, Fibromyalgia, GERD (gastroesophageal reflux disease), History of total bilateral knee replacement, Hx of blood clots, Hyperlipidemia, Hypertension, Incontinence of urine, Irregular heartbeat, Medication monitoring encounter, Morbid obesity with BMI of 45.0-49.9, adult ([facility] Obstructive sleep apnea syndrome, Osteoarthritis, Primary osteoarthritis of left knee, PVC (premature ventricular contraction), S/P right and left heart catheterization, Sleep apnea, SOB (shortness of breath), Spinal stenosis of lumbar region with neurogenic claudication, Supplemental oxygen dependent, Urine frequency, and UTI (urinary tract infection). has a past surgical history that includes Hysterectomy; Nerve Block (5/13/2013); Nerve Block (5/28/13); sinus surgery; Foot surgery (Left); Nerve Block (2/14/14); Colonoscopy (4 28 14); Nerve Block (07/14/2014); Nerve Block (7-21-14); Nerve Block (10/2/14); Nerve Block (11/9/2015); Nerve Block (11/16/15); Nerve Block (11/23/15); Total hip arthroplasty (Right, 5/14/15); bronchoscopy (Left, 8/16/2017); Cardiac catheterization (x 3); Nerve Block (03/28/2018); Nerve Block (05/23/2018); Nerve Block (08/28/2018); pr perq vert agmntj cavity crtj uni/bi cannulj lmbr (N/A, 10/29/2018); and joint replacement (Bilateral, 5/27/2016).     Restrictions  Restrictions/Precautions  Restrictions/Precautions: Fall Risk  Required Braces or Orthoses?: No  Implants present? : Metal implants(R ALYSON, B BKA)  Position Activity Restriction  Other position/activity restrictions: decreased safety awareness, h/o fall at home  Subjective   Subjective  Subjective: Patient in bedside recliner upon entry, agreeable to therapy. General Comment  Comments: Patient is very pleasant and cooperative. Pain Assessment  Pain Assessment: 0-10  Pain Level: 8  Patient's Stated Pain Goal: No pain  Pain Type: Acute pain  Pain Location: Hip  Pain Orientation: Left  Clinical Progression: Not changed  Response to Pain Intervention: Patient Satisfied       Orientation  Orientation  Overall Orientation Status: Within Functional Limits  Cognition      Objective      Transfers  Sit to Stand: 2 Person Assistance; Moderate Assistance(From bedside recliner)  Stand to sit: 2 Person Assistance;Minimal Assistance  Ambulation  Ambulation?: Yes  More Ambulation?: No  Ambulation 1  Surface: level tile  Device: Rolling Walker  Assistance: 2 Person assistance;Contact guard assistance;Stand by assistance(SBA x 1 for safety)  Quality of Gait: flexed posture, decreased sun, small steps  Gait Deviations: Slow Sun;Decreased step length  Distance: 8'  Comments: Pt required constant cues to lock her knee and to remain inside of walker. (Pt report being fearful of RW, report falling w/ one.)  Stairs/Curb  Stairs?: No     Balance  Sitting - Static: Fair;-(leans to R d/t L hip pain)  Sitting - Dynamic: Fair;-  Standing - Static: Fair;-  Standing - Dynamic: Fair;-  Other exercises  Other exercises?: Yes  Other exercises 1: I/S and issued HEP both L/E supine including glut/Quad sets x 10  Other exercises 2: Ther ex seated both L/E x 10                        G-Code     OutComes Score                                                     AM-PAC Score             Goals  Short term goals  Time Frame for Short term goals: 7 days  Short term goal 1:  Increase LE strength to [facility]  Short term goal 2: Improve endurance to good  Short term goal 3: Improve sitting and standing balance to Fair (+)  Short term goal 4: Improve bed mobiltiy to Min A x1  Short term goal 5: Improve transfers with RW to Min A x1  Short term goal 6: Improve ambulation with RW to Min A x1 25 ft  Patient Goals   Patient goals : go to SNF    Plan    Plan  Times per week: 6-7x/wk  Times per day: Daily  Current Treatment Recommendations: Strengthening, Transfer Training, Endurance Training, Patient/Caregiver Education & Training, Equipment Evaluation, Education, & procurement, Balance Training, Functional Mobility Training, Safety Education & Training, Positioning, Gait Training  Safety Devices  Type of devices: Patient at risk for falls, Nurse notified, Call light within reach, Left in chair, Gait belt(Left in bedside recliner.)     Therapy Time   Individual Concurrent Group Co-treatment   Time In 1105         Time Out [address]         Minutes [address] PTA   Electronically signed by Ruba Lindquist PTA on 10/1/2020 at 5:37 PM

## 2020-10-01 NOTE — PLAN OF CARE
Problem: Pain:  Goal: Pain level will decrease  Description: Pain level will decrease  Outcome: Ongoing     Problem: Pain:  Goal: Control of acute pain  Description: Control of acute pain  Outcome: Met This Shift   Adequate pain control achieved this shift. See MAR. Problem: Falls - Risk of:  Goal: Will remain free from falls  Description: Will remain free from falls  Outcome: Met This Shift   Pt. Free of falls and injuries this shift. Problem: Falls - Risk of:  Goal: Absence of physical injury  Description: Absence of physical injury  Outcome: Met This Shift   No injuries this shift. Patient's safety maintained. Problem: Skin Integrity:  Goal: Will show no infection signs and symptoms  Description: Will show no infection signs and symptoms  Outcome: Ongoing     Problem: Skin Integrity:  Goal: Absence of new skin breakdown  Description: Absence of new skin breakdown  Outcome: Met This Shift   Skin assessment as charted. No evidence of new skin breakdown. Problem: Musculor/Skeletal Functional Status  Goal: Highest potential functional level  Outcome: Ongoing     Problem: Musculor/Skeletal Functional Status  Goal: Absence of falls  Outcome: Met This Shift   Pt. Free of falls and injuries this shift.

## 2020-10-02 VITALS
RESPIRATION RATE: 18 BRPM | OXYGEN SATURATION: 94 % | SYSTOLIC BLOOD PRESSURE: 150 MMHG | TEMPERATURE: 98.8 F | BODY MASS INDEX: 49.85 KG/M2 | HEART RATE: 57 BPM | HEIGHT: 64 IN | WEIGHT: 292 LBS | DIASTOLIC BLOOD PRESSURE: 75 MMHG

## 2020-10-02 LAB
ANION GAP SERPL CALCULATED.3IONS-SCNC: 9 MMOL/L (ref 9–17)
BUN BLDV-MCNC: 23 MG/DL (ref 8–23)
BUN/CREAT BLD: ABNORMAL (ref 9–20)
CALCIUM SERPL-MCNC: 8.9 MG/DL (ref 8.6–10.4)
CHLORIDE BLD-SCNC: 107 MMOL/L (ref 98–107)
CO2: 28 MMOL/L (ref 20–31)
CREAT SERPL-MCNC: 1.13 MG/DL (ref 0.5–0.9)
GFR AFRICAN AMERICAN: 57 ML/MIN
GFR NON-AFRICAN AMERICAN: 47 ML/MIN
GFR SERPL CREATININE-BSD FRML MDRD: ABNORMAL ML/MIN/{1.73_M2}
GFR SERPL CREATININE-BSD FRML MDRD: ABNORMAL ML/MIN/{1.73_M2}
GLUCOSE BLD-MCNC: 95 MG/DL (ref 70–99)
HCT VFR BLD CALC: 34.2 % (ref 36–46)
HEMOGLOBIN: 11 G/DL (ref 12–16)
MCH RBC QN AUTO: 26.4 PG (ref 26–34)
MCHC RBC AUTO-ENTMCNC: 32.2 G/DL (ref 31–37)
MCV RBC AUTO: 81.9 FL (ref 80–100)
NRBC AUTOMATED: ABNORMAL PER 100 WBC
PDW BLD-RTO: 14.3 % (ref 11.5–14.9)
PLATELET # BLD: 261 K/UL (ref 150–450)
PMV BLD AUTO: 8.7 FL (ref 6–12)
POTASSIUM SERPL-SCNC: 4.2 MMOL/L (ref 3.7–5.3)
RBC # BLD: 4.18 M/UL (ref 4–5.2)
SODIUM BLD-SCNC: 144 MMOL/L (ref 135–144)
WBC # BLD: 7 K/UL (ref 3.5–11)

## 2020-10-02 PROCEDURE — 6370000000 HC RX 637 (ALT 250 FOR IP): Performed by: INTERNAL MEDICINE

## 2020-10-02 PROCEDURE — 6360000002 HC RX W HCPCS: Performed by: NURSE PRACTITIONER

## 2020-10-02 PROCEDURE — 6370000000 HC RX 637 (ALT 250 FOR IP): Performed by: NURSE PRACTITIONER

## 2020-10-02 PROCEDURE — 6360000002 HC RX W HCPCS: Performed by: INTERNAL MEDICINE

## 2020-10-02 PROCEDURE — 99239 HOSP IP/OBS DSCHRG MGMT >30: CPT | Performed by: INTERNAL MEDICINE

## 2020-10-02 PROCEDURE — 85027 COMPLETE CBC AUTOMATED: CPT

## 2020-10-02 PROCEDURE — 2580000003 HC RX 258: Performed by: NURSE PRACTITIONER

## 2020-10-02 PROCEDURE — 36415 COLL VENOUS BLD VENIPUNCTURE: CPT

## 2020-10-02 PROCEDURE — 96372 THER/PROPH/DIAG INJ SC/IM: CPT

## 2020-10-02 RX ORDER — METHYLPREDNISOLONE 4 MG/1
4 TABLET ORAL 3 TIMES DAILY
Qty: 3 TABLET | Refills: 0 | Status: SHIPPED | OUTPATIENT
Start: 2020-10-03 | End: 2020-10-04

## 2020-10-02 RX ORDER — GABAPENTIN 100 MG/1
100 CAPSULE ORAL DAILY
Qty: 3 CAPSULE | Refills: 0 | Status: SHIPPED | OUTPATIENT
Start: 2020-10-02 | End: 2021-01-11

## 2020-10-02 RX ORDER — METHYLPREDNISOLONE 4 MG/1
4 TABLET ORAL 2 TIMES DAILY
Qty: 2 TABLET | Refills: 0 | Status: SHIPPED | OUTPATIENT
Start: 2020-10-04 | End: 2020-10-05

## 2020-10-02 RX ORDER — OXYCODONE HYDROCHLORIDE 5 MG/1
5 TABLET ORAL EVERY 8 HOURS PRN
Qty: 9 TABLET | Refills: 0 | Status: SHIPPED | OUTPATIENT
Start: 2020-10-02 | End: 2020-10-05

## 2020-10-02 RX ORDER — AMLODIPINE BESYLATE 5 MG/1
5 TABLET ORAL DAILY
Qty: 30 TABLET | Refills: 3 | Status: SHIPPED | OUTPATIENT
Start: 2020-10-03 | End: 2021-01-03 | Stop reason: SDUPTHER

## 2020-10-02 RX ORDER — METHYLPREDNISOLONE 4 MG/1
4 TABLET ORAL DAILY
Qty: 1 TABLET | Refills: 0 | Status: SHIPPED | OUTPATIENT
Start: 2020-10-05 | End: 2020-10-06

## 2020-10-02 RX ORDER — METHYLPREDNISOLONE 4 MG/1
4 TABLET ORAL NIGHTLY
Qty: 1 TABLET | Refills: 0 | Status: SHIPPED | OUTPATIENT
Start: 2020-10-02 | End: 2020-10-03

## 2020-10-02 RX ADMIN — AMLODIPINE BESYLATE 5 MG: 5 TABLET ORAL at 09:22

## 2020-10-02 RX ADMIN — ENOXAPARIN SODIUM 30 MG: 30 INJECTION SUBCUTANEOUS at 09:22

## 2020-10-02 RX ADMIN — DULOXETINE 30 MG: 30 CAPSULE, DELAYED RELEASE ORAL at 09:23

## 2020-10-02 RX ADMIN — METHYLPREDNISOLONE 4 MG: 4 TABLET ORAL at 12:23

## 2020-10-02 RX ADMIN — FLUTICASONE PROPIONATE 2 SPRAY: 50 SPRAY, METERED NASAL at 09:24

## 2020-10-02 RX ADMIN — OXYCODONE HYDROCHLORIDE 5 MG: 5 TABLET ORAL at 06:41

## 2020-10-02 RX ADMIN — OXYCODONE HYDROCHLORIDE 5 MG: 5 TABLET ORAL at 15:17

## 2020-10-02 RX ADMIN — Medication 10 ML: at 09:30

## 2020-10-02 RX ADMIN — METHYLPREDNISOLONE 4 MG: 4 TABLET ORAL at 17:32

## 2020-10-02 RX ADMIN — ASPIRIN 81 MG: 81 TABLET, COATED ORAL at 09:23

## 2020-10-02 RX ADMIN — METOPROLOL TARTRATE 50 MG: 50 TABLET, FILM COATED ORAL at 09:22

## 2020-10-02 ASSESSMENT — PAIN SCALES - GENERAL
PAINLEVEL_OUTOF10: 2
PAINLEVEL_OUTOF10: 8
PAINLEVEL_OUTOF10: 6

## 2020-10-02 ASSESSMENT — PAIN DESCRIPTION - LOCATION
LOCATION: HIP
LOCATION: HIP

## 2020-10-02 ASSESSMENT — PAIN DESCRIPTION - PAIN TYPE
TYPE: ACUTE PAIN
TYPE: ACUTE PAIN

## 2020-10-02 ASSESSMENT — PAIN DESCRIPTION - ORIENTATION
ORIENTATION: LEFT
ORIENTATION: LEFT

## 2020-10-02 NOTE — DISCHARGE SUMMARY
to reduce the radiation dose to as low as reasonably achievable.; CT of the abdomen and pelvis was performed without the administration of intravenous contrast. Multiplanar reformatted images are provided for review. Dose modulation, iterative reconstruction, and/or weight based adjustment of the mA/kV was utilized to reduce the radiation dose to as low as reasonably achievable. COMPARISON: Left hip radiograph September 28, 2018; pelvis and left hip radiograph January 1, 2020 HISTORY: ORDERING SYSTEM PROVIDED HISTORY: fall pain TECHNOLOGIST PROVIDED HISTORY: fall pain Reason for Exam: pt states she fell 10 days ago; abd pain and left hip pain Acuity: Acute Type of Exam: Initial; ORDERING SYSTEM PROVIDED HISTORY: abdominal pain, fall TECHNOLOGIST PROVIDED HISTORY: abdominal pain, fall Reason for Exam: pt states she fell 10 days ago; abd pain and left hip pain Acuity: Acute Type of Exam: Initial Relevant Medical/Surgical History: surg - hyst, right hip replacement, kyphoplasty FINDINGS: CT RENAL STONE PROTOCOL: LOWER CHEST:  Visualized portion of the lower chest demonstrates no acute abnormality. Calcifications of the partially visualized aortic and mitral valves. Chronic appearing scarring versus fibrotic change at the lung bases. Severe atherosclerotic changes of the imaged descending thoracic aorta. KIDNEYS AND URINARY TRACT: No renal calculi are identified. There is no evidence for hydronephrosis. The ureters are of normal course and caliber. ORGANS: Lack of intravenous contrast limits evaluation of the solid organs and bowel. The solid organs are grossly unremarkable. GI/BOWEL: No bowel obstruction. No evidence of acute appendicitis. PELVIS: The bladder and pelvic organs are unremarkable. PERITONEUM/RETROPERITONEUM: Severe atherosclerotic changes of the imaged abdominal aorta. No retroperitoneal adenopathy identified. No free fluid or free air identified within the abdomen and pelvis.  BONES/SOFT TISSUES: No acute osseous or soft tissue abnormality identified. Postoperative changes of right total hip arthroplasty. Multilevel degenerative changes of the spine which are moderate to severe. Note is made of prior kyphoplasty at the L1 level. Grade 1 anterolisthesis of L4 on L5 which appears degenerative. Severe degenerative change of the left hip as described below. Degenerative changes of the pubic symphysis and bilateral sacroiliac joints. No acute soft tissue abnormality identified. Note is made of a small fat attenuating lesion along the distal iliopsoas on the left consistent with small lipoma. CT LEFT HIP: Bones: No acute fracture or dislocation of the left hip identified. No suspicious lytic or sclerotic osseous lesions. Soft Tissue:  No acute soft tissue abnormality at the left hip. Small well-circumscribed fat attenuating lesion at the distal left iliopsoas consistent with small lipoma. Joint:  Severe osteoarthritis of the left hip with bone on bone articulation, subchondral sclerosis, and prominent subchondral cystic change. Marginal osteophytes also present. No significant joint effusion identified. 1. No acute intra-process identified. 2. Severe atherosclerotic disease. 3. No acute fracture identified. Specifically, no acute fracture of the left hip identified. 4. Severe osteoarthritis of the left hip. Ct Hip Left Wo Contrast    Result Date: 9/29/2020  EXAMINATION: STONE PROTOCOL CT OF THE ABDOMEN AND PELVIS 9/29/2020 8:14 pm; 9/29/2020 8:11 pm TECHNIQUE: CT of the left hip was performed without the administration of intravenous contrast.  Multiplanar reformatted images are provided for review.  Dose modulation, iterative reconstruction, and/or weight based adjustment of the mA/kV was utilized to reduce the radiation dose to as low as reasonably achievable.; CT of the abdomen and pelvis was performed without the administration of intravenous contrast. Multiplanar reformatted images are provided for review. Dose modulation, iterative reconstruction, and/or weight based adjustment of the mA/kV was utilized to reduce the radiation dose to as low as reasonably achievable. COMPARISON: Left hip radiograph September 28, 2018; pelvis and left hip radiograph January 1, 2020 HISTORY: ORDERING SYSTEM PROVIDED HISTORY: fall pain TECHNOLOGIST PROVIDED HISTORY: fall pain Reason for Exam: pt states she fell 10 days ago; abd pain and left hip pain Acuity: Acute Type of Exam: Initial; ORDERING SYSTEM PROVIDED HISTORY: abdominal pain, fall TECHNOLOGIST PROVIDED HISTORY: abdominal pain, fall Reason for Exam: pt states she fell 10 days ago; abd pain and left hip pain Acuity: Acute Type of Exam: Initial Relevant Medical/Surgical History: surg - hyst, right hip replacement, kyphoplasty FINDINGS: CT RENAL STONE PROTOCOL: LOWER CHEST:  Visualized portion of the lower chest demonstrates no acute abnormality. Calcifications of the partially visualized aortic and mitral valves. Chronic appearing scarring versus fibrotic change at the lung bases. Severe atherosclerotic changes of the imaged descending thoracic aorta. KIDNEYS AND URINARY TRACT: No renal calculi are identified. There is no evidence for hydronephrosis. The ureters are of normal course and caliber. ORGANS: Lack of intravenous contrast limits evaluation of the solid organs and bowel. The solid organs are grossly unremarkable. GI/BOWEL: No bowel obstruction. No evidence of acute appendicitis. PELVIS: The bladder and pelvic organs are unremarkable. PERITONEUM/RETROPERITONEUM: Severe atherosclerotic changes of the imaged abdominal aorta. No retroperitoneal adenopathy identified. No free fluid or free air identified within the abdomen and pelvis. BONES/SOFT TISSUES: No acute osseous or soft tissue abnormality identified. Postoperative changes of right total hip arthroplasty. Multilevel degenerative changes of the spine which are moderate to severe.   Note is made of prior kyphoplasty at the L1 level. Grade 1 anterolisthesis of L4 on L5 which appears degenerative. Severe degenerative change of the left hip as described below. Degenerative changes of the pubic symphysis and bilateral sacroiliac joints. No acute soft tissue abnormality identified. Note is made of a small fat attenuating lesion along the distal iliopsoas on the left consistent with small lipoma. CT LEFT HIP: Bones: No acute fracture or dislocation of the left hip identified. No suspicious lytic or sclerotic osseous lesions. Soft Tissue:  No acute soft tissue abnormality at the left hip. Small well-circumscribed fat attenuating lesion at the distal left iliopsoas consistent with small lipoma. Joint:  Severe osteoarthritis of the left hip with bone on bone articulation, subchondral sclerosis, and prominent subchondral cystic change. Marginal osteophytes also present. No significant joint effusion identified. 1. No acute intra-process identified. 2. Severe atherosclerotic disease. 3. No acute fracture identified. Specifically, no acute fracture of the left hip identified. 4. Severe osteoarthritis of the left hip. Consultations:    Consults:     Final Specialist Recommendations/Findings:   IP CONSULT TO INTERNAL MEDICINE  IP CONSULT TO SOCIAL WORK      The patient was seen and examined on day of discharge and this discharge summary is in conjunction with any daily progress note from day of discharge.     Discharge plan:     Disposition: Home    Physician Follow Up:     Raciel Bonilla MD   [address] [place] [address] [address] [place] [phone]    In 3 days      [facility] ED  [address] [address] [phone]    If symptoms worsen       Requiring Further Evaluation/Follow Up POST HOSPITALIZATION/Incidental Findings:    Diet: cardiac diet    Activity: As tolerated    Instructions to Patient:     Discharge Medications:      Medication List      START taking these medications amLODIPine 5 MG tablet  Commonly known as:  NORVASC  Take 1 tablet by mouth daily        CHANGE how you take these medications    gabapentin 100 MG capsule  Commonly known as:  NEURONTIN  Take 1 capsule by mouth daily for 3 days. What changed:  See the new instructions. CONTINUE taking these medications    albuterol sulfate  (90 Base) MCG/ACT inhaler  Inhale 2 puffs into the lungs 4 times daily as needed for Wheezing     aspirin 81 MG tablet     calcium carbonate 1250 (500 Ca) MG tablet  Commonly known as:  OYSTER SHELL CALCIUM 500 mg     docusate sodium 100 MG capsule  Commonly known as:  COLACE     DULoxetine 30 MG extended release capsule  Commonly known as:  CYMBALTA  TAKE 1 CAPSULE TWICE A DAY     fluticasone 50 MCG/ACT nasal spray  Commonly known as:  FLONASE     levocetirizine 5 MG tablet  Commonly known as:  XYZAL  take 1 tablet by mouth once daily AT NIGHT     metoprolol tartrate 50 MG tablet  Commonly known as:  LOPRESSOR     naproxen sodium 220 MG tablet  Commonly known as:  ANAPROX     neomycin-polymyxin-dexameth 3.5-59773-1.1 Oint     ofloxacin 0.3 % solution  Commonly known as:  Faythe [place] Misc  1 each by Does not apply route daily Upright walker with wheels and seat        STOP taking these medications    oxyCODONE 5 MG immediate release tablet  Commonly known as:  Roxicodone     Potassium 99 MG Tabs        ASK your doctor about these medications    * methylPREDNISolone 4 MG tablet  Commonly known as:  Medrol  Take 1 tablet by mouth nightly for 1 day  Ask about: Should I take this medication? * methylPREDNISolone 4 MG tablet  Commonly known as:  Medrol  Take 1 tablet by mouth 3 times daily for 1 day  Ask about: Should I take this medication? * methylPREDNISolone 4 MG tablet  Commonly known as:  Medrol  Take 1 tablet by mouth 2 times daily for 1 day  Ask about: Should I take this medication?      * methylPREDNISolone 4 MG tablet  Commonly known as:  Medrol  Take 1 tablet by mouth daily for 1 day  Ask about: Should I take this medication?     oxyCODONE 5 MG immediate release tablet  Commonly known as:  Roxicodone  Take 1 tablet by mouth every 8 hours as needed for Pain for up to 3 days. Ask about: Should I take this medication? * This list has 4 medication(s) that are the same as other medications prescribed for you. Read the directions carefully, and ask your doctor or other care provider to review them with you. Where to Get Your Medications      These medications were sent to [facility], [address], [address] [place] [address]    Phone:  [phone]   · amLODIPine 5 MG tablet  · gabapentin 100 MG capsule     You can get these medications from any pharmacy    Bring a paper prescription for each of these medications  · methylPREDNISolone 4 MG tablet  · methylPREDNISolone 4 MG tablet  · methylPREDNISolone 4 MG tablet  · methylPREDNISolone 4 MG tablet  · oxyCODONE 5 MG immediate release tablet         Time Spent on discharge is  35 mins in patient examination, evaluation, counseling as well as medication reconciliation, prescriptions for required medications, discharge plan and follow up. Electronically signed by   Quyen Eason MD  10/15/2020  4:11 PM      Thank you Dr. Quyen Eason MD for the opportunity to be involved in this patient's care.

## 2020-10-02 NOTE — PLAN OF CARE
Problem: Pain:  Goal: Pain level will decrease  Description: Pain level will decrease  Outcome: Ongoing  Note: Pt pain is controlled with PRN pain medication as needed. Goal: Control of acute pain  Description: Control of acute pain  Outcome: Ongoing  Note: Pt pain is controlled with PRN pain medication as needed. Goal: Control of chronic pain  Description: Control of chronic pain  Outcome: Ongoing  Note: Pt pain is controlled with PRN pain medication as needed. Problem: Falls - Risk of:  Goal: Will remain free from falls  Description: Will remain free from falls  Outcome: Ongoing  Note: No falls this shift. Call light is within reach, side rails up x2, and bed in lowest position. Pt safety is maintained. Goal: Absence of physical injury  Description: Absence of physical injury  Outcome: Ongoing  Note: No injury this shift. Pt safety maintained. Problem: Skin Integrity:  Goal: Will show no infection signs and symptoms  Description: Will show no infection signs and symptoms  Outcome: Ongoing  Note: Pt remains free from infection. No signs and symptoms of infection. Goal: Absence of new skin breakdown  Description: Absence of new skin breakdown  Outcome: Ongoing  Note: Skin assessment as charted. Problem: Musculor/Skeletal Functional Status  Goal: Highest potential functional level  Outcome: Ongoing  Note: PT & OT as ordered. Goal: Absence of falls  Outcome: Ongoing  Note: No falls this shift. Call light is within reach, side rails up x2, and bed in lowest position. Pt safety is maintained.

## 2020-10-09 ENCOUNTER — CARE COORDINATION (OUTPATIENT)
Dept: CASE MANAGEMENT | Age: 75
End: 2020-10-09

## 2020-10-09 ENCOUNTER — TELEPHONE (OUTPATIENT)
Dept: INTERNAL MEDICINE CLINIC | Age: 75
End: 2020-10-09

## 2020-10-09 ENCOUNTER — CARE COORDINATION (OUTPATIENT)
Dept: CARE COORDINATION | Age: 75
End: 2020-10-09

## 2020-10-09 PROCEDURE — 1111F DSCHRG MED/CURRENT MED MERGE: CPT | Performed by: INTERNAL MEDICINE

## 2020-10-09 NOTE — TELEPHONE ENCOUNTER
Patient requesting hospital bed and pressure reducing mattress, Order pending your approval.   Order, demographics and recent hospital DC note will need to be faxed to [phone]. [facility] confirmed that they accept her insurance.

## 2020-10-09 NOTE — CARE COORDINATION
[facility] Transitions Initial Follow Up Call    Call within 2 business days of discharge: Yes    Patient: Stefanie Olivares Patient : 1945   MRN: <[id number]>  Reason for Admission: Left Hip pain  Discharge Date: 20 RARS: No data recorded    Last Discharge [facility]       Complaint Diagnosis Description Type Department Provider    20 Hip Pain; Leg Pain Pain of left hip joint . .. ED to Hosp-Admission (Discharged) (ADMITTED) 1200 Luma Lechuga MD; Katie Cabrera. .. Challenges to be reviewed by the provider   Additional needs identified to be addressed with provider No  none    Discussed COVID-19 related testing which was available at this time. Test results were negative. Patient informed of results, if available? Yes         Method of communication with provider : none    Advance Care Planning:   Does patient have an Advance Directive:  not on file. Was this a readmission? No  Patient stated reason for admission: Left hip pain  Patients top risk factors for readmission: functional physical ability    Care Transition Nurse (CTN) contacted the patient by telephone to perform post hospital discharge assessment. Verified name and  with patient as identifiers. Provided introduction to self, and explanation of the CTN role. CTN reviewed discharge instructions, medical action plan and red flags with patient who verbalized understanding. Patient given an opportunity to ask questions and does not have any further questions or concerns at this time. Were discharge instructions available to patient? Yes. Reviewed appropriate site of care based on symptoms and resources available to patient including: PCP. The patient agrees to contact the PCP office for questions related to their healthcare. Medication reconciliation was performed with patient, who verbalizes understanding of administration of home medications.  Advised obtaining a 90-day supply of all daily and as-needed medications. Covid Risk Education    Patient has following risk factors of: heart failure. Education provided regarding infection prevention, and signs and symptoms of COVID-19 and when to seek medical attention with patient who verbalized understanding. Discussed exposure protocols and quarantine From CDC: Are you at higher risk for severe illness?   and given an opportunity for questions and concerns. The patient agrees to contact the COVID-19 hotline [phone] or PCP office for questions related to COVID-19. For more information on steps you can take to protect yourself, see CDC's How to Protect Yourself     Patient/family/caregiver given information for [employer] and agrees to enroll no  Patient's preferred e-mail: declines  Patient's preferred phone number: declines    Discussed follow-up appointments. If no appointment was previously scheduled, appointment scheduling offered: Yes. Is follow up appointment scheduled within 7 days of discharge? Patient states she will call herself. Declined assistance  Non-[facility] follow up appointment(s):     Plan for follow-up call in 3-5 days based on severity of symptoms and risk factors. Plan for next call: symptom management-Left hip pain and follow up appointment-check status of scheduling appointment  CTN provided contact information for future needs. Patient admitted to [address] [place] [address] 9/29-9/30/20 for Left hip pain. Patient discharged to [facility] for rehab from 9/30-10/8/20. Patient states she continues to have left hip pain. Pain managed with Oxycodone 5mg. States she needs another mattress although the one she has is new. Denies sob, cough, congestion, n/v or bladder concerns. Patient admits to occassional constipation. Last BM last night. Appetite good. Medications reviewed. Patient ambulates using a walker. [facility] made contact but patient does not remember what they agreed on.  She has a aide Mon, Wed, Fri for 2 hours per day. Patient declined assistance to schedule a f/u appointment d/t coordination with availability of an aide. I called [facility]. Staff state they called yesterday twice and left a message. They want SOC to begin today. We compared contact information. Patient updated. Patient states she has family support. I will check next week on status. DENISE Rojo RN  Care Transition Nurse.            Non-face-to-face services provided:      Care Transitions 24 Hour Call    Do you have any ongoing symptoms?:  Yes  Patient-reported symptoms:  Pain  Do you have a copy of your discharge instructions?:  Yes  Do you have all of your prescriptions and are they filled?:  Yes  Have you been contacted by a [employer] [address]?:  No  Have you scheduled your follow up appointment?:  No  Were you discharged with any Home Care or Post Acute Services:  Yes  Post Acute Services:  Home Health (Comment: [facility])  Do you feel like you have everything you need to keep you well at home?:  Yes  Care Transitions Interventions         Follow Up  Future Appointments   Date Time Provider Mayo Betts   10/14/2020  8:15 AM KARIN Stevenson [address] [facility]   11/3/2020  1:30 PM Suri Katja Magallanes [address]       James Nix, [place]

## 2020-10-09 NOTE — CARE COORDINATION
[facility] Transitions Follow Up Call    10/9/2020    Patient: Roe Burrows  Patient : 1945   MRN: <[id number]>  Reason for Admission: Lt hip pain  Discharge Date: 20 RARS: No data recorded     Patient called back to ask if we could discuss the call details from earlier today. She was sleepy and could not remember. I began to review. She stated the nurse was calling and asked to call me back. She called back. States she was speaking with Jazmyn Bruce from [facility] office regarding a new mattress. I reviewed the conversation. She started to cry. States she was disappointed because of the status of her condition. She expected to have more improvement. I provided encouragement. Patient stated I was a blessing and thanked me. I will check back next week. ADAM VenturaN RN  Care Transition Nurse.             Follow Up  Future Appointments   Date Time Provider Mayo Betts   10/14/2020  8:15 AM KARIN Miranda - CNP [facility]   11/3/2020  1:30 PM Demetria Frankel MD [facility]P JOSÉ Bae RN

## 2020-10-13 ENCOUNTER — HOSPITAL ENCOUNTER (OUTPATIENT)
Dept: PAIN MANAGEMENT | Age: 75
Discharge: HOME | End: 2020-10-13
Payer: MEDICARE

## 2020-10-13 PROCEDURE — 99213 OFFICE O/P EST LOW 20 MIN: CPT

## 2020-10-13 PROCEDURE — 99442 PR PHYS/QHP TELEPHONE EVALUATION 11-20 MIN: CPT | Performed by: NURSE PRACTITIONER

## 2020-10-13 RX ORDER — OXYCODONE HYDROCHLORIDE 5 MG/1
5 TABLET ORAL EVERY 8 HOURS PRN
Qty: 90 TABLET | Refills: 0 | Status: SHIPPED | OUTPATIENT
Start: 2020-10-13 | End: 2020-11-12 | Stop reason: SDUPTHER

## 2020-10-13 ASSESSMENT — ENCOUNTER SYMPTOMS
BACK PAIN: 1
COUGH: 0
CONSTIPATION: 0
SHORTNESS OF BREATH: 0

## 2020-10-13 NOTE — PROGRESS NOTES
Patient completed a telephone visit today to review medication contract. Chief Complaint: back pain    [facility]  Pt reports chronic history of low back pain that radiates into right hip and buttocks at times She is s/p bilateral knee and right hip replaced. She has tried NSAIDS heat chiropractic care and with little relief. Just completed in home PT to increase strength and help with ambulation and balance. She had recent fall on 9/28/18 with resulting L1 compression fracture. She had kyphoplasty of L1 which did help her back pain.  She had lumbar epidural on and reports moderate relief. She goes to the chiropractor regularly with relief. She started aquatic PT and states it is not helping. She had a recent fall at home and was admitted to the hospital for two days. She went to [place] for about a week and is home now. She continues PT in her home. She was scheduled for lumber injection but she had to cancel it due to the fall. Back Pain   This is a chronic problem. The current episode started more than 1 year ago. The problem occurs constantly. The problem is unchanged. The pain is present in the lumbar spine. The quality of the pain is described as aching. Radiates to: down left leg to foot. The pain is at a severity of 6/10. The pain is moderate. The symptoms are aggravated by position and standing (walking). Pertinent negatives include no chest pain, fever, numbness, paresthesias or tingling. She has tried analgesics and bed rest for the symptoms. The treatment provided mild relief. Patient denies any new neurological symptoms. No bowel or bladder incontinence, no weakness, and no falling.     Pill count: appropriate was due 10/7    Morphine equivalent: 22.5    Periodic Controlled Substance Monitoring: Possible medication side effects, risk of tolerance/dependence & alternative treatments discussed., No signs of potential drug abuse or diversion identified., Obtaining appropriate analgesic effect of chin. KARIN Elder - CNP)      Past Medical History:   Diagnosis Date    Abnormality of rib determined by X-ray 1/28/2016    Acute cystitis without hematuria 6/16/2018    Acute exacerbation of chronic bronchitis (HCC)     Acute on chronic diastolic heart failure ([facility] [address] 1/28/2016    Adjustment disorder with mixed anxiety and depressed mood 6/26/2015    Mild     Anemia 3/5/2014    Back pain     Bronchiectasis with acute lower respiratory infection ([facility] [address] 8/16/2017    Bronchitis     Chronic bronchitis (HCC) 1/28/2016    Chronic cough 7/23/2018    Degenerative joint disease (DJD) of hip 5/14/2015    Dyslipidemia 3/5/2014    Encephalopathy acute 5/29/2016    Essential hypertension 1/28/2016    Fibromyalgia     GERD (gastroesophageal reflux disease)     History of total bilateral knee replacement 5/28/2016    Hx of blood clots     left leg and lung    Hyperlipidemia     Hypertension     Incontinence of urine     Irregular heartbeat     DR. Olga Leslie Medication monitoring encounter 6/13/2018    Morbid obesity with BMI of 45.0-49.9, adult ([facility] [address] 1/28/2016    Obstructive sleep apnea syndrome     Osteoarthritis     Primary osteoarthritis of left knee 5/27/2016    PVC (premature ventricular contraction) 1/28/2016    S/P right and left heart catheterization 10/2/2015    Sleep apnea     no machine    SOB (shortness of breath)     Spinal stenosis of lumbar region with neurogenic claudication 5/13/2013    Supplemental oxygen dependent 12/8/2017    Urine frequency     UTI (urinary tract infection)     hospitalized early july 2014 for kidney infection       Past Surgical History:   Procedure Laterality Date    BRONCHOSCOPY Left 8/16/2017    BRONCHOSCOPY ALVEOLAR LAVAGE LOWER LOBE performed by Karina Peralta MD at [facility]  x 3    no stents    COLONOSCOPY  4 28 14    polyps biopsies     FOOT SURGERY Left     calcium deposit removal from top of foot  HYSTERECTOMY      JOINT REPLACEMENT Bilateral 5/27/2016    bilat total knees    NERVE BLOCK  5/13/2013    caudal celestone 6mg    NERVE BLOCK  5/28/13    Caudal #2, Celestone 9mg    NERVE BLOCK  2/14/14    rt hip inj celestone 9 mg    NERVE BLOCK  07/14/2014    caudal# 3- celestone 9 mg    NERVE BLOCK  7-21-14    caudal epidural #2, decadron 7mg, fentanyl 25mcg    NERVE BLOCK  10/2/14    duramorph 1.5  decadron 5mg    NERVE BLOCK  11/9/2015    caudal# 1 celestone 9mg    NERVE BLOCK  11/16/15    caudal #2  decadron 10mg    NERVE BLOCK  11/23/15    duramorph 1mg celestone 9mg    NERVE BLOCK  03/28/2018    CAUDAL EPIDURAL STEROID BLOCK  DECADRON 10 MG     NERVE BLOCK  05/23/2018    caudal # decadron 7mg    NERVE BLOCK  08/28/2018    natalia transforminal # 1, decadron 10mg gadoteridol, LONG NEEDLES    MA PERQ VERT AGMNTJ CAVITY CRTJ UNI/BI CANNULJ LMBR N/A 10/29/2018    KYPHOPLASTY L1 performed by Deep Dougherty MD at [address] Right 5/14/15    total hip replacement       Allergies   Allergen Reactions    Rosuvastatin Calcium Anaphylaxis     Hair fell out    Statins Anaphylaxis     Hair fell out    Bactrim [Sulfamethoxazole-Trimethoprim] Diarrhea    Caffeine Other (See Comments)     pain  pain    Dye [Iodides] Other (See Comments)     Iv dye= blood clots in arm    Food      Tomatoes, pain    Ioxaglate Other (See Comments)     Iv dye= blood clots in arm    Tomato     Dicloxacillin Rash    Pcn [Penicillins] Rash         Current Outpatient Medications:     gabapentin (NEURONTIN) 100 MG capsule, Take 1 capsule by mouth daily for 3 days. , Disp: 3 capsule, Rfl: 0    amLODIPine (NORVASC) 5 MG tablet, Take 1 tablet by mouth daily, Disp: 30 tablet, Rfl: 3    neomycin-polymyxin-dexameth 3.5-79585-7.1 OINT, , Disp: , Rfl:     levocetirizine (XYZAL) 5 MG tablet, take 1 tablet by mouth once daily AT NIGHT, Disp: 30 tablet, Rfl: 2    naproxen sodium (ANAPROX) 220 MG tablet, Take 220 mg by mouth 2 times daily (with meals), Disp: , Rfl:     fluticasone (FLONASE) 50 MCG/ACT nasal spray, instill 2 sprays into each nostril once daily, Disp: , Rfl:     calcium carbonate (OYSTER SHELL CALCIUM 500 MG) 1250 (500 Ca) MG tablet, Take 1 tablet by mouth daily With magnesium, zinc, Disp: , Rfl:     albuterol sulfate  (90 Base) MCG/ACT inhaler, Inhale 2 puffs into the lungs 4 times daily as needed for Wheezing, Disp: 1 Inhaler, Rfl: 2    DULoxetine (CYMBALTA) 30 MG extended release capsule, TAKE 1 CAPSULE TWICE A DAY, Disp: 180 capsule, Rfl: 4    ofloxacin (OCUFLOX) 0.3 % solution, Place into both eyes daily as needed , Disp: , Rfl:     docusate sodium (COLACE) 100 MG capsule, Take 100 mg by mouth daily as needed for Constipation, Disp: , Rfl:     Misc.  Devices (WALKER) MISC, 1 each by Does not apply route daily Upright walker with wheels and seat, Disp: 1 each, Rfl: 0    metoprolol tartrate (LOPRESSOR) 50 MG tablet, Take 50 mg by mouth 2 times daily, Disp: , Rfl:     aspirin 81 MG tablet, Take 81 mg by mouth daily, Disp: , Rfl:     Family History   Problem Relation Age of Onset    Stomach Cancer Brother         stomach    High Blood Pressure Mother     Heart Disease Sister         irregular heartbeat       Social History     Socioeconomic History    Marital status:      Spouse name: Not on file    Number of children: Not on file    Years of education: Not on file    Highest education level: Not on file   Occupational History    Not on file   Social Needs    Financial resource strain: Not on file    Food insecurity     Worry: Not on file     Inability: Not on file    Transportation needs     Medical: Not on file     Non-medical: Not on file   Tobacco Use    Smoking status: Former Smoker     Packs/day: 1.00     Years: 18.00     Pack years: 18.00     Last attempt to quit: 1993     Years since quittin.4    Smokeless tobacco: Never Used   Substance and Sexual Activity    Alcohol use: No    Drug use: No    Sexual activity: Not Currently   Lifestyle    Physical activity     Days per week: Not on file     Minutes per session: Not on file    Stress: Not on file   Relationships    Social connections     Talks on phone: Not on file     Gets together: Not on file     Attends Anglican service: Not on file     Active member of club or organization: Not on file     Attends meetings of clubs or organizations: Not on file     Relationship status: Not on file    Intimate partner violence     Fear of current or ex partner: Not on file     Emotionally abused: Not on file     Physically abused: Not on file     Forced sexual activity: Not on file   Other Topics Concern    Not on file   Social History Narrative    Not on file       Review of Systems:  Review of Systems   Constitution: Negative for chills and fever. Cardiovascular: Negative for chest pain and palpitations. Respiratory: Negative for cough and shortness of breath. Musculoskeletal: Positive for back pain. Gastrointestinal: Negative for constipation. Neurological: Negative for disturbances in coordination, loss of balance, numbness, paresthesias and tingling. Physical Exam:  There were no vitals taken for this visit. Physical Exam  Neurological:      Mental Status: She is alert.    Psychiatric:         Mood and Affect: Mood normal.         Record/Diagnostics Review:    Last tiffanie 8/2020 and was appropriate     Assessment:  Problem List Items Addressed This Visit     Spinal stenosis of lumbar region with neurogenic claudication (Chronic)    Relevant Medications    oxyCODONE (ROXICODONE) 5 MG immediate release tablet    Medication monitoring encounter - Primary (Chronic)    Chronic prescription opiate use (Chronic)    History of total bilateral knee replacement    Relevant Medications    oxyCODONE (ROXICODONE) 5 MG immediate release tablet    DDD (degenerative disc disease), lumbar    Relevant Medications    oxyCODONE (ROXICODONE) 5 MG immediate release tablet             Treatment Plan:  Patient relates current medications are helping the pain. Patient reports taking pain medications as prescribed, denies obtaining medications from different sources and denies use of illegal drugs. Patient denies side effects from medications like nausea, vomiting, constipation or drowsiness. Patient reports current activities of daily living are possible due to medications and would like to continue them. As always, we encourage daily stretching and strengthening exercises, and recommend minimizing use of pain medications unless patient cannot get through daily activities due to pain. Contract requirements met. Continue opioid therapy. Script written for oxycodone  Follow up appointment made for 4 weeks    Alma Tavarez is a 75 y.o. female being evaluated by a Virtual Visit (telephone visit) encounter to address concerns as mentioned above. A caregiver was present when appropriate. Due to this being a TeleHealth encounter (During [facility]- public health emergency), evaluation of the following organ systems was limited: Vitals/Constitutional/EENT/Resp/CV/GI//MS/Neuro/Skin/Heme-Lymph-Imm. Pursuant to the emergency declaration under the [address] [place] [address], [address] [place] [address] authority and the [employer] and [employer] Act, this Virtual Visit was conducted with patient's (and/or legal guardian's) consent, to reduce the patient's risk of exposure to COVID-19 and provide necessary medical care. The patient (and/or legal guardian) has also been advised to contact this office for worsening conditions or problems, and seek emergency medical treatment and/or call 911 if deemed necessary.      Patient identification was verified at the start of the visit: Yes    Total time spent for this encounter: 15 minutes    Services were provided

## 2020-10-15 ENCOUNTER — CARE COORDINATION (OUTPATIENT)
Dept: CARE COORDINATION | Age: 75
End: 2020-10-15

## 2020-10-15 NOTE — CARE COORDINATION
[facility] returned call stating that her last note needs updated to include reason for hospital bed. Most recent note is her DC summary, will send request to PCP. Documentation needs to reflect need for bed. Example: patient has a medical condition requiring repositioning not feasible with an ordinary bed in order to alleviate pain.

## 2020-10-15 NOTE — CARE COORDINATION
10/13/20  Raciel Bonilla MD   amLODIPine (NORVASC) 5 MG tablet Take 1 tablet by mouth daily 10/3/20   Raciel Bonilla MD   neomycin-polymyxin-dexameth 3.5-62611-6.1 OINT  7/13/20   Historical Provider, MD   levocetirizine (XYZAL) 5 MG tablet take 1 tablet by mouth once daily AT NIGHT 8/24/20   Warden Lio PA-C   naproxen sodium (ANAPROX) 220 MG tablet Take 220 mg by mouth 2 times daily (with meals)    Historical Provider, MD   fluticasone (FLONASE) 50 MCG/ACT nasal spray instill 2 sprays into each nostril once daily 5/15/20   Historical Provider, MD   calcium carbonate (OYSTER SHELL CALCIUM 500 MG) 1250 (500 Ca) MG tablet Take 1 tablet by mouth daily With magnesium, zinc    Historical Provider, MD   albuterol sulfate  (90 Base) MCG/ACT inhaler Inhale 2 puffs into the lungs 4 times daily as needed for Wheezing 4/20/20   Raciel Bonilla MD   DULoxetine (CYMBALTA) 30 MG extended release capsule TAKE 1 CAPSULE TWICE A DAY 11/7/19   Raciel Bonilla MD   ofloxacin (OCUFLOX) 0.3 % solution Place into both eyes daily as needed  4/13/19   Historical Provider, MD   docusate sodium (COLACE) 100 MG capsule Take 100 mg by mouth daily as needed for Constipation    Historical Provider, MD   Misc.  Devices [facility]) MISC 1 each by Does not apply route daily Upright walker with wheels and seat 11/12/18   Karime Friday, MD   metoprolol tartrate (LOPRESSOR) 50 MG tablet Take 50 mg by mouth 2 times daily    Historical Provider, MD   aspirin 81 MG tablet Take 81 mg by mouth daily    Historical Provider, MD       Future Appointments   Date Time Provider Mayo Betts   10/19/2020 10:30 AM [facility] VASCULAR [facility] [address]   11/3/2020  1:30 PM Liam Cheema MD [facility] [place] [facility]   11/12/2020 12:00 PM Andre Pugh, APRN - CNP [address]     ,   Congestive Heart Failure Assessment       No patient-reported symptoms      Symptoms:      Symptom course:  stable  Weight trend: stable  Salt intake watch compared to last visit:  stable     ,   COPD Assessment    Does the patient understand envrionmental exposure?:  Yes  Is the patient able to verbalize Rescue vs. Long Acting medications?:  Yes     No patient-reported symptoms         Symptoms:          and   General Assessment    Do you have any symptoms that are causing concern?:  Yes  Progression since Onset:  Unchanged  Reported Symptoms:  Pain

## 2020-10-19 ENCOUNTER — CARE COORDINATION (OUTPATIENT)
Dept: CARE COORDINATION | Age: 75
End: 2020-10-19

## 2020-10-22 ENCOUNTER — CARE COORDINATION (OUTPATIENT)
Dept: CARE COORDINATION | Age: 75
End: 2020-10-22

## 2020-10-22 NOTE — CARE COORDINATION
Ambulatory Care Coordination Note  10/22/2020  CM Risk Score: 10  Charlson 10 Year Mortality Risk Score: 79%     ACC: Mckenzie Garza, RN    Summary Note: Spoke with patient who stated that Abner Mckenna at the equipment supplier is working on getting her just a mattress. ACM encouraged her to call with any needs for this. Patient stated she would like to know if PCP would consider trying a steroid for her pain. Per patient it had helped in the past. ACM will route to PCP again and follow up with her with his recommendations. Precautions reviewed for COVID-19 per CDC  Wash hands often with soap and water for at least 20 seconds  When soap is not available us hand  with at least 70% alcohol  Avoid touching your face  Avoid close contact with others  Maintain 6 feet distance if possible    If you are sick:  Cover mouth and nose when coughing or sneezing and then wash hands  Wear a mask when around others  Clean and disinfect surfaces often with soap or detergent and water. Care Coordination Interventions    Program Enrollment:  Complex Care  Referral from Primary Care Provider:  No  Suggested Interventions and Community Resources  Fall Risk Prevention:  Completed  Transportation Support:  Declined  Zone Management Tools:  Completed         Goals Addressed                 This Visit's Progress     Conditions and Symptoms   On track     I will schedule office visits, as directed by my provider. I will keep my appointment or reschedule if I have to cancel. I will notify my provider of any barriers to my plan of care. I will follow my Zone Management tool to seek urgent or emergent care. I will notify my provider of any symptoms that indicate a worsening of my condition.     Barriers: overwhelmed by complexity of regimen  Plan for overcoming my barriers: work with [facility]  Confidence: 9/10  Anticipated Goal Completion Date: 12.20.20              Prior to Admission medications    Medication Sig Start Date End Date Joi Dominique   11/3/2020  1:30 PM Vernetta Cogan, MD [facility] [place] [facility]   11/12/2020 12:00 PM Deborah Leyden, APRN - CNP [address]     ,   Congestive Heart Failure Assessment       No patient-reported symptoms      Symptoms:      Symptom course:  stable  Weight trend:  stable  Salt intake watch compared to last visit:  stable     ,   COPD Assessment    Does the patient understand envrionmental exposure?:  Yes  Is the patient able to verbalize Rescue vs. Long Acting medications?:  Yes     No patient-reported symptoms         Symptoms:          and   General Assessment    Do you have any symptoms that are causing concern?:  Yes  Progression since Onset:  Unchanged  Reported Symptoms:  Pain (Comment: chronic pain)

## 2020-10-29 ENCOUNTER — HOSPITAL ENCOUNTER (OUTPATIENT)
Dept: VASCULAR LAB | Age: 75
Discharge: HOME | End: 2020-10-29
Payer: MEDICARE

## 2020-10-29 PROCEDURE — 93975 VASCULAR STUDY: CPT

## 2020-11-02 ENCOUNTER — TELEPHONE (OUTPATIENT)
Dept: INTERNAL MEDICINE CLINIC | Age: 75
End: 2020-11-02

## 2020-11-02 NOTE — TELEPHONE ENCOUNTER
Pt called requesting Kirk Duvall RN CC call her back regarding pt has been trying to get a mattress. Please return pts call when you are available.

## 2020-11-03 ENCOUNTER — CARE COORDINATION (OUTPATIENT)
Dept: CARE COORDINATION | Age: 75
End: 2020-11-03

## 2020-11-04 NOTE — TELEPHONE ENCOUNTER
Spoke with patient explained Ludy Pratt has not returned ACM call. Patient stated her grand daughter will help her look for a mattress of her preference when she comes over. Patient would still like a bigger mattress than what she would get with a hospital bed.

## 2020-11-05 NOTE — CARE COORDINATION
[facility] attempted to reach Xin Melgoza, the number provided by patient of who she thought was helping with her mattress. LATHA left VM for Xin Melgoza and explained to patient that she did not return my call. Plan is to have patient and her grand daughter research mattresses that she can buy from a store. Due to the specific details that she would like. She will call [facility] with any concerns.

## 2020-11-12 ENCOUNTER — HOSPITAL ENCOUNTER (OUTPATIENT)
Dept: PAIN MANAGEMENT | Age: 75
Discharge: HOME | End: 2020-11-12
Payer: MEDICARE

## 2020-11-12 PROCEDURE — 99213 OFFICE O/P EST LOW 20 MIN: CPT

## 2020-11-12 PROCEDURE — 99441 PR PHYS/QHP TELEPHONE EVALUATION 5-10 MIN: CPT | Performed by: NURSE PRACTITIONER

## 2020-11-12 RX ORDER — OXYCODONE HYDROCHLORIDE 5 MG/1
5 TABLET ORAL EVERY 8 HOURS PRN
Qty: 90 TABLET | Refills: 0 | Status: SHIPPED | OUTPATIENT
Start: 2020-11-12 | End: 2020-12-11 | Stop reason: SDUPTHER

## 2020-11-12 ASSESSMENT — ENCOUNTER SYMPTOMS
COUGH: 0
BACK PAIN: 1
CONSTIPATION: 0
SHORTNESS OF BREATH: 0

## 2020-11-12 NOTE — PROGRESS NOTES
appropriate analgesic effect of treatment. Leonid Shahid, APRN - CNP)      Past Medical History:   Diagnosis Date    Abnormality of rib determined by X-ray 1/28/2016    Acute cystitis without hematuria 6/16/2018    Acute exacerbation of chronic bronchitis (HCC)     Acute on chronic diastolic heart failure ([facility] 1/28/2016    Adjustment disorder with mixed anxiety and depressed mood 6/26/2015    Mild     Anemia 3/5/2014    Back pain     Bronchiectasis with acute lower respiratory infection ([facility] 8/16/2017    Bronchitis     Chronic bronchitis (HCC) 1/28/2016    Chronic cough 7/23/2018    Degenerative joint disease (DJD) of hip 5/14/2015    Dyslipidemia 3/5/2014    Encephalopathy acute 5/29/2016    Essential hypertension 1/28/2016    Fibromyalgia     GERD (gastroesophageal reflux disease)     History of total bilateral knee replacement 5/28/2016    Hx of blood clots     left leg and lung    Hyperlipidemia     Hypertension     Incontinence of urine     Irregular heartbeat     DR. Tia Hamilton Medication monitoring encounter 6/13/2018    Morbid obesity with BMI of 45.0-49.9, [facility] 1/28/2016    Obstructive sleep apnea syndrome     Osteoarthritis     Primary osteoarthritis of left knee 5/27/2016    PVC (premature ventricular contraction) 1/28/2016    S/P right and left heart catheterization 10/2/2015    Sleep apnea     no machine    SOB (shortness of breath)     Spinal stenosis of lumbar region with neurogenic claudication 5/13/2013    Supplemental oxygen dependent 12/8/2017    Urine frequency     UTI (urinary tract infection)     hospitalized early july 2014 for kidney infection       Past Surgical History:   Procedure Laterality Date    BRONCHOSCOPY Left 8/16/2017    BRONCHOSCOPY ALVEOLAR LAVAGE LOWER LOBE performed by Walt Habermann, MD at [address] [facility]  x 3    no stents    COLONOSCOPY  4 28 14    polyps biopsies     FOOT SURGERY Left     calcium deposit removal from top of foot    HYSTERECTOMY      JOINT REPLACEMENT Bilateral 5/27/2016    bilat total knees    NERVE BLOCK  5/13/2013    caudal celestone 6mg    NERVE BLOCK  5/28/13    Caudal #2, Celestone 9mg    NERVE BLOCK  2/14/14    rt hip inj celestone 9 mg    NERVE BLOCK  07/14/2014    caudal# 3- celestone 9 mg    NERVE BLOCK  7-21-14    caudal epidural #2, decadron 7mg, fentanyl 25mcg    NERVE BLOCK  10/2/14    duramorph 1.5  decadron 5mg    NERVE BLOCK  11/9/2015    caudal# 1 celestone 9mg    NERVE BLOCK  11/16/15    caudal #2  decadron 10mg    NERVE BLOCK  11/23/15    duramorph 1mg celestone 9mg    NERVE BLOCK  03/28/2018    CAUDAL EPIDURAL STEROID BLOCK  DECADRON 10 MG     NERVE BLOCK  05/23/2018    caudal # decadron 7mg    NERVE BLOCK  08/28/2018    natalia transforminal # 1, decadron 10mg gadoteridol, LONG NEEDLES    HI PERQ VERT AGMNTJ CAVITY CRTJ UNI/BI CANNULJ LMBR N/A 10/29/2018    KYPHOPLASTY L1 performed by Richar Smyth MD at [address] Right 5/14/15    total hip replacement       Allergies   Allergen Reactions    Rosuvastatin Calcium Anaphylaxis     Hair fell out    Statins Anaphylaxis     Hair fell out    Bactrim [Sulfamethoxazole-Trimethoprim] Diarrhea    Caffeine Other (See Comments)     pain  pain    Dye [Iodides] Other (See Comments)     Iv dye= blood clots in arm    Food      Tomatoes, pain    Ioxaglate Other (See Comments)     Iv dye= blood clots in arm    Tomato     Dicloxacillin Rash    Pcn [Penicillins] Rash         Current Outpatient Medications:     oxyCODONE (ROXICODONE) 5 MG immediate release tablet, Take 1 tablet by mouth every 8 hours as needed for Pain for up to 30 days. , Disp: 90 tablet, Rfl: 0    gabapentin (NEURONTIN) 100 MG capsule, Take 1 capsule by mouth daily for 3 days. , Disp: 3 capsule, Rfl: 0    amLODIPine (NORVASC) 5 MG tablet, Take 1 tablet by mouth daily, Disp: 30 tablet, Rfl: 3   neomycin-polymyxin-dexameth 3.5-41993-9.1 OINT, , Disp: , Rfl:     levocetirizine (XYZAL) 5 MG tablet, take 1 tablet by mouth once daily AT NIGHT, Disp: 30 tablet, Rfl: 2    naproxen sodium (ANAPROX) 220 MG tablet, Take 220 mg by mouth 2 times daily (with meals), Disp: , Rfl:     fluticasone (FLONASE) 50 MCG/ACT nasal spray, instill 2 sprays into each nostril once daily, Disp: , Rfl:     calcium carbonate (OYSTER SHELL CALCIUM 500 MG) 1250 (500 Ca) MG tablet, Take 1 tablet by mouth daily With magnesium, zinc, Disp: , Rfl:     albuterol sulfate  (90 Base) MCG/ACT inhaler, Inhale 2 puffs into the lungs 4 times daily as needed for Wheezing, Disp: 1 Inhaler, Rfl: 2    DULoxetine (CYMBALTA) 30 MG extended release capsule, TAKE 1 CAPSULE TWICE A DAY, Disp: 180 capsule, Rfl: 4    ofloxacin (OCUFLOX) 0.3 % solution, Place into both eyes daily as needed , Disp: , Rfl:     docusate sodium (COLACE) 100 MG capsule, Take 100 mg by mouth daily as needed for Constipation, Disp: , Rfl:     Misc.  Devices (WALKER) MISC, 1 each by Does not apply route daily Upright walker with wheels and seat, Disp: 1 each, Rfl: 0    metoprolol tartrate (LOPRESSOR) 50 MG tablet, Take 50 mg by mouth 2 times daily, Disp: , Rfl:     aspirin 81 MG tablet, Take 81 mg by mouth daily, Disp: , Rfl:     Family History   Problem Relation Age of Onset    Stomach Cancer Brother         stomach    High Blood Pressure Mother     Heart Disease Sister         irregular heartbeat       Social History     Socioeconomic History    Marital status:      Spouse name: Not on file    Number of children: Not on file    Years of education: Not on file    Highest education level: Not on file   Occupational History    Not on file   Social Needs    Financial resource strain: Not on file    Food insecurity     Worry: Not on file     Inability: Not on file    Transportation needs     Medical: Not on file     Non-medical: Not on file   Tobacco Use    Smoking status: Former Smoker     Packs/day: 1.00     Years: 18.00     Pack years: 18.00     Last attempt to quit: 1993     Years since quittin.5    Smokeless tobacco: Never Used   Substance and Sexual Activity    Alcohol use: No    Drug use: No    Sexual activity: Not Currently   Lifestyle    Physical activity     Days per week: Not on file     Minutes per session: Not on file    Stress: Not on file   Relationships    Social connections     Talks on phone: Not on file     Gets together: Not on file     Attends [employer] service: Not on file     Active member of club or organization: Not on file     Attends meetings of clubs or organizations: Not on file     Relationship status: Not on file    Intimate partner violence     Fear of current or ex partner: Not on file     Emotionally abused: Not on file     Physically abused: Not on file     Forced sexual activity: Not on file   Other Topics Concern    Not on file   Social History Narrative    Not on file       Review of Systems:  Review of Systems   Constitution: Negative for chills and fever. Cardiovascular: Negative for chest pain and palpitations. Respiratory: Negative for cough and shortness of breath. Musculoskeletal: Positive for back pain. Gastrointestinal: Negative for constipation. Neurological: Positive for numbness and tingling. Negative for disturbances in coordination and loss of balance. Physical Exam:  There were no vitals taken for this visit. Physical Exam  Neurological:      Mental Status: She is alert.    Psychiatric:         Mood and Affect: Mood normal.         Record/Diagnostics Review:    Last tiffanie 2019 and was appropriate     Assessment:  Problem List Items Addressed This Visit     Spinal stenosis of lumbar region with neurogenic claudication (Chronic)    Relevant Medications    oxyCODONE (ROXICODONE) 5 MG immediate release tablet    History of total bilateral knee replacement    Relevant Medications    oxyCODONE (ROXICODONE) 5 MG immediate release tablet    Acquired spondylolisthesis - Primary    DDD (degenerative disc disease), lumbar    Relevant Medications    oxyCODONE (ROXICODONE) 5 MG immediate release tablet    Osteopenia of lumbar spine             Treatment Plan:  Patient relates current medications are helping the pain. Patient reports taking pain medications as prescribed, denies obtaining medications from different sources and denies use of illegal drugs. Patient denies side effects from medications like nausea, vomiting, constipation or drowsiness. Patient reports current activities of daily living are possible due to medications and would like to continue them. As always, we encourage daily stretching and strengthening exercises, and recommend minimizing use of pain medications unless patient cannot get through daily activities due to pain. Contract requirements met. Continue opioid therapy. Script written for oxycodone  Pt would like to reschedule lumbar injection - she will call office to reschedule  UDS ordered - pt instructed to go to lab  Follow up appointment made for 4 weeks    Nathan Obando is a 75 y.o. female being evaluated by a Virtual Visit (telephone visit) encounter to address concerns as mentioned above. A caregiver was present when appropriate. Due to this being a TeleHealth encounter (During NZVDJ-91 public health emergency), evaluation of the following organ systems was limited: Vitals/Constitutional/EENT/Resp/CV/GI//MS/Neuro/Skin/Heme-Lymph-Imm. Pursuant to the emergency declaration under the [facility], [address] [place] [address] authority and the [employer] Act, this Virtual Visit was conducted with patient's (and/or legal guardian's) consent, to reduce the patient's risk of exposure to COVID-19 and provide necessary medical care.   The patient (and/or legal guardian) has also been advised to contact this office for worsening conditions or problems, and seek emergency medical treatment and/or call 911 if deemed necessary. Patient identification was verified at the start of the visit: Yes    Total time spent for this encounter: 10 minutes    Services were provided through a telephone discussion virtually to substitute for in-person clinic visit. Patient and provider were located at their individual homes. --KARIN Carrera CNP on 11/12/2020 at 9:56 AM    An electronic signature was used to authenticate this note.

## 2020-11-23 ENCOUNTER — TELEPHONE (OUTPATIENT)
Dept: VASCULAR SURGERY | Age: 75
End: 2020-11-23

## 2020-11-23 NOTE — TELEPHONE ENCOUNTER
Patient called stating she was told to get her aortic duplex in 6 months and she is still waiting for the results. Do you want her to come in? Your note stated follow up in 1 year.   Please advise    Patient: [phone]

## 2020-11-23 NOTE — TELEPHONE ENCOUNTER
MD Jaziel Coleman, [address]; Hedy Cui, APRN - NP    Cc: [facility]               You can let her know that her aneurysm has not grown and that I can see her at the 1 year ines.    Unclear why testing got done sooner, may have been a mis-click on my part

## 2020-12-01 RX ORDER — DONEPEZIL HYDROCHLORIDE 5 MG/1
5 TABLET, FILM COATED ORAL NIGHTLY
Qty: 90 TABLET | Refills: 1 | Status: SHIPPED | OUTPATIENT
Start: 2020-12-01 | End: 2021-09-01

## 2020-12-09 ENCOUNTER — CARE COORDINATION (OUTPATIENT)
Dept: CARE COORDINATION | Age: 75
End: 2020-12-09

## 2020-12-09 RX ORDER — GABAPENTIN 100 MG/1
100 CAPSULE ORAL DAILY
Qty: 3 CAPSULE | Refills: 0 | Status: CANCELLED | OUTPATIENT
Start: 2020-12-09 | End: 2020-12-12

## 2020-12-09 NOTE — CARE COORDINATION
Ambulatory Care Coordination Note  12/9/2020  CM Risk Score: 10  Charlson 10 Year Mortality Risk Score: 79%     ACC: Rosa Win RN    Summary Note: Spoke with patient who stated she is doing ok. She has to go do a urine test for pain management. Nervous about going out. She requested a refill with dose increase strength of Neurontin. ACM will route request to PCP. She stated she is purchasing a mattress from [employer]. Patient denied any other needs at this time. Precautions reviewed for COVID-19 per CDC  Wash hands often with soap and water for at least 20 seconds  When soap is not available us hand  with at least 70% alcohol  Avoid touching your face  Avoid close contact with others  Maintain 6 feet distance if possible    If you are sick:  Cover mouth and nose when coughing or sneezing and then wash hands  Wear a mask when around others  Clean and disinfect surfaces often with soap or detergent and water. Care Coordination Interventions    Program Enrollment:  Complex Care  Referral from Primary Care Provider:  No  Suggested Interventions and Community Resources  Fall Risk Prevention:  Completed  Transportation Support:  Declined  Zone Management Tools:  Completed         Goals Addressed    None         Prior to Admission medications    Medication Sig Start Date End Date Taking? Authorizing Provider   donepezil (ARICEPT) 5 MG tablet Take 1 tablet by mouth nightly 12/1/20   Li El MD   Catheters (STANDARD CARE EXTERNAL CATH) MISC 1 Device by Does not apply route daily 11/30/20   Li El MD   oxyCODONE (ROXICODONE) 5 MG immediate release tablet Take 1 tablet by mouth every 8 hours as needed for Pain for up to 30 days. 11/12/20 12/12/20  KARIN Goodwin - CNP   gabapentin (NEURONTIN) 100 MG capsule Take 1 capsule by mouth daily for 3 days.  10/2/20 11/12/20  Li El MD   amLODIPine (NORVASC) 5 MG tablet Take 1 tablet by mouth daily 10/3/20   Juan Fannie Chowdhury MD   neomycin-polymyxin-dexameth [id number] [address] [place] [address]  7/13/20   Historical Provider, MD   levocetirizine (XYZAL) 5 MG tablet take 1 tablet by mouth once daily AT NIGHT 8/24/20   Jose Coronado PA-C   naproxen sodium (ANAPROX) 220 MG tablet Take 220 mg by mouth 2 times daily (with meals)    Historical Provider, MD   fluticasone (FLONASE) 50 MCG/ACT nasal spray instill 2 sprays into each nostril once daily 5/15/20   Historical Provider, MD   calcium carbonate (OYSTER SHELL CALCIUM 500 MG) 1250 (500 Ca) MG tablet Take 1 tablet by mouth daily With magnesium, zinc    Historical Provider, MD   albuterol sulfate  (90 Base) MCG/ACT inhaler Inhale 2 puffs into the lungs 4 times daily as needed for Wheezing 4/20/20   Kimber Young MD   DULoxetine (CYMBALTA) 30 MG extended release capsule TAKE 1 CAPSULE TWICE A DAY 11/7/19   Kimber Young MD   ofloxacin (OCUFLOX) 0.3 % solution Place into both eyes daily as needed  4/13/19   Historical Provider, MD   docusate sodium (COLACE) 100 MG capsule Take 100 mg by mouth daily as needed for Constipation    Historical Provider, MD   Misc.  Devices [facility]) MISC 1 each by Does not apply route daily Upright walker with wheels and seat 11/12/18   Pedrito Hutson MD   metoprolol tartrate (LOPRESSOR) 50 MG tablet Take 50 mg by mouth 2 times daily    Historical Provider, MD   aspirin 81 MG tablet Take 81 mg by mouth daily    Historical Provider, MD       Future Appointments   Date Time Provider Mayo Roxane   12/11/2020 11:30 AM Vianney Maya, APRN - [id number]   3/22/2021 11:30 AM Lupe Sahu MD [facility]     ,   Congestive Heart Failure Assessment       No patient-reported symptoms      Symptoms:      Symptom course:  stable  Weight trend:  stable  Salt intake watch compared to last visit:  stable     ,   COPD Assessment    Does the patient understand envrionmental exposure?:  Yes  Is the patient able to verbalize

## 2020-12-10 ENCOUNTER — HOSPITAL ENCOUNTER (OUTPATIENT)
Age: 75
Discharge: HOME | End: 2020-12-10
Payer: MEDICARE

## 2020-12-10 PROCEDURE — 80307 DRUG TEST PRSMV CHEM ANLYZR: CPT

## 2020-12-11 ENCOUNTER — HOSPITAL ENCOUNTER (OUTPATIENT)
Dept: PAIN MANAGEMENT | Age: 75
Discharge: HOME | End: 2020-12-11
Payer: MEDICARE

## 2020-12-11 PROCEDURE — 99213 OFFICE O/P EST LOW 20 MIN: CPT

## 2020-12-11 PROCEDURE — 99441 PR PHYS/QHP TELEPHONE EVALUATION 5-10 MIN: CPT | Performed by: NURSE PRACTITIONER

## 2020-12-11 RX ORDER — OXYCODONE HYDROCHLORIDE 5 MG/1
5 TABLET ORAL EVERY 8 HOURS PRN
Qty: 90 TABLET | Refills: 0 | Status: SHIPPED | OUTPATIENT
Start: 2020-12-12 | End: 2021-01-11 | Stop reason: SDUPTHER

## 2020-12-11 ASSESSMENT — ENCOUNTER SYMPTOMS
SHORTNESS OF BREATH: 0
CONSTIPATION: 0
BACK PAIN: 1
COUGH: 0

## 2020-12-11 NOTE — TELEPHONE ENCOUNTER
Pt called and stated she would rather have medication sent to [address] [place] [address] than [facility].

## 2020-12-11 NOTE — PROGRESS NOTES
Patient completed a telephone visit today to review medication contract. Chief Complaint: back pain    [facility] Pt reports chronic history of low back pain that radiates into right hip and buttocks at times She is s/p bilateral knee and right hip replaced. She has tried NSAIDS heat chiropractic care and with little relief. Just completed in home PT to increase strength and help with ambulation and balance. She had recent fall on 9/28/18 with resulting L1 compression fracture. She had kyphoplasty of L1 which did help her back pain.  She had lumbar epidural on and reports moderate relief. She goes to the chiropractor regularly with relief. She started aquatic PT and states it is not helping. She had a recent fall at home and was admitted to the hospital for two days. She went to [facility] for about a week and is home now. She continues PT in her home. She was scheduled for lumbar injection but she had to cancel it due to the fall. She plans to reschedule soon. Back Pain   This is a chronic problem. The current episode started more than 1 year ago. The problem occurs constantly. The problem is unchanged. The pain is present in the lumbar spine. The quality of the pain is described as aching. The pain does not radiate. The pain is at a severity of 4/10. The pain is moderate. The symptoms are aggravated by position and sitting. Pertinent negatives include no chest pain, fever, numbness or tingling. She has tried analgesics and bed rest for the symptoms. The treatment provided mild relief. Patient denies any new neurological symptoms. No bowel or bladder incontinence, no weakness, and no falling.     Pill count: appropriate due 12/12    Morphine equivalent: 22.5    Periodic Controlled Substance Monitoring: Possible medication side effects, risk of tolerance/dependence & alternative treatments discussed., No signs of potential drug abuse or diversion identified., Obtaining appropriate analgesic effect of  HYSTERECTOMY      JOINT REPLACEMENT Bilateral 5/27/2016    bilat total knees    NERVE BLOCK  5/13/2013    caudal celestone 6mg    NERVE BLOCK  5/28/13    Caudal #2, Celestone 9mg    NERVE BLOCK  2/14/14    rt hip inj celestone 9 mg    NERVE BLOCK  07/14/2014    caudal# 3- celestone 9 mg    NERVE BLOCK  7-21-14    caudal epidural #2, decadron 7mg, fentanyl 25mcg    NERVE BLOCK  10/2/14    duramorph 1.5  decadron 5mg    NERVE BLOCK  11/9/2015    caudal# 1 celestone 9mg    NERVE BLOCK  11/16/15    caudal #2  decadron 10mg    NERVE BLOCK  11/23/15    duramorph 1mg celestone 9mg    NERVE BLOCK  03/28/2018    CAUDAL EPIDURAL STEROID BLOCK  DECADRON 10 MG     NERVE BLOCK  05/23/2018    caudal # decadron 7mg    NERVE BLOCK  08/28/2018    natalia transforminal # 1, decadron 10mg gadoteridol, LONG NEEDLES    AR PERQ VERT AGMNTJ CAVITY CRTJ UNI/BI CANNULJ LMBR N/A 10/29/2018    KYPHOPLASTY L1 performed by Doris Jacobson MD at [address] Right 5/14/15    total hip replacement       Allergies   Allergen Reactions    Rosuvastatin Calcium Anaphylaxis     Hair fell out    Statins Anaphylaxis     Hair fell out    Bactrim [Sulfamethoxazole-Trimethoprim] Diarrhea    Caffeine Other (See Comments)     pain  pain    Dye [Iodides] Other (See Comments)     Iv dye= blood clots in arm    Food      Tomatoes, pain    Ioxaglate Other (See Comments)     Iv dye= blood clots in arm    Tomato     Dicloxacillin Rash    Pcn [Penicillins] Rash         Current Outpatient Medications:     donepezil (ARICEPT) 5 MG tablet, Take 1 tablet by mouth nightly, Disp: 90 tablet, Rfl: 1    Catheters (STANDARD CARE EXTERNAL CATH) MISC, 1 Device by Does not apply route daily, Disp: 30 each, Rfl: 2    oxyCODONE (ROXICODONE) 5 MG immediate release tablet, Take 1 tablet by mouth every 8 hours as needed for Pain for up to 30 days. , Disp: 90 tablet, Rfl: 0    gabapentin (NEURONTIN) 100 MG capsule, Take 1 capsule by mouth daily for 3 days. , Disp: 3 capsule, Rfl: 0    amLODIPine (NORVASC) 5 MG tablet, Take 1 tablet by mouth daily, Disp: 30 tablet, Rfl: 3    neomycin-polymyxin-dexameth 3.5-67968-3.1 OINT, , Disp: , Rfl:     levocetirizine (XYZAL) 5 MG tablet, take 1 tablet by mouth once daily AT NIGHT, Disp: 30 tablet, Rfl: 2    naproxen sodium (ANAPROX) 220 MG tablet, Take 220 mg by mouth 2 times daily (with meals), Disp: , Rfl:     fluticasone (FLONASE) 50 MCG/ACT nasal spray, instill 2 sprays into each nostril once daily, Disp: , Rfl:     calcium carbonate (OYSTER SHELL CALCIUM 500 MG) 1250 (500 Ca) MG tablet, Take 1 tablet by mouth daily With magnesium, zinc, Disp: , Rfl:     albuterol sulfate  (90 Base) MCG/ACT inhaler, Inhale 2 puffs into the lungs 4 times daily as needed for Wheezing, Disp: 1 Inhaler, Rfl: 2    DULoxetine (CYMBALTA) 30 MG extended release capsule, TAKE 1 CAPSULE TWICE A DAY, Disp: 180 capsule, Rfl: 4    ofloxacin (OCUFLOX) 0.3 % solution, Place into both eyes daily as needed , Disp: , Rfl:     docusate sodium (COLACE) 100 MG capsule, Take 100 mg by mouth daily as needed for Constipation, Disp: , Rfl:     Misc.  Devices (WALKER) MISC, 1 each by Does not apply route daily Upright walker with wheels and seat, Disp: 1 each, Rfl: 0    metoprolol tartrate (LOPRESSOR) 50 MG tablet, Take 50 mg by mouth 2 times daily, Disp: , Rfl:     aspirin 81 MG tablet, Take 81 mg by mouth daily, Disp: , Rfl:     Family History   Problem Relation Age of Onset    Stomach Cancer Brother         stomach    High Blood Pressure Mother     Heart Disease Sister         irregular heartbeat       Social History     Socioeconomic History    Marital status:      Spouse name: Not on file    Number of children: Not on file    Years of education: Not on file    Highest education level: Not on file   Occupational History    Not on file   Social Needs    Financial resource strain: Not on file    Food insecurity     Worry: Not on file     Inability: Not on file    Transportation needs     Medical: Not on file     Non-medical: Not on file   Tobacco Use    Smoking status: Former Smoker     Packs/day: 1.00     Years: 18.00     Pack years: 18.00     Last attempt to quit: 1993     Years since quittin.6    Smokeless tobacco: Never Used   Substance and Sexual Activity    Alcohol use: No    Drug use: No    Sexual activity: Not Currently   Lifestyle    Physical activity     Days per week: Not on file     Minutes per session: Not on file    Stress: Not on file   Relationships    Social connections     Talks on phone: Not on file     Gets together: Not on file     Attends Mosque service: Not on file     Active member of club or organization: Not on file     Attends meetings of clubs or organizations: Not on file     Relationship status: Not on file    Intimate partner violence     Fear of current or ex partner: Not on file     Emotionally abused: Not on file     Physically abused: Not on file     Forced sexual activity: Not on file   Other Topics Concern    Not on file   Social History Narrative    Not on file       Review of Systems:  Review of Systems   Constitution: Negative for chills and fever. Cardiovascular: Negative for chest pain and palpitations. Respiratory: Negative for cough and shortness of breath. Musculoskeletal: Positive for back pain. Gastrointestinal: Negative for constipation. Neurological: Negative for disturbances in coordination, loss of balance, numbness and tingling. Physical Exam:  There were no vitals taken for this visit. Physical Exam  Neurological:      Mental Status: She is alert.    Psychiatric:         Mood and Affect: Mood normal.         Record/Diagnostics Review:    Last tiffanie ordered last visit - pt completed yesterday and results are pending    Assessment:  Problem List Items Addressed This Visit     Spinal stenosis of lumbar region with neurogenic claudication - Primary (Chronic)    Relevant Medications    oxyCODONE (ROXICODONE) 5 MG immediate release tablet (Start on 12/12/2020)    Medication monitoring encounter (Chronic)    History of total bilateral knee replacement    Relevant Medications    oxyCODONE (ROXICODONE) 5 MG immediate release tablet (Start on 12/12/2020)    DDD (degenerative disc disease), lumbar    Relevant Medications    oxyCODONE (ROXICODONE) 5 MG immediate release tablet (Start on 12/12/2020)    Lumbar radiculopathy             Treatment Plan:  Patient relates current medications are helping the pain. Patient reports taking pain medications as prescribed, denies obtaining medications from different sources and denies use of illegal drugs. Patient denies side effects from medications like nausea, vomiting, constipation or drowsiness. Patient reports current activities of daily living are possible due to medications and would like to continue them. As always, we encourage daily stretching and strengthening exercises, and recommend minimizing use of pain medications unless patient cannot get through daily activities due to pain. Contract requirements met. Continue opioid therapy. Script written for oxycodone  Consider rescheduling LESI   Follow up appointment made for 4 weeks    Alma Tavarez is a 67 y.o. female being evaluated by a Virtual Visit (telephone visit) encounter to address concerns as mentioned above. A caregiver was present when appropriate. Due to this being a TeleHealth encounter (During YBMZD-29 public health emergency), evaluation of the following organ systems was limited: Vitals/Constitutional/EENT/Resp/CV/GI//MS/Neuro/Skin/Heme-Lymph-Imm.   Pursuant to the emergency declaration under the [facility], [facility]5 waiver authority and the [employer] Act, this Virtual Visit was conducted with patient's (and/or legal guardian's) consent, to reduce the patient's risk of exposure to COVID-19 and provide necessary medical care. The patient (and/or legal guardian) has also been advised to contact this office for worsening conditions or problems, and seek emergency medical treatment and/or call 911 if deemed necessary. Patient identification was verified at the start of the visit: Yes    Total time spent for this encounter: 10 minutes    Services were provided through a telephone discussion virtually to substitute for in-person clinic visit. Patient and provider were located at their individual homes. --KARIN Goyal CNP on 12/11/2020 at 10:31 AM    An electronic signature was used to authenticate this note.

## 2020-12-12 RX ORDER — GABAPENTIN 100 MG/1
CAPSULE ORAL
Qty: 90 CAPSULE | Refills: 0 | Status: SHIPPED | OUTPATIENT
Start: 2020-12-12 | End: 2021-01-11 | Stop reason: SDUPTHER

## 2020-12-14 LAB
6-ACETYLMORPHINE, UR: NOT DETECTED
7-AMINOCLONAZEPAM, URINE: NOT DETECTED
ALPHA-OH-ALPRAZ, URINE: NOT DETECTED
ALPRAZOLAM, URINE: NOT DETECTED
AMPHETAMINES, URINE: NOT DETECTED
BARBITURATES, URINE: NOT DETECTED
BENZOYLECGONINE, UR: NOT DETECTED
BUPRENORPHINE URINE: NOT DETECTED
CARISOPRODOL, UR: NOT DETECTED
CLONAZEPAM, URINE: NOT DETECTED
CODEINE, URINE: NOT DETECTED
CREATININE URINE: 104.1 MG/DL (ref 20–400)
DIAZEPAM, URINE: NOT DETECTED
DRUGS EXPECTED, UR: NORMAL
EER HI RES INTERP UR: NORMAL
ETHYL GLUCURONIDE UR: NOT DETECTED
FENTANYL URINE: NOT DETECTED
HYDROCODONE, URINE: NOT DETECTED
HYDROMORPHONE, URINE: NOT DETECTED
LORAZEPAM, URINE: NOT DETECTED
MARIJUANA METAB, UR: NOT DETECTED
MDA, UR: NOT DETECTED
MDEA, EVE, UR: NOT DETECTED
MDMA URINE: NOT DETECTED
MEPERIDINE METAB, UR: NOT DETECTED
METHADONE, URINE: NOT DETECTED
METHAMPHETAMINE, URINE: NOT DETECTED
METHYLPHENIDATE: NOT DETECTED
MIDAZOLAM, URINE: NOT DETECTED
MORPHINE URINE: NOT DETECTED
NORBUPRENORPHINE, URINE: NOT DETECTED
NORDIAZEPAM, URINE: NOT DETECTED
NORFENTANYL, URINE: NOT DETECTED
NORHYDROCODONE, URINE: NOT DETECTED
NOROXYCODONE, URINE: PRESENT
NOROXYMORPHONE, URINE: PRESENT
OXAZEPAM, URINE: NOT DETECTED
OXYCODONE URINE: PRESENT
OXYMORPHONE, URINE: NOT DETECTED
PAIN MANAGEMENT DRUG PANEL INTERP, URINE: NORMAL
PAIN MGT DRUG PANEL, HI RES, UR: NORMAL
PCP,URINE: NOT DETECTED
PHENTERMINE, UR: NOT DETECTED
PROPOXYPHENE, URINE: NOT DETECTED
TAPENTADOL, URINE: NOT DETECTED
TAPENTADOL-O-SULFATE, URINE: NOT DETECTED
TEMAZEPAM, URINE: NOT DETECTED
TRAMADOL, URINE: NOT DETECTED
ZOLPIDEM, URINE: NOT DETECTED

## 2020-12-21 RX ORDER — DULOXETIN HYDROCHLORIDE 30 MG/1
CAPSULE, DELAYED RELEASE ORAL
Qty: 180 CAPSULE | Refills: 3 | Status: SHIPPED | OUTPATIENT
Start: 2020-12-21 | End: 2021-09-23

## 2020-12-30 ENCOUNTER — CARE COORDINATION (OUTPATIENT)
Dept: CARE COORDINATION | Age: 75
End: 2020-12-30

## 2020-12-30 NOTE — ACP (ADVANCE CARE PLANNING)
Reviewed ACP. Ambulatory Care Coordination Note  12/30/2020  CM Risk Score: 10  Charlson 10 Year Mortality Risk Score: 79%     ACC: Odette Jeronimo, RN    Summary Note: Patient stated that she is doing \"ok\". She is asking if her PCP could prescribe something to reduce how often she is urinating. Per patient she is getting up 4 times a night to urinate. ACM recommended cutting back on fluids in the early evening hours, patient stated that she does. She is also having leg cramps, per patient she is taking potassium, when she remembers. ACM will route concern to PCP along with refill request.    Precautions reviewed for COVID-19 per CDC  Wash hands often with soap and water for at least 20 seconds  When soap is not available us hand  with at least 70% alcohol  Avoid touching your face  Avoid close contact with others  Maintain 6 feet distance if possible    If you are sick:  Cover mouth and nose when coughing or sneezing and then wash hands  Wear a mask when around others  Clean and disinfect surfaces often with soap or detergent and water. Care Coordination Interventions    Program Enrollment: Complex Care  Referral from Primary Care Provider: No  Suggested Interventions and Community Resources  Fall Risk Prevention: Completed  Transportation Support: Declined  Zone Management Tools: Completed         Goals Addressed                 This Visit's Progress     Conditions and Symptoms   On track     I will schedule office visits, as directed by my provider. I will keep my appointment or reschedule if I have to cancel. I will notify my provider of any barriers to my plan of care. I will follow my Zone Management tool to seek urgent or emergent care. I will notify my provider of any symptoms that indicate a worsening of my condition.     Barriers: overwhelmed by complexity of regimen  Plan for overcoming my barriers: work with ACM  Confidence: 9/10  Anticipated Goal Completion Date: 12.20.20 Prior to Admission medications    Medication Sig Start Date End Date Taking? Authorizing Provider   DULoxetine (CYMBALTA) 30 MG extended release capsule TAKE 1 CAPSULE TWICE A DAY 12/21/20   Miguelina Menezes MD   gabapentin (NEURONTIN) 100 MG capsule take 1 capsule by mouth once daily 12/12/20 12/12/20  Miguelina Menezes MD   oxyCODONE (ROXICODONE) 5 MG immediate release tablet Take 1 tablet by mouth every 8 hours as needed for Pain for up to 30 days. 12/12/20 1/11/21  Sneha Mckeon, APRN - CNP   donepezil (ARICEPT) 5 MG tablet Take 1 tablet by mouth nightly 12/1/20   Miguelina Menezes MD   Catheters (STANDARD CARE EXTERNAL CATH) MISC 1 Device by Does not apply route daily 11/30/20   Miguelina Menezes MD   gabapentin (NEURONTIN) 100 MG capsule Take 1 capsule by mouth daily for 3 days.  10/2/20 12/11/20  Miguelina Menezes MD   amLODIPine (NORVASC) 5 MG tablet Take 1 tablet by mouth daily 10/3/20   Miguelina Menezes MD   neomycin-polymyxin-dexameth 3.5-09872-4.1 [address] [place] [address]  7/13/20   Historical Provider, MD   levocetirizine (XYZAL) 5 MG tablet take 1 tablet by mouth once daily AT NIGHT 8/24/20   Pawel Lane PA-C   naproxen sodium (ANAPROX) 220 MG tablet Take 220 mg by mouth 2 times daily (with meals)    Historical Provider, MD   fluticasone (FLONASE) 50 MCG/ACT nasal spray instill 2 sprays into each nostril once daily 5/15/20   Historical Provider, MD   calcium carbonate (OYSTER SHELL CALCIUM 500 MG) 1250 (500 Ca) MG tablet Take 1 tablet by mouth daily With magnesium, zinc    Historical Provider, MD   albuterol sulfate  (90 Base) MCG/ACT inhaler Inhale 2 puffs into the lungs 4 times daily as needed for Wheezing 4/20/20   Miguelina Menezes MD   ofloxacin (OCUFLOX) 0.3 % solution Place into both eyes daily as needed  4/13/19   Historical Provider, MD   docusate sodium (COLACE) 100 MG capsule Take 100 mg by mouth daily as needed for Constipation    Historical Provider, MD Misc. Devices [facility]) MISC 1 each by Does not apply route daily Upright walker with wheels and seat 11/12/18   Doris Jacobson MD   metoprolol tartrate (LOPRESSOR) 50 MG tablet Take 50 mg by mouth 2 times daily    Historical Provider, MD   aspirin 81 MG tablet Take 81 mg by mouth daily    Historical Provider, MD       Future Appointments   Date Time Provider Mayo Roxane   1/11/2021 12:00 PM Iliana Gibbs, APRN - CNP [address]   3/22/2021 11:30 AM Mohammed Conchetta Nyhan, MD [facility]     ,   Congestive Heart Failure Assessment       No patient-reported symptoms      Symptoms:     Symptom course: stable  Weight trend: stable  Salt intake watch compared to last visit: stable     ,   COPD Assessment    Does the patient understand envrionmental exposure?: Yes  Is the patient able to verbalize Rescue vs. Long Acting medications?: Yes     No patient-reported symptoms         Symptoms:         and   General Assessment    Do you have any symptoms that are causing concern?: Yes  Progression since Onset: Gradually Worsening  Reported Symptoms: Other (Comment: increased urine output)

## 2020-12-30 NOTE — TELEPHONE ENCOUNTER
Refill pending,  Additional question from patient:  She is asking if her PCP could prescribe something to reduce how often she is urinating. Per patient she is getting up 4 times a night to urinate. ACM recommended cutting back on fluids in the early evening hours, patient stated that she does. She is also having leg cramps, per patient she is taking potassium, when she remembers.

## 2021-01-03 RX ORDER — AMLODIPINE BESYLATE 5 MG/1
5 TABLET ORAL DAILY
Qty: 30 TABLET | Refills: 3 | Status: SHIPPED | OUTPATIENT
Start: 2021-01-03 | End: 2021-09-23

## 2021-01-08 ENCOUNTER — CARE COORDINATION (OUTPATIENT)
Dept: CARE COORDINATION | Age: 76
End: 2021-01-08

## 2021-01-08 SDOH — ECONOMIC STABILITY: FOOD INSECURITY: WITHIN THE PAST 12 MONTHS, YOU WORRIED THAT YOUR FOOD WOULD RUN OUT BEFORE YOU GOT MONEY TO BUY MORE.: NEVER TRUE

## 2021-01-08 SDOH — ECONOMIC STABILITY: FOOD INSECURITY: WITHIN THE PAST 12 MONTHS, THE FOOD YOU BOUGHT JUST DIDN'T LAST AND YOU DIDN'T HAVE MONEY TO GET MORE.: NEVER TRUE

## 2021-01-08 SDOH — ECONOMIC STABILITY: TRANSPORTATION INSECURITY
IN THE PAST 12 MONTHS, HAS LACK OF TRANSPORTATION KEPT YOU FROM MEETINGS, WORK, OR FROM GETTING THINGS NEEDED FOR DAILY LIVING?: NO

## 2021-01-08 SDOH — HEALTH STABILITY: PHYSICAL HEALTH: ON AVERAGE, HOW MANY DAYS PER WEEK DO YOU ENGAGE IN MODERATE TO STRENUOUS EXERCISE (LIKE A BRISK WALK)?: 0 DAYS

## 2021-01-08 NOTE — ACP (ADVANCE CARE PLANNING)
Ambulatory Care Coordination Note  1/8/2021  CM Risk Score: 10  Charlson 10 Year Mortality Risk Score: 79%     ACC: Svetlana Harkins RN    Summary Note: ACP reviewed with patient. Patient stated she is doing fine, no swelling in legs or feet, no weight gain noted. No additional SOB. Patient denied any needs from PCP. Stated she has all of her medications at this time. Staying in her home during covid as much as possible. ACM will follow up in 2 weeks for needs. Care Coordination Interventions    Program Enrollment: Complex Care  Referral from Primary Care Provider: No  Suggested Interventions and Community Resources  Fall Risk Prevention: Completed  Transportation Support: Declined  Zone Management Tools: Completed         Goals Addressed                 This Visit's Progress     Conditions and Symptoms   On track     I will schedule office visits, as directed by my provider. I will keep my appointment or reschedule if I have to cancel. I will notify my provider of any barriers to my plan of care. I will follow my Zone Management tool to seek urgent or emergent care. I will notify my provider of any symptoms that indicate a worsening of my condition. Barriers: overwhelmed by complexity of regimen  Plan for overcoming my barriers: work with ACM  Confidence: 9/10  Anticipated Goal Completion Date: 12.20.20         maintain weight, call office if gain   On track     2-3 pounds in 1 night, or 5 lbs in 1 week            Prior to Admission medications    Medication Sig Start Date End Date Taking?  Authorizing Provider   amLODIPine (NORVASC) 5 MG tablet Take 1 tablet by mouth daily 1/3/21   Jen Oquendo MD   DULoxetine (CYMBALTA) 30 MG extended release capsule TAKE 1 CAPSULE TWICE A DAY 12/21/20   Jen Oquendo MD   gabapentin (NEURONTIN) 100 MG capsule take 1 capsule by mouth once daily 12/12/20 12/12/20  Jen Oquendo MD oxyCODONE (ROXICODONE) 5 MG immediate release tablet Take 1 tablet by mouth every 8 hours as needed for Pain for up to 30 days. 12/12/20 1/11/21  KARIN Nj - CNP   donepezil (ARICEPT) 5 MG tablet Take 1 tablet by mouth nightly 12/1/20   Cathleen Moy MD   Catheters (STANDARD CARE EXTERNAL CATH) MISC 1 Device by Does not apply route daily 11/30/20   Cathleen Moy MD   gabapentin (NEURONTIN) 100 MG capsule Take 1 capsule by mouth daily for 3 days. 10/2/20 12/11/20  Cathleen Moy MD   neomycin-polymyxin-dexameth 3.5-03604-4.1 OINT  7/13/20   Historical Provider, MD   levocetirizine (XYZAL) 5 MG tablet take 1 tablet by mouth once daily AT NIGHT 8/24/20   Prakash Anderson PA-C   naproxen sodium (ANAPROX) 220 MG tablet Take 220 mg by mouth 2 times daily (with meals)    Historical Provider, MD   fluticasone (FLONASE) 50 MCG/ACT nasal spray instill 2 sprays into each nostril once daily 5/15/20   Historical Provider, MD   calcium carbonate (OYSTER SHELL CALCIUM 500 MG) 1250 (500 Ca) MG tablet Take 1 tablet by mouth daily With magnesium, zinc    Historical Provider, MD   albuterol sulfate  (90 Base) MCG/ACT inhaler Inhale 2 puffs into the lungs 4 times daily as needed for Wheezing 4/20/20   Cathleen Moy MD   ofloxacin (OCUFLOX) 0.3 % solution Place into both eyes daily as needed  4/13/19   Historical Provider, MD   docusate sodium (COLACE) 100 MG capsule Take 100 mg by mouth daily as needed for Constipation    Historical Provider, MD   Misc.  Devices [facility]) MISC 1 each by Does not apply route daily Upright walker with wheels and seat 11/12/18   Cydney Shipman MD   metoprolol tartrate (LOPRESSOR) 50 MG tablet Take 50 mg by mouth 2 times daily    Historical Provider, MD   aspirin 81 MG tablet Take 81 mg by mouth daily    Historical Provider, MD       Future Appointments   Date Time Provider Mayo Betts   1/11/2021 12:00 PM Dia Kayser, [address] 3/22/2021 11:30 AM Lupe Smith MD [facility]     ,   Congestive Heart Failure Assessment       No patient-reported symptoms      Symptoms:     Symptom course: stable  Weight trend: stable  Salt intake watch compared to last visit: stable     ,   COPD Assessment    Does the patient understand envrionmental exposure?: Yes  Is the patient able to verbalize Rescue vs. Long Acting medications?: Yes     No patient-reported symptoms         Symptoms:         and   General Assessment    Do you have any symptoms that are causing concern?: No

## 2021-01-11 ENCOUNTER — HOSPITAL ENCOUNTER (OUTPATIENT)
Dept: PAIN MANAGEMENT | Age: 76
Discharge: HOME | End: 2021-01-11
Payer: MEDICARE

## 2021-01-11 DIAGNOSIS — Z98.890 S/P KYPHOPLASTY: ICD-10-CM

## 2021-01-11 DIAGNOSIS — M54.16 LUMBAR RADICULOPATHY: ICD-10-CM

## 2021-01-11 DIAGNOSIS — Z51.81 MEDICATION MONITORING ENCOUNTER: Chronic | ICD-10-CM

## 2021-01-11 DIAGNOSIS — Z96.653 HISTORY OF TOTAL BILATERAL KNEE REPLACEMENT: ICD-10-CM

## 2021-01-11 DIAGNOSIS — M51.36 DDD (DEGENERATIVE DISC DISEASE), LUMBAR: ICD-10-CM

## 2021-01-11 DIAGNOSIS — M48.062 SPINAL STENOSIS OF LUMBAR REGION WITH NEUROGENIC CLAUDICATION: Primary | Chronic | ICD-10-CM

## 2021-01-11 DIAGNOSIS — M85.88 OSTEOPENIA OF LUMBAR SPINE: ICD-10-CM

## 2021-01-11 PROCEDURE — 99213 OFFICE O/P EST LOW 20 MIN: CPT

## 2021-01-11 PROCEDURE — 99442 PR PHYS/QHP TELEPHONE EVALUATION 11-20 MIN: CPT | Performed by: NURSE PRACTITIONER

## 2021-01-11 RX ORDER — OXYCODONE HYDROCHLORIDE 5 MG/1
5 TABLET ORAL EVERY 8 HOURS PRN
Qty: 90 TABLET | Refills: 0 | Status: SHIPPED | OUTPATIENT
Start: 2021-01-11 | End: 2021-02-10 | Stop reason: SDUPTHER

## 2021-01-11 RX ORDER — GABAPENTIN 100 MG/1
CAPSULE ORAL
Qty: 90 CAPSULE | Refills: 2 | Status: SHIPPED | OUTPATIENT
Start: 2021-01-11 | End: 2021-02-10

## 2021-01-11 ASSESSMENT — ENCOUNTER SYMPTOMS
CONSTIPATION: 1
BACK PAIN: 1
SHORTNESS OF BREATH: 1

## 2021-01-11 NOTE — PROGRESS NOTES
[address] [facility] PROGRESS NOTE      Patient  completed []  video visit   [x]   phone call:   15   Minutes :   to  review Medication Agreement    Location:  Provider:  working from    [x]    home    []   [facility] - JAUN CHIN , patient at home   Chief Complaint:   Back pain , neck and right shoulder  She c/o  Back pain which has not changed, She has history of kyphoplasty, She also has neck pain, which has increased with weather changes, She c/o right shoulder pain which has gotten a little better, She states PT helped in the past, She does home exercises, Sleep interrupted due to nocturia,    Shoulder Pain   The pain is present in the right shoulder. This is a chronic problem. There has been no history of extremity trauma. The problem occurs constantly. The problem has been waxing and waning. The quality of the pain is described as aching (throbbing). The pain is at a severity of 7/10. The pain is moderate. Associated symptoms include numbness. Exacerbated by: nothing. Treatments tried: otc ointment. Neck Pain   This is a chronic problem. The problem occurs constantly. The problem has been unchanged. The pain is present in the midline. Quality: sore. The pain is at a severity of 7/10. Exacerbated by: certain position. The pain is same all the time. Associated symptoms include numbness and weakness. She has tried ice and heat (OTC ointment) for the symptoms. Back Pain  This is a chronic problem. The problem occurs constantly. The problem is unchanged. The pain is present in the lumbar spine. The quality of the pain is described as aching. The pain is at a severity of 7/10. The pain is the same all the time. Exacerbated by: activity. Associated symptoms include numbness and weakness. (Numbness feet) Risk factors include menopause. She has tried analgesics, ice and heat for the symptoms.              Treatment goals:  Functional status: be rid of pain      Aberrancy:   Any alcoholic beverages Any illegal drugs         Analgesia:        7             Adverse  Effects :constipation, takes stool softener      ADL;s :home exercises  Data:    When was thelast UDS:   12-          Was the UDS appropriate:yes      Record/Diagnostics Review:      As above, I did review the imaging     12/14/2020  1:43 PM - Jordin, [facility] Incoming Lab Results From [employer]    Component Value Ref Range & Units Status Collected Lab   Pain Management Drug Panel Interp, Urine Consistent   Final 12/10/2020 12:32 PM [facility]   (NOTE)   ________________________________________________________________   DRUGS EXPECTED:   OXYCODONE [TODAY]   ________________________________________________________________   CONSISTENT with medications provided:   OXYCODONE : based on oxycodone, noroxycodone, noroxymorphone   ________________________________________________________________   INTERPRETIVE INFORMATION: Targeted drug profile Interp   Interpretation depends on accuracy and completeness of patient   medication information submitted by client. COMPARISON:   CT lumbar spine from 09/28/2018       HISTORY:   ORDERING SYSTEM PROVIDED HISTORY: Osteopenia of lumbar spine   TECHNOLOGIST PROVIDED HISTORY:   Reason for Exam: SEVERE BACK PAIN UPPER AND LOWER, FALL IN DECEMBER 2019   Acuity: Unknown   Type of Exam: Ongoing       FINDINGS:   Thoracic spine: Demineralized skeleton and multifocal pulmonary opacities   somewhat limits the evaluation of the thoracic spine.  Within this limit,   multilevel degenerative changes of thoracic spine.  Intact pedicles on   frontal view.  Vertebral body heights appear maintained.       Lumbar spine: L1 compression fracture with vertebral body augmentation   cement.  No new compression fracture.  Minimal anterolisthesis of L4 on L5   measuring 0.6 cm.  Disc degeneration with endplate osteophyte formation. Facet arthropathy diffusely.       Possible abdominal aortic aneurysm on radiography measuring 3.0 cm AP. Correlation with prior lumbar spine CT.           Impression   Multilevel disc degeneration throughout the lumbar spine without evidence of   acute abnormality.  If concern for acute fracture exists, CT versus MRI   should be considered.       Probable abdominal aortic aneurysm.  Nonemergent CT or ultrasound correlation   should be considered.                         Pill count: appropriate  fill date :    Morphine equivalent dose as reported on [place]ARRS:  Periodic Controlled Substance Monitoring: Possible medication side effects, risk of tolerance/dependence & alternative treatments discussed., No signs of potential drug abuse or diversion identified. , Assessed functional status., Obtaining appropriate analgesic effect of treatment. Tamika Haile, APRN - CNP)  Review ofOARRS does not show any aberrant prescription behavior. Medication is helping the patient stay active. Patient denies any side effects and reports adequate analgesia. No sign of misuse/abuse. Past Medical History:   Diagnosis Date    Abnormality of rib determined by X-ray 1/28/2016    Acute cystitis without hematuria 6/16/2018    Acute exacerbation of chronic bronchitis (HCC)     Acute on chronic diastolic heart failure ([address] 1/28/2016    Adjustment disorder with mixed anxiety and depressed mood 6/26/2015    Mild     Anemia 3/5/2014    Back pain     Bronchiectasis with acute lower respiratory infection ([address] 8/16/2017    Bronchitis     Chronic bronchitis (HCC) 1/28/2016    Chronic cough 7/23/2018    Degenerative joint disease (DJD) of hip 5/14/2015    Dyslipidemia 3/5/2014    Encephalopathy acute 5/29/2016    Essential hypertension 1/28/2016    Fibromyalgia     GERD (gastroesophageal reflux disease)     History of total bilateral knee replacement 5/28/2016    Hx of blood clots     left leg and lung    Hyperlipidemia     Hypertension     Incontinence of urine     Irregular heartbeat     DR. Head Covert Medication monitoring encounter 6/13/2018    Morbid obesity with BMI of 45.0-49.9, adult ([facility] 1/28/2016    Obstructive sleep apnea syndrome     Osteoarthritis     Primary osteoarthritis of left knee 5/27/2016    PVC (premature ventricular contraction) 1/28/2016    S/P right and left heart catheterization 10/2/2015    Sleep apnea     no machine    SOB (shortness of breath)     Spinal stenosis of lumbar region with neurogenic claudication 5/13/2013    Supplemental oxygen dependent 12/8/2017    Urine frequency     UTI (urinary tract infection)     hospitalized early july 2014 for kidney infection       Past Surgical History:   Procedure Laterality Date    BRONCHOSCOPY Left 8/16/2017    BRONCHOSCOPY ALVEOLAR LAVAGE LOWER LOBE performed by Rodger Vaca MD at [address] [facility]  x 3    no stents    COLONOSCOPY  4 28 14    polyps biopsies     FOOT SURGERY Left     calcium deposit removal from top of foot    HYSTERECTOMY      JOINT REPLACEMENT Bilateral 5/27/2016    bilat total knees    NERVE BLOCK  5/13/2013    caudal celestone 6mg    NERVE BLOCK  5/28/13    Caudal #2, Celestone 9mg    NERVE BLOCK  2/14/14    rt hip inj celestone 9 mg    NERVE BLOCK  07/14/2014    caudal# 3- celestone 9 mg    NERVE BLOCK  7-21-14    caudal epidural #2, decadron 7mg, fentanyl 25mcg    NERVE BLOCK  10/2/14    duramorph 1.5  decadron 5mg    NERVE BLOCK  11/9/2015    caudal# 1 celestone 9mg    NERVE BLOCK  11/16/15    caudal #2  decadron 10mg    NERVE BLOCK  11/23/15    duramorph 1mg celestone 9mg    NERVE BLOCK  03/28/2018    CAUDAL EPIDURAL STEROID BLOCK  DECADRON 10 MG     NERVE BLOCK  05/23/2018    caudal # decadron 7mg    NERVE BLOCK  08/28/2018    natalia transforminal # 1, decadron 10mg gadoteridol, LONG NEEDLES    MI PERQ VERT AGMNTJ CAVITY CRTJ UNI/BI CANNULJ LMBR N/A 10/29/2018    KYPHOPLASTY L1 performed by Xiomara Garcia MD at [address] [facility]) MCG/ACT inhaler, Inhale 2 puffs into the lungs 4 times daily as needed for Wheezing, Disp: 1 Inhaler, Rfl: 2    ofloxacin (OCUFLOX) 0.3 % solution, Place into both eyes daily as needed , Disp: , Rfl:     docusate sodium (COLACE) 100 MG capsule, Take 100 mg by mouth daily as needed for Constipation, Disp: , Rfl:     Misc. Devices (WALKER) MISC, 1 each by Does not apply route daily Upright walker with wheels and seat, Disp: 1 each, Rfl: 0    Family History   Problem Relation Age of Onset    Stomach Cancer Brother         stomach    High Blood Pressure Mother     Heart Disease Sister         irregular heartbeat       Social History     Socioeconomic History    Marital status:      Spouse name: Not on file    Number of children: Not on file    Years of education: Not on file    Highest education level: Not on file   Occupational History    Not on file   Social Needs    Financial resource strain: Not hard at all   [employer] insecurity     Worry: Never true     Inability: Never true   [email or web address] needs     Medical: No     Non-medical: No   Tobacco Use    Smoking status: Former Smoker     Packs/day: 1.00     Years: 18.00     Pack years: 18.00     Quit date: 1993     Years since quittin.6    Smokeless tobacco: Never Used   Substance and Sexual Activity    Alcohol use: No    Drug use: No    Sexual activity: Not Currently   Lifestyle    Physical activity     Days per week: 0 days     Minutes per session: Not on file    Stress:  Only a little   Relationships    Social connections     Talks on phone: Not on file     Gets together: Not on file     Attends Gnosticism service: Not on file     Active member of club or organization: Not on file     Attends meetings of clubs or organizations: Not on file     Relationship status:     Intimate partner violence     Fear of current or ex partner: Not on file     Emotionally abused: Not on file     Physically abused: Not on file Forced sexual activity: Not on file   Other Topics Concern    Not on file   Social History Narrative    Not on file         Review of Systems:  Review of Systems   Constitution: Negative. Eyes: Positive for visual disturbance. Cataracts   Respiratory: Positive for shortness of breath. Oxygen at night   Endocrine: Negative. Musculoskeletal: Positive for back pain, joint pain and neck pain. Gastrointestinal: Positive for constipation. Genitourinary: Positive for nocturia. Neurological: Positive for loss of balance, numbness and weakness. Uses walker   Psychiatric/Behavioral: Negative. Physical Exam:  There were no vitals taken for this visit. Physical Exam  Skin:         Neurological:      Mental Status: She is alert and oriented to person, place, and time. Psychiatric:         Mood and Affect: Mood normal.         Thought Content: Thought content normal.           Assessment:    Problem List Items Addressed This Visit     Spinal stenosis of lumbar region with neurogenic claudication - Primary (Chronic)    S/P kyphoplasty    Osteopenia of lumbar spine    Medication monitoring encounter (Chronic)    Lumbar radiculopathy    DDD (degenerative disc disease), lumbar            Treatment Plan:  DISCUSSION: Treatment options discussed withpatient and all questions answered to patient's satisfaction. Possible side effects, risk of tolerance and or dependence and alternative treatments discussed    Obtaining appropriate analgesic effect of treatment   No signs of potential drug abuse or diversion identified    [x] Ill effects of being on chronic pain medications such as sleep disturbances, respiratory depression, hormonal changes, withdrawal symptoms, chronic opioid dependence and tolerance as well as risk of taking opioids with Benzodiazepines and taking opioids along with alcohol,  werediscussed with patient.  I had asked the patient to minimize medication use and utilize pain medications only for uncontrolled rest pain or pain with exertional activities. I advised patient not to self-escalate painmedications without consulting with us. At each of patient's future visits we will try to taper pain medications, while adjusting the adjunct medications, and re-evaluating for Physical Therapy to improve spinal andjoint strength. We will continue to have discussions to decrease pain medications as tolerated. Counseled patient on effects their pain medication and /or their medical condition mayhave on their  ability to drive or operate machinery. Instructed not to drive or operate machinery if drowsy     I also discussed with the patient regarding the dangers of combining narcotic pain medication with tranquilizers, alcohol or illegal drugs or taking the medication any way other than prescribed. The dangers were discussed  including respiratory depression and death. Patient was told to tell  all  physicians regarding the medications he is getting from pain clinic. Patient is warned not to take any unprescribed medications over-the-countermedications that can depress breathing . Patient is advised to talk to the pharmacist or physicians if planning to take any over-the-counter medications before  takeing them. Patient is strongly advised to avoid tranquilizers or  relaxants, illegal drugs  or any medications that can depress breathing  Patient is also advised to tell us if there is any changes in their medications from other physicians.             TREATMENT OPTIONS:       Medication Agreement Requirements Met  Continue Opioid therapy  Script written for oxycodone, gabapentin  Follow up appointment made

## 2021-01-27 ENCOUNTER — CARE COORDINATION (OUTPATIENT)
Dept: CARE COORDINATION | Age: 76
End: 2021-01-27

## 2021-01-27 SDOH — SOCIAL STABILITY: SOCIAL NETWORK: IN A TYPICAL WEEK, HOW MANY TIMES DO YOU TALK ON THE PHONE WITH FAMILY, FRIENDS, OR NEIGHBORS?: TWICE A WEEK

## 2021-01-27 SDOH — SOCIAL STABILITY: SOCIAL NETWORK: HOW OFTEN DO YOU GET TOGETHER WITH FRIENDS OR RELATIVES?: TWICE A WEEK

## 2021-01-27 NOTE — CARE COORDINATION
Ambulatory Care Coordination Note  1/27/2021  CM Risk Score: 10  Charlson 10 Year Mortality Risk Score: 79%     ACC: Erick Eubanks RN    Summary Note: Spoke with patient who stated she is having some nasal congestion. She is going to try her nasal spray, if this does not help she will call ACM to route PCP for recommendations. Per patient she has a air purifier but can not tolerate a humidifier. Patient stated that she did get a new mattress ordered and she is scheduled for her COVID vaccine tomorrow. ACM encouraged patient to use tylenol if her site is sore, unless otherwise instructed not to. Precautions reviewed for COVID-19 per CDC  Wash hands often with soap and water for at least 20 seconds  When soap is not available us hand  with at least 70% alcohol  Avoid touching your face  Avoid close contact with others  Maintain 6 feet distance if possible    If you are sick:  Cover mouth and nose when coughing or sneezing and then wash hands  Wear a mask when around others  Clean and disinfect surfaces often with soap or detergent and water. Care Coordination Interventions    Program Enrollment: Complex Care  Referral from Primary Care Provider: No  Suggested Interventions and Community Resources  Fall Risk Prevention: Completed  Transportation Support: Declined  Zone Management Tools: Completed         Goals Addressed                 This Visit's Progress     Conditions and Symptoms   On track     I will schedule office visits, as directed by my provider. I will keep my appointment or reschedule if I have to cancel. I will notify my provider of any barriers to my plan of care. I will follow my Zone Management tool to seek urgent or emergent care. I will notify my provider of any symptoms that indicate a worsening of my condition.     Barriers: overwhelmed by complexity of regimen  Plan for overcoming my barriers: work with ACM  Confidence: 9/10  Anticipated Goal Completion Date: 12.20.20 metoprolol tartrate (LOPRESSOR) 50 MG tablet Take 50 mg by mouth 2 times daily    Historical Provider, MD   aspirin 81 MG tablet Take 81 mg by mouth daily    Historical Provider, MD       Future Appointments   Date Time Provider Mayo Crespoi   2/10/2021  1:30 PM Luanne Cm, APRN - CNP [address]   3/22/2021 11:30 AM Lupe Braden MD [facility]     ,   Congestive Heart Failure Assessment       No patient-reported symptoms      Symptoms:     Symptom course: stable  Weight trend: stable  Salt intake watch compared to last visit: stable     ,   COPD Assessment    Does the patient understand envrionmental exposure?: Yes  Is the patient able to verbalize Rescue vs. Long Acting medications?: Yes     No patient-reported symptoms         Symptoms:         and   General Assessment    Do you have any symptoms that are causing concern?: Yes  Progression since Onset: Unchanged  Reported Symptoms: Other (Comment: nasal congestion)

## 2021-02-10 ENCOUNTER — CARE COORDINATION (OUTPATIENT)
Dept: CARE COORDINATION | Age: 76
End: 2021-02-10

## 2021-02-10 ENCOUNTER — HOSPITAL ENCOUNTER (OUTPATIENT)
Dept: PAIN MANAGEMENT | Age: 76
Discharge: HOME | End: 2021-02-10
Payer: MEDICARE

## 2021-02-10 DIAGNOSIS — Z98.890 S/P KYPHOPLASTY: ICD-10-CM

## 2021-02-10 DIAGNOSIS — M54.16 LUMBAR RADICULOPATHY: ICD-10-CM

## 2021-02-10 DIAGNOSIS — Z51.81 MEDICATION MONITORING ENCOUNTER: Chronic | ICD-10-CM

## 2021-02-10 DIAGNOSIS — M79.7 FIBROMYALGIA: ICD-10-CM

## 2021-02-10 DIAGNOSIS — M43.22 CERVICAL SPINE ANKYLOSIS: ICD-10-CM

## 2021-02-10 DIAGNOSIS — Z96.653 HISTORY OF TOTAL BILATERAL KNEE REPLACEMENT: ICD-10-CM

## 2021-02-10 DIAGNOSIS — M51.36 DDD (DEGENERATIVE DISC DISEASE), LUMBAR: ICD-10-CM

## 2021-02-10 DIAGNOSIS — Z79.891 CHRONIC PRESCRIPTION OPIATE USE: Chronic | ICD-10-CM

## 2021-02-10 DIAGNOSIS — M48.062 SPINAL STENOSIS OF LUMBAR REGION WITH NEUROGENIC CLAUDICATION: Primary | Chronic | ICD-10-CM

## 2021-02-10 DIAGNOSIS — M85.88 OSTEOPENIA OF LUMBAR SPINE: ICD-10-CM

## 2021-02-10 PROCEDURE — 99213 OFFICE O/P EST LOW 20 MIN: CPT

## 2021-02-10 PROCEDURE — 99442 PR PHYS/QHP TELEPHONE EVALUATION 11-20 MIN: CPT | Performed by: NURSE PRACTITIONER

## 2021-02-10 RX ORDER — LIDOCAINE HYDROCHLORIDE 4 G/100ML
LIQUID TOPICAL
COMMUNITY
End: 2021-10-02

## 2021-02-10 RX ORDER — OXYCODONE HYDROCHLORIDE 5 MG/1
5 TABLET ORAL EVERY 8 HOURS PRN
Qty: 90 TABLET | Refills: 0 | Status: SHIPPED | OUTPATIENT
Start: 2021-02-13 | End: 2021-03-10

## 2021-02-10 RX ORDER — GABAPENTIN 100 MG/1
100 CAPSULE ORAL 3 TIMES DAILY
Qty: 90 CAPSULE | Refills: 0 | Status: SHIPPED | OUTPATIENT
Start: 2021-02-16 | End: 2021-06-14 | Stop reason: SDUPTHER

## 2021-02-10 ASSESSMENT — ENCOUNTER SYMPTOMS
SHORTNESS OF BREATH: 1
CONSTIPATION: 1
BACK PAIN: 1
EYES NEGATIVE: 1

## 2021-02-10 NOTE — CARE COORDINATION
Attempting to reach patient for a follow up care coordination call regarding any needs, questions or concerns. Kike Fregoso [facility] [phone]  No VM available.

## 2021-02-10 NOTE — CARE COORDINATION
Patient returned call stating she had a fall from her bed, she is sore but no injury that she can tell. Per patient she is ready to order a hospital bed.  ACM explained that

## 2021-02-10 NOTE — PROGRESS NOTES
Rick 88 PROGRESS NOTE      Patient  completed []  video visit   [x]   phone call:     16 Minutes :   [x]    to  review Medication Agreement    []  Follow up after procedure   []  Discuss treatment options    Location:  Provider:  working from    [x]    home    []   [facility] - JAUN CHIN , patient at  home   Chief Complaint:  Back pain    She c/o back pain  which fluctuates, She has history of kyphoplasty. She also has joint pain, pain in her knees. She reports she has been taking the gabapentin 100mg , 3 times a day,She uses a walker. She states has nocturia and is up at She is using  EMS foot massager for the numbness in her feet. No Ed visits. She had her first covid vaccine. Back Pain  This is a chronic problem. The problem occurs constantly. The problem has been waxing and waning since onset. The pain is present in the lumbar spine. Quality: dull. The pain does not radiate. The pain is at a severity of 6/10. The pain is the same all the time. The symptoms are aggravated by standing (activity). Associated symptoms include numbness. Risk factors include menopause. She has tried analgesics, heat and ice for the symptoms.              Treatment goals:  Functional status: to be able to walk more      Aberrancy:   Any alcoholic beverages      no      Any illegal drugs   no      Analgesia:          6           Adverse  Effects :constipation, takes colace      ADL;s :home exercises    Data:    When was thelast UDS:    12-         Was the UDS appropriate:yes    Record/Diagnostics Review:      As above, I did review the imaging       12/14/2020  1:43 PM - Jordin, [facility] Incoming Lab Results From [employer]    Component Value Ref Range & Units Status Collected Lab   Pain Management Drug Panel Interp, Urine Consistent   Final 12/10/2020 12:32 PM [facility]   (NOTE)   ________________________________________________________________   DRUGS EXPECTED:   OXYCODONE [TODAY] ________________________________________________________________   CONSISTENT with medications provided:   OXYCODONE : based on oxycodone, noroxycodone, noroxymorphone   ________________________________________________________________   INTERPRETIVE INFORMATION: Targeted drug profile Interp   Interpretation depends on accuracy and completeness of patient      EXAMINATION:   3 XRAY VIEWS OF THE LUMBAR SPINE; THREE XRAY VIEWS OF THE THORACIC SPINE       2/11/2020 1:09 pm       COMPARISON:   CT lumbar spine from 09/28/2018       HISTORY:   ORDERING SYSTEM PROVIDED HISTORY: Osteopenia of lumbar spine   TECHNOLOGIST PROVIDED HISTORY:   Reason for Exam: SEVERE BACK PAIN UPPER AND LOWER, FALL IN DECEMBER 2019   Acuity: Unknown   Type of Exam: Ongoing       FINDINGS:   Thoracic spine: Demineralized skeleton and multifocal pulmonary opacities   somewhat limits the evaluation of the thoracic spine.  Within this limit,   multilevel degenerative changes of thoracic spine.  Intact pedicles on   frontal view.  Vertebral body heights appear maintained.       Lumbar spine: L1 compression fracture with vertebral body augmentation   cement.  No new compression fracture.  Minimal anterolisthesis of L4 on L5   measuring 0.6 cm.  Disc degeneration with endplate osteophyte formation. Facet arthropathy diffusely.       Possible abdominal aortic aneurysm on radiography measuring 3.0 cm AP.    Correlation with prior lumbar spine CT.           Impression   Multilevel disc degeneration throughout the lumbar spine without evidence of   acute abnormality.  If concern for acute fracture exists, CT versus MRI   should be considered.       Probable abdominal aortic aneurysm.  Nonemergent CT or ultrasound correlation   should be considered.                       Pill count: appropriate  fill date :2-   Morphine equivalent dose as reported on [place]ARRS:22.50  Periodic Controlled Substance Monitoring: Possible medication side effects, risk of tolerance/dependence & alternative treatments discussed., No signs of potential drug abuse or diversion identified. , Assessed functional status., Obtaining appropriate analgesic effect of treatment. Darrius Soriano, APRN - CNP)  Review of[place]ARRS does not show any aberrant prescription behavior. Medication is helping the patient stay active. Patient denies any side effects and reports adequate analgesia. No sign of misuse/abuse. Past Medical History:   Diagnosis Date    Abnormality of rib determined by X-ray 1/28/2016    Acute cystitis without hematuria 6/16/2018    Acute exacerbation of chronic bronchitis (HCC)     Acute on chronic diastolic heart failure ([facility] [address] 1/28/2016    Adjustment disorder with mixed anxiety and depressed mood 6/26/2015    Mild     Anemia 3/5/2014    Back pain     Bronchiectasis with acute lower respiratory infection ([facility] [address] 8/16/2017    Bronchitis     Chronic bronchitis (HCC) 1/28/2016    Chronic cough 7/23/2018    Degenerative joint disease (DJD) of hip 5/14/2015    Dyslipidemia 3/5/2014    Encephalopathy acute 5/29/2016    Essential hypertension 1/28/2016    Fibromyalgia     GERD (gastroesophageal reflux disease)     History of total bilateral knee replacement 5/28/2016    Hx of blood clots     left leg and lung    Hyperlipidemia     Hypertension     Incontinence of urine     Irregular heartbeat     DR. Burt Freeman Medication monitoring encounter 6/13/2018    Morbid obesity with BMI of 45.0-49.9, adult ([facility] [address] 1/28/2016    Obstructive sleep apnea syndrome     Osteoarthritis     Primary osteoarthritis of left knee 5/27/2016    PVC (premature ventricular contraction) 1/28/2016    S/P right and left heart catheterization 10/2/2015    Sleep apnea     no machine    SOB (shortness of breath)     Spinal stenosis of lumbar region with neurogenic claudication 5/13/2013    Supplemental oxygen dependent 12/8/2017    Urine frequency     UTI (urinary tract infection) hospitalized early july 2014 for kidney infection       Past Surgical History:   Procedure Laterality Date    BRONCHOSCOPY Left 8/16/2017    BRONCHOSCOPY ALVEOLAR LAVAGE LOWER LOBE performed by Chuck Nielson MD at [address]  x 3    no stents    COLONOSCOPY  4 28 14    polyps biopsies     FOOT SURGERY Left     calcium deposit removal from top of foot    HYSTERECTOMY      JOINT REPLACEMENT Bilateral 5/27/2016    bilat total knees    NERVE BLOCK  5/13/2013    caudal celestone 6mg    NERVE BLOCK  5/28/13    Caudal #2, Celestone 9mg    NERVE BLOCK  2/14/14    rt hip inj celestone 9 mg    NERVE BLOCK  07/14/2014    caudal# 3- celestone 9 mg    NERVE BLOCK  7-21-14    caudal epidural #2, decadron 7mg, fentanyl 25mcg    NERVE BLOCK  10/2/14    duramorph 1.5  decadron 5mg    NERVE BLOCK  11/9/2015    caudal# 1 celestone 9mg    NERVE BLOCK  11/16/15    caudal #2  decadron 10mg    NERVE BLOCK  11/23/15    duramorph 1mg celestone 9mg    NERVE BLOCK  03/28/2018    CAUDAL EPIDURAL STEROID BLOCK  DECADRON 10 MG     NERVE BLOCK  05/23/2018    caudal # decadron 7mg    NERVE BLOCK  08/28/2018    natalia transforminal # 1, decadron 10mg gadoteridol, LONG NEEDLES    MO PERQ VERT AGMNTJ CAVITY CRTJ UNI/BI CANNULJ LMBR N/A 10/29/2018    KYPHOPLASTY L1 performed by Fuentes [facility] MD at [address] [facility] [address] HIP ARTHROPLASTY Right 5/14/15    total hip replacement       Allergies   Allergen Reactions    Rosuvastatin Calcium Anaphylaxis     Hair fell out    Statins Anaphylaxis     Hair fell out    Bactrim [Sulfamethoxazole-Trimethoprim] Diarrhea    Caffeine Other (See Comments)     pain  pain    Dye [Iodides] Other (See Comments)     Iv dye= blood clots in arm    Food      Tomatoes, pain    Ioxaglate Other (See Comments)     Iv dye= blood clots in arm    Tomato     Dicloxacillin Rash    Pcn [Penicillins] Rash         Current Outpatient Medications:     Lidocaine HCl (ASPERCREME LIDOCAINE) 4 % LIQD, Apply topically, Disp: , Rfl:     gabapentin (NEURONTIN) 100 MG capsule, take 1 capsule by mouth once daily, Disp: 90 capsule, Rfl: 2    oxyCODONE (ROXICODONE) 5 MG immediate release tablet, Take 1 tablet by mouth every 8 hours as needed for Pain for up to 30 days. , Disp: 90 tablet, Rfl: 0    amLODIPine (NORVASC) 5 MG tablet, Take 1 tablet by mouth daily, Disp: 30 tablet, Rfl: 3    DULoxetine (CYMBALTA) 30 MG extended release capsule, TAKE 1 CAPSULE TWICE A DAY, Disp: 180 capsule, Rfl: 3    donepezil (ARICEPT) 5 MG tablet, Take 1 tablet by mouth nightly, Disp: 90 tablet, Rfl: 1    calcium carbonate (OYSTER SHELL CALCIUM 500 MG) 1250 (500 Ca) MG tablet, Take 1 tablet by mouth daily With magnesium, zinc, Disp: , Rfl:     metoprolol tartrate (LOPRESSOR) 50 MG tablet, Take 50 mg by mouth 2 times daily, Disp: , Rfl:     aspirin 81 MG tablet, Take 81 mg by mouth daily, Disp: , Rfl:     Catheters (STANDARD CARE EXTERNAL CATH) MISC, 1 Device by Does not apply route daily, Disp: 30 each, Rfl: 2    neomycin-polymyxin-dexameth 3.5-09906-5.1 OINT, , Disp: , Rfl:     levocetirizine (XYZAL) 5 MG tablet, take 1 tablet by mouth once daily AT NIGHT, Disp: 30 tablet, Rfl: 2    naproxen sodium (ANAPROX) 220 MG tablet, Take 220 mg by mouth 2 times daily (with meals), Disp: , Rfl:     fluticasone (FLONASE) 50 MCG/ACT nasal spray, instill 2 sprays into each nostril once daily, Disp: , Rfl:     albuterol sulfate  (90 Base) MCG/ACT inhaler, Inhale 2 puffs into the lungs 4 times daily as needed for Wheezing, Disp: 1 Inhaler, Rfl: 2    ofloxacin (OCUFLOX) 0.3 % solution, Place into both eyes daily as needed , Disp: , Rfl:     docusate sodium (COLACE) 100 MG capsule, Take 100 mg by mouth daily as needed for Constipation, Disp: , Rfl:     Misc.  Devices (WALKER) MISC, 1 each by Does not apply route daily Upright walker with wheels and seat, Disp: 1 each, Rfl: 0    Family History Problem Relation Age of Onset    Stomach Cancer Brother         stomach    High Blood Pressure Mother     Heart Disease Sister         irregular heartbeat       Social History     Socioeconomic History    Marital status:      Spouse name: Not on file    Number of children: Not on file    Years of education: Not on file    Highest education level: Not on file   Occupational History    Not on file   Social Needs    Financial resource strain: Not hard at all   [place] insecurity     Worry: Never true     Inability: Never true   [employer] needs     Medical: No     Non-medical: No   Tobacco Use    Smoking status: Former Smoker     Packs/day: 1.00     Years: 18.00     Pack years: 18.00     Quit date: 1993     Years since quittin.7    Smokeless tobacco: Never Used   Substance and Sexual Activity    Alcohol use: No    Drug use: No    Sexual activity: Not Currently   Lifestyle    Physical activity     Days per week: 0 days     Minutes per session: Not on file    Stress: Only a little   Relationships    Social connections     Talks on phone: Twice a week     Gets together: Twice a week     Attends Gnosticism service: Not on file     Active member of club or organization: Not on file     Attends meetings of clubs or organizations: Not on file     Relationship status:     Intimate partner violence     Fear of current or ex partner: Not on file     Emotionally abused: Not on file     Physically abused: Not on file     Forced sexual activity: Not on file   Other Topics Concern    Not on file   Social History Narrative    Not on file         Review of Systems:  Review of Systems   Constitution: Negative. HENT: Negative. Eyes: Negative. Cardiovascular: Negative. Respiratory: Positive for shortness of breath. Uses oxygen at night   Endocrine: Negative. Hematologic/Lymphatic: Negative. Skin: Negative.     Musculoskeletal: Positive for back pain and joint pain.   Gastrointestinal: Positive for constipation. Genitourinary: Positive for nocturia. Neurological: Positive for loss of balance and numbness. Uses walker   Psychiatric/Behavioral: Negative. Physical Exam:  There were no vitals taken for this visit. Physical Exam  Skin:         Neurological:      Mental Status: She is alert and oriented to person, place, and time. Psychiatric:         Mood and Affect: Mood normal.         Thought Content: Thought content normal.           Assessment:    Problem List Items Addressed This Visit     Spinal stenosis of lumbar region with neurogenic claudication - Primary (Chronic)    S/P kyphoplasty    Osteopenia of lumbar spine    Medication monitoring encounter (Chronic)    Lumbar radiculopathy    Fibromyalgia    DDD (degenerative disc disease), lumbar    Chronic prescription opiate use (Chronic)    Cervical spine ankylosis            Treatment Plan:  DISCUSSION: Treatment options discussed withpatient and all questions answered to patient's satisfaction. Possible side effects, risk of tolerance and or dependence and alternative treatments discussed    Obtaining appropriate analgesic effect of treatment   No signs of potential drug abuse or diversion identified    [x] Ill effects of being on chronic pain medications such as sleep disturbances, respiratory depression, hormonal changes, withdrawal symptoms, chronic opioid dependence and tolerance as well as risk of taking opioids with Benzodiazepines and taking opioids along with alcohol,  werediscussed with patient. I had asked the patient to minimize medication use and utilize pain medications only for uncontrolled rest pain or pain with exertional activities. I advised patient not to self-escalate painmedications without consulting with us.   At each of patient's future visits we will try to taper pain medications, while adjusting the adjunct medications, and re-evaluating for Physical Therapy to improve spinal andjoint strength. We will continue to have discussions to decrease pain medications as tolerated. Counseled patient on effects their pain medication and /or their medical condition mayhave on their  ability to drive or operate machinery. Instructed not to drive or operate machinery if drowsy     I also discussed with the patient regarding the dangers of combining narcotic pain medication with tranquilizers, alcohol or illegal drugs or taking the medication any way other than prescribed. The dangers were discussed  including respiratory depression and death. Patient was told to tell  all  physicians regarding the medications he is getting from pain clinic. Patient is warned not to take any unprescribed medications over-the-countermedications that can depress breathing . Patient is advised to talk to the pharmacist or physicians if planning to take any over-the-counter medications before  takeing them. Patient is strongly advised to avoid tranquilizers or  relaxants, illegal drugs  or any medications that can depress breathing  Patient is also advised to tell us if there is any changes in their medications from other physicians.             TREATMENT OPTIONS:       Medication Agreement Requirements Met  Continue Opioid therapy  Script written for gabapentin, oxycodone  Follow up appointment made

## 2021-02-11 NOTE — CARE COORDINATION
ACM explained that she would need a visit with PCP for documentation to support the need for a hospital bed. ACM had PCP staff contact patient to schedule. ACM will follow up in 5-7 days.

## 2021-02-23 ENCOUNTER — CARE COORDINATION (OUTPATIENT)
Dept: CARE COORDINATION | Age: 76
End: 2021-02-23

## 2021-02-25 NOTE — CARE COORDINATION
Attempting to reach patient for a follow up care coordination call regarding any needs, questions or concerns.   Prudencio Matos, [address]

## 2021-03-01 ENCOUNTER — CARE COORDINATION (OUTPATIENT)
Dept: CARE COORDINATION | Age: 76
End: 2021-03-01

## 2021-03-02 ENCOUNTER — OFFICE VISIT (OUTPATIENT)
Dept: INTERNAL MEDICINE CLINIC | Age: 76
End: 2021-03-02
Payer: MEDICARE

## 2021-03-02 VITALS
RESPIRATION RATE: 16 BRPM | BODY MASS INDEX: 50.12 KG/M2 | HEART RATE: 16 BPM | HEIGHT: 64 IN | TEMPERATURE: 97.2 F | DIASTOLIC BLOOD PRESSURE: 78 MMHG | SYSTOLIC BLOOD PRESSURE: 134 MMHG

## 2021-03-02 DIAGNOSIS — I50.33 ACUTE ON CHRONIC DIASTOLIC HEART FAILURE (HCC): ICD-10-CM

## 2021-03-02 DIAGNOSIS — M16.0 PRIMARY OSTEOARTHRITIS OF BOTH HIPS: ICD-10-CM

## 2021-03-02 DIAGNOSIS — Z00.00 HEALTHCARE MAINTENANCE: ICD-10-CM

## 2021-03-02 DIAGNOSIS — N39.3 STRESS INCONTINENCE OF URINE: ICD-10-CM

## 2021-03-02 DIAGNOSIS — R35.1 NOCTURIA: Primary | ICD-10-CM

## 2021-03-02 DIAGNOSIS — Z13.1 SCREENING FOR DIABETES MELLITUS (DM): ICD-10-CM

## 2021-03-02 DIAGNOSIS — J42 CHRONIC BRONCHITIS, UNSPECIFIED CHRONIC BRONCHITIS TYPE (HCC): ICD-10-CM

## 2021-03-02 DIAGNOSIS — J47.0 BRONCHIECTASIS WITH ACUTE LOWER RESPIRATORY INFECTION (HCC): ICD-10-CM

## 2021-03-02 PROCEDURE — 99214 OFFICE O/P EST MOD 30 MIN: CPT | Performed by: INTERNAL MEDICINE

## 2021-03-02 RX ORDER — METHYLPREDNISOLONE 4 MG/1
TABLET ORAL
Qty: 1 KIT | Refills: 0 | Status: SHIPPED | OUTPATIENT
Start: 2021-03-02 | End: 2021-03-08

## 2021-03-02 ASSESSMENT — PATIENT HEALTH QUESTIONNAIRE - PHQ9
2. FEELING DOWN, DEPRESSED OR HOPELESS: 0
SUM OF ALL RESPONSES TO PHQ QUESTIONS 1-9: 0
SUM OF ALL RESPONSES TO PHQ9 QUESTIONS 1 & 2: 0

## 2021-03-02 NOTE — PROGRESS NOTES
Visit Information    Have you changed or started any medications since your last visit including any over-the-counter medicines, vitamins, or herbal medicines? no   Are you having any side effects from any of your medications? -  no  Have you stopped taking any of your medications? Is so, why? -  no    Have you seen any other physician or provider since your last visit? Yes - Records Obtained  Have you had any other diagnostic tests since your last visit? No  Have you been seen in the emergency room and/or had an admission to a hospital since we last saw you? No  Have you had your routine dental cleaning in the past 6 months? yes     Have you activated your [employer] account? If not, what are your barriers? Yes     Patient Care Team:  Ze Brewer MD as PCP - General (Internal Medicine)  Ze Brewer MD as PCP - [facility] Provider  Keon Hou MD as Consulting Physician (Gastroenterology)  Bradford Smith MD as Consulting Physician (Cardiology)  Nadia Gandhi MD as Consulting Physician (Pulmonology)  Dorothea Woo RN as [address] [place] [address] History Review  Past Medical, Family, and Social History reviewed and does contribute to the patient presenting condition    Health Maintenance   Topic Date Due    Hepatitis C screen  Never done    Shingles Vaccine (1 of 2) Never done    DEXA (modify frequency per FRAX score)  Never done    Annual Wellness Visit (AWV)  Never done    Flu vaccine (1) Never done    COVID-19 Vaccine (2 of 2 - Ainsley Pagoda series) 02/25/2021    Potassium monitoring  10/02/2021    Creatinine monitoring  10/02/2021    DTaP/Tdap/Td vaccine (2 - Td) 06/18/2030    Pneumococcal 65+ years Vaccine  Completed    Hepatitis A vaccine  Aged Out    Hepatitis B vaccine  Aged Out    Hib vaccine  Aged Out    Meningococcal (ACWY) vaccine  Aged Out     Chief Complaint   Patient presents with    Other     Pt requesting catheter due to fall risk at night.  Pt also asking about steroid therapy for left hip . Pt denies any other concerns at this time. SUBJECTIVE:  Jed Wood is a 67 y.o. female patient who  comes for complaints of   Chief Complaint   Patient presents with    Other     Pt requesting catheter due to fall risk at night. Pt also asking about steroid therapy for left hip . Pt denies any other concerns at this time. Nocturia  6-11times a night  Few years  No burning or pain  Widespread arthritis- frequency is related to back pain  Pt       Low back pain  Also left hip pain from arthritis  req medrol dose pain  Chronic pain but worse last 2 weeks        REVIEW OF SYSTEMS (except Subjective (HPI))  GENERAL: No fevers / chills  RESPIRATORY: Negative for cough, wheezing or shortness of breath  CARDIOVASCULAR: Negative for chest pain or palpitations. GI: no nausea, vomiting, or diarrhea  NEURO: No history of headaches. Past Medical History:   Diagnosis Date    Abnormality of rib determined by X-ray 1/28/2016    Acute cystitis without hematuria 6/16/2018    Acute exacerbation of chronic bronchitis (HCC)     Acute on chronic diastolic heart failure ([facility] [address] 1/28/2016    Adjustment disorder with mixed anxiety and depressed mood 6/26/2015    Mild     Anemia 3/5/2014    Back pain     Bronchiectasis with acute lower respiratory infection ([address] 8/16/2017    Bronchitis     Chronic bronchitis (HCC) 1/28/2016    Chronic cough 7/23/2018    Degenerative joint disease (DJD) of hip 5/14/2015    Dyslipidemia 3/5/2014    Encephalopathy acute 5/29/2016    Essential hypertension 1/28/2016    Fibromyalgia     GERD (gastroesophageal reflux disease)     History of total bilateral knee replacement 5/28/2016    Hx of blood clots     left leg and lung    Hyperlipidemia     Hypertension     Incontinence of urine     Irregular heartbeat     DR. Hardy Arechiga Medication monitoring encounter 6/13/2018    Morbid obesity with BMI of 45.0-49.9, adult ([facility] [address] 1/28/2016    Obstructive sleep apnea syndrome     Osteoarthritis     Primary osteoarthritis of left knee 2016    PVC (premature ventricular contraction) 2016    S/P right and left heart catheterization 10/2/2015    Sleep apnea     no machine    SOB (shortness of breath)     Spinal stenosis of lumbar region with neurogenic claudication 2013    Supplemental oxygen dependent 2017    Urine frequency     UTI (urinary tract infection)     hospitalized early 2014 for kidney infection       SOCIAL HISTORY:  Social History     Socioeconomic History    Marital status:      Spouse name: Not on file    Number of children: Not on file    Years of education: Not on file    Highest education level: Not on file   Occupational History    Not on file   Social Needs    Financial resource strain: Not hard at all   [place] insecurity     Worry: Never true     Inability: Never true   INFIMET needs     Medical: No     Non-medical: No   Tobacco Use    Smoking status: Former Smoker     Packs/day: 1.00     Years: 18.00     Pack years: 18.00     Quit date: 1993     Years since quittin.8    Smokeless tobacco: Never Used   Substance and Sexual Activity    Alcohol use: No    Drug use: No    Sexual activity: Not Currently   Lifestyle    Physical activity     Days per week: 0 days     Minutes per session: Not on file    Stress:  Only a little   Relationships    Social connections     Talks on phone: Twice a week     Gets together: Twice a week     Attends Taoism service: Not on file     Active member of club or organization: Not on file     Attends meetings of clubs or organizations: Not on file     Relationship status:     Intimate partner violence     Fear of current or ex partner: Not on file     Emotionally abused: Not on file     Physically abused: Not on file     Forced sexual activity: Not on file   Other Topics Concern    Not on file   Social History Narrative    Not on file CURRENT MEDICATIONS:  Current Outpatient Medications   Medication Sig Dispense Refill    Lidocaine HCl (ASPERCREME LIDOCAINE) 4 % LIQD Apply topically      oxyCODONE (ROXICODONE) 5 MG immediate release tablet Take 1 tablet by mouth every 8 hours as needed for Pain for up to 30 days. 90 tablet 0    gabapentin (NEURONTIN) 100 MG capsule Take 1 capsule by mouth 3 times daily for 30 days. 90 capsule 0    amLODIPine (NORVASC) 5 MG tablet Take 1 tablet by mouth daily 30 tablet 3    DULoxetine (CYMBALTA) 30 MG extended release capsule TAKE 1 CAPSULE TWICE A  capsule 3    donepezil (ARICEPT) 5 MG tablet Take 1 tablet by mouth nightly 90 tablet 1    Catheters (STANDARD CARE EXTERNAL CATH) MISC 1 Device by Does not apply route daily 30 each 2    neomycin-polymyxin-dexameth [id number] OINT       levocetirizine (XYZAL) 5 MG tablet take 1 tablet by mouth once daily AT NIGHT 30 tablet 2    naproxen sodium (ANAPROX) 220 MG tablet Take 220 mg by mouth 2 times daily (with meals)      fluticasone (FLONASE) 50 MCG/ACT nasal spray instill 2 sprays into each nostril once daily      calcium carbonate (OYSTER SHELL CALCIUM 500 MG) 1250 (500 Ca) MG tablet Take 1 tablet by mouth daily With magnesium, zinc      albuterol sulfate  (90 Base) MCG/ACT inhaler Inhale 2 puffs into the lungs 4 times daily as needed for Wheezing 1 Inhaler 2    ofloxacin (OCUFLOX) 0.3 % solution Place into both eyes daily as needed       docusate sodium (COLACE) 100 MG capsule Take 100 mg by mouth daily as needed for Constipation      Misc. Devices (WALKER) MISC 1 each by Does not apply route daily Upright walker with wheels and seat 1 each 0    metoprolol tartrate (LOPRESSOR) 50 MG tablet Take 50 mg by mouth 2 times daily      aspirin 81 MG tablet Take 81 mg by mouth daily       No current facility-administered medications for this visit.           OBJECTIVE:  Vitals:    03/02/21 1341   BP: 134/78   Pulse:    Resp:    Temp: Body mass index is 50.12 kg/m². Focal exam:  Tender across lower back    General exam (except above):  General appearance - well appearing, alert, in no acute distress. comes in wheelchair  Head - Atraumatic, normocephalic  Eyes - EOMI, no jaundice or pallor  Lungs - Lungs clear to auscultation. No wheezing, rhonchi, rales  Heart - RRR without murmur, gallop, or rubs. No ectopy  Abdomen - Abdomen soft, non-tender. Bowel sounds normal. No masses, organomegaly  Extremities -No significant edema, or skin discoloration. Good capillary refill. Neuro - Pt Alert, awake and oriented x 3. Pt inwheel chair, unable to examine motor power    ASSESSMENT AND PLAN ([address]):   Saskia Oquendo was seen today for other. Diagnoses and all orders for this visit:    Nocturia  Comments:  avoid 2hr prior to bedtime    Orders:  -     Catheters (STANDARD CARE EXTERNAL CATH) MISC; 1 each by Does not apply route daily  -     Eugene Harris MD, Urology, [place]  -     Glucose, Fasting; Future    Primary osteoarthritis of both hips  -     methylPREDNISolone (MEDROL, YUDELKA,) 4 MG tablet; Take as directed    Acute on chronic diastolic heart failure (HCC)  Comments:  stable, no current symptoms, c/w current meds    Chronic bronchitis, unspecified chronic bronchitis type ([address]    Bronchiectasis with acute lower respiratory infection ([address]  Comments:  stable, no current symp    Healthcare maintenance  Comments:  Due flu vaccine,dexa and hep c screening. Stress incontinence of urine  -     Urinalysis Reflex to Culture; Future    Screening for diabetes mellitus (DM)  -     Glucose, Fasting;  Future           Follow up in: david Raines MD

## 2021-03-03 ENCOUNTER — TELEPHONE (OUTPATIENT)
Dept: INTERNAL MEDICINE CLINIC | Age: 76
End: 2021-03-03

## 2021-03-03 NOTE — TELEPHONE ENCOUNTER
Contacted patient to inform that her Catheter order was incorrectly sent to [facility] and needed to be informed of what medical supply company she uses? Patient informed that she gave the information to the nurse yesterday at her visit or she said that usually [employer] take care of her supply orders. Please advise if these orders have been sent to correct location patient is unsure of the name of medical supply company she uses?

## 2021-03-04 ENCOUNTER — CARE COORDINATION (OUTPATIENT)
Dept: CARE COORDINATION | Age: 76
End: 2021-03-04

## 2021-03-04 NOTE — CARE COORDINATION
Patient called ACM to check on the progress of her external catheters. ACM contacted PCP office, [facility] and requested that she fax [facility] and was told that they will need a RX, demographics and a F2F for 3.2.21. Patient mentioned having extreme difficulty  walking due to left hip pain. Per patient she uses a walker and she has a pain management appointment next week. She will discuss possible injections. ACM asked if she would be interested in PT, she stated if her PCP would recommend it she will try. She had an xray on   9.29.20 of this hip. Patient takes Oxycodone daily for pain   She also started a steroid 2 days ago. ACM will route message to PCP for advice.

## 2021-03-05 ENCOUNTER — TELEPHONE (OUTPATIENT)
Dept: PAIN MANAGEMENT | Age: 76
End: 2021-03-05

## 2021-03-05 NOTE — PROGRESS NOTES
Ced Erwin is a 67 y.o. female evaluated on 3/8/2021. Modality of virtual service provided -via  telephone   Consent:  Patient and/or health care decision maker is aware that that patient may receive a bill for this telephone service, depending on one's insurance coverage, and has provided verbal consent to proceed: Yes    Patient identification was verified at the start of the visit: Yes    Chief complaint: Ced Erwin is 67 y.o.,  female, with  with chief complaint of pain involving the low back. Patient is complaining of severe back pain. The pain in the low back and in the is worse. Patient is feeling weakness in her hips and knees she is somewhat concerned about falling. She reports when she walks for short distances she feels very weak. Patient had epidural steroid injection in the past which helped her back pain considerably and would like to have them repeated. Patient also has shortness of breath with exertion. Patient is requesting LESI to help her pain. Patient's pain is worse in the left hip area with pain radiating towards the groin. Patient reports when she moves her hip the pain is worse. Back Pain  This is a chronic problem. The current episode started more than 1 year ago (Worse since the fall in December 21st ,2019). The problem occurs constantly. The problem has been gradually worsening since onset. The pain is present in the sacro-iliac, thoracic spine and lumbar spine. The quality of the pain is described as aching Landy Riling, nagging). Radiates to: Towards both hips worse on the right. The pain is at a severity of 8/10 (6-10). The pain is severe. The pain is worse during the day. The symptoms are aggravated by bending, position, standing and twisting (Walking lifting her ADLs ,with any movement). Associated symptoms include weakness.  Pertinent negatives include no abdominal pain, bladder incontinence, bowel incontinence, chest pain, dysuria, fever, numbness or tingling. (Legs feel numb and weak) Risk factors include lack of exercise, obesity and history of osteoporosis. Alleviating factors:heat, ice and topical creams -lidocaine  Salonpas and Aspercreme   Lifestyle changes experienced with pain: Keeps awake at night, Wakes from sleep, Prevents or limits ADLs, Increases w/activity., Increases w/prolonged sitting/standing/walking  Mood changes,anxious  Patient currently unemployed. Physical therapy did not help the pain. Are you under psychological counseling at present: No  Goals for treatment include:  Decrease in pain  Enjoy daily and recreational activities, return to previous status. Last procedure was lumbar epidural steroid injection 3/16/2020    Patient relates current medications are helping the pain. Patient reports taking pain medications as prescribed, denies obtaining medications from different sources and denies use of illegal drugs. Patient denies side effects from medications like nausea, vomiting, constipation or drowsiness. Patient reports current activities of daily living ar possible due to medications and would like to continue them. ACTIVITY/SOCIAL/EMOTIONAL:  Sleep Pattern: 8 hours per night. Wakes up to go to bathroom frequently at night  Home Exercises:  no regular exercise  Activity:not significantly changed  Emotional Issues: sad.    Currently seeing a Psychiatrist or Psychologist:  No     ADVERSE MEDICATION EFFECTS:   Nausea and vomiting: no   Constipation: yes-Undercontrol-: yes  Dizziness/drowsy/sleepy--no  Urinary Retention: no    ABERRANT BEHAVIORS SINCE LAST VISIT  Lost rx/pills:------------------------------------------ no  Taking  medication as prescribed: ----------- yes  Urine Drug Screen ---------------------------------  yes             Date------------------------------------------------- 12/14/2020              results as expected ---------------------yes    Recent ER visits: -------------------------------------No  Pill count is appropriate: ---------------------------yes   Refills for prescriptions appropriate:---------- yes    Past Medical History:   Diagnosis Date    Abnormality of rib determined by X-ray 1/28/2016    Acute cystitis without hematuria 6/16/2018    Acute exacerbation of chronic bronchitis (HCC)     Acute on chronic diastolic heart failure ([facility] 1/28/2016    Adjustment disorder with mixed anxiety and depressed mood 6/26/2015    Mild     Anemia 3/5/2014    Back pain     Bronchiectasis with acute lower respiratory infection ([facility] 8/16/2017    Bronchitis     Chronic bronchitis (HCC) 1/28/2016    Chronic cough 7/23/2018    Degenerative joint disease (DJD) of hip 5/14/2015    Dyslipidemia 3/5/2014    Encephalopathy acute 5/29/2016    Essential hypertension 1/28/2016    Fibromyalgia     GERD (gastroesophageal reflux disease)     History of total bilateral knee replacement 5/28/2016    Hx of blood clots     left leg and lung    Hyperlipidemia     Hypertension     Incontinence of urine     Irregular heartbeat     DR. Head Covert Medication monitoring encounter 6/13/2018    Morbid obesity with BMI of 45.0-49.9, adult ([facility] 1/28/2016    Obstructive sleep apnea syndrome     Osteoarthritis     Primary osteoarthritis of left knee 5/27/2016    PVC (premature ventricular contraction) 1/28/2016    S/P right and left heart catheterization 10/2/2015    Sleep apnea     no machine    SOB (shortness of breath)     Spinal stenosis of lumbar region with neurogenic claudication 5/13/2013    Supplemental oxygen dependent 12/8/2017    Urine frequency     UTI (urinary tract infection)     hospitalized early july 2014 for kidney infection       Past Surgical History:   Procedure Laterality Date    BRONCHOSCOPY Left 8/16/2017    BRONCHOSCOPY ALVEOLAR LAVAGE LOWER LOBE performed by Danny Linton MD at [address] [place] [address]  x 3    no stents    COLONOSCOPY  14    polyps biopsies     FOOT SURGERY Left     calcium deposit removal from top of foot    HYSTERECTOMY      JOINT REPLACEMENT Bilateral 2016    bilat total knees    NERVE BLOCK  2013    caudal celestone 6mg    NERVE BLOCK  13    Caudal #2, Celestone 9mg    NERVE BLOCK  14    rt hip inj celestone 9 mg    NERVE BLOCK  2014    caudal# 3- celestone 9 mg    NERVE BLOCK  14    caudal epidural #2, decadron 7mg, fentanyl 25mcg    NERVE BLOCK  10/2/14    duramorph 1.5  decadron 5mg    NERVE BLOCK  2015    caudal# 1 celestone 9mg    NERVE BLOCK  11/16/15    caudal #2  decadron 10mg    NERVE BLOCK  11/23/15    duramorph 1mg celestone 9mg    NERVE BLOCK  2018    CAUDAL EPIDURAL STEROID BLOCK  DECADRON 10 MG     NERVE BLOCK  2018    caudal # decadron 7mg    NERVE BLOCK  2018    natalia transforminal # 1, decadron 10mg gadoteridol, LONG NEEDLES    [place] PERQ VERT AGMNTJ CAVITY CRTJ UNI/BI CANNULJ LMBR N/A 10/29/2018    KYPHOPLASTY L1 performed by Monica Montanez MD at [address] Right 5/14/15    total hip replacement       Family History   Problem Relation Age of Onset    Stomach Cancer Brother         stomach    High Blood Pressure Mother     Heart Disease Sister         irregular heartbeat       Social History     Socioeconomic History    Marital status:      Spouse name: None    Number of children: None    Years of education: None    Highest education level: None   Occupational History    None   Social Needs    Financial resource strain: Not hard at all   Crystal-Hiram insecurity     Worry: Never true     Inability: Never true    Transportation needs     Medical: No     Non-medical: No   Tobacco Use    Smoking status: Former Smoker     Packs/day: 1.00     Years: 18.00     Pack years: 18.00     Quit date: 1993     Years since quittin.8    Smokeless tobacco: Never Used Substance and Sexual Activity    Alcohol use: No    Drug use: No    Sexual activity: Not Currently   Lifestyle    Physical activity     Days per week: 0 days     Minutes per session: None    Stress: Only a little   Relationships    Social connections     Talks on phone: Twice a week     Gets together: Twice a week     Attends Church service: None     Active member of club or organization: None     Attends meetings of clubs or organizations: None     Relationship status:     Intimate partner violence     Fear of current or ex partner: None     Emotionally abused: None     Physically abused: None     Forced sexual activity: None   Other Topics Concern    None   Social History Narrative    None       Allergies   Allergen Reactions    Rosuvastatin Calcium Anaphylaxis     Hair fell out    Statins Anaphylaxis     Hair fell out    Bactrim [Sulfamethoxazole-Trimethoprim] Diarrhea    Caffeine Other (See Comments)     pain  pain    Dye [Iodides] Other (See Comments)     Iv dye= blood clots in arm    Food      Tomatoes, pain    Ioxaglate Other (See Comments)     Iv dye= blood clots in arm    Tomato     Dicloxacillin Rash    Pcn [Penicillins] Rash       Current Outpatient Medications on File Prior to Encounter   Medication Sig Dispense Refill    Catheters (STANDARD CARE EXTERNAL CATH) MISC 1 each by Does not apply route daily 5 each 1    methylPREDNISolone (MEDROL, YUDELKA,) 4 MG tablet Take as directed 1 kit 0    Lidocaine HCl (ASPERCREME LIDOCAINE) 4 % LIQD Apply topically      oxyCODONE (ROXICODONE) 5 MG immediate release tablet Take 1 tablet by mouth every 8 hours as needed for Pain for up to 30 days. 90 tablet 0    gabapentin (NEURONTIN) 100 MG capsule Take 1 capsule by mouth 3 times daily for 30 days.  90 capsule 0    amLODIPine (NORVASC) 5 MG tablet Take 1 tablet by mouth daily 30 tablet 3    DULoxetine (CYMBALTA) 30 MG extended release capsule TAKE 1 CAPSULE TWICE A  capsule 3  donepezil (ARICEPT) 5 MG tablet Take 1 tablet by mouth nightly 90 tablet 1    Catheters (STANDARD CARE EXTERNAL CATH) MISC 1 Device by Does not apply route daily 30 each 2    neomycin-polymyxin-dexameth [id number] OINT       levocetirizine (XYZAL) 5 MG tablet take 1 tablet by mouth once daily AT NIGHT 30 tablet 2    naproxen sodium (ANAPROX) 220 MG tablet Take 220 mg by mouth 2 times daily (with meals)      fluticasone (FLONASE) 50 MCG/ACT nasal spray instill 2 sprays into each nostril once daily      calcium carbonate (OYSTER SHELL CALCIUM 500 MG) 1250 (500 Ca) MG tablet Take 1 tablet by mouth daily With magnesium, zinc      albuterol sulfate  (90 Base) MCG/ACT inhaler Inhale 2 puffs into the lungs 4 times daily as needed for Wheezing 1 Inhaler 2    ofloxacin (OCUFLOX) 0.3 % solution Place into both eyes daily as needed       docusate sodium (COLACE) 100 MG capsule Take 100 mg by mouth daily as needed for Constipation      Misc. Devices (WALKER) MISC 1 each by Does not apply route daily Upright walker with wheels and seat 1 each 0    metoprolol tartrate (LOPRESSOR) 50 MG tablet Take 50 mg by mouth 2 times daily      aspirin 81 MG tablet Take 81 mg by mouth daily       No current facility-administered medications on file prior to encounter. Review of Systems   Constitutional: Negative for activity change, appetite change and fever. HENT: Negative for congestion, hearing loss and sore throat. Respiratory: Negative for apnea and shortness of breath. Cardiovascular: Negative for chest pain and palpitations. Gastrointestinal: Negative for abdominal pain, bowel incontinence, constipation, nausea and rectal pain. Endocrine: Negative for polydipsia and polyuria. Genitourinary: Negative for bladder incontinence, dysuria and hematuria. Musculoskeletal: Positive for back pain. Negative for arthralgias. Skin: Negative for rash. Neurological: Positive for weakness.  Negative for tingling and numbness. Hematological: Does not bruise/bleed easily. Psychiatric/Behavioral: Negative for self-injury, sleep disturbance and suicidal ideas. The patient is not nervous/anxious. Physical Exam  Skin:         Neurological:      Mental Status: She is alert and oriented to person, place, and time. Psychiatric:         Mood and Affect: Mood normal.        DATA:  LAB.:  12/14/2020  1:43 PM - Jordin, [facility] Incoming Lab Results From [facility]    Component Value Ref Range & Units Status Collected Lab   Pain Management Drug Panel Interp, Urine Consistent   Final 12/10/2020 12:32 PM [facility]   (NOTE)   ________________________________________________________________   DRUGS EXPECTED:   OXYCODONE [TODAY]   ________________________________________________________________   CONSISTENT with medications provided:   OXYCODONE : based on oxycodone, noroxycodone, noroxymorphone        X-Ray reports:  EXAMINATION:   STONE PROTOCOL CT OF THE ABDOMEN AND PELVIS       9/29/2020 8:14 pm; 9/29/2020 8:11 pm       TECHNIQUE:   CT of the left hip was performed without the administration of intravenous   contrast.  Multiplanar reformatted images are provided for review. Dose   modulation, iterative reconstruction, and/or weight based adjustment of the   mA/kV was utilized to reduce the radiation dose to as low as reasonably   achievable.; CT of the abdomen and pelvis was performed without the   administration of intravenous contrast. Multiplanar reformatted images are   provided for review.  Dose modulation, iterative reconstruction, and/or weight   based adjustment of the mA/kV was utilized to reduce the radiation dose to as   low as reasonably achievable.       COMPARISON:   Left hip radiograph September 28, 2018; pelvis and left hip radiograph   January 1, 2020       HISTORY:   ORDERING SYSTEM PROVIDED HISTORY: fall pain   TECHNOLOGIST PROVIDED HISTORY:   fall pain   Reason for Exam: pt states she fell 10 days ago; abd pain and left hip pain   Acuity: Acute   Type of Exam: Initial; ORDERING SYSTEM PROVIDED HISTORY: abdominal pain, fall   TECHNOLOGIST PROVIDED HISTORY:   abdominal pain, fall       Reason for Exam: pt states she fell 10 days ago; abd pain and left hip pain   Acuity: Acute   Type of Exam: Initial   Relevant Medical/Surgical History: surg - hyst, right hip replacement,   kyphoplasty       FINDINGS:   CT RENAL STONE PROTOCOL:       LOWER CHEST:  Visualized portion of the lower chest demonstrates no acute   abnormality.  Calcifications of the partially visualized aortic and mitral   valves.  Chronic appearing scarring versus fibrotic change at the lung bases. Severe atherosclerotic changes of the imaged descending thoracic aorta.       KIDNEYS AND URINARY TRACT: No renal calculi are identified. There is no   evidence for hydronephrosis.  The ureters are of normal course and caliber.       ORGANS: Lack of intravenous contrast limits evaluation of the solid organs   and bowel.  The solid organs are grossly unremarkable.       GI/BOWEL: No bowel obstruction.  No evidence of acute appendicitis.       PELVIS: The bladder and pelvic organs are unremarkable.       PERITONEUM/RETROPERITONEUM: Severe atherosclerotic changes of the imaged   abdominal aorta.  No retroperitoneal adenopathy identified.  No free fluid or   free air identified within the abdomen and pelvis.       BONES/SOFT TISSUES: No acute osseous or soft tissue abnormality identified.    Postoperative changes of right total hip arthroplasty.  Multilevel   degenerative changes of the spine which are moderate to severe.  Note is made   of prior kyphoplasty at the L1 level.  Grade 1 anterolisthesis of L4 on L5   which appears degenerative.  Severe degenerative change of the left hip as   described below.  Degenerative changes of the pubic symphysis and bilateral   sacroiliac joints.  No acute soft tissue abnormality identified.  Note is   made of a small fat attenuating lesion along the distal iliopsoas on the left   consistent with small lipoma.       CT LEFT HIP:       Bones: No acute fracture or dislocation of the left hip identified.  No   suspicious lytic or sclerotic osseous lesions.       Soft Tissue:  No acute soft tissue abnormality at the left hip.  Small   well-circumscribed fat attenuating lesion at the distal left iliopsoas   consistent with small lipoma.       Joint:  Severe osteoarthritis of the left hip with bone on bone articulation,   subchondral sclerosis, and prominent subchondral cystic change.  Marginal   osteophytes also present.  No significant joint effusion identified.           Impression   1. No acute intra-process identified. 2. Severe atherosclerotic disease. 3. No acute fracture identified.  Specifically, no acute fracture of the left   hip identified. 4. Severe osteoarthritis of the left hip. STONE PROTOCOL CT OF THE ABDOMEN AND PELVIS       9/29/2020 8:14 pm; 9/29/2020 8:11 pm       TECHNIQUE:   CT of the left hip was performed without the administration of intravenous   contrast.  Multiplanar reformatted images are provided for review. Dose   modulation, iterative reconstruction, and/or weight based adjustment of the   mA/kV was utilized to reduce the radiation dose to as low as reasonably   achievable.; CT of the abdomen and pelvis was performed without the   administration of intravenous contrast. Multiplanar reformatted images are   provided for review.  Dose modulation, iterative reconstruction, and/or weight   based adjustment of the mA/kV was utilized to reduce the radiation dose to as   low as reasonably achievable.       COMPARISON:   Left hip radiograph September 28, 2018; pelvis and left hip radiograph   January 1, 2020       HISTORY:   ORDERING SYSTEM PROVIDED HISTORY: fall pain   TECHNOLOGIST PROVIDED HISTORY:   fall pain   Reason for Exam: pt states she fell 10 days ago; abd pain and left hip pain   Acuity: Acute   Type of Exam: Initial; ORDERING SYSTEM PROVIDED HISTORY: abdominal pain, fall   TECHNOLOGIST PROVIDED HISTORY:   abdominal pain, fall       Reason for Exam: pt states she fell 10 days ago; abd pain and left hip pain   Acuity: Acute   Type of Exam: Initial   Relevant Medical/Surgical History: surg - hyst, right hip replacement,   kyphoplasty       FINDINGS:   CT RENAL STONE PROTOCOL:       LOWER CHEST:  Visualized portion of the lower chest demonstrates no acute   abnormality.  Calcifications of the partially visualized aortic and mitral   valves.  Chronic appearing scarring versus fibrotic change at the lung bases. Severe atherosclerotic changes of the imaged descending thoracic aorta.       KIDNEYS AND URINARY TRACT: No renal calculi are identified. There is no   evidence for hydronephrosis.  The ureters are of normal course and caliber.       ORGANS: Lack of intravenous contrast limits evaluation of the solid organs   and bowel.  The solid organs are grossly unremarkable.       GI/BOWEL: No bowel obstruction.  No evidence of acute appendicitis.       PELVIS: The bladder and pelvic organs are unremarkable.       PERITONEUM/RETROPERITONEUM: Severe atherosclerotic changes of the imaged   abdominal aorta.  No retroperitoneal adenopathy identified.  No free fluid or   free air identified within the abdomen and pelvis.       BONES/SOFT TISSUES: No acute osseous or soft tissue abnormality identified.    Postoperative changes of right total hip arthroplasty.  Multilevel   degenerative changes of the spine which are moderate to severe.  Note is made   of prior kyphoplasty at the L1 level.  Grade 1 anterolisthesis of L4 on L5   which appears degenerative.  Severe degenerative change of the left hip as   described below.  Degenerative changes of the pubic symphysis and bilateral   sacroiliac joints.  No acute soft tissue abnormality identified.  Note is   made of a small fat attenuating lesion along the distal iliopsoas on the left   consistent with small lipoma.       CT LEFT HIP:       Bones: No acute fracture or dislocation of the left hip identified.  No   suspicious lytic or sclerotic osseous lesions.       Soft Tissue:  No acute soft tissue abnormality at the left hip.  Small   well-circumscribed fat attenuating lesion at the distal left iliopsoas   consistent with small lipoma.       Joint:  Severe osteoarthritis of the left hip with bone on bone articulation,   subchondral sclerosis, and prominent subchondral cystic change.  Marginal   osteophytes also present.  No significant joint effusion identified.           Impression   1. No acute intra-process identified. 2. Severe atherosclerotic disease. 3. No acute fracture identified.  Specifically, no acute fracture of the left   hip identified. 4. Severe osteoarthritis of the left hip. Clinical  impression:  1. Lumbar radiculopathy    2. Spinal stenosis of lumbar region with neurogenic claudication    3. Osteopenia of lumbar spine    4. DDD (degenerative disc disease), lumbar    5. S/P kyphoplasty    6. Cervical spine ankylosis    7. Acquired spondylolisthesis    8. Cervical spinal stenosis    9. Closed fracture of lumbar vertebra, unspecified fracture morphology, unspecified lumbar vertebral level, initial encounter ([facility]    10. Primary osteoarthritis of left knee    11. Morbid obesity with BMI of 45.0-49.9, adult ([facility]    12. Medication monitoring encounter    13. Primary osteoarthritis of left hip        Plan of care: It appears patient's pain may be originating in the left hip or in the lumbar region. She previously had a lumbar epidural steroid injection with good pain relief. It is rather difficult to assess whether the pain is originating in the hip or the lumbar region. We need to do a physical examination on her before proceeding with either lumbar epidural steroid injection or left hip joint injection.   This was discussed with the patient and the patient agrees for the procedure. The procedures and the risks as well as alternate therapies were discussed with the patient. Patient is scheduled for possible epidural steroid injection or left hip joint injection pending on the physical examination. PDMP Monitoring:    Last PDMP Isis Mckeon as Reviewed [facility]):  Review User Review Instant Review Result   Natasha Bowman 3/5/2021  2:47 PM Reviewed PDMP [1]     Counselling/Preventive measures for pain  Control:    [x]  Spine strengthening exercises are discussed with patient in detail. [x] Ill effects of being on chronic pain medications such as sleep disturbances, hormonal changes, withdrawal symptoms,  chronic opioid dependence and tolerance were discussed with patient. I had asked the patient to minimize medication use and utilize pain medications only for uncontrolled rest pain or pain with exertional activities. I advised patient not to self escalate pain medications without consulting with us. At each of patient's future visits we will try to taper pain medications, while adjusting the adjunct medications, and re-evaluating for Physical Therapy to improve spinal and joint strength. We will continue to have discussions to decrease pain medications as tolerated. I also discussed with the patient regarding the dangers of combining narcotic pain medication with tranquilizers, alcohol or illegal drugs or taking the medication any other than prescribed. The dangers including the respiratory depression and death. Patient was told to tell  to all  physicians regarding the medications he is getting from pain clinic. Patient is warned not to take any unprescribed medications over-the-counter medications that can depress breathing . Patient is advised to talk to the pharmacist or physicians if planning to take any over-the-counter medications before  takeing them.  Patient is strongly advised to avoid tranquilizers or  Relaxants for any medications that can depress breathing or recreational drugs. Patient is also advised to tell us if there is any changes in his medications from other physicians. We discussed the same at today's visit and have not been to implement it, as the patient's pain is not under control with current medications. LESI versus left hip injection  Orders Placed This Encounter   Procedures    Lumbar Epidural Steroid Injection/Caudal     Standing Status:   Future     Standing Expiration Date:   3/8/2022    FLUORO FOR SURGICAL PROCEDURES     Standing Status:   Future     Standing Expiration Date:   3/8/2022    MD ARTHROCENTESIS ASPIR&/INJ MAJOR JT/BURSA W/O US    Saline lock IV     Standing Status:   Future     Standing Expiration Date:   9/8/2022     Decision Making Process : Patient's health history and referral records thoroughly reviewed before focused physical examination and discussion with patient. I have spent 20 mins. Over 50% of today's visit is spent on examining the patient and counseling and coordinating the care. Level of complexity of date to be reviewed is Moderate. The chart date reviewed include the following: Imaging Reports. Summary of Care. Time spent reviewing with patient the below reports:   Medication safety, Treatment options. Level of diagnosis and management options of this case is multiple: involving the following management options: Interventions as needed, medication management as appropriate, future visits, activity modification, heat/ice as needed, Urine drug screen as required. [x]The patient's questions were answered to the best of my abilities. This note was created using voice recognition software. There may be inaccuracies of transcription  that are inadvertently overlooked prior to the signature. There is any questions about the transcription please contact me. Return in  4 weeks  with physician / CNP  for further plan of treatment.   Due to the COVID-19 pandemic and the appropriate interventions by [facility], our non-urgent pain management patients will not be seen in the office at this time for their protection and the protection of our staff. To offer continuity of care, their prescriptions will be escribed this month after a careful chart review and review of their OARRS report  Pursuant to the emergency declaration under the [employer] [facility], 1135 waiver authority and the [employer] Act, this Virtual Visit was conducted, with patient's consent, to reduce the patient's risk of exposure to COVID-19 and provide continuity of care for an established patient. Services were prov ided through a video synchronous discussion virtually to substitute for in-person appointment. \"  Documentation:  I communicated with the patient and/or health care decision maker about plan of care  Details of this discussion including any medical advice provided: Total Time: minutes: 21-30 minutes    I affirm this is a Patient Initiated Episode with an Established Patient who has not had a related appointment within my department in the past 7 days or scheduled within the next 24 hours.     Electronically signed by Corinne Cristal, MD on 3/8/2021 at 2:01 PM

## 2021-03-05 NOTE — TELEPHONE ENCOUNTER
Patient leaves message PCC's  and states her back and hip are really hurting; not sure she can make it until Monday (has an appointment with us). I called her back; spoke with her. I advised Dr Rod Handy is off today; I could get him a message, just not sure when he will respond. Patient states her aide is there and put some cream on her back and it has helped. She says she is feeling better. I told her to utilize the urgent care or ER if her pain worsens. Patient agrees. Jo Prim

## 2021-03-06 ENCOUNTER — CARE COORDINATION (OUTPATIENT)
Dept: CARE COORDINATION | Age: 76
End: 2021-03-06

## 2021-03-06 DIAGNOSIS — M16.0 PRIMARY OSTEOARTHRITIS OF BOTH HIPS: ICD-10-CM

## 2021-03-06 DIAGNOSIS — M48.062 SPINAL STENOSIS OF LUMBAR REGION WITH NEUROGENIC CLAUDICATION: Primary | Chronic | ICD-10-CM

## 2021-03-06 DIAGNOSIS — I50.33 ACUTE ON CHRONIC DIASTOLIC HEART FAILURE (HCC): ICD-10-CM

## 2021-03-06 DIAGNOSIS — M25.552 PAIN OF LEFT HIP JOINT: ICD-10-CM

## 2021-03-06 NOTE — CARE COORDINATION
Left detailed VM that PCP would like patient to try home PT. Patient returned call stating that she would like [facility] for home health. LATHA spoke with [facility] who stated they can pull the referral from [address] [place] [address] Rd. LATHA notified patient.

## 2021-03-08 ENCOUNTER — HOSPITAL ENCOUNTER (OUTPATIENT)
Dept: PAIN MANAGEMENT | Age: 76
Discharge: HOME | End: 2021-03-08
Payer: MEDICARE

## 2021-03-08 ENCOUNTER — CARE COORDINATION (OUTPATIENT)
Dept: CARE COORDINATION | Age: 76
End: 2021-03-08

## 2021-03-08 DIAGNOSIS — S32.009A CLOSED FRACTURE OF LUMBAR VERTEBRA, UNSPECIFIED FRACTURE MORPHOLOGY, UNSPECIFIED LUMBAR VERTEBRAL LEVEL, INITIAL ENCOUNTER (HCC): ICD-10-CM

## 2021-03-08 DIAGNOSIS — Z98.890 S/P KYPHOPLASTY: ICD-10-CM

## 2021-03-08 DIAGNOSIS — M48.062 SPINAL STENOSIS OF LUMBAR REGION WITH NEUROGENIC CLAUDICATION: ICD-10-CM

## 2021-03-08 DIAGNOSIS — E66.01 MORBID OBESITY WITH BMI OF 45.0-49.9, ADULT (HCC): ICD-10-CM

## 2021-03-08 DIAGNOSIS — M43.22 CERVICAL SPINE ANKYLOSIS: ICD-10-CM

## 2021-03-08 DIAGNOSIS — M54.16 LUMBAR RADICULOPATHY: Primary | ICD-10-CM

## 2021-03-08 DIAGNOSIS — M43.10 ACQUIRED SPONDYLOLISTHESIS: ICD-10-CM

## 2021-03-08 DIAGNOSIS — Z51.81 MEDICATION MONITORING ENCOUNTER: ICD-10-CM

## 2021-03-08 DIAGNOSIS — M17.12 PRIMARY OSTEOARTHRITIS OF LEFT KNEE: ICD-10-CM

## 2021-03-08 DIAGNOSIS — M51.36 DDD (DEGENERATIVE DISC DISEASE), LUMBAR: ICD-10-CM

## 2021-03-08 DIAGNOSIS — M85.88 OSTEOPENIA OF LUMBAR SPINE: ICD-10-CM

## 2021-03-08 DIAGNOSIS — M16.12 PRIMARY OSTEOARTHRITIS OF LEFT HIP: ICD-10-CM

## 2021-03-08 DIAGNOSIS — M48.02 CERVICAL SPINAL STENOSIS: ICD-10-CM

## 2021-03-08 PROCEDURE — 99213 OFFICE O/P EST LOW 20 MIN: CPT

## 2021-03-08 PROCEDURE — 99214 OFFICE O/P EST MOD 30 MIN: CPT | Performed by: PAIN MEDICINE

## 2021-03-08 ASSESSMENT — ENCOUNTER SYMPTOMS
BOWEL INCONTINENCE: 0
SHORTNESS OF BREATH: 0
BACK PAIN: 1
APNEA: 0
SORE THROAT: 0
ABDOMINAL PAIN: 0
NAUSEA: 0
RECTAL PAIN: 0
CONSTIPATION: 0

## 2021-03-08 NOTE — CARE COORDINATION
ACM received call from [facility] stating that they did not receive the referral for PT.  ISAMARM explained that the person she spoke with on Saturday stated they would pull the referral from [address] [place] [address] Rd. [facility] will [facility] now and call ACM back with any issues.

## 2021-03-09 NOTE — PRE-PROCEDURE INSTRUCTIONS
Called patient  for an injection appointment. Left hip vs LESI @3/23/2021 @1020    Last Covid  Vaccine 2/25/21    Chart reviewed along with medication list. Pre procedure instructions given. Covid-19 screening questions asked. Information given where to be dropped off, a person to remain in the car and which door to enter in. Temp must be taken. Patient acknowledges an understanding.

## 2021-03-10 ENCOUNTER — CARE COORDINATION (OUTPATIENT)
Dept: CARE COORDINATION | Age: 76
End: 2021-03-10

## 2021-03-10 DIAGNOSIS — Z96.653 HISTORY OF TOTAL BILATERAL KNEE REPLACEMENT: ICD-10-CM

## 2021-03-10 RX ORDER — METOPROLOL TARTRATE 50 MG/1
50 TABLET, FILM COATED ORAL 2 TIMES DAILY
Qty: 180 TABLET | Refills: 1 | Status: SHIPPED | OUTPATIENT
Start: 2021-03-10 | End: 2021-09-25

## 2021-03-10 RX ORDER — OXYCODONE HYDROCHLORIDE 5 MG/1
5 TABLET ORAL EVERY 8 HOURS PRN
Qty: 90 TABLET | Refills: 0 | Status: SHIPPED | OUTPATIENT
Start: 2021-03-10 | End: 2021-04-12 | Stop reason: SDUPTHER

## 2021-03-10 NOTE — CARE COORDINATION
Patient called [facility] to ask if [facility] could check on the order for external catheters for her. [facility] spoke with [facility] who stated that patient has been approved, they need to confirm order with patient, a rep will call patient today. [facility] informed patient to expect a call. Patient verified that she did have her initial assessment for home care-PT. Together they decided to hold off on her PT until she has her injection on 3.23.21 with pain management.

## 2021-03-12 ENCOUNTER — CARE COORDINATION (OUTPATIENT)
Dept: CARE COORDINATION | Age: 76
End: 2021-03-12

## 2021-03-12 NOTE — CARE COORDINATION
Ambulatory Care Coordination Note  3/12/2021  CM Risk Score: 10  Charlson 10 Year Mortality Risk Score: 79%     ACC: Keaton Anderson, RN    Summary Note:Spoke with patient who denied any needs from PCP or ACM at this time. Per patient she is feeling much better. She heard from supply company and she will get her external catheters in 5-7 days, covered by insurance. ACM will follow up with patient in 1 week. Care Coordination Interventions    Program Enrollment: Complex Care  Referral from Primary Care Provider: No  Suggested Interventions and Community Resources  Fall Risk Prevention: Completed  Home Health Services: Completed  Transportation Support: Declined  Zone Management Tools: Completed         Goals Addressed                 This Visit's Progress     Conditions and Symptoms   On track     I will schedule office visits, as directed by my provider. I will keep my appointment or reschedule if I have to cancel. I will notify my provider of any barriers to my plan of care. I will follow my Zone Management tool to seek urgent or emergent care. I will notify my provider of any symptoms that indicate a worsening of my condition. Barriers: overwhelmed by complexity of regimen  Plan for overcoming my barriers: work with ACM  Confidence: 9/10  Anticipated Goal Completion Date: 12.20.20              Prior to Admission medications    Medication Sig Start Date End Date Taking? Authorizing Provider   metoprolol tartrate (LOPRESSOR) 50 MG tablet Take 1 tablet by mouth 2 times daily 3/10/21   Donny Hill MD   oxyCODONE (ROXICODONE) 5 MG immediate release tablet Take 1 tablet by mouth every 8 hours as needed for Pain for up to 30 days.  3/10/21 4/9/21  Torsten Osman MD   Catheters (STANDARD CARE EXTERNAL CATH) MISC 1 each by Does not apply route daily 3/2/21   Nathanael Law MD   Lidocaine HCl (ASPERCREME LIDOCAINE) 4 % LIQD Apply topically    Historical Provider, MD   gabapentin (NEURONTIN) 100 MG capsule Take 1 capsule by mouth 3 times daily for 30 days. 2/16/21 3/18/21  Court Case, APRN - CNP   amLODIPine (NORVASC) 5 MG tablet Take 1 tablet by mouth daily 1/3/21   Kd Muñoz MD   DULoxetine (CYMBALTA) 30 MG extended release capsule TAKE 1 CAPSULE TWICE A DAY 12/21/20   Kd Muñoz MD   donepezil (ARICEPT) 5 MG tablet Take 1 tablet by mouth nightly 12/1/20   Kd Muñoz MD   Catheters (STANDARD CARE EXTERNAL CATH) MISC 1 Device by Does not apply route daily 11/30/20   Kd Muñoz MD   neomycin-polymyxin-dexameth [id number] [address] [place] [address]  7/13/20   Historical Provider, MD   levocetirizine (XYZAL) 5 MG tablet take 1 tablet by mouth once daily AT NIGHT 8/24/20   Dwayne Brittle, PA-C   naproxen sodium (ANAPROX) 220 MG tablet Take 220 mg by mouth 2 times daily (with meals)    Historical Provider, MD   fluticasone (FLONASE) 50 MCG/ACT nasal spray instill 2 sprays into each nostril once daily 5/15/20   Historical Provider, MD   calcium carbonate (OYSTER SHELL CALCIUM 500 MG) 1250 (500 Ca) MG tablet Take 1 tablet by mouth daily With magnesium, zinc    Historical Provider, MD   albuterol sulfate  (90 Base) MCG/ACT inhaler Inhale 2 puffs into the lungs 4 times daily as needed for Wheezing 4/20/20   Kd Muñoz MD   ofloxacin (OCUFLOX) 0.3 % solution Place into both eyes daily as needed  4/13/19   Historical Provider, MD   docusate sodium (COLACE) 100 MG capsule Take 100 mg by mouth daily as needed for Constipation    Historical Provider, MD   Misc.  Devices [facility]) MISC 1 each by Does not apply route daily Upright walker with wheels and seat 11/12/18   Julieta Gan MD   aspirin 81 MG tablet Take 81 mg by mouth daily    Historical Provider, MD       Future Appointments   Date Time Provider Mayo Betts   3/22/2021 11:30 AM Antonette Burton MD [facility] HEART/VAS [facility]   3/25/2021 10:00 AM Julieta Gan MD [address]   4/12/2021  1:00 PM Court Case, APRN - CNP [address]      and   General Assessment    Do you have any symptoms that are causing concern?: No

## 2021-03-16 ENCOUNTER — CARE COORDINATION (OUTPATIENT)
Dept: CARE COORDINATION | Age: 76
End: 2021-03-16

## 2021-03-16 ENCOUNTER — TELEPHONE (OUTPATIENT)
Dept: PAIN MANAGEMENT | Age: 76
End: 2021-03-16

## 2021-03-18 ENCOUNTER — CARE COORDINATION (OUTPATIENT)
Dept: CARE COORDINATION | Age: 76
End: 2021-03-18

## 2021-03-18 NOTE — CARE COORDINATION
Patient called stating her home O2 is making a weird noise. [facility] encouraged her to call her supply company right away. She will call [facility] back if she is unable to get any help with it. Per patient she is going to follow up with [facility] regarding external catheters.

## 2021-03-23 ENCOUNTER — TELEPHONE (OUTPATIENT)
Dept: PAIN MANAGEMENT | Age: 76
End: 2021-03-23

## 2021-03-23 NOTE — TELEPHONE ENCOUNTER
Spoke with patient and reviewed pre-procedure instructions. Reviewed medications, ride home, mask protocol, and answered questions.  Verbalized understanding

## 2021-03-24 ENCOUNTER — CARE COORDINATION (OUTPATIENT)
Dept: CARE COORDINATION | Age: 76
End: 2021-03-24

## 2021-03-24 NOTE — CARE COORDINATION
Ambulatory Care Coordination Note  3/24/2021  CM Risk Score: 10  Charlson 10 Year Mortality Risk Score: 79%     ACC: Loreto Cloud RN    Summary Note: Spoke with patient who denied any needs from PCP or ACM at this time. Per patient she received a new Oxygen tank. She also received her external catheters. She has a pain management procedure tomorrow. ACM will graduate at this time but will be happy to assist patient with any needs in the future. Precautions reviewed for COVID-19 per CDC  Wash hands often with soap and water for at least 20 seconds  When soap is not available us hand  with at least 70% alcohol  Avoid touching your face  Avoid close contact with others  Maintain 6 feet distance if possible    If you are sick:  Cover mouth and nose when coughing or sneezing and then wash hands  Wear a mask when around others  Clean and disinfect surfaces often with soap or detergent and water. Care Coordination Interventions    Program Enrollment: Complex Care  Referral from Primary Care Provider: No  Suggested Interventions and Community Resources  Fall Risk Prevention: Completed  Home Health Services: Completed  Transportation Support: Declined  Zone Management Tools: Completed         Goals Addressed                 This Visit's Progress     COMPLETED: Conditions and Symptoms   On track     I will schedule office visits, as directed by my provider. I will keep my appointment or reschedule if I have to cancel. I will notify my provider of any barriers to my plan of care. I will follow my Zone Management tool to seek urgent or emergent care. I will notify my provider of any symptoms that indicate a worsening of my condition. Barriers: overwhelmed by complexity of regimen  Plan for overcoming my barriers: work with ACM  Confidence: 9/10  Anticipated Goal Completion Date: 12.20.20              Prior to Admission medications    Medication Sig Start Date End Date Taking?  Authorizing daily as needed for Constipation    Historical Provider, MD   Misc.  Devices [facility]) MISC 1 each by Does not apply route daily Upright walker with wheels and seat 11/12/18   Amalia Cervantes MD   aspirin 81 MG tablet Take 81 mg by mouth daily    Historical Provider, MD       Future Appointments   Date Time Provider Mayo Patelisti   3/25/2021 10:00 AM Amalia Cervantes MD [address]   4/12/2021  1:00 PM Tsering Ardon APRN - CNP [address]   4/26/2021 11:00 AM Lupe Marks MD [facility]     ,   Congestive Heart Failure Assessment       No patient-reported symptoms      Symptoms:     Symptom course: stable  Weight trend: stable  Salt intake watch compared to last visit: stable     ,   COPD Assessment    Does the patient understand envrionmental exposure?: Yes  Is the patient able to verbalize Rescue vs. Long Acting medications?: Yes     No patient-reported symptoms         Symptoms:         and   General Assessment    Do you have any symptoms that are causing concern?: No

## 2021-03-25 ENCOUNTER — HOSPITAL ENCOUNTER (OUTPATIENT)
Dept: GENERAL RADIOLOGY | Age: 76
Discharge: HOME | End: 2021-03-27
Payer: MEDICARE

## 2021-03-25 ENCOUNTER — HOSPITAL ENCOUNTER (OUTPATIENT)
Dept: PAIN MANAGEMENT | Age: 76
Discharge: HOME | End: 2021-03-25
Payer: MEDICARE

## 2021-03-25 VITALS
DIASTOLIC BLOOD PRESSURE: 63 MMHG | SYSTOLIC BLOOD PRESSURE: 150 MMHG | WEIGHT: 270 LBS | BODY MASS INDEX: 46.1 KG/M2 | OXYGEN SATURATION: 96 % | TEMPERATURE: 98 F | HEIGHT: 64 IN | HEART RATE: 53 BPM | RESPIRATION RATE: 16 BRPM

## 2021-03-25 DIAGNOSIS — M54.16 LUMBAR RADICULOPATHY: Primary | ICD-10-CM

## 2021-03-25 DIAGNOSIS — M54.16 LUMBAR RADICULOPATHY: ICD-10-CM

## 2021-03-25 DIAGNOSIS — M48.062 SPINAL STENOSIS OF LUMBAR REGION WITH NEUROGENIC CLAUDICATION: ICD-10-CM

## 2021-03-25 DIAGNOSIS — M51.36 DDD (DEGENERATIVE DISC DISEASE), LUMBAR: ICD-10-CM

## 2021-03-25 DIAGNOSIS — M85.88 OSTEOPENIA OF LUMBAR SPINE: ICD-10-CM

## 2021-03-25 DIAGNOSIS — Z98.890 S/P KYPHOPLASTY: ICD-10-CM

## 2021-03-25 PROCEDURE — 62323 NJX INTERLAMINAR LMBR/SAC: CPT | Performed by: PAIN MEDICINE

## 2021-03-25 PROCEDURE — 3209999900 FLUORO FOR SURGICAL PROCEDURES

## 2021-03-25 PROCEDURE — 62323 NJX INTERLAMINAR LMBR/SAC: CPT

## 2021-03-25 PROCEDURE — 6360000002 HC RX W HCPCS: Performed by: PAIN MEDICINE

## 2021-03-25 RX ORDER — TRIAMCINOLONE ACETONIDE 40 MG/ML
INJECTION, SUSPENSION INTRA-ARTICULAR; INTRAMUSCULAR
Status: COMPLETED | OUTPATIENT
Start: 2021-03-25 | End: 2021-03-25

## 2021-03-25 RX ADMIN — TRIAMCINOLONE ACETONIDE 80 MG: 40 INJECTION, SUSPENSION INTRA-ARTICULAR; INTRAMUSCULAR at 10:40

## 2021-03-25 ASSESSMENT — PAIN DESCRIPTION - DESCRIPTORS: DESCRIPTORS: ACHING;SHARP

## 2021-03-25 ASSESSMENT — PAIN DESCRIPTION - FREQUENCY: FREQUENCY: CONTINUOUS

## 2021-03-25 ASSESSMENT — PAIN DESCRIPTION - ORIENTATION: ORIENTATION: RIGHT;LEFT

## 2021-03-25 ASSESSMENT — PAIN - FUNCTIONAL ASSESSMENT: PAIN_FUNCTIONAL_ASSESSMENT: 0-10

## 2021-03-25 NOTE — PROCEDURES
interfering with activities of daily living we decided to proceed with lumbar epidural steroid injection. The procedure and risks were discussed with the patient and an informed consent was obtained  Current Pain Assessment  Pain Assessment  Pain Assessment: 0-10  Pain Level: 7  Patient's Stated Pain Goal: No pain  Pain Type: Chronic pain  Pain Location: Back  Pain Orientation: Right, Left  Pain Radiating Towards: left buttock, left leg  Pain Descriptors: Aching, Sharp  Pain Frequency: Continuous  Pain Onset: On-going  Clinical Progression: Gradually worsening  Effect of Pain on Daily Activities: pain increases with activity  Functional Pain Assessment: Prevents or interferes some active activities and ADLs  Non-Pharmaceutical Pain Intervention(s): Repositioned, Rest, Cold applied, Heat applied  Response to Pain Intervention: (3-2020 LESI helped 70% improved)  POSS Score (Patient Ctrl Analgesia): 1     Procedure:  . Patient's vital signs including BP, EKG and SaO2 were monitored by the RN and they remained stable during the procedure. A meaningful communication was kept up with the patient throughout  the procedure. The patient is placed in prone position. Skin over the back was prepped and draped in sterile manner. Then using fluoroscopy the L5, S1 interspace was observed and the skin and deep tissues in the left paramedian area were infiltrated with 6 ml of 1% lidocaine. The #20-gauge, 3-1/2 inch Tuohy needle was inserted through the skin wheal and the epidural space was identified using loss of resistance technique with normal saline. This was confirmed with AP and lateral views using fluoroscopy   Contrast was not used due to allergy . Then after negative aspiration a total of 80 mg of triamcinolone with 8 ml of normal saline was injected into the epidural space. The needle is removed and a Band-Aid was placed over the needle insertion site.     Patient's vital signs remained stable and the patient tolerated the procedure well. The patient was discharged home in stable condition and will be followed in the pain clinic in the next few weeks for further planning.     Electronically signed by Tanja Farley MD on 3/25/2021 at 10:46 AM

## 2021-03-25 NOTE — PROGRESS NOTES
Discharge criteria met. Patient alert and oriented x3  Post procedure dressing dry and intact. Sensory and motor function intact as per pre-procedure. Instructions and follow up reviewed with pt at patient at discharge. Patient discharged per wheelchair.  Son lino Hooper  @ [phone]

## 2021-03-29 ENCOUNTER — TELEPHONE (OUTPATIENT)
Dept: PAIN MANAGEMENT | Age: 76
End: 2021-03-29

## 2021-04-12 ENCOUNTER — HOSPITAL ENCOUNTER (OUTPATIENT)
Dept: PAIN MANAGEMENT | Age: 76
Discharge: HOME | End: 2021-04-12
Payer: MEDICARE

## 2021-04-12 DIAGNOSIS — M54.16 LUMBAR RADICULOPATHY: ICD-10-CM

## 2021-04-12 DIAGNOSIS — M85.88 OSTEOPENIA OF LUMBAR SPINE: ICD-10-CM

## 2021-04-12 DIAGNOSIS — M51.36 DDD (DEGENERATIVE DISC DISEASE), LUMBAR: ICD-10-CM

## 2021-04-12 DIAGNOSIS — M48.02 CERVICAL SPINAL STENOSIS: ICD-10-CM

## 2021-04-12 DIAGNOSIS — M43.22 CERVICAL SPINE ANKYLOSIS: ICD-10-CM

## 2021-04-12 DIAGNOSIS — M80.00XS AGE-RELATED OSTEOPOROSIS WITH CURRENT PATHOLOGICAL FRACTURE, SEQUELA: ICD-10-CM

## 2021-04-12 DIAGNOSIS — M79.7 FIBROMYALGIA: ICD-10-CM

## 2021-04-12 DIAGNOSIS — Z51.81 MEDICATION MONITORING ENCOUNTER: Chronic | ICD-10-CM

## 2021-04-12 DIAGNOSIS — Z98.890 S/P KYPHOPLASTY: ICD-10-CM

## 2021-04-12 DIAGNOSIS — Z79.891 CHRONIC PRESCRIPTION OPIATE USE: Chronic | ICD-10-CM

## 2021-04-12 DIAGNOSIS — Z96.653 HISTORY OF TOTAL BILATERAL KNEE REPLACEMENT: ICD-10-CM

## 2021-04-12 DIAGNOSIS — M48.062 SPINAL STENOSIS OF LUMBAR REGION WITH NEUROGENIC CLAUDICATION: Primary | Chronic | ICD-10-CM

## 2021-04-12 PROCEDURE — 99213 OFFICE O/P EST LOW 20 MIN: CPT

## 2021-04-12 PROCEDURE — 99442 PR PHYS/QHP TELEPHONE EVALUATION 11-20 MIN: CPT | Performed by: NURSE PRACTITIONER

## 2021-04-12 RX ORDER — OXYCODONE HYDROCHLORIDE 5 MG/1
5 TABLET ORAL 4 TIMES DAILY PRN
Qty: 120 TABLET | Refills: 0 | Status: SHIPPED | OUTPATIENT
Start: 2021-04-16 | End: 2021-05-14 | Stop reason: SDUPTHER

## 2021-04-12 ASSESSMENT — ENCOUNTER SYMPTOMS
CONSTIPATION: 1
RESPIRATORY NEGATIVE: 1
BACK PAIN: 1

## 2021-04-12 NOTE — PROGRESS NOTES
Rick 89 PROGRESS NOTE      Patient  completed []  video visit   [x]   phone call:     18    Minutes :       [x]    to  review Medication Agreement    [x]  Follow up after procedure   []  Discuss treatment options      Location:  Provider:  working from    [x]    home    []   [facility] - JAUN CHIN ,   patient at home       Chief Complaint: low back pain    She had lumbar epidural injection L5, S1 on 3- with 20% relief. She c/o leg cramps. And numbness in her feet. She has history of  Kyphoplasty. She just started PT., Her activity is unchanged. She is up tot he bathroom frequently. She has had no Ed visits. Back Pain  This is a chronic problem. The problem occurs constantly. The problem is unchanged. The pain is present in the lumbar spine. The quality of the pain is described as shooting. Radiates to: down legs sometime. The pain is at a severity of 9/10. The pain is severe. The pain is the same all the time. Exacerbated by: laying in bed, activity. Associated symptoms include numbness. (Cramps in legs) Risk factors include menopause and sedentary lifestyle. She has tried analgesics, heat and ice for the symptoms.              Treatment goals:  Functional status: feel better      Aberrancy:   Any alcoholic beverages    no        Any illegal drugs   no    Analgesia:    9                 Adverse  Effects :      ADL;s :not active    Data:    When was thelast UDS:     12-       Was the UDS appropriate:  [x] yes []   no      Record/Diagnostics Review:      As above, I did review the imaging       12/14/2020  1:43 PM - Jordin, [facility] Incoming Lab Results From [facility]    Component Value Ref Range & Units Status Collected Lab   Pain Management Drug Panel Interp, Urine Consistent   Final 12/10/2020 12:32 PM [facility]   (NOTE)   ________________________________________________________________   DRUGS EXPECTED:   OXYCODONE [TODAY] ________________________________________________________________   CONSISTENT with medications provided:   OXYCODONE : based on oxycodone, noroxycodone, noroxymorphone   ________________________________________________________________   INTERPRETIVE INFORMATION: Targeted drug profile Interp   Interpretation depends on accuracy and completeness of patient   medication information submitted by client. 6-Acetylmorphine, Ur Not Detected   Chaya         EXAMINATION:   3 XRAY VIEWS OF THE LUMBAR SPINE; THREE XRAY VIEWS OF THE THORACIC SPINE       2/11/2020 1:09 pm       COMPARISON:   CT lumbar spine from 09/28/2018       HISTORY:   ORDERING SYSTEM PROVIDED HISTORY: Osteopenia of lumbar spine   TECHNOLOGIST PROVIDED HISTORY:   Reason for Exam: SEVERE BACK PAIN UPPER AND LOWER, FALL IN DECEMBER 2019   Acuity: Unknown   Type of Exam: Ongoing       FINDINGS:   Thoracic spine: Demineralized skeleton and multifocal pulmonary opacities   somewhat limits the evaluation of the thoracic spine.  Within this limit,   multilevel degenerative changes of thoracic spine.  Intact pedicles on   frontal view.  Vertebral body heights appear maintained.       Lumbar spine: L1 compression fracture with vertebral body augmentation   cement.  No new compression fracture.  Minimal anterolisthesis of L4 on L5   measuring 0.6 cm.  Disc degeneration with endplate osteophyte formation. Facet arthropathy diffusely.       Possible abdominal aortic aneurysm on radiography measuring 3.0 cm AP.    Correlation with prior lumbar spine CT.           Impression   Multilevel disc degeneration throughout the lumbar spine without evidence of   acute abnormality.  If concern for acute fracture exists, CT versus MRI   should be considered.       Probable abdominal aortic aneurysm.  Nonemergent CT or ultrasound correlation   should be considered.                     Pill count: appropriate    fill date :4-  Morphine equivalent dose as reported on Upright walker with wheels and seat, Disp: 1 each, Rfl: 0    Family History   Problem Relation Age of Onset    Stomach Cancer Brother         stomach    High Blood Pressure Mother     Heart Disease Sister         irregular heartbeat       Social History     Socioeconomic History    Marital status:      Spouse name: Not on file    Number of children: Not on file    Years of education: Not on file    Highest education level: Not on file   Occupational History    Not on file   Social Needs    Financial resource strain: Not hard at all   [place] insecurity     Worry: Never true     Inability: Never true   [employer] needs     Medical: No     Non-medical: No   Tobacco Use    Smoking status: Former Smoker     Packs/day: 1.00     Years: 18.00     Pack years: 18.00     Quit date: 1993     Years since quittin.9    Smokeless tobacco: Never Used   Substance and Sexual Activity    Alcohol use: No    Drug use: No    Sexual activity: Not Currently   Lifestyle    Physical activity     Days per week: 0 days     Minutes per session: Not on file    Stress: Only a little   Relationships    Social connections     Talks on phone: Twice a week     Gets together: Twice a week     Attends [employer] service: Not on file     Active member of club or organization: Not on file     Attends meetings of clubs or organizations: Not on file     Relationship status:     Intimate partner violence     Fear of current or ex partner: Not on file     Emotionally abused: Not on file     Physically abused: Not on file     Forced sexual activity: Not on file   Other Topics Concern    Not on file   Social History Narrative    Not on file         Review of Systems:  Review of Systems   Constitution: Positive for malaise/fatigue. HENT: Negative. Eyes: Positive for visual disturbance. Cardiovascular: Negative. Respiratory: Negative. On oxygen   Endocrine: Negative.     Hematologic/Lymphatic: us.  At each of patient's future visits we will try to taper pain medications, while adjusting the adjunct medications, and re-evaluating for Physical Therapy to improve spinal andjoint strength. We will continue to have discussions to decrease pain medications as tolerated. Counseled patient on effects their pain medication and /or their medical condition mayhave on their  ability to drive or operate machinery. Instructed not to drive or operate machinery if drowsy     I also discussed with the patient regarding the dangers of combining narcotic pain medication with tranquilizers, alcohol or illegal drugs or taking the medication any way other than prescribed. The dangers were discussed  including respiratory depression and death. Patient was told to tell  all  physicians regarding the medications he is getting from pain clinic. Patient is warned not to take any unprescribed medications over-the-countermedications that can depress breathing . Patient is advised to talk to the pharmacist or physicians if planning to take any over-the-counter medications before  takeing them. Patient is strongly advised to avoid tranquilizers or  relaxants, illegal drugs  or any medications that can depress breathing  Patient is also advised to tell us if there is any changes in their medications from other physicians. 1. Will increase oxycodone to 4 times a day as pain levels are still high, she does not want a pain medication with tylenol in it and she does not want to take a muscle relaxant    She  stateshas # 16 oxycodone tabs left, will start taking 4 times a day.     TREATMENT OPTIONS:       Medication Agreement Requirements Met  Continue Opioid therapy  Script written for  oxycoodne  Follow up appointment made

## 2021-04-26 ENCOUNTER — OFFICE VISIT (OUTPATIENT)
Dept: VASCULAR SURGERY | Age: 76
End: 2021-04-26
Payer: MEDICARE

## 2021-04-26 VITALS
OXYGEN SATURATION: 96 % | HEIGHT: 64 IN | HEART RATE: 60 BPM | TEMPERATURE: 97.8 F | DIASTOLIC BLOOD PRESSURE: 62 MMHG | RESPIRATION RATE: 18 BRPM | WEIGHT: 268 LBS | BODY MASS INDEX: 45.75 KG/M2 | SYSTOLIC BLOOD PRESSURE: 140 MMHG

## 2021-04-26 DIAGNOSIS — M79.89 LEG SWELLING: ICD-10-CM

## 2021-04-26 DIAGNOSIS — I71.40 AAA (ABDOMINAL AORTIC ANEURYSM) WITHOUT RUPTURE: Primary | ICD-10-CM

## 2021-04-26 PROCEDURE — 99214 OFFICE O/P EST MOD 30 MIN: CPT | Performed by: SURGERY

## 2021-04-26 ASSESSMENT — ENCOUNTER SYMPTOMS
ABDOMINAL PAIN: 0
CONSTIPATION: 1
COUGH: 0
CHEST TIGHTNESS: 0
DIARRHEA: 0
BACK PAIN: 1
COLOR CHANGE: 0
EYE DISCHARGE: 0
NAUSEA: 0
SHORTNESS OF BREATH: 0

## 2021-04-26 NOTE — PROGRESS NOTES
Division of Vascular Surgery        Follow Up      AAA    Chief Complaint:      AAA    History of Present Illness:      Asutin Dixon is a 67 y.o. woman presents for yearly surveillance of an incidently found 3 cm abdominal aortic aneurysm. She denies any severe abdominal or new back pain. She uses a specialized walker to get around due to chronic back pain. She does have some swelling in her lower extremities left greater then right. Denies symptoms suggestive of claudication or ischemic rest pain, no open wounds/sores or discoloration in her feet. Medical History:     Past Medical History:   Diagnosis Date    Abnormality of rib determined by X-ray 1/28/2016    Acute cystitis without hematuria 6/16/2018    Acute exacerbation of chronic bronchitis (HCC)     Acute on chronic diastolic heart failure ([facility]) 1/28/2016    Adjustment disorder with mixed anxiety and depressed mood 6/26/2015    Mild     Anemia 3/5/2014    Back pain     Bronchiectasis with acute lower respiratory infection ([facility]) 8/16/2017    Bronchitis     Chronic bronchitis (HCC) 1/28/2016    Chronic cough 7/23/2018    Degenerative joint disease (DJD) of hip 5/14/2015    Dyslipidemia 3/5/2014    Encephalopathy acute 5/29/2016    Essential hypertension 1/28/2016    Fibromyalgia     GERD (gastroesophageal reflux disease)     History of total bilateral knee replacement 5/28/2016    Hx of blood clots     left leg and lung    Hyperlipidemia     Hypertension     Incontinence of urine     Irregular heartbeat     DR. Fern Patiño Medication monitoring encounter 6/13/2018    Morbid obesity with BMI of 45.0-49.9, adult ([facility]) 1/28/2016    Obstructive sleep apnea syndrome     Osteoarthritis     Primary osteoarthritis of left knee 5/27/2016    PVC (premature ventricular contraction) 1/28/2016    S/P right and left heart catheterization 10/2/2015    Sleep apnea     no machine    SOB (shortness of breath)     Spinal stenosis of lumbar region with neurogenic claudication 5/13/2013    Supplemental oxygen dependent 12/8/2017    Urine frequency     UTI (urinary tract infection)     hospitalized early july 2014 for kidney infection     Surgical History:     Past Surgical History:   Procedure Laterality Date    BRONCHOSCOPY Left 8/16/2017    BRONCHOSCOPY ALVEOLAR LAVAGE LOWER LOBE performed by Tegan Castelan MD at [address]  x 3    no stents    COLONOSCOPY  4 28 14    polyps biopsies     FOOT SURGERY Left     calcium deposit removal from top of foot    HYSTERECTOMY      JOINT REPLACEMENT Bilateral 5/27/2016    bilat total knees    NERVE BLOCK  5/13/2013    caudal celestone 6mg    NERVE BLOCK  5/28/13    Caudal #2, Celestone 9mg    NERVE BLOCK  2/14/14    rt hip inj celestone 9 mg    NERVE BLOCK  07/14/2014    caudal# 3- celestone 9 mg    NERVE BLOCK  7-21-14    caudal epidural #2, decadron 7mg, fentanyl 25mcg    NERVE BLOCK  10/2/14    duramorph 1.5  decadron 5mg    NERVE BLOCK  11/9/2015    caudal# 1 celestone 9mg    NERVE BLOCK  11/16/15    caudal #2  decadron 10mg    NERVE BLOCK  11/23/15    duramorph 1mg celestone 9mg    NERVE BLOCK  03/28/2018    CAUDAL EPIDURAL STEROID BLOCK  DECADRON 10 MG     NERVE BLOCK  05/23/2018    caudal # decadron 7mg    NERVE BLOCK  08/28/2018    natalia transforminal # 1, decadron 10mg gadoteridol, LONG NEEDLES    MD PERQ VERT AGMNTJ CAVITY CRTJ UNI/BI CANNULJ LMBR N/A 10/29/2018    KYPHOPLASTY L1 performed by Taco Paz MD at [address] Right 5/14/15    total hip replacement     Family History:     Family History   Problem Relation Age of Onset    Stomach Cancer Brother         stomach    High Blood Pressure Mother     Heart Disease Sister         irregular heartbeat     Allergies:       Rosuvastatin calcium, Statins, Bactrim [sulfamethoxazole-trimethoprim], Caffeine, Dye [iodides], Food, Ioxaglate, Tomato, Dicloxacillin, and Pcn [penicillins]  Medications:      Current Outpatient Medications   Medication Sig Dispense Refill    oxyCODONE (ROXICODONE) 5 MG immediate release tablet Take 1 tablet by mouth 4 times daily as needed for Pain for up to 30 days. 120 tablet 0    metoprolol tartrate (LOPRESSOR) 50 MG tablet Take 1 tablet by mouth 2 times daily 180 tablet 1    Catheters (STANDARD CARE EXTERNAL CATH) MISC 1 each by Does not apply route daily 5 each 1    Lidocaine HCl (ASPERCREME LIDOCAINE) 4 % LIQD Apply topically      amLODIPine (NORVASC) 5 MG tablet Take 1 tablet by mouth daily 30 tablet 3    DULoxetine (CYMBALTA) 30 MG extended release capsule TAKE 1 CAPSULE TWICE A  capsule 3    donepezil (ARICEPT) 5 MG tablet Take 1 tablet by mouth nightly 90 tablet 1    Catheters (STANDARD CARE EXTERNAL CATH) MISC 1 Device by Does not apply route daily 30 each 2    neomycin-polymyxin-dexameth 3.5-78475-0.1 OINT       levocetirizine (XYZAL) 5 MG tablet take 1 tablet by mouth once daily AT NIGHT 30 tablet 2    naproxen sodium (ANAPROX) 220 MG tablet Take 220 mg by mouth 2 times daily (with meals)      fluticasone (FLONASE) 50 MCG/ACT nasal spray instill 2 sprays into each nostril once daily      calcium carbonate (OYSTER SHELL CALCIUM 500 MG) 1250 (500 Ca) MG tablet Take 1 tablet by mouth daily With magnesium, zinc      albuterol sulfate  (90 Base) MCG/ACT inhaler Inhale 2 puffs into the lungs 4 times daily as needed for Wheezing 1 Inhaler 2    ofloxacin (OCUFLOX) 0.3 % solution Place into both eyes daily as needed       docusate sodium (COLACE) 100 MG capsule Take 100 mg by mouth daily as needed for Constipation      Misc. Devices (WALKER) MISC 1 each by Does not apply route daily Upright walker with wheels and seat 1 each 0    aspirin 81 MG tablet Take 81 mg by mouth daily      gabapentin (NEURONTIN) 100 MG capsule Take 1 capsule by mouth 3 times daily for 30 days.  90 capsule 0     No current facility-administered medications for this visit. Social History:     Tobacco:    reports that she quit smoking about 27 years ago. She has a 18.00 pack-year smoking history. She has never used smokeless tobacco.  Alcohol:      reports no history of alcohol use. Drug Use:  reports no history of drug use. Occupation:  Retired/disabled, worked in a [employer] for over 30 years    Review of Systems:     Review of Systems   Constitutional: Negative for activity change, chills, fatigue and fever. HENT: Negative for congestion. Eyes: Negative for discharge. Respiratory: Negative for cough, chest tightness and shortness of breath. Cardiovascular: Positive for leg swelling (left leg ). Negative for chest pain and palpitations. Gastrointestinal: Positive for constipation. Negative for abdominal pain, diarrhea and nausea. Genitourinary: Negative for difficulty urinating. Musculoskeletal: Positive for arthralgias, back pain, gait problem and myalgias. Skin: Negative for color change, pallor, rash and wound. Neurological: Positive for weakness and numbness. Negative for dizziness. Psychiatric/Behavioral: Negative for agitation. Physical Exam:     Vitals:  BP (!) 140/62 (Site: Left Upper Arm, Position: Sitting, Cuff Size: Large Adult)   Pulse 60   Temp 97.8 °F (36.6 °C) (Temporal)   Resp 18   Ht 5' 4\" (1.626 m)   Wt 268 lb (121.6 kg)   SpO2 96%   BMI 46.00 kg/m²     Physical Exam  Constitutional:       General: She is not in acute distress. Appearance: She is well-developed and well-groomed. HENT:      Head: Normocephalic. Eyes:      General:         Right eye: No discharge. Left eye: No discharge. Extraocular Movements: Extraocular movements intact. Conjunctiva/sclera: Conjunctivae normal.   Neck:      Musculoskeletal: Full passive range of motion without pain. Vascular: No carotid bruit. Cardiovascular:      Rate and Rhythm: Normal rate and regular rhythm. Pulses:           Dorsalis pedis pulses are 1+ on the right side and 1+ on the left side. Posterior tibial pulses are 1+ on the right side and 1+ on the left side. Pulmonary:      Effort: Pulmonary effort is normal. No respiratory distress. Abdominal:      Palpations: Abdomen is soft. There is no pulsatile mass. Tenderness: There is no abdominal tenderness. Musculoskeletal:         General: Swelling present. Right lower leg: She exhibits swelling (mild). She exhibits no tenderness. Edema present. Left lower leg: She exhibits swelling (mild). She exhibits no tenderness. Edema present. Right foot: Normal capillary refill. No tenderness or swelling. Left foot: Normal capillary refill. No tenderness or swelling. Feet:      Right foot:      Skin integrity: No ulcer or skin breakdown. Left foot:      Skin integrity: No ulcer or skin breakdown. Skin:     General: Skin is warm and dry. Capillary Refill: Capillary refill takes less than 2 seconds. Neurological:      Mental Status: She is alert and oriented to person, place, and time. GCS: GCS eye subscore is 4. GCS verbal subscore is 5. GCS motor subscore is 6. Sensory: Sensation is intact. Motor: Weakness present. Psychiatric:         Mood and Affect: Mood normal.         Speech: Speech normal.         Behavior: Behavior normal.         Thought Content:  Thought content normal.       Imaging/Labs:         Assessment and Plan:     Abdominal Aortic Aneurysm, lower extremity swelling  · After reviewing images from last year and this year, appears that true size of her aorta is under 2 cm, previous measurements were taken in the proximal aorta (above renals)  · No need for further surveillance given essentially normal appearing infrarenal abdominal aorta  · Compression stockings for chronic lower extremity swelling  · Follow up as needed    Electronically signed by Michelle Dillon MD on 5/2/2021

## 2021-04-29 NOTE — TELEPHONE ENCOUNTER
Patient requesting a referral to a different home health agency due to [facility] not having an aide available. She requested [facility], LATHA spoke with Jacki who reported that they do not have any aides available in her area. LATHA called patient back and she stated she may have [employer]ers come out or pay someone from her [employer]. ACM will let patient know if there are any agencies with more openings for aide services.

## 2021-05-06 ENCOUNTER — CARE COORDINATION (OUTPATIENT)
Dept: CARE COORDINATION | Age: 76
End: 2021-05-06

## 2021-05-06 NOTE — CARE COORDINATION
Patient called stating stating that she is having increased anxiety. Mostly when walking. ACM recommended an appointment with PCP. Patient would prefer a VV and will call to schedule.

## 2021-05-14 ENCOUNTER — HOSPITAL ENCOUNTER (OUTPATIENT)
Dept: PAIN MANAGEMENT | Age: 76
Discharge: HOME | End: 2021-05-14
Payer: MEDICARE

## 2021-05-14 DIAGNOSIS — M85.88 OSTEOPENIA OF LUMBAR SPINE: ICD-10-CM

## 2021-05-14 DIAGNOSIS — M79.7 FIBROMYALGIA: ICD-10-CM

## 2021-05-14 DIAGNOSIS — M51.36 DDD (DEGENERATIVE DISC DISEASE), LUMBAR: ICD-10-CM

## 2021-05-14 DIAGNOSIS — Z51.81 MEDICATION MONITORING ENCOUNTER: Chronic | ICD-10-CM

## 2021-05-14 DIAGNOSIS — Z79.891 CHRONIC PRESCRIPTION OPIATE USE: Chronic | ICD-10-CM

## 2021-05-14 DIAGNOSIS — S32.010S CLOSED COMPRESSION FRACTURE OF L1 VERTEBRA, SEQUELA: ICD-10-CM

## 2021-05-14 DIAGNOSIS — M16.0 PRIMARY OSTEOARTHRITIS OF BOTH HIPS: ICD-10-CM

## 2021-05-14 DIAGNOSIS — Z96.653 HISTORY OF TOTAL BILATERAL KNEE REPLACEMENT: ICD-10-CM

## 2021-05-14 DIAGNOSIS — M80.00XS AGE-RELATED OSTEOPOROSIS WITH CURRENT PATHOLOGICAL FRACTURE, SEQUELA: ICD-10-CM

## 2021-05-14 DIAGNOSIS — M48.02 CERVICAL SPINAL STENOSIS: ICD-10-CM

## 2021-05-14 DIAGNOSIS — Z98.890 S/P KYPHOPLASTY: ICD-10-CM

## 2021-05-14 DIAGNOSIS — M48.062 SPINAL STENOSIS OF LUMBAR REGION WITH NEUROGENIC CLAUDICATION: Primary | Chronic | ICD-10-CM

## 2021-05-14 DIAGNOSIS — M43.22 CERVICAL SPINE ANKYLOSIS: ICD-10-CM

## 2021-05-14 PROCEDURE — 99442 PR PHYS/QHP TELEPHONE EVALUATION 11-20 MIN: CPT | Performed by: NURSE PRACTITIONER

## 2021-05-14 PROCEDURE — 99213 OFFICE O/P EST LOW 20 MIN: CPT

## 2021-05-14 RX ORDER — OXYCODONE HYDROCHLORIDE 5 MG/1
5 TABLET ORAL 4 TIMES DAILY PRN
Qty: 120 TABLET | Refills: 0 | Status: SHIPPED | OUTPATIENT
Start: 2021-05-16 | End: 2021-06-14 | Stop reason: SDUPTHER

## 2021-05-14 ASSESSMENT — ENCOUNTER SYMPTOMS
COUGH: 1
ROS SKIN COMMENTS: UNDER LEFT ARM
CONSTIPATION: 1
SHORTNESS OF BREATH: 1
BACK PAIN: 1

## 2021-05-14 NOTE — PROGRESS NOTES
Rick 88 PROGRESS NOTE      Patient  completed []  video visit   [x]   phone call:     12    Minutes :       [x]    to  review Medication Agreement    []  Follow up after procedure   []  Discuss treatment options      Location:  Provider:  working from    [x]    home    []   [facility] ,   patient at home       Chief Complaint:  Back pain    She c/o low back pain which has gotten a little better, The pain shoots down her legs, She has history of kyphoplasty . She had lumbar epidural  Injection on 3-2021 L5, L5 with 20% relief. She states got some information on SCS. She sleeps well. Her activity is a little better. She can not walk far, she ambulates with a walkerNo Ed visits. Back Pain  This is a chronic problem. The problem occurs constantly. The problem has been gradually improving since onset. The pain is present in the lumbar spine. The quality of the pain is described as aching (dull). The pain does not radiate. The pain is at a severity of 4/10. The pain is moderate. The pain is the same all the time. Exacerbated by: activity. Associated symptoms include numbness and weakness. Risk factors include menopause and sedentary lifestyle. She has tried analgesics and heat for the symptoms.                    Treatment goals:  Functional status: less pain as possible, walk more  Aberrancy:   Any alcoholic beverages  no          Any illegal drugs  no       Analgesia:      4               Adverse  Effects :no      ADL;s :home exercises    Data:    When was thelast UDS:   12-         Was the UDS appropriate:  [x] yes []   no      Record/Diagnostics Review:      As above, I did review the imaging     12/14/2020  1:43 PM - Jordin, Chiki Incoming Lab Results From [facility]    Component Value Ref Range & Units Status Collected Lab   Pain Management Drug Panel Interp, Urine Consistent   Final 12/10/2020 12:32 PM [facility]   (NOTE) ________________________________________________________________   DRUGS EXPECTED:   OXYCODONE [TODAY]   ________________________________________________________________   CONSISTENT with medications provided:   OXYCODONE : based on oxycodone, noroxycodone, noroxymorphone   ________________________________________________________________   INTERPRETIVE INFORMATION: Targeted drug profile Interp   Interpretation depends on accuracy and completeness of patient   medication information submitted by client. 6-Acetylmorphine, Ur Not Detected   Final 12/10/2020 12:32 PM [facility]   7-Aminoclonazepam, Urine Not Detected   Final 12/10      EXAMINATION:   3 XRAY VIEWS OF THE LUMBAR SPINE; THREE XRAY VIEWS OF THE THORACIC SPINE       2/11/2020 1:09 pm       COMPARISON:   CT lumbar spine from 09/28/2018       HISTORY:   ORDERING SYSTEM PROVIDED HISTORY: Osteopenia of lumbar spine   TECHNOLOGIST PROVIDED HISTORY:   Reason for Exam: SEVERE BACK PAIN UPPER AND LOWER, FALL IN DECEMBER 2019   Acuity: Unknown   Type of Exam: Ongoing       FINDINGS:   Thoracic spine: Demineralized skeleton and multifocal pulmonary opacities   somewhat limits the evaluation of the thoracic spine.  Within this limit,   multilevel degenerative changes of thoracic spine.  Intact pedicles on   frontal view.  Vertebral body heights appear maintained.       Lumbar spine: L1 compression fracture with vertebral body augmentation   cement.  No new compression fracture.  Minimal anterolisthesis of L4 on L5   measuring 0.6 cm.  Disc degeneration with endplate osteophyte formation. Facet arthropathy diffusely.       Possible abdominal aortic aneurysm on radiography measuring 3.0 cm AP. Correlation with prior lumbar spine CT.           Impression   Multilevel disc degeneration throughout the lumbar spine without evidence of   acute abnormality.  If concern for acute fracture exists, CT versus MRI   should be considered.       Probable abdominal aortic aneurysm. left heart catheterization 10/2/2015    Sleep apnea     no machine    SOB (shortness of breath)     Spinal stenosis of lumbar region with neurogenic claudication 5/13/2013    Supplemental oxygen dependent 12/8/2017    Urine frequency     UTI (urinary tract infection)     hospitalized early july 2014 for kidney infection       Past Surgical History:   Procedure Laterality Date    BRONCHOSCOPY Left 8/16/2017    BRONCHOSCOPY ALVEOLAR LAVAGE LOWER LOBE performed by Tegan Castelan MD at [address]  x 3    no stents    COLONOSCOPY  4 28 14    polyps biopsies     FOOT SURGERY Left     calcium deposit removal from top of foot    HYSTERECTOMY      JOINT REPLACEMENT Bilateral 5/27/2016    bilat total knees    NERVE BLOCK  5/13/2013    caudal celestone 6mg    NERVE BLOCK  5/28/13    Caudal #2, Celestone 9mg    NERVE BLOCK  2/14/14    rt hip inj celestone 9 mg    NERVE BLOCK  07/14/2014    caudal# 3- celestone 9 mg    NERVE BLOCK  7-21-14    caudal epidural #2, decadron 7mg, fentanyl 25mcg    NERVE BLOCK  10/2/14    duramorph 1.5  decadron 5mg    NERVE BLOCK  11/9/2015    caudal# 1 celestone 9mg    NERVE BLOCK  11/16/15    caudal #2  decadron 10mg    NERVE BLOCK  11/23/15    duramorph 1mg celestone 9mg    NERVE BLOCK  03/28/2018    CAUDAL EPIDURAL STEROID BLOCK  DECADRON 10 MG     NERVE BLOCK  05/23/2018    caudal # decadron 7mg    NERVE BLOCK  08/28/2018    natalia transforminal # 1, decadron 10mg gadoteridol, LONG NEEDLES    RI PERQ VERT AGMNTJ CAVITY CRTJ UNI/BI CANNULJ LMBR N/A 10/29/2018    KYPHOPLASTY L1 performed by Taco Paz MD at [address] Right 5/14/15    total hip replacement       Allergies   Allergen Reactions    Rosuvastatin Calcium Anaphylaxis     Hair fell out    Statins Anaphylaxis     Hair fell out    Bactrim [Sulfamethoxazole-Trimethoprim] Diarrhea    Caffeine Other (See Comments)     pain  pain    Dye [Iodides] Other (See Comments)     Iv dye= blood clots in arm    Food      Tomatoes, pain    Ioxaglate Other (See Comments)     Iv dye= blood clots in arm    Tomato     Dicloxacillin Rash    Pcn [Penicillins] Rash         Current Outpatient Medications:     oxyCODONE (ROXICODONE) 5 MG immediate release tablet, Take 1 tablet by mouth 4 times daily as needed for Pain for up to 30 days. , Disp: 120 tablet, Rfl: 0    metoprolol tartrate (LOPRESSOR) 50 MG tablet, Take 1 tablet by mouth 2 times daily, Disp: 180 tablet, Rfl: 1    Lidocaine HCl (ASPERCREME LIDOCAINE) 4 % LIQD, Apply topically, Disp: , Rfl:     gabapentin (NEURONTIN) 100 MG capsule, Take 1 capsule by mouth 3 times daily for 30 days. , Disp: 90 capsule, Rfl: 0    amLODIPine (NORVASC) 5 MG tablet, Take 1 tablet by mouth daily, Disp: 30 tablet, Rfl: 3    DULoxetine (CYMBALTA) 30 MG extended release capsule, TAKE 1 CAPSULE TWICE A DAY, Disp: 180 capsule, Rfl: 3    donepezil (ARICEPT) 5 MG tablet, Take 1 tablet by mouth nightly, Disp: 90 tablet, Rfl: 1    aspirin 81 MG tablet, Take 81 mg by mouth daily, Disp: , Rfl:     Catheters (STANDARD CARE EXTERNAL CATH) MISC, 1 each by Does not apply route daily, Disp: 5 each, Rfl: 1    Catheters (STANDARD CARE EXTERNAL CATH) MISC, 1 Device by Does not apply route daily, Disp: 30 each, Rfl: 2    neomycin-polymyxin-dexameth 3.5-93385-6.1 OINT, , Disp: , Rfl:     levocetirizine (XYZAL) 5 MG tablet, take 1 tablet by mouth once daily AT NIGHT, Disp: 30 tablet, Rfl: 2    naproxen sodium (ANAPROX) 220 MG tablet, Take 220 mg by mouth 2 times daily (with meals), Disp: , Rfl:     fluticasone (FLONASE) 50 MCG/ACT nasal spray, instill 2 sprays into each nostril once daily, Disp: , Rfl:     calcium carbonate (OYSTER SHELL CALCIUM 500 MG) 1250 (500 Ca) MG tablet, Take 1 tablet by mouth daily With magnesium, zinc, Disp: , Rfl:     albuterol sulfate  (90 Base) MCG/ACT inhaler, Inhale 2 puffs into the lungs 4 times daily as needed for Wheezing, Disp: 1 Inhaler, Rfl: 2    ofloxacin (OCUFLOX) 0.3 % solution, Place into both eyes daily as needed , Disp: , Rfl:     docusate sodium (COLACE) 100 MG capsule, Take 100 mg by mouth daily as needed for Constipation, Disp: , Rfl:     Misc. Devices (WALKER) MISC, 1 each by Does not apply route daily Upright walker with wheels and seat, Disp: 1 each, Rfl: 0    Family History   Problem Relation Age of Onset    Stomach Cancer Brother         stomach    High Blood Pressure Mother     Heart Disease Sister         irregular heartbeat       Social History     Socioeconomic History    Marital status:      Spouse name: Not on file    Number of children: Not on file    Years of education: Not on file    Highest education level: Not on file   Occupational History    Not on file   Social Needs    Financial resource strain: Not hard at all   [place] insecurity     Worry: Never true     Inability: Never true   [employer] needs     Medical: No     Non-medical: No   Tobacco Use    Smoking status: Former Smoker     Packs/day: 1.00     Years: 18.00     Pack years: 18.00     Quit date: 1993     Years since quittin.0    Smokeless tobacco: Never Used   Substance and Sexual Activity    Alcohol use: No    Drug use: No    Sexual activity: Not Currently   Lifestyle    Physical activity     Days per week: 0 days     Minutes per session: Not on file    Stress:  Only a little   Relationships    Social connections     Talks on phone: Twice a week     Gets together: Twice a week     Attends [employer] service: Not on file     Active member of club or organization: Not on file     Attends meetings of clubs or organizations: Not on file     Relationship status:     Intimate partner violence     Fear of current or ex partner: Not on file     Emotionally abused: Not on file     Physically abused: Not on file     Forced sexual activity: Not on file   Other Topics effects of being on chronic pain medications such as sleep disturbances, respiratory depression, hormonal changes, withdrawal symptoms, chronic opioid dependence and tolerance as well as risk of taking opioids with Benzodiazepines and taking opioids along with alcohol,  werediscussed with patient. I had asked the patient to minimize medication use and utilize pain medications only for uncontrolled rest pain or pain with exertional activities. I advised patient not to self-escalate painmedications without consulting with us. At each of patient's future visits we will try to taper pain medications, while adjusting the adjunct medications, and re-evaluating for Physical Therapy to improve spinal andjoint strength. We will continue to have discussions to decrease pain medications as tolerated. Counseled patient on effects their pain medication and /or their medical condition mayhave on their  ability to drive or operate machinery. Instructed not to drive or operate machinery if drowsy     I also discussed with the patient regarding the dangers of combining narcotic pain medication with tranquilizers, alcohol or illegal drugs or taking the medication any way other than prescribed. The dangers were discussed  including respiratory depression and death. Patient was told to tell  all  physicians regarding the medications he is getting from pain clinic. Patient is warned not to take any unprescribed medications over-the-countermedications that can depress breathing . Patient is advised to talk to the pharmacist or physicians if planning to take any over-the-counter medications before  takeing them. Patient is strongly advised to avoid tranquilizers or  relaxants, illegal drugs  or any medications that can depress breathing  Patient is also advised to tell us if there is any changes in their medications from other physicians.             TREATMENT OPTIONS:       Medication Agreement Requirements Met  Continue Opioid therapy  Script written for  oxycodone  Follow up appointment made

## 2021-05-20 ENCOUNTER — TELEPHONE (OUTPATIENT)
Dept: INTERNAL MEDICINE CLINIC | Age: 76
End: 2021-05-20

## 2021-05-20 DIAGNOSIS — R19.5 LOOSE STOOLS: Primary | ICD-10-CM

## 2021-05-20 NOTE — TELEPHONE ENCOUNTER
Patient would like a referral to Dr Bell-GI--She can not control her bowels--she states her stool is constantly seeping out. [id number] [address] for the referral or please advise.

## 2021-06-14 ENCOUNTER — HOSPITAL ENCOUNTER (OUTPATIENT)
Dept: PAIN MANAGEMENT | Age: 76
Discharge: HOME | End: 2021-06-14
Payer: MEDICARE

## 2021-06-14 DIAGNOSIS — M43.22 CERVICAL SPINE ANKYLOSIS: ICD-10-CM

## 2021-06-14 DIAGNOSIS — M51.36 DDD (DEGENERATIVE DISC DISEASE), LUMBAR: ICD-10-CM

## 2021-06-14 DIAGNOSIS — Z96.653 HISTORY OF TOTAL BILATERAL KNEE REPLACEMENT: ICD-10-CM

## 2021-06-14 DIAGNOSIS — M54.16 LUMBAR RADICULOPATHY: ICD-10-CM

## 2021-06-14 DIAGNOSIS — M48.062 SPINAL STENOSIS OF LUMBAR REGION WITH NEUROGENIC CLAUDICATION: Chronic | ICD-10-CM

## 2021-06-14 DIAGNOSIS — M79.7 FIBROMYALGIA: ICD-10-CM

## 2021-06-14 DIAGNOSIS — Z98.890 S/P KYPHOPLASTY: ICD-10-CM

## 2021-06-14 DIAGNOSIS — Z51.81 MEDICATION MONITORING ENCOUNTER: Primary | Chronic | ICD-10-CM

## 2021-06-14 DIAGNOSIS — M85.88 OSTEOPENIA OF LUMBAR SPINE: ICD-10-CM

## 2021-06-14 PROCEDURE — 99442 PR PHYS/QHP TELEPHONE EVALUATION 11-20 MIN: CPT | Performed by: NURSE PRACTITIONER

## 2021-06-14 PROCEDURE — 99213 OFFICE O/P EST LOW 20 MIN: CPT

## 2021-06-14 RX ORDER — GABAPENTIN 100 MG/1
100 CAPSULE ORAL 3 TIMES DAILY
Qty: 90 CAPSULE | Refills: 0 | Status: SHIPPED | OUTPATIENT
Start: 2021-06-14 | End: 2021-08-10 | Stop reason: SDUPTHER

## 2021-06-14 RX ORDER — OXYCODONE HYDROCHLORIDE 5 MG/1
5 TABLET ORAL 4 TIMES DAILY PRN
Qty: 120 TABLET | Refills: 0 | Status: SHIPPED | OUTPATIENT
Start: 2021-06-15 | End: 2021-07-13 | Stop reason: SDUPTHER

## 2021-06-14 ASSESSMENT — ENCOUNTER SYMPTOMS
CONSTIPATION: 0
SHORTNESS OF BREATH: 0
BACK PAIN: 1
COUGH: 0

## 2021-06-14 NOTE — PROGRESS NOTES
Patient completed a telephone visit today to review medication contract. Chief Complaint: back pain    [facility] Pt reports chronic history of low back pain that radiates into right hip and buttocks at times She is s/p bilateral knee and right hip replaced. She has tried NSAIDS heat chiropractic care and with little relief. Just completed in home PT to increase strength and help with ambulation and balance. She had a fall on 9/28/18 with resulting L1 compression fracture. She had kyphoplasty of L1 which did help her back pain.  She had lumbar epidural on and reports moderate relief. She goes to the chiropractor regularly with relief. She tried aquatic PT with no benefit. She had LESI 3/25 with 40-50%  relief of the pain. Back Pain  This is a chronic problem. The current episode started more than 1 year ago. The problem occurs constantly. The problem is unchanged. The pain is present in the lumbar spine. The quality of the pain is described as aching (throbbing). The pain does not radiate. The pain is at a severity of 4/10. The pain is mild. The symptoms are aggravated by position and standing (activity). Associated symptoms include numbness and paresthesias. Pertinent negatives include no chest pain or fever. She has tried heat, analgesics and bed rest for the symptoms. The treatment provided mild relief. Patient denies any new neurological symptoms. No bowel or bladder incontinence, no weakness, and no falling. Pill count: appropriate due 6/15    Morphine equivalent: 30    Periodic Controlled Substance Monitoring: Possible medication side effects, risk of tolerance/dependence & alternative treatments discussed., No signs of potential drug abuse or diversion identified., Obtaining appropriate analgesic effect of treatment.  KARIN Page - CNP)      Past Medical History:   Diagnosis Date    Abnormality of rib determined by X-ray 1/28/2016    Acute cystitis without hematuria 6/16/2018    Acute exacerbation of chronic bronchitis (HCC)     Acute on chronic diastolic heart failure ([facility] 1/28/2016    Adjustment disorder with mixed anxiety and depressed mood 6/26/2015    Mild     Anemia 3/5/2014    Back pain     Bronchiectasis with acute lower respiratory infection ([facility] 8/16/2017    Bronchitis     Chronic bronchitis (HCC) 1/28/2016    Chronic cough 7/23/2018    Degenerative joint disease (DJD) of hip 5/14/2015    Dyslipidemia 3/5/2014    Encephalopathy acute 5/29/2016    Essential hypertension 1/28/2016    Fibromyalgia     GERD (gastroesophageal reflux disease)     History of total bilateral knee replacement 5/28/2016    Hx of blood clots     left leg and lung    Hyperlipidemia     Hypertension     Incontinence of urine     Irregular heartbeat     DR. Rudy Loza Medication monitoring encounter 6/13/2018    Morbid obesity with BMI of 45.0-49.9, [facility] 1/28/2016    Obstructive sleep apnea syndrome     Osteoarthritis     Primary osteoarthritis of left knee 5/27/2016    PVC (premature ventricular contraction) 1/28/2016    S/P right and left heart catheterization 10/2/2015    Sleep apnea     no machine    SOB (shortness of breath)     Spinal stenosis of lumbar region with neurogenic claudication 5/13/2013    Supplemental oxygen dependent 12/8/2017    Urine frequency     UTI (urinary tract infection)     hospitalized early july 2014 for kidney infection       Past Surgical History:   Procedure Laterality Date    BRONCHOSCOPY Left 8/16/2017    BRONCHOSCOPY ALVEOLAR LAVAGE LOWER LOBE performed by Ashley Tellez MD at [address] [address]  x 3    no stents    COLONOSCOPY  4 28 14    polyps biopsies     FOOT SURGERY Left     calcium deposit removal from top of foot    HYSTERECTOMY      JOINT REPLACEMENT Bilateral 5/27/2016    bilat total knees    NERVE BLOCK  5/13/2013    caudal celestone 6mg    NERVE BLOCK  5/28/13    Caudal #2, Celestone 9mg    NERVE BLOCK  2/14/14    rt hip inj celestone 9 mg    NERVE BLOCK  07/14/2014    caudal# 3- celestone 9 mg    NERVE BLOCK  7-21-14    caudal epidural #2, decadron 7mg, fentanyl 25mcg    NERVE BLOCK  10/2/14    duramorph 1.5  decadron 5mg    NERVE BLOCK  11/9/2015    caudal# 1 celestone 9mg    NERVE BLOCK  11/16/15    caudal #2  decadron 10mg    NERVE BLOCK  11/23/15    duramorph 1mg celestone 9mg    NERVE BLOCK  03/28/2018    CAUDAL EPIDURAL STEROID BLOCK  DECADRON 10 MG     NERVE BLOCK  05/23/2018    caudal # decadron 7mg    NERVE BLOCK  08/28/2018    natalia transforminal # 1, decadron 10mg gadoteridol, LONG NEEDLES    VT PERQ VERT AGMNTJ CAVITY CRTJ UNI/BI CANNULJ LMBR N/A 10/29/2018    KYPHOPLASTY L1 performed by Lexa Agosto MD at [address] Right 5/14/15    total hip replacement       Allergies   Allergen Reactions    Rosuvastatin Calcium Anaphylaxis     Hair fell out    Statins Anaphylaxis     Hair fell out    Bactrim [Sulfamethoxazole-Trimethoprim] Diarrhea    Caffeine Other (See Comments)     pain  pain    Dye [Iodides] Other (See Comments)     Iv dye= blood clots in arm    Food      Tomatoes, pain    Ioxaglate Other (See Comments)     Iv dye= blood clots in arm    Tomato     Dicloxacillin Rash    Pcn [Penicillins] Rash         Current Outpatient Medications:     oxyCODONE (ROXICODONE) 5 MG immediate release tablet, Take 1 tablet by mouth 4 times daily as needed for Pain for up to 30 days. , Disp: 120 tablet, Rfl: 0    metoprolol tartrate (LOPRESSOR) 50 MG tablet, Take 1 tablet by mouth 2 times daily, Disp: 180 tablet, Rfl: 1    Catheters (STANDARD CARE EXTERNAL CATH) MISC, 1 each by Does not apply route daily, Disp: 5 each, Rfl: 1    Lidocaine HCl (ASPERCREME LIDOCAINE) 4 % LIQD, Apply topically, Disp: , Rfl:     gabapentin (NEURONTIN) 100 MG capsule, Take 1 capsule by mouth 3 times daily for 30 days. , Disp: 90 capsule, Rfl: 0    amLODIPine (NORVASC) 5 MG tablet, Take 1 tablet by mouth daily, Disp: 30 tablet, Rfl: 3    DULoxetine (CYMBALTA) 30 MG extended release capsule, TAKE 1 CAPSULE TWICE A DAY, Disp: 180 capsule, Rfl: 3    donepezil (ARICEPT) 5 MG tablet, Take 1 tablet by mouth nightly, Disp: 90 tablet, Rfl: 1    Catheters (STANDARD CARE EXTERNAL CATH) MISC, 1 Device by Does not apply route daily, Disp: 30 each, Rfl: 2    neomycin-polymyxin-dexameth 3.5-13374-2.1 OINT, , Disp: , Rfl:     levocetirizine (XYZAL) 5 MG tablet, take 1 tablet by mouth once daily AT NIGHT, Disp: 30 tablet, Rfl: 2    naproxen sodium (ANAPROX) 220 MG tablet, Take 220 mg by mouth 2 times daily (with meals), Disp: , Rfl:     fluticasone (FLONASE) 50 MCG/ACT nasal spray, instill 2 sprays into each nostril once daily, Disp: , Rfl:     calcium carbonate (OYSTER SHELL CALCIUM 500 MG) 1250 (500 Ca) MG tablet, Take 1 tablet by mouth daily With magnesium, zinc, Disp: , Rfl:     albuterol sulfate  (90 Base) MCG/ACT inhaler, Inhale 2 puffs into the lungs 4 times daily as needed for Wheezing, Disp: 1 Inhaler, Rfl: 2    ofloxacin (OCUFLOX) 0.3 % solution, Place into both eyes daily as needed , Disp: , Rfl:     docusate sodium (COLACE) 100 MG capsule, Take 100 mg by mouth daily as needed for Constipation, Disp: , Rfl:     Misc.  Devices (WALKER) MISC, 1 each by Does not apply route daily Upright walker with wheels and seat, Disp: 1 each, Rfl: 0    aspirin 81 MG tablet, Take 81 mg by mouth daily, Disp: , Rfl:     Family History   Problem Relation Age of Onset    Stomach Cancer Brother         stomach    High Blood Pressure Mother     Heart Disease Sister         irregular heartbeat       Social History     Socioeconomic History    Marital status:      Spouse name: Not on file    Number of children: Not on file    Years of education: Not on file    Highest education level: Not on file   Occupational History    Not on file   Tobacco Use    Smoking status: Former Smoker     Packs/day: 1.00     Years: 18.00     Pack years: 18.00     Quit date: 1993     Years since quittin.1    Smokeless tobacco: Never Used   Vaping Use    Vaping Use: Never used   Substance and Sexual Activity    Alcohol use: No    Drug use: No    Sexual activity: Not Currently   Other Topics Concern    Not on file   Social History Narrative    Not on file     Social Determinants of Health     Financial Resource Strain: Low Risk     Difficulty of Paying Living Expenses: Not hard at all   Food Insecurity: No Food Insecurity    Worried About Running Out of Food in the Last Year: Never true    Bobby of Food in the Last Year: Never true   Transportation Needs: No Transportation Needs    Lack of Transportation (Medical): No    Lack of Transportation (Non-Medical): No   Physical Activity: Unknown    Days of Exercise per Week: 0 days    Minutes of Exercise per Session: Not on file   Stress: No Stress Concern Present    Feeling of Stress : Only a little   Social Connections: Unknown    Frequency of Communication with Friends and Family: Twice a week    Frequency of Social Gatherings with Friends and Family: Twice a week    Attends [employer] Services: Not on file   [employer] or Organizations: Not on file    Attends Club or Organization Meetings: Not on file    Marital Status:    Intimate Partner Violence:     Fear of Current or Ex-Partner:     Emotionally Abused:     Physically Abused:     Sexually Abused:        Review of Systems:  Review of Systems   Constitutional: Negative for chills and fever. Cardiovascular: Negative for chest pain and palpitations. Respiratory: Negative for cough and shortness of breath. Musculoskeletal: Positive for back pain. Gastrointestinal: Negative for constipation. Neurological: Positive for numbness and paresthesias. Negative for disturbances in coordination and loss of balance.        Physical Exam:  There were no vitals taken for this visit. Physical Exam  Neurological:      Mental Status: She is alert.    Psychiatric:         Mood and Affect: Mood normal.         Record/Diagnostics Review:    Last tiffanie 12/2020 and was appropriate     ABERRANT BEHAVIORS SINCE LAST VISIT  Lost rx/pills:------------------------------------------ no  Taking  medication as prescribed: ----------- yes  Urine Drug Screen ---------------------------------  yes             Date------------------------------------------------- 12/14/2020              results as expected ---------------------yes     Recent ER visits: -------------------------------------No  Pill count is appropriate: ---------------------------yes   Refills for prescriptions appropriate:---------- yes    Assessment:  Problem List Items Addressed This Visit     Spinal stenosis of lumbar region with neurogenic claudication (Chronic)    Relevant Medications    oxyCODONE (ROXICODONE) 5 MG immediate release tablet (Start on 6/15/2021)    gabapentin (NEURONTIN) 100 MG capsule    Medication monitoring encounter - Primary (Chronic)    Fibromyalgia    Relevant Medications    oxyCODONE (ROXICODONE) 5 MG immediate release tablet (Start on 6/15/2021)    gabapentin (NEURONTIN) 100 MG capsule    History of total bilateral knee replacement    Relevant Medications    oxyCODONE (ROXICODONE) 5 MG immediate release tablet (Start on 6/15/2021)    gabapentin (NEURONTIN) 100 MG capsule    Cervical spine ankylosis    Relevant Medications    gabapentin (NEURONTIN) 100 MG capsule    S/P kyphoplasty    Relevant Medications    gabapentin (NEURONTIN) 100 MG capsule    DDD (degenerative disc disease), lumbar    Relevant Medications    oxyCODONE (ROXICODONE) 5 MG immediate release tablet (Start on 6/15/2021)    gabapentin (NEURONTIN) 100 MG capsule    Lumbar radiculopathy    Relevant Medications    gabapentin (NEURONTIN) 100 MG capsule    Osteopenia of lumbar spine    Relevant Medications gabapentin (NEURONTIN) 100 MG capsule             Treatment Plan:  Patient relates current medications are helping the pain. Patient reports taking pain medications as prescribed, denies obtaining medications from different sources and denies use of illegal drugs. Patient denies side effects from medications like nausea, vomiting, constipation or drowsiness. Patient reports current activities of daily living are possible due to medications and would like to continue them. As always, we encourage daily stretching and strengthening exercises, and recommend minimizing use of pain medications unless patient cannot get through daily activities due to pain. Contract requirements met. Continue opioid therapy. Script written for oxycodone  Follow up appointment made for 4 weeks    Yris Gallegos, was evaluated through a synchronous (real-time) audio-video encounter. The patient (or guardian if applicable) is aware that this is a billable service. Verbal consent to proceed has been obtained within the past 12 months. The visit was conducted pursuant to the emergency declaration under the [address] [place] [address], [address] [place] [address] authority and the [employer] Act. Patient identification was verified, and a caregiver was present when appropriate. The patient was located in a state where the provider was credentialed to provide care. Total time spent for this encounter: 15 minutes    --KARIN Maldonado CNP on 6/14/2021 at 12:45 PM    An electronic signature was used to authenticate this note.

## 2021-07-13 ENCOUNTER — HOSPITAL ENCOUNTER (OUTPATIENT)
Dept: PAIN MANAGEMENT | Age: 76
Discharge: HOME | End: 2021-07-13
Payer: MEDICARE

## 2021-07-13 DIAGNOSIS — M48.062 SPINAL STENOSIS OF LUMBAR REGION WITH NEUROGENIC CLAUDICATION: Primary | Chronic | ICD-10-CM

## 2021-07-13 DIAGNOSIS — M51.36 DDD (DEGENERATIVE DISC DISEASE), LUMBAR: ICD-10-CM

## 2021-07-13 DIAGNOSIS — M79.7 FIBROMYALGIA: ICD-10-CM

## 2021-07-13 DIAGNOSIS — Z79.891 CHRONIC PRESCRIPTION OPIATE USE: Chronic | ICD-10-CM

## 2021-07-13 DIAGNOSIS — M48.02 CERVICAL SPINAL STENOSIS: ICD-10-CM

## 2021-07-13 DIAGNOSIS — Z98.890 S/P KYPHOPLASTY: ICD-10-CM

## 2021-07-13 DIAGNOSIS — M54.16 LUMBAR RADICULOPATHY: ICD-10-CM

## 2021-07-13 DIAGNOSIS — S32.010S CLOSED COMPRESSION FRACTURE OF L1 VERTEBRA, SEQUELA: ICD-10-CM

## 2021-07-13 DIAGNOSIS — Z51.81 MEDICATION MONITORING ENCOUNTER: Chronic | ICD-10-CM

## 2021-07-13 DIAGNOSIS — Z96.653 HISTORY OF TOTAL BILATERAL KNEE REPLACEMENT: ICD-10-CM

## 2021-07-13 DIAGNOSIS — M80.00XS AGE-RELATED OSTEOPOROSIS WITH CURRENT PATHOLOGICAL FRACTURE, SEQUELA: ICD-10-CM

## 2021-07-13 DIAGNOSIS — M85.88 OSTEOPENIA OF LUMBAR SPINE: ICD-10-CM

## 2021-07-13 DIAGNOSIS — E66.01 MORBID OBESITY (HCC): ICD-10-CM

## 2021-07-13 PROCEDURE — 99213 OFFICE O/P EST LOW 20 MIN: CPT

## 2021-07-13 PROCEDURE — 99442 PR PHYS/QHP TELEPHONE EVALUATION 11-20 MIN: CPT | Performed by: NURSE PRACTITIONER

## 2021-07-13 RX ORDER — TOBRAMYCIN AND DEXAMETHASONE 3; 1 MG/ML; MG/ML
SUSPENSION/ DROPS OPHTHALMIC
COMMUNITY
Start: 2021-05-20 | End: 2021-08-10

## 2021-07-13 RX ORDER — OXYCODONE HYDROCHLORIDE 5 MG/1
5 TABLET ORAL 4 TIMES DAILY PRN
Qty: 120 TABLET | Refills: 0 | Status: SHIPPED | OUTPATIENT
Start: 2021-07-15 | End: 2021-12-14 | Stop reason: SDUPTHER

## 2021-07-13 ASSESSMENT — ENCOUNTER SYMPTOMS
BACK PAIN: 1
COUGH: 1
EYES NEGATIVE: 1
CONSTIPATION: 1

## 2021-07-13 NOTE — PROGRESS NOTES
[facility]    Component Value Ref Range & Units Status Collected Lab   Pain Management Drug Panel Interp, Urine Consistent   Final 12/10/2020 12:32 PM [facility]   (NOTE)   ________________________________________________________________   DRUGS EXPECTED:   OXYCODONE [TODAY]   ________________________________________________________________   CONSISTENT with medications provided:   OXYCODONE : based on oxycodone, noroxycodone, noroxymorphone   ________________________________________________________________   INTERPRETIVE INFORMATION: Targeted drug profile Interp   Interpretation depends on accuracy and completeness of patient   medication information submitted by client. EXAMINATION:   3 XRAY VIEWS OF THE LUMBAR SPINE; THREE XRAY VIEWS OF THE THORACIC SPINE       2/11/2020 1:09 pm       COMPARISON:   CT lumbar spine from 09/28/2018       HISTORY:   ORDERING SYSTEM PROVIDED HISTORY: Osteopenia of lumbar spine   TECHNOLOGIST PROVIDED HISTORY:   Reason for Exam: SEVERE BACK PAIN UPPER AND LOWER, FALL IN DECEMBER 2019   Acuity: Unknown   Type of Exam: Ongoing       FINDINGS:   Thoracic spine: Demineralized skeleton and multifocal pulmonary opacities   somewhat limits the evaluation of the thoracic spine.  Within this limit,   multilevel degenerative changes of thoracic spine.  Intact pedicles on   frontal view.  Vertebral body heights appear maintained.       Lumbar spine: L1 compression fracture with vertebral body augmentation   cement.  No new compression fracture.  Minimal anterolisthesis of L4 on L5   measuring 0.6 cm.  Disc degeneration with endplate osteophyte formation. Facet arthropathy diffusely.       Possible abdominal aortic aneurysm on radiography measuring 3.0 cm AP. Correlation with prior lumbar spine CT.           Impression   Multilevel disc degeneration throughout the lumbar spine without evidence of   acute abnormality.  If concern for acute fracture exists, CT versus MRI   should be considered.     Probable abdominal aortic aneurysm.  Nonemergent CT or ultrasound correlation   should be considered.             XRAY VIEWS OF THE LUMBAR SPINE; THREE XRAY VIEWS OF THE THORACIC SPINE       2/11/2020 1:09 pm       COMPARISON:   CT lumbar spine from 09/28/2018       HISTORY:   ORDERING SYSTEM PROVIDED HISTORY: Osteopenia of lumbar spine   TECHNOLOGIST PROVIDED HISTORY:   Reason for Exam: SEVERE BACK PAIN UPPER AND LOWER, FALL IN DECEMBER 2019   Acuity: Unknown   Type of Exam: Ongoing       FINDINGS:   Thoracic spine: Demineralized skeleton and multifocal pulmonary opacities   somewhat limits the evaluation of the thoracic spine.  Within this limit,   multilevel degenerative changes of thoracic spine.  Intact pedicles on   frontal view.  Vertebral body heights appear maintained.       Lumbar spine: L1 compression fracture with vertebral body augmentation   cement.  No new compression fracture.  Minimal anterolisthesis of L4 on L5   measuring 0.6 cm.  Disc degeneration with endplate osteophyte formation. Facet arthropathy diffusely.       Possible abdominal aortic aneurysm on radiography measuring 3.0 cm AP. Correlation with prior lumbar spine CT.           Impression   Multilevel disc degeneration throughout the lumbar spine without evidence of   acute abnormality.  If concern for acute fracture exists, CT versus MRI   should be considered.       Probable abdominal aortic aneurysm.  Nonemergent CT or ultrasound correlation   should be considered.                   Pill count: appropriate    fill date :7-    Morphine meq as reported on  [place]ARRS: 30  Periodic Controlled Substance Monitoring: Possible medication side effects, risk of tolerance/dependence & alternative treatments discussed., No signs of potential drug abuse or diversion identified. , Assessed functional status., Obtaining appropriate analgesic effect of treatment.  Santiago Landaverde, APRN - CNP)  Review of[place]ARRS does not show any aberrant prescription behavior. Medication is helping the patient stay active. Patient denies any side effects and reports adequate analgesia. No sign of misuse/abuse. Past Medical History:   Diagnosis Date    Abnormality of rib determined by X-ray 1/28/2016    Acute cystitis without hematuria 6/16/2018    Acute exacerbation of chronic bronchitis (HCC)     Acute on chronic diastolic heart failure ([facility] [address] 1/28/2016    Adjustment disorder with mixed anxiety and depressed mood 6/26/2015    Mild     Anemia 3/5/2014    Back pain     [facility] with [facility] 8/16/2017    Bronchitis     Chronic bronchitis (HCC) 1/28/2016    Chronic cough 7/23/2018    Degenerative joint disease (DJD) of hip 5/14/2015    Dyslipidemia 3/5/2014    Encephalopathy acute 5/29/2016    Essential hypertension 1/28/2016    Fibromyalgia     GERD (gastroesophageal reflux disease)     History of total bilateral knee replacement 5/28/2016    Hx of blood clots     left leg and lung    Hyperlipidemia     Hypertension     Incontinence of urine     Irregular heartbeat     DR. Ruano Blocker Medication monitoring encounter 6/13/2018    Morbid obesity with BMI of 45.0-49.9, adult ([facility] 1/28/2016    Obstructive sleep apnea syndrome     Osteoarthritis     Primary osteoarthritis of left knee 5/27/2016    PVC (premature ventricular contraction) 1/28/2016    S/P right and left heart catheterization 10/2/2015    Sleep apnea     no machine    SOB (shortness of breath)     Spinal stenosis of lumbar region with neurogenic claudication 5/13/2013    Supplemental oxygen dependent 12/8/2017    Urine frequency     UTI (urinary tract infection)     hospitalized early july 2014 for kidney infection       Past Surgical History:   Procedure Laterality Date    BRONCHOSCOPY Left 8/16/2017    BRONCHOSCOPY ALVEOLAR LAVAGE LOWER LOBE performed by Judith Fuchs MD at [address] [facility]  x 3    no stents    COLONOSCOPY  4 28 14    polyps biopsies     FOOT SURGERY Left     calcium deposit removal from top of foot    HYSTERECTOMY      JOINT REPLACEMENT Bilateral 5/27/2016    bilat total knees    NERVE BLOCK  5/13/2013    caudal celestone 6mg    NERVE BLOCK  5/28/13    Caudal #2, Celestone 9mg    NERVE BLOCK  2/14/14    rt hip inj celestone 9 mg    NERVE BLOCK  07/14/2014    caudal# 3- celestone 9 mg    NERVE BLOCK  7-21-14    caudal epidural #2, decadron 7mg, fentanyl 25mcg    NERVE BLOCK  10/2/14    duramorph 1.5  decadron 5mg    NERVE BLOCK  11/9/2015    caudal# 1 celestone 9mg    NERVE BLOCK  11/16/15    caudal #2  decadron 10mg    NERVE BLOCK  11/23/15    duramorph 1mg celestone 9mg    NERVE BLOCK  03/28/2018    CAUDAL EPIDURAL STEROID BLOCK  DECADRON 10 MG     NERVE BLOCK  05/23/2018    caudal # decadron 7mg    NERVE BLOCK  08/28/2018    natalia transforminal # 1, decadron 10mg gadoteridol, LONG NEEDLES    NC PERQ VERT AGMNTJ CAVITY CRTJ UNI/BI CANNULJ LMBR N/A 10/29/2018    KYPHOPLASTY L1 performed by Corina Collazo MD at [facility] Right 5/14/15    total hip replacement       Allergies   Allergen Reactions    Rosuvastatin Calcium Anaphylaxis     Hair fell out    Statins Anaphylaxis     Hair fell out    Bactrim [Sulfamethoxazole-Trimethoprim] Diarrhea    Caffeine Other (See Comments)     pain  pain    Dye [Iodides] Other (See Comments)     Iv dye= blood clots in arm    Food      Tomatoes, pain    Ioxaglate Other (See Comments)     Iv dye= blood clots in arm    Tomato     Dicloxacillin Rash    Pcn [Penicillins] Rash         Current Outpatient Medications:     tobramycin-dexamethasone (TOBRADEX) 0.3-0.1 % ophthalmic suspension, instill 1 drop into both eyes three times a day for 1 week then i...  (REFER TO PRESCRIPTION NOTES). , Disp: , Rfl:     oxyCODONE (ROXICODONE) 5 MG immediate release tablet, Take 1 tablet by mouth 4 times daily as needed for Pain for up to 30 days. , Disp: 120 tablet, Rfl: 0    gabapentin (NEURONTIN) 100 MG capsule, Take 1 capsule by mouth 3 times daily for 30 days. , Disp: 90 capsule, Rfl: 0    metoprolol tartrate (LOPRESSOR) 50 MG tablet, Take 1 tablet by mouth 2 times daily, Disp: 180 tablet, Rfl: 1    Lidocaine HCl (ASPERCREME LIDOCAINE) 4 % LIQD, Apply topically, Disp: , Rfl:     amLODIPine (NORVASC) 5 MG tablet, Take 1 tablet by mouth daily, Disp: 30 tablet, Rfl: 3    DULoxetine (CYMBALTA) 30 MG extended release capsule, TAKE 1 CAPSULE TWICE A DAY, Disp: 180 capsule, Rfl: 3    donepezil (ARICEPT) 5 MG tablet, Take 1 tablet by mouth nightly, Disp: 90 tablet, Rfl: 1    calcium carbonate (OYSTER SHELL CALCIUM 500 MG) 1250 (500 Ca) MG tablet, Take 1 tablet by mouth daily With magnesium, zinc, Disp: , Rfl:     aspirin 81 MG tablet, Take 81 mg by mouth daily, Disp: , Rfl:     Catheters (STANDARD CARE EXTERNAL CATH) MISC, 1 each by Does not apply route daily, Disp: 5 each, Rfl: 1    Catheters (STANDARD CARE EXTERNAL CATH) MISC, 1 Device by Does not apply route daily, Disp: 30 each, Rfl: 2    neomycin-polymyxin-dexameth 3.5-74790-5.1 OINT, , Disp: , Rfl:     levocetirizine (XYZAL) 5 MG tablet, take 1 tablet by mouth once daily AT NIGHT, Disp: 30 tablet, Rfl: 2    naproxen sodium (ANAPROX) 220 MG tablet, Take 220 mg by mouth 2 times daily (with meals), Disp: , Rfl:     fluticasone (FLONASE) 50 MCG/ACT nasal spray, instill 2 sprays into each nostril once daily, Disp: , Rfl:     albuterol sulfate  (90 Base) MCG/ACT inhaler, Inhale 2 puffs into the lungs 4 times daily as needed for Wheezing, Disp: 1 Inhaler, Rfl: 2    ofloxacin (OCUFLOX) 0.3 % solution, Place into both eyes daily as needed , Disp: , Rfl:     docusate sodium (COLACE) 100 MG capsule, Take 100 mg by mouth daily as needed for Constipation, Disp: , Rfl:     Misc.  Devices (WALKER) MISC, 1 each by Does not apply route daily Upright walker with wheels and seat, Disp: 1 each, Rfl: 0    Family History   Problem Relation Age of Onset    Stomach Cancer Brother         stomach    High Blood Pressure Mother     Heart Disease Sister         irregular heartbeat       Social History     Socioeconomic History    Marital status:      Spouse name: Not on file    Number of children: Not on file    Years of education: Not on file    Highest education level: Not on file   Occupational History    Not on file   Tobacco Use    Smoking status: Former Smoker     Packs/day: 1.00     Years: 18.00     Pack years: 18.00     Quit date: 1993     Years since quittin.1    Smokeless tobacco: Never Used   Vaping Use    Vaping Use: Never used   Substance and Sexual Activity    Alcohol use: No    Drug use: No    Sexual activity: Not Currently   Other Topics Concern    Not on file   Social History Narrative    Not on file     Social Determinants of Health     Financial Resource Strain: Low Risk     Difficulty of Paying Living Expenses: Not hard at all   Food Insecurity: No Food Insecurity    Worried About [address] in the Last Year: Never true    [address] [employer] in the Last Year: Never true   Transportation Needs: No Transportation Needs    Lack of Transportation (Medical): No    Lack of Transportation (Non-Medical): No   Physical Activity: Unknown    Days of Exercise per Week: 0 days    Minutes of Exercise per Session: Not on file   Stress: No Stress Concern Present    Feeling of Stress :  Only a little   Social Connections: Unknown    Frequency of Communication with Friends and Family: Twice a week    Frequency of Social Gatherings with Friends and Family: Twice a week    Attends Cheondo[employer] Services: Not on file   [email or web address] or Organizations: Not on file    Attends Club or Organization Meetings: Not on file    Marital Status:    Intimate Partner Violence:     Fear of Current or Ex-Partner:     Emotionally Abused:     Physically Abused:     Sexually Abused:          Review of Systems:  Review of Systems   Constitutional: Positive for malaise/fatigue. HENT: Negative. Eyes: Negative. Cardiovascular: Negative. Respiratory: Positive for cough. Hematologic/Lymphatic: Negative. Skin: Negative. Musculoskeletal: Positive for back pain and joint pain. Gastrointestinal: Positive for constipation. Genitourinary: Positive for nocturia. Neurological: Positive for loss of balance and numbness. Uses a walker   Psychiatric/Behavioral: Negative. Physical Exam:  There were no vitals taken for this visit. Physical Exam  Skin:         Neurological:      Mental Status: She is alert and oriented to person, place, and time. Psychiatric:         Mood and Affect: Mood normal.         Thought Content: Thought content normal.           Assessment:  Problem List Items Addressed This Visit     Spinal stenosis of lumbar region with neurogenic claudication - Primary (Chronic)    S/P kyphoplasty    Osteopenia of lumbar spine    Morbid obesity ([facility] [facility]    Medication monitoring encounter (Chronic)    Lumbar radiculopathy    History of total bilateral knee replacement    Fibromyalgia    DDD (degenerative disc disease), lumbar    Closed compression fracture of L1 lumbar vertebra    Chronic prescription opiate use (Chronic)    Cervical spinal stenosis    Age-related osteoporosis with current pathological fracture              Treatment Plan:  DISCUSSION: Treatment options discussed withpatient and all questions answered to patient's satisfaction.      Possible side effects, risk of tolerance and or dependence and alternative treatments discussed    Obtaining appropriate analgesic effect of treatment   No signs of potential drug abuse or diversion identified    [x] Ill effects of being on chronic pain medications such as sleep disturbances, respiratory depression, hormonal changes, withdrawal symptoms, chronic opioid dependence and tolerance as well as risk of taking opioids with Benzodiazepines and taking opioids along with alcohol,  werediscussed with patient. I had asked the patient to minimize medication use and utilize pain medications only for uncontrolled rest pain or pain with exertional activities. I advised patient not to self-escalate painmedications without consulting with us. At each of patient's future visits we will try to taper pain medications, while adjusting the adjunct medications, and re-evaluating for Physical Therapy to improve spinal andjoint strength. We will continue to have discussions to decrease pain medications as tolerated. Counseled patient on effects their pain medication and /or their medical condition mayhave on their  ability to drive or operate machinery. Instructed not to drive or operate machinery if drowsy     I also discussed with the patient regarding the dangers of combining narcotic pain medication with tranquilizers, alcohol or illegal drugs or taking the medication any way other than prescribed. The dangers were discussed  including respiratory depression and death. Patient was told to tell  all  physicians regarding the medications he is getting from pain clinic. Patient is warned not to take any unprescribed medications over-the-countermedications that can depress breathing . Patient is advised to talk to the pharmacist or physicians if planning to take any over-the-counter medications before  takeing them. Patient is strongly advised to avoid tranquilizers or  relaxants, illegal drugs  or any medications that can depress breathing  Patient is also advised to tell us if there is any changes in their medications from other physicians.             TREATMENT OPTIONS:       Medication Agreement Requirements Met  Continue Opioid therapy  Script written for  oxycodone  Follow up appointment made

## 2021-07-23 RX ORDER — EMOLLIENT BASE
1 CREAM (GRAM) TOPICAL SEE ADMIN INSTRUCTIONS
Qty: 120 G | Refills: 2 | Status: SHIPPED | OUTPATIENT
Start: 2021-07-23 | End: 2021-09-23

## 2021-08-10 ENCOUNTER — HOSPITAL ENCOUNTER (OUTPATIENT)
Dept: PAIN MANAGEMENT | Age: 76
Discharge: HOME | End: 2021-08-10
Payer: MEDICARE

## 2021-08-10 DIAGNOSIS — M43.22 CERVICAL SPINE ANKYLOSIS: ICD-10-CM

## 2021-08-10 DIAGNOSIS — Z96.653 HISTORY OF TOTAL BILATERAL KNEE REPLACEMENT: ICD-10-CM

## 2021-08-10 DIAGNOSIS — Z51.81 MEDICATION MONITORING ENCOUNTER: Chronic | ICD-10-CM

## 2021-08-10 DIAGNOSIS — M17.12 PRIMARY OSTEOARTHRITIS OF LEFT KNEE: ICD-10-CM

## 2021-08-10 DIAGNOSIS — M48.02 CERVICAL SPINAL STENOSIS: ICD-10-CM

## 2021-08-10 DIAGNOSIS — S32.010S CLOSED COMPRESSION FRACTURE OF L1 VERTEBRA, SEQUELA: ICD-10-CM

## 2021-08-10 DIAGNOSIS — M54.16 LUMBAR RADICULOPATHY: ICD-10-CM

## 2021-08-10 DIAGNOSIS — M79.7 FIBROMYALGIA: ICD-10-CM

## 2021-08-10 DIAGNOSIS — M54.50 ACUTE MIDLINE LOW BACK PAIN, UNSPECIFIED WHETHER SCIATICA PRESENT: ICD-10-CM

## 2021-08-10 DIAGNOSIS — M85.88 OSTEOPENIA OF LUMBAR SPINE: ICD-10-CM

## 2021-08-10 DIAGNOSIS — M51.36 DDD (DEGENERATIVE DISC DISEASE), LUMBAR: ICD-10-CM

## 2021-08-10 DIAGNOSIS — M48.062 SPINAL STENOSIS OF LUMBAR REGION WITH NEUROGENIC CLAUDICATION: Primary | Chronic | ICD-10-CM

## 2021-08-10 DIAGNOSIS — Z98.890 S/P KYPHOPLASTY: ICD-10-CM

## 2021-08-10 PROCEDURE — 99443 PR PHYS/QHP TELEPHONE EVALUATION 21-30 MIN: CPT | Performed by: NURSE PRACTITIONER

## 2021-08-10 PROCEDURE — 99213 OFFICE O/P EST LOW 20 MIN: CPT

## 2021-08-10 RX ORDER — OXYCODONE AND ACETAMINOPHEN 7.5; 325 MG/1; MG/1
1 TABLET ORAL EVERY 6 HOURS PRN
Qty: 120 TABLET | Refills: 0 | Status: SHIPPED | OUTPATIENT
Start: 2021-08-14 | End: 2021-09-10 | Stop reason: SDUPTHER

## 2021-08-10 RX ORDER — AZITHROMYCIN 250 MG/1
TABLET, FILM COATED ORAL
COMMUNITY
Start: 2021-07-20 | End: 2021-08-10 | Stop reason: ALTCHOICE

## 2021-08-10 RX ORDER — PREDNISONE 20 MG/1
TABLET ORAL
COMMUNITY
Start: 2021-07-20 | End: 2021-08-10

## 2021-08-10 RX ORDER — POLYETHYLENE GLYCOL 3350 17 G/17G
17 POWDER, FOR SOLUTION ORAL DAILY PRN
COMMUNITY
End: 2021-10-02

## 2021-08-10 RX ORDER — GABAPENTIN 100 MG/1
100 CAPSULE ORAL 3 TIMES DAILY
Qty: 90 CAPSULE | Refills: 0 | Status: SHIPPED | OUTPATIENT
Start: 2021-08-10 | End: 2021-09-21

## 2021-08-10 ASSESSMENT — ENCOUNTER SYMPTOMS
CONSTIPATION: 1
EYES NEGATIVE: 1
SHORTNESS OF BREATH: 1
BACK PAIN: 1
COUGH: 1

## 2021-08-10 NOTE — PROGRESS NOTES
Rick 88 PROGRESS NOTE      Patient  completed []  video visit   [x]   phone call:    34    Minutes :       [x]    to  review Medication Agreement    []  Follow up after procedure   []  Discuss treatment options      Location:  Provider:  working from    [x]    home    []   [facility] - JAUN CHIN ,   patient at home       Chief Complaint:  Low back pain    She c/o low back pain. She has history of kyphoplasty L1 in 2018. She is in PT 2 times a week helps strengthen her but increases her pain. . She has had total right hip and bilateral knee arthroplasties. She went to pain clinic in [place], [place]. She had x-rays done and will be having lumbar MRI done tomorrow,. Her last injection lumbar epidural in 3/2021 with 50% relief. Her sleep is fair. Back Pain  This is a chronic problem. The problem occurs constantly. The problem has been gradually worsening since onset. The pain is present in the lumbar spine. The quality of the pain is described as shooting (sharp). Radiates to: down her  right  leg. The pain is at a severity of 7/10. The pain is moderate. The pain is the same all the time. Exacerbated by: activity. Associated symptoms include numbness and weakness. Risk factors include obesity and menopause.          Treatment goals:  Functional status: not to get flare ups of pain    Aberrancy:   Any alcoholic beverages  no          Any illegal drugs  no      Analgesia:  7                  Adverse  Effects :no    ADL;s :in Physical therapy      Data:    When was thelast UDS:  12-          Was the UDS appropriate:  [x] yes []   no      Record/Diagnostics Review:      As above, I did review the imaging     12/14/2020  1:43 PM - Chiki Brenner Incoming Lab Results From [facility]    Component Value Ref Range & Units Status Collected Lab   Pain Management Drug Panel Interp, Urine Consistent   Final 12/10/2020 12:32 PM [facility]   (NOTE) ________________________________________________________________   DRUGS EXPECTED:   OXYCODONE [TODAY]   ________________________________________________________________   CONSISTENT with medications provided:   OXYCODONE : based on oxycodone, noroxycodone, noroxymorphone   ________________________________________________________________   INTERPRETIVE INFORMATION: Targeted drug profile Interp   Interpretation depends on accuracy and completeness of patient   medication information submitted by client. EXAMINATION:   3 XRAY VIEWS OF THE LUMBAR SPINE; THREE XRAY VIEWS OF THE THORACIC SPINE       2/11/2020 1:09 pm       COMPARISON:   CT lumbar spine from 09/28/2018       HISTORY:   ORDERING SYSTEM PROVIDED HISTORY: Osteopenia of lumbar spine   TECHNOLOGIST PROVIDED HISTORY:   Reason for Exam: SEVERE BACK PAIN UPPER AND LOWER, FALL IN DECEMBER 2019   Acuity: Unknown   Type of Exam: Ongoing       FINDINGS:   Thoracic spine: Demineralized skeleton and multifocal pulmonary opacities   somewhat limits the evaluation of the thoracic spine.  Within this limit,   multilevel degenerative changes of thoracic spine.  Intact pedicles on   frontal view.  Vertebral body heights appear maintained.       Lumbar spine: L1 compression fracture with vertebral body augmentation   cement.  No new compression fracture.  Minimal anterolisthesis of L4 on L5   measuring 0.6 cm.  Disc degeneration with endplate osteophyte formation. Facet arthropathy diffusely.       Possible abdominal aortic aneurysm on radiography measuring 3.0 cm AP. Correlation with prior lumbar spine CT.           Impression   Multilevel disc degeneration throughout the lumbar spine without evidence of   acute abnormality.  If concern for acute fracture exists, CT versus MRI   should be considered.       Probable abdominal aortic aneurysm.  Nonemergent CT or ultrasound correlation   should be considered.                  Pill count: appropriate    fill date Jorgito Mayen 8-    Morphine equivalent dose as reported on OARRS: 30     Review ofOARRS does not show any aberrant prescription behavior. Medication is helping the patient stay active. Patient denies any side effects and reports adequate analgesia. No sign of misuse/abuse. Past Medical History:   Diagnosis Date    Abnormality of rib determined by X-ray 1/28/2016    Acute cystitis without hematuria 6/16/2018    Acute exacerbation of chronic bronchitis (HCC)     Acute on chronic diastolic heart failure ([facility] 1/28/2016    Adjustment disorder with mixed anxiety and depressed mood 6/26/2015    Mild     Anemia 3/5/2014    Back pain     Bronchiectasis with acute lower respiratory infection ([facility] 8/16/2017    Bronchitis     Chronic bronchitis (HCC) 1/28/2016    Chronic cough 7/23/2018    Degenerative joint disease (DJD) of hip 5/14/2015    Dyslipidemia 3/5/2014    Encephalopathy acute 5/29/2016    Essential hypertension 1/28/2016    Fibromyalgia     GERD (gastroesophageal reflux disease)     History of total bilateral knee replacement 5/28/2016    Hx of blood clots     left leg and lung    Hyperlipidemia     Hypertension     Incontinence of urine     Irregular heartbeat     DR. Espinoza [place] Medication monitoring encounter 6/13/2018    Morbid obesity with BMI of 45.0-49.9, adult ([facility] 1/28/2016    Obstructive sleep apnea syndrome     Osteoarthritis     Primary osteoarthritis of left knee 5/27/2016    PVC (premature ventricular contraction) 1/28/2016    S/P right and left heart catheterization 10/2/2015    Sleep apnea     no machine    SOB (shortness of breath)     Spinal stenosis of lumbar region with neurogenic claudication 5/13/2013    Supplemental oxygen dependent 12/8/2017    Urine frequency     UTI (urinary tract infection)     hospitalized early july 2014 for kidney infection       Past Surgical History:   Procedure Laterality Date    BRONCHOSCOPY Left 8/16/2017    BRONCHOSCOPY ALVEOLAR LAVAGE LOWER LOBE performed by Yulissa Bueno MD at [address]  x 3    no stents    COLONOSCOPY  4 28 14    polyps biopsies     FOOT SURGERY Left     calcium deposit removal from top of foot    HYSTERECTOMY      JOINT REPLACEMENT Bilateral 5/27/2016    bilat total knees    NERVE BLOCK  5/13/2013    caudal celestone 6mg    NERVE BLOCK  5/28/13    Caudal #2, Celestone 9mg    NERVE BLOCK  2/14/14    rt hip inj celestone 9 mg    NERVE BLOCK  07/14/2014    caudal# 3- celestone 9 mg    NERVE BLOCK  7-21-14    caudal epidural #2, decadron 7mg, fentanyl 25mcg    NERVE BLOCK  10/2/14    duramorph 1.5  decadron 5mg    NERVE BLOCK  11/9/2015    caudal# 1 celestone 9mg    NERVE BLOCK  11/16/15    caudal #2  decadron 10mg    NERVE BLOCK  11/23/15    duramorph 1mg celestone 9mg    NERVE BLOCK  03/28/2018    CAUDAL EPIDURAL STEROID BLOCK  DECADRON 10 MG     NERVE BLOCK  05/23/2018    caudal # decadron 7mg    NERVE BLOCK  08/28/2018    natalia transforminal # 1, decadron 10mg gadoteridol, LONG NEEDLES    IA PERQ VERT AGMNTJ CAVITY CRTJ UNI/BI CANNULJ LMBR N/A 10/29/2018    KYPHOPLASTY L1 performed by Lamberto Madrigal MD at [address] Right 5/14/15    total hip replacement       Allergies   Allergen Reactions    Rosuvastatin Calcium Anaphylaxis     Hair fell out    Statins Anaphylaxis     Hair fell out    Bactrim [Sulfamethoxazole-Trimethoprim] Diarrhea    Caffeine Other (See Comments)     pain  pain    Dye [Iodides] Other (See Comments)     Iv dye= blood clots in arm    Food      Tomatoes, pain    Ioxaglate Other (See Comments)     Iv dye= blood clots in arm    Tomato     Dicloxacillin Rash    Pcn [Penicillins] Rash         Current Outpatient Medications:     polyethylene glycol (GLYCOLAX) 17 GM/SCOOP powder, Take 17 g by mouth as needed, Disp: , Rfl:     psyllium (KONSYL) 28.3 % PACK, Take 1 packet by mouth daily, Disp: , Rfl:     NONFORMULARY, nervive once a day, Disp: , Rfl:     oxyCODONE (ROXICODONE) 5 MG immediate release tablet, Take 1 tablet by mouth 4 times daily as needed for Pain for up to 30 days. , Disp: 120 tablet, Rfl: 0    gabapentin (NEURONTIN) 100 MG capsule, Take 1 capsule by mouth 3 times daily for 30 days. , Disp: 90 capsule, Rfl: 0    metoprolol tartrate (LOPRESSOR) 50 MG tablet, Take 1 tablet by mouth 2 times daily, Disp: 180 tablet, Rfl: 1    Lidocaine HCl (ASPERCREME LIDOCAINE) 4 % LIQD, Apply topically, Disp: , Rfl:     DULoxetine (CYMBALTA) 30 MG extended release capsule, TAKE 1 CAPSULE TWICE A DAY, Disp: 180 capsule, Rfl: 3    donepezil (ARICEPT) 5 MG tablet, Take 1 tablet by mouth nightly, Disp: 90 tablet, Rfl: 1    aspirin 81 MG tablet, Take 81 mg by mouth daily, Disp: , Rfl:     Cream Base CREA, Apply 1 g topically See Admin Instructions To area of pain, Disp: 120 g, Rfl: 2    Catheters (STANDARD CARE EXTERNAL CATH) MISC, 1 each by Does not apply route daily, Disp: 5 each, Rfl: 1    amLODIPine (NORVASC) 5 MG tablet, Take 1 tablet by mouth daily, Disp: 30 tablet, Rfl: 3    Catheters (STANDARD CARE EXTERNAL CATH) MISC, 1 Device by Does not apply route daily, Disp: 30 each, Rfl: 2    neomycin-polymyxin-dexameth 3.5-05126-4.1 OINT, , Disp: , Rfl:     levocetirizine (XYZAL) 5 MG tablet, take 1 tablet by mouth once daily AT NIGHT, Disp: 30 tablet, Rfl: 2    naproxen sodium (ANAPROX) 220 MG tablet, Take 220 mg by mouth 2 times daily (with meals), Disp: , Rfl:     fluticasone (FLONASE) 50 MCG/ACT nasal spray, instill 2 sprays into each nostril once daily, Disp: , Rfl:     calcium carbonate (OYSTER SHELL CALCIUM 500 MG) 1250 (500 Ca) MG tablet, Take 1 tablet by mouth daily With magnesium, zinc, Disp: , Rfl:     albuterol sulfate  (90 Base) MCG/ACT inhaler, Inhale 2 puffs into the lungs 4 times daily as needed for Wheezing, Disp: 1 Inhaler, Rfl: 2    ofloxacin (OCUFLOX) 0.3 % solution, Place into both eyes daily as needed , Disp: , Rfl:     docusate sodium (COLACE) 100 MG capsule, Take 100 mg by mouth daily as needed for Constipation, Disp: , Rfl:     Misc. Devices (WALKER) MISC, 1 each by Does not apply route daily Upright walker with wheels and seat, Disp: 1 each, Rfl: 0    Family History   Problem Relation Age of Onset    Stomach Cancer Brother         stomach    High Blood Pressure Mother     Heart Disease Sister         irregular heartbeat       Social History     Socioeconomic History    Marital status:      Spouse name: Not on file    Number of children: Not on file    Years of education: Not on file    Highest education level: Not on file   Occupational History    Not on file   Tobacco Use    Smoking status: Former Smoker     Packs/day: 1.00     Years: 18.00     Pack years: 18.00     Quit date: 1993     Years since quittin.2    Smokeless tobacco: Never Used   Vaping Use    Vaping Use: Never used   Substance and Sexual Activity    Alcohol use: No    Drug use: No    Sexual activity: Not Currently   Other Topics Concern    Not on file   Social History Narrative    Not on file     Social Determinants of Health     Financial Resource Strain: Low Risk     Difficulty of Paying Living Expenses: Not hard at all   Food Insecurity: No Food Insecurity    Worried About [facility] in the Last Year: Never true    [facility] in the Last Year: Never true   Transportation Needs: No Transportation Needs    Lack of Transportation (Medical): No    Lack of Transportation (Non-Medical): No   Physical Activity: Unknown    Days of Exercise per Week: 0 days    Minutes of Exercise per Session: Not on file   Stress: No Stress Concern Present    Feeling of Stress :  Only a little   Social Connections: Unknown    Frequency of Communication with Friends and Family: Twice a week    Frequency of Social Gatherings with Friends and Family: Twice a week    Attends Alevism Services: Not on file    Active Member of Clubs or Organizations: Not on file    Attends Club or Organization Meetings: Not on file    Marital Status:    Intimate Partner Violence:     Fear of Current or Ex-Partner:     Emotionally Abused:     Physically Abused:     Sexually Abused:          Review of Systems:  Review of Systems   Constitutional: Negative. HENT: Negative. Eyes: Negative. Cardiovascular: Negative. Respiratory: Positive for cough and shortness of breath. Oxygen at night   Endocrine: Negative. Hematologic/Lymphatic: Negative. Skin: Negative. Musculoskeletal: Positive for back pain and joint pain. Gastrointestinal: Positive for constipation. Managed   Genitourinary: Negative. Neurological: Positive for numbness and weakness. Gait issue, uses a walker   Psychiatric/Behavioral: Negative. Physical Exam:  There were no vitals taken for this visit. Physical Exam  Skin:         Neurological:      Mental Status: She is alert and oriented to person, place, and time. Psychiatric:         Mood and Affect: Mood normal.         Thought Content: Thought content normal.           Assessment:      Problem List Items Addressed This Visit     Spinal stenosis of lumbar region with neurogenic claudication - Primary (Chronic)    S/P kyphoplasty    Primary osteoarthritis of left knee    Osteopenia of lumbar spine    Medication monitoring encounter (Chronic)    History of total bilateral knee replacement    Fibromyalgia    DDD (degenerative disc disease), lumbar    Closed compression fracture of L1 lumbar vertebra    Cervical spine ankylosis    Cervical spinal stenosis            Treatment Plan:  DISCUSSION: Treatment options discussed withpatient and all questions answered to patient's satisfaction.      Possible side effects, risk of tolerance and or dependence and alternative treatments discussed    Obtaining appropriate analgesic effect of treatment   No signs of potential drug abuse or diversion identified    [x] Ill effects of being on chronic pain medications such as sleep disturbances, respiratory depression, hormonal changes, withdrawal symptoms, chronic opioid dependence and tolerance as well as risk of taking opioids with Benzodiazepines and taking opioids along with alcohol,  werediscussed with patient. I had asked the patient to minimize medication use and utilize pain medications only for uncontrolled rest pain or pain with exertional activities. I advised patient not to self-escalate painmedications without consulting with us. At each of patient's future visits we will try to taper pain medications, while adjusting the adjunct medications, and re-evaluating for Physical Therapy to improve spinal andjoint strength. We will continue to have discussions to decrease pain medications as tolerated. Counseled patient on effects their pain medication and /or their medical condition mayhave on their  ability to drive or operate machinery. Instructed not to drive or operate machinery if drowsy     I also discussed with the patient regarding the dangers of combining narcotic pain medication with tranquilizers, alcohol or illegal drugs or taking the medication any way other than prescribed. The dangers were discussed  including respiratory depression and death. Patient was told to tell  all  physicians regarding the medications he is getting from pain clinic. Patient is warned not to take any unprescribed medications over-the-countermedications that can depress breathing . Patient is advised to talk to the pharmacist or physicians if planning to take any over-the-counter medications before  takeing them. Patient is strongly advised to avoid tranquilizers or  relaxants, illegal drugs  or any medications that can depress breathing  Patient is also advised to tell us if there is any changes in their medications from other physicians.       1. Will switch to percocet 7.5 mg, to help better manage pain while she is going through PT       TREATMENT OPTIONS:       Medication Agreement Requirements Met  Continue Opioid therapy  Script written for  percocet  Follow up appointment made

## 2021-08-30 ENCOUNTER — CARE COORDINATION (OUTPATIENT)
Dept: CARE COORDINATION | Age: 76
End: 2021-08-30

## 2021-08-30 DIAGNOSIS — R15.1 FECAL SMEARING: Primary | ICD-10-CM

## 2021-09-01 DIAGNOSIS — F03.90 DEMENTIA WITHOUT BEHAVIORAL DISTURBANCE, UNSPECIFIED DEMENTIA TYPE: ICD-10-CM

## 2021-09-01 RX ORDER — DONEPEZIL HYDROCHLORIDE 5 MG/1
TABLET, FILM COATED ORAL
Qty: 90 TABLET | Refills: 1 | Status: SHIPPED | OUTPATIENT
Start: 2021-09-01

## 2021-09-07 ENCOUNTER — CARE COORDINATION (OUTPATIENT)
Dept: CARE COORDINATION | Age: 76
End: 2021-09-07

## 2021-09-07 NOTE — CARE COORDINATION
Patient called asking about a referral for GI. [facility] explained that it was sent. Patient will call Dr. Bell's office and let [facility] know if there are any questions. She also asked for Dr. Norene Claude contact information. This was provided.

## 2021-09-08 RX ORDER — LEVOCETIRIZINE DIHYDROCHLORIDE 5 MG/1
TABLET, FILM COATED ORAL
Qty: 30 TABLET | Refills: 2 | Status: SHIPPED | OUTPATIENT
Start: 2021-09-08

## 2021-09-08 NOTE — TELEPHONE ENCOUNTER
Patient requesting refill of medication, levocetirizine. Last refill 8.24.20.   RX pending your approval.

## 2021-09-09 RX ORDER — CLINDAMYCIN HYDROCHLORIDE 300 MG/1
CAPSULE ORAL
COMMUNITY
Start: 2021-08-31 | End: 2021-09-23

## 2021-09-09 RX ORDER — IBUPROFEN 800 MG/1
800 TABLET ORAL EVERY 6 HOURS PRN
COMMUNITY
Start: 2021-08-31 | End: 2021-10-02

## 2021-09-10 ENCOUNTER — HOSPITAL ENCOUNTER (OUTPATIENT)
Dept: PAIN MANAGEMENT | Age: 76
Discharge: HOME | End: 2021-09-10
Payer: MEDICARE

## 2021-09-10 DIAGNOSIS — M85.88 OSTEOPENIA OF LUMBAR SPINE: ICD-10-CM

## 2021-09-10 DIAGNOSIS — M48.02 CERVICAL SPINAL STENOSIS: ICD-10-CM

## 2021-09-10 DIAGNOSIS — M54.16 LUMBAR RADICULOPATHY: ICD-10-CM

## 2021-09-10 DIAGNOSIS — M79.7 FIBROMYALGIA: ICD-10-CM

## 2021-09-10 DIAGNOSIS — M43.10 ACQUIRED SPONDYLOLISTHESIS: ICD-10-CM

## 2021-09-10 DIAGNOSIS — M54.50 ACUTE MIDLINE LOW BACK PAIN, UNSPECIFIED WHETHER SCIATICA PRESENT: ICD-10-CM

## 2021-09-10 DIAGNOSIS — M48.062 SPINAL STENOSIS OF LUMBAR REGION WITH NEUROGENIC CLAUDICATION: Primary | ICD-10-CM

## 2021-09-10 DIAGNOSIS — M43.22 CERVICAL SPINE ANKYLOSIS: ICD-10-CM

## 2021-09-10 DIAGNOSIS — Z98.890 S/P KYPHOPLASTY: ICD-10-CM

## 2021-09-10 DIAGNOSIS — Z51.81 MEDICATION MONITORING ENCOUNTER: ICD-10-CM

## 2021-09-10 DIAGNOSIS — Z79.891 CHRONIC PRESCRIPTION OPIATE USE: Chronic | ICD-10-CM

## 2021-09-10 DIAGNOSIS — M16.0 PRIMARY OSTEOARTHRITIS OF BOTH HIPS: ICD-10-CM

## 2021-09-10 DIAGNOSIS — M51.36 DDD (DEGENERATIVE DISC DISEASE), LUMBAR: ICD-10-CM

## 2021-09-10 PROCEDURE — 99213 OFFICE O/P EST LOW 20 MIN: CPT

## 2021-09-10 PROCEDURE — 99443 PR PHYS/QHP TELEPHONE EVALUATION 21-30 MIN: CPT | Performed by: NURSE PRACTITIONER

## 2021-09-10 RX ORDER — EAR PLUGS
1000 EACH OTIC (EAR) DAILY
COMMUNITY

## 2021-09-10 RX ORDER — OXYCODONE AND ACETAMINOPHEN 7.5; 325 MG/1; MG/1
1 TABLET ORAL EVERY 6 HOURS PRN
Qty: 120 TABLET | Refills: 0 | Status: ON HOLD | OUTPATIENT
Start: 2021-09-13 | End: 2021-10-05 | Stop reason: SDUPTHER

## 2021-09-10 ASSESSMENT — ENCOUNTER SYMPTOMS
BACK PAIN: 1
EYES NEGATIVE: 1
COUGH: 1
CONSTIPATION: 1

## 2021-09-10 NOTE — PROGRESS NOTES
Rick 88 PROGRESS NOTE      Patient  completed []  video visit   [x]   phone call: 28        Minutes :       [x]    to  review Medication Agreement    []  Follow up after procedure   []  Discuss treatment options      Location:  Provider:  working from    [x]    home    []   [facility] - JAUN CHIN ,   patient at home       Chief Complaint: low back pain    She c/o low back pain which does not radiate, The pain shoots down her legs. She has history of kyphoplasty L1 in 2018. She had lumbar epidural injection 3/2020 with 50% relief. She has considered seeing a surgeon for her back. She currently is in Physical therapy, 2 times a week, She feels she is getting stronger. and she can stand longer. She went to a pain clinic in [place] for another opinion and had recent lumbar MRI done. She has  a virtual appt at the pain clinic in [place] next week. She states lumbar median branch nerves was discussed by them. MPRESSION:     *  Severe multilevel degenerative changes of the lumbar spine as mentioned in the body of the report.  Mass effect is noted on the exiting L4 nerve roots and L5 nerve roots at L4-L5 and L5-S1 respectively. *  7 mm anterolisthesis of L4 on L5. *  Remote compression deformity with likely kyphoplasty material at the L1 vertebral body. She states is taking OTC med, nervine, for neuropathy. She has history of right total hip and bilateral knee arthroplasty. She states was able to drive and  go to her great grandsons birthday party. Back Pain  This is a chronic problem. The problem occurs constantly. The problem has been gradually improving since onset. The pain is present in the lumbar spine. The quality of the pain is described as aching. Radiates to: shoots doewn legs  at times. The pain is at a severity of 5/10. The pain is moderate. The pain is the same all the time. Exacerbated by: activity getting up. Associated symptoms include numbness and weakness.  She has tried analgesics, heat and ice for the symptoms. Treatment goals:  Functional status: walk with no pain      Aberrancy:   Any alcoholic beverages   no         Any illegal drugs  no       Analgesia:   5                  Adverse  Effects :no    ADL;s :in PT    Data:    When was thelast UDS:   12-         Was the UDS appropriate:  [x] yes []   no      Record/Diagnostics Review:      As above, I did review the imaging     12/14/2020  1:43 PM - Jordin, [facility] Incoming Lab Results From [facility]    Component Value Ref Range & Units Status Collected Lab   Pain Management Drug Panel Interp, Urine Consistent   Final 12/10/2020 12:32 PM [facility]   (NOTE)   ________________________________________________________________   DRUGS EXPECTED:   OXYCODONE [TODAY]   ________________________________________________________________   CONSISTENT with medications provided:   OXYCODONE : based on oxycodone, noroxycodone, noroxymorphone   ________________________________________________________________   INTERPRETIVE INFORMATION: Targeted drug profile Interp   Interpretation depends on accuracy and completeness of patient   medication information submitted by client. 6-Acetylmorphine, Ur Not Detected   Final 12/10/              MR lumbar without contrast    Narrative    MR LUMBAR SPINE WO CONT     CLINICAL INFORMATION: Bilateral lower back pain and numbness in the the lower extremities for 5 years. No known injury. History of fibromyalgia. COMPARISON: 6/28/2005     PROCEDURE: Routine lumbosacral spine protocol MRI was obtained without contrast material. Multisequence, multiplanar imaging was obtained. FINDINGS:     Modic type II changes at L2-L3.  Diffuse osteophytic spurring within the lumbar spine. 7 mm anterolisthesis of L4 on L5.  4 mm retrolisthesis of L5 on S1. There is compression deformity and loss of signal within the L1 vertebral body likely relating to prior kyphoplasty.      Loss of disc space height at L2-L3, L3-L4, L4-L5, and L5-S1. No suspicious bone marrow signal.     The visualized cord is within normal limits.  Normal appearance of the cauda equina nerve roots.       The conus medullaris terminates at L1-L2. Paravertebral and visualized intraabdominal structures are unremarkable. Level by level:     L1-L2: Small left lateral zone disc protrusion.  No significant neuroforaminal narrowing or central canal stenosis. L2-L3: Broad-based posterior disc bulge extending from the right subarticular zone through the left extraforaminal zone.  There is accompanying endplate osteophyte formation bilaterally, right greater than left.  Significant facet hypertrophic changes.  Resulting moderate thecal sac narrowing and   moderate left and mild right neuroforaminal narrowing. L3-L4: Broad-based posterior disc bulge extending from the right to left subarticular zone.  There is significant facet arthrosis causing moderate thecal sac narrowing and mild left and right neural foraminal narrowing. L4-L5: Broad-based posterior disc bulge extending from the right to left neuroforaminal zone with advanced facet arthrosis causing moderate to severe thecal sac narrowing and severe bilateral neural foraminal narrowing.  There is mass effect on the exiting L4 nerve roots bilaterally. L5-S1: There is a circumferential disc bulge with mild facet arthrosis causing mild thecal sac narrowing and moderate to severe bilateral neural foraminal narrowing.  There is mild mass effect on the exiting L5 nerve roots bilaterally. IMPRESSION:     *  Severe multilevel degenerative changes of the lumbar spine as mentioned in the body of the report.  Mass effect is noted on the exiting L4 nerve roots and L5 nerve roots at L4-L5 and L5-S1 respectively. *  7 mm anterolisthesis of L4 on L5. *  Remote compression deformity with likely kyphoplasty material at the L1 vertebral body.      Approved by Resident Jeffery Shelton DO Stephane  on 9/7/2021 8:12 AM     Jake BAEZA have personally reviewed the image(s) and agree with and/or edited the report     Workstation:[id number]     Finalized by Jake Zavala on 9/7/2021 2:56 PM  Other Result Text    Interface - Rad Results/Orders In 1 - 09/07/2021  2:57 PM EDT   Formatting of this note might be different from the original.   MR LUMBAR SPINE WO CONT     CLINICAL INFORMATION: Bilateral lower back pain and numbness in the the lower extremities for 5 years. No known injury. History of fibromyalgia. COMPARISON: 6/28/2005     PROCEDURE: Routine lumbosacral spine protocol MRI was obtained without contrast material. Multisequence, multiplanar imaging was obtained. FINDINGS:     Modic type II changes at L2-L3.  Diffuse osteophytic spurring within the lumbar spine. 7 mm anterolisthesis of L4 on L5.  4 mm retrolisthesis of L5 on S1. There is compression deformity and loss of signal within the L1 vertebral body likely relating to prior kyphoplasty. Loss of disc space height at L2-L3, L3-L4, L4-L5, and L5-S1. No suspicious bone marrow signal.     The visualized cord is within normal limits.  Normal appearance of the cauda equina nerve roots.       The conus medullaris terminates at L1-L2. Paravertebral and visualized intraabdominal structures are unremarkable. Level by level:     L1-L2: Small left lateral zone disc protrusion.  No significant neuroforaminal narrowing or central canal stenosis. L2-L3: Broad-based posterior disc bulge extending from the right subarticular zone through the left extraforaminal zone.  There is accompanying endplate osteophyte formation bilaterally, right greater than left.  Significant facet hypertrophic changes.  Resulting moderate thecal sac narrowing and   moderate left and mild right neuroforaminal narrowing.      L3-L4: Broad-based posterior disc bulge extending from the right to left subarticular zone. Erika Drew is significant facet arthrosis causing moderate thecal sac narrowing and mild left and right neural foraminal narrowing. L4-L5: Broad-based posterior disc bulge extending from the right to left neuroforaminal zone with advanced facet arthrosis causing moderate to severe thecal sac narrowing and severe bilateral neural foraminal narrowing.  There is mass effect on the exiting L4 nerve roots bilaterally. L5-S1: There is a circumferential disc bulge with mild facet arthrosis causing mild thecal sac narrowing and moderate to severe bilateral neural foraminal narrowing.  There is mild mass effect on the exiting L5 nerve roots bilaterally. IMPRESSION:     *  Severe multilevel degenerative changes of the lumbar spine as mentioned in the body of the report.  Mass effect is noted on the exiting L4 nerve roots and L5 nerve roots at L4-L5 and L5-S1 respectively. *  7 mm anterolisthesis of L4 on L5. *  Remote compression deformity with likely kyphoplasty material at the L1 vertebral body. Approved by Resident Clementinrav Rangel DO  on 9/7/2021 8:12 AM     Penny BAEZA have personally reviewed the image(s) and agree with and/or edited the report     Workstation:[id number]     Finalized by Penny Julio on 9/7/2021 2:56 PM     Date: 09/07/21   Received From: [facility]               Pill count: appropriate    fill date :9-    Morphine equivalent dose as reported on [place]:45  Periodic Controlled Substance Monitoring: Possible medication side effects, risk of tolerance/dependence & alternative treatments discussed., No signs of potential drug abuse or diversion identified. , Assessed functional status., Obtaining appropriate analgesic effect of treatment. David Diaz, KARIN - CNP)  Review of[place]ARRS does not show any aberrant prescription behavior. Medication is helping the patient stay active. Patient denies any side effects and reports adequate analgesia. No sign of misuse/abuse.             Past Medical History:   Diagnosis Date    Abnormality of rib determined by X-ray 1/28/2016    Acute cystitis without hematuria 6/16/2018    Acute exacerbation of chronic bronchitis (HCC)     Acute on chronic diastolic heart failure ([facility] 1/28/2016    Adjustment disorder with mixed anxiety and depressed mood 6/26/2015    Mild     Anemia 3/5/2014    Back pain     [facility] with [facility]16/2017    Bronchitis     Chronic bronchitis (HCC) 1/28/2016    Chronic cough 7/23/2018    Degenerative joint disease (DJD) of hip 5/14/2015    Dyslipidemia 3/5/2014    Encephalopathy acute 5/29/2016    Essential hypertension 1/28/2016    Fibromyalgia     GERD (gastroesophageal reflux disease)     History of total bilateral knee replacement 5/28/2016    Hx of blood clots     left leg and lung    Hyperlipidemia     Hypertension     Incontinence of urine     Irregular heartbeat     DR. Ruth Acevedo Medication monitoring encounter 6/13/2018    Morbid obesity with BMI of 45.0-49.9, adult ([facility] 1/28/2016    Obstructive sleep apnea syndrome     Osteoarthritis     Primary osteoarthritis of left knee 5/27/2016    PVC (premature ventricular contraction) 1/28/2016    S/P right and left heart catheterization 10/2/2015    Sleep apnea     no machine    SOB (shortness of breath)     Spinal stenosis of lumbar region with neurogenic claudication 5/13/2013    Supplemental oxygen dependent 12/8/2017    Urine frequency     UTI (urinary tract infection)     hospitalized early july 2014 for kidney infection       Past Surgical History:   Procedure Laterality Date    BRONCHOSCOPY Left 8/16/2017    BRONCHOSCOPY ALVEOLAR LAVAGE LOWER LOBE performed by Fermin Poe MD at [address]  x 3    no stents    COLONOSCOPY  4 28 14    polyps biopsies     FOOT SURGERY Left     calcium deposit removal from top of foot    HYSTERECTOMY      JOINT REPLACEMENT Bilateral bedtime, Disp: 90 tablet, Rfl: 1    psyllium (KONSYL) 28.3 % PACK, Take 1 packet by mouth daily, Disp: , Rfl:     gabapentin (NEURONTIN) 100 MG capsule, Take 1 capsule by mouth 3 times daily for 30 days. , Disp: 90 capsule, Rfl: 0    oxyCODONE-acetaminophen (PERCOCET) 7.5-325 MG per tablet, Take 1 tablet by mouth every 6 hours as needed for Pain for up to 30 days. , Disp: 120 tablet, Rfl: 0    metoprolol tartrate (LOPRESSOR) 50 MG tablet, Take 1 tablet by mouth 2 times daily, Disp: 180 tablet, Rfl: 1    Lidocaine HCl (ASPERCREME LIDOCAINE) 4 % LIQD, Apply topically, Disp: , Rfl:     DULoxetine (CYMBALTA) 30 MG extended release capsule, TAKE 1 CAPSULE TWICE A DAY, Disp: 180 capsule, Rfl: 3    aspirin 81 MG tablet, Take 81 mg by mouth daily, Disp: , Rfl:     ibuprofen (ADVIL;MOTRIN) 800 MG tablet, take 1 tablet by mouth every 6 to 8 hours if needed, Disp: , Rfl:     polyethylene glycol (GLYCOLAX) 17 GM/SCOOP powder, Take 17 g by mouth as needed, Disp: , Rfl:     NONFORMULARY, nervive once a day, Disp: , Rfl:     Cream Base CREA, Apply 1 g topically See Admin Instructions To area of pain, Disp: 120 g, Rfl: 2    Catheters (STANDARD CARE EXTERNAL CATH) MISC, 1 each by Does not apply route daily, Disp: 5 each, Rfl: 1    amLODIPine (NORVASC) 5 MG tablet, Take 1 tablet by mouth daily, Disp: 30 tablet, Rfl: 3    Catheters (STANDARD CARE EXTERNAL CATH) MISC, 1 Device by Does not apply route daily, Disp: 30 each, Rfl: 2    neomycin-polymyxin-dexameth 3.5-80221-3.1 OINT, , Disp: , Rfl:     fluticasone (FLONASE) 50 MCG/ACT nasal spray, instill 2 sprays into each nostril once daily, Disp: , Rfl:     calcium carbonate (OYSTER SHELL CALCIUM 500 MG) 1250 (500 Ca) MG tablet, Take 1 tablet by mouth daily With magnesium, zinc, Disp: , Rfl:     albuterol sulfate  (90 Base) MCG/ACT inhaler, Inhale 2 puffs into the lungs 4 times daily as needed for Wheezing, Disp: 1 Inhaler, Rfl: 2    ofloxacin (OCUFLOX) 0.3 % solution, Place into both eyes daily as needed , Disp: , Rfl:     docusate sodium (COLACE) 100 MG capsule, Take 100 mg by mouth daily as needed for Constipation, Disp: , Rfl:     Misc. Devices (WALKER) MISC, 1 each by Does not apply route daily Upright walker with wheels and seat, Disp: 1 each, Rfl: 0    Family History   Problem Relation Age of Onset    Stomach Cancer Brother         stomach    High Blood Pressure Mother     Heart Disease Sister         irregular heartbeat       Social History     Socioeconomic History    Marital status:      Spouse name: Not on file    Number of children: Not on file    Years of education: Not on file    Highest education level: Not on file   Occupational History    Not on file   Tobacco Use    Smoking status: Former Smoker     Packs/day: 1.00     Years: 18.00     Pack years: 18.00     Quit date: 1993     Years since quittin.3    Smokeless tobacco: Never Used   Vaping Use    Vaping Use: Never used   Substance and Sexual Activity    Alcohol use: No    Drug use: No    Sexual activity: Not Currently   Other Topics Concern    Not on file   Social History Narrative    Not on file     Social Determinants of Health     Financial Resource Strain: Low Risk     Difficulty of Paying Living Expenses: Not hard at all   Food Insecurity: No Food Insecurity    Worried About [address] [employer] in the Last Year: Never true    [facility] in the Last Year: Never true   Transportation Needs: No Transportation Needs    Lack of Transportation (Medical): No    Lack of Transportation (Non-Medical): No   Physical Activity: Unknown    Days of Exercise per Week: 0 days    Minutes of Exercise per Session: Not on file   Stress: No Stress Concern Present    Feeling of Stress :  Only a little   Social Connections: Unknown    Frequency of Communication with Friends and Family: Twice a week    Frequency of Social Gatherings with Friends and Family: Twice a week    Attends Yazdanism Services: Not on file    Active Member of Clubs or Organizations: Not on file    Attends Club or Organization Meetings: Not on file    Marital Status:    Intimate Partner Violence:     Fear of Current or Ex-Partner:     Emotionally Abused:     Physically Abused:     Sexually Abused:          Review of Systems:  Review of Systems   Constitutional: Positive for malaise/fatigue. HENT: Positive for hearing loss. Eyes: Negative. Cardiovascular: Negative. Respiratory: Positive for cough. Endocrine: Negative. Hematologic/Lymphatic: Negative. Skin: Negative. Musculoskeletal: Positive for back pain and joint pain. Gastrointestinal: Positive for constipation. Genitourinary: Positive for nocturia. Neurological: Positive for loss of balance, numbness and weakness. Walker   Psychiatric/Behavioral: The patient is nervous/anxious. Physical Exam:  There were no vitals taken for this visit. Physical Exam  Skin:         Neurological:      Mental Status: She is alert and oriented to person, place, and time. Psychiatric:         Mood and Affect: Mood normal.         Thought Content:  Thought content normal.           Assessment:      Problem List Items Addressed This Visit     Spinal stenosis of lumbar region with neurogenic claudication - Primary (Chronic)    Relevant Medications    ibuprofen (ADVIL;MOTRIN) 800 MG tablet    S/P kyphoplasty    Osteopenia of lumbar spine    Medication monitoring encounter (Chronic)    Fibromyalgia    Relevant Medications    ibuprofen (ADVIL;MOTRIN) 800 MG tablet    Degenerative joint disease (DJD) of hip    Relevant Medications    ibuprofen (ADVIL;MOTRIN) 800 MG tablet    DDD (degenerative disc disease), lumbar    Relevant Medications    ibuprofen (ADVIL;MOTRIN) 800 MG tablet    Chronic prescription opiate use (Chronic)    Cervical spine ankylosis    Acquired spondylolisthesis            Treatment Plan:  DISCUSSION: Treatment options discussed withpatient and all questions answered to patient's satisfaction. Possible side effects, risk of tolerance and or dependence and alternative treatments discussed    Obtaining appropriate analgesic effect of treatment   No signs of potential drug abuse or diversion identified    [x] Ill effects of being on chronic pain medications such as sleep disturbances, respiratory depression, hormonal changes, withdrawal symptoms, chronic opioid dependence and tolerance as well as risk of taking opioids with Benzodiazepines and taking opioids along with alcohol,  werediscussed with patient. I had asked the patient to minimize medication use and utilize pain medications only for uncontrolled rest pain or pain with exertional activities. I advised patient not to self-escalate painmedications without consulting with us. At each of patient's future visits we will try to taper pain medications, while adjusting the adjunct medications, and re-evaluating for Physical Therapy to improve spinal andjoint strength. We will continue to have discussions to decrease pain medications as tolerated. Counseled patient on effects their pain medication and /or their medical condition mayhave on their  ability to drive or operate machinery. Instructed not to drive or operate machinery if drowsy     I also discussed with the patient regarding the dangers of combining narcotic pain medication with tranquilizers, alcohol or illegal drugs or taking the medication any way other than prescribed. The dangers were discussed  including respiratory depression and death. Patient was told to tell  all  physicians regarding the medications he is getting from pain clinic. Patient is warned not to take any unprescribed medications over-the-countermedications that can depress breathing . Patient is advised to talk to the pharmacist or physicians if planning to take any over-the-counter medications before  takeing them.  Patient is strongly advised to avoid tranquilizers or  relaxants, illegal drugs  or any medications that can depress breathing  Patient is also advised to tell us if there is any changes in their medications from other physicians. 1.  She has questions about further treatment, discussed having next visit with Dr Nico Johnson.     TREATMENT OPTIONS:       Medication Agreement Requirements Met  Continue Opioid therapy  Script written for  percocet  Follow up appointment made

## 2021-09-21 ENCOUNTER — CARE COORDINATION (OUTPATIENT)
Dept: CARE COORDINATION | Age: 76
End: 2021-09-21

## 2021-09-21 DIAGNOSIS — Z98.890 S/P KYPHOPLASTY: ICD-10-CM

## 2021-09-21 DIAGNOSIS — M43.22 CERVICAL SPINE ANKYLOSIS: ICD-10-CM

## 2021-09-21 DIAGNOSIS — M51.36 DDD (DEGENERATIVE DISC DISEASE), LUMBAR: ICD-10-CM

## 2021-09-21 DIAGNOSIS — M48.062 SPINAL STENOSIS OF LUMBAR REGION WITH NEUROGENIC CLAUDICATION: Chronic | ICD-10-CM

## 2021-09-21 DIAGNOSIS — M85.88 OSTEOPENIA OF LUMBAR SPINE: ICD-10-CM

## 2021-09-21 DIAGNOSIS — I50.33 ACUTE ON CHRONIC DIASTOLIC HEART FAILURE (HCC): ICD-10-CM

## 2021-09-21 DIAGNOSIS — M16.0 PRIMARY OSTEOARTHRITIS OF BOTH HIPS: ICD-10-CM

## 2021-09-21 DIAGNOSIS — Z96.653 HISTORY OF TOTAL BILATERAL KNEE REPLACEMENT: ICD-10-CM

## 2021-09-21 DIAGNOSIS — M79.7 FIBROMYALGIA: ICD-10-CM

## 2021-09-21 DIAGNOSIS — M48.062 SPINAL STENOSIS OF LUMBAR REGION WITH NEUROGENIC CLAUDICATION: Primary | ICD-10-CM

## 2021-09-21 DIAGNOSIS — M54.16 LUMBAR RADICULOPATHY: ICD-10-CM

## 2021-09-21 DIAGNOSIS — M25.552 PAIN OF LEFT HIP JOINT: ICD-10-CM

## 2021-09-21 RX ORDER — GABAPENTIN 100 MG/1
CAPSULE ORAL
Qty: 90 CAPSULE | Refills: 2 | Status: SHIPPED | OUTPATIENT
Start: 2021-09-21 | End: 2021-09-23 | Stop reason: DRUGHIGH

## 2021-09-22 ENCOUNTER — CARE COORDINATION (OUTPATIENT)
Dept: CARE COORDINATION | Age: 76
End: 2021-09-22

## 2021-09-22 SDOH — ECONOMIC STABILITY: TRANSPORTATION INSECURITY
IN THE PAST 12 MONTHS, HAS LACK OF TRANSPORTATION KEPT YOU FROM MEETINGS, WORK, OR FROM GETTING THINGS NEEDED FOR DAILY LIVING?: YES

## 2021-09-22 SDOH — ECONOMIC STABILITY: TRANSPORTATION INSECURITY
IN THE PAST 12 MONTHS, HAS THE LACK OF TRANSPORTATION KEPT YOU FROM MEDICAL APPOINTMENTS OR FROM GETTING MEDICATIONS?: YES

## 2021-09-22 NOTE — CARE COORDINATION
AC received notice from [facility] that they can not accept patient at this time. Per patient [place] told her the same thing. Patient called shortly after in distress stating she can barely tolerate the back and hip pain any longer. She is also having intermittent chest discomfort. She has not called the aide agencies that were provided to her yesterday. Per patient she is unable to care for herself at home. ACM recommended going to the ED and getting evaluated. Possibly going to a SNF from there to regain some strength. Patient agreed. She also asked if she would qualify for any transportation in the future, like [employer]?   She agreed to a Referral to [facility] and [facility] [place]..

## 2021-09-23 ENCOUNTER — CARE COORDINATION (OUTPATIENT)
Dept: CARE COORDINATION | Age: 76
End: 2021-09-23

## 2021-09-23 ENCOUNTER — HOSPITAL ENCOUNTER (OUTPATIENT)
Age: 76
Setting detail: OBSERVATION
Discharge: NURSING, REHAB OR LONG-TERM CARE | End: 2021-09-28
Attending: EMERGENCY MEDICINE | Admitting: INTERNAL MEDICINE
Payer: MEDICARE

## 2021-09-23 DIAGNOSIS — N30.00 ACUTE CYSTITIS WITHOUT HEMATURIA: ICD-10-CM

## 2021-09-23 DIAGNOSIS — Z98.890 S/P KYPHOPLASTY: ICD-10-CM

## 2021-09-23 DIAGNOSIS — M43.16 LUMBAR ADJACENT SEGMENT DISEASE WITH SPONDYLOLISTHESIS: ICD-10-CM

## 2021-09-23 DIAGNOSIS — M47.816 LUMBAR SPONDYLOSIS: Primary | ICD-10-CM

## 2021-09-23 DIAGNOSIS — M47.816 ARTHROPATHY OF LUMBAR FACET JOINT: ICD-10-CM

## 2021-09-23 DIAGNOSIS — M51.36 DDD (DEGENERATIVE DISC DISEASE), LUMBAR: ICD-10-CM

## 2021-09-23 DIAGNOSIS — M16.12 PRIMARY OSTEOARTHRITIS OF LEFT HIP: ICD-10-CM

## 2021-09-23 DIAGNOSIS — E66.01 MORBID OBESITY (HCC): ICD-10-CM

## 2021-09-23 DIAGNOSIS — M51.36 LUMBAR ADJACENT SEGMENT DISEASE WITH SPONDYLOLISTHESIS: ICD-10-CM

## 2021-09-23 DIAGNOSIS — R53.1 GENERAL WEAKNESS: ICD-10-CM

## 2021-09-23 PROBLEM — R53.81 PHYSICAL DEBILITY: Status: ACTIVE | Noted: 2021-09-23

## 2021-09-23 PROBLEM — J44.9 COPD (CHRONIC OBSTRUCTIVE PULMONARY DISEASE) (HCC): Status: ACTIVE | Noted: 2021-09-23

## 2021-09-23 PROBLEM — N39.0 UTI (URINARY TRACT INFECTION): Status: ACTIVE | Noted: 2021-09-23

## 2021-09-23 LAB
-: ABNORMAL
ABSOLUTE EOS #: 0.2 K/UL (ref 0–0.4)
ABSOLUTE IMMATURE GRANULOCYTE: ABNORMAL K/UL (ref 0–0.3)
ABSOLUTE LYMPH #: 1.6 K/UL (ref 1–4.8)
ABSOLUTE MONO #: 0.4 K/UL (ref 0.1–1.3)
ALBUMIN SERPL-MCNC: 3.9 G/DL (ref 3.5–5.2)
ALBUMIN/GLOBULIN RATIO: ABNORMAL (ref 1–2.5)
ALP BLD-CCNC: 70 U/L (ref 35–104)
ALT SERPL-CCNC: 14 U/L (ref 5–33)
AMORPHOUS: ABNORMAL
ANION GAP SERPL CALCULATED.3IONS-SCNC: 8 MMOL/L (ref 9–17)
AST SERPL-CCNC: 17 U/L
BACTERIA: ABNORMAL
BASOPHILS # BLD: 1 % (ref 0–2)
BASOPHILS ABSOLUTE: 0 K/UL (ref 0–0.2)
BILIRUB SERPL-MCNC: 0.31 MG/DL (ref 0.3–1.2)
BILIRUBIN URINE: NEGATIVE
BUN BLDV-MCNC: 23 MG/DL (ref 8–23)
BUN/CREAT BLD: ABNORMAL (ref 9–20)
CALCIUM SERPL-MCNC: 9.2 MG/DL (ref 8.6–10.4)
CASTS UA: ABNORMAL /LPF
CHLORIDE BLD-SCNC: 103 MMOL/L (ref 98–107)
CO2: 27 MMOL/L (ref 20–31)
COLOR: YELLOW
COMMENT UA: ABNORMAL
CREAT SERPL-MCNC: 1.13 MG/DL (ref 0.5–0.9)
CRYSTALS, UA: ABNORMAL /HPF
DIFFERENTIAL TYPE: ABNORMAL
EOSINOPHILS RELATIVE PERCENT: 5 % (ref 0–4)
EPITHELIAL CELLS UA: ABNORMAL /HPF
GFR AFRICAN AMERICAN: 57 ML/MIN
GFR NON-AFRICAN AMERICAN: 47 ML/MIN
GFR SERPL CREATININE-BSD FRML MDRD: ABNORMAL ML/MIN/{1.73_M2}
GFR SERPL CREATININE-BSD FRML MDRD: ABNORMAL ML/MIN/{1.73_M2}
GLUCOSE BLD-MCNC: 99 MG/DL (ref 70–99)
GLUCOSE URINE: NEGATIVE
HCT VFR BLD CALC: 33.6 % (ref 36–46)
HEMOGLOBIN: 10.8 G/DL (ref 12–16)
IMMATURE GRANULOCYTES: ABNORMAL %
KETONES, URINE: NEGATIVE
LEUKOCYTE ESTERASE, URINE: ABNORMAL
LYMPHOCYTES # BLD: 37 % (ref 24–44)
MCH RBC QN AUTO: 26.4 PG (ref 26–34)
MCHC RBC AUTO-ENTMCNC: 32.1 G/DL (ref 31–37)
MCV RBC AUTO: 82.2 FL (ref 80–100)
MONOCYTES # BLD: 9 % (ref 1–7)
MUCUS: ABNORMAL
NITRITE, URINE: NEGATIVE
NRBC AUTOMATED: ABNORMAL PER 100 WBC
OTHER OBSERVATIONS UA: ABNORMAL
PDW BLD-RTO: 14.4 % (ref 11.5–14.9)
PH UA: 6 (ref 5–8)
PLATELET # BLD: 292 K/UL (ref 150–450)
PLATELET ESTIMATE: ABNORMAL
PMV BLD AUTO: 8.1 FL (ref 6–12)
POTASSIUM SERPL-SCNC: 3.9 MMOL/L (ref 3.7–5.3)
PROTEIN UA: NEGATIVE
RBC # BLD: 4.09 M/UL (ref 4–5.2)
RBC # BLD: ABNORMAL 10*6/UL
RBC UA: ABNORMAL /HPF
RENAL EPITHELIAL, UA: ABNORMAL /HPF
SEG NEUTROPHILS: 48 % (ref 36–66)
SEGMENTED NEUTROPHILS ABSOLUTE COUNT: 2.1 K/UL (ref 1.3–9.1)
SODIUM BLD-SCNC: 138 MMOL/L (ref 135–144)
SPECIFIC GRAVITY UA: 1.02 (ref 1–1.03)
TOTAL PROTEIN: 7.4 G/DL (ref 6.4–8.3)
TRICHOMONAS: ABNORMAL
TURBIDITY: ABNORMAL
URINE HGB: NEGATIVE
UROBILINOGEN, URINE: NORMAL
WBC # BLD: 4.4 K/UL (ref 3.5–11)
WBC # BLD: ABNORMAL 10*3/UL
WBC UA: ABNORMAL /HPF
YEAST: ABNORMAL

## 2021-09-23 PROCEDURE — 80053 COMPREHEN METABOLIC PANEL: CPT

## 2021-09-23 PROCEDURE — 87186 SC STD MICRODIL/AGAR DIL: CPT

## 2021-09-23 PROCEDURE — 36415 COLL VENOUS BLD VENIPUNCTURE: CPT

## 2021-09-23 PROCEDURE — 6370000000 HC RX 637 (ALT 250 FOR IP): Performed by: STUDENT IN AN ORGANIZED HEALTH CARE EDUCATION/TRAINING PROGRAM

## 2021-09-23 PROCEDURE — 87086 URINE CULTURE/COLONY COUNT: CPT

## 2021-09-23 PROCEDURE — 6360000002 HC RX W HCPCS: Performed by: EMERGENCY MEDICINE

## 2021-09-23 PROCEDURE — 99223 1ST HOSP IP/OBS HIGH 75: CPT | Performed by: INTERNAL MEDICINE

## 2021-09-23 PROCEDURE — 85025 COMPLETE CBC W/AUTO DIFF WBC: CPT

## 2021-09-23 PROCEDURE — 2580000003 HC RX 258: Performed by: EMERGENCY MEDICINE

## 2021-09-23 PROCEDURE — G0378 HOSPITAL OBSERVATION PER HR: HCPCS

## 2021-09-23 PROCEDURE — 2580000003 HC RX 258: Performed by: STUDENT IN AN ORGANIZED HEALTH CARE EDUCATION/TRAINING PROGRAM

## 2021-09-23 PROCEDURE — 96374 THER/PROPH/DIAG INJ IV PUSH: CPT

## 2021-09-23 PROCEDURE — 1200000000 HC SEMI PRIVATE

## 2021-09-23 PROCEDURE — 87077 CULTURE AEROBIC IDENTIFY: CPT

## 2021-09-23 PROCEDURE — 81001 URINALYSIS AUTO W/SCOPE: CPT

## 2021-09-23 PROCEDURE — 6360000002 HC RX W HCPCS: Performed by: STUDENT IN AN ORGANIZED HEALTH CARE EDUCATION/TRAINING PROGRAM

## 2021-09-23 PROCEDURE — 99284 EMERGENCY DEPT VISIT MOD MDM: CPT

## 2021-09-23 RX ORDER — ONDANSETRON 2 MG/ML
4 INJECTION INTRAMUSCULAR; INTRAVENOUS EVERY 6 HOURS PRN
Status: DISCONTINUED | OUTPATIENT
Start: 2021-09-23 | End: 2021-09-29 | Stop reason: HOSPADM

## 2021-09-23 RX ORDER — POLYETHYLENE GLYCOL 3350 17 G/17G
17 POWDER, FOR SOLUTION ORAL DAILY PRN
Status: DISCONTINUED | OUTPATIENT
Start: 2021-09-23 | End: 2021-09-29 | Stop reason: HOSPADM

## 2021-09-23 RX ORDER — DONEPEZIL HYDROCHLORIDE 5 MG/1
5 TABLET, FILM COATED ORAL NIGHTLY
Status: DISCONTINUED | OUTPATIENT
Start: 2021-09-23 | End: 2021-09-29 | Stop reason: HOSPADM

## 2021-09-23 RX ORDER — GABAPENTIN 100 MG/1
100 CAPSULE ORAL 3 TIMES DAILY
Status: DISCONTINUED | OUTPATIENT
Start: 2021-09-23 | End: 2021-09-25

## 2021-09-23 RX ORDER — ACETAMINOPHEN 325 MG/1
650 TABLET ORAL EVERY 6 HOURS PRN
Status: DISCONTINUED | OUTPATIENT
Start: 2021-09-23 | End: 2021-09-29 | Stop reason: HOSPADM

## 2021-09-23 RX ORDER — GABAPENTIN 100 MG/1
100 CAPSULE ORAL 3 TIMES DAILY
Status: ON HOLD | COMMUNITY
End: 2021-10-05 | Stop reason: SDUPTHER

## 2021-09-23 RX ORDER — SODIUM CHLORIDE 9 MG/ML
25 INJECTION, SOLUTION INTRAVENOUS PRN
Status: DISCONTINUED | OUTPATIENT
Start: 2021-09-23 | End: 2021-09-29 | Stop reason: HOSPADM

## 2021-09-23 RX ORDER — ACETAMINOPHEN 650 MG/1
650 SUPPOSITORY RECTAL EVERY 6 HOURS PRN
Status: DISCONTINUED | OUTPATIENT
Start: 2021-09-23 | End: 2021-09-29 | Stop reason: HOSPADM

## 2021-09-23 RX ORDER — MORPHINE SULFATE 2 MG/ML
4 INJECTION, SOLUTION INTRAMUSCULAR; INTRAVENOUS ONCE
Status: COMPLETED | OUTPATIENT
Start: 2021-09-23 | End: 2021-09-23

## 2021-09-23 RX ORDER — ONDANSETRON 4 MG/1
4 TABLET, ORALLY DISINTEGRATING ORAL EVERY 8 HOURS PRN
Status: DISCONTINUED | OUTPATIENT
Start: 2021-09-23 | End: 2021-09-29 | Stop reason: HOSPADM

## 2021-09-23 RX ORDER — SODIUM CHLORIDE 0.9 % (FLUSH) 0.9 %
5-40 SYRINGE (ML) INJECTION PRN
Status: DISCONTINUED | OUTPATIENT
Start: 2021-09-23 | End: 2021-09-29 | Stop reason: HOSPADM

## 2021-09-23 RX ORDER — ALBUTEROL SULFATE 90 UG/1
2 AEROSOL, METERED RESPIRATORY (INHALATION) 4 TIMES DAILY PRN
Status: CANCELLED | OUTPATIENT
Start: 2021-09-23

## 2021-09-23 RX ORDER — OXYCODONE HYDROCHLORIDE AND ACETAMINOPHEN 5; 325 MG/1; MG/1
1 TABLET ORAL EVERY 6 HOURS PRN
Status: DISCONTINUED | OUTPATIENT
Start: 2021-09-23 | End: 2021-09-29 | Stop reason: HOSPADM

## 2021-09-23 RX ORDER — OXYCODONE HYDROCHLORIDE 5 MG/1
2.5 TABLET ORAL EVERY 6 HOURS PRN
Status: DISCONTINUED | OUTPATIENT
Start: 2021-09-23 | End: 2021-09-29 | Stop reason: HOSPADM

## 2021-09-23 RX ORDER — LANOLIN ALCOHOL/MO/W.PET/CERES
3 CREAM (GRAM) TOPICAL NIGHTLY PRN
Status: DISCONTINUED | OUTPATIENT
Start: 2021-09-24 | End: 2021-09-29 | Stop reason: HOSPADM

## 2021-09-23 RX ORDER — SODIUM CHLORIDE 0.9 % (FLUSH) 0.9 %
5-40 SYRINGE (ML) INJECTION EVERY 12 HOURS SCHEDULED
Status: DISCONTINUED | OUTPATIENT
Start: 2021-09-23 | End: 2021-09-29 | Stop reason: HOSPADM

## 2021-09-23 RX ORDER — ASPIRIN 81 MG/1
81 TABLET ORAL DAILY
Status: DISCONTINUED | OUTPATIENT
Start: 2021-09-23 | End: 2021-09-29 | Stop reason: HOSPADM

## 2021-09-23 RX ORDER — ALBUTEROL SULFATE 2.5 MG/3ML
2.5 SOLUTION RESPIRATORY (INHALATION)
Status: DISCONTINUED | OUTPATIENT
Start: 2021-09-23 | End: 2021-09-29 | Stop reason: HOSPADM

## 2021-09-23 RX ORDER — POTASSIUM CHLORIDE 20 MEQ/1
40 TABLET, EXTENDED RELEASE ORAL PRN
Status: DISCONTINUED | OUTPATIENT
Start: 2021-09-23 | End: 2021-09-29 | Stop reason: HOSPADM

## 2021-09-23 RX ORDER — POTASSIUM CHLORIDE 7.45 MG/ML
10 INJECTION INTRAVENOUS PRN
Status: DISCONTINUED | OUTPATIENT
Start: 2021-09-23 | End: 2021-09-29 | Stop reason: HOSPADM

## 2021-09-23 RX ORDER — OXYCODONE AND ACETAMINOPHEN 7.5; 325 MG/1; MG/1
1 TABLET ORAL EVERY 6 HOURS PRN
Status: DISCONTINUED | OUTPATIENT
Start: 2021-09-23 | End: 2021-09-23 | Stop reason: CLARIF

## 2021-09-23 RX ORDER — METOPROLOL TARTRATE 50 MG/1
50 TABLET, FILM COATED ORAL 2 TIMES DAILY
Status: CANCELLED | OUTPATIENT
Start: 2021-09-23

## 2021-09-23 RX ORDER — SODIUM CHLORIDE 9 MG/ML
INJECTION, SOLUTION INTRAVENOUS CONTINUOUS
Status: DISCONTINUED | OUTPATIENT
Start: 2021-09-23 | End: 2021-09-25

## 2021-09-23 RX ADMIN — ENOXAPARIN SODIUM 30 MG: 30 INJECTION SUBCUTANEOUS at 21:04

## 2021-09-23 RX ADMIN — OXYCODONE HYDROCHLORIDE AND ACETAMINOPHEN 1 TABLET: 5; 325 TABLET ORAL at 19:06

## 2021-09-23 RX ADMIN — OXYCODONE HYDROCHLORIDE 2.5 MG: 5 TABLET ORAL at 19:06

## 2021-09-23 RX ADMIN — MORPHINE SULFATE 4 MG: 2 INJECTION, SOLUTION INTRAMUSCULAR; INTRAVENOUS at 12:20

## 2021-09-23 RX ADMIN — GABAPENTIN 100 MG: 100 CAPSULE ORAL at 21:04

## 2021-09-23 RX ADMIN — SODIUM CHLORIDE: 9 INJECTION, SOLUTION INTRAVENOUS at 18:21

## 2021-09-23 RX ADMIN — DONEPEZIL HYDROCHLORIDE 5 MG: 5 TABLET, FILM COATED ORAL at 21:04

## 2021-09-23 RX ADMIN — CEFTRIAXONE SODIUM 1000 MG: 1 INJECTION, POWDER, FOR SOLUTION INTRAMUSCULAR; INTRAVENOUS at 16:07

## 2021-09-23 ASSESSMENT — ENCOUNTER SYMPTOMS
CONSTIPATION: 0
TROUBLE SWALLOWING: 0
COLOR CHANGE: 0
COUGH: 0
ABDOMINAL DISTENTION: 0
BACK PAIN: 1
VOMITING: 0
EYE DISCHARGE: 0
NAUSEA: 0
BLOOD IN STOOL: 0
SINUS PRESSURE: 0
EYE REDNESS: 0
FACIAL SWELLING: 0
CHEST TIGHTNESS: 0
WHEEZING: 0
ABDOMINAL PAIN: 0
DIARRHEA: 0
EYE PAIN: 0
RHINORRHEA: 0
SHORTNESS OF BREATH: 0
WHEEZING: 1
SORE THROAT: 0

## 2021-09-23 ASSESSMENT — PAIN DESCRIPTION - LOCATION
LOCATION: GENERALIZED

## 2021-09-23 ASSESSMENT — PAIN SCALES - GENERAL
PAINLEVEL_OUTOF10: 2
PAINLEVEL_OUTOF10: 6
PAINLEVEL_OUTOF10: 2
PAINLEVEL_OUTOF10: 5
PAINLEVEL_OUTOF10: 5

## 2021-09-23 ASSESSMENT — PAIN DESCRIPTION - PAIN TYPE
TYPE: ACUTE PAIN
TYPE: CHRONIC PAIN
TYPE: CHRONIC PAIN

## 2021-09-23 NOTE — ED NOTES
PT stated she uses purewick at home because she cant ambulate to restroom at night. I placed a purewick for this PT . 400 cc out at that time.      Mari Thompson  09/23/21 [id number]

## 2021-09-23 NOTE — PLAN OF CARE
Problem: Falls - Risk of:  Goal: Will remain free from falls  Description: Will remain free from falls  Outcome: Ongoing  Note: No falls this shift. Call light within reach and siderails x2. Bed in lowest position. Patient safety maintained. Goal: Absence of physical injury  Description: Absence of physical injury  Outcome: Ongoing     Problem: Skin Integrity:  Goal: Will show no infection signs and symptoms  Description: Will show no infection signs and symptoms  Outcome: Ongoing  Note: Skin assessment as charted. No new areas of breakdown. Goal: Absence of new skin breakdown  Description: Absence of new skin breakdown  Outcome: Ongoing     Problem: Pain:  Goal: Pain level will decrease  Description: Pain level will decrease  Outcome: Ongoing  Note: Denies any pain at this time. Will continue to monitor.     Goal: Control of acute pain  Description: Control of acute pain  Outcome: Ongoing  Goal: Control of chronic pain  Description: Control of chronic pain  Outcome: Ongoing

## 2021-09-23 NOTE — PROGRESS NOTES
ADMISSION NOTE       Patient admitted to room  2052. Time of admit:  [address] from:  ED    Reason for admission:  UTI    Where patient has been residing for the last 24 hrs:  Home    Has the patient been admitted to any facility in the last 4 weeks, which one:  No    Family at bedside:  No    Patient is currently in pain Yes, baseline chronic pain. Patient has been oriented to room, educated on how to use call light, and to call for assistance prior to getting up. Bed in lowest and locked position. 2 siderails up for safety. Call light within reach.

## 2021-09-23 NOTE — H&P
[address] [facility]      [address] [facility]     HISTORY AND PHYSICAL EXAMINATION            Date:   9/23/2021  Patient name:  August Enter  Date of admission:  9/23/2021 12:00 PM  MRN:   [id number]  Account:  [de-identified]  YOB: 1945  PCP:    Evette Gutierrez MD  Room:   2052/2052-01  Code Status:    Prior    Chief Complaint:     Chief Complaint   Patient presents with    Fatigue       History Obtained From:     patient    History of Present Illness: The patient is a 67 y.o. Non- / non  female who presents to the ED complaining of fatigue and bilateral leg weakness for the past 5 days. Patient uses an upright walker at home but says her legs are shaking and she has difficulties getting up to go to the bathroom because her legs feel so weak and often go numb. Patient does have chronic back pain, with neuropathy over the past 4-5 years. Recent MRI report that patient brought in shows moderate stenosis in C3-7, severe neural foraminal narrowing C4-7, 7 mm anterolisthesis in L4-5. Patient will be seeing a surgeon at another facility once she \"regains her strength\". Patient admitted for evaluation of weakness, possible UTI and possible placement in SNF. Labs in the ED were unremarkable, except for UA + for leukocyte esterase and bacteria. Patient started on Rocephin in ED.         Past Medical History:     Past Medical History:   Diagnosis Date    Abnormality of rib determined by X-ray 1/28/2016    Acute cystitis without hematuria 6/16/2018    Acute exacerbation of chronic bronchitis (HCC)     Acute on chronic diastolic heart failure ([facility] [address] 1/28/2016    Adjustment disorder with mixed anxiety and depressed mood 6/26/2015    Mild     Anemia 3/5/2014    Back pain     Bronchiectasis with acute lower respiratory infection ([address] 8/16/2017    Bronchitis  [facility] 1/[facility]    Chronic cough 7/23/2018    Degenerative joint disease (DJD) of hip 5/14/2015    Dyslipidemia 3/5/2014    Encephalopathy acute 5/29/2016    Essential hypertension 1/28/2016    Fibromyalgia     GERD (gastroesophageal reflux disease)     History of total bilateral knee replacement 5/28/2016    Hx of blood clots     left leg and lung    Hyperlipidemia     Hypertension     Incontinence of urine     Irregular heartbeat     DR. Pam Shukla Medication monitoring encounter 6/13/2018    Morbid obesity with BMI of 45.0-49.9, adult ([facility] [address] 1/28/2016    Obstructive sleep apnea syndrome     Osteoarthritis     Primary osteoarthritis of left knee 5/27/2016    PVC (premature ventricular contraction) 1/28/2016    S/P right and left heart catheterization 10/2/2015    Sleep apnea     no machine    SOB (shortness of breath)     Spinal stenosis of lumbar region with neurogenic claudication 5/13/2013    Supplemental oxygen dependent 12/8/2017    Urine frequency     UTI (urinary tract infection)     hospitalized early july 2014 for kidney infection        Past SurgicalHistory:     Past Surgical History:   Procedure Laterality Date    BRONCHOSCOPY Left 8/16/2017    BRONCHOSCOPY ALVEOLAR LAVAGE LOWER LOBE performed by Yulissa Bueno MD at [address] [facility]  x 3    no stents    COLONOSCOPY  4 28 14    polyps biopsies     FOOT SURGERY Left     calcium deposit removal from top of foot    HYSTERECTOMY      JOINT REPLACEMENT Bilateral 5/27/2016    bilat total knees    NERVE BLOCK  5/13/2013    caudal celestone 6mg    NERVE BLOCK  5/28/13    Caudal #2, Celestone 9mg    NERVE BLOCK  2/14/14    rt hip inj celestone 9 mg    NERVE BLOCK  07/14/2014    caudal# 3- celestone 9 mg    NERVE BLOCK  7-21-14    caudal epidural #2, decadron 7mg, fentanyl 25mcg    NERVE BLOCK  10/2/14    duramorph 1.5  decadron 5mg    NERVE BLOCK  11/9/2015    caudal# 1 celestone 9mg  NERVE BLOCK  11/16/15    caudal #2  decadron 10mg    NERVE BLOCK  11/23/15    duramorph 1mg celestone 9mg    NERVE BLOCK  03/28/2018    CAUDAL EPIDURAL STEROID BLOCK  DECADRON 10 MG     NERVE BLOCK  05/23/2018    caudal # decadron 7mg    NERVE BLOCK  08/28/2018    natalia transforminal # 1, decadron 10mg gadoteridol, LONG NEEDLES    AR PERQ VERT AGMNTJ CAVITY CRTJ UNI/BI CANNULJ LMBR N/A 10/29/2018    KYPHOPLASTY L1 performed by 30 Arabella Hernandez MD at [address] Right 5/14/15    total hip replacement        Medications Prior to Admission:        Prior to Admission medications    Medication Sig Start Date End Date Taking? Authorizing Provider   gabapentin (NEURONTIN) 100 MG capsule Take 100 mg by mouth 3 times daily. Yes Historical Provider, MD   NONFORMULARY daily nervive OTDC   Yes Historical Provider, MD   Cyanocobalamin (VITAMIN B-12) 1000 MCG/15ML LIQD Take 1,000 mcg by mouth daily    Yes Historical Provider, MD   oxyCODONE-acetaminophen (PERCOCET) 7.5-325 MG per tablet Take 1 tablet by mouth every 6 hours as needed for Pain for up to 30 days.  9/13/21 10/13/21 Yes KARIN Saleh - CNP   ibuprofen (ADVIL;MOTRIN) 800 MG tablet Take 800 mg by mouth every 6 hours as needed for Pain  8/31/21  Yes Historical Provider, MD   levocetirizine (XYZAL) 5 MG tablet take 1 tablet by mouth once daily AT NIGHT 9/8/21  Yes Evette Gutierrez MD   donepezil (ARICEPT) 5 MG tablet take 1 tablet by mouth at bedtime 9/1/21  Yes Evette Gutierrez MD   polyethylene glycol (GLYCOLAX) 17 GM/SCOOP powder Take 17 g by mouth daily as needed    Yes Historical Provider, MD   psyllium (KONSYL) 28.3 % PACK Take 1 packet by mouth daily   Yes Historical Provider, MD   Lidocaine HCl (ASPERCREME LIDOCAINE) 4 % LIQD Apply topically   Yes Historical Provider, MD   calcium carbonate (OYSTER SHELL CALCIUM 500 MG) 1250 (500 Ca) MG tablet Take 1 tablet by mouth daily With magnesium, zinc   Yes Historical Provider, MD   albuterol sulfate  (90 Base) MCG/ACT inhaler Inhale 2 puffs into the lungs 4 times daily as needed for Wheezing 4/20/20  Yes Cliff Rosa MD   docusate sodium (COLACE) 100 MG capsule Take 100 mg by mouth daily as needed for Constipation   Yes Historical Provider, MD   aspirin 81 MG EC tablet Take 81 mg by mouth daily    Yes Historical Provider, MD   Catheters (STANDARD CARE EXTERNAL CATH) MISC 1 each by Does not apply route daily 3/2/21   Robert Hinson MD   Catheters (STANDARD CARE EXTERNAL CATH) MISC 1 Device by Does not apply route daily 11/30/20   Cliff Rosa MD   Misc. Devices [facility]) MISC 1 each by Does not apply route daily Upright walker with wheels and seat 11/12/18   Corina Collazo MD        Allergies:     Rosuvastatin calcium, Statins, Bactrim [sulfamethoxazole-trimethoprim], Caffeine, Dye [iodides], Food, Ioxaglate, Tomato, Dicloxacillin, and Pcn [penicillins]    Social History:     Tobacco:    reports that she quit smoking about 28 years ago. She has a 18.00 pack-year smoking history. She has never used smokeless tobacco.  Alcohol:      reports no history of alcohol use. Drug Use:  reports no history of drug use. Family History:     Family History   Problem Relation Age of Onset    Stomach Cancer Brother         stomach    High Blood Pressure Mother     Heart Disease Sister         irregular heartbeat       Review of Systems:     Positive and Negative as described in HPI. Review of Systems   Constitutional: Positive for activity change and fatigue. Negative for appetite change, chills, diaphoresis, fever and unexpected weight change. Eyes: Negative for visual disturbance. Respiratory: Positive for wheezing. Negative for cough, chest tightness and shortness of breath. Cardiovascular: Negative for chest pain and palpitations. Gastrointestinal: Negative for abdominal distention, abdominal pain, constipation, diarrhea, nausea and vomiting. Genitourinary: Negative for difficulty urinating, dysuria, hematuria and urgency. Neurological: Positive for weakness and numbness. Negative for dizziness, tremors, light-headedness and headaches. Psychiatric/Behavioral: Negative for agitation. Physical Exam:   BP (!) 148/77   Pulse 64   Temp 98.5 °F (36.9 °C) (Oral)   Resp 16   Ht 5' 4\" (1.626 m)   Wt 270 lb (122.5 kg)   SpO2 98%   BMI 46.35 kg/m²   Temp (24hrs), Av.5 °F (36.9 °C), Min:98.5 °F (36.9 °C), Max:98.5 °F (36.9 °C)    No results for input(s): POCGLU in the last 72 hours. No intake or output data in the 24 hours ending 21 1702    Physical Exam  Constitutional:       Appearance: She is obese. HENT:      Head: Normocephalic. Cardiovascular:      Rate and Rhythm: Normal rate and regular rhythm. Pulses: Normal pulses. Heart sounds: Normal heart sounds. Pulmonary:      Effort: Pulmonary effort is normal.      Breath sounds: Normal breath sounds. Abdominal:      General: Bowel sounds are normal. There is no distension. Tenderness: There is no abdominal tenderness. Musculoskeletal:      Right shoulder: Normal strength. Left shoulder: Normal strength. Right hand: Normal strength. Left hand: Normal strength. Right lower leg: Swelling present. Left lower leg: Swelling present. Skin:     General: Skin is warm. Neurological:      General: No focal deficit present. Mental Status: She is alert and oriented to person, place, and time.    Psychiatric:         Mood and Affect: Mood normal.         Investigations:     Laboratory Testing:  Recent Results (from the past 24 hour(s))   CBC Auto Differential    Collection Time: 21 12:24 PM   Result Value Ref Range    WBC 4.4 3.5 - 11.0 k/uL    RBC 4.09 4.0 - 5.2 m/uL    Hemoglobin 10.8 (L) 12.0 - 16.0 g/dL    Hematocrit 33.6 (L) 36 - 46 %    MCV 82.2 80 - 100 fL    MCH 26.4 26 - 34 pg    MCHC 32.1 31 - 37 g/dL    RDW 14.4 11.5 - 14.9 % Platelets 993 197 - 212 k/uL    MPV 8.1 6.0 - 12.0 fL    NRBC Automated NOT REPORTED per 100 WBC    Differential Type NOT REPORTED     Seg Neutrophils 48 36 - 66 %    Lymphocytes 37 24 - 44 %    Monocytes 9 (H) 1 - 7 %    Eosinophils % 5 (H) 0 - 4 %    Basophils 1 0 - 2 %    Immature Granulocytes NOT REPORTED 0 %    Segs Absolute 2.10 1.3 - 9.1 k/uL    Absolute Lymph # 1.60 1.0 - 4.8 k/uL    Absolute Mono # 0.40 0.1 - 1.3 k/uL    Absolute Eos # 0.20 0.0 - 0.4 k/uL    Basophils Absolute 0.00 0.0 - 0.2 k/uL    Absolute Immature Granulocyte NOT REPORTED 0.00 - 0.30 k/uL    WBC Morphology NOT REPORTED     RBC Morphology NOT REPORTED     Platelet Estimate NOT REPORTED    Comprehensive Metabolic Panel    Collection Time: 09/23/21 12:24 PM   Result Value Ref Range    Glucose 99 70 - 99 mg/dL    BUN 23 8 - 23 mg/dL    CREATININE 1.13 (H) 0.50 - 0.90 mg/dL    Bun/Cre Ratio NOT REPORTED 9 - 20    Calcium 9.2 8.6 - 10.4 mg/dL    Sodium 138 135 - 144 mmol/L    Potassium 3.9 3.7 - 5.3 mmol/L    Chloride 103 98 - 107 mmol/L    CO2 27 20 - 31 mmol/L    Anion Gap 8 (L) 9 - 17 mmol/L    Alkaline Phosphatase 70 35 - 104 U/L    ALT 14 5 - 33 U/L    AST 17 <32 U/L    Total Bilirubin 0.31 0.3 - 1.2 mg/dL    Total Protein 7.4 6.4 - 8.3 g/dL    Albumin 3.9 3.5 - 5.2 g/dL    Albumin/Globulin Ratio NOT REPORTED 1.0 - 2.5    GFR Non- 47 (L) >60 mL/min    GFR  57 (L) >60 mL/min    GFR Comment          GFR Staging NOT REPORTED    Urinalysis Reflex to Culture    Collection Time: 09/23/21  2:57 PM    Specimen: Urine, clean catch   Result Value Ref Range    Color, UA YELLOW YELLOW    Turbidity UA CLOUDY (A) CLEAR    Glucose, Ur NEGATIVE NEGATIVE    Bilirubin Urine NEGATIVE NEGATIVE    Ketones, Urine NEGATIVE NEGATIVE    Specific Gravity, UA 1.018 1.000 - 1.030    Urine Hgb NEGATIVE NEGATIVE    pH, UA 6.0 5.0 - 8.0    Protein, UA NEGATIVE NEGATIVE    Urobilinogen, Urine Normal Normal    Nitrite, Urine NEGATIVE NEGATIVE    Leukocyte Esterase, Urine SMALL (A) NEGATIVE    Urinalysis Comments NOT REPORTED    Microscopic Urinalysis    Collection Time: 09/23/21  2:57 PM   Result Value Ref Range    -          WBC, UA 10 TO 20 /HPF    RBC, UA 2 TO 5 /HPF    Casts UA NOT REPORTED /LPF    Crystals, UA NOT REPORTED None /HPF    Epithelial Cells UA 0 TO 2 /HPF    Renal Epithelial, UA NOT REPORTED 0 /HPF    Bacteria, UA MANY (A) None    Mucus, UA NOT REPORTED None    Trichomonas, UA NOT REPORTED None    Amorphous, UA NOT REPORTED None    Other Observations UA NOT REPORTED NOT REQ. Yeast, UA NOT REPORTED None       Imaging/Diagnostics:  No results found.     Assessment :      Primary Problem  <principal problem not specified>    Active Hospital Problems    Diagnosis Date Noted    Sleep apnea [G47.30]      Priority: Medium    UTI (urinary tract infection) [N39.0] 09/23/2021    Morbid obesity with BMI of 45.0-49.9, adult ([facility] [E66.01, Z68.42] 01/28/2016    Fibromyalgia [M79.7] 03/05/2014       Plan:     Patient status Admit as inpatient in the  Med/Surge    Debility 2/2 arthritis, spinal stenosis with neurogenic claudication  - New onset of being unsteady, weak, difficulty with daily tasks   - Under care of pain management for back pain  - Continue home Oxycodone   - To be seen by case management/social work for possible SNF placement     Acute cystitis without hematuria    - UA positive for leukocytes esterase and bacteria  - Culture pending   - Started on Rocephin in ED    Fibromyalgia   - Continue home Cymbalta  - Continue home gabapentin     Obstructive sleep apnea  - On 2L of O2 via nasal cannula at night    COPD  - Continue home albuterol     Diet: Regular   DVT Prophylaxis: Lovenox 30 mg    Consultations:   IP CONSULT TO PRIMARY CARE PROVIDER  IP CONSULT TO SOCIAL WORK    Patient is admitted as inpatient status because of co-morbiditieslisted above, severity of signs and symptoms as outlined, requirement for current medical therapies and most importantly because of direct risk to patient if care not provided in a hospital setting. Viktoria Mariscal MD  9/23/2021  5:02 PM    Copy sent to Dr. Cordelia Gomez MD  .resient  Attending Physician Statement  I have discussed the care of Cindra Soulier and I have examined the patient myselft and taken ros and hpi , including pertinent history and exam findings,  with the resident. I have reviewed the key elements of all parts of the encounter with the resident. I agree with the assessment, plan and orders as documented by the resident.   Left leg wekenss with falls  Mri cervical lumbar spine noted with DJD and radiculopathy some element of spinal stenosis of cervical spine moderate  Will do MRI brain rule out ischemic stroke neurology consult  Physical therapy patient unable to go back to home at fall risk will need extended care facility    Electronically signed by Gaurang Aguilar MD

## 2021-09-23 NOTE — ED TRIAGE NOTES
Mode of arrival (squad #, walk in, police, etc) : Walk in        Chief complaint(s): Weakness        Arrival Note (brief scenario, treatment PTA, etc). : Pt arrives to ED c/o generalized weakness. Patient states that she always has some weakness but states that over the last 4-5 days has gotten worse. Patient states that she has been having all over body pain as well. C= \"Have you ever felt that you should Cut down on your drinking? \"  No  A= \"Have people Annoyed you by criticizing your drinking? \"  No  G= \"Have you ever felt bad or Guilty about your drinking? \"  No  E= \"Have you ever had a drink as an Eye-opener first thing in the morning to steady your nerves or to help a hangover? \"  No      Deferred []      Reason for deferring: N/A    *If yes to two or more: probable alcohol abuse. *

## 2021-09-23 NOTE — ED NOTES
Pt care handoff, report given to Bre Sands RN Med-Surg; questions, concerns answered at this time.       Nishi Lee RN  09/23/21 [id number]

## 2021-09-23 NOTE — ED PROVIDER NOTES
[address]  eMERGENCY dEPARTMENT eNCOUnter      Pt Name: Fady Rinaldi  MRN: [id number]  [place] 1945  Date of evaluation: 9/23/21      CHIEF COMPLAINT       Chief Complaint   Patient presents with    Fatigue         HISTORY OF PRESENT ILLNESS    Fady Rinaldi is a 67 y.o. female who presents complaining of leg weakness. Patient states that she has chronic back issues for which she sees the pain clinic for. Patient states she normally walks with a walker because as the only way she can stand on her own. Patient states her last for 5 days she is just been feeling like her legs are weaker than normal and her back is a lot more painful than normal.  Patient states that when she tries to stand she just shakes so bad that she just lays against her walker and really cannot move. Patient really is having a hard time getting to the bathroom because of this. Patient states she is not been ill in any way though she does feel like maybe she is wheezing a little bit right now more than normal but she has a history of asbestosis. Patient denies any injury to the back. REVIEW OF SYSTEMS       Review of Systems   Constitutional: Negative for activity change, appetite change, chills, diaphoresis and fever. HENT: Negative for congestion, ear pain, facial swelling, nosebleeds, rhinorrhea, sinus pressure, sore throat and trouble swallowing. Eyes: Negative for pain, discharge and redness. Respiratory: Negative for cough, chest tightness, shortness of breath and wheezing. Cardiovascular: Negative for chest pain, palpitations and leg swelling. Gastrointestinal: Negative for abdominal pain, blood in stool, constipation, diarrhea, nausea and vomiting. Genitourinary: Negative for difficulty urinating, dysuria, flank pain, frequency, genital sores and hematuria. Musculoskeletal: Positive for back pain. Negative for arthralgias, gait problem, joint swelling, myalgias and neck pain.    Skin: Negative for color change, pallor, rash and wound. Neurological: Positive for weakness (Both legs). Negative for dizziness, tremors, seizures, syncope, speech difficulty, numbness and headaches. Psychiatric/Behavioral: Negative for confusion, decreased concentration, hallucinations, self-injury, sleep disturbance and suicidal ideas. PAST MEDICAL HISTORY     Past Medical History:   Diagnosis Date    Abnormality of rib determined by X-ray 1/28/2016    Acute cystitis without hematuria 6/16/2018    Acute exacerbation of chronic bronchitis (HCC)     Acute on chronic diastolic heart failure ([facility] [address] 1/28/2016    Adjustment disorder with mixed anxiety and depressed mood 6/26/2015    Mild     Anemia 3/5/2014    Back pain     Bronchiectasis with acute lower respiratory infection ([facility] [address] 8/16/2017    Bronchitis     Chronic bronchitis (HCC) 1/28/2016    Chronic cough 7/23/2018    Degenerative joint disease (DJD) of hip 5/14/2015    Dyslipidemia 3/5/2014    Encephalopathy acute 5/29/2016    Essential hypertension 1/28/2016    Fibromyalgia     GERD (gastroesophageal reflux disease)     History of total bilateral knee replacement 5/28/2016    Hx of blood clots     left leg and lung    Hyperlipidemia     Hypertension     Incontinence of urine     Irregular heartbeat     DR. Jerod Strong Medication monitoring encounter 6/13/2018    Morbid obesity with BMI of 45.0-49.9, adult ([facility] [address] 1/28/2016    Obstructive sleep apnea syndrome     Osteoarthritis     Primary osteoarthritis of left knee 5/27/2016    PVC (premature ventricular contraction) 1/28/2016    S/P right and left heart catheterization 10/2/2015    Sleep apnea     no machine    SOB (shortness of breath)     Spinal stenosis of lumbar region with neurogenic claudication 5/13/2013    Supplemental oxygen dependent 12/8/2017    Urine frequency     UTI (urinary tract infection)     hospitalized early july 2014 for kidney infection       SURGICAL HISTORY       Past Surgical History:   Procedure Laterality Date    BRONCHOSCOPY Left 8/16/2017    BRONCHOSCOPY ALVEOLAR LAVAGE LOWER LOBE performed by Walt Habermann, MD at [address]  x 3    no stents    COLONOSCOPY  4 28 14    polyps biopsies     FOOT SURGERY Left     calcium deposit removal from top of foot    HYSTERECTOMY      JOINT REPLACEMENT Bilateral 5/27/2016    bilat total knees    NERVE BLOCK  5/13/2013    caudal celestone 6mg    NERVE BLOCK  5/28/13    Caudal #2, Celestone 9mg    NERVE BLOCK  2/14/14    rt hip inj celestone 9 mg    NERVE BLOCK  07/14/2014    caudal# 3- celestone 9 mg    NERVE BLOCK  7-21-14    caudal epidural #2, decadron 7mg, fentanyl 25mcg    NERVE BLOCK  10/2/14    duramorph 1.5  decadron 5mg    NERVE BLOCK  11/9/2015    caudal# 1 celestone 9mg    NERVE BLOCK  11/16/15    caudal #2  decadron 10mg    NERVE BLOCK  11/23/15    duramorph 1mg celestone 9mg    NERVE BLOCK  03/28/2018    CAUDAL EPIDURAL STEROID BLOCK  DECADRON 10 MG     NERVE BLOCK  05/23/2018    caudal # decadron 7mg    NERVE BLOCK  08/28/2018    natalia transforminal # 1, decadron 10mg gadoteridol, LONG NEEDLES    MO PERQ VERT AGMNTJ CAVITY CRTJ UNI/BI CANNULJ LMBR N/A 10/29/2018    KYPHOPLASTY L1 performed by Isidoro Rondon MD at [address] Right 5/14/15    total hip replacement       CURRENT MEDICATIONS       Previous Medications    ALBUTEROL SULFATE  (90 BASE) MCG/ACT INHALER    Inhale 2 puffs into the lungs 4 times daily as needed for Wheezing    AMLODIPINE (NORVASC) 5 MG TABLET    Take 1 tablet by mouth daily    ASPIRIN 81 MG TABLET    Take 81 mg by mouth daily    CALCIUM CARBONATE (OYSTER SHELL CALCIUM 500 MG) 1250 (500 CA) MG TABLET    Take 1 tablet by mouth daily With magnesium, zinc    CATHETERS (STANDARD CARE EXTERNAL CATH) MISC    1 Device by Does not apply route daily    CATHETERS (STANDARD CARE EXTERNAL CATH) MISC    1 each by Does not apply route daily    CLINDAMYCIN (CLEOCIN) 300 MG CAPSULE    take 2 capsules by mouth NOW then take 1 capsule by mouth every 6 hours until finished    CREAM BASE CREA    Apply 1 g topically See Admin Instructions To area of pain    CYANOCOBALAMIN (VITAMIN B-12) 1000 MCG/15ML LIQD    Take by mouth    DOCUSATE SODIUM (COLACE) 100 MG CAPSULE    Take 100 mg by mouth daily as needed for Constipation    DONEPEZIL (ARICEPT) 5 MG TABLET    take 1 tablet by mouth at bedtime    DULOXETINE (CYMBALTA) 30 MG EXTENDED RELEASE CAPSULE    TAKE 1 CAPSULE TWICE A DAY    FLUTICASONE (FLONASE) 50 MCG/ACT NASAL SPRAY    instill 2 sprays into each nostril once daily    GABAPENTIN (NEURONTIN) 100 MG CAPSULE    take 1 capsule by mouth once daily    IBUPROFEN (ADVIL;MOTRIN) 800 MG TABLET    take 1 tablet by mouth every 6 to 8 hours if needed    LEVOCETIRIZINE (XYZAL) 5 MG TABLET    take 1 tablet by mouth once daily AT NIGHT    LIDOCAINE HCL (ASPERCREME LIDOCAINE) 4 % LIQD    Apply topically    METOPROLOL TARTRATE (LOPRESSOR) 50 MG TABLET    Take 1 tablet by mouth 2 times daily    MISC. DEVICES (WALKER) MISC    1 each by Does not apply route daily Upright walker with wheels and seat    NEOMYCIN-POLYMYXIN-DEXAMETH 3.5-91843-0.1 OINT        NONFORMULARY    nervive once a day    NONFORMULARY    daily nervive OTDC    OFLOXACIN (OCUFLOX) 0.3 % SOLUTION    Place into both eyes daily as needed     OXYCODONE-ACETAMINOPHEN (PERCOCET) 7.5-325 MG PER TABLET    Take 1 tablet by mouth every 6 hours as needed for Pain for up to 30 days. POLYETHYLENE GLYCOL (GLYCOLAX) 17 GM/SCOOP POWDER    Take 17 g by mouth as needed    PSYLLIUM (KONSYL) 28.3 % PACK    Take 1 packet by mouth daily       ALLERGIES     is allergic to rosuvastatin calcium, statins, bactrim [sulfamethoxazole-trimethoprim], caffeine, dye [iodides], food, ioxaglate, tomato, dicloxacillin, and pcn [penicillins].     SOCIAL HISTORY      reports that she quit smoking about 28 years ago. She has a 18.00 pack-year smoking history. She has never used smokeless tobacco. She reports that she does not drink alcohol and does not use drugs. PHYSICAL EXAM     INITIAL VITALS: BP (!) 150/97   Pulse 70   Temp 98.5 °F (36.9 °C) (Oral)   Resp 17   Ht 5' 4\" (1.626 m)   Wt 270 lb (122.5 kg)   SpO2 97%   BMI 46.35 kg/m²      Physical Exam  Vitals and nursing note reviewed. Constitutional:       General: She is not in acute distress. Appearance: She is well-developed. She is not diaphoretic. HENT:      Head: Normocephalic and atraumatic. Eyes:      General: No scleral icterus. Right eye: No discharge. Left eye: No discharge. Conjunctiva/sclera: Conjunctivae normal.      Pupils: Pupils are equal, round, and reactive to light. Cardiovascular:      Rate and Rhythm: Normal rate and regular rhythm. Heart sounds: Normal heart sounds. No murmur heard. No friction rub. No gallop. Pulmonary:      Effort: Pulmonary effort is normal. No respiratory distress. Breath sounds: Normal breath sounds. No wheezing or rales. Chest:      Chest wall: No tenderness. Abdominal:      General: Bowel sounds are normal. There is no distension. Palpations: Abdomen is soft. There is no mass. Tenderness: There is no abdominal tenderness. There is no guarding or rebound. Musculoskeletal:         General: No tenderness. Normal range of motion. Skin:     General: Skin is warm and dry. Coloration: Skin is not pale. Findings: No erythema or rash. Neurological:      Mental Status: She is alert and oriented to person, place, and time. Cranial Nerves: No cranial nerve deficit. Sensory: No sensory deficit. Motor: No weakness or abnormal muscle tone. Coordination: Coordination normal.      Deep Tendon Reflexes: Reflexes normal.   Psychiatric:         Behavior: Behavior normal.         Thought Content:  Thought content normal. Judgment: Judgment normal.         DIAGNOSTIC RESULTS     RADIOLOGY:All plain film, CT,MRI, and formal ultrasound images (except ED bedside ultrasound) are read by the radiologist and the interpretations are directly viewed by the emergency physician. No results found. LABS: All lab results were reviewed by myself, and all abnormals are listed below. Labs Reviewed   CBC WITH AUTO DIFFERENTIAL - Abnormal; Notable for the following components:       Result Value    Hemoglobin 10.8 (*)     Hematocrit 33.6 (*)     Monocytes 9 (*)     Eosinophils % 5 (*)     All other components within normal limits   COMPREHENSIVE METABOLIC PANEL - Abnormal; Notable for the following components:    CREATININE 1.13 (*)     Anion Gap 8 (*)     GFR Non- 47 (*)     GFR  57 (*)     All other components within normal limits   URINE RT REFLEX TO CULTURE - Abnormal; Notable for the following components:    Turbidity UA CLOUDY (*)     Leukocyte Esterase, Urine SMALL (*)     All other components within normal limits   MICROSCOPIC URINALYSIS - Abnormal; Notable for the following components:    Bacteria, UA MANY (*)     All other components within normal limits   CULTURE, URINE         MEDICAL DECISION MAKING:     Patient is here with weakness in her legs and worsening back pain. Patient just had an MRI done does show severe degenerative changes throughout the spine. Patient does follow with pain clinic. Patient has refused surgeries in the past.  Basically the patient's presenting with any acute exacerbation of her chronic issues that making it harder for her to get around. We will check some basic labs get her pain hopefully under control and then see if we can get her upright or whether we need to admit her for possible rehab.       EMERGENCY DEPARTMENT COURSE:   Vitals:    Vitals:    09/23/21 1204   BP: (!) 150/97   Pulse: 70   Resp: 17   Temp: 98.5 °F (36.9 °C)   TempSrc: Oral   SpO2: 97%   Weight: 270 lb (122.5 kg)   Height: 5' 4\" (1.626 m)       The patient was given the following medications while in the emergency department:  Orders Placed This Encounter   Medications    morphine (PF) injection 4 mg    cefTRIAXone (ROCEPHIN) 1000 mg IVPB in 50 mL D5W minibag     Order Specific Question:   Antimicrobial Indications     Answer:   Urinary Tract Infection       -------------------------  3:54 PM EDT  Patient was reevaluated and states that she really does not feel much better and does not feel comfortable going home. Patient does have a UTI so I will start her on some antibiotics and get her admitted. I spoke with Dr. Gómez Emery who agrees to the admission. Residents of been notified. CONSULTS:  IP CONSULT TO PRIMARY CARE PROVIDER    PROCEDURES:  none    FINAL IMPRESSION      1. Acute cystitis without hematuria    2. General weakness          DISPOSITION/PLAN   DISPOSITION Admitted 09/23/2021 03:54:08 PM      PATIENT REFERREDTO:  No follow-up provider specified.     DISCHARGEMEDICATIONS:  New Prescriptions    No medications on file       (Please note that portions of this note were completed with a voice recognition program.  Efforts were made to edit thedictations but occasionally words are mis-transcribed.)    Zulema Forbes MD  Attending Emergency Physician                        Zulema Forbes MD  09/23/21 8610

## 2021-09-24 ENCOUNTER — APPOINTMENT (OUTPATIENT)
Dept: MRI IMAGING | Age: 76
End: 2021-09-24
Payer: MEDICARE

## 2021-09-24 PROBLEM — R29.898 WEAKNESS OF LEFT LOWER EXTREMITY: Status: ACTIVE | Noted: 2021-09-24

## 2021-09-24 LAB
ABSOLUTE EOS #: 0.2 K/UL (ref 0–0.4)
ABSOLUTE IMMATURE GRANULOCYTE: ABNORMAL K/UL (ref 0–0.3)
ABSOLUTE LYMPH #: 2 K/UL (ref 1–4.8)
ABSOLUTE MONO #: 0.4 K/UL (ref 0.1–1.3)
ANION GAP SERPL CALCULATED.3IONS-SCNC: 8 MMOL/L (ref 9–17)
BASOPHILS # BLD: 1 % (ref 0–2)
BASOPHILS ABSOLUTE: 0 K/UL (ref 0–0.2)
BUN BLDV-MCNC: 20 MG/DL (ref 8–23)
BUN/CREAT BLD: ABNORMAL (ref 9–20)
CALCIUM SERPL-MCNC: 9 MG/DL (ref 8.6–10.4)
CHLORIDE BLD-SCNC: 109 MMOL/L (ref 98–107)
CO2: 26 MMOL/L (ref 20–31)
CREAT SERPL-MCNC: 0.96 MG/DL (ref 0.5–0.9)
DIFFERENTIAL TYPE: ABNORMAL
EOSINOPHILS RELATIVE PERCENT: 5 % (ref 0–4)
GFR AFRICAN AMERICAN: >60 ML/MIN
GFR NON-AFRICAN AMERICAN: 57 ML/MIN
GFR SERPL CREATININE-BSD FRML MDRD: ABNORMAL ML/MIN/{1.73_M2}
GFR SERPL CREATININE-BSD FRML MDRD: ABNORMAL ML/MIN/{1.73_M2}
GLUCOSE BLD-MCNC: 106 MG/DL (ref 70–99)
HCT VFR BLD CALC: 31.5 % (ref 36–46)
HEMOGLOBIN: 10.2 G/DL (ref 12–16)
IMMATURE GRANULOCYTES: ABNORMAL %
LYMPHOCYTES # BLD: 42 % (ref 24–44)
MCH RBC QN AUTO: 26.6 PG (ref 26–34)
MCHC RBC AUTO-ENTMCNC: 32.3 G/DL (ref 31–37)
MCV RBC AUTO: 82.3 FL (ref 80–100)
MONOCYTES # BLD: 8 % (ref 1–7)
NRBC AUTOMATED: ABNORMAL PER 100 WBC
PDW BLD-RTO: 14.2 % (ref 11.5–14.9)
PLATELET # BLD: 288 K/UL (ref 150–450)
PLATELET ESTIMATE: ABNORMAL
PMV BLD AUTO: 8.1 FL (ref 6–12)
POTASSIUM SERPL-SCNC: 4.3 MMOL/L (ref 3.7–5.3)
RBC # BLD: 3.83 M/UL (ref 4–5.2)
RBC # BLD: ABNORMAL 10*6/UL
SEG NEUTROPHILS: 44 % (ref 36–66)
SEGMENTED NEUTROPHILS ABSOLUTE COUNT: 2 K/UL (ref 1.3–9.1)
SODIUM BLD-SCNC: 143 MMOL/L (ref 135–144)
WBC # BLD: 4.6 K/UL (ref 3.5–11)
WBC # BLD: ABNORMAL 10*3/UL

## 2021-09-24 PROCEDURE — 99233 SBSQ HOSP IP/OBS HIGH 50: CPT | Performed by: INTERNAL MEDICINE

## 2021-09-24 PROCEDURE — 6370000000 HC RX 637 (ALT 250 FOR IP)

## 2021-09-24 PROCEDURE — 2580000003 HC RX 258: Performed by: STUDENT IN AN ORGANIZED HEALTH CARE EDUCATION/TRAINING PROGRAM

## 2021-09-24 PROCEDURE — G0378 HOSPITAL OBSERVATION PER HR: HCPCS

## 2021-09-24 PROCEDURE — 6370000000 HC RX 637 (ALT 250 FOR IP): Performed by: NURSE PRACTITIONER

## 2021-09-24 PROCEDURE — 97535 SELF CARE MNGMENT TRAINING: CPT

## 2021-09-24 PROCEDURE — 6370000000 HC RX 637 (ALT 250 FOR IP): Performed by: STUDENT IN AN ORGANIZED HEALTH CARE EDUCATION/TRAINING PROGRAM

## 2021-09-24 PROCEDURE — 97162 PT EVAL MOD COMPLEX 30 MIN: CPT

## 2021-09-24 PROCEDURE — 6360000002 HC RX W HCPCS: Performed by: STUDENT IN AN ORGANIZED HEALTH CARE EDUCATION/TRAINING PROGRAM

## 2021-09-24 PROCEDURE — 99219 PR INITIAL OBSERVATION CARE/DAY 50 MINUTES: CPT | Performed by: PSYCHIATRY & NEUROLOGY

## 2021-09-24 PROCEDURE — 97530 THERAPEUTIC ACTIVITIES: CPT

## 2021-09-24 PROCEDURE — 85025 COMPLETE CBC W/AUTO DIFF WBC: CPT

## 2021-09-24 PROCEDURE — 36415 COLL VENOUS BLD VENIPUNCTURE: CPT

## 2021-09-24 PROCEDURE — 80048 BASIC METABOLIC PNL TOTAL CA: CPT

## 2021-09-24 PROCEDURE — 97166 OT EVAL MOD COMPLEX 45 MIN: CPT

## 2021-09-24 RX ORDER — LIDOCAINE 4 G/G
2 PATCH TOPICAL DAILY
Status: DISCONTINUED | OUTPATIENT
Start: 2021-09-24 | End: 2021-09-29 | Stop reason: HOSPADM

## 2021-09-24 RX ADMIN — ENOXAPARIN SODIUM 30 MG: 30 INJECTION SUBCUTANEOUS at 08:29

## 2021-09-24 RX ADMIN — GABAPENTIN 100 MG: 100 CAPSULE ORAL at 13:07

## 2021-09-24 RX ADMIN — OXYCODONE HYDROCHLORIDE 2.5 MG: 5 TABLET ORAL at 19:02

## 2021-09-24 RX ADMIN — OXYCODONE HYDROCHLORIDE AND ACETAMINOPHEN 1 TABLET: 5; 325 TABLET ORAL at 13:16

## 2021-09-24 RX ADMIN — GABAPENTIN 100 MG: 100 CAPSULE ORAL at 08:29

## 2021-09-24 RX ADMIN — CEFTRIAXONE SODIUM 1000 MG: 1 INJECTION, POWDER, FOR SOLUTION INTRAMUSCULAR; INTRAVENOUS at 16:23

## 2021-09-24 RX ADMIN — OXYCODONE HYDROCHLORIDE AND ACETAMINOPHEN 1 TABLET: 5; 325 TABLET ORAL at 19:02

## 2021-09-24 RX ADMIN — Medication 3 MG: at 00:14

## 2021-09-24 RX ADMIN — GABAPENTIN 100 MG: 100 CAPSULE ORAL at 22:44

## 2021-09-24 RX ADMIN — ENOXAPARIN SODIUM 30 MG: 30 INJECTION SUBCUTANEOUS at 22:43

## 2021-09-24 RX ADMIN — Medication 3 MG: at 22:44

## 2021-09-24 RX ADMIN — DONEPEZIL HYDROCHLORIDE 5 MG: 5 TABLET, FILM COATED ORAL at 22:44

## 2021-09-24 RX ADMIN — OXYCODONE HYDROCHLORIDE AND ACETAMINOPHEN 1 TABLET: 5; 325 TABLET ORAL at 06:18

## 2021-09-24 RX ADMIN — OXYCODONE HYDROCHLORIDE 2.5 MG: 5 TABLET ORAL at 06:18

## 2021-09-24 RX ADMIN — OXYCODONE HYDROCHLORIDE 2.5 MG: 5 TABLET ORAL at 13:07

## 2021-09-24 RX ADMIN — SODIUM CHLORIDE, PRESERVATIVE FREE 10 ML: 5 INJECTION INTRAVENOUS at 22:45

## 2021-09-24 ASSESSMENT — PAIN DESCRIPTION - FREQUENCY
FREQUENCY: CONTINUOUS

## 2021-09-24 ASSESSMENT — PAIN SCALES - GENERAL
PAINLEVEL_OUTOF10: 10
PAINLEVEL_OUTOF10: 10
PAINLEVEL_OUTOF10: 8
PAINLEVEL_OUTOF10: 5
PAINLEVEL_OUTOF10: 10
PAINLEVEL_OUTOF10: 7
PAINLEVEL_OUTOF10: 8
PAINLEVEL_OUTOF10: 10
PAINLEVEL_OUTOF10: 10
PAINLEVEL_OUTOF10: 8

## 2021-09-24 ASSESSMENT — PAIN DESCRIPTION - PAIN TYPE
TYPE: CHRONIC PAIN;NEUROPATHIC PAIN

## 2021-09-24 ASSESSMENT — PAIN DESCRIPTION - DESCRIPTORS
DESCRIPTORS: CONSTANT
DESCRIPTORS: CONSTANT
DESCRIPTORS: BURNING
DESCRIPTORS: SHARP;STABBING

## 2021-09-24 ASSESSMENT — PAIN DESCRIPTION - LOCATION
LOCATION: BACK;HIP
LOCATION: GENERALIZED
LOCATION: GENERALIZED;HEAD
LOCATION: GENERALIZED

## 2021-09-24 ASSESSMENT — ENCOUNTER SYMPTOMS
ABDOMINAL PAIN: 0
SHORTNESS OF BREATH: 0
WHEEZING: 1
DIARRHEA: 0
CHEST TIGHTNESS: 0
ABDOMINAL DISTENTION: 0
BACK PAIN: 1
COUGH: 0
CONSTIPATION: 0

## 2021-09-24 ASSESSMENT — PAIN DESCRIPTION - ORIENTATION: ORIENTATION: LOWER;LEFT

## 2021-09-24 ASSESSMENT — PAIN DESCRIPTION - ONSET
ONSET: ON-GOING
ONSET: ON-GOING

## 2021-09-24 NOTE — PROGRESS NOTES
Consult completed via perfect serve 9/24/2021 @ 14:30. Dr. Pepe Up responded back to have primary to get MRI of Cervical spine and thoracic spine.

## 2021-09-24 NOTE — CONSULTS
NEUROLOGY INPATIENT CONSULT NOTE    9/24/2021         Kanwal Pennington is a  67 y.o. female admitted on 9/23/2021 with  UTI (urinary tract infection) [N39.0]  General weakness [R53.1]  Acute cystitis without hematuria [N30.00]  Weakness of left lower extremity [R29.898]      History is obtained mostly from the patient and the medical record and from the caregivers. Chart is reviewed and patient is examined. Briefly, this is a  67 y.o. female admitted on 9/23/2021 with generalized weakness, acute cystitis. As patient stated that she has bilateral lower extremities feeling much more weaker; neurology is consulted. Patient stated that she has back pain and spinal stenosis \"since 70s\" and has been using walker because of weakness in lower extremities for 4 years \"since 2017\". Recently she had flulike symptoms and then she has noticed more generalized weakness. She has been having extreme pain in both lower legs. She denies pain radiating down from lower back down to the extremities. She has extreme tenderness at multiple joints. 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 No current facility-administered medications on file prior to encounter. Current Outpatient Medications on File Prior to Encounter   Medication Sig Dispense Refill    gabapentin (NEURONTIN) 100 MG capsule Take 100 mg by mouth 3 times daily.  Cyanocobalamin (VITAMIN B-12) 1000 MCG/15ML LIQD Take 1,000 mcg by mouth daily       oxyCODONE-acetaminophen (PERCOCET) 7.5-325 MG per tablet Take 1 tablet by mouth every 6 hours as needed for Pain for up to 30 days.  120 tablet 0    levocetirizine (XYZAL) 5 MG tablet take 1 tablet by mouth once daily AT NIGHT 30 tablet 2    donepezil (ARICEPT) 5 MG tablet take 1 tablet by mouth at bedtime 90 tablet 1    Catheters (STANDARD CARE EXTERNAL CATH) MISC 1 each by Does not apply route daily 5 each 1    Lidocaine HCl (ASPERCREME LIDOCAINE) 4 % LIQD Apply topically      Catheters (STANDARD CARE EXTERNAL CATH) MISC 1 Device by Does not apply route daily 30 each 2    albuterol sulfate  (90 Base) MCG/ACT inhaler Inhale 2 puffs into the lungs 4 times daily as needed for Wheezing 1 Inhaler 2    Misc. Devices (WALKER) MISC 1 each by Does not apply route daily Upright walker with wheels and seat 1 each 0    NONFORMULARY daily nervive OTDC      ibuprofen (ADVIL;MOTRIN) 800 MG tablet Take 800 mg by mouth every 6 hours as needed for Pain       polyethylene glycol (GLYCOLAX) 17 GM/SCOOP powder Take 17 g by mouth daily as needed       psyllium (KONSYL) 28.3 % PACK Take 1 packet by mouth daily      docusate sodium (COLACE) 100 MG capsule Take 100 mg by mouth daily as needed for Constipation      aspirin 81 MG EC tablet Take 81 mg by mouth daily        Allergies: Tommy Roth is allergic to rosuvastatin calcium, statins, bactrim [sulfamethoxazole-trimethoprim], caffeine, dye [iodides], food, ioxaglate, tomato, dicloxacillin, and pcn [penicillins].     Past Medical History:   Diagnosis Date    Abnormality of rib determined by X-ray 1/28/2016    Acute cystitis without hematuria 6/16/2018    Acute exacerbation of chronic bronchitis (HCC)     Acute on chronic diastolic heart failure ([facility] [address] 1/28/2016    Adjustment disorder with mixed anxiety and depressed mood 6/26/2015    Mild     Anemia 3/5/2014    Back pain     Bronchiectasis with acute lower respiratory infection ([facility] [address] 8/16/2017    Bronchitis     Chronic bronchitis (HCC) 1/28/2016    Chronic cough 7/23/2018    Degenerative joint disease (DJD) of hip 5/14/2015    Dyslipidemia 3/5/2014    Encephalopathy acute 5/29/2016    Essential hypertension 1/28/2016    Fibromyalgia     GERD (gastroesophageal reflux disease)     History of total bilateral knee replacement 5/28/2016    Hx of blood clots     left leg and lung    Hyperlipidemia     by Kp Lunsford MD at [address] 5/14/15    total hip replacement     Social History: Ryan Jacobs  reports that she quit smoking about 28 years ago. She has a 18.00 pack-year smoking history. She has never used smokeless tobacco. She reports that she does not drink alcohol and does not use drugs. Family History   Problem Relation Age of Onset    Stomach Cancer Brother         stomach    High Blood Pressure Mother     Heart Disease Sister         irregular heartbeat       Current Medications:     lidocaine  2 patch TransDERmal Daily    sodium chloride flush  5-40 mL IntraVENous 2 times per day    enoxaparin  30 mg SubCUTAneous BID    aspirin  81 mg Oral Daily    donepezil  5 mg Oral Nightly    gabapentin  100 mg Oral TID    cefTRIAXone (ROCEPHIN) IV  1,000 mg IntraVENous Q24H     PRN Meds include: sodium chloride flush, sodium chloride, ondansetron **OR** ondansetron, polyethylene glycol, acetaminophen **OR** acetaminophen, potassium chloride **OR** potassium alternative oral replacement **OR** potassium chloride, albuterol, oxyCODONE-acetaminophen **AND** oxyCODONE, melatonin    ROS:   Constitutional Negative for fever and chills   HEENT Negative for ear discharge, ear pain, nosebleed   Eyes Negative for photophobia, pain and discharge   Respiratory Negative for hemoptysis and sputum   Cardiovascular Negative for orthopnea, claudication and PND   Gastrointestinal Negative for abdominal pain, diarrhea, blood in stool   Musculoskeletal Negative for joint pain, negative for myalgia   Skin Negative for rash or itching   Endo/heme/allergies Negative for polydipsia, environmental allergy   Psychiatric Negative for suicidal ideation.   Patient is not anxious           Objective:   BP (!) 154/58   Pulse 54   Temp 98.5 °F (36.9 °C) (Oral)   Resp 16   Ht 5' 4\" (1.626 m)   Wt 270 lb (122.5 kg)   SpO2 98%   BMI 46.35 kg/m²     Blood pressure range: Systolic (63HSY), BKW:553 , Min:151 , JGT:081   ; Diastolic (24XGJ), LUANA:92, Min:58, Max:59      Continuous infusions:    sodium chloride      sodium chloride 75 mL/hr at 09/23/21 1821       ON EXAMINATION:  GENERAL  Appears comfortable and in no distress   HEENT  NC/ AT   NECK  Supple and no bruits heard   Cardiovascular  S1, S2 heard; radial pulse intact   MENTAL STATUS:  Alert, oriented, intact memory, no confusion, normal speech, normal language, no hallucination or delusion   CRANIAL NERVES: II     -       Pupils reactive b/l., Fundus exam: intact venous pulsations; Visual fields intact to confrontation  III,IV,VI -  EOMs full, no afferent defect, no                      ASHLEIGH, no ptosis  V     -     Normal facial sensation  VII    -     Normal facial symmetry  VIII   -     Intact hearing  IX,X -     Symmetrical palate  XI    -     Symmetrical shoulder shrug  XII   -     Midline tongue, no atrophy    MOTOR FUNCTION:  Motor exam significant for 5/5 in bilateral upper extremities; limited mobility at the left shoulder; able to move both lower extremities against gravity and minimal resistance but she has extreme tenderness in both lower extremities limiting motor exam in lower extremities; normal bulk and normal tone and no involuntary movements. SENSORY FUNCTION:  Intact light touch and pinprick in bilateral upper extremities and diminished light touch and pinprick and temperature and vibration in distal lower extremities in stocking pattern.      CEREBELLAR FUNCTION:  Intact fine motor control over upper limbs   REFLEX FUNCTION:  Symmetric 1+ DTRs in upper extremities and hypoactive DTRs it both knees and ankles with bilateral flexor plantar responses   STATION and GAIT  able to stand with support but has extreme difficulty walking with ongoing tenderness in both feet at multiple joints          Data:    Lab Results:     Lab Results   Component Value Date    CHOL 254 (H) 04/15/2019    LDLCHOLESTEROL 186 (H) 04/15/2019    HDL 47 04/15/2019    TRIG 104 04/15/2019    ALT 14 09/23/2021    AST 17 09/23/2021    TSH 2.61 04/15/2019    INR 1.0 09/30/2020    LABA1C 5.4 04/15/2019    IFMZPEFS75 801 12/17/2019     Hematology:  Recent Labs     09/23/21  1224 09/24/21  0655   WBC 4.4 4.6   HGB 10.8* 10.2*   HCT 33.6* 31.5*    288     Chemistry:  Recent Labs     09/23/21  1224 09/24/21  0655    143   K 3.9 4.3    109*   CO2 27 26   GLUCOSE 99 106*   BUN 23 20   CREATININE 1.13* 0.96*   CALCIUM 9.2 9.0     Recent Labs     09/23/21  1224   PROT 7.4   LABALBU 3.9   AST 17   ALT 14             Diagnostic data reviewed:  MRI cervical spine (without) (9/7/2021) done at [facility]: Severe degenerative changes in cervical spine leading to moderate spinal canal stenosis and severe bilateral neural foraminal narrowing at C4-5 and C5-C6-C7. Moderate spinal canal stenosis at C3 -C4. Moderate left neuroforaminal narrowing at C2-C3. Severe rotator cuff arthropathy or bilateral shoulder joints with history. Migration of humeral heads and loss of subacromial spaces bilaterally. MRI lumbar spine (without) 9/7/2021, done at [facility]: Severe multilevel degenerative changes of the lumbar spine; mass-effect noted on exiting L4 nerve roots and L5 nerve roots at L4-L5 and L5-S1 respectively; 7 mm anterolisthesis of L4 on L5; remote compression deformity with likely kyphoplasty material at L1 vertebral body. Impression and Plan: Ms. Alma Tavarez is a 67 y.o. female with   [de-identified] year hx of lumbar spinal stenosis;  Four year hx of low back pain and lower extremity weakness; several year history of neuropathy from asbestosis/fibromyalgia according to patient; severe degenerative changes at various levels of cervical and lumbosacral spine  Lumbar spinal stenosis with neurogenic claudication    Recent flulike symptoms and generalized weakness with the predominant weakness in bilateral lower legs for the past few days; might benefit from spinal fluid studies and NCS/EMG studies of bilateral lower extremities. Also to request staff to get images of neuroimaging studies of spine done recently. Morbid obesity with BMI > 45  Comorbid conditions include Hypertension, hyperlipidemia, morbid obesity with obstructive sleep apnea; osteoarthritis    Care plan is discussed with the patient and she voiced understanding those instructions. Will follow with you. Thank you for consultation.              Erlin Benz MD 9/24/2021 8:02 PM

## 2021-09-24 NOTE — PROGRESS NOTES
Patient complaining of left leg pain and weakness. Patient states she can't lift her leg off the bed. Patient is able to move her toes and feet and able to bend her knee. Patient states her left hip has been giving her problems in the last few weeks, but it has not felt like this until now. Patient states this happened earlier today when she was standing up off the toilet, but had resolved. Pranav Santiago NP notified via perfect serve. Mik Guevara states this is a chronic problem, and that patient had recent MRI that shows spinal stenosis and was supposed to follow up with surgeon at some point. Patient getting increasingly anxious, asked for something for anxiety and to help her sleep.  Melatonin ordered per Mik Guevara NP.

## 2021-09-24 NOTE — PROGRESS NOTES
[address] [employer]    PROGRESS NOTE             9/24/2021    10:38 AM    Name:   Jena Garcia  MRN:     [id number]     Acct:      [de-identified]   Room:   2052/2052-01  IP Day:  1  Admit Date:  9/23/2021 12:00 PM    PCP:  Ernie Francois MD  Code Status:  Full Code    Subjective:     C/C:   Chief Complaint   Patient presents with    Fatigue     Interval History Status: not changed. Seen and examined at bedside. States that she had an anxiety attack last night due to increasing left lower extremity weakness. Today at bedside left leg is weaker than on examination yesterday, most likely secondary to chronic back and hip problems. Patient has some wheezing today, currently on 2.5 L of oxygen via nasal cannula, this is her baseline. Will order breathing treatments. Brief History:     The patient is a 67 y.o. Non- / non  female who presents to the ED complaining of fatigue and bilateral leg weakness for the past 5 days. Patient uses an upright walker at home but says her legs are shaking and she has difficulties getting up to go to the bathroom because her legs feel so weak and often go numb. Patient does have chronic back pain, with neuropathy over the past 4-5 years. Recent MRI report that patient brought in shows moderate stenosis in C3-7, severe neural foraminal narrowing C4-7, 7 mm anterolisthesis in L4-5. Patient will be seeing a surgeon at another facility once she \"regains her strength\". Patient admitted for evaluation of weakness, possible UTI and possible placement in SNF.      Labs in the ED were unremarkable, except for UA + for leukocyte esterase and bacteria. Patient started on Rocephin in ED. Review of Systems:     Review of Systems   Constitutional: Negative for chills and fever. Eyes: Negative for visual disturbance. Respiratory: Positive for wheezing.  Negative for cough, chest tightness and shortness of Bronchiectasis with acute lower respiratory infection ([facility] Bronchitis, Chronic bronchitis ([facility] Chronic cough, Degenerative joint disease (DJD) of hip, Dyslipidemia, Encephalopathy acute, Essential hypertension, Fibromyalgia, GERD (gastroesophageal reflux disease), History of total bilateral knee replacement, Hx of blood clots, Hyperlipidemia, Hypertension, Incontinence of urine, Irregular heartbeat, Medication monitoring encounter, Morbid obesity with BMI of 45.0-49.9, adult ([facility] Obstructive sleep apnea syndrome, Osteoarthritis, Primary osteoarthritis of left knee, PVC (premature ventricular contraction), S/P right and left heart catheterization, Sleep apnea, SOB (shortness of breath), Spinal stenosis of lumbar region with neurogenic claudication, Supplemental oxygen dependent, Urine frequency, and UTI (urinary tract infection). Social History:   reports that she quit smoking about 28 years ago. She has a 18.00 pack-year smoking history. She has never used smokeless tobacco. She reports that she does not drink alcohol and does not use drugs. Family History:   Family History   Problem Relation Age of Onset    Stomach Cancer Brother         stomach    High Blood Pressure Mother     Heart Disease Sister         irregular heartbeat       Vitals:  BP (!) 155/59   Pulse 52   Temp 96.8 °F (36 °C) (Oral)   Resp 16   Ht 5' 4\" (1.626 m)   Wt 270 lb (122.5 kg)   SpO2 98%   BMI 46.35 kg/m²   Temp (24hrs), Av.6 °F (36.4 °C), Min:96.8 °F (36 °C), Max:98.5 °F (36.9 °C)    No results for input(s): POCGLU in the last 72 hours. I/O(24Hr):     Intake/Output Summary (Last 24 hours) at 2021 1038  Last data filed at 2021 1829  Gross per 24 hour   Intake 240 ml   Output --   Net 240 ml       Labs:  [unfilled]    Lab Results   Component Value Date/Time    SPECIAL NOT REPORTED 2019 02:30 PM     Lab Results   Component Value Date/Time    CULTURE ESCHERICHIA COLI >[id number] CFU/ML (A) 2019 02:30 PM       [facility]    Radiology:    No results found. Physical Examination:        Physical Exam  Constitutional:       Appearance: She is obese. Cardiovascular:      Rate and Rhythm: Normal rate and regular rhythm. Pulses: Normal pulses. Heart sounds: Normal heart sounds. Pulmonary:      Effort: Pulmonary effort is normal.      Breath sounds: Wheezing present. Abdominal:      Palpations: Abdomen is soft. Tenderness: There is no abdominal tenderness. Skin:     General: Skin is warm. Neurological:      General: No focal deficit present. Mental Status: She is alert and oriented to person, place, and time.    Psychiatric:         Mood and Affect: Mood normal.           Assessment:        Primary Problem  Physical debility    Active Hospital Problems    Diagnosis Date Noted    Sleep apnea [G47.30]      Priority: Medium    UTI (urinary tract infection) [N39.0] 09/23/2021    Physical debility [R53.81] 09/23/2021    COPD (chronic obstructive pulmonary disease) ([facility] [J44.9] 09/23/2021    Morbid obesity with BMI of 45.0-49.9, adult ([facility] [E66.01, Z68.42] 01/28/2016    Fibromyalgia [M79.7] 03/05/2014    Spinal stenosis of lumbar region with neurogenic claudication [M48.062] 05/13/2013       Plan:        Debility 2/2 arthritis, spinal stenosis with neurogenic claudication  - New onset of being unsteady, weak, difficulty with daily tasks   - Under care of pain management for back pain  - Continue home Oxycodone   - To be seen by case management/social work for possible SNF placement      Acute cystitis without hematuria    - UA positive for leukocytes esterase and bacteria  - Culture pending   - Started on Rocephin in ED     Fibromyalgia   - Continue home Cymbalta  - Continue home gabapentin      Obstructive sleep apnea  - On 2L of O2 via nasal cannula at night     COPD  - Continue home albuterol   - Respiratory on board     Diet: Regular   DVT Prophylaxis: Lovenox 30 mg Janet Cheatham MD  9/24/2021  10:38 AM     Attending Physician Statement  I have discussed the care of Chance Waggoner and I have examined the patient myselft and taken ros and hpi , including pertinent history and exam findings,  with the resident. I have reviewed the key elements of all parts of the encounter with the resident. I agree with the assessment, plan and orders as documented by the resident.     Left leg wekenss with falls  Mri cervical lumbar spine noted with DJD and radiculopathy some element of spinal stenosis of cervical spine moderate  Will do MRI brain rule out ischemic stroke neurology consult  Physical therapy patient unable to go back to home at fall risk will need extended care facility  Electronically signed by Kristina Ann MD

## 2021-09-24 NOTE — CARE COORDINATION
Please have patient or family member fill out the MRI Screening form and fax to dept @ [phone]. Any questions. .please call [facility] MRI @ [phone]. MRI exam will be scheduled after receiving the completed screening form.  Thank you!!

## 2021-09-24 NOTE — PROGRESS NOTES
Physical Therapy    Facility/Department: [facility] MED SURG  Initial Assessment    NAME: Dolly Nixon  : 1945  MRN: [id number]    Date of Service: 2021    Discharge Recommendations:  Patient would benefit from continued therapy after discharge   PT Equipment Recommendations  Equipment Needed: No    Assessment   Body structures, Functions, Activity limitations: Decreased functional mobility ; Decreased strength; Increased pain  Assessment: Impaired mobility due to pain and safety concerns of decreased strength  Decision Making: Medium Complexity  History: spinal stenosis  Exam: decresed strength, mobility; increased pain  Clinical Presentation: evolving  REQUIRES PT FOLLOW UP: Yes  Activity Tolerance  Activity Tolerance: Patient limited by pain       Patient Diagnosis(es): The primary encounter diagnosis was Acute cystitis without hematuria. A diagnosis of General weakness was also pertinent to this visit.      has a past medical history of Abnormality of rib determined by X-ray, Acute cystitis without hematuria, Acute exacerbation of chronic bronchitis (HCC), Acute on chronic diastolic heart failure ([address] Adjustment disorder with mixed anxiety and depressed mood, Anemia, Back pain, Bronchiectasis with acute lower respiratory infection ([address] Bronchitis, Chronic bronchitis ([address] Chronic cough, Degenerative joint disease (DJD) of hip, Dyslipidemia, Encephalopathy acute, Essential hypertension, Fibromyalgia, GERD (gastroesophageal reflux disease), History of total bilateral knee replacement, Hx of blood clots, Hyperlipidemia, Hypertension, Incontinence of urine, Irregular heartbeat, Medication monitoring encounter, Morbid obesity with BMI of 45.0-49.9, adult ([address] Obstructive sleep apnea syndrome, Osteoarthritis, Primary osteoarthritis of left knee, PVC (premature ventricular contraction), S/P right and left heart catheterization, Sleep apnea, SOB (shortness of breath), Spinal stenosis of lumbar region with neurogenic claudication, Supplemental oxygen dependent, Urine frequency, and UTI (urinary tract infection). has a past surgical history that includes Hysterectomy; Nerve Block (5/13/2013); Nerve Block (5/28/13); sinus surgery; Foot surgery (Left); Nerve Block (2/14/14); Colonoscopy (4 28 14); Nerve Block (07/14/2014); Nerve Block (7-21-14); Nerve Block (10/2/14); Nerve Block (11/9/2015); Nerve Block (11/16/15); Nerve Block (11/23/15); Total hip arthroplasty (Right, 5/14/15); bronchoscopy (Left, 8/16/2017); Cardiac catheterization (x 3); Nerve Block (03/28/2018); Nerve Block (05/23/2018); Nerve Block (08/28/2018); pr perq vert agmntj cavity crtj uni/bi cannulj lmbr (N/A, 10/29/2018); and joint replacement (Bilateral, 5/27/2016). Restrictions  Restrictions/Precautions  Restrictions/Precautions: Fall Risk  Required Braces or Orthoses?: No  Position Activity Restriction  Other position/activity restrictions: Up as tolerated, pt reports h/o fibromyalgia with generalized pain with increased activity        Subjective  General  Patient assessed for rehabilitation services?: Yes  Family / Caregiver Present: No  Follows Commands: Within Functional Limits  Subjective  Subjective: c/c of back and hip pain  Pain Screening  Patient Currently in Pain: Yes  Pain Assessment  Pain Assessment: 0-10  Pain Level: 10  Pain Location: Back; Hip  Pain Orientation: Lower; Left  Pain Descriptors: Lilo Duverney; Stabbing  Vital Signs  Patient Currently in Pain: Yes       Orientation  Orientation  Overall Orientation Status: Within Normal Limits  Social/Functional History  Social/Functional History  Lives With: Alone  Type of Home: Apartment  Home Layout: One level  Home Access: Level entry  Bathroom Shower/Tub: Tub/Shower unit, Walk-in shower  Bathroom Toilet: Handicap height  Bathroom Equipment: Tub transfer bench, Hand-held shower, Grab bars around toilet  Bathroom Accessibility: Walker accessible  Home Equipment: 4 wheeled walker, [employer] haley  Ambulation Assistance: Independent (with walker)  Transfer Assistance: Independent    Objective       AROM RLE (degrees)  RLE AROM: WFL  AROM LLE (degrees)  LLE AROM : WFL  Strength RLE  Comment: grossly 3/3+/5  Strength LLE  Comment: grossly 3/3-/5        Bed mobility  Supine to Sit: Moderate assistance  Sit to Supine: Moderate assistance  Scooting: Moderate assistance  Comment: head elevated and use of rail  Transfers  Sit to Stand: Moderate Assistance;Minimal Assistance;2 Person Assistance  Stand to sit: Minimal Assistance; Moderate Assistance;2 Person Assistance  Bed to Chair: Dependent/Total (use of lucy stedy)  Comment: attempted to stand with RW- maxA for pt to partially stand for few seconds then sat back on bed due to pain.  In PM pt requirted modA x 2 to stand from bed with lucy stedy and Olya x2 to stand from paddles on lucy stedy  Ambulation  Ambulation?: No (not attempted due to safety concerns at this time)     Balance  Sitting - Static: Fair;+ (SBA)  Sitting - Dynamic: Fair (CGA)        Plan   Plan  Times per week: 7x/wk  Current Treatment Recommendations: Strengthening, Balance Training, Endurance Training, Functional Mobility Training, Gait Training, Transfer Training, Safety Education & Training  Safety Devices  Type of devices: Call light within reach, Left in chair, Patient at risk for falls, Nurse notified      Goals  Short term goals  Time Frame for Short term goals: 3-5 days  Short term goal 1: bed mobility with min/CGA  Short term goal 2: standing with RW and Olya x1-2  Short term goal 3: transfer to chair/commode with RW and Olya x1-2  Short term goal 4: ambulate with RW and Olya x 1-2 for 10-20ft       Therapy Time   Individual Concurrent Group Co-treatment   Time In [phone])         Time Out 0950 [phone])         Minutes 38(27)          total time: 72 min       Dangelo Soriano, PT

## 2021-09-24 NOTE — PLAN OF CARE
Problem: Falls - Risk of:  Goal: Will remain free from falls  Description: Will remain free from falls  9/24/2021 1606 by Amando Abebe RN  Outcome: Ongoing  9/24/2021 0413 by Rock Sharma RN  Outcome: Ongoing  Goal: Absence of physical injury  Description: Absence of physical injury  9/24/2021 1606 by Amando Abebe RN  Outcome: Ongoing  9/24/2021 0413 by Rock Sharma RN  Outcome: Ongoing     Problem: Skin Integrity:  Goal: Will show no infection signs and symptoms  Description: Will show no infection signs and symptoms  9/24/2021 1606 by Amando Abebe RN  Outcome: Ongoing  9/24/2021 0413 by Rock Sharma RN  Outcome: Ongoing  Goal: Absence of new skin breakdown  Description: Absence of new skin breakdown  9/24/2021 1606 by Amando Abebe RN  Outcome: Ongoing  9/24/2021 0413 by Rock Sharma RN  Outcome: Ongoing     Problem: Pain:  Goal: Pain level will decrease  Description: Pain level will decrease  9/24/2021 1606 by Amando Abebe RN  Outcome: Ongoing  9/24/2021 0413 by Rock Sharma RN  Outcome: Ongoing  Goal: Control of acute pain  Description: Control of acute pain  9/24/2021 1606 by Amando Abebe RN  Outcome: Ongoing  9/24/2021 0413 by Rock Sharma RN  Outcome: Ongoing  Goal: Control of chronic pain  Description: Control of chronic pain  9/24/2021 1606 by Amando Abebe RN  Outcome: Ongoing  9/24/2021 0413 by Rock Sharma RN  Outcome: Ongoing

## 2021-09-24 NOTE — DISCHARGE INSTR - COC
Continuity of Care Form    Patient Name: Patito Pompa   :    MRN:  [id number]    Admit date:  2021  Discharge date:  2021    Code Status Order: Full Code   Advance Directives:      Admitting Physician:  Dorene Fuentes MD  PCP: Dorene Fuentes MD    Discharging Nurse: [address] [place] Unit/Room#: 2052/2052-01  [facility] Phone Number: [phone]    Emergency Contact:   Extended Emergency Contact Information  Primary Emergency Contact: Stacey Grant  Address: [place], [address] Vince Kocher of [address] Phone: [phone]  Work Phone: [phone]  Mobile Phone: [phone]  Relation: Child  Secondary Emergency Contact: Kim Cowan  Address: [place] [facility] [address] Phone: [phone]  Work Phone: [phone]  Mobile Phone: [phone]  Relation: Child    Past Surgical History:  Past Surgical History:   Procedure Laterality Date    BRONCHOSCOPY Left 2017    BRONCHOSCOPY ALVEOLAR LAVAGE LOWER LOBE performed by Karina Peralta MD at [address]  x 3    no stents    COLONOSCOPY  4 28 14    polyps biopsies     FOOT SURGERY Left     calcium deposit removal from top of foot    HYSTERECTOMY      JOINT REPLACEMENT Bilateral 2016    bilat total knees    NERVE BLOCK  2013    caudal celestone 6mg    NERVE BLOCK  13    Caudal #2, Celestone 9mg    NERVE BLOCK  14    rt hip inj celestone 9 mg    NERVE BLOCK  2014    caudal# 3- celestone 9 mg    NERVE BLOCK  14    caudal epidural #2, decadron 7mg, fentanyl 25mcg    NERVE BLOCK  10/2/14    duramorph 1.5  decadron 5mg    NERVE BLOCK  2015    caudal# 1 celestone 9mg    NERVE BLOCK  11/16/15    caudal #2  decadron 10mg    NERVE BLOCK  11/23/15    duramorph 1mg celestone 9mg    NERVE BLOCK  2018    CAUDAL EPIDURAL STEROID BLOCK  DECADRON 10 MG     NERVE BLOCK  2018    caudal # decadron 7mg    NERVE BLOCK 08/28/2018    natalia transforminal # 1, decadron 10mg gadoteridol, LONG NEEDLES    NH PERQ VERT AGMNTJ CAVITY CRTJ UNI/BI CANNULJ LMBR N/A 10/29/2018    KYPHOPLASTY L1 performed by Courtenay Bamberger, MD at [address] Right 5/14/15    total hip replacement       Immunization History:   Immunization History   Administered Date(s) Administered    COVID-19, Moderna, PF, 100mcg/0.5mL 01/28/2021, 02/25/2021    Pneumococcal Conjugate 13-valent ([id number]) 07/23/2018    Pneumococcal Polysaccharide ([id number]) 05/27/2012    Tdap (Boostrix, Adacel) 06/18/2020       Active Problems:  Patient Active Problem List   Diagnosis Code    Spinal stenosis of lumbar region with neurogenic claudication M48.062    Dyslipidemia E78.5    Anemia D64.9    Fibromyalgia M79.7    Degenerative joint disease (DJD) of hip M16.9    Adjustment disorder with mixed anxiety and depressed mood F43.23    S/P right and left heart catheterization Z98.890    SOB (shortness of breath) R06.02    Acute on chronic diastolic heart failure (HCC) I50.33    Essential hypertension I10    Morbid obesity with BMI of 45.0-49.9, adult (HCC) E66.01, Z68.42    PVC (premature ventricular contraction) I49.3    Abnormality of rib determined by X-ray Q76.6    Chronic bronchitis (HCC) J42    Sleep apnea G47.30    History of total bilateral knee replacement Z96.653    Encephalopathy acute G93.40    Obstructive sleep apnea syndrome G47.33    Primary osteoarthritis of left knee M17.12    Bronchiectasis with acute lower respiratory infection (HCC) J47.0    Supplemental oxygen dependent Z99.81    Back pain M54.9    Medication monitoring encounter Z51.81    Acute cystitis without hematuria N30.00    Chronic cough R05    Acquired spondylolisthesis M43.10    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    Acute low back pain M54.5    Neck pain M54.2    Closed compression fracture of L1 lumbar vertebra S32.010A    Cervical spinal stenosis M48.02    BMI 40.0-44.9, adult ([facility]) Z68.41    Chronic prescription opiate use Z79.891    Age-related osteoporosis with current pathological fracture M80.00XA    S/P kyphoplasty Z98.890    Morbid obesity ([facility]) E66.01    DDD (degenerative disc disease), lumbar M51.36    Lumbar radiculopathy M54.16    Depression F32.9    Osteopenia of lumbar spine M85.88    Pain of left hip joint M25.552    UTI (urinary tract infection) N39.0    Physical debility R53.81    COPD (chronic obstructive pulmonary disease) ([facility]) J44.9    Weakness of left lower extremity R29.898       Isolation/Infection:   Isolation            No Isolation          Patient Infection Status       Infection Onset Added Last Indicated Last Indicated By Review Planned Expiration Resolved Resolved By    None active    Resolved    COVID-19 Rule Out 09/30/20 09/30/20 09/30/20 COVID-19 (Ordered)   10/01/20 Rule-Out Test Resulted            Nurse Assessment:  Last Vital Signs: BP (!) 154/58   Pulse 54   Temp 98.5 °F (36.9 °C) (Oral)   Resp 16   Ht 5' 4\" (1.626 m)   Wt 270 lb (122.5 kg)   SpO2 98%   BMI 46.35 kg/m²     Last documented pain score (0-10 scale): Pain Level: 10  Last Weight:   Wt Readings from Last 1 Encounters:   09/23/21 270 lb (122.5 kg)     Mental Status:  oriented    IV Access:  - None    Nursing Mobility/ADLs:  Walking   Assisted  Transfer  Assisted  Bathing  Assisted  Dressing  Assisted  Toileting  Assisted  Feeding  Assisted  Med Admin  Assisted  Med Delivery   whole    Wound Care Documentation and Therapy:  Incision 10/29/18 Back Lower;Mid (Active)   Number of days: 1061        Elimination:  Continence:   · Bowel:  Yes  · Bladder: No  Urinary Catheter: None   Colostomy/Ileostomy/Ileal Conduit: No       Date of Last BM: 09/27/2021    Intake/Output Summary (Last 24 hours) at 9/24/2021 1709  Last data filed at 9/23/2021 1829  Gross per 24 hour   Intake 240 ml   Output --   Net 240 ml     I/O last 3 completed shifts: In: 240 [P.O.:240]  Out: -     Safety Concerns: At Risk for Falls    Impairments/Disabilities:      None    Nutrition Therapy:  Current Nutrition Therapy:   - Oral Diet:  General    Routes of Feeding: Oral  Liquids: Thin Liquids  Daily Fluid Restriction: no  Last Modified Barium Swallow with Video (Video Swallowing Test): not done    Treatments at the Time of Hospital Discharge:   Respiratory Treatments: n/a  Oxygen Therapy:  is on oxygen at 3l/m L/min per nasal cannula. Ventilator:    - No ventilator support    Rehab Therapies: Physical Therapy  Weight Bearing Status/Restrictions: No weight bearing restirctions  Other Medical Equipment (for information only, NOT a DME order):  walker  Other Treatments: skilled nursing assessment, medication education and monitoring    Patient's personal belongings (please select all that are sent with patient):  Juan    RN SIGNATURE:  Electronically signed by Ovi Petty RN on 9/28/21 at 2:41 PM EDT    CASE MANAGEMENT/SOCIAL WORK SECTION    Inpatient Status Date: 9/23/21    Readmission Risk Assessment Score:  Readmission Risk              Risk of Unplanned Readmission:  9           Discharging to Facility/ Agency   · [facility]: [facility]  · Address:  [address] [place] [address]  · Phone: [phone]  · Fax: [phone]    Dialysis Facility (if applicable)   · Name:  · Address:  · Dialysis Schedule:  · Phone:  · Fax:    / signature: Electronically signed by GRECIA Jacinto on 9/24/21 at 5:10 PM EDT    PHYSICIAN SECTION    Prognosis: Fair    Condition at Discharge: Stable    Rehab Potential (if transferring to Rehab): Fair    Recommended Labs or Other Treatments After Discharge:     Physician Certification: I certify the above information and transfer of Max Mckenna  is necessary for the continuing treatment of the diagnosis listed and that she requires Estiven Bobby for greater 30 days.      Update Admission H&P: No change in H&P    PHYSICIAN SIGNATURE:  Electronically signed by Trino Parker MD on 9/28/21 at 1:06 PM EDT

## 2021-09-24 NOTE — CARE COORDINATION
CASE MANAGEMENT NOTE:    Admission Date:  9/23/2021 Ryan Jacobs is a 67 y.o.  female    Admitted for : UTI (urinary tract infection) [N39.0]  General weakness [R53.1]  Acute cystitis without hematuria [N30.00]    Met with:  Patient    PCP:  Dr. Radha Fletcher:  [employer]      Is patient alert and oriented at time of discussion:  Yes    Current Residence/ Living Arrangements:  independently at home and drives, however hasn't been able to care for herself recently. Current Services PTA:  No    Does patient go to outpatient dialysis: No  If yes, location and chair time: n/a    Is patient agreeable to VNS: Yes, however would like to go to SNF first.    Freedom of choice provided:  NA    List of [address] provided: NA    VNS chosen:  NA    DME:  straight cane, walker and wheelchair    Home Oxygen: Yes 2L NC @ HS    Nebulizer: yes    CPAP/BIPAP: No    Supplier: [facility]     Potential Assistance Needed: SNF    SNF needed: Yes    Freedom of choice and list provided: Yes - pt would like [facility] or [facility]. Notified Nasir Loredo, of this. Pharmacy:  [facility] on [facility]       Does Patient want to use MEDS to BEDS? No    Is patient currently receiving oral anticoagulation therapy? No    Is the Patient an [facility] with Readmission Risk Score greater than 14%? No  If yes, pt needs a follow up appointment made within 7 days. Family Members/Caregivers that pt would like involved in their care:    Yes    If yes, list name here:  Inocencio Conley    Transportation Provider:  Patient and Family             Discharge Plan:  SNF - [facility] or [facility].                  Electronically signed by: Peyton Benjamin RN on 9/24/2021 at 12:03 PM

## 2021-09-24 NOTE — FLOWSHEET NOTE
Writer responded to pt request for a Bible with the old and new testament  Pt was very pleased to have a Bible. Pt brought her daily devotional but pt Bible was too heavy to bring  Pt has strong bailey and attends daily prayer via zoom everyday with her Mormon in addition to [employer] and Jainism online  Pt talked about the role of \"suffering like Holger\" in her bailey   Pt became tearful as she spoke of all the death recently at her Mormon and family  Writer left when medical staff arrived but writer shared how spiritual care can be reached while at the hospital       09/24/21 1236   Encounter Summary   Services provided to: Patient   Referral/Consult From: Nurse   Support System Family members; Pentecostalism/bailey community   Continue Visiting   (9/24/21)   Complexity of Encounter Moderate   Length of Encounter 30 minutes   Spiritual Assessment Completed Yes   Spiritual/Jewish   Type Spiritual support   Assessment Calm; Approachable; Hopeful;Coping;Peaceful   Intervention Active listening;Explored feelings, thoughts, concerns;Nurtured hope;Provided reading materials/devotional materials;Sustaining presence/ Ministry of presence; Discussed relationship with God;Discussed belief system/Roman Catholic practices/bailey;Discussed illness/injury and it's impact   Outcome Comfort;Expressed gratitude;Engaged in conversation; Shared life review;Expressed feelings/needs/concerns;Coping;Grieving; Hopeful;Receptive

## 2021-09-24 NOTE — PROGRESS NOTES
[address]   Occupational Therapy Evaluation  Date: 21  Patient Name: Darcie Godinez       Room: 3225/4986-76  MRN: [id number]  Account: [de-identified]   : 1945  (76 y.o.) Gender: female     Discharge Recommendations:  Further Occupational Therapy is recommended upon facility discharge. Equipment Needed:  (TBD)    Referring Practitioner: Brandy Celaya MD  Diagnosis: Physical debility  Additional Pertinent Hx: 67 y.o. Non- / non  female who presents to the ED complaining of fatigue and bilateral leg weakness for the past 5 days. Patient uses an upright walker at home but says her legs are shaking and she has difficulties getting up to go to the bathroom because her legs feel so weak and often go numb. Patient does have chronic back pain, with neuropathy over the past 4-5 years. Recent MRI report that patient brought in shows moderate stenosis in C3-7, severe neural foraminal narrowing C4-7, 7 mm anterolisthesis in L4-5. Patient will be seeing a surgeon at another facility once she \"regains her strength\".     Treatment Diagnosis: Impaired self-care status  Past Medical History:  has a past medical history of Abnormality of rib determined by X-ray, Acute cystitis without hematuria, Acute exacerbation of chronic bronchitis (HCC), Acute on chronic diastolic heart failure ([address] Adjustment disorder with mixed anxiety and depressed mood, Anemia, Back pain, Bronchiectasis with acute lower respiratory infection ([address] Bronchitis, Chronic bronchitis ([address] Chronic cough, Degenerative joint disease (DJD) of hip, Dyslipidemia, Encephalopathy acute, Essential hypertension, Fibromyalgia, GERD (gastroesophageal reflux disease), History of total bilateral knee replacement, Hx of blood clots, Hyperlipidemia, Hypertension, Incontinence of urine, Irregular heartbeat, Medication monitoring encounter, Morbid obesity with BMI of 45.0-49.9, adult ([address] Obstructive sleep apnea syndrome, Osteoarthritis, Primary osteoarthritis of left knee, PVC (premature ventricular contraction), S/P right and left heart catheterization, Sleep apnea, SOB (shortness of breath), Spinal stenosis of lumbar region with neurogenic claudication, Supplemental oxygen dependent, Urine frequency, and UTI (urinary tract infection). Past Surgical History:   has a past surgical history that includes Hysterectomy; Nerve Block (5/13/2013); Nerve Block (5/28/13); sinus surgery; Foot surgery (Left); Nerve Block (2/14/14); Colonoscopy (4 28 14); Nerve Block (07/14/2014); Nerve Block (7-21-14); Nerve Block (10/2/14); Nerve Block (11/9/2015); Nerve Block (11/16/15); Nerve Block (11/23/15); Total hip arthroplasty (Right, 5/14/15); bronchoscopy (Left, 8/16/2017); Cardiac catheterization (x 3); Nerve Block (03/28/2018); Nerve Block (05/23/2018); Nerve Block (08/28/2018); pr perq vert agmntj cavity crtj uni/bi cannulj lmbr (N/A, 10/29/2018); and joint replacement (Bilateral, 5/27/2016). Restrictions  Restrictions/Precautions: Fall Risk  Other position/activity restrictions: Up as tolerated, pt reports h/o fibromyalgia with generalized pain with increased activity  Required Braces or Orthoses?: No     Vitals  Temp: 98.5 °F (36.9 °C)  Pulse: 54  Resp: 16  BP: (!) 154/58  Height: 5' 4\" (162.6 cm)  Weight: 270 lb (122.5 kg)  BMI (Calculated): 46.4  Oxygen Therapy  SpO2: 98 %  Pulse Oximeter Device Mode: Intermittent  Pulse Oximeter Device Location: Finger  O2 Device: Nasal cannula  Skin Assessment: Clean, dry, & intact  O2 Flow Rate (L/min): 2.5 L/min  Level of Consciousness: Alert (0)    Subjective  Subjective: \"Ooo it just hurts! \" Pt exclaimed when sitting EOB, referring to her L hip. Comments: Okay for OT per [employer]. Pt pleasant and agreeable for today's session.   Overall Orientation Status: Within Functional Limits  Hearing  Hearing: Exceptions to [facility]  Hearing Exceptions: Hard of hearing/hearing concerns  Social/Functional History  Lives With: Alone  Type of Home: Apartment  Home Layout: One level  Home Access: Level entry  Bathroom Shower/Tub: Tub/Shower unit, Walk-in shower  Bathroom Toilet: Handicap height  Bathroom Equipment: Tub transfer bench, Hand-held shower, Grab bars around toilet  Bathroom Accessibility: Walker accessible  Home Equipment: 4 wheeled walker, Electric scooter, [address] Help From: Family (Daughter and nephew)  ADL Assistance: Needs assistance (Independent except for bathing)  Homemaking Assistance: Needs assistance  Homemaking Responsibilities: Yes  Ambulation Assistance: Independent (with walker)  Transfer Assistance: Independent  Active : Yes  IADL Comments: Pt sleeps in an adjustable bed with rails. Additional Comments: Pt reported that she has needed more assistance the past three weeks due to her legs feeling like \"goo. \" She used to have aides going to her apt to assist with household chores, but not recentl due to the pandemic. Daughter, son, grandsons, and nephew live nearby but all work. Pain Assessment  Pain Assessment: 0-10  Pain Level: 10  Pain Type: Chronic pain, Neuropathic pain  Pain Location: Generalized  Pain Descriptors: Burning  Pain Frequency: Continuous    Objective      Cognition  Overall Cognitive Status: WFL   Sensation  Overall Sensation Status: Impaired (Neuropathy in B hands, legs, and feet)   ADL  Feeding: Modified independent   Grooming: Moderate assistance  UE Bathing: Moderate assistance  LE Bathing: Maximum assistance  UE Dressing: Moderate assistance  LE Dressing: Maximum assistance  Toileting: Maximum assistance  Additional Comments: ADL scores based on skilled observation and clinical reasoning, unless otherwise noted. Pt in significant pain this date, which pt reports is fibromyaglia. Urgent need to go to the bathroom, dependent for brief management and perineal care.  She requested assist with putting her hair up in a side ponytail due to the pain in B UEs. Pt is limited by pain, reduced ROM, strength, and endurance, fatigue, and limited activity tolerance. UE Function           LUE Strength  LUE Strength Comment: PURNIMA d/t significant pain (pt deferred)     LUE Tone: Normotonic     LUE AROM (degrees)  LUE AROM : Exceptions  LUE General AROM: Shoulder flexion ~45 degrees (limited by pain)     Left Hand AROM (degrees)  Left Hand AROM: WFL  RUE Strength  RUE Strength Comment: PURNIMA d/t significant pain (pt deferred)      RUE Tone: Normotonic     RUE AROM (degrees)  RUE AROM : WFL     Right Hand AROM (degrees)  Right Hand AROM: WFL    Fine Motor Skills  Coordination  Movements Are Fluid And Coordinated: No  Coordination and Movement description: Fine motor impairments, Right UE, Left UE                           Mobility  Supine to Sit: Moderate assistance  Sit to Supine: Moderate assistance       Balance  Sitting Balance: Stand by assistance  Standing Balance: Contact guard assistance (with SS)  Standing Balance  Time: ~1 minute x 2, ~2 - 3 minutes x 2   Activity: self care, transfers  Comment: with SS for UE support, cues to stand straight     Bed mobility  Rolling to Right: Contact guard assistance  Supine to Sit: Moderate assistance  Sit to Supine: Moderate assistance  Scooting: Moderate assistance  Comment: use of handrail     Transfers  Sit to stand: Moderate assistance, 2 Person assistance  Stand to sit: Moderate assistance, 2 Person assistance  Transfer Comments: Cues to initiate movement due to pain  Toilet Transfers  Toilet - Technique:  Nima vilchis)  Equipment Used: Extra wide bedside commode  Toilet Transfer: Moderate assistance, 2 Person assistance  Toilet Transfers Comments: Educated/demonstrated use of lucy vilchis to complete transfers   Functional Activity Tolerance  Functional Activity Tolerance:  Tolerates 30 min exercise with multiple rests   Assessment  Performance deficits / Impairments: Decreased functional mobility , Decreased ADL status, Decreased ROM, Decreased strength, Decreased safe awareness, Decreased endurance, Decreased sensation, Decreased balance  Assessment: Pt is experiencing overall weakness, especially in BLEs. Pt reported that her legs have been getting weaker the past three weeks. Treatment Diagnosis: Impaired self-care status  Prognosis: Good  Decision Making: Medium Complexity  REQUIRES OT FOLLOW UP: Yes  Discharge Recommendations: Patient would benefit from continued therapy after discharge  Activity Tolerance: Patient limited by fatigue, Patient limited by pain  Activity Tolerance: Pt experiences spontaneous shooting pain throughout her body, requiring constant adjustment to reduce pain         Functional Outcome Measures  AM-PAC Daily Activity Inpatient   How much help for putting on and taking off regular lower body clothing?: A Lot  How much help for Bathing?: A Lot  How much help for Toileting?: Total  How much help for putting on and taking off regular upper body clothing?: A Lot  How much help for taking care of personal grooming?: A Little  How much help for eating meals?: None  AM-[facility] Inpatient Daily Activity Raw Score: 14  AM-PAC Inpatient ADL T-Scale Score : 33.39  ADL Inpatient CMS 0-100% Score: 59.67  ADL Inpatient CMS G-Code Modifier : CK       Goals  Patient Goals   Patient goals :  To feel better  Short term goals  Time Frame for Short term goals: By discharge  Short term goal 1: Patient will perform BADLs with minimal assistance and good safety, using adaptive equipment as needed  Short term goal 2: Patient will perform transfers/functional mobility, with least restrictive device, with minimal assist and good safety  Short term goal 3: Patient will verbalize at least three nonpharmaceutical pain management strategies to promote participation in self care  Short term goal 4: Patient will actively participate in 15+ minutes of therapeutic exercise/funcitonal activity to promote independence with self care and

## 2021-09-24 NOTE — PROGRESS NOTES
Patient requesting Dr. Yanez  be added to her case for pulmonary for her asbestos  -- notified residents of her request.

## 2021-09-24 NOTE — PROGRESS NOTES
SW spoke to pt regarding discharge. Pt is accepted at [facility]. Facility will start pre-cert. It will be helpful to have updated therapy notes Monday Morning. Pt also asked for a pulmonology consult/ Dr. Marc Ruggiero. 7000 started in HENS.

## 2021-09-24 NOTE — PROGRESS NOTES
Consult completed via perfect serve to Dr. Robbie Sommer, to whom is on call for for Dr. Patty Marrero. Waiting for response back. Dr. Robbie Sommer responded that he will see patient tomorrow.

## 2021-09-25 ENCOUNTER — APPOINTMENT (OUTPATIENT)
Dept: INTERVENTIONAL RADIOLOGY/VASCULAR | Age: 76
End: 2021-09-25
Payer: MEDICARE

## 2021-09-25 LAB
ABSOLUTE EOS #: 0.3 K/UL (ref 0–0.4)
ABSOLUTE IMMATURE GRANULOCYTE: ABNORMAL K/UL (ref 0–0.3)
ABSOLUTE LYMPH #: 1.9 K/UL (ref 1–4.8)
ABSOLUTE MONO #: 0.4 K/UL (ref 0.1–1.3)
ANION GAP SERPL CALCULATED.3IONS-SCNC: 12 MMOL/L (ref 9–17)
BASOPHILS # BLD: 1 % (ref 0–2)
BASOPHILS ABSOLUTE: 0 K/UL (ref 0–0.2)
BUN BLDV-MCNC: 17 MG/DL (ref 8–23)
BUN/CREAT BLD: ABNORMAL (ref 9–20)
CALCIUM SERPL-MCNC: 9.1 MG/DL (ref 8.6–10.4)
CHLORIDE BLD-SCNC: 107 MMOL/L (ref 98–107)
CO2: 23 MMOL/L (ref 20–31)
CREAT SERPL-MCNC: 1.03 MG/DL (ref 0.5–0.9)
CULTURE: ABNORMAL
DIFFERENTIAL TYPE: ABNORMAL
EOSINOPHILS RELATIVE PERCENT: 7 % (ref 0–4)
GFR AFRICAN AMERICAN: >60 ML/MIN
GFR NON-AFRICAN AMERICAN: 52 ML/MIN
GFR SERPL CREATININE-BSD FRML MDRD: ABNORMAL ML/MIN/{1.73_M2}
GFR SERPL CREATININE-BSD FRML MDRD: ABNORMAL ML/MIN/{1.73_M2}
GLUCOSE BLD-MCNC: 99 MG/DL (ref 70–99)
GLUCOSE, CSF: 70 MG/DL (ref 40–70)
HCT VFR BLD CALC: 31.7 % (ref 36–46)
HEMOGLOBIN: 10.2 G/DL (ref 12–16)
IGA: 997 MG/DL (ref 70–400)
IMMATURE GRANULOCYTES: ABNORMAL %
LYMPHOCYTES # BLD: 42 % (ref 24–44)
Lab: ABNORMAL
MCH RBC QN AUTO: 26.6 PG (ref 26–34)
MCHC RBC AUTO-ENTMCNC: 32.2 G/DL (ref 31–37)
MCV RBC AUTO: 82.5 FL (ref 80–100)
MONOCYTES # BLD: 8 % (ref 1–7)
NRBC AUTOMATED: ABNORMAL PER 100 WBC
PDW BLD-RTO: 14.4 % (ref 11.5–14.9)
PLATELET # BLD: 281 K/UL (ref 150–450)
PLATELET ESTIMATE: ABNORMAL
PMV BLD AUTO: 8.3 FL (ref 6–12)
POTASSIUM SERPL-SCNC: 4.6 MMOL/L (ref 3.7–5.3)
PROTEIN CSF: 37 MG/DL (ref 15–45)
RBC # BLD: 3.84 M/UL (ref 4–5.2)
RBC # BLD: ABNORMAL 10*6/UL
SEG NEUTROPHILS: 42 % (ref 36–66)
SEGMENTED NEUTROPHILS ABSOLUTE COUNT: 2 K/UL (ref 1.3–9.1)
SODIUM BLD-SCNC: 142 MMOL/L (ref 135–144)
SPECIMEN DESCRIPTION: ABNORMAL
WBC # BLD: 4.6 K/UL (ref 3.5–11)
WBC # BLD: ABNORMAL 10*3/UL

## 2021-09-25 PROCEDURE — 36415 COLL VENOUS BLD VENIPUNCTURE: CPT

## 2021-09-25 PROCEDURE — 82040 ASSAY OF SERUM ALBUMIN: CPT

## 2021-09-25 PROCEDURE — 83916 OLIGOCLONAL BANDS: CPT

## 2021-09-25 PROCEDURE — 86592 SYPHILIS TEST NON-TREP QUAL: CPT

## 2021-09-25 PROCEDURE — G0378 HOSPITAL OBSERVATION PER HR: HCPCS

## 2021-09-25 PROCEDURE — 84157 ASSAY OF PROTEIN OTHER: CPT

## 2021-09-25 PROCEDURE — 85025 COMPLETE CBC W/AUTO DIFF WBC: CPT

## 2021-09-25 PROCEDURE — 82945 GLUCOSE OTHER FLUID: CPT

## 2021-09-25 PROCEDURE — 6370000000 HC RX 637 (ALT 250 FOR IP): Performed by: STUDENT IN AN ORGANIZED HEALTH CARE EDUCATION/TRAINING PROGRAM

## 2021-09-25 PROCEDURE — 87205 SMEAR GRAM STAIN: CPT

## 2021-09-25 PROCEDURE — 62328 DX LMBR SPI PNXR W/FLUOR/CT: CPT | Performed by: RADIOLOGY

## 2021-09-25 PROCEDURE — 99232 SBSQ HOSP IP/OBS MODERATE 35: CPT | Performed by: INTERNAL MEDICINE

## 2021-09-25 PROCEDURE — 6370000000 HC RX 637 (ALT 250 FOR IP)

## 2021-09-25 PROCEDURE — 6360000002 HC RX W HCPCS: Performed by: STUDENT IN AN ORGANIZED HEALTH CARE EDUCATION/TRAINING PROGRAM

## 2021-09-25 PROCEDURE — 99225 PR SBSQ OBSERVATION CARE/DAY 25 MINUTES: CPT | Performed by: PSYCHIATRY & NEUROLOGY

## 2021-09-25 PROCEDURE — 80048 BASIC METABOLIC PNL TOTAL CA: CPT

## 2021-09-25 PROCEDURE — 87327 CRYPTOCOCCUS NEOFORM AG IA: CPT

## 2021-09-25 PROCEDURE — 97110 THERAPEUTIC EXERCISES: CPT

## 2021-09-25 PROCEDURE — 82042 OTHER SOURCE ALBUMIN QUAN EA: CPT

## 2021-09-25 PROCEDURE — 82784 ASSAY IGA/IGD/IGG/IGM EACH: CPT

## 2021-09-25 PROCEDURE — 2580000003 HC RX 258: Performed by: STUDENT IN AN ORGANIZED HEALTH CARE EDUCATION/TRAINING PROGRAM

## 2021-09-25 PROCEDURE — 89050 BODY FLUID CELL COUNT: CPT

## 2021-09-25 PROCEDURE — 83873 ASSAY OF CSF PROTEIN: CPT

## 2021-09-25 PROCEDURE — 97530 THERAPEUTIC ACTIVITIES: CPT

## 2021-09-25 RX ORDER — CEFUROXIME AXETIL 250 MG/1
500 TABLET ORAL EVERY 12 HOURS SCHEDULED
Status: DISCONTINUED | OUTPATIENT
Start: 2021-09-25 | End: 2021-09-27

## 2021-09-25 RX ORDER — METOPROLOL SUCCINATE 50 MG/1
50 TABLET, EXTENDED RELEASE ORAL 2 TIMES DAILY
Status: DISCONTINUED | OUTPATIENT
Start: 2021-09-25 | End: 2021-09-28

## 2021-09-25 RX ORDER — GABAPENTIN 100 MG/1
100 CAPSULE ORAL NIGHTLY
Status: DISCONTINUED | OUTPATIENT
Start: 2021-09-25 | End: 2021-09-27

## 2021-09-25 RX ORDER — GABAPENTIN 100 MG/1
200 CAPSULE ORAL EVERY MORNING
Status: DISCONTINUED | OUTPATIENT
Start: 2021-09-26 | End: 2021-09-27

## 2021-09-25 RX ORDER — METOPROLOL SUCCINATE 50 MG/1
50 TABLET, EXTENDED RELEASE ORAL 2 TIMES DAILY
Status: ON HOLD | COMMUNITY
End: 2021-09-28 | Stop reason: HOSPADM

## 2021-09-25 RX ORDER — DULOXETIN HYDROCHLORIDE 30 MG/1
30 CAPSULE, DELAYED RELEASE ORAL 2 TIMES DAILY
Status: ON HOLD | COMMUNITY
End: 2021-10-05 | Stop reason: SDUPTHER

## 2021-09-25 RX ORDER — GABAPENTIN 100 MG/1
100 CAPSULE ORAL ONCE
Status: COMPLETED | OUTPATIENT
Start: 2021-09-25 | End: 2021-09-25

## 2021-09-25 RX ORDER — GABAPENTIN 300 MG/1
300 CAPSULE ORAL DAILY
Status: DISCONTINUED | OUTPATIENT
Start: 2021-09-26 | End: 2021-09-25 | Stop reason: SDUPTHER

## 2021-09-25 RX ORDER — DULOXETIN HYDROCHLORIDE 30 MG/1
30 CAPSULE, DELAYED RELEASE ORAL DAILY
Status: DISCONTINUED | OUTPATIENT
Start: 2021-09-25 | End: 2021-09-26

## 2021-09-25 RX ADMIN — ASPIRIN 81 MG: 81 TABLET, COATED ORAL at 09:07

## 2021-09-25 RX ADMIN — OXYCODONE HYDROCHLORIDE AND ACETAMINOPHEN 1 TABLET: 5; 325 TABLET ORAL at 11:31

## 2021-09-25 RX ADMIN — GABAPENTIN 100 MG: 100 CAPSULE ORAL at 20:32

## 2021-09-25 RX ADMIN — OXYCODONE HYDROCHLORIDE AND ACETAMINOPHEN 1 TABLET: 5; 325 TABLET ORAL at 20:26

## 2021-09-25 RX ADMIN — OXYCODONE HYDROCHLORIDE AND ACETAMINOPHEN 1 TABLET: 5; 325 TABLET ORAL at 01:04

## 2021-09-25 RX ADMIN — GABAPENTIN 100 MG: 100 CAPSULE ORAL at 17:47

## 2021-09-25 RX ADMIN — DULOXETINE HYDROCHLORIDE 30 MG: 30 CAPSULE, DELAYED RELEASE ORAL at 09:07

## 2021-09-25 RX ADMIN — ENOXAPARIN SODIUM 30 MG: 30 INJECTION SUBCUTANEOUS at 20:27

## 2021-09-25 RX ADMIN — METOPROLOL SUCCINATE 50 MG: 50 TABLET, EXTENDED RELEASE ORAL at 17:50

## 2021-09-25 RX ADMIN — DONEPEZIL HYDROCHLORIDE 5 MG: 5 TABLET, FILM COATED ORAL at 20:32

## 2021-09-25 RX ADMIN — CEFUROXIME AXETIL 500 MG: 250 TABLET ORAL at 20:30

## 2021-09-25 RX ADMIN — GABAPENTIN 100 MG: 100 CAPSULE ORAL at 09:07

## 2021-09-25 RX ADMIN — OXYCODONE HYDROCHLORIDE 2.5 MG: 5 TABLET ORAL at 11:31

## 2021-09-25 RX ADMIN — ENOXAPARIN SODIUM 30 MG: 30 INJECTION SUBCUTANEOUS at 09:07

## 2021-09-25 RX ADMIN — SODIUM CHLORIDE, PRESERVATIVE FREE 10 ML: 5 INJECTION INTRAVENOUS at 09:12

## 2021-09-25 RX ADMIN — OXYCODONE HYDROCHLORIDE 2.5 MG: 5 TABLET ORAL at 01:04

## 2021-09-25 RX ADMIN — OXYCODONE HYDROCHLORIDE 2.5 MG: 5 TABLET ORAL at 20:26

## 2021-09-25 RX ADMIN — SODIUM CHLORIDE, PRESERVATIVE FREE 10 ML: 5 INJECTION INTRAVENOUS at 20:32

## 2021-09-25 ASSESSMENT — PAIN SCALES - GENERAL
PAINLEVEL_OUTOF10: 6
PAINLEVEL_OUTOF10: 5
PAINLEVEL_OUTOF10: 6
PAINLEVEL_OUTOF10: 2

## 2021-09-25 ASSESSMENT — PAIN DESCRIPTION - LOCATION: LOCATION: BACK;HIP

## 2021-09-25 ASSESSMENT — ENCOUNTER SYMPTOMS
NAUSEA: 0
COUGH: 0
CONSTIPATION: 0
ABDOMINAL DISTENTION: 0
BACK PAIN: 1
DIARRHEA: 0
SHORTNESS OF BREATH: 0
VOMITING: 0
WHEEZING: 0
ABDOMINAL PAIN: 0
CHEST TIGHTNESS: 0

## 2021-09-25 ASSESSMENT — PAIN DESCRIPTION - PAIN TYPE: TYPE: CHRONIC PAIN

## 2021-09-25 ASSESSMENT — PAIN DESCRIPTION - ORIENTATION: ORIENTATION: LOWER;LEFT

## 2021-09-25 NOTE — PROGRESS NOTES
Physical Therapy  Facility/Department: [facility] MED SURG  Daily Treatment Note  NAME: Antoni Romero  :   MRN: [id number]    Date of Service: 2021    Discharge Recommendations:  Patient would benefit from continued therapy after discharge   PT Equipment Recommendations  Equipment Needed: No    Assessment   Body structures, Functions, Activity limitations: Decreased functional mobility ; Decreased strength; Increased pain  REQUIRES PT FOLLOW UP: Yes  Activity Tolerance  Activity Tolerance: Patient limited by pain     Patient Diagnosis(es): The primary encounter diagnosis was Acute cystitis without hematuria. A diagnosis of General weakness was also pertinent to this visit. has a past medical history of Abnormality of rib determined by X-ray, Acute cystitis without hematuria, Acute exacerbation of chronic bronchitis (HCC), Acute on chronic diastolic heart failure ([address] Adjustment disorder with mixed anxiety and depressed mood, Anemia, Back pain, Bronchiectasis with acute lower respiratory infection ([address] Bronchitis, Chronic bronchitis ([address] Chronic cough, Degenerative joint disease (DJD) of hip, Dyslipidemia, Encephalopathy acute, Essential hypertension, Fibromyalgia, GERD (gastroesophageal reflux disease), History of total bilateral knee replacement, Hx of blood clots, Hyperlipidemia, Hypertension, Incontinence of urine, Irregular heartbeat, Medication monitoring encounter, Morbid obesity with BMI of 45.0-49.9, adult ([address] Obstructive sleep apnea syndrome, Osteoarthritis, Primary osteoarthritis of left knee, PVC (premature ventricular contraction), S/P right and left heart catheterization, Sleep apnea, SOB (shortness of breath), Spinal stenosis of lumbar region with neurogenic claudication, Supplemental oxygen dependent, Urine frequency, and UTI (urinary tract infection). has a past surgical history that includes Hysterectomy; Nerve Block (2013); Nerve Block (13); sinus surgery;  Foot surgery (Left); Nerve Block (2/14/14); Colonoscopy (4 28 14); Nerve Block (07/14/2014); Nerve Block (7-21-14); Nerve Block (10/2/14); Nerve Block (11/9/2015); Nerve Block (11/16/15); Nerve Block (11/23/15); Total hip arthroplasty (Right, 5/14/15); bronchoscopy (Left, 8/16/2017); Cardiac catheterization (x 3); Nerve Block (03/28/2018); Nerve Block (05/23/2018); Nerve Block (08/28/2018); pr perq vert agmntj cavity crtj uni/bi cannulj lmbr (N/A, 10/29/2018); and joint replacement (Bilateral, 5/27/2016). Restrictions  Restrictions/Precautions  Restrictions/Precautions: Fall Risk  Required Braces or Orthoses?: No  Position Activity Restriction  Other position/activity restrictions: Up as tolerated, pt reports h/o fibromyalgia with generalized pain with increased activity  Subjective   Subjective  Subjective: Pt sitting in bedside recliner upon entry,. Pt stated that nurses x 2 assist her to chair , without lucy stedy. Pt agreeable to therapy, but not standing activity today. General Comment  Comments: Pet is very pleasant and talkative. Pt has difficult time staying focus and on task. Pain Assessment  Pain Assessment: 0-10  Pain Level: 6  Pain Type: Chronic pain  Pain Location: Back; Hip  Pain Orientation: Lower; Left  Oxygen Therapy  SpO2: 95 %  Pulse Oximeter Device Mode: Intermittent  Pulse Oximeter Device Location: Finger  O2 Device: None (Room air)       Orientation     Cognition      Objective      Transfers  Comment: Pt sitting in bedside recliner, and stated that she preferred to do ex sitting since her pain has quiet down a little.   Ambulation  Ambulation?: No (not attempted due to safety concerns at this time)     Balance  Sitting - Static: Fair;+ (SBA)  Sitting - Dynamic: Fair (CGA)  Other exercises  Other exercises?: Yes  Other exercises 1: Issued and reviewed ther ex both U/E with MIN (Orange) T-band 2 sets 5's  Other exercises 2: Reviewed glut/Quadsets supine in recliner x 10 hold 5 seconds and seated ex x 10         Comment: Pt educated on the importance of doing exercises and getting up and moving.               G-Code     OutComes Score                                                     AM-PAC Score             Goals  Short term goals  Time Frame for Short term goals: 3-5 days  Short term goal 1: bed mobility with min/CGA  Short term goal 2: standing with RW and Olya x1-2  Short term goal 3: transfer to chair/commode with RW and Olya x1-2  Short term goal 4: ambulate with RW and Olya x 1-2 for 10-20ft    Plan    Plan  Times per week: 7x/wk  Current Treatment Recommendations: Strengthening, Balance Training, Endurance Training, Functional Mobility Training, Gait Training, Transfer Training, Safety Education & Training  Safety Devices  Type of devices: Call light within reach, Left in chair, Patient at risk for falls, Nurse notified     Therapy Time   Individual Concurrent Group Co-treatment   Time In [address]         Time Out 1454         Minutes [address], PTA   Electronically signed by Mitesh Westfall PTA on 9/25/2021 at 4:05 PM

## 2021-09-25 NOTE — PROGRESS NOTES
Patient upset and stating that she usually takes Cymbalta and Metoprolol BID. These medications had been crossed off of her home medication list upon admission but patient states that that is a mistake. RN will inform physician today upon rounding.

## 2021-09-25 NOTE — PROGRESS NOTES
[address]    PROGRESS NOTE             9/25/2021    9:54 AM    Name:   Alma Tavarez  MRN:     [id number]     Acct:      [de-identified]   Room:   2052/2052-01   Day:  1  Admit Date:  9/23/2021 12:00 PM    PCP:  Kimber Young MD  Code Status:  Full Code    Subjective:     C/C:   Chief Complaint   Patient presents with    Fatigue     Interval History Status: not changed. Patient seen and examined at bedside this morning. No acute events overnight. Patient seen by neurology yesterday and MRI head showed no intracranial abnormality with mild chronic small vessel ischemic disease. Patient upset about not receiving her metoprolol, explained that her HR was in the low 50s which is why Metoprolol was not given. She states her HR is always low and she takes this medication for an abnormal rhythm but doesn't know what the condition is called. Also states \"aspirin also makes my rhythm better\". Will discuss this further with Dr. Fannie Chowdhury. Review of Systems:     Review of Systems   Constitutional: Negative for chills, fatigue and fever. Respiratory: Negative for cough, chest tightness, shortness of breath and wheezing. Cardiovascular: Negative for chest pain, palpitations and leg swelling. Gastrointestinal: Negative for abdominal distention, abdominal pain, constipation, diarrhea, nausea and vomiting. Genitourinary: Negative for difficulty urinating, dysuria and hematuria. Musculoskeletal: Positive for back pain. Neurological: Positive for weakness and numbness. Negative for dizziness, speech difficulty and headaches. Psychiatric/Behavioral: Negative for agitation. Medications: Allergies:     Allergies   Allergen Reactions    Rosuvastatin Calcium Anaphylaxis     Hair fell out    Statins Anaphylaxis     Hair fell out    Bactrim [Sulfamethoxazole-Trimethoprim] Diarrhea    Caffeine Other (See Comments)     pain  pain    Dye [Iodides] Other (See Comments)     Iv dye= blood clots in arm    Food      Tomatoes, pain    Ioxaglate Other (See Comments)     Iv dye= blood clots in arm    Tomato     Dicloxacillin Rash    Pcn [Penicillins] Rash       Current Meds:   Scheduled Meds:    DULoxetine  30 mg Oral Daily    lidocaine  2 patch TransDERmal Daily    sodium chloride flush  5-40 mL IntraVENous 2 times per day    enoxaparin  30 mg SubCUTAneous BID    aspirin  81 mg Oral Daily    donepezil  5 mg Oral Nightly    gabapentin  100 mg Oral TID    cefTRIAXone (ROCEPHIN) IV  1,000 mg IntraVENous Q24H     Continuous Infusions:    sodium chloride      sodium chloride 75 mL/hr at 09/23/21 1821     PRN Meds: sodium chloride flush, sodium chloride, ondansetron **OR** ondansetron, polyethylene glycol, acetaminophen **OR** acetaminophen, potassium chloride **OR** potassium alternative oral replacement **OR** potassium chloride, albuterol, oxyCODONE-acetaminophen **AND** oxyCODONE, melatonin    Data:     Past Medical History:   has a past medical history of Abnormality of rib determined by X-ray, Acute cystitis without hematuria, Acute exacerbation of chronic bronchitis ([facility] Acute on chronic diastolic heart failure ([facility] [address] Adjustment disorder with mixed anxiety and depressed mood, Anemia, Back pain, Bronchiectasis with acute lower respiratory infection ([facility] [address] [address] Bronchitis, Chronic bronchitis ([facility] [address] Chronic cough, Degenerative joint disease (DJD) of hip, Dyslipidemia, Encephalopathy acute, Essential hypertension, Fibromyalgia, GERD (gastroesophageal reflux disease), History of total bilateral knee replacement, Hx of blood clots, Hyperlipidemia, Hypertension, Incontinence of urine, Irregular heartbeat, Medication monitoring encounter, Morbid obesity with BMI of 45.0-49.9, adult ([facility] Obstructive sleep apnea syndrome, Osteoarthritis, Primary osteoarthritis of left knee, PVC (premature ventricular contraction), S/P right and left heart catheterization, Sleep apnea, SOB (shortness of breath), Spinal stenosis of lumbar region with neurogenic claudication, Supplemental oxygen dependent, Urine frequency, and UTI (urinary tract infection). Social History:   reports that she quit smoking about 28 years ago. She has a 18.00 pack-year smoking history. She has never used smokeless tobacco. She reports that she does not drink alcohol and does not use drugs. Family History:   Family History   Problem Relation Age of Onset    Stomach Cancer Brother         stomach    High Blood Pressure Mother     Heart Disease Sister         irregular heartbeat       Vitals:  BP (!) 145/82   Pulse 70   Temp 98.5 °F (36.9 °C) (Oral)   Resp 18   Ht 5' 4\" (1.626 m)   Wt 270 lb (122.5 kg)   SpO2 100%   BMI 46.35 kg/m²   Temp (24hrs), Av.4 °F (36.9 °C), Min:98.1 °F (36.7 °C), Max:98.5 °F (36.9 °C)    No results for input(s): POCGLU in the last 72 hours. I/O(24Hr): Intake/Output Summary (Last 24 hours) at 2021 0954  Last data filed at 2021 8792  Gross per 24 hour   Intake 10 ml   Output 850 ml   Net -840 ml       Labs:  [unfilled]    Lab Results   Component Value Date/Time    SPECIAL NOT REPORTED 2021 02:57 PM     Lab Results   Component Value Date/Time    CULTURE (A) 2021 02:57 PM     LACTOSE FERMENTING GRAM NEGATIVE RODS >[id number] CFU/ML       @[facility]@    Radiology:    MRI BRAIN WO CONTRAST    Result Date: 2021  EXAMINATION: MRI OF THE BRAIN WITHOUT CONTRAST  2021 4:43 pm TECHNIQUE: Multiplanar multisequence MRI of the brain was performed without the administration of intravenous contrast. COMPARISON: None. HISTORY: ORDERING SYSTEM PROVIDED HISTORY: left lower extremity weakness FINDINGS: INTRACRANIAL STRUCTURES/VENTRICLES: There is no acute infarct. Mild periventricular and deep white matter T2/FLAIR hyperintensity, most consistent with chronic small vessel ischemic disease. No mass effect or midline shift.  No evidence of an acute intracranial hemorrhage. The ventricles and sulci are prominent secondary to mild atrophy. The sellar/suprasellar regions appear unremarkable. The normal signal voids within the major intracranial vessels appear maintained. ORBITS: The visualized portion of the orbits demonstrate no acute abnormality. SINUSES: The visualized paranasal sinuses and mastoid air cells are well aerated. BONES/SOFT TISSUES: The bone marrow signal intensity appears normal. The soft tissues demonstrate no acute abnormality. No acute intracranial abnormality. Mild chronic small vessel ischemic disease and mild atrophy. Physical Examination:        Physical Exam  Constitutional:       Appearance: Normal appearance. She is obese. HENT:      Head: Normocephalic. Cardiovascular:      Rate and Rhythm: Normal rate and regular rhythm. Pulses: Normal pulses. Heart sounds: Normal heart sounds. Pulmonary:      Effort: Pulmonary effort is normal.      Breath sounds: Normal breath sounds. Abdominal:      General: Bowel sounds are normal.      Palpations: Abdomen is soft. Musculoskeletal:      Comments: Left lower leg weakness   Skin:     General: Skin is warm. Neurological:      General: No focal deficit present. Mental Status: She is alert.    Psychiatric:         Mood and Affect: Mood normal.           Assessment:        Primary Problem  Physical debility    Active Hospital Problems    Diagnosis Date Noted    Sleep apnea [G47.30]      Priority: Medium    Weakness of left lower extremity [R29.898] 09/24/2021    UTI (urinary tract infection) [N39.0] 09/23/2021    Physical debility [R53.81] 09/23/2021    COPD (chronic obstructive pulmonary disease) ([facility] [J44.9] 09/23/2021    Morbid obesity with BMI of 45.0-49.9, adult ([facility] [E66.01, Z68.42] 01/28/2016    Fibromyalgia [M79.7] 03/05/2014    Spinal stenosis of lumbar region with neurogenic claudication [M48.062] 05/13/2013       Plan: Debility 2/2 arthritis, spinal stenosis with neurogenic claudication  - New onset of being unsteady, weak, difficulty with daily tasks   - Under care of pain management for back pain  - Continue home Oxycodone   - Accepted to [facility] for monday     Acute cystitis without hematuria    - UA positive for leukocytes esterase and bacteria  - Culture - lactose fermenting gram neg rods  - Started on Rocephin in ED     Fibromyalgia   - Continue home Cymbalta  - Continue home gabapentin      Obstructive sleep apnea  - On 2L of O2 via nasal cannula at night at home  - Currently on 2.5-3.5L     COPD  - Continue home albuterol   - Respiratory on board     Diet: Regular   DVT Prophylaxis: Lovenox 30 mg    Brenden Epps MD  9/25/2021  9:54 AM       I have discussed the care of Ryan Jacobs , including pertinent history and exam findings,    today with the resident. I have seen and examined the patient and the key elements of all parts of the encounter have been performed by me . I agree with the assessment, plan and orders as documented by the resident. Principal Problem:    Physical debility  Active Problems:    Sleep apnea    Spinal stenosis of lumbar region with neurogenic claudication    Fibromyalgia    Morbid obesity with BMI of 45.0-49.9, adult (HCC)    UTI (urinary tract infection)    COPD (chronic obstructive pulmonary disease) (HCC)    Weakness of left lower extremity  Resolved Problems:    * No resolved hospital problems. *        Overall  course ;                                   are improving over time.         Patient feeling better  Back pain is improving  Seen by neurologist  Awaiting placement          Electronically signed by Vitaliy Remy MD

## 2021-09-25 NOTE — PROGRESS NOTES
NEUROLOGY INPATIENT PROGRESS NOTE    9/25/2021         Kanwal Pennington is a  67 y.o. female admitted on 9/23/2021 with  UTI (urinary tract infection) [N39.0]  General weakness [R53.1]  Acute cystitis without hematuria [N30.00]  Weakness of left lower extremity [R29.898]      Briefly, this is a  67 y.o. female admitted on 9/23/2021 with generalized weakness, acute cystitis. As patient stated that she has bilateral lower extremities feeling much more weaker; neurology is consulted. Patient stated that she has back pain and spinal stenosis \"since 70s\" and has been using walker because of weakness in lower extremities for 4 years \"since 2017\". Recently she had flulike symptoms and then she has noticed more generalized weakness. She has been having extreme pain in both lower legs. She denies pain radiating down from lower back down to the extremities. She has extreme tenderness at multiple joints. 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 9/25/21: Discussion done about spinal tap; patient was hesitating yesterday but today she stated that she is agreeable to get it done under fluoroscopy. No current facility-administered medications on file prior to encounter. Current Outpatient Medications on File Prior to Encounter   Medication Sig Dispense Refill    metoprolol succinate (TOPROL XL) 50 MG extended release tablet Take 50 mg by mouth 2 times daily      DULoxetine (CYMBALTA) 30 MG extended release capsule Take 30 mg by mouth 2 times daily      gabapentin (NEURONTIN) 100 MG capsule Take 100 mg by mouth 3 times daily.  Cyanocobalamin (VITAMIN B-12) 1000 MCG/15ML LIQD Take 1,000 mcg by mouth daily       oxyCODONE-acetaminophen (PERCOCET) 7.5-325 MG per tablet Take 1 tablet by mouth every 6 hours as needed for Pain for up to 30 days.  120 tablet 0    levocetirizine (XYZAL) 5 MG tablet take 1 Essential hypertension 1/28/2016    Fibromyalgia     GERD (gastroesophageal reflux disease)     History of total bilateral knee replacement 5/28/2016    Hx of blood clots     left leg and lung    Hyperlipidemia     Hypertension     Incontinence of urine     Irregular heartbeat     DR. Jerod Strong Medication monitoring encounter 6/13/2018    Morbid obesity with BMI of 45.0-49.9, adult ([facility] 1/28/2016    Obstructive sleep apnea syndrome     Osteoarthritis     Primary osteoarthritis of left knee 5/27/2016    PVC (premature ventricular contraction) 1/28/2016    S/P right and left heart catheterization 10/2/2015    Sleep apnea     no machine    SOB (shortness of breath)     Spinal stenosis of lumbar region with neurogenic claudication 5/13/2013    Supplemental oxygen dependent 12/8/2017    Urine frequency     UTI (urinary tract infection)     hospitalized early july 2014 for kidney infection       Past Surgical History:   Procedure Laterality Date    BRONCHOSCOPY Left 8/16/2017    BRONCHOSCOPY ALVEOLAR LAVAGE LOWER LOBE performed by Shanelle Boland MD at [facility]  x 3    no stents    COLONOSCOPY  4 28 14    polyps biopsies     FOOT SURGERY Left     calcium deposit removal from top of foot    HYSTERECTOMY      JOINT REPLACEMENT Bilateral 5/27/2016    bilat total knees    NERVE BLOCK  5/13/2013    caudal celestone 6mg    NERVE BLOCK  5/28/13    Caudal #2, Celestone 9mg    NERVE BLOCK  2/14/14    rt hip inj celestone 9 mg    NERVE BLOCK  07/14/2014    caudal# 3- celestone 9 mg    NERVE BLOCK  7-21-14    caudal epidural #2, decadron 7mg, fentanyl 25mcg    NERVE BLOCK  10/2/14    duramorph 1.5  decadron 5mg    NERVE BLOCK  11/9/2015    caudal# 1 celestone 9mg    NERVE BLOCK  11/16/15    caudal #2  decadron 10mg    NERVE BLOCK  11/23/15    duramorph 1mg celestone 9mg    NERVE BLOCK  03/28/2018    CAUDAL EPIDURAL STEROID BLOCK  DECADRON 10 MG     NERVE BLOCK 05/23/2018    caudal # decadron 7mg    NERVE BLOCK  08/28/2018    natalia transforminal # 1, decadron 10mg gadoteridol, LONG NEEDLES    IA PERQ VERT AGMNTJ CAVITY CRTJ UNI/BI CANNULJ LMBR N/A 10/29/2018    KYPHOPLASTY L1 performed by Triny Guerrero MD at [address] Right 5/14/15    total hip replacement     Social History: Kvng Teran  reports that she quit smoking about 28 years ago. She has a 18.00 pack-year smoking history. She has never used smokeless tobacco. She reports that she does not drink alcohol and does not use drugs.     Family History   Problem Relation Age of Onset    Stomach Cancer Brother         stomach    High Blood Pressure Mother     Heart Disease Sister         irregular heartbeat       Current Medications:     DULoxetine  30 mg Oral Daily    lidocaine  2 patch TransDERmal Daily    sodium chloride flush  5-40 mL IntraVENous 2 times per day    enoxaparin  30 mg SubCUTAneous BID    aspirin  81 mg Oral Daily    donepezil  5 mg Oral Nightly    gabapentin  100 mg Oral TID    cefTRIAXone (ROCEPHIN) IV  1,000 mg IntraVENous Q24H     PRN Meds include: sodium chloride flush, sodium chloride, ondansetron **OR** ondansetron, polyethylene glycol, acetaminophen **OR** acetaminophen, potassium chloride **OR** potassium alternative oral replacement **OR** potassium chloride, albuterol, oxyCODONE-acetaminophen **AND** oxyCODONE, melatonin    ROS:   Constitutional Negative for fever and chills   HEENT Negative for ear discharge, ear pain, nosebleed   Eyes Negative for photophobia, pain and discharge   Respiratory Negative for hemoptysis and sputum   Cardiovascular Negative for orthopnea, claudication and PND   Gastrointestinal Negative for abdominal pain, diarrhea, blood in stool   Musculoskeletal Negative for joint pain, negative for myalgia   Skin Negative for rash or itching   Endo/heme/allergies Negative for polydipsia, environmental allergy Psychiatric Negative for suicidal ideation. Patient is not anxious           Objective:   BP (!) 145/82   Pulse 70   Temp 98.5 °F (36.9 °C) (Oral)   Resp 18   Ht 5' 4\" (1.626 m)   Wt 270 lb (122.5 kg)   SpO2 100%   BMI 46.35 kg/m²     Blood pressure range: Systolic (08OHU), WVH:688 , Min:145 , SUNIL:837   ; Diastolic (94TCK), CQM:68, Min:58, Max:82      Continuous infusions:    sodium chloride      sodium chloride 75 mL/hr at 09/23/21 1821       ON EXAMINATION:  GENERAL  Appears comfortable and in no distress   HEENT  NC/ AT   NECK  Supple and no bruits heard   Cardiovascular  S1, S2 heard; radial pulse intact   MENTAL STATUS:  Alert, oriented, intact memory, no confusion, normal speech, normal language, no hallucination or delusion   CRANIAL NERVES: II     -       Pupils reactive b/l., Fundus exam: intact venous pulsations; Visual fields intact to confrontation  III,IV,VI -  EOMs full, no afferent defect, no                      ASHLEIGH, no ptosis  V     -     Normal facial sensation  VII    -     Normal facial symmetry  VIII   -     Intact hearing  IX,X -     Symmetrical palate  XI    -     Symmetrical shoulder shrug  XII   -     Midline tongue, no atrophy    MOTOR FUNCTION:  Motor exam significant for 5/5 in bilateral upper extremities; limited mobility at the left shoulder; able to move both lower extremities against gravity and minimal resistance but she has extreme tenderness in both lower extremities limiting motor exam in lower extremities; normal bulk and normal tone and no involuntary movements. SENSORY FUNCTION:  Intact light touch and pinprick in bilateral upper extremities and diminished light touch and pinprick and temperature and vibration in distal lower extremities in stocking pattern.      CEREBELLAR FUNCTION:  Intact fine motor control over upper limbs   REFLEX FUNCTION:  Symmetric 1+ DTRs in upper extremities and hypoactive DTRs it both knees and ankles with bilateral flexor plantar responses   STATION and GAIT  able to stand with support but has extreme difficulty walking with ongoing tenderness in both feet at multiple joints          Data:    Lab Results:     Lab Results   Component Value Date    CHOL 254 (H) 04/15/2019    LDLCHOLESTEROL 186 (H) 04/15/2019    HDL 47 04/15/2019    TRIG 104 04/15/2019    ALT 14 09/23/2021    AST 17 09/23/2021    TSH 2.61 04/15/2019    INR 1.0 09/30/2020    LABA1C 5.4 04/15/2019    YXMNRPLA96 801 12/17/2019     Hematology:  Recent Labs     09/23/21  1224 09/24/21  0655 09/25/21  0715   WBC 4.4 4.6 4.6   HGB 10.8* 10.2* 10.2*   HCT 33.6* 31.5* 31.7*    288 281     Chemistry:  Recent Labs     09/23/21  1224 09/24/21  0655    143   K 3.9 4.3    109*   CO2 27 26   GLUCOSE 99 106*   BUN 23 20   CREATININE 1.13* 0.96*   CALCIUM 9.2 9.0     Recent Labs     09/23/21  1224   PROT 7.4   LABALBU 3.9   AST 17   ALT 14             Diagnostic data reviewed:  MRI cervical spine (without) (9/7/2021) done at [facility]: Severe degenerative changes in cervical spine leading to moderate spinal canal stenosis and severe bilateral neural foraminal narrowing at C4-5 and C5-C6-C7. Moderate spinal canal stenosis at C3 -C4. Moderate left neuroforaminal narrowing at C2-C3. Severe rotator cuff arthropathy or bilateral shoulder joints with history. Migration of humeral heads and loss of subacromial spaces bilaterally. MRI lumbar spine (without) 9/7/2021, done at [facility]: Severe multilevel degenerative changes of the lumbar spine; mass-effect noted on exiting L4 nerve roots and L5 nerve roots at L4-L5 and L5-S1 respectively; 7 mm anterolisthesis of L4 on L5; remote compression deformity with likely kyphoplasty material at L1 vertebral body. Impression and Plan: Ms. Roxane Miranda is a 67 y.o. female with   [de-identified] year hx of lumbar spinal stenosis;  Four year hx of low back pain and lower extremity weakness; several year history of neuropathy from

## 2021-09-26 LAB
ABSOLUTE EOS #: 0.22 K/UL (ref 0–0.4)
ABSOLUTE IMMATURE GRANULOCYTE: ABNORMAL K/UL (ref 0–0.3)
ABSOLUTE LYMPH #: 2.02 K/UL (ref 1–4.8)
ABSOLUTE MONO #: 0.13 K/UL (ref 0.1–1.3)
ALBUMIN CSF: 189 MG/L (ref 70–350)
ALBUMIN INDEX: 51.1
ALBUMIN SERPL-MCNC: 3.7 G/DL (ref 3.5–5.2)
ANION GAP SERPL CALCULATED.3IONS-SCNC: 11 MMOL/L (ref 9–17)
APPEARANCE CSF: CLEAR
BASOPHILS # BLD: 0 % (ref 0–2)
BASOPHILS ABSOLUTE: 0 K/UL (ref 0–0.2)
BUN BLDV-MCNC: 18 MG/DL (ref 8–23)
BUN/CREAT BLD: ABNORMAL (ref 9–20)
CALCIUM SERPL-MCNC: 9.1 MG/DL (ref 8.6–10.4)
CHLORIDE BLD-SCNC: 107 MMOL/L (ref 98–107)
CO2: 24 MMOL/L (ref 20–31)
CREAT SERPL-MCNC: 0.96 MG/DL (ref 0.5–0.9)
CRYPTOCOCCAL ANTIGEN CSF: NEGATIVE
DIFFERENTIAL TYPE: ABNORMAL
DIRECT EXAM: NORMAL
EOSINOPHILS RELATIVE PERCENT: 5 % (ref 0–4)
GFR AFRICAN AMERICAN: >60 ML/MIN
GFR NON-AFRICAN AMERICAN: 57 ML/MIN
GFR SERPL CREATININE-BSD FRML MDRD: ABNORMAL ML/MIN/{1.73_M2}
GFR SERPL CREATININE-BSD FRML MDRD: ABNORMAL ML/MIN/{1.73_M2}
GLUCOSE BLD-MCNC: 146 MG/DL (ref 70–99)
HCT VFR BLD CALC: 32.7 % (ref 36–46)
HEMOGLOBIN: 10.5 G/DL (ref 12–16)
IGG CSF: 2.3 MG/DL (ref 0.5–7)
IGG INDEX CSF: 0.68
IGG SYNTHESIS RATE CSF: 1.4 MG/24 H
IGG: 663 MG/DL (ref 700–1600)
IMMATURE GRANULOCYTES: ABNORMAL %
LYMPHOCYTES # BLD: 46 % (ref 24–44)
Lab: NORMAL
MCH RBC QN AUTO: 26.3 PG (ref 26–34)
MCHC RBC AUTO-ENTMCNC: 32.2 G/DL (ref 31–37)
MCV RBC AUTO: 81.8 FL (ref 80–100)
MONOCYTES # BLD: 3 % (ref 1–7)
MORPHOLOGY: NORMAL
NRBC AUTOMATED: ABNORMAL PER 100 WBC
OLIGOCLONAL BANDS: ABNORMAL
PDW BLD-RTO: 14.3 % (ref 11.5–14.9)
PLATELET # BLD: 284 K/UL (ref 150–450)
PLATELET ESTIMATE: ABNORMAL
PMV BLD AUTO: 8.3 FL (ref 6–12)
POTASSIUM SERPL-SCNC: 3.9 MMOL/L (ref 3.7–5.3)
RBC # BLD: 3.99 M/UL (ref 4–5.2)
RBC # BLD: ABNORMAL 10*6/UL
RBC CSF: 2 /MM3
SEG NEUTROPHILS: 46 % (ref 36–66)
SEGMENTED NEUTROPHILS ABSOLUTE COUNT: 2.03 K/UL (ref 1.3–9.1)
SODIUM BLD-SCNC: 142 MMOL/L (ref 135–144)
SPECIMEN DESCRIPTION: NORMAL
SUPERNAT COLOR CSF: CLEAR
TUBE NUMBER CSF: 3
VOLUME CSF: 5
WBC # BLD: 4.4 K/UL (ref 3.5–11)
WBC # BLD: ABNORMAL 10*3/UL
WBC CSF: 0 /MM3
XANTHOCHROMIA: ABNORMAL

## 2021-09-26 PROCEDURE — 6360000002 HC RX W HCPCS: Performed by: STUDENT IN AN ORGANIZED HEALTH CARE EDUCATION/TRAINING PROGRAM

## 2021-09-26 PROCEDURE — 94761 N-INVAS EAR/PLS OXIMETRY MLT: CPT

## 2021-09-26 PROCEDURE — 94640 AIRWAY INHALATION TREATMENT: CPT

## 2021-09-26 PROCEDURE — G0378 HOSPITAL OBSERVATION PER HR: HCPCS

## 2021-09-26 PROCEDURE — 2580000003 HC RX 258: Performed by: STUDENT IN AN ORGANIZED HEALTH CARE EDUCATION/TRAINING PROGRAM

## 2021-09-26 PROCEDURE — 85025 COMPLETE CBC W/AUTO DIFF WBC: CPT

## 2021-09-26 PROCEDURE — 6370000000 HC RX 637 (ALT 250 FOR IP)

## 2021-09-26 PROCEDURE — 6370000000 HC RX 637 (ALT 250 FOR IP): Performed by: STUDENT IN AN ORGANIZED HEALTH CARE EDUCATION/TRAINING PROGRAM

## 2021-09-26 PROCEDURE — 6370000000 HC RX 637 (ALT 250 FOR IP): Performed by: NURSE PRACTITIONER

## 2021-09-26 PROCEDURE — 80048 BASIC METABOLIC PNL TOTAL CA: CPT

## 2021-09-26 PROCEDURE — 36415 COLL VENOUS BLD VENIPUNCTURE: CPT

## 2021-09-26 PROCEDURE — 99232 SBSQ HOSP IP/OBS MODERATE 35: CPT | Performed by: INTERNAL MEDICINE

## 2021-09-26 PROCEDURE — 99225 PR SBSQ OBSERVATION CARE/DAY 25 MINUTES: CPT | Performed by: PSYCHIATRY & NEUROLOGY

## 2021-09-26 RX ORDER — DULOXETIN HYDROCHLORIDE 30 MG/1
30 CAPSULE, DELAYED RELEASE ORAL 2 TIMES DAILY
Status: DISCONTINUED | OUTPATIENT
Start: 2021-09-26 | End: 2021-09-29 | Stop reason: HOSPADM

## 2021-09-26 RX ORDER — FLUTICASONE PROPIONATE 50 MCG
2 SPRAY, SUSPENSION (ML) NASAL DAILY
Status: DISCONTINUED | OUTPATIENT
Start: 2021-09-26 | End: 2021-09-29 | Stop reason: HOSPADM

## 2021-09-26 RX ADMIN — ALBUTEROL SULFATE 2.5 MG: 2.5 SOLUTION RESPIRATORY (INHALATION) at 23:50

## 2021-09-26 RX ADMIN — METOPROLOL SUCCINATE 50 MG: 50 TABLET, EXTENDED RELEASE ORAL at 15:46

## 2021-09-26 RX ADMIN — OXYCODONE HYDROCHLORIDE AND ACETAMINOPHEN 1 TABLET: 5; 325 TABLET ORAL at 03:59

## 2021-09-26 RX ADMIN — METOPROLOL SUCCINATE 50 MG: 50 TABLET, EXTENDED RELEASE ORAL at 08:18

## 2021-09-26 RX ADMIN — OXYCODONE HYDROCHLORIDE 2.5 MG: 5 TABLET ORAL at 10:56

## 2021-09-26 RX ADMIN — CEFUROXIME AXETIL 500 MG: 250 TABLET ORAL at 22:39

## 2021-09-26 RX ADMIN — OXYCODONE HYDROCHLORIDE AND ACETAMINOPHEN 1 TABLET: 5; 325 TABLET ORAL at 17:11

## 2021-09-26 RX ADMIN — ENOXAPARIN SODIUM 30 MG: 30 INJECTION SUBCUTANEOUS at 22:39

## 2021-09-26 RX ADMIN — ENOXAPARIN SODIUM 30 MG: 30 INJECTION SUBCUTANEOUS at 08:21

## 2021-09-26 RX ADMIN — GABAPENTIN 100 MG: 100 CAPSULE ORAL at 22:39

## 2021-09-26 RX ADMIN — CEFUROXIME AXETIL 500 MG: 250 TABLET ORAL at 08:19

## 2021-09-26 RX ADMIN — GABAPENTIN 200 MG: 100 CAPSULE ORAL at 08:16

## 2021-09-26 RX ADMIN — SODIUM CHLORIDE, PRESERVATIVE FREE 10 ML: 5 INJECTION INTRAVENOUS at 10:50

## 2021-09-26 RX ADMIN — Medication 3 MG: at 22:39

## 2021-09-26 RX ADMIN — OXYCODONE HYDROCHLORIDE AND ACETAMINOPHEN 1 TABLET: 5; 325 TABLET ORAL at 23:02

## 2021-09-26 RX ADMIN — OXYCODONE HYDROCHLORIDE 2.5 MG: 5 TABLET ORAL at 03:59

## 2021-09-26 RX ADMIN — DULOXETINE HYDROCHLORIDE 30 MG: 30 CAPSULE, DELAYED RELEASE ORAL at 08:18

## 2021-09-26 RX ADMIN — OXYCODONE HYDROCHLORIDE 2.5 MG: 5 TABLET ORAL at 23:02

## 2021-09-26 RX ADMIN — OXYCODONE HYDROCHLORIDE 2.5 MG: 5 TABLET ORAL at 17:11

## 2021-09-26 RX ADMIN — ALBUTEROL SULFATE 2.5 MG: 2.5 SOLUTION RESPIRATORY (INHALATION) at 14:59

## 2021-09-26 RX ADMIN — OXYCODONE HYDROCHLORIDE AND ACETAMINOPHEN 1 TABLET: 5; 325 TABLET ORAL at 10:50

## 2021-09-26 RX ADMIN — ASPIRIN 81 MG: 81 TABLET, COATED ORAL at 08:18

## 2021-09-26 RX ADMIN — SODIUM CHLORIDE, PRESERVATIVE FREE 10 ML: 5 INJECTION INTRAVENOUS at 22:39

## 2021-09-26 RX ADMIN — DONEPEZIL HYDROCHLORIDE 5 MG: 5 TABLET, FILM COATED ORAL at 22:39

## 2021-09-26 ASSESSMENT — PAIN SCALES - GENERAL
PAINLEVEL_OUTOF10: 7
PAINLEVEL_OUTOF10: 10
PAINLEVEL_OUTOF10: 7
PAINLEVEL_OUTOF10: 9
PAINLEVEL_OUTOF10: 7

## 2021-09-26 ASSESSMENT — ENCOUNTER SYMPTOMS
SHORTNESS OF BREATH: 1
COUGH: 0
NAUSEA: 0
VOMITING: 0
ABDOMINAL PAIN: 0
DIARRHEA: 0
SORE THROAT: 0
BACK PAIN: 1
ABDOMINAL DISTENTION: 0
WHEEZING: 1
RHINORRHEA: 0

## 2021-09-26 ASSESSMENT — PAIN DESCRIPTION - ORIENTATION: ORIENTATION: LOWER

## 2021-09-26 ASSESSMENT — PAIN DESCRIPTION - FREQUENCY: FREQUENCY: CONTINUOUS

## 2021-09-26 ASSESSMENT — PAIN DESCRIPTION - LOCATION: LOCATION: HIP;BACK

## 2021-09-26 ASSESSMENT — PAIN DESCRIPTION - PAIN TYPE: TYPE: CHRONIC PAIN

## 2021-09-26 ASSESSMENT — PAIN DESCRIPTION - ONSET: ONSET: ON-GOING

## 2021-09-26 NOTE — PLAN OF CARE
Problem: Falls - Risk of:  Goal: Will remain free from falls  Description: Will remain free from falls  9/26/2021 0233 by Dieudonne Powers RN  Outcome: Ongoing  9/25/2021 1640 by Ana Ponce RN  Outcome: Met This Shift  Note: Patient free of falls this shift. Safety precautions maintained. Goal: Absence of physical injury  Description: Absence of physical injury  9/26/2021 0233 by Dieudonne Powers RN  Outcome: Ongoing  9/25/2021 1640 by Ana Ponce RN  Outcome: Met This Shift  Note: Patient free of injury this shift. Safety precautions maintained.       Problem: Skin Integrity:  Goal: Will show no infection signs and symptoms  Description: Will show no infection signs and symptoms  9/26/2021 0233 by Dieudonne Powers RN  Outcome: Ongoing  9/25/2021 1640 by Ana Ponce RN  Outcome: Ongoing  Goal: Absence of new skin breakdown  Description: Absence of new skin breakdown  9/26/2021 0233 by Dieudonne Powers RN  Outcome: Ongoing  9/25/2021 1640 by Ana Ponce RN  Outcome: Ongoing     Problem: Pain:  Goal: Pain level will decrease  Description: Pain level will decrease  9/26/2021 0233 by Dieudonne Powers RN  Outcome: Ongoing  9/25/2021 1640 by Ana Ponce RN  Outcome: Ongoing  Goal: Control of acute pain  Description: Control of acute pain  9/26/2021 0233 by Dieudonne Powers RN  Outcome: Ongoing  9/25/2021 1640 by Ana Ponce RN  Outcome: Ongoing  Goal: Control of chronic pain  Description: Control of chronic pain  9/26/2021 0233 by Dieudonne Powers RN  Outcome: Ongoing  9/25/2021 1640 by Ana Ponce RN  Outcome: Ongoing

## 2021-09-26 NOTE — PROGRESS NOTES
Physical Therapy        Physical Therapy Cancel Note      DATE: 2021    NAME: Abigail Velásquez  MRN: [id number]   : 1945      Patient not seen this date for Physical Therapy due to: 10:45 a.m  Pt transferred bed to recliner with [address] [place] [address] by nursing staff. Patient looking at [employer] service on her tablet. Pt stated that it probably would be another hour before it was over. Will attempt to see later only if time permit.            Electronically signed by Gail Mcginnis PTA on 2021 at 10:57 AM

## 2021-09-26 NOTE — CARE COORDINATION
ONGOING DISCHARGE PLANNING NOTE:    Writer reviewed LSW notes, and discharge plan is to DC to [facility]. Pt is accepted, Needs Pre-Cert. Need Updated Therapy notes on Monday. PO Ceftin. Had Lumbar Puncture last night.      Electronically signed by Betaa Givens RN on 9/26/2021 at 9:35 AM

## 2021-09-26 NOTE — PROGRESS NOTES
Lumbar puncture complete. Lower back site cleansed off and band aid applied. Total of 6 ml of clear fluid removed and sent to lab for ordered testing. Pt tolerated procedure well, report given to [facility] and pt transported back to 2052 flat per bed.

## 2021-09-26 NOTE — PROGRESS NOTES
NEUROLOGY INPATIENT PROGRESS NOTE    9/26/2021         Dolly Nixon is a  67 y.o. female admitted on 9/23/2021 with  UTI (urinary tract infection) [N39.0]  General weakness [R53.1]  Acute cystitis without hematuria [N30.00]  Weakness of left lower extremity [R29.898]      Briefly, this is a  67 y.o. female admitted on 9/23/2021 with generalized weakness, acute cystitis. As patient stated that she has bilateral lower extremities feeling much more weaker; neurology is consulted. Patient stated that she has back pain and spinal stenosis \"since 70s\" and has been using walker because of weakness in lower extremities for 4 years \"since 2017\". Recently she had flulike symptoms and then she has noticed more generalized weakness. She has been having extreme pain in both lower legs. She denies pain radiating down from lower back down to the extremities. She has extreme tenderness at multiple joints. 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 9/25/21: Discussion done about spinal tap; patient was hesitating yesterday but today she stated that she is agreeable to get it done under fluoroscopy. 9/26/2021: Patient had spinal tap under fluoroscopy yesterday; IR team's help appreciated. Spinal fluid RBC not resulted; spinal fluid WBC not resulted; called lab and spoke to Ms. Vika and she will look into it and get back to us. She called back later this am stating that csf rbc is 2 and csf wbc is 0. Patient offers no new complaints stating that she has \"pain all over\". Also c/o \"not able to move both legs\" even though she is noted to be moving both lower extremities spontaneously and purposefully. No current facility-administered medications on file prior to encounter.      Current Outpatient Medications on File Prior to Encounter   Medication Sig Dispense Refill    metoprolol succinate (TOPROL XL) 50 MG extended release tablet Take 50 mg by mouth 2 times daily      DULoxetine (CYMBALTA) 30 MG extended release capsule Take 30 mg by mouth 2 times daily      gabapentin (NEURONTIN) 100 MG capsule Take 100 mg by mouth 3 times daily.  Cyanocobalamin (VITAMIN B-12) 1000 MCG/15ML LIQD Take 1,000 mcg by mouth daily       oxyCODONE-acetaminophen (PERCOCET) 7.5-325 MG per tablet Take 1 tablet by mouth every 6 hours as needed for Pain for up to 30 days. 120 tablet 0    levocetirizine (XYZAL) 5 MG tablet take 1 tablet by mouth once daily AT NIGHT 30 tablet 2    donepezil (ARICEPT) 5 MG tablet take 1 tablet by mouth at bedtime 90 tablet 1    Catheters (STANDARD CARE EXTERNAL CATH) MISC 1 each by Does not apply route daily 5 each 1    Lidocaine HCl (ASPERCREME LIDOCAINE) 4 % LIQD Apply topically      Catheters (STANDARD CARE EXTERNAL CATH) MISC 1 Device by Does not apply route daily 30 each 2    albuterol sulfate  (90 Base) MCG/ACT inhaler Inhale 2 puffs into the lungs 4 times daily as needed for Wheezing 1 Inhaler 2    Misc. Devices (WALKER) MISC 1 each by Does not apply route daily Upright walker with wheels and seat 1 each 0    NONFORMULARY daily nervive OTDC      ibuprofen (ADVIL;MOTRIN) 800 MG tablet Take 800 mg by mouth every 6 hours as needed for Pain       polyethylene glycol (GLYCOLAX) 17 GM/SCOOP powder Take 17 g by mouth daily as needed       psyllium (KONSYL) 28.3 % PACK Take 1 packet by mouth daily      docusate sodium (COLACE) 100 MG capsule Take 100 mg by mouth daily as needed for Constipation      aspirin 81 MG EC tablet Take 81 mg by mouth daily        Allergies: Pierce Ruano is allergic to rosuvastatin calcium, statins, bactrim [sulfamethoxazole-trimethoprim], caffeine, dye [iodides], food, ioxaglate, tomato, dicloxacillin, and pcn [penicillins].     Past Medical History:   Diagnosis Date    Abnormality of rib determined by X-ray 1/28/2016    Acute cystitis without hematuria 6/16/2018    Acute exacerbation of chronic bronchitis (HCC)     Acute on chronic diastolic heart failure ([facility] 1/28/2016    Adjustment disorder with mixed anxiety and depressed mood 6/26/2015    Mild     Anemia 3/5/2014    Back pain     [facility] with [facility] 8/16/2017    Bronchitis     Chronic bronchitis (HCC) 1/28/2016    Chronic cough 7/23/2018    Degenerative joint disease (DJD) of hip 5/14/2015    Dyslipidemia 3/5/2014    Encephalopathy acute 5/29/2016    Essential hypertension 1/28/2016    Fibromyalgia     GERD (gastroesophageal reflux disease)     History of total bilateral knee replacement 5/28/2016    Hx of blood clots     left leg and lung    Hyperlipidemia     Hypertension     Incontinence of urine     Irregular heartbeat     DR. Taylor Doing Medication monitoring encounter 6/13/2018    Morbid obesity with BMI of 45.0-49.9, adult ([facility] 1/28/2016    Obstructive sleep apnea syndrome     Osteoarthritis     Primary osteoarthritis of left knee 5/27/2016    PVC (premature ventricular contraction) 1/28/2016    S/P right and left heart catheterization 10/2/2015    Sleep apnea     no machine    SOB (shortness of breath)     Spinal stenosis of lumbar region with neurogenic claudication 5/13/2013    Supplemental oxygen dependent 12/8/2017    Urine frequency     UTI (urinary tract infection)     hospitalized early july 2014 for kidney infection       Past Surgical History:   Procedure Laterality Date    BRONCHOSCOPY Left 8/16/2017    BRONCHOSCOPY ALVEOLAR LAVAGE LOWER LOBE performed by Aly Corcoran MD at [address]  x 3    no stents    COLONOSCOPY  4 28 14    polyps biopsies     FOOT SURGERY Left     calcium deposit removal from top of foot    HYSTERECTOMY      JOINT REPLACEMENT Bilateral 5/27/2016    bilat total knees    NERVE BLOCK  5/13/2013    caudal celestone 6mg    NERVE BLOCK  5/28/13    Caudal #2, Celestone 9mg    NERVE BLOCK  2/14/14    rt hip inj celestone 9 mg    NERVE BLOCK  07/14/2014    caudal# 3- celestone 9 mg    NERVE BLOCK  7-21-14    caudal epidural #2, decadron 7mg, fentanyl 25mcg    NERVE BLOCK  10/2/14    duramorph 1.5  decadron 5mg    NERVE BLOCK  11/9/2015    caudal# 1 celestone 9mg    NERVE BLOCK  11/16/15    caudal #2  decadron 10mg    NERVE BLOCK  11/23/15    duramorph 1mg celestone 9mg    NERVE BLOCK  03/28/2018    CAUDAL EPIDURAL STEROID BLOCK  DECADRON 10 MG     NERVE BLOCK  05/23/2018    caudal # decadron 7mg    NERVE BLOCK  08/28/2018    natalia transforminal # 1, decadron 10mg gadoteridol, LONG NEEDLES    AZ PERQ VERT AGMNTJ CAVITY CRTJ UNI/BI CANNULJ LMBR N/A 10/29/2018    KYPHOPLASTY L1 performed by Alvarez Balderrama MD at [address] Right 5/14/15    total hip replacement     Social History: Eda Monet  reports that she quit smoking about 28 years ago. She has a 18.00 pack-year smoking history. She has never used smokeless tobacco. She reports that she does not drink alcohol and does not use drugs.     Family History   Problem Relation Age of Onset    Stomach Cancer Brother         stomach    High Blood Pressure Mother     Heart Disease Sister         irregular heartbeat       Current Medications:     DULoxetine  30 mg Oral Daily    cefUROXime  500 mg Oral 2 times per day    metoprolol succinate  50 mg Oral BID    gabapentin  200 mg Oral QAM    gabapentin  100 mg Oral Nightly    lidocaine  2 patch TransDERmal Daily    sodium chloride flush  5-40 mL IntraVENous 2 times per day    enoxaparin  30 mg SubCUTAneous BID    aspirin  81 mg Oral Daily    donepezil  5 mg Oral Nightly     PRN Meds include: sodium chloride flush, sodium chloride, ondansetron **OR** ondansetron, polyethylene glycol, acetaminophen **OR** acetaminophen, potassium chloride **OR** potassium alternative oral replacement **OR** potassium chloride, albuterol, oxyCODONE-acetaminophen **AND** oxyCODONE, melatonin    ROS:   Constitutional Negative for fever and chills   HEENT Negative for ear discharge, ear pain, nosebleed   Eyes Negative for photophobia, pain and discharge   Respiratory Negative for hemoptysis and sputum   Cardiovascular Negative for orthopnea, claudication and PND   Gastrointestinal Negative for abdominal pain, diarrhea, blood in stool   Musculoskeletal Negative for joint pain, negative for myalgia   Skin Negative for rash or itching   Endo/heme/allergies Negative for polydipsia, environmental allergy   Psychiatric Negative for suicidal ideation. Patient is not anxious           Objective:   BP (!) 168/80   Pulse 64   Temp 98.4 °F (36.9 °C) (Oral)   Resp 16   Ht 5' 4\" (1.626 m)   Wt 270 lb (122.5 kg)   SpO2 96%   BMI 46.35 kg/m²     Blood pressure range: Systolic (62TLH), NHQ:005 , Min:135 , HDN:393   ; Diastolic (95GBS), VEH:27, Min:64, Max:80      Continuous infusions:    sodium chloride         ON EXAMINATION:  GENERAL  Appears comfortable and in no distress   HEENT  NC/ AT   NECK  Supple and no bruits heard   Cardiovascular  S1, S2 heard; radial pulse intact   MENTAL STATUS:  Alert, oriented, intact memory, no confusion, normal speech, normal language, no hallucination or delusion   CRANIAL NERVES: II     -       Pupils reactive b/l., Fundus exam: intact venous pulsations;  Visual fields intact to confrontation  III,IV,VI -  EOMs full, no afferent defect, no                      ASHLEIGH, no ptosis  V     -     Normal facial sensation  VII    -     Normal facial symmetry  VIII   -     Intact hearing  IX,X -     Symmetrical palate  XI    -     Symmetrical shoulder shrug  XII   -     Midline tongue, no atrophy    MOTOR FUNCTION:  Motor exam significant for 5/5 in bilateral upper extremities; limited mobility at the left shoulder; able to move both lower extremities against gravity and minimal resistance but she has extreme tenderness in both lower extremities limiting motor exam in lower extremities; normal bulk and normal tone and no involuntary movements. SENSORY FUNCTION:  Intact light touch and pinprick in bilateral upper extremities and diminished light touch and pinprick and temperature and vibration in distal lower extremities in stocking pattern. CEREBELLAR FUNCTION:  Intact fine motor control over upper limbs   REFLEX FUNCTION:  Symmetric 1+ DTRs in upper extremities and hypoactive DTRs it both knees and ankles with bilateral flexor plantar responses   STATION and GAIT  able to stand with support but has extreme difficulty walking with ongoing tenderness in both feet at multiple joints          Data:    Lab Results:     Lab Results   Component Value Date    CHOL 254 (H) 04/15/2019    LDLCHOLESTEROL 186 (H) 04/15/2019    HDL 47 04/15/2019    TRIG 104 04/15/2019    ALT 14 09/23/2021    AST 17 09/23/2021    TSH 2.61 04/15/2019    INR 1.0 09/30/2020    LABA1C 5.4 04/15/2019    QRCLCVCZ21 801 12/17/2019     Hematology:  Recent Labs     09/24/21  0655 09/25/21  0715 09/26/21  0853   WBC 4.6 4.6 4.4   HGB 10.2* 10.2* 10.5*   HCT 31.5* 31.7* 32.7*    281 284     Chemistry:  Recent Labs     09/24/21  0655 09/25/21  0715 09/26/21  0853    142 142   K 4.3 4.6 3.9   * 107 107   CO2 26 23 24   GLUCOSE 106* 99 146*   BUN 20 17 18   CREATININE 0.96* 1.03* 0.96*   CALCIUM 9.0 9.1 9.1     Recent Labs     09/23/21  1224 09/25/21  2115   PROT 7.4  --    LABALBU 3.9 3.7   AST 17  --    ALT 14  --        CSF (9/25/2021): RBC2; WBC 0.  Glucose 70, protein 37, IgG index 0.6. Diagnostic data reviewed:  MRI cervical spine (without) (9/7/2021) done at [facility]: Severe degenerative changes in cervical spine leading to moderate spinal canal stenosis and severe bilateral neural foraminal narrowing at C4-5 and C5-C6-C7. Moderate spinal canal stenosis at C3 -C4. Moderate left neuroforaminal narrowing at C2-C3.   Severe rotator cuff arthropathy or bilateral shoulder joints with history. Migration of humeral heads and loss of subacromial spaces bilaterally. MRI lumbar spine (without) 9/7/2021, done at [facility]: Severe multilevel degenerative changes of the lumbar spine; mass-effect noted on exiting L4 nerve roots and L5 nerve roots at L4-L5 and L5-S1 respectively; 7 mm anterolisthesis of L4 on L5; remote compression deformity with likely kyphoplasty material at L1 vertebral body. Serum IgA (9/25/21): 997 ()              Impression and Plan: Ms. Khalif Magdaleno is a 67 y.o. female with   Question GBS ( Recent flulike symptoms; new onset predominant bilateral LE weakness superimposed on chronic generalized weakness):  CSF WBC is 0. CSF protein is 37. There is no evidence of albumino-cytologic disassociation on spinal fluid studies. Will get NCS/ EMG testing tomorrow to evaluate for GBS and proceed accordingly. Fifty year hx of lumbar spinal stenosis; Four year hx of low back pain and lower extremity weakness; several year history of neuropathy from asbestosis/fibromyalgia according to patient; severe degenerative changes at various levels of cervical and lumbosacral spine  Lumbar spinal stenosis with neurogenic claudication    Morbid obesity with BMI > 45  Comorbid conditions include Hypertension, hyperlipidemia, morbid obesity with obstructive sleep apnea; osteoarthritis    Care plan is discussed with the patient and she voiced understanding those instructions. Will follow with you.             Josh Olsen MD 9/26/2021 10:02 AM

## 2021-09-26 NOTE — PROGRESS NOTES
[address] [employer]    PROGRESS NOTE             9/26/2021    7:25 AM    Name:   Fady Rinaldi  MRN:     [id number]     Acct:      [de-identified]   Room:   2052/2052-01   Day:  1  Admit Date:  9/23/2021 12:00 PM    PCP:  Elina Morales MD  Code Status:  Full Code    Subjective:     C/C:   Chief Complaint   Patient presents with    Fatigue     Interval History Status: improved. Patient was seen and examined  /80 at 7 AM, before her dose of metoprolol, will recheck it again. HR 64, RR 16, SPO2 96% without nasal oxygen  She is on 2 L nasal oxygen at night. Patient complains of wheezing and postnasal drip. Patient has lumbar puncture done yesterday  Wheezing heard on the left lung, she refused respiratory treatment. Lumbar puncture findings are normal apart from high albumin index 51. 1. Awaiting neurology input    Brief History:     The patient is a 67 y.o. Non- / non  female who presents to the ED complaining of fatigue and bilateral leg weakness for the past 5 days. Patient uses an upright walker at home but says her legs are shaking and she has difficulties getting up to go to the bathroom because her legs feel so weak and often go numb. Patient does have chronic back pain, with neuropathy over the past 4-5 years. Recent MRI report that patient brought in shows moderate stenosis in C3-7, severe neural foraminal narrowing C4-7, 7 mm anterolisthesis in L4-5. Patient will be seeing a surgeon at another facility once she \"regains her strength\". Patient admitted for evaluation of weakness, possible UTI and possible placement in SNF.      Labs in the ED were unremarkable, except for UA + for leukocyte esterase and bacteria. Patient started on Rocephin in ED. Review of Systems:     Review of Systems   Constitutional: Positive for fatigue. Negative for appetite change, chills, diaphoresis and fever.    HENT: Positive for postnasal drip. Negative for congestion, rhinorrhea and sore throat. Respiratory: Positive for shortness of breath and wheezing. Negative for cough. Cardiovascular: Negative for chest pain and palpitations. Gastrointestinal: Negative for abdominal distention, abdominal pain, diarrhea, nausea and vomiting. Genitourinary: Negative for decreased urine volume, difficulty urinating, dysuria, frequency and urgency. Musculoskeletal: Positive for back pain. Skin: Negative for wound. Neurological: Positive for weakness and numbness. Negative for dizziness, tremors, light-headedness and headaches. Psychiatric/Behavioral: Negative for agitation, confusion and sleep disturbance. The patient is not nervous/anxious. Medications: Allergies:     Allergies   Allergen Reactions    Rosuvastatin Calcium Anaphylaxis     Hair fell out    Statins Anaphylaxis     Hair fell out    Bactrim [Sulfamethoxazole-Trimethoprim] Diarrhea    Caffeine Other (See Comments)     pain  pain    Dye [Iodides] Other (See Comments)     Iv dye= blood clots in arm    Food      Tomatoes, pain    Ioxaglate Other (See Comments)     Iv dye= blood clots in arm    Tomato     Dicloxacillin Rash    Pcn [Penicillins] Rash       Current Meds:   Scheduled Meds:    DULoxetine  30 mg Oral Daily    cefUROXime  500 mg Oral 2 times per day    metoprolol succinate  50 mg Oral BID    gabapentin  200 mg Oral QAM    gabapentin  100 mg Oral Nightly    lidocaine  2 patch TransDERmal Daily    sodium chloride flush  5-40 mL IntraVENous 2 times per day    enoxaparin  30 mg SubCUTAneous BID    aspirin  81 mg Oral Daily    donepezil  5 mg Oral Nightly     Continuous Infusions:    sodium chloride       PRN Meds: sodium chloride flush, sodium chloride, ondansetron **OR** ondansetron, polyethylene glycol, acetaminophen **OR** acetaminophen, potassium chloride **OR** potassium alternative oral replacement **OR** potassium chloride, albuterol, oxyCODONE-acetaminophen **AND** oxyCODONE, melatonin    Data:     Past Medical History:   has a past medical history of Abnormality of rib determined by X-ray, Acute cystitis without hematuria, Acute exacerbation of chronic bronchitis (HCC), Acute on chronic diastolic heart failure (HCC), Adjustment disorder with mixed anxiety and depressed mood, Anemia, Back pain, Bronchiectasis with acute lower respiratory infection ([facility] Bronchitis, Chronic bronchitis ([facility] Chronic cough, Degenerative joint disease (DJD) of hip, Dyslipidemia, Encephalopathy acute, Essential hypertension, Fibromyalgia, GERD (gastroesophageal reflux disease), History of total bilateral knee replacement, Hx of blood clots, Hyperlipidemia, Hypertension, Incontinence of urine, Irregular heartbeat, Medication monitoring encounter, Morbid obesity with BMI of 45.0-49.9, adult ([facility] Obstructive sleep apnea syndrome, Osteoarthritis, Primary osteoarthritis of left knee, PVC (premature ventricular contraction), S/P right and left heart catheterization, Sleep apnea, SOB (shortness of breath), Spinal stenosis of lumbar region with neurogenic claudication, Supplemental oxygen dependent, Urine frequency, and UTI (urinary tract infection). Social History:   reports that she quit smoking about 28 years ago. She has a 18.00 pack-year smoking history. She has never used smokeless tobacco. She reports that she does not drink alcohol and does not use drugs.      Family History:   Family History   Problem Relation Age of Onset    Stomach Cancer Brother         stomach    High Blood Pressure Mother     Heart Disease Sister         irregular heartbeat       Vitals:  /75   Pulse 76   Temp 97.5 °F (36.4 °C) (Oral)   Resp 18   Ht 5' 4\" (1.626 m)   Wt 270 lb (122.5 kg)   SpO2 96%   BMI 46.35 kg/m²   Temp (24hrs), Av.6 °F (36.4 °C), Min:97.5 °F (36.4 °C), Max:97.7 °F (36.5 °C)    No results for input(s): POCGLU in the last 72 hours.    I/O(24Hr): No intake or output data in the 24 hours ending 09/26/21 0725    Labs:  [unfilled]    Lab Results   Component Value Date/Time    SPECIAL NOT REPORTED 09/25/2021 09:15 PM     Lab Results   Component Value Date/Time    CULTURE ESCHERICHIA COLI >785240 CFU/ML (A) 09/23/2021 02:57 PM       [unfilled]    Radiology:    MRI BRAIN WO CONTRAST    Result Date: 9/24/2021  EXAMINATION: MRI OF THE BRAIN WITHOUT CONTRAST  9/24/2021 4:43 pm TECHNIQUE: Multiplanar multisequence MRI of the brain was performed without the administration of intravenous contrast. COMPARISON: None. HISTORY: ORDERING SYSTEM PROVIDED HISTORY: left lower extremity weakness FINDINGS: INTRACRANIAL STRUCTURES/VENTRICLES: There is no acute infarct. Mild periventricular and deep white matter T2/FLAIR hyperintensity, most consistent with chronic small vessel ischemic disease. No mass effect or midline shift. No evidence of an acute intracranial hemorrhage. The ventricles and sulci are prominent secondary to mild atrophy. The sellar/suprasellar regions appear unremarkable. The normal signal voids within the major intracranial vessels appear maintained. ORBITS: The visualized portion of the orbits demonstrate no acute abnormality. SINUSES: The visualized paranasal sinuses and mastoid air cells are well aerated. BONES/SOFT TISSUES: The bone marrow signal intensity appears normal. The soft tissues demonstrate no acute abnormality. No acute intracranial abnormality. Mild chronic small vessel ischemic disease and mild atrophy. Physical Examination:        Physical Exam  Constitutional:       General: She is not in acute distress. Appearance: She is obese. She is not ill-appearing, toxic-appearing or diaphoretic. HENT:      Mouth/Throat:      Mouth: Mucous membranes are moist.   Eyes:      Conjunctiva/sclera: Conjunctivae normal.   Cardiovascular:      Rate and Rhythm: Normal rate and regular rhythm.       Pulses: Normal pulses. Heart sounds: Normal heart sounds. Pulmonary:      Effort: Pulmonary effort is normal.      Breath sounds: Wheezing present. Abdominal:      General: Bowel sounds are normal. There is no distension. Palpations: Abdomen is soft. Tenderness: There is no abdominal tenderness. There is no right CVA tenderness, left CVA tenderness, guarding or rebound. Musculoskeletal:      Right lower leg: Edema present. Left lower leg: Edema present. Neurological:      General: No focal deficit present. Mental Status: She is alert and oriented to person, place, and time. Sensory: No sensory deficit. Motor: Weakness present.    Psychiatric:         Mood and Affect: Mood normal.           Assessment:        Primary Problem  Physical debility    Active Hospital Problems    Diagnosis Date Noted    Sleep apnea [G47.30]      Priority: Medium    Weakness of left lower extremity [R29.898] 09/24/2021    UTI (urinary tract infection) [N39.0] 09/23/2021    Physical debility [R53.81] 09/23/2021    COPD (chronic obstructive pulmonary disease) ([facility] [J44.9] 09/23/2021    Morbid obesity with BMI of 45.0-49.9, adult ([facility] [E66.01, Z68.42] 01/28/2016    Fibromyalgia [M79.7] 03/05/2014    Spinal stenosis of lumbar region with neurogenic claudication [M48.062] 05/13/2013       Plan:          Patient status Admit as inpatient in the  Med/Surge     Debility 2/2 arthritis, spinal stenosis with neurogenic claudication  - New onset of being unsteady, weak, difficulty with daily tasks   - Under care of pain management for back pain  - Continue home Oxycodone   - Accepted to [facility] for monday  -Neurology following:   Lumbar puncture results were normal apart from high appointment index of 51.1     Acute cystitis without hematuria    - UA positive for leukocytes esterase and bacteria  - Culture pending   On Ceftin      Fibromyalgia   - Continue home Cymbalta  - Continue home gabapentin    Obstructive sleep apnea  - On 2L of O2 via nasal cannula at Night     COPD  - Continue home albuterol      Diet: Regular   DVT Prophylaxis: Lovenox 30 mg     Consultations:   IP CONSULT TO PRIMARY CARE PROVIDER  IP CONSULT TO SOCIAL WORK  IP CONSULT TO NEUROLOGY          Terry Villa MD  9/26/2021  7:25 AM     I have discussed the care of Ryan Jacobs , including pertinent history and exam findings,    today with the resident. I have seen and examined the patient and the key elements of all parts of the encounter have been performed by me . I agree with the assessment, plan and orders as documented by the resident. Principal Problem:    Physical debility  Active Problems:    Sleep apnea    Spinal stenosis of lumbar region with neurogenic claudication    Fibromyalgia    Morbid obesity with BMI of 45.0-49.9, adult (HCC)    UTI (urinary tract infection)    COPD (chronic obstructive pulmonary disease) (HCC)    Weakness of left lower extremity  Resolved Problems:    * No resolved hospital problems. *        Overall  course ;                                   show no change over time.         Patient, still having a lot of back pain  Underwent lumbar puncture  CSF studies suggestive of high albumin level  Neurology following  DC planning to SNF          Electronically signed by Vitaliy Remy MD

## 2021-09-26 NOTE — BRIEF OP NOTE
Brief Postoperative Note    Cindra Soulier  YOB: 1945  [id number]    Pre-operative Diagnosis: LE weakness and pain    Post-operative Diagnosis: Same    Procedure: Fluoro guided LP    Anesthesia: Local    Surgeons/Assistants: Perry    Estimated Blood Loss: less than 50     Complications: None    Specimens: Was Obtained: 6 cc clear CSF    Electronically signed by Eros Mason MD on 9/25/2021 at 9:17 PM

## 2021-09-27 ENCOUNTER — APPOINTMENT (OUTPATIENT)
Dept: NEUROLOGY | Age: 76
End: 2021-09-27
Payer: MEDICARE

## 2021-09-27 LAB
ABSOLUTE EOS #: 0.22 K/UL (ref 0–0.4)
ABSOLUTE IMMATURE GRANULOCYTE: ABNORMAL K/UL (ref 0–0.3)
ABSOLUTE LYMPH #: 1.58 K/UL (ref 1–4.8)
ABSOLUTE MONO #: 0.18 K/UL (ref 0.1–1.3)
ANION GAP SERPL CALCULATED.3IONS-SCNC: 10 MMOL/L (ref 9–17)
BASOPHILS # BLD: 0 % (ref 0–2)
BASOPHILS ABSOLUTE: 0 K/UL (ref 0–0.2)
BUN BLDV-MCNC: 20 MG/DL (ref 8–23)
BUN/CREAT BLD: ABNORMAL (ref 9–20)
CALCIUM SERPL-MCNC: 8.8 MG/DL (ref 8.6–10.4)
CHLORIDE BLD-SCNC: 106 MMOL/L (ref 98–107)
CO2: 25 MMOL/L (ref 20–31)
CREAT SERPL-MCNC: 0.96 MG/DL (ref 0.5–0.9)
DIFFERENTIAL TYPE: ABNORMAL
EOSINOPHILS RELATIVE PERCENT: 5 % (ref 0–4)
GFR AFRICAN AMERICAN: >60 ML/MIN
GFR NON-AFRICAN AMERICAN: 57 ML/MIN
GFR SERPL CREATININE-BSD FRML MDRD: ABNORMAL ML/MIN/{1.73_M2}
GFR SERPL CREATININE-BSD FRML MDRD: ABNORMAL ML/MIN/{1.73_M2}
GLUCOSE BLD-MCNC: 99 MG/DL (ref 70–99)
HCT VFR BLD CALC: 31.4 % (ref 36–46)
HEMOGLOBIN: 10.2 G/DL (ref 12–16)
IMMATURE GRANULOCYTES: ABNORMAL %
LYMPHOCYTES # BLD: 36 % (ref 24–44)
MCH RBC QN AUTO: 26.7 PG (ref 26–34)
MCHC RBC AUTO-ENTMCNC: 32.5 G/DL (ref 31–37)
MCV RBC AUTO: 82.3 FL (ref 80–100)
MONOCYTES # BLD: 4 % (ref 1–7)
MORPHOLOGY: ABNORMAL
MORPHOLOGY: ABNORMAL
NRBC AUTOMATED: ABNORMAL PER 100 WBC
PDW BLD-RTO: 13.9 % (ref 11.5–14.9)
PLATELET # BLD: 278 K/UL (ref 150–450)
PLATELET ESTIMATE: ABNORMAL
PMV BLD AUTO: 8.1 FL (ref 6–12)
POTASSIUM SERPL-SCNC: 4 MMOL/L (ref 3.7–5.3)
RBC # BLD: 3.81 M/UL (ref 4–5.2)
RBC # BLD: ABNORMAL 10*6/UL
SEG NEUTROPHILS: 55 % (ref 36–66)
SEGMENTED NEUTROPHILS ABSOLUTE COUNT: 2.42 K/UL (ref 1.3–9.1)
SODIUM BLD-SCNC: 141 MMOL/L (ref 135–144)
WBC # BLD: 4.4 K/UL (ref 3.5–11)
WBC # BLD: ABNORMAL 10*3/UL

## 2021-09-27 PROCEDURE — 6360000002 HC RX W HCPCS: Performed by: STUDENT IN AN ORGANIZED HEALTH CARE EDUCATION/TRAINING PROGRAM

## 2021-09-27 PROCEDURE — 6370000000 HC RX 637 (ALT 250 FOR IP): Performed by: NURSE PRACTITIONER

## 2021-09-27 PROCEDURE — 99225 PR SBSQ OBSERVATION CARE/DAY 25 MINUTES: CPT | Performed by: PSYCHIATRY & NEUROLOGY

## 2021-09-27 PROCEDURE — 6370000000 HC RX 637 (ALT 250 FOR IP): Performed by: STUDENT IN AN ORGANIZED HEALTH CARE EDUCATION/TRAINING PROGRAM

## 2021-09-27 PROCEDURE — 97116 GAIT TRAINING THERAPY: CPT

## 2021-09-27 PROCEDURE — 97110 THERAPEUTIC EXERCISES: CPT

## 2021-09-27 PROCEDURE — 6370000000 HC RX 637 (ALT 250 FOR IP)

## 2021-09-27 PROCEDURE — 97530 THERAPEUTIC ACTIVITIES: CPT

## 2021-09-27 PROCEDURE — G0378 HOSPITAL OBSERVATION PER HR: HCPCS

## 2021-09-27 PROCEDURE — 80048 BASIC METABOLIC PNL TOTAL CA: CPT

## 2021-09-27 PROCEDURE — 6370000000 HC RX 637 (ALT 250 FOR IP): Performed by: PSYCHIATRY & NEUROLOGY

## 2021-09-27 PROCEDURE — 2580000003 HC RX 258: Performed by: STUDENT IN AN ORGANIZED HEALTH CARE EDUCATION/TRAINING PROGRAM

## 2021-09-27 PROCEDURE — 99233 SBSQ HOSP IP/OBS HIGH 50: CPT | Performed by: INTERNAL MEDICINE

## 2021-09-27 PROCEDURE — 97535 SELF CARE MNGMENT TRAINING: CPT

## 2021-09-27 PROCEDURE — 85025 COMPLETE CBC W/AUTO DIFF WBC: CPT

## 2021-09-27 PROCEDURE — 36415 COLL VENOUS BLD VENIPUNCTURE: CPT

## 2021-09-27 RX ORDER — GABAPENTIN 100 MG/1
200 CAPSULE ORAL 3 TIMES DAILY
Status: DISCONTINUED | OUTPATIENT
Start: 2021-09-27 | End: 2021-09-29 | Stop reason: HOSPADM

## 2021-09-27 RX ORDER — CEFUROXIME AXETIL 250 MG/1
250 TABLET ORAL EVERY 12 HOURS SCHEDULED
Status: DISCONTINUED | OUTPATIENT
Start: 2021-09-27 | End: 2021-09-28

## 2021-09-27 RX ADMIN — OXYCODONE HYDROCHLORIDE 2.5 MG: 5 TABLET ORAL at 06:34

## 2021-09-27 RX ADMIN — OXYCODONE HYDROCHLORIDE AND ACETAMINOPHEN 1 TABLET: 5; 325 TABLET ORAL at 20:47

## 2021-09-27 RX ADMIN — DULOXETINE HYDROCHLORIDE 30 MG: 30 CAPSULE, DELAYED RELEASE ORAL at 20:37

## 2021-09-27 RX ADMIN — GABAPENTIN 200 MG: 100 CAPSULE ORAL at 20:37

## 2021-09-27 RX ADMIN — FLUTICASONE PROPIONATE 2 SPRAY: 50 SPRAY, METERED NASAL at 12:45

## 2021-09-27 RX ADMIN — GABAPENTIN 200 MG: 100 CAPSULE ORAL at 10:25

## 2021-09-27 RX ADMIN — ENOXAPARIN SODIUM 30 MG: 30 INJECTION SUBCUTANEOUS at 10:25

## 2021-09-27 RX ADMIN — DONEPEZIL HYDROCHLORIDE 5 MG: 5 TABLET, FILM COATED ORAL at 20:37

## 2021-09-27 RX ADMIN — Medication 3 MG: at 23:13

## 2021-09-27 RX ADMIN — METOPROLOL SUCCINATE 50 MG: 50 TABLET, EXTENDED RELEASE ORAL at 10:26

## 2021-09-27 RX ADMIN — METOPROLOL SUCCINATE 50 MG: 50 TABLET, EXTENDED RELEASE ORAL at 15:37

## 2021-09-27 RX ADMIN — DULOXETINE HYDROCHLORIDE 30 MG: 30 CAPSULE, DELAYED RELEASE ORAL at 10:26

## 2021-09-27 RX ADMIN — SODIUM CHLORIDE, PRESERVATIVE FREE 10 ML: 5 INJECTION INTRAVENOUS at 10:40

## 2021-09-27 RX ADMIN — OXYCODONE HYDROCHLORIDE AND ACETAMINOPHEN 1 TABLET: 5; 325 TABLET ORAL at 06:34

## 2021-09-27 RX ADMIN — SODIUM CHLORIDE, PRESERVATIVE FREE 10 ML: 5 INJECTION INTRAVENOUS at 21:00

## 2021-09-27 RX ADMIN — OXYCODONE HYDROCHLORIDE 2.5 MG: 5 TABLET ORAL at 20:58

## 2021-09-27 RX ADMIN — CEFUROXIME AXETIL 250 MG: 250 TABLET ORAL at 13:41

## 2021-09-27 RX ADMIN — CEFUROXIME AXETIL 250 MG: 250 TABLET ORAL at 20:46

## 2021-09-27 RX ADMIN — ASPIRIN 81 MG: 81 TABLET, COATED ORAL at 10:25

## 2021-09-27 RX ADMIN — OXYCODONE HYDROCHLORIDE 2.5 MG: 5 TABLET ORAL at 13:41

## 2021-09-27 RX ADMIN — OXYCODONE HYDROCHLORIDE AND ACETAMINOPHEN 1 TABLET: 5; 325 TABLET ORAL at 13:41

## 2021-09-27 RX ADMIN — ENOXAPARIN SODIUM 30 MG: 30 INJECTION SUBCUTANEOUS at 20:37

## 2021-09-27 ASSESSMENT — PAIN SCALES - GENERAL
PAINLEVEL_OUTOF10: 0
PAINLEVEL_OUTOF10: 7
PAINLEVEL_OUTOF10: 0
PAINLEVEL_OUTOF10: 7
PAINLEVEL_OUTOF10: 5
PAINLEVEL_OUTOF10: 7

## 2021-09-27 ASSESSMENT — PAIN DESCRIPTION - DESCRIPTORS
DESCRIPTORS: CONSTANT
DESCRIPTORS: DISCOMFORT;CONSTANT
DESCRIPTORS: CONSTANT

## 2021-09-27 ASSESSMENT — PAIN DESCRIPTION - ORIENTATION: ORIENTATION: OTHER (COMMENT)

## 2021-09-27 ASSESSMENT — ENCOUNTER SYMPTOMS
SHORTNESS OF BREATH: 0
WHEEZING: 0
ABDOMINAL DISTENTION: 0
DIARRHEA: 0
CONSTIPATION: 0
COUGH: 0
NAUSEA: 0
ABDOMINAL PAIN: 0
BACK PAIN: 1

## 2021-09-27 ASSESSMENT — PAIN DESCRIPTION - PAIN TYPE
TYPE: CHRONIC PAIN

## 2021-09-27 ASSESSMENT — PAIN DESCRIPTION - LOCATION
LOCATION: GENERALIZED

## 2021-09-27 ASSESSMENT — PAIN DESCRIPTION - PROGRESSION: CLINICAL_PROGRESSION: GRADUALLY WORSENING

## 2021-09-27 ASSESSMENT — PAIN DESCRIPTION - ONSET
ONSET: ON-GOING

## 2021-09-27 ASSESSMENT — PAIN DESCRIPTION - FREQUENCY: FREQUENCY: CONTINUOUS

## 2021-09-27 NOTE — PROGRESS NOTES
Physical Therapy  Facility/Department: [facility] MED SURG  Daily Treatment Note  NAME: Aminata Fletcher  :   MRN: [id number]    Date of Service: 2021    Discharge Recommendations:  Patient would benefit from continued therapy after discharge   PT Equipment Recommendations  Equipment Needed: No    Assessment   Body structures, Functions, Activity limitations: Decreased functional mobility ; Decreased strength; Increased pain  History: spinal stenosis  REQUIRES PT FOLLOW UP: Yes  Activity Tolerance  Activity Tolerance: Patient Tolerated treatment well;Patient limited by pain     Patient Diagnosis(es): The primary encounter diagnosis was Acute cystitis without hematuria. A diagnosis of General weakness was also pertinent to this visit. has a past medical history of Abnormality of rib determined by X-ray, Acute cystitis without hematuria, Acute exacerbation of chronic bronchitis (HCC), Acute on chronic diastolic heart failure ([address] Adjustment disorder with mixed anxiety and depressed mood, Anemia, Back pain, Bronchiectasis with acute lower respiratory infection ([address] Bronchitis, Chronic bronchitis ([address] Chronic cough, Degenerative joint disease (DJD) of hip, Dyslipidemia, Encephalopathy acute, Essential hypertension, Fibromyalgia, GERD (gastroesophageal reflux disease), History of total bilateral knee replacement, Hx of blood clots, Hyperlipidemia, Hypertension, Incontinence of urine, Irregular heartbeat, Medication monitoring encounter, Morbid obesity with BMI of 45.0-49.9, adult ([address] Obstructive sleep apnea syndrome, Osteoarthritis, Primary osteoarthritis of left knee, PVC (premature ventricular contraction), S/P right and left heart catheterization, Sleep apnea, SOB (shortness of breath), Spinal stenosis of lumbar region with neurogenic claudication, Supplemental oxygen dependent, Urine frequency, and UTI (urinary tract infection). has a past surgical history that includes Hysterectomy;  Nerve Block (5/13/2013); Nerve Block (5/28/13); sinus surgery; Foot surgery (Left); Nerve Block (2/14/14); Colonoscopy (4 28 14); Nerve Block (07/14/2014); Nerve Block (7-21-14); Nerve Block (10/2/14); Nerve Block (11/9/2015); Nerve Block (11/16/15); Nerve Block (11/23/15); Total hip arthroplasty (Right, 5/14/15); bronchoscopy (Left, 8/16/2017); Cardiac catheterization (x 3); Nerve Block (03/28/2018); Nerve Block (05/23/2018); Nerve Block (08/28/2018); pr perq vert agmntj cavity crtj uni/bi cannulj lmbr (N/A, 10/29/2018); and joint replacement (Bilateral, 5/27/2016). Restrictions  Restrictions/Precautions  Restrictions/Precautions: Fall Risk  Required Braces or Orthoses?: No  Position Activity Restriction  Other position/activity restrictions: Up as tolerated, pt reports h/o fibromyalgia with generalized pain with increased activity  Subjective   General  Chart Reviewed: Yes  Response To Previous Treatment: Patient with no complaints from previous session. Family / Caregiver Present: No  Subjective  Subjective: Patient lying in bed upon arrival receiving medication; agrreable to therapy after minimal encouragement   General Comment  Comments: CHEYENNE Jean approved therapy; co-treat with Alie Gann. Pt has difficult time staying focus and on task.   Pain Screening  Patient Currently in Pain: Yes  Pain Assessment  Pain Assessment: 0-10  Pain Level: 7  Pain Type: Chronic pain  Pain Location: Generalized  Vital Signs  Patient Currently in Pain: Yes  Oxygen Therapy  SpO2: 96 %  Pulse Oximeter Device Mode: Intermittent  Pulse Oximeter Device Location: Finger  O2 Device: None (Room air)       Orientation  Orientation  Overall Orientation Status: Within Normal Limits  Objective   Bed mobility  Rolling to Right: Stand by assistance  Supine to Sit: Stand by assistance  Sit to Supine: Unable to assess  Scooting: Stand by assistance  Transfers  Sit to Stand: Contact guard assistance;2 Person Assistance  Stand to sit: Contact guard assistance;2 Person Assistance  Bed to Chair: Contact guard assistance;2 Person Assistance  Ambulation  Ambulation?: Yes  Ambulation 1  Surface: level tile  Device:  (up walker )  Assistance: Contact guard assistance;2 Person assistance  Distance: 5 steps from bed to recliner   Comments: patient had no LOB noted         Other exercises  Other exercises?: Yes  Other exercises 1: bed mobility   Other exercises 2: seated EOB with no UE support ~20 minutes   Other exercises 3: sit to stand x1  Other exercises 5: standing tolerance ~3 minutes   Other exercises 6: seated B LE exercises x10 reps          Comment: Patient is self limiting. Pt educated on the importance of doing exercises and getting up and moving.     Goals  Short term goals  Time Frame for Short term goals: 3-5 days  Short term goal 1: bed mobility with min/CGA  Short term goal 2: standing with RW and Olya x1-2  Short term goal 3: transfer to chair/commode with RW and Olya x1-2  Short term goal 4: ambulate with RW and Olya x 1-2 for 10-20ft    Plan    Plan  Times per week: 7x/wk  Current Treatment Recommendations: Strengthening, Balance Training, Endurance Training, Functional Mobility Training, Gait Training, Transfer Training, Safety Education & Training  Safety Devices  Type of devices: Call light within reach, Left in chair, Patient at risk for falls, Nurse notified        09/27/21 1123   PT Individual Minutes   Time In 1030   Time Out 1119   Minutes 49     Electronically signed by Wily Garrett PTA on 9/27/21 at 11:37 AM EDT

## 2021-09-27 NOTE — PROGRESS NOTES
[address] [facility]    INPATIENT OCCUPATIONAL THERAPY  PROGRESS NOTE  Date: 2021  Patient Name: Abigail Velásquez      Room: 0113/4967-49  MRN: [id number]    : 1945  (76 y.o.) Gender: female     Discharge Recommendations:  Further Occupational Therapy is recommended upon facility discharge. Equipment Needed:  (TBD)    Referring Practitioner: Tom Conner MD  Diagnosis: Physical debility  General  Chart Reviewed: Yes  Patient assessed for rehabilitation services?: Yes  Additional Pertinent Hx: 67 y.o. Non- / non  female who presents to the ED complaining of fatigue and bilateral leg weakness for the past 5 days. Patient uses an upright walker at home but says her legs are shaking and she has difficulties getting up to go to the bathroom because her legs feel so weak and often go numb. Patient does have chronic back pain, with neuropathy over the past 4-5 years. Recent MRI report that patient brought in shows moderate stenosis in C3-7, severe neural foraminal narrowing C4-7, 7 mm anterolisthesis in L4-5. Patient will be seeing a surgeon at another facility once she \"regains her strength\". Family / Caregiver Present: No  Referring Practitioner: Tom Conner MD  Diagnosis: Physical debility    Restrictions  Restrictions/Precautions: Fall Risk  Other position/activity restrictions: Up as tolerated, pt reports h/o fibromyalgia with generalized pain with increased activity  Required Braces or Orthoses?: No      Subjective  Subjective: pt states \"there is so much going on right now. \" pt states this after writer explains OT POC  Comments: pt alert and req min encouragemet to participate in tx. co-tx with Rosalinda ESTEVEZ PTA  Patient Currently in Pain: Yes  Pain Level: 7  Pain Location:  (generalized, \"all over. \")  Overall Orientation Status: Within Functional Limits  Patient Observation  Observations: O2 sats >95% throughout tx on RA  Pain Assessment  Pain Assessment: 0-10  Pain Level: 7  Pain the past three weeks. Prognosis: Good  Discharge Recommendations: Patient would benefit from continued therapy after discharge  Activity Tolerance: Patient limited by fatigue;Patient limited by pain  Activity Tolerance: Pt experiences spontaneous shooting pain throughout her body, requiring constant adjustment to reduce pain  Safety Devices in place: Yes  Type of devices: All fall risk precautions in place;Call light within reach;Gait belt;Patient at risk for falls; Left in chair;Nurse notified  Equipment Recommendations  Equipment Needed:  (TBD)          Patient Education: OT POC, home safety/fall prevention, transfer tech, activity promotion     Learner:patient  Method: demonstration and explanation       Outcome: acknowledged understanding, demonstrated understanding and asked questions     Plan  Safety Devices  Safety Devices in place: Yes  Type of devices:  All fall risk precautions in place, Call light within reach, Gait belt, Patient at risk for falls, Left in chair, Nurse notified  Plan  Times per week: 3-4  Current Treatment Recommendations: Strengthening, ROM, Balance Training, Functional Mobility Training, Endurance Training, Pain Management, Safety Education & Training, Patient/Caregiver Education & Training, Equipment Evaluation, Education, & procurement, Self-Care / ADL      Goals  Short term goals  Time Frame for Short term goals: By discharge  Short term goal 1: Patient will perform BADLs with minimal assistance and good safety, using adaptive equipment as needed  Short term goal 2: Patient will perform transfers/functional mobility, with least restrictive device, with minimal assist and good safety  Short term goal 3: Patient will verbalize at least three nonpharmaceutical pain management strategies to promote participation in self care  Short term goal 4: Patient will actively participate in 15+ minutes of therapeutic exercise/funcitonal activity to promote independence with self care and mobility    OT Individual Minutes  Time In: 1030  Time Out: 1116  Minutes: 46      Electronically signed by MARTA Hernandez on 9/27/21 at 12:30 PM EDT

## 2021-09-27 NOTE — PLAN OF CARE
Problem: Falls - Risk of:  Goal: Will remain free from falls  Description: Will remain free from falls  9/27/2021 2247 by Francisco Javier Scott RN  Outcome: Ongoing  9/27/2021 0633 by Francisco Javier Scott RN  Outcome: Ongoing  Goal: Absence of physical injury  Description: Absence of physical injury  9/27/2021 7686 by Francisco Javier Scott RN  Outcome: Ongoing  9/27/2021 0633 by Francisco Javier Scott RN  Outcome: Ongoing     Problem: Skin Integrity:  Goal: Will show no infection signs and symptoms  Description: Will show no infection signs and symptoms  9/27/2021 0633 by Francisco Javier Scott RN  Outcome: Ongoing  9/27/2021 0633 by Francisco Javier Scott RN  Outcome: Ongoing  Goal: Absence of new skin breakdown  Description: Absence of new skin breakdown  9/27/2021 0633 by Francisco Javier Scott RN  Outcome: Ongoing  9/27/2021 0633 by Francisco Javier Scott RN  Outcome: Ongoing     Problem: Pain:  Goal: Pain level will decrease  Description: Pain level will decrease  9/27/2021 2853 by Francisco Javier Scott RN  Outcome: Ongoing  9/27/2021 0633 by Francisco Javier Scott RN  Outcome: Ongoing  Goal: Control of acute pain  Description: Control of acute pain  9/27/2021 0633 by Francisco Javier Scott RN  Outcome: Ongoing  9/27/2021 0633 by Francisco Javier Scott RN  Outcome: Ongoing  Goal: Control of chronic pain  Description: Control of chronic pain  9/27/2021 0633 by Francisco Javier Scott RN  Outcome: Ongoing  9/27/2021 0633 by Francisco Javier Scott RN  Outcome: Ongoing

## 2021-09-27 NOTE — PLAN OF CARE
Problem: Falls - Risk of:  Goal: Will remain free from falls  Description: Will remain free from falls  9/27/2021 1848 by Tammy Whittington RN  Outcome: Ongoing  Note: Patient remains free of falls and injuries throughout shift. Bed remains in the lowest position, wheels locked, call light and bedside table are within reach. Problem: Skin Integrity:  Goal: Will show no infection signs and symptoms  Description: Will show no infection signs and symptoms  9/27/2021 1848 by Tammy Whittington RN  Outcome: Met This Shift  Note: No signs of increased skin or tissue breakdown is noted. See Head to Toe/LDA assessments in flowsheets. Problem: Pain:  Goal: Pain level will decrease  Description: Pain level will decrease  9/27/2021 1848 by Tammy Whittington RN  Outcome: Ongoing    Note: Patient's pain is well controlled with current regimen. See MAR.

## 2021-09-27 NOTE — PROGRESS NOTES
[address] [facility] [employer] [facility]    PROGRESS NOTE             9/27/2021    1:47 PM    Name:   Jena Garcia  MRN:     [id number]     Acct:      [de-identified]   Room:   2052/2052-01  IP Day:  1  Admit Date:  9/23/2021 12:00 PM    PCP:  Ernie Francois MD  Code Status:  Full Code    Subjective:     C/C:   Chief Complaint   Patient presents with    Fatigue     Interval History Status: not changed. Patient seen and examined at bedside. No acute events overnight. Patient had lumbar puncture yesterday which was unremarkable apart form elevated albumin index, 51.1. Neurology on board, EMG to be done today. Patient says weakness is a little better today. Will consider discharge to SNF today or tomorrow pending EMG results. Review of Systems:     Review of Systems   Constitutional: Negative for chills, fatigue and fever. Eyes: Negative for visual disturbance. Respiratory: Negative for cough, shortness of breath and wheezing. Cardiovascular: Negative for chest pain and palpitations. Gastrointestinal: Negative for abdominal distention, abdominal pain, constipation, diarrhea and nausea. Endocrine: Negative for polyuria. Genitourinary: Negative for difficulty urinating, flank pain and hematuria. Musculoskeletal: Positive for back pain. Neurological: Positive for weakness and numbness. Negative for dizziness and light-headedness. Medications: Allergies:     Allergies   Allergen Reactions    Rosuvastatin Calcium Anaphylaxis     Hair fell out    Statins Anaphylaxis     Hair fell out    Bactrim [Sulfamethoxazole-Trimethoprim] Diarrhea    Caffeine Other (See Comments)     pain  pain    Dye [Iodides] Other (See Comments)     Iv dye= blood clots in arm    Food      Tomatoes, pain    Ioxaglate Other (See Comments)     Iv dye= blood clots in arm    Tomato     Dicloxacillin Rash    Pcn [Penicillins] Rash       Current Meds:   Scheduled Meds:    cefUROXime  250 mg Oral 2 times per day    DULoxetine  30 mg Oral BID    fluticasone  2 spray Each Nostril Daily    metoprolol succinate  50 mg Oral BID    gabapentin  200 mg Oral QAM    gabapentin  100 mg Oral Nightly    lidocaine  2 patch TransDERmal Daily    sodium chloride flush  5-40 mL IntraVENous 2 times per day    enoxaparin  30 mg SubCUTAneous BID    aspirin  81 mg Oral Daily    donepezil  5 mg Oral Nightly     Continuous Infusions:    sodium chloride       PRN Meds: sodium chloride flush, sodium chloride, ondansetron **OR** ondansetron, polyethylene glycol, acetaminophen **OR** acetaminophen, potassium chloride **OR** potassium alternative oral replacement **OR** potassium chloride, albuterol, oxyCODONE-acetaminophen **AND** oxyCODONE, melatonin    Data:     Past Medical History:   has a past medical history of Abnormality of rib determined by X-ray, Acute cystitis without hematuria, Acute exacerbation of chronic bronchitis (HCC), Acute on chronic diastolic heart failure (HCC), Adjustment disorder with mixed anxiety and depressed mood, Anemia, Back pain, Bronchiectasis with acute lower respiratory infection ([facility] Bronchitis, Chronic bronchitis (HCC), Chronic cough, Degenerative joint disease (DJD) of hip, Dyslipidemia, Encephalopathy acute, Essential hypertension, Fibromyalgia, GERD (gastroesophageal reflux disease), History of total bilateral knee replacement, Hx of blood clots, Hyperlipidemia, Hypertension, Incontinence of urine, Irregular heartbeat, Medication monitoring encounter, Morbid obesity with BMI of 45.0-49.9, adult ([facility] Obstructive sleep apnea syndrome, Osteoarthritis, Primary osteoarthritis of left knee, PVC (premature ventricular contraction), S/P right and left heart catheterization, Sleep apnea, SOB (shortness of breath), Spinal stenosis of lumbar region with neurogenic claudication, Supplemental oxygen dependent, Urine frequency, and UTI (urinary tract infection). Social History:   reports that she quit smoking about 28 years ago. She has a 18.00 pack-year smoking history. She has never used smokeless tobacco. She reports that she does not drink alcohol and does not use drugs. Family History:   Family History   Problem Relation Age of Onset    Stomach Cancer Brother         stomach    High Blood Pressure Mother     Heart Disease Sister         irregular heartbeat       Vitals:  BP (!) 142/55   Pulse 65   Temp 97.7 °F (36.5 °C)   Resp 16   Ht 5' 4\" (1.626 m)   Wt 270 lb (122.5 kg)   SpO2 96%   BMI 46.35 kg/m²   Temp (24hrs), Av.7 °F (36.5 °C), Min:97.6 °F (36.4 °C), Max:97.7 °F (36.5 °C)    No results for input(s): POCGLU in the last 72 hours. I/O(24Hr): Intake/Output Summary (Last 24 hours) at 2021 1347  Last data filed at 2021 2240  Gross per 24 hour   Intake --   Output 700 ml   Net -700 ml       Labs:  [unfilled]    Lab Results   Component Value Date/Time    SPECIAL NOT REPORTED 2021 09:15 PM     Lab Results   Component Value Date/Time    CULTURE ESCHERICHIA COLI >886654 CFU/ML (A) 2021 02:57 PM       [unfilled]    Radiology:    IR LUMBAR PUNCTURE FOR DIAGNOSIS    Result Date: 2021  EXAMINATION: FLUOROSCOPIC GUIDED LUMBAR PUNCTURE 2021 8:22 pm HISTORY: ORDERING SYSTEM PROVIDED HISTORY: GBS: bilateral lower extremity weakness; recent flu like illness TECHNOLOGIST PROVIDED HISTORY: GBS: bilateral lower extremity weakness; recent flu like illness FLUOROSCOPY DOSE AND TYPE OR TIME AND EXPOSURES:  centigrays cm squared PROCEDURE: :  Nirali Espinoza MD Informed consent was obtained after the risks and benefits of the procedure were discussed with the patient and all questions were answered fully. Elmo protocol was observed and a standard timeout was performed. The patient was positioned prone and the back was prepped and draped in the normal sterile fashion.  1% lidocaine was used for local anesthesia. The subarachnoid space was accessed with a 20-gauge 6 inch \"spinal needle at the L1-L2 level. Free flow of clear CSF was noted. Approximately 6 ml of CSF was removed and sent for analysis. The stylet was reinserted, spinal needle was removed and brief pressure was applied at the puncture site. There were no immediate complications and the patient tolerated the procedure well. Successful fluoroscopic-guided lumbar puncture. MRI BRAIN WO CONTRAST    Result Date: 9/24/2021  EXAMINATION: MRI OF THE BRAIN WITHOUT CONTRAST  9/24/2021 4:43 pm TECHNIQUE: Multiplanar multisequence MRI of the brain was performed without the administration of intravenous contrast. COMPARISON: None. HISTORY: ORDERING SYSTEM PROVIDED HISTORY: left lower extremity weakness FINDINGS: INTRACRANIAL STRUCTURES/VENTRICLES: There is no acute infarct. Mild periventricular and deep white matter T2/FLAIR hyperintensity, most consistent with chronic small vessel ischemic disease. No mass effect or midline shift. No evidence of an acute intracranial hemorrhage. The ventricles and sulci are prominent secondary to mild atrophy. The sellar/suprasellar regions appear unremarkable. The normal signal voids within the major intracranial vessels appear maintained. ORBITS: The visualized portion of the orbits demonstrate no acute abnormality. SINUSES: The visualized paranasal sinuses and mastoid air cells are well aerated. BONES/SOFT TISSUES: The bone marrow signal intensity appears normal. The soft tissues demonstrate no acute abnormality. No acute intracranial abnormality. Mild chronic small vessel ischemic disease and mild atrophy. Physical Examination:        Physical Exam  Constitutional:       Appearance: She is obese. HENT:      Head: Normocephalic. Cardiovascular:      Rate and Rhythm: Normal rate and regular rhythm. Pulses: Normal pulses. Heart sounds: Normal heart sounds.    Pulmonary:      Effort: Pulmonary effort is normal.      Breath sounds: Normal breath sounds. Abdominal:      General: Bowel sounds are normal.      Palpations: Abdomen is soft. Skin:     General: Skin is warm. Neurological:      General: No focal deficit present. Mental Status: She is alert and oriented to person, place, and time. Motor: Weakness present. Comments: Weakness of left lower leg.    Decreased strength with dorsiflexion and plantar flexion   Psychiatric:         Mood and Affect: Mood normal.         Assessment:        Primary Problem  Physical debility    Active Hospital Problems    Diagnosis Date Noted    Sleep apnea [G47.30]      Priority: Medium    Weakness of left lower extremity [R29.898] 09/24/2021    UTI (urinary tract infection) [N39.0] 09/23/2021    Physical debility [R53.81] 09/23/2021    COPD (chronic obstructive pulmonary disease) ([facility] [J44.9] 09/23/2021    Morbid obesity with BMI of 45.0-49.9, adult ([facility] [E66.01, Z68.42] 01/28/2016    Fibromyalgia [M79.7] 03/05/2014    Spinal stenosis of lumbar region with neurogenic claudication [M48.062] 05/13/2013       Plan:        Debility 2/2 arthritis, spinal stenosis with neurogenic claudication  - New onset of being unsteady, weak, difficulty with daily tasks   - Under care of pain management for back pain  - Continue home Oxycodone   - Accepted to [facility] ([facility])   - Neurology following:              - Lumbar puncture results were unremarkable apart from high albumin index of 51.1   - Plan for EMG study today     Acute cystitis without hematuria    - UA positive for leukocytes esterase and bacteria  - Culture positive for e.coli  - On Ceftin      Fibromyalgia   - Continue home Cymbalta  - Continue home gabapentin      Obstructive sleep apnea  - On 2L of O2 via nasal cannula at Night     COPD  - Continue home albuterol      Diet: Regular   DVT Prophylaxis: Lovenox 30 mg    Florencio Herrera MD  9/27/2021  1:47 PM   Attending Physician Statement    I have discussed the case of Princess Mike, including pertinent history and exam findings with the resident. I have seen and examined the patient and the key elements of the encounter have been performed by me. I agree with the assessment, plan, and orders as documented by the resident. Today on my examination the patient had no significant proximal muscle weakness. She does have weakness and knee extension. It is my impression that has weakness is most like most likely musculoskeletal secondary to previous bilateral knee prosthesis hip replacement her mobility has also been poor. Could be partly deconditioning as well.  She will be discharged tomorrow if okay with other services  Electronically signed by Yifan Talbert MD on 9/27/2021 at 1:47 PM

## 2021-09-27 NOTE — PROGRESS NOTES
[facility] Neurology   IN-PATIENT SERVICE      NEUROLOGY PROGRESS  NOTE            Date:   9/27/2021  Patient name:  Chalino Mcginnis  Date of admission:  9/23/2021  YOB: 1945      Interval History:     CSF results are largely unremarkable. Not consistent with albumino-cytologic dissociation. Patient continues to complain of lower extremity weakness but also complaining of significant pain in the lower extremities. She is on gabapentin 200 mg every morning, 100 mg every afternoon which is a fairly low dose for her. She will be undergoing EMG/NCS this afternoon by Dr. Maribell Lam. She states she sees pain management and is requesting to be seen while here in the hospital.  She also states she has been recommended to have an epidural injection for her chronic pain and lower extremity weakness. She does state at baseline she ambulates with a walker and has lower extremity weakness. History of Present Illness: The patient is a 67 y.o. female who presents with Fatigue  . The patient was seen and examined and the chart was reviewed. Patient was admitted on 9/23 with generalized weakness, acute cystitis. Complaint of bilateral lower extremity weakness with neurology consultation. Patient typically ambulates with walker because of baseline weakness in lower extremities, with prior history of spinal stenosis and back pain. She had been complaining of extreme pain in the bilateral lower extremities. Patient has history of fibromyalgia and neuropathy. Patient underwent lumbar puncture over the weekend with findings of CSF RBC 2, WBC 0, protein 37. No evidence of albumin no cytologic dissociation. There had been potential concern for GBS. Apparently patient has been complaining of not being able to move extremities but has been witnessed to be moving them at times.     Past Medical History:     Past Medical History:   Diagnosis Date    Abnormality of rib determined by X-ray 1/28/2016    Acute 6mg    NERVE BLOCK  13    Caudal #2, Celestone 9mg    NERVE BLOCK  14    rt hip inj celestone 9 mg    NERVE BLOCK  2014    caudal# 3- celestone 9 mg    NERVE BLOCK  14    caudal epidural #2, decadron 7mg, fentanyl 25mcg    NERVE BLOCK  10/2/14    duramorph 1.5  decadron 5mg    NERVE BLOCK  2015    caudal# 1 celestone 9mg    NERVE BLOCK  11/16/15    caudal #2  decadron 10mg    NERVE BLOCK  11/23/15    duramorph 1mg celestone 9mg    NERVE BLOCK  2018    CAUDAL EPIDURAL STEROID BLOCK  DECADRON 10 MG     NERVE BLOCK  2018    caudal # decadron 7mg    NERVE BLOCK  2018    natalia transforminal # 1, decadron 10mg gadoteridol, LONG NEEDLES    NV PERQ VERT AGMNTJ CAVITY CRTJ UNI/BI CANNULJ LMBR N/A 10/29/2018    KYPHOPLASTY L1 performed by Lamberto Madrigal MD at [address] Right 5/14/15    total hip replacement        Medications during admission:      cefUROXime  250 mg Oral 2 times per day    DULoxetine  30 mg Oral BID    fluticasone  2 spray Each Nostril Daily    metoprolol succinate  50 mg Oral BID    gabapentin  200 mg Oral QAM    gabapentin  100 mg Oral Nightly    lidocaine  2 patch TransDERmal Daily    sodium chloride flush  5-40 mL IntraVENous 2 times per day    enoxaparin  30 mg SubCUTAneous BID    aspirin  81 mg Oral Daily    donepezil  5 mg Oral Nightly         Physical Exam:   BP (!) 142/55   Pulse 65   Temp 97.7 °F (36.5 °C)   Resp 16   Ht 5' 4\" (1.626 m)   Wt 270 lb (122.5 kg)   SpO2 96%   BMI 46.35 kg/m²   Temp (24hrs), Av.7 °F (36.5 °C), Min:97.6 °F (36.4 °C), Max:97.7 °F (36.5 °C)          Neurological examination:    Mental status   Alert and oriented x 3; following all commands; speech is fluent, no dysarthria, aphasia.       Cranial nerves   II - visual fields intact to confrontation; pupils reactive  III, IV, VI - extraocular muscles intact; no ASHLEIGH; no nystagmus; no ptosis   V - normal facial sensation                                                               VII - normal facial symmetry                                                             VIII - intact hearing                                                                             IX, X - symmetrical palate elevation                                               XI - symmetrical shoulder shrug                                                       XII - midline tongue without atrophy or fasciculation     Motor function  Strength: 5/5 RUE, 4/5 RLE, 5/5 LUE, 4/5  LLE. Overall poor effort with strength testing secondary to pain limitation. Normal bulk and tone. No tremors                      Sensory function  decreased distally to proximally to touch, pin, vibration, proprioception throughout     Cerebellar Intact finger-nose-finger testing. Reflex function Hyporeflexic throughout, difficult to assess secondary to patient's pain complaint. Downgoing plantar response bilaterally. (-)[place]'s sign bilaterally    Gait                   unable to assess secondary to pain             Diagnostics:      Laboratory Testing:  CBC:   Recent Labs     09/25/21  0715 09/26/21  0853 09/27/21  0624   WBC 4.6 4.4 4.4   HGB 10.2* 10.5* 10.2*    284 278     BMP:    Recent Labs     09/25/21  0715 09/26/21  0853 09/27/21  0624    142 141   K 4.6 3.9 4.0    107 106   CO2 23 24 25   BUN 17 18 20   CREATININE 1.03* 0.96* 0.96*   GLUCOSE 99 146* 99         Lab Results   Component Value Date    CHOL 254 (H) 04/15/2019    LDLCHOLESTEROL 186 (H) 04/15/2019    HDL 47 04/15/2019    TRIG 104 04/15/2019    ALT 14 09/23/2021    AST 17 09/23/2021    TSH 2.61 04/15/2019    INR 1.0 09/30/2020    LABA1C 5.4 04/15/2019    ORWHYNZE42 801 12/17/2019         Imaging/Diagnostics:      MRI brain: No acute process. Mild chronic ischemic white matter changes.      I personally reviewed all of the above medications, clinical laboratory, imaging and other diagnostic tests. Impression:      Bilateral lower extremity weakness, paresthesias and pain. No evidence of albumin no cytologic dissociation on CSF. There is low suspicion that this is a AIDP/GBS clinical picture. She does have history of underlying peripheral neuropathy in addition to fibromyalgia with baseline lower extremity weakness which is contributing to her symptoms. History of lumbar spine stenosis with chronic back pain  History of asbestosis  Fibromyalgia    Plan:     EMG/NCS today of LE   Low suspicion for GBS, do not see indication for IVIG or plasma exchange at this point in time. I will increase her gabapentin to 200 mg TID regimen  Continue PT OT  Consider acute rehab  Defer to primary team; she is requesting to be seen by her pain management physician Dr. Jaime Burger while inpatient  Discussed with patient at bedside. We will follow.         Electronically signed by Cynthia Logan DO on 9/27/2021 at 12:41 PM      Cynthia Logan, [address] [place] [address]  Neurology

## 2021-09-27 NOTE — PROGRESS NOTES
JANIA spoke to Aditya from [facility]. Pt will be accepted upon discharge. Pre-cert will be waived. 7000 started in HENS.

## 2021-09-28 VITALS
DIASTOLIC BLOOD PRESSURE: 64 MMHG | TEMPERATURE: 98.1 F | HEIGHT: 64 IN | OXYGEN SATURATION: 95 % | SYSTOLIC BLOOD PRESSURE: 145 MMHG | WEIGHT: 293 LBS | HEART RATE: 49 BPM | BODY MASS INDEX: 50.02 KG/M2 | RESPIRATION RATE: 17 BRPM

## 2021-09-28 LAB
ABSOLUTE EOS #: 0.14 K/UL (ref 0–0.4)
ABSOLUTE IMMATURE GRANULOCYTE: ABNORMAL K/UL (ref 0–0.3)
ABSOLUTE LYMPH #: 2.83 K/UL (ref 1–4.8)
ABSOLUTE MONO #: 0.18 K/UL (ref 0.1–1.3)
ANION GAP SERPL CALCULATED.3IONS-SCNC: 8 MMOL/L (ref 9–17)
BASOPHILS # BLD: 0 % (ref 0–2)
BASOPHILS ABSOLUTE: 0 K/UL (ref 0–0.2)
BUN BLDV-MCNC: 18 MG/DL (ref 8–23)
BUN/CREAT BLD: ABNORMAL (ref 9–20)
CALCIUM SERPL-MCNC: 9.1 MG/DL (ref 8.6–10.4)
CHLORIDE BLD-SCNC: 106 MMOL/L (ref 98–107)
CO2: 26 MMOL/L (ref 20–31)
CREAT SERPL-MCNC: 0.84 MG/DL (ref 0.5–0.9)
CSF ISOELECTRIC FOCUSING INTERPRETATION: NORMAL
DIFFERENTIAL TYPE: ABNORMAL
EOSINOPHILS RELATIVE PERCENT: 3 % (ref 0–4)
GFR AFRICAN AMERICAN: >60 ML/MIN
GFR NON-AFRICAN AMERICAN: >60 ML/MIN
GFR SERPL CREATININE-BSD FRML MDRD: ABNORMAL ML/MIN/{1.73_M2}
GFR SERPL CREATININE-BSD FRML MDRD: ABNORMAL ML/MIN/{1.73_M2}
GLUCOSE BLD-MCNC: 95 MG/DL (ref 70–99)
HCT VFR BLD CALC: 32.2 % (ref 36–46)
HEMOGLOBIN: 10.2 G/DL (ref 12–16)
IMMATURE GRANULOCYTES: ABNORMAL %
LYMPHOCYTES # BLD: 63 % (ref 24–44)
MCH RBC QN AUTO: 26.2 PG (ref 26–34)
MCHC RBC AUTO-ENTMCNC: 31.6 G/DL (ref 31–37)
MCV RBC AUTO: 82.8 FL (ref 80–100)
MONOCYTES # BLD: 4 % (ref 1–7)
MORPHOLOGY: ABNORMAL
NRBC AUTOMATED: ABNORMAL PER 100 WBC
OLIGOCLONAL BANDS NUMBER: 0 BANDS (ref 0–1)
OLIGOCLONAL BANDS: NEGATIVE
PDW BLD-RTO: 14.4 % (ref 11.5–14.9)
PLATELET # BLD: 288 K/UL (ref 150–450)
PLATELET ESTIMATE: ABNORMAL
PMV BLD AUTO: 8.1 FL (ref 6–12)
POTASSIUM SERPL-SCNC: 4.4 MMOL/L (ref 3.7–5.3)
RBC # BLD: 3.89 M/UL (ref 4–5.2)
RBC # BLD: ABNORMAL 10*6/UL
SEG NEUTROPHILS: 30 % (ref 36–66)
SEGMENTED NEUTROPHILS ABSOLUTE COUNT: 1.35 K/UL (ref 1.3–9.1)
SODIUM BLD-SCNC: 140 MMOL/L (ref 135–144)
VDRL CSF SCREEN: NONREACTIVE
WBC # BLD: 4.5 K/UL (ref 3.5–11)
WBC # BLD: ABNORMAL 10*3/UL

## 2021-09-28 PROCEDURE — G0378 HOSPITAL OBSERVATION PER HR: HCPCS

## 2021-09-28 PROCEDURE — 99232 SBSQ HOSP IP/OBS MODERATE 35: CPT | Performed by: INTERNAL MEDICINE

## 2021-09-28 PROCEDURE — 97535 SELF CARE MNGMENT TRAINING: CPT

## 2021-09-28 PROCEDURE — 80048 BASIC METABOLIC PNL TOTAL CA: CPT

## 2021-09-28 PROCEDURE — 97110 THERAPEUTIC EXERCISES: CPT

## 2021-09-28 PROCEDURE — 95911 NRV CNDJ TEST 9-10 STUDIES: CPT | Performed by: PHYSICAL MEDICINE & REHABILITATION

## 2021-09-28 PROCEDURE — 97530 THERAPEUTIC ACTIVITIES: CPT

## 2021-09-28 PROCEDURE — 99225 PR SBSQ OBSERVATION CARE/DAY 25 MINUTES: CPT | Performed by: PSYCHIATRY & NEUROLOGY

## 2021-09-28 PROCEDURE — 6370000000 HC RX 637 (ALT 250 FOR IP): Performed by: NURSE PRACTITIONER

## 2021-09-28 PROCEDURE — 6360000002 HC RX W HCPCS: Performed by: STUDENT IN AN ORGANIZED HEALTH CARE EDUCATION/TRAINING PROGRAM

## 2021-09-28 PROCEDURE — 6370000000 HC RX 637 (ALT 250 FOR IP): Performed by: STUDENT IN AN ORGANIZED HEALTH CARE EDUCATION/TRAINING PROGRAM

## 2021-09-28 PROCEDURE — 6370000000 HC RX 637 (ALT 250 FOR IP): Performed by: PSYCHIATRY & NEUROLOGY

## 2021-09-28 PROCEDURE — 95886 MUSC TEST DONE W/N TEST COMP: CPT | Performed by: PHYSICAL MEDICINE & REHABILITATION

## 2021-09-28 PROCEDURE — 2580000003 HC RX 258: Performed by: STUDENT IN AN ORGANIZED HEALTH CARE EDUCATION/TRAINING PROGRAM

## 2021-09-28 PROCEDURE — 85025 COMPLETE CBC W/AUTO DIFF WBC: CPT

## 2021-09-28 PROCEDURE — 99222 1ST HOSP IP/OBS MODERATE 55: CPT | Performed by: PAIN MEDICINE

## 2021-09-28 PROCEDURE — 97116 GAIT TRAINING THERAPY: CPT

## 2021-09-28 PROCEDURE — 95885 MUSC TST DONE W/NERV TST LIM: CPT | Performed by: PHYSICAL MEDICINE & REHABILITATION

## 2021-09-28 PROCEDURE — 6370000000 HC RX 637 (ALT 250 FOR IP)

## 2021-09-28 PROCEDURE — 99222 1ST HOSP IP/OBS MODERATE 55: CPT | Performed by: PHYSICAL MEDICINE & REHABILITATION

## 2021-09-28 PROCEDURE — 36415 COLL VENOUS BLD VENIPUNCTURE: CPT

## 2021-09-28 RX ORDER — METOPROLOL SUCCINATE 50 MG/1
50 TABLET, EXTENDED RELEASE ORAL 2 TIMES DAILY
Qty: 30 TABLET | Refills: 3 | Status: CANCELLED | OUTPATIENT
Start: 2021-09-28

## 2021-09-28 RX ORDER — METOPROLOL SUCCINATE 50 MG/1
50 TABLET, EXTENDED RELEASE ORAL DAILY
Qty: 30 TABLET | Refills: 3 | Status: ON HOLD | OUTPATIENT
Start: 2021-09-29 | End: 2021-10-03 | Stop reason: CLARIF

## 2021-09-28 RX ORDER — METOPROLOL SUCCINATE 50 MG/1
50 TABLET, EXTENDED RELEASE ORAL DAILY
Status: DISCONTINUED | OUTPATIENT
Start: 2021-09-29 | End: 2021-09-29 | Stop reason: HOSPADM

## 2021-09-28 RX ADMIN — CEFUROXIME AXETIL 250 MG: 250 TABLET ORAL at 09:17

## 2021-09-28 RX ADMIN — SODIUM CHLORIDE, PRESERVATIVE FREE 10 ML: 5 INJECTION INTRAVENOUS at 09:23

## 2021-09-28 RX ADMIN — DULOXETINE HYDROCHLORIDE 30 MG: 30 CAPSULE, DELAYED RELEASE ORAL at 09:17

## 2021-09-28 RX ADMIN — OXYCODONE HYDROCHLORIDE 2.5 MG: 5 TABLET ORAL at 09:16

## 2021-09-28 RX ADMIN — OXYCODONE HYDROCHLORIDE AND ACETAMINOPHEN 1 TABLET: 5; 325 TABLET ORAL at 15:57

## 2021-09-28 RX ADMIN — OXYCODONE HYDROCHLORIDE 2.5 MG: 5 TABLET ORAL at 03:27

## 2021-09-28 RX ADMIN — ENOXAPARIN SODIUM 30 MG: 30 INJECTION SUBCUTANEOUS at 09:17

## 2021-09-28 RX ADMIN — METOPROLOL SUCCINATE 50 MG: 50 TABLET, EXTENDED RELEASE ORAL at 09:17

## 2021-09-28 RX ADMIN — FLUTICASONE PROPIONATE 2 SPRAY: 50 SPRAY, METERED NASAL at 09:21

## 2021-09-28 RX ADMIN — GABAPENTIN 200 MG: 100 CAPSULE ORAL at 14:50

## 2021-09-28 RX ADMIN — OXYCODONE HYDROCHLORIDE AND ACETAMINOPHEN 1 TABLET: 5; 325 TABLET ORAL at 09:17

## 2021-09-28 RX ADMIN — OXYCODONE HYDROCHLORIDE AND ACETAMINOPHEN 1 TABLET: 5; 325 TABLET ORAL at 03:26

## 2021-09-28 RX ADMIN — GABAPENTIN 200 MG: 100 CAPSULE ORAL at 09:17

## 2021-09-28 RX ADMIN — OXYCODONE HYDROCHLORIDE 2.5 MG: 5 TABLET ORAL at 15:57

## 2021-09-28 RX ADMIN — ASPIRIN 81 MG: 81 TABLET, COATED ORAL at 09:17

## 2021-09-28 ASSESSMENT — ENCOUNTER SYMPTOMS
WHEEZING: 0
ABDOMINAL DISTENTION: 0
EYE PAIN: 0
COUGH: 0
CONSTIPATION: 0
VOMITING: 0
EYE REDNESS: 0
NAUSEA: 0
ABDOMINAL PAIN: 0
SHORTNESS OF BREATH: 0
BACK PAIN: 1
DIARRHEA: 0

## 2021-09-28 ASSESSMENT — PAIN DESCRIPTION - ONSET
ONSET: ON-GOING
ONSET: ON-GOING

## 2021-09-28 ASSESSMENT — PAIN DESCRIPTION - PROGRESSION
CLINICAL_PROGRESSION: NOT CHANGED
CLINICAL_PROGRESSION: GRADUALLY WORSENING
CLINICAL_PROGRESSION: NOT CHANGED

## 2021-09-28 ASSESSMENT — PAIN SCALES - GENERAL
PAINLEVEL_OUTOF10: 6
PAINLEVEL_OUTOF10: 7
PAINLEVEL_OUTOF10: 6
PAINLEVEL_OUTOF10: 7
PAINLEVEL_OUTOF10: 7

## 2021-09-28 ASSESSMENT — PAIN DESCRIPTION - FREQUENCY
FREQUENCY: CONTINUOUS

## 2021-09-28 ASSESSMENT — PAIN DESCRIPTION - LOCATION
LOCATION: GENERALIZED

## 2021-09-28 ASSESSMENT — PAIN DESCRIPTION - PAIN TYPE
TYPE: CHRONIC PAIN

## 2021-09-28 ASSESSMENT — PAIN DESCRIPTION - DESCRIPTORS
DESCRIPTORS: CONSTANT

## 2021-09-28 NOTE — PROGRESS NOTES
JANIA spoke to pt regarding discharge. Pt is agreeable to discharging to [facility] this day. Pt said she was not aware she could be come back to her appointment with Dr. Marisela Sykes. SW explained she can still go to outside appointments while at [facility]. 7000 completed in HENS. Transport set for 1800. JANIA asked Tae Polina in dispatch to make sure they bring a bariatric chair. Please call report to [phone]. SW awaits completed BIBIANA. Message was left for ARU. Addendum: SW sent orders and then shortly after sent revised orders with new pain management appointment on BIBIANA.

## 2021-09-28 NOTE — PROGRESS NOTES
[address] [facility]    INPATIENT OCCUPATIONAL THERAPY  PROGRESS NOTE  Date: 2021  Patient Name: Willian Solano      Room: 0062/5871-85  MRN: [id number]    : 1945  (76 y.o.) Gender: female     Discharge Recommendations:  Further Occupational Therapy is recommended upon facility discharge. Equipment Needed:  (Future DME TBD: BSC during inhouse stay)    Referring Practitioner: Indiana Marte MD  Diagnosis: Physical debility  General  Chart Reviewed: Yes  Patient assessed for rehabilitation services?: Yes  Additional Pertinent Hx: 67 y.o. Non- / non  female who presents to the ED complaining of fatigue and bilateral leg weakness for the past 5 days. Patient uses an upright walker at home but says her legs are shaking and she has difficulties getting up to go to the bathroom because her legs feel so weak and often go numb. Patient does have chronic back pain, with neuropathy over the past 4-5 years. Recent MRI report that patient brought in shows moderate stenosis in C3-7, severe neural foraminal narrowing C4-7, 7 mm anterolisthesis in L4-5. Patient will be seeing a surgeon at another facility once she \"regains her strength\". Family / Caregiver Present: No  Referring Practitioner: Indiana Marte MD  Diagnosis: Physical debility    Restrictions  Restrictions/Precautions: Fall Risk  Other position/activity restrictions: Up as tolerated, pt reports h/o fibromyalgia with generalized pain with increased activity  Required Braces or Orthoses?: No      Subjective  Subjective: Pt reports weak bladder prior to admission. Writer educated on potential effects of purewick/encouraged BSC and bladder control. Comments: Pt alert, engaged. Able to transfer to EOB with SBA x1, complete sit to stands with CGA/SBA x1, and tolerated ambulation 5-10ft x2 today. She does indicate at one point she is unsure if she can tolerate an acute rehab unit.   Patient Currently in Pain: Yes  Pain Level: 7  Pain Location: Generalized  Overall Orientation Status: Within Functional Limits     Pain Assessment  Pain Assessment: 0-10  Pain Level: 7  Pain Type: Chronic pain  Pain Location: Generalized  Pain Descriptors: Constant  Pain Frequency: Continuous  Clinical Progression: Not changed  Response to Pain Intervention: Patient Satisfied    Objective  Cognition  Overall Cognitive Status: WFL  Bed mobility  Rolling to Right: Stand by assistance  Supine to Sit: Stand by assistance  Scooting: Stand by assistance  Comment: HOB ~15deg; relied on bed rail; educated on distancing bed adaptation for home planning. Balance  Sitting Balance: Stand by assistance ( no LOB)  Standing Balance: Contact guard assistance (c  upright 4WW, 2nd SBA for safety/knee buckling)  Standing Balance  Time: ~3 minutes x3  Activity: functional transfers/mobility/posture focus  Comment: pt states that feels unsteady but no LOB noted; max VC for posture and decreased elbow compensation increase moderate forward lean. Pt demos poor carryover with posture and elbow compensation. Functional Mobility  Functional - Mobility Device: Other  Activity: Other (upright walker)  Assist Level: Contact guard assistance  Functional Mobility Comments: 2nd SBA, LLE buckled x2, able to self correct; VC to not keep hands over brakes/pt guarded with baseline use of handles; encouragement required for willingness to trial bedside commode but receptive post education. Increased dififculty with retrostep requiring increased encouragement, cuing and time. ADL  Feeding: Modified independent   Grooming: Setup; Increased time to complete ( pt washes face, oral care)  UE Bathing: Minimal assistance  LE Bathing: Maximum assistance (TA for buttocks care and below knees)  UE Dressing:  Moderate assistance  LE Dressing: Maximum assistance  Toileting: Maximum assistance (TA for buttocks care and brief mgt)  Additional Comments: Pt receiving brief change upon my entry; reports decreased bladder control and increased use of purewick during stay. Writer recommending initiating use of BSC to increased functional mobility and to resume baseline toileting strategies and encouraged bladder strengthening. Pt appears slightly guarded but agreeable. Janusz Sis removed while pt remained in recliner at her request to imrpove comfort. Would be appropriate to advance to [facility] with Ax1 with nursing using stand pivot and upright walker. Writer informed PCT and nursing of recommendation. Okay with purewick overnight with functional voluntary output stream.     Transfers  Sit to stand: Contact guard assistance  Stand to sit: Minimal assistance  Transfer Comments: VC for LLE positioning to reduce WB through ankle and increase use of gross muscles for pain mgt, once technique applied pt with improved sit to stand tolerance. VC for technique and eccentric control. Improved eccentric control with repetition. Toilet Transfers  Toilet - Technique: Ambulating  Equipment Used: Extra wide bedside commode  Toilet Transfers Comments: review and recommending BSC use                             Assessment  Performance deficits / Impairments: Decreased functional mobility ; Decreased ADL status; Decreased ROM; Decreased strength;Decreased safe awareness;Decreased endurance;Decreased sensation;Decreased balance  Assessment: Pt is experiencing overall weakness, especially in BLEs. Pt reported that her legs have been getting weaker the past three weeks. Prognosis: Good  Discharge Recommendations: Patient would benefit from continued therapy after discharge  Activity Tolerance: Patient limited by fatigue;Patient limited by pain  Safety Devices in place: Yes  Type of devices: All fall risk precautions in place;Call light within reach;Gait belt;Patient at risk for falls; Left in chair;Nurse notified  Equipment Recommendations  Equipment Needed:  (Future DME TBD: BSC during inhouse stay)          Patient Education:   Noted Above  Learner:patient  Method: demonstration and explanation       Outcome: needs reinforcement     Plan  Safety Devices  Safety Devices in place: Yes  Type of devices:  All fall risk precautions in place, Call light within reach, Gait belt, Patient at risk for falls, Left in chair, Nurse notified  Plan  Times per week: 3-4  Current Treatment Recommendations: Strengthening, ROM, Balance Training, Functional Mobility Training, Endurance Training, Pain Management, Safety Education & Training, Patient/Caregiver Education & Training, Equipment Evaluation, Education, & procurement, Self-Care / ADL      Goals  Short term goals  Time Frame for Short term goals: By discharge  Short term goal 1: Patient will perform BADLs with minimal assistance and good safety, using adaptive equipment as needed  Short term goal 2: Patient will perform transfers/functional mobility, with least restrictive device, with minimal assist and good safety  Short term goal 3: Patient will verbalize at least three nonpharmaceutical pain management strategies to promote participation in self care  Short term goal 4: Patient will actively participate in 15+ minutes of therapeutic exercise/funcitonal activity to promote independence with self care and mobility    OT Individual Minutes  Time In: 1040  Time Out: [address]  Minutes: 42      Electronically signed by MARTA Culp on 9/28/21 at 3:47 PM EDT

## 2021-09-28 NOTE — DISCHARGE SUMMARY
[facility]    Discharge Summary     Patient ID: Antoni Romero  :     MRN: [id number]     ACCOUNT:  [de-identified]   Patient's PCP: Lex Cowden, MD  Admit Date: 2021   Discharge Date: 2021     Length of Stay: 5  Code Status:  Full Code  Admitting Physician: Lex Cowden, MD  Discharge Physician: Skyler Morocho MD     Active Discharge Diagnoses:       Primary Problem  Physical debility      Hospital Problems  Active Hospital Problems    Diagnosis Date Noted    Sleep apnea [G47.30]      Priority: Medium    Weakness of left lower extremity [R29.898] 2021    UTI (urinary tract infection) [N39.0] 2021    Physical debility [R53.81] 2021    COPD (chronic obstructive pulmonary disease) ([facility] [J44.9] 2021    Morbid [facility] with [facility] 45.0-49.9[facility] [E66.01, Z68.42] 2016    Fibromyalgia [M79.7] 2014    Spinal stenosis of lumbar region with neurogenic claudication [M48.062] 2013       Admission Condition:  poor     Discharged Condition: good    Hospital Stay:       Hospital Course:      Patient is a 71-year-old female with history of HTN, HLD, fibromyalgia, GERD who presented complaining of fatigue and bilateral leg weakness for 5 days duration. She had shown concern about falling while ambulating around her home, and presented to the hospital with desire to be placed at [facility] or inpatient rehab. She had a previous MRI of the C-spine which showed multilevel degenerative disc disease and central spinal canal narrowing. During this admission, patient was also noted to have urine culture positive for E. coli, treated with antibiotics for cystitis. Patient was seen by neurology, MRI head showed no intracranial abnormality. Lumbar puncture was unremarkable.   EMG was indeterminate, but included in differential was L4-L5/L5-S1 radiculopathy, possibly secondary to bulging spinal disc. On the day of discharge, patient was amenable to discharge to SNF temporarily for rehabilitation of her lower extremity weakness. Significant therapeutic interventions: EMG, MRI head, antibiotics    Significant Diagnostic Studies:   Labs / Micro:  CBC:   Lab Results   Component Value Date    WBC 4.5 09/28/2021    RBC 3.89 09/28/2021    RBC 4.24 05/26/2012    HGB 10.2 09/28/2021    HCT 32.2 09/28/2021    MCV 82.8 09/28/2021    MCH 26.2 09/28/2021    MCHC 31.6 09/28/2021    RDW 14.4 09/28/2021     09/28/2021     05/26/2012     BMP:    Lab Results   Component Value Date    GLUCOSE 95 09/28/2021    GLUCOSE 105 05/26/2012     09/28/2021    K 4.4 09/28/2021     09/28/2021    CO2 26 09/28/2021    ANIONGAP 8 09/28/2021    BUN 18 09/28/2021    CREATININE 0.84 09/28/2021    BUNCRER NOT REPORTED 09/28/2021    CALCIUM 9.1 09/28/2021    LABGLOM >60 09/28/2021    GFRAA >60 09/28/2021    GFR      09/28/2021    GFR NOT REPORTED 09/28/2021     U/A:    Lab Results   Component Value Date    COLORU YELLOW 09/23/2021    TURBIDITY CLOUDY 09/23/2021    SPECGRAV 1.018 09/23/2021    HGBUR NEGATIVE 09/23/2021    PHUR 6.0 09/23/2021    PROTEINU NEGATIVE 09/23/2021    GLUCOSEU NEGATIVE 09/23/2021    GLUCOSEU NEGATIVE 05/24/2012    KETUA NEGATIVE 09/23/2021    BILIRUBINUR NEGATIVE 09/23/2021    BILIRUBINUR NEGATIVE 05/24/2012    UROBILINOGEN Normal 09/23/2021    NITRU NEGATIVE 09/23/2021    LEUKOCYTESUR SMALL 09/23/2021     Urine culture positive for E. coli greater than 100,000 CFU/mL.     Radiology:  IR LUMBAR PUNCTURE FOR DIAGNOSIS    Result Date: 9/26/2021  EXAMINATION: FLUOROSCOPIC GUIDED LUMBAR PUNCTURE 9/25/2021 8:22 pm HISTORY: ORDERING SYSTEM PROVIDED HISTORY: GBS: bilateral lower extremity weakness; recent flu like illness TECHNOLOGIST PROVIDED HISTORY: GBS: bilateral lower extremity weakness; recent flu like illness FLUOROSCOPY DOSE AND TYPE OR TIME AND EXPOSURES:  centigrays cm squared PROCEDURE: :  Shereen Timmons MD Informed consent was obtained after the risks and benefits of the procedure were discussed with the patient and all questions were answered fully. [place]back protocol was observed and a standard timeout was performed. The patient was positioned prone and the back was prepped and draped in the normal sterile fashion. 1% lidocaine was used for local anesthesia. The subarachnoid space was accessed with a 20-gauge 6 inch \"spinal needle at the L1-L2 level. Free flow of clear CSF was noted. Approximately 6 ml of CSF was removed and sent for analysis. The stylet was reinserted, spinal needle was removed and brief pressure was applied at the puncture site. There were no immediate complications and the patient tolerated the procedure well. Successful fluoroscopic-guided lumbar puncture. MRI BRAIN WO CONTRAST    Result Date: 9/24/2021  EXAMINATION: MRI OF THE BRAIN WITHOUT CONTRAST  9/24/2021 4:43 pm TECHNIQUE: Multiplanar multisequence MRI of the brain was performed without the administration of intravenous contrast. COMPARISON: None. HISTORY: ORDERING SYSTEM PROVIDED HISTORY: left lower extremity weakness FINDINGS: INTRACRANIAL STRUCTURES/VENTRICLES: There is no acute infarct. Mild periventricular and deep white matter T2/FLAIR hyperintensity, most consistent with chronic small vessel ischemic disease. No mass effect or midline shift. No evidence of an acute intracranial hemorrhage. The ventricles and sulci are prominent secondary to mild atrophy. The sellar/suprasellar regions appear unremarkable. The normal signal voids within the major intracranial vessels appear maintained. ORBITS: The visualized portion of the orbits demonstrate no acute abnormality. SINUSES: The visualized paranasal sinuses and mastoid air cells are well aerated. BONES/SOFT TISSUES: The bone marrow signal intensity appears normal. The soft tissues demonstrate no acute abnormality.      No acute intracranial abnormality. Mild chronic small vessel ischemic disease and mild atrophy. Consultations:    Consults:     Final Specialist Recommendations/Findings:   IP CONSULT TO PRIMARY CARE PROVIDER  IP CONSULT TO SOCIAL WORK  IP CONSULT TO NEUROLOGY  IP CONSULT TO PAIN MANAGEMENT  IP CONSULT TO PHYSICAL MEDICINE REHAB      The patient was seen and examined on day of discharge and this discharge summary is in conjunction with any daily progress note from day of discharge. Discharge plan:       Disposition: To a non-[facility]    Physician Follow Up:   No follow-up provider specified. Requiring Further Evaluation/Follow Up POST HOSPITALIZATION/Incidental Findings: None    Diet: regular diet    Activity: As tolerated    Instructions to Patient: Please keep all follow-up appointments and take all medications as directed.     Discharge Medications:      Medication List      CHANGE how you take these medications    metoprolol succinate 50 MG extended release tablet  Commonly known as: TOPROL XL  Take 1 tablet by mouth daily  Start taking on: September 29, 2021  What changed: when to take this        CONTINUE taking these medications    albuterol sulfate  (90 Base) MCG/ACT inhaler  Inhale 2 puffs into the lungs 4 times daily as needed for Wheezing     Aspercreme Lidocaine 4 % Liqd  Generic drug: Lidocaine HCl     aspirin 81 MG EC tablet     docusate sodium 100 MG capsule  Commonly known as: COLACE     donepezil 5 MG tablet  Commonly known as: ARICEPT  take 1 tablet by mouth at bedtime     DULoxetine 30 MG extended release capsule  Commonly known as: CYMBALTA     gabapentin 100 MG capsule  Commonly known as: NEURONTIN     ibuprofen 800 MG tablet  Commonly known as: ADVIL;MOTRIN     levocetirizine 5 MG tablet  Commonly known as: XYZAL  take 1 tablet by mouth once daily AT NIGHT     oxyCODONE-acetaminophen 7.5-325 MG per tablet  Commonly known as: PERCOCET  Take 1 tablet by mouth every 6 hours

## 2021-09-28 NOTE — PROGRESS NOTES
[address] [employer]    PROGRESS NOTE             9/28/2021    7:23 AM    Name:   Patito Pompa  MRN:     [id number]     Acct:      [de-identified]   Room:   2052/2052-01   Day:  1  Admit Date:  9/23/2021 12:00 PM    PCP:  Dorene Fuentes MD  Code Status:  Full Code    Subjective:     C/C:   Chief Complaint   Patient presents with    Fatigue     Interval History Status: not changed. Patient seen and examined at bedside in PCU this morning. EMG results from yesterday showed chronic C5-C6 radiculopathy. Patient's pain management physician Dr. Paulette Tenoriow to see today. Patient expresses desire to discharge to ARU. To be evaluated by PMR. Brief History:     The patient is a 67 y.o. Non- / non  female who presents to the ED complaining of fatigue and bilateral leg weakness for the past 5 days. Patient uses an upright walker at home but says her legs are shaking and she has difficulties getting up to go to the bathroom because her legs feel so weak and often go numb. Patient does have chronic back pain, with neuropathy over the past 4-5 years. Recent MRI report that patient brought in shows moderate stenosis in C3-7, severe neural foraminal narrowing C4-7, 7 mm anterolisthesis in L4-5. Patient will be seeing a surgeon at another facility once she \"regains her strength\". Patient admitted for evaluation of weakness, possible UTI and possible placement in SNF. Review of Systems:     Review of Systems   Constitutional: Negative for chills, fatigue and fever. Eyes: Negative for pain and redness. Respiratory: Negative for cough, shortness of breath and wheezing. Cardiovascular: Negative for chest pain and palpitations. Gastrointestinal: Negative for abdominal distention, abdominal pain, diarrhea, nausea and vomiting. Genitourinary: Negative for difficulty urinating and dysuria. Musculoskeletal: Positive for back pain. vessel ischemic disease and mild atrophy. Physical Examination:        Physical Exam  Constitutional:       Appearance: She is obese. HENT:      Head: Normocephalic. Cardiovascular:      Rate and Rhythm: Normal rate and regular rhythm. Pulses: Normal pulses. Comments: Systolic murmur appreciated  Pulmonary:      Effort: Pulmonary effort is normal.      Breath sounds: Normal breath sounds. Abdominal:      General: Bowel sounds are normal.      Palpations: Abdomen is soft. Skin:     General: Skin is warm. Neurological:      General: No focal deficit present. Mental Status: She is alert and oriented to person, place, and time. Motor: Weakness present. Comments: Weakness of left lower leg.    Decreased strength with dorsiflexion and plantar flexion   Psychiatric:         Mood and Affect: Mood normal.       Assessment:        Primary Problem  Physical debility    Active Hospital Problems    Diagnosis Date Noted    Sleep apnea [G47.30]      Priority: Medium    Weakness of left lower extremity [R29.898] 09/24/2021    UTI (urinary tract infection) [N39.0] 09/23/2021    Physical debility [R53.81] 09/23/2021    COPD (chronic obstructive pulmonary disease) ([facility] [J44.9] 09/23/2021    Morbid obesity with BMI of 45.0-49.9, adult ([facility] [E66.01, Z68.42] 01/28/2016    Fibromyalgia [M79.7] 03/05/2014    Spinal stenosis of lumbar region with neurogenic claudication [M48.062] 05/13/2013       Plan:        Debility 2/2 arthritis, spinal stenosis with neurogenic claudication  - New onset of being unsteady, weak, difficulty with daily tasks   - Under care of pain management for back pain  - Continue home Oxycodone  - Neurology consulted              Lumbar puncture results were unremarkable   EMG showed evidence of chronic left-sided C5-C6 radiculopathy     Acute cystitis without hematuria    - UA positive for leukocytes esterase and bacteria  - Culture positive for e.coli  - Ceftin, day 4     Fibromyalgia   - Continue home duloxetine 30 mg twice daily  - Continue home gabapentin 200 mg 3 times daily     Obstructive sleep apnea  - On 2L of O2 via nasal cannula at night     COPD  - Continue home albuterol      DVT PPx: Lovenox 30 mg twice daily  GI PPx: None  Code: Full  Dispo: PMR to evaluate for placement to [facility]  9/28/2021  7:23 AM     Attending Physician Statement    I have discussed the case of Willian Solano, including pertinent history and exam findings with the resident. I have seen and examined the patient and the key elements of the encounter have been performed by me. I agree with the assessment, plan, and orders as documented by the resident. The patient has extensive degenerative joint disease in her lumbar and cervical spine. She has severe spinal stenosis at multiple levels in her lumbar and cervical regions.   She is a candidate for acute rehab admission and further physical therapy for ambulation  Electronically signed by Shawn Jenkins MD on 9/28/2021 at 1:03 PM

## 2021-09-28 NOTE — PROGRESS NOTES
Physical Therapy  Facility/Department: [facility] MED SURG  Daily Treatment Note  NAME: Darcie Godinez  :   MRN: [id number]    Date of Service: 2021    Discharge Recommendations:  Patient would benefit from continued therapy after discharge   PT Equipment Recommendations  Equipment Needed: No    Assessment   Body structures, Functions, Activity limitations: Decreased functional mobility ; Decreased strength; Increased pain  REQUIRES PT FOLLOW UP: Yes  Activity Tolerance  Activity Tolerance: Patient Tolerated treatment well;Patient limited by pain     Patient Diagnosis(es): The primary encounter diagnosis was Acute cystitis without hematuria. A diagnosis of General weakness was also pertinent to this visit. has a past medical history of Abnormality of rib determined by X-ray, Acute cystitis without hematuria, Acute exacerbation of chronic bronchitis (HCC), Acute on chronic diastolic heart failure ([address] Adjustment disorder with mixed anxiety and depressed mood, Anemia, Back pain, Bronchiectasis with acute lower respiratory infection ([address] Bronchitis, Chronic bronchitis ([address] Chronic cough, Degenerative joint disease (DJD) of hip, Dyslipidemia, Encephalopathy acute, Essential hypertension, Fibromyalgia, GERD (gastroesophageal reflux disease), History of total bilateral knee replacement, Hx of blood clots, Hyperlipidemia, Hypertension, Incontinence of urine, Irregular heartbeat, Medication monitoring encounter, Morbid obesity with BMI of 45.0-49.9, adult ([address] Obstructive sleep apnea syndrome, Osteoarthritis, Primary osteoarthritis of left knee, PVC (premature ventricular contraction), S/P right and left heart catheterization, Sleep apnea, SOB (shortness of breath), Spinal stenosis of lumbar region with neurogenic claudication, Supplemental oxygen dependent, Urine frequency, and UTI (urinary tract infection). has a past surgical history that includes Hysterectomy; Nerve Block (2013);  Nerve Block (5/28/13); sinus surgery; Foot surgery (Left); Nerve Block (2/14/14); Colonoscopy (4 28 14); Nerve Block (07/14/2014); Nerve Block (7-21-14); Nerve Block (10/2/14); Nerve Block (11/9/2015); Nerve Block (11/16/15); Nerve Block (11/23/15); Total hip arthroplasty (Right, 5/14/15); bronchoscopy (Left, 8/16/2017); Cardiac catheterization (x 3); Nerve Block (03/28/2018); Nerve Block (05/23/2018); Nerve Block (08/28/2018); pr perq vert agmntj cavity crtj uni/bi cannulj lmbr (N/A, 10/29/2018); and joint replacement (Bilateral, 5/27/2016). Restrictions  Restrictions/Precautions  Restrictions/Precautions: Fall Risk  Required Braces or Orthoses?: No  Position Activity Restriction  Other position/activity restrictions: Up as tolerated, pt reports h/o fibromyalgia with generalized pain with increased activity  Subjective   Subjective  Subjective: Pt in bed upon arrival, pt agreeable to therapy. General Comment  Comments: Pt continued to be very pleasant and cooperative.    Pain Assessment  Pain Assessment: 0-10  Pain Level: 7  Pain Type: Chronic pain  Pain Location: Generalized  Non-Pharmaceutical Pain Intervention(s): Ambulation/Increased Activity;Relaxation techniques  Response to Pain Intervention: Patient Satisfied  Oxygen Therapy  SpO2: 96 %  Pulse Oximeter Device Mode: Intermittent  Pulse Oximeter Device Location: Finger  O2 Device: None (Room air)       Orientation  Orientation  Overall Orientation Status: Within Normal Limits  Cognition      Objective   Bed mobility  Rolling to Right: Stand by assistance  Supine to Sit: Stand by assistance  Sit to Supine: Unable to assess (Pt remained in recliner following therapy.)  Scooting: Stand by assistance (To EOB)  Transfers  Sit to Stand: Contact guard assistance;2 Person Assistance  Stand to sit: Contact guard assistance;2 Person Assistance  Bed to Chair: Contact guard assistance;2 Person Assistance  Comment: With patient's up Wheeled walker from home  Ambulation  Ambulation?: Yes  Ambulation 1  Surface: level tile  Device:  (up walker )  Assistance: Contact guard assistance;2 Person assistance  Quality of Gait: Slow, contant cues to stand tall  Gait Deviations: Slow Oralia;Decreased step length;Decreased step height  Distance: 6 steps fwd/bckwd, 15' x 2 (1 seated and 1 standing restbreak)  Comments: no LOB  Stairs/Curb  Stairs?: No     Balance  Posture: Fair  Sitting - Static: Fair;+ (w/SBA)  Sitting - Dynamic: Fair (w/SBA)  Comments: Standing with pt's Up walker from home  Other exercises  Other exercises?: Yes  Other exercises 1: bed mobility , Rolling (Assist with depend change)  Other exercises 2: Glut/Quadsets 10 hold 5-10 seconds and ankle pumps x 10 supine  Other exercises 3: AROM both L/E seated x 10  Other exercises 5: Sit<>Stand x 3 (cues for hand placement)         Comment: Pt educated on the importance of doing exercises and getting up and moving.               G-Code     OutComes Score                                                     AM-PAC Score             Goals  Short term goals  Time Frame for Short term goals: 3-5 days  Short term goal 1: bed mobility with min/CGA  Short term goal 2: standing with RW and Olya x1-2  Short term goal 3: transfer to chair/commode with RW and Olya x1-2  Short term goal 4: ambulate with RW and Olya x 1-2 for 10-20ft    Plan    Plan  Times per week: 7x/wk  Current Treatment Recommendations: Strengthening, Balance Training, Endurance Training, Functional Mobility Training, Gait Training, Transfer Training, Safety Education & Training  Safety Devices  Type of devices: Call light within reach, Left in chair, Patient at risk for falls, Nurse notified, Gait belt     Therapy Time   Individual Concurrent Group Co-treatment   Time In 1019         Time Out 1122         Minutes [address] [facility] [place], [place]   Electronically signed by Jacobo Fofana PTA on 9/28/2021 at 6:13 PM

## 2021-09-28 NOTE — PROGRESS NOTES
Consult completed via perfect serve to Dr. Jay Letters regarding ARU evaluation 9/28/2021 8:31  Dr. Jay Letters will see patient today.  8:43

## 2021-09-28 NOTE — PROGRESS NOTES
[id number] [facility] Neurology   IN-PATIENT SERVICE      NEUROLOGY PROGRESS  NOTE                Interval History:     No current complaints. Doing well with therapies today. Awaiting placement. EMG showed chronic radiculopathy, possible axonal neuropathy with no evidence of acute inflammatory demyelinating polyneuropathy or Guillain-Barré syndrome. History of Present Illness: The patient is a 67 y.o. female who presents with Fatigue  . The patient was seen and examined and the chart was reviewed. Patient was admitted on 9/23 with generalized weakness, acute cystitis. Complaint of bilateral lower extremity weakness with neurology consultation. Patient typically ambulates with walker because of baseline weakness in lower extremities, with prior history of spinal stenosis and back pain. She had been complaining of extreme pain in the bilateral lower extremities. Patient has history of fibromyalgia and neuropathy. Patient underwent lumbar puncture over the weekend with findings of CSF RBC 2, WBC 0, protein 37. No evidence of albumin no cytologic dissociation. There had been potential concern for GBS. Apparently patient has been complaining of not being able to move extremities but has been witnessed to be moving them at times. 9/27:   CSF results are largely unremarkable. Not consistent with albumino-cytologic dissociation. Patient continues to complain of lower extremity weakness but also complaining of significant pain in the lower extremities. She is on gabapentin 200 mg every morning, 100 mg every afternoon which is a fairly low dose for her. She will be undergoing EMG/NCS this afternoon by Dr. Olga Lemos. She states she sees pain management and is requesting to be seen while here in the hospital.  She also states she has been recommended to have an epidural injection for her chronic pain and lower extremity weakness.   She does state at baseline she ambulates with a walker and has lower extremity weakness. Physical Exam:   BP (!) 145/64   Pulse (!) 49   Temp 98.1 °F (36.7 °C) (Oral)   Resp 17   Ht 5' 4\" (1.626 m)   Wt 297 lb 9.9 oz (135 kg)   SpO2 95%   BMI 51.09 kg/m²   Temp (24hrs), Av °F (36.7 °C), Min:97.7 °F (36.5 °C), Max:98.4 °F (36.9 °C)      Neurological examination:    Mental status    Alert and oriented x 3; following all commands; speech is fluent, no dysarthria, aphasia.       Cranial nerves    II - visual fields intact to confrontation; pupils reactive  III, IV, VI - extraocular muscles intact; no ASHLEIGH; no nystagmus; no ptosis   V - normal facial sensation                                                               VII - normal facial symmetry                                                             VIII - intact hearing                                                                             IX, X - symmetrical palate elevation                                               XI - symmetrical shoulder shrug                                                       XII - midline tongue without atrophy or fasciculation      Motor function  Strength: 5/5 RUE, 4/5 RLE, 5/5 LUE, 4/5  LLE. Overall poor effort with strength testing secondary to pain limitation. Normal bulk and tone. No tremors                       Sensory function  decreased distally to proximally to touch, pin, vibration, proprioception throughout      Cerebellar Intact finger-nose-finger testing.      Reflex function Hyporeflexic throughout, difficult to assess secondary to patient's pain complaint. Downgoing plantar response bilaterally.  (-)[place]'s sign bilaterally    Gait                   unable to assess secondary to pain                Diagnostics:      Laboratory Testing:  CBC: Recent Labs     21  0853 21  0624 21  0638   WBC 4.4 4.4 4.5   HGB 10.5* 10.2* 10.2*    278 288     BMP:    Recent Labs     21  0853 21  0624 21  0638    141 140   K 3.9 4.0 4.4   CL 107 106 106   CO2 24 25 26   BUN 18 20 18   CREATININE 0.96* 0.96* 0.84   GLUCOSE 146* 99 95         Lab Results   Component Value Date    CHOL 254 (H) 04/15/2019    LDLCHOLESTEROL 186 (H) 04/15/2019    HDL 47 04/15/2019    TRIG 104 04/15/2019    ALT 14 09/23/2021    AST 17 09/23/2021    TSH 2.61 04/15/2019    INR 1.0 09/30/2020    LABA1C 5.4 04/15/2019    ABQAHWQV25 801 12/17/2019         Impression:      Bilateral lower extremity weakness, paresthesias and pain. No evidence of albumin no cytologic dissociation on CSF. There is low suspicion that this is a AIDP/GBS clinical picture. She does have history of underlying peripheral neuropathy in addition to fibromyalgia with baseline lower extremity weakness which is contributing to her symptoms. History of lumbar spine stenosis with chronic back pain  History of asbestosis  Fibromyalgia    Plan:     Continue gabapentin 200 mg 3 times daily  Continue PT OT  Pain management has been consulted, will perform facet block injection  Follow-up as outpatient in 6 weeks  No objection to discharge at this time from a neurologic standpoint  We will sign off, please do not hesitate to contact with any further questions or concerns.       Electronically signed by Renata Maier DO on 9/28/2021 at 1:48 PM      [facility]  Neurology

## 2021-09-28 NOTE — PLAN OF CARE
Problem: Falls - Risk of:  Goal: Will remain free from falls  Description: Will remain free from falls  9/28/2021 1515 by Chaparrita Galvez RN  Outcome: Completed  9/28/2021 0245 by Mily Still RN  Outcome: Ongoing  Goal: Absence of physical injury  Description: Absence of physical injury  9/28/2021 1515 by Chaparrita Galvez RN  Outcome: Completed  9/28/2021 0245 by Mily Still RN  Outcome: Ongoing     Problem: Skin Integrity:  Goal: Will show no infection signs and symptoms  Description: Will show no infection signs and symptoms  9/28/2021 1515 by Chaparrita Galvez RN  Outcome: Completed  9/28/2021 0245 by Mily Still RN  Outcome: Ongoing  Goal: Absence of new skin breakdown  Description: Absence of new skin breakdown  9/28/2021 1515 by Chaparrita Galvez RN  Outcome: Completed  9/28/2021 0245 by Mily Still RN  Outcome: Ongoing     Problem: Pain:  Goal: Pain level will decrease  Description: Pain level will decrease  9/28/2021 1515 by Chaparrita Galvez RN  Outcome: Completed  9/28/2021 0245 by Mily Still RN  Outcome: Ongoing  Goal: Control of acute pain  Description: Control of acute pain  9/28/2021 1515 by Chaparrita Galvez RN  Outcome: Completed  9/28/2021 0245 by Mily Still RN  Outcome: Ongoing  Goal: Control of chronic pain  Description: Control of chronic pain  9/28/2021 1515 by Chaparrita Galvez RN  Outcome: Completed  9/28/2021 0245 by Mily Still RN  Outcome: Ongoing

## 2021-09-28 NOTE — CONSULTS
Pain Management Inpatient Consultation  Note      Patient: Kvng Teran    Location: 8962/3385-99    CHIEF COMPLAINT:  Back pain - requesting intervention    HISTORY OFPRESENT ILLNESS:    The patient is a 67 y.o. female who is referred to pain clinic in consultation for back pain by internal medicine team. She was admitted for the management of fatigue and bilateral leg weakness following a cold, per the patient. She states that she was able to ambulate well with her walker prior to this illness, but once she got sick, she \"laid around\" all day for 4 days. Following this, she noticed significant weakness in both legs and was not able to ambulate the way she did prior to this illness. She states she has multiple areas of pain including hands, feet, neck, and legs, but at this time would like to address her low back pain. She believes it may be contributing to her leg weakness. She states her pain is located in the lumbar region and goes across her back on both sides to her hips. Additionally, she has pain in the sacro-iliac region. It is equal on both sides, though she states sometimes it is worse on one side and switches back and forth. The pain does not radiate down to her legs. She has had procedures done with Dr. Veronica Esquivel in the past. Most recently, she underwent lumbar epidural (L5-S1) in March of this year from which she had reported mild relief. Recently, she was seen in a spine clinic in [place] where she was told she should have facet joint injections versus  medial branch block performed. She is requesting that Dr. Veronica Esquivel do this procedure as she has been going to his clinic for pain management for some time.        Past Medical History:        Diagnosis Date    Abnormality of rib determined by X-ray 1/28/2016    Acute cystitis without hematuria 6/16/2018    Acute exacerbation of chronic bronchitis (HCC)     Acute on chronic diastolic heart failure ([facility] 1/28/2016    Adjustment disorder with mixed anxiety and depressed mood 6/26/2015    Mild     Anemia 3/5/2014    Back pain     Bronchiectasis with acute lower respiratory infection ([facility] [address] 8/16/2017    Bronchitis     Chronic bronchitis (HCC) 1/28/2016    Chronic cough 7/23/2018    Degenerative joint disease (DJD) of hip 5/14/2015    Dyslipidemia 3/5/2014    Encephalopathy acute 5/29/2016    Essential hypertension 1/28/2016    Fibromyalgia     GERD (gastroesophageal reflux disease)     History of total bilateral knee replacement 5/28/2016    Hx of blood clots     left leg and lung    Hyperlipidemia     Hypertension     Incontinence of urine     Irregular heartbeat     DR. Ene Reddy Medication monitoring encounter 6/13/2018    Morbid obesity with BMI of 45.0-49.9, adult ([facility] [address] 1/28/2016    Obstructive sleep apnea syndrome     Osteoarthritis     Primary osteoarthritis of left knee 5/27/2016    PVC (premature ventricular contraction) 1/28/2016    S/P right and left heart catheterization 10/2/2015    Sleep apnea     no machine    SOB (shortness of breath)     Spinal stenosis of lumbar region with neurogenic claudication 5/13/2013    Supplemental oxygen dependent 12/8/2017    Urine frequency     UTI (urinary tract infection)     hospitalized early july 2014 for kidney infection       Past Surgical History:        Procedure Laterality Date    BRONCHOSCOPY Left 8/16/2017    BRONCHOSCOPY ALVEOLAR LAVAGE LOWER LOBE performed by Sohan Beasley MD at [address]  x 3    no stents    COLONOSCOPY  4 28 14    polyps biopsies     FOOT SURGERY Left     calcium deposit removal from top of foot    HYSTERECTOMY      JOINT REPLACEMENT Bilateral 5/27/2016    bilat total knees    NERVE BLOCK  5/13/2013    caudal celestone 6mg    NERVE BLOCK  5/28/13    Caudal #2, Celestone 9mg    NERVE BLOCK  2/14/14    rt hip inj celestone 9 mg    NERVE BLOCK  07/14/2014    caudal# 3- celestone 9 mg    NERVE BLOCK  7-21-14 caudal epidural #2, decadron 7mg, fentanyl 25mcg    NERVE BLOCK  10/2/14    duramorph 1.5  decadron 5mg    NERVE BLOCK  11/9/2015    caudal# 1 celestone 9mg    NERVE BLOCK  11/16/15    caudal #2  decadron 10mg    NERVE BLOCK  11/23/15    duramorph 1mg celestone 9mg    NERVE BLOCK  03/28/2018    CAUDAL EPIDURAL STEROID BLOCK  DECADRON 10 MG     NERVE BLOCK  05/23/2018    caudal # decadron 7mg    NERVE BLOCK  08/28/2018    natalia transforminal # 1, decadron 10mg gadoteridol, LONG NEEDLES    CA PERQ VERT AGMNTJ CAVITY CRTJ UNI/BI CANNULJ LMBR N/A 10/29/2018    KYPHOPLASTY L1 performed by Rosalinda Cueva MD at [address] Right 5/14/15    total hip replacement       Home Medications:   Prior to Admission medications    Medication Sig Start Date End Date Taking? Authorizing Provider   metoprolol succinate (TOPROL XL) 50 MG extended release tablet Take 50 mg by mouth 2 times daily   Yes Historical Provider, MD   DULoxetine (CYMBALTA) 30 MG extended release capsule Take 30 mg by mouth 2 times daily   Yes Historical Provider, MD   gabapentin (NEURONTIN) 100 MG capsule Take 100 mg by mouth 3 times daily. Yes Historical Provider, MD   Cyanocobalamin (VITAMIN B-12) 1000 MCG/15ML LIQD Take 1,000 mcg by mouth daily    Yes Historical Provider, MD   oxyCODONE-acetaminophen (PERCOCET) 7.5-325 MG per tablet Take 1 tablet by mouth every 6 hours as needed for Pain for up to 30 days.  9/13/21 10/13/21 Yes KARIN Hidalgo - CNP   levocetirizine (XYZAL) 5 MG tablet take 1 tablet by mouth once daily AT NIGHT 9/8/21  Yes Trent Person MD   donepezil (ARICEPT) 5 MG tablet take 1 tablet by mouth at bedtime 9/1/21  Yes Trent Person MD   Catheters (STANDARD CARE EXTERNAL CATH) MISC 1 each by Does not apply route daily 3/2/21  Yes Denilson Choi MD   Lidocaine HCl (ASPERCREME LIDOCAINE) 4 % LIQD Apply topically   Yes Historical Provider, MD   Catheters (STANDARD CARE EXTERNAL CATH) Expenses: Not hard at all   Food Insecurity: No Food Insecurity    Worried About Running Out of Food in the Last Year: Never true    Bobby of Food in the Last Year: Never true   Transportation Needs: Unmet Transportation Needs    Lack of Transportation (Medical): Yes    Lack of Transportation (Non-Medical): Yes   Physical Activity: Unknown    Days of Exercise per Week: 0 days    Minutes of Exercise per Session: Not on file   Stress: No Stress Concern Present    Feeling of Stress : Only a little   Social Connections: Unknown    Frequency of Communication with Friends and Family: Twice a week    Frequency of Social Gatherings with Friends and Family: Twice a week    Attends Taoism Services: Not on file    Active Member of Clubs or Organizations: Not on file    Attends Club or Organization Meetings: Not on file    Marital Status:    Intimate Partner Violence:     Fear of Current or Ex-Partner:     Emotionally Abused:     Physically Abused:     Sexually Abused:        Family History:       Problem Relation Age of Onset    Stomach Cancer Brother         stomach    High Blood Pressure Mother     Heart Disease Sister         irregular heartbeat       REVIEW OF SYSTEMS:  Review of Systems   Constitutional: Positive for activity change and fatigue. Negative for appetite change and unexpected weight change. HENT: Negative for congestion, hearing loss and sore throat. Eyes: Negative for pain and visual disturbance. Respiratory: Negative for cough and shortness of breath. Cardiovascular: Negative for chest pain and palpitations. Gastrointestinal: Negative for abdominal pain, constipation, nausea and vomiting. Endocrine: Negative for cold intolerance and polyuria. Genitourinary: Negative for dysuria and hematuria. Musculoskeletal: Positive for arthralgias, back pain and neck pain. Skin: Negative for rash. Neurological: Positive for weakness and numbness.    Hematological: History of DVT   Psychiatric/Behavioral: Negative for self-injury, sleep disturbance and suicidal ideas. The patient is not nervous/anxious. PHYSICALEXAM:  Vital Signs:  BP (!) 153/53   Pulse 97   Temp 97.7 °F (36.5 °C) (Oral)   Resp 18   Ht 5' 4\" (1.626 m)   Wt 297 lb 9.9 oz (135 kg)   SpO2 99%   BMI 51.09 kg/m²     Physical Exam  Constitutional:       General: She is not in acute distress. Appearance: Normal appearance. She is obese. Comments: Sitting on bedside commode   HENT:      Head: Normocephalic and atraumatic. Eyes:      Extraocular Movements: Extraocular movements intact. Conjunctiva/sclera: Conjunctivae normal.   Cardiovascular:      Rate and Rhythm: Normal rate. Pulses: Normal pulses. Pulmonary:      Effort: Pulmonary effort is normal. No respiratory distress. Abdominal:      General: There is no distension. Tenderness: There is no abdominal tenderness. Musculoskeletal:      Cervical back: No rigidity. Right lower leg: No edema. Left lower leg: No edema. Lymphadenopathy:      Cervical: No cervical adenopathy. Skin:     Findings: No rash. Neurological:      Mental Status: She is alert and oriented to person, place, and time. Cranial Nerves: Cranial nerves are intact. No cranial nerve deficit. Sensory: Sensory deficit present. Deep Tendon Reflexes:      Reflex Scores:       Achilles reflexes are 0 on the right side. Comments: Patient is overall deconditioned   Psychiatric:         Mood and Affect: Mood normal.         Speech: Speech normal.         Behavior: Behavior normal.         Thought Content: Thought content normal.         Judgment: Judgment normal.       Right Ankle Exam     Muscle Strength   The patient has normal right ankle strength. Left Ankle Exam     Muscle Strength   The patient has normal left ankle strength.       Right Knee Exam     Muscle Strength   The patient has normal right knee strength. Left Knee Exam     Muscle Strength   The patient has normal left knee strength. Right Hip Exam     Muscle Strength   The patient has normal right hip strength. Flexion: 5/5       Left Hip Exam     Muscle Strength   The patient has normal left hip strength. Flexion: 4/5       Back Exam     Tenderness   The patient is experiencing tenderness in the sacroiliac and lumbar. Range of Motion   Back extension: Limited and painful. Back flexion: Limited and painful. Back lateral bend right: Limited and painful. Back lateral bend left: Limited and painful. Back rotation right: Limited and painful. Back rotation left: Limited and painful.      Muscle Strength   Right Quadriceps:  5/5   Left Quadriceps:  5/5   Right Hamstrings:  5/5   Left Hamstrings:  5/5     Other   Sensation: normal  Gait: antalgic     Comments:  Unable to perform complete MSK exam as patient was on bedside commode during visit     Sensory exam:   Decreased sensation in lateral left thigh (L4 dermatome region), right L3 region of lower leg, and right S1 region of lower leg             DATA:  CBC with Differential:    Lab Results   Component Value Date    WBC 4.5 09/28/2021    RBC 3.89 09/28/2021    RBC 4.24 05/26/2012    HGB 10.2 09/28/2021    HCT 32.2 09/28/2021     09/28/2021     05/26/2012    MCV 82.8 09/28/2021    MCH 26.2 09/28/2021    MCHC 31.6 09/28/2021    RDW 14.4 09/28/2021    LYMPHOPCT 63 09/28/2021    MONOPCT 4 09/28/2021    BASOPCT 0 09/28/2021    MONOSABS 0.18 09/28/2021    LYMPHSABS 2.83 09/28/2021    EOSABS 0.14 09/28/2021    BASOSABS 0.00 09/28/2021    DIFFTYPE NOT REPORTED 09/28/2021     BMP:    Lab Results   Component Value Date     09/28/2021    K 4.4 09/28/2021     09/28/2021    CO2 26 09/28/2021    BUN 18 09/28/2021    LABALBU 3.7 09/25/2021    CREATININE 0.84 09/28/2021    CALCIUM 9.1 09/28/2021    GFRAA >60 09/28/2021    LABGLOM >60 09/28/2021    GLUCOSE 95 09/28/2021 GLUCOSE 105 05/26/2012     EXAMINATION:   STONE PROTOCOL CT OF THE ABDOMEN AND PELVIS       9/29/2020 8:14 pm; 9/29/2020 8:11 pm       TECHNIQUE:   CT of the left hip was performed without the administration of intravenous   contrast.  Multiplanar reformatted images are provided for review. Dose   modulation, iterative reconstruction, and/or weight based adjustment of the   mA/kV was utilized to reduce the radiation dose to as low as reasonably   achievable.; CT of the abdomen and pelvis was performed without the   administration of intravenous contrast. Multiplanar reformatted images are   provided for review. Dose modulation, iterative reconstruction, and/or weight   based adjustment of the mA/kV was utilized to reduce the radiation dose to as   low as reasonably achievable.       COMPARISON:   Left hip radiograph September 28, 2018; pelvis and left hip radiograph   January 1, 2020       HISTORY:   ORDERING SYSTEM PROVIDED HISTORY: fall pain   TECHNOLOGIST PROVIDED HISTORY:   fall pain   Reason for Exam: pt states she fell 10 days ago; abd pain and left hip pain   Acuity: Acute   Type of Exam: Initial; ORDERING SYSTEM PROVIDED HISTORY: abdominal pain, fall   TECHNOLOGIST PROVIDED HISTORY:   abdominal pain, fall       Reason for Exam: pt states she fell 10 days ago; abd pain and left hip pain   Acuity: Acute   Type of Exam: Initial   Relevant Medical/Surgical History: surg - hyst, right hip replacement,   kyphoplasty       FINDINGS:   CT RENAL STONE PROTOCOL:       LOWER CHEST:  Visualized portion of the lower chest demonstrates no acute   abnormality.  Calcifications of the partially visualized aortic and mitral   valves.  Chronic appearing scarring versus fibrotic change at the lung bases. Severe atherosclerotic changes of the imaged descending thoracic aorta.       KIDNEYS AND URINARY TRACT: No renal calculi are identified.  There is no   evidence for hydronephrosis.  The ureters are of normal course and caliber.       ORGANS: Lack of intravenous contrast limits evaluation of the solid organs   and bowel.  The solid organs are grossly unremarkable.       GI/BOWEL: No bowel obstruction.  No evidence of acute appendicitis.       PELVIS: The bladder and pelvic organs are unremarkable.       PERITONEUM/RETROPERITONEUM: Severe atherosclerotic changes of the imaged   abdominal aorta.  No retroperitoneal adenopathy identified.  No free fluid or   free air identified within the abdomen and pelvis.       BONES/SOFT TISSUES: No acute osseous or soft tissue abnormality identified. Postoperative changes of right total hip arthroplasty.  Multilevel   degenerative changes of the spine which are moderate to severe.  Note is made   of prior kyphoplasty at the L1 level.  Grade 1 anterolisthesis of L4 on L5   which appears degenerative.  Severe degenerative change of the left hip as   described below.  Degenerative changes of the pubic symphysis and bilateral   sacroiliac joints.  No acute soft tissue abnormality identified.  Note is   made of a small fat attenuating lesion along the distal iliopsoas on the left   consistent with small lipoma.       CT LEFT HIP:       Bones: No acute fracture or dislocation of the left hip identified.  No   suspicious lytic or sclerotic osseous lesions.       Soft Tissue:  No acute soft tissue abnormality at the left hip.  Small   well-circumscribed fat attenuating lesion at the distal left iliopsoas   consistent with small lipoma.       Joint:  Severe osteoarthritis of the left hip with bone on bone articulation,   subchondral sclerosis, and prominent subchondral cystic change.  Marginal   osteophytes also present.  No significant joint effusion identified.           Impression   1. No acute intra-process identified. 2. Severe atherosclerotic disease. 3. No acute fracture identified.  Specifically, no acute fracture of the left   hip identified. 4. Severe osteoarthritis of the left hip. IMPRESSION & RECOMMENDATIONS  Patient to be scheduled for lumbar facet joint injection this week. Electronically signed by Dav Carmen MD on 9/28/2021 at 12:36 PM      NAME:  Kvng Teran  MRN: [id number]   YOB: 1945   Date: 9/29/2021   Age: 67 y.o. Gender: female       Kvng Teran is 67 y.o. ,female, referred because of Fatigue        I Performed a history and physical examination of the patient and discussed management with the resident/ Physician Assistant/ Nurse Practitioner. I reviewed the Resident/ Physician Assistant/ Nurse Practitioner note and agree with the documented findings and plan of care. Any areas of disagreement are noted on the chart. I was present for any key portions of any procedure. I have documented in the chart those procedures where I was not present during key Portions. I agree with the chief complaint, past medical history, past surgical history, Social & family history, Allergies, medications as documented unless noted below. I have personally evaluated this patient and have completed at least one if not all key elements of the (History, physical exam and plan of care). Additional findings are added to the note above    Diagnosis:   1. Lumbar spondylosis    2. Acute cystitis without hematuria    3. General weakness    4. DDD (degenerative disc disease), lumbar    5. Arthropathy of lumbar facet joint    6. Lumbar adjacent segment disease with spondylolisthesis    7. Primary osteoarthritis of left hip    8. S/P kyphoplasty    9. Morbid obesity ([address]      Plan of Care: The following treatment plan was developed after discussion with patient:    Patient  has axial or localized     lumbar facet pain at  bilateral L1, L1-L2, L2-L3, L3-L4, L4-L5 and L5-S1 that is worse with hyperextension of the spine relieved by forward flexion. Palpation showed tenderness over the lumbar  facet joints which also correlate well with patients Imaging. Patient failed all conservative treatment plans which included NSAIDS, activity modifications,home exercises, over the counter remedies, ice, heat and Physical / Chiropractic therapies. Patient's symptoms are gradually worsening with current treatment, interfering with sleep and activities of daily living. We discussed Bilateral  lumbar  facet joint injections at levels  and re-evaluate symptoms in two weeks at an office visit. We will do 1 side at a time depending on the most painful side first and then the other side if needed. Patient agreed to the procedure which will be scheduled as soon as possible. All questions satisfactorily answered by me with the use of a spine model. Decision Making Process : Patient's health history and referral records thoroughly reviewed before focused physical examination and discussion with patient. Over 50% of today's visit is spent on examining the patient and counseling. Level of complexity of date to be reviewed is Moderate. The chart date reviewed include the following: Imaging Reports. Summary of Care. Time spent reviewing with patient the below reports:   Medication safety, Treatment options. Level of diagnosis and management options of this case is multiple: involving the following management options: Interventions as needed, medication management as appropriate, future visits, activity modification, heat/ice as needed, Urine drug screen as required. [x]The patient's questions were answered to the best of my abilities.     Thank you very much for asking me to see the patient and will follow    Electronically signed by Marisela Law MD on 9/29/2021 at 6:50 AM

## 2021-09-28 NOTE — CONSULTS
Physical Medicine & Rehabilitation  Consult Note      Admitting Physician: iRo Sullivan MD    Primary Care Provider: Raciel Bonilla MD     Reason for Consult:  Acute Inpatient Rehabilitation    Chief Complaint: Fatigue difficulty ambulating    History of Present Illness:  Referring Provider is requesting an evaluation for appropriate placement upon discharge from acute care. Ms. Rita Bronson is a 67 y.o.  female who was admitted to [facility] on 9/23/2021 with Fatigue    /9year-old female admitted with fatigue and bilateral lower extremity weakness for 5 days. She uses a walker at home but says her legs are getting shaky and difficulty getting to the bathroom. She has chronic back pain with neuropathy the past for 5 years. Recent MRI showed the patient has moderate stenosis C3-7 and several neural foraminal narrowing C4-7 as well as anterolisthesis at L4-5. Patient being seen by surgeon at another facility admitted with weakness, UTI. Internal medicine-debility secondary spinal stenosis with neurogenic claudication, UTI complete Ceftin, has 5 myalgia on duloxetine and gabapentin. On O2 for obstructive sleep apnea. Neurology-had a lumbar puncture no evidence of albumin no cytogenic dissociation,-there is concern of possible GBS as patient complaining of not being able to move extremities but witnessed to move them at times. Had a low suspicion for GBS, Neurontin increased. Seen by pain management    Pain management-history of kyphoplasty in 2018, plan for possible facet injection later this week    IR LUMBAR PUNCTURE FOR DIAGNOSIS    Result Date: 9/26/2021  Successful fluoroscopic-guided lumbar puncture. MRI BRAIN WO CONTRAST    Result Date: 9/24/2021  No acute intracranial abnormality. Mild chronic small vessel ischemic disease and mild atrophy.      MRI cervical spine (without) (9/7/2021) done at [facility]: Severe degenerative changes in cervical spine leading to moderate spinal canal stenosis and severe bilateral neural foraminal narrowing at C4-5 and C5-C6-C7. Moderate spinal canal stenosis at C3 -C4. Moderate left neuroforaminal narrowing at C2-C3. Severe rotator cuff arthropathy or bilateral shoulder joints with history. Migration of humeral heads and loss of subacromial spaces bilaterally.     MRI lumbar spine (without) 9/7/2021, done at [facility]: Severe multilevel degenerative changes of the lumbar spine; mass-effect noted on exiting L4 nerve roots and L5 nerve roots at L4-L5 and L5-S1 respectively; 7 mm anterolisthesis of L4 on L5; remote compression deformity with likely kyphoplasty material at L1 vertebral body. EMG 9/27: Possible bilateral L4-S1 chronic radiculopathy though limited testing, left C5-6 radiculopathy, severe carpal tunnel syndrome left upper extremity    Review of Systems:  Constitutional: negative for anorexia, chills, fatigue, fevers, sweats and weight loss  Eyes: negative for redness and visual disturbance  Ears, nose, mouth, throat, and face: negative for earaches, sore throat and tinnitus  Respiratory: negative for cough and shortness of breath  Cardiovascular: negative for chest pain, dyspnea and palpitations  Gastrointestinal: negative for abdominal pain, change in bowel habits, constipation, nausea and vomiting  Genitourinary:negative for dysuria, frequency, hesitancy and urinary incontinence  Integument/breast: negative for pruritus and rash  Musculoskeletal:negative for muscle weakness and stiff joints  Neurological: negative for dizziness, headaches and weakness  Behavioral/Psych: negative for decreased appetite, depression and fatigue    Functional History:  PTA: Independent with all activities.     Current:  PT:  Restrictions/Precautions: Fall Risk  Other position/activity restrictions: Up as tolerated, pt reports h/o fibromyalgia with generalized pain with increased activity   Transfers  Sit to Stand: Contact guard assistance, 2 Person Assistance  Stand to sit: Contact guard assistance, 2 Person Assistance  Bed to Chair: Contact guard assistance, 2 Person Assistance  Comment: Pt sitting in bedside recliner, and stated that she preferred to do ex sitting since her pain has quiet down a little. Ambulation 1  Surface: level tile  Device:  (up walker )  Assistance: Contact guard assistance, 2 Person assistance  Distance: 5 steps from bed to recliner   Comments: patient had no LOB noted         OT:   ADL  Feeding: Modified independent   Grooming: Setup; Increased time to complete ( pt washes face, oral care)  UE Bathing: Minimal assistance  LE Bathing: Maximum assistance (TA for buttocks care and below knees)  UE Dressing: Moderate assistance  LE Dressing: Maximum assistance  Toileting: Maximum assistance (TA for buttocks care and brief mgt)      ST:     Past Medical History:        Diagnosis Date    Abnormality of rib determined by X-ray 1/28/2016    Acute cystitis without hematuria 6/16/2018    Acute exacerbation of chronic bronchitis (HCC)     Acute on chronic diastolic heart failure ([facility] [address] 1/28/2016    Adjustment disorder with mixed anxiety and depressed mood 6/26/2015    Mild     Anemia 3/5/2014    Back pain     Bronchiectasis with acute lower respiratory infection ([facility] [address] 8/16/2017    Bronchitis     Chronic bronchitis (HCC) 1/28/2016    Chronic cough 7/23/2018    Degenerative joint disease (DJD) of hip 5/14/2015    Dyslipidemia 3/5/2014    Encephalopathy acute 5/29/2016    Essential hypertension 1/28/2016    Fibromyalgia     GERD (gastroesophageal reflux disease)     History of total bilateral knee replacement 5/28/2016    Hx of blood clots     left leg and lung    Hyperlipidemia     Hypertension     Incontinence of urine     Irregular heartbeat     DR. Antony Tran Medication monitoring encounter 6/13/2018    Morbid obesity with BMI of 45.0-49.9, adult ([facility] [address] 1/28/2016    Obstructive sleep apnea syndrome     Osteoarthritis     Primary osteoarthritis of left knee 5/27/2016    PVC (premature ventricular contraction) 1/28/2016    S/P right and left heart catheterization 10/2/2015    Sleep apnea     no machine    SOB (shortness of breath)     Spinal stenosis of lumbar region with neurogenic claudication 5/13/2013    Supplemental oxygen dependent 12/8/2017    Urine frequency     UTI (urinary tract infection)     hospitalized early july 2014 for kidney infection       Past Surgical History:        Procedure Laterality Date    BRONCHOSCOPY Left 8/16/2017    BRONCHOSCOPY ALVEOLAR LAVAGE LOWER LOBE performed by Yelitza Kurtz MD at [facility]  x 3    no stents    COLONOSCOPY  4 28 14    polyps biopsies     FOOT SURGERY Left     calcium deposit removal from top of foot    HYSTERECTOMY      JOINT REPLACEMENT Bilateral 5/27/2016    bilat total knees    NERVE BLOCK  5/13/2013    caudal celestone 6mg    NERVE BLOCK  5/28/13    Caudal #2, Celestone 9mg    NERVE BLOCK  2/14/14    rt hip inj celestone 9 mg    NERVE BLOCK  07/14/2014    caudal# 3- celestone 9 mg    NERVE BLOCK  7-21-14    caudal epidural #2, decadron 7mg, fentanyl 25mcg    NERVE BLOCK  10/2/14    duramorph 1.5  decadron 5mg    NERVE BLOCK  11/9/2015    caudal# 1 celestone 9mg    NERVE BLOCK  11/16/15    caudal #2  decadron 10mg    NERVE BLOCK  11/23/15    duramorph 1mg celestone 9mg    NERVE BLOCK  03/28/2018    CAUDAL EPIDURAL STEROID BLOCK  DECADRON 10 MG     NERVE BLOCK  05/23/2018    caudal # decadron 7mg    NERVE BLOCK  08/28/2018    natalia transforminal # 1, decadron 10mg gadoteridol, LONG NEEDLES    ID PERQ VERT AGMNTJ CAVITY CRTJ UNI/BI CANNULJ LMBR N/A 10/29/2018    KYPHOPLASTY L1 performed by Esvin Garcia MD at [address] [place] [address] Right 5/14/15    total hip replacement       Allergies:     Allergies   Allergen Reactions    Rosuvastatin Calcium Anaphylaxis     Hair fell out    Statins Anaphylaxis     Hair fell out    Bactrim [Sulfamethoxazole-Trimethoprim] Diarrhea    Caffeine Other (See Comments)     pain  pain    Dye [Iodides] Other (See Comments)     Iv dye= blood clots in arm    Food      Tomatoes, pain    Ioxaglate Other (See Comments)     Iv dye= blood clots in arm    Tomato     Dicloxacillin Rash    Pcn [Penicillins] Rash        Current Medications:   Current Facility-Administered Medications: [START ON 9/29/2021] metoprolol succinate (TOPROL XL) extended release tablet 50 mg, 50 mg, Oral, Daily  gabapentin (NEURONTIN) capsule 200 mg, 200 mg, Oral, TID  DULoxetine (CYMBALTA) extended release capsule 30 mg, 30 mg, Oral, BID  fluticasone (FLONASE) 50 MCG/ACT nasal spray 2 spray, 2 spray, Each Nostril, Daily  lidocaine 4 % external patch 2 patch, 2 patch, TransDERmal, Daily  sodium chloride flush 0.9 % injection 5-40 mL, 5-40 mL, IntraVENous, 2 times per day  sodium chloride flush 0.9 % injection 5-40 mL, 5-40 mL, IntraVENous, PRN  0.9 % sodium chloride infusion, 25 mL, IntraVENous, PRN  enoxaparin (LOVENOX) injection 30 mg, 30 mg, SubCUTAneous, BID  ondansetron (ZOFRAN-ODT) disintegrating tablet 4 mg, 4 mg, Oral, Q8H PRN **OR** ondansetron (ZOFRAN) injection 4 mg, 4 mg, IntraVENous, Q6H PRN  polyethylene glycol (GLYCOLAX) packet 17 g, 17 g, Oral, Daily PRN  acetaminophen (TYLENOL) tablet 650 mg, 650 mg, Oral, Q6H PRN **OR** acetaminophen (TYLENOL) suppository 650 mg, 650 mg, Rectal, Q6H PRN  potassium chloride (KLOR-CON M) extended release tablet 40 mEq, 40 mEq, Oral, PRN **OR** potassium bicarb-citric acid (EFFER-K) effervescent tablet 40 mEq, 40 mEq, Oral, PRN **OR** potassium chloride 10 mEq/100 mL IVPB (Peripheral Line), 10 mEq, IntraVENous, PRN  aspirin EC tablet 81 mg, 81 mg, Oral, Daily  donepezil (ARICEPT) tablet 5 mg, 5 mg, Oral, Nightly  albuterol (PROVENTIL) nebulizer solution 2.5 mg, 2.5 mg, Nebulization, As Directed RT PRN  oxyCODONE-acetaminophen (PERCOCET) 5-325 MG per tablet 1 tablet, 1 tablet, Oral, Q6H PRN **AND** oxyCODONE (ROXICODONE) immediate release tablet 2.5 mg, 2.5 mg, Oral, Q6H PRN  melatonin tablet 3 mg, 3 mg, Oral, Nightly PRN    Social History:  Social History     Socioeconomic History    Marital status:      Spouse name: Not on file    Number of children: Not on file    Years of education: Not on file    Highest education level: Not on file   Occupational History    Not on file   Tobacco Use    Smoking status: Former Smoker     Packs/day: 1.00     Years: 18.00     Pack years: 18.00     Quit date: 1993     Years since quittin.3    Smokeless tobacco: Never Used   Vaping Use    Vaping Use: Never used   Substance and Sexual Activity    Alcohol use: No    Drug use: No    Sexual activity: Not Currently   Other Topics Concern    Not on file   Social History Narrative    Not on file     Social Determinants of Health     Financial Resource Strain: Low Risk     Difficulty of Paying Living Expenses: Not hard at all   Food Insecurity: No Food Insecurity    Worried About Running Out of Food in the Last Year: Never true    Bobby of Food in the Last Year: Never true   Transportation Needs: Unmet Transportation Needs    Lack of Transportation (Medical): Yes    Lack of Transportation (Non-Medical): Yes   Physical Activity: Unknown    Days of Exercise per Week: 0 days    Minutes of Exercise per Session: Not on file   Stress: No Stress Concern Present    Feeling of Stress :  Only a little   Social Connections: Unknown    Frequency of Communication with Friends and Family: Twice a week    Frequency of Social Gatherings with Friends and Family: Twice a week    Attends [employer] Services: Not on file   [employer] or Organizations: Not on file    Attends Club or Organization Meetings: Not on file    Marital Status:    Intimate Partner Violence:     Fear of Current or Ex-Partner:     Emotionally Abused:     Physically Abused:     Sexually Abused:      Social/Functional History  Lives With: Alone  Type of Home: Apartment  Home Layout: One level  Home Access: Level entry  Bathroom Shower/Tub: Tub/Shower unit, Walk-in shower  Bathroom Toilet: Handicap height  Bathroom Equipment: Tub transfer bench, Hand-held shower, Grab bars around toilet  Bathroom Accessibility: Walker accessible  Home Equipment: 4 wheeled walker, Electric scooter, [address] Help From: Family (Daughter and nephew)  ADL Assistance: Needs assistance (Independent except for bathing)  Homemaking Assistance: Needs assistance  Homemaking Responsibilities: Yes  Ambulation Assistance: Independent (with walker)  Transfer Assistance: Independent  Active : Yes  IADL Comments: Pt sleeps in an adjustable bed with rails. Additional Comments: Pt reported that she has needed more assistance the past three weeks due to her legs feeling like \"goo. \" She used to have aides going to her apt to assist with household chores, but not recentl due to the pandemic. Daughter, son, grandsons, and nephew live nearby but all work. Family History:       Problem Relation Age of Onset    Stomach Cancer Brother         stomach    High Blood Pressure Mother     Heart Disease Sister         irregular heartbeat           Physical Exam:    BP (!) 145/64   Pulse (!) 49   Temp 98.1 °F (36.7 °C) (Oral)   Resp 17   Ht 5' 4\" (1.626 m)   Wt 297 lb 9.9 oz (135 kg)   SpO2 95%   BMI 51.09 kg/m²     General appearance: alert, appears stated age, cooperative, and no distress  Head: Normocephalic, without obvious abnormality, atraumatic  Eyes: conjunctivae clear. Throat: lips, mucosa, and tongue normal.  Neck: no adenopathy and supple, symmetrical, trachea midline. Lungs: clear to auscultation bilaterally. Heart: regular rate and rhythm, no murmur.   Abdomen: soft, non-tender; bowel sounds normal.  Extremities: extremities normal, atraumatic, no edema, normal tone.  Mental status: Alert, orientedX3, thought content appropriate. Sensory: Intact in BUE and BLE to soft and pin sensation. Motor: Muscle tone and bulk are normal bilaterally. No pronator drift. Strength right lower extremity 4/5, left lower extremity limited due to pain but at least to 3/5, left upper extremity distally 4/5 limited ROM shoulder due to rotator cuff injury, right upper extremity. To be at least 4/5    Plantar reflex is down going bilaterally. Coordination: finger to nose normal bilaterally. Diagnostics:  CBC   Lab Results   Component Value Date    WBC 4.5 09/28/2021    RBC 3.89 09/28/2021    RBC 4.24 05/26/2012    HGB 10.2 09/28/2021    HCT 32.2 09/28/2021    MCV 82.8 09/28/2021    RDW 14.4 09/28/2021     09/28/2021     05/26/2012     BMP    Lab Results   Component Value Date     09/28/2021    K 4.4 09/28/2021     09/28/2021    CO2 26 09/28/2021    BUN 18 09/28/2021     Uric Acid  No components found for: URIC  VITAMIN B12 No components found for: B12  PT/INR  No results found for: PTINR    Radiology:     Impression: Ms. Eda Monet is a 67 y.o. {female with a history of Physical debility    1. History of spinal stenosis with neurogenic claudication, EMG showed possible L4-S1 chronic radiculopathy though limited testing MRI lower extremity notes mass-effect on L4-5 and L5-S1  2. Left C5-6 radiculopathy, MRI cervical spine moderate spinal canal stenosis severe bilateral neuroforaminal C4-C7  3. Left carpal tunnel syndrome  4. Severe rotator cuff arthropathy bilateral shoulders  5. Remote compression fracture with likely kyphoplasty L1  6. Pain-Roxicodone lidocaine patch  7. Questionable dementia-Aricept  8. UTI  9. Fibromyalgia-Cymbalta or Neurontin  10. Obstructive sleep apnea-Proventil, Flonase  11. Anemia  12. Bradycardia-49 pulse today on 9/28    Recommendations:  1.  Diagnosis: Deconditioning due to possible neurogenic claudication/radiculopathy/spinal stenosis  2. Therapy: Has PT and OT needs  3. Medical  Necessity: As above  4. Support: Clarify support  5. Rehab recommendation: Informed patient plan for facet joint injection later this week and currently scheduled to go to [facility] at 6 PM today. Please notify if there is any changes  6. DVT proph: Lovenox    It was my pleasure to evaluate Khalif Line today. Please call with questions. Mariana Mccarthy. Lorna Ruggiero MD          This note is created with the assistance of a speech recognition program.  While intending to generate a document that actually reflects the content of the visit, the document can still have some errors including those of syntax and sound a like substitutions which may escape proof reading.   In such instances, actual meaning can be extrapolated by contextual diversion

## 2021-09-29 ENCOUNTER — CARE COORDINATION (OUTPATIENT)
Dept: CARE COORDINATION | Age: 76
End: 2021-09-29

## 2021-09-29 ENCOUNTER — TELEPHONE (OUTPATIENT)
Dept: PAIN MANAGEMENT | Age: 76
End: 2021-09-29

## 2021-09-29 LAB — MYELIN BASIC PROTEIN, CSF: 1.84 NG/ML (ref 0–5.5)

## 2021-09-29 ASSESSMENT — ENCOUNTER SYMPTOMS
COUGH: 0
SORE THROAT: 0
EYE PAIN: 0
ABDOMINAL PAIN: 0
VOMITING: 0
SHORTNESS OF BREATH: 0
NAUSEA: 0

## 2021-09-29 NOTE — CARE COORDINATION
HC attempted to contact this patient, there was no answer.   HC left contact information for patient for a return call at her  convenience, understanding that patient is just out of the hospital.    Plan of Care  [facility]. will assist patient with social needs

## 2021-09-29 NOTE — CARE COORDINATION
Patient called [facility]CM stating that she is in a SNF. Per patient she had her last pain medication at 3 pm yesterday. Per patient she has been soiled twice now and can not get help to be changed. She is too weak to do very much for herself and her family wants her moved to a different facility. She is going to speak with someone and have them move her to a new facility. ACM recommended speaking with the  at the facility, explaining concerns and discuss going to another facility if they can not accommodate her needs.    Patient agrees

## 2021-09-30 ENCOUNTER — CARE COORDINATION (OUTPATIENT)
Dept: CARE COORDINATION | Age: 76
End: 2021-09-30

## 2021-09-30 ENCOUNTER — HOSPITAL ENCOUNTER (OUTPATIENT)
Dept: GENERAL RADIOLOGY | Age: 76
Discharge: HOME | End: 2021-10-02
Payer: MEDICARE

## 2021-09-30 ENCOUNTER — CARE COORDINATION (OUTPATIENT)
Dept: CASE MANAGEMENT | Age: 76
End: 2021-09-30

## 2021-09-30 ENCOUNTER — HOSPITAL ENCOUNTER (OUTPATIENT)
Dept: PAIN MANAGEMENT | Age: 76
Discharge: HOME | End: 2021-09-30
Payer: MEDICARE

## 2021-09-30 VITALS
BODY MASS INDEX: 50.02 KG/M2 | RESPIRATION RATE: 16 BRPM | HEART RATE: 50 BPM | TEMPERATURE: 98.4 F | HEIGHT: 64 IN | OXYGEN SATURATION: 100 % | WEIGHT: 293 LBS | SYSTOLIC BLOOD PRESSURE: 137 MMHG | DIASTOLIC BLOOD PRESSURE: 61 MMHG

## 2021-09-30 DIAGNOSIS — M43.16 LUMBAR ADJACENT SEGMENT DISEASE WITH SPONDYLOLISTHESIS: ICD-10-CM

## 2021-09-30 DIAGNOSIS — M47.816 ARTHROPATHY OF LUMBAR FACET JOINT: ICD-10-CM

## 2021-09-30 DIAGNOSIS — R52 PAIN: ICD-10-CM

## 2021-09-30 DIAGNOSIS — M47.816 LUMBAR SPONDYLOSIS: Primary | ICD-10-CM

## 2021-09-30 DIAGNOSIS — M51.36 LUMBAR ADJACENT SEGMENT DISEASE WITH SPONDYLOLISTHESIS: ICD-10-CM

## 2021-09-30 DIAGNOSIS — M51.36 LUMBAR DEGENERATIVE DISC DISEASE: ICD-10-CM

## 2021-09-30 PROCEDURE — 64494 INJ PARAVERT F JNT L/S 2 LEV: CPT | Performed by: PAIN MEDICINE

## 2021-09-30 PROCEDURE — 64493 INJ PARAVERT F JNT L/S 1 LEV: CPT

## 2021-09-30 PROCEDURE — 64493 INJ PARAVERT F JNT L/S 1 LEV: CPT | Performed by: PAIN MEDICINE

## 2021-09-30 PROCEDURE — 64495 INJ PARAVERT F JNT L/S 3 LEV: CPT

## 2021-09-30 PROCEDURE — 3209999900 FLUORO FOR SURGICAL PROCEDURES

## 2021-09-30 PROCEDURE — 2500000003 HC RX 250 WO HCPCS: Performed by: PAIN MEDICINE

## 2021-09-30 PROCEDURE — 64495 INJ PARAVERT F JNT L/S 3 LEV: CPT | Performed by: PAIN MEDICINE

## 2021-09-30 PROCEDURE — 64494 INJ PARAVERT F JNT L/S 2 LEV: CPT

## 2021-09-30 PROCEDURE — 6360000002 HC RX W HCPCS: Performed by: PAIN MEDICINE

## 2021-09-30 RX ORDER — BUPIVACAINE HYDROCHLORIDE 5 MG/ML
INJECTION, SOLUTION EPIDURAL; INTRACAUDAL
Status: COMPLETED | OUTPATIENT
Start: 2021-09-30 | End: 2021-09-30

## 2021-09-30 RX ORDER — TRIAMCINOLONE ACETONIDE 40 MG/ML
INJECTION, SUSPENSION INTRA-ARTICULAR; INTRAMUSCULAR
Status: COMPLETED | OUTPATIENT
Start: 2021-09-30 | End: 2021-09-30

## 2021-09-30 RX ADMIN — BUPIVACAINE HYDROCHLORIDE 15 ML: 5 INJECTION, SOLUTION EPIDURAL; INTRACAUDAL; PERINEURAL at 12:22

## 2021-09-30 RX ADMIN — TRIAMCINOLONE ACETONIDE 80 MG: 40 INJECTION, SUSPENSION INTRA-ARTICULAR; INTRAMUSCULAR at 12:23

## 2021-09-30 ASSESSMENT — PAIN DESCRIPTION - PAIN TYPE: TYPE: CHRONIC PAIN

## 2021-09-30 ASSESSMENT — PAIN DESCRIPTION - ORIENTATION: ORIENTATION: RIGHT;LEFT

## 2021-09-30 ASSESSMENT — PAIN DESCRIPTION - DESCRIPTORS: DESCRIPTORS: SHOOTING;ACHING;BURNING

## 2021-09-30 ASSESSMENT — PAIN DESCRIPTION - PROGRESSION: CLINICAL_PROGRESSION: GRADUALLY WORSENING

## 2021-09-30 ASSESSMENT — PAIN DESCRIPTION - LOCATION: LOCATION: BACK

## 2021-09-30 ASSESSMENT — PAIN DESCRIPTION - DIRECTION: RADIATING_TOWARDS: LEFT HIP, LEFT LEG

## 2021-09-30 ASSESSMENT — PAIN SCALES - GENERAL: PAINLEVEL_OUTOF10: 7

## 2021-09-30 ASSESSMENT — PAIN DESCRIPTION - FREQUENCY: FREQUENCY: CONTINUOUS

## 2021-09-30 ASSESSMENT — PAIN DESCRIPTION - ONSET: ONSET: ON-GOING

## 2021-09-30 NOTE — CARE COORDINATION
Patient stated she left the skilled facility and she did talk to her insurance who verified that she would be covered for time that she stayed before she left. Patient would like to go to [facility]. Kendra Troy [phone]    [facility] called Kendra Troy and left a  requesting a return call to discuss any needs. Patient notified, waiting on a return call.

## 2021-09-30 NOTE — PROCEDURES
Pre-Procedure Note    Patient Name: Margareth Mancera   YOB: 1945  Room/Bed: Room/bed info not found  Medical Record Number: [id number]  Date: 9/30/2021       Indication:    1. Lumbar spondylosis    2. Lumbar degenerative disc disease    3. Arthropathy of lumbar facet joint    4. Lumbar adjacent segment disease with spondylolisthesis        Consent: On file. Vital Signs:   Vitals:    09/30/21 1219   BP: 133/61   Pulse: 54   Resp: 18   Temp:    SpO2: 96%       Past Medical History:   has a past medical history of Abnormality of rib determined by X-ray, Acute cystitis without hematuria, Acute exacerbation of chronic bronchitis (HCC), Acute on chronic diastolic heart failure ([facility] [address] Adjustment disorder with mixed anxiety and depressed mood, Anemia, Back pain, Bronchiectasis with acute lower respiratory infection ([facility] [address] Bronchitis, Chronic bronchitis ([facility] [address] Chronic cough, Degenerative joint disease (DJD) of hip, Dyslipidemia, Encephalopathy acute, Essential hypertension, Fibromyalgia, GERD (gastroesophageal reflux disease), History of total bilateral knee replacement, Hx of blood clots, Hyperlipidemia, Hypertension, Incontinence of urine, Irregular heartbeat, Medication monitoring encounter, Morbid obesity with BMI of 45.0-49.9, adult ([facility] [address] Obstructive sleep apnea syndrome, Osteoarthritis, Primary osteoarthritis of left knee, PVC (premature ventricular contraction), S/P right and left heart catheterization, Sleep apnea, SOB (shortness of breath), Spinal stenosis of lumbar region with neurogenic claudication, Supplemental oxygen dependent, Urine frequency, and UTI (urinary tract infection). Past Surgical History:   has a past surgical history that includes Hysterectomy; Nerve Block (5/13/2013); Nerve Block (5/28/13); sinus surgery; Foot surgery (Left); Nerve Block (2/14/14); Colonoscopy (4 28 14); Nerve Block (07/14/2014); Nerve Block (7-21-14); Nerve Block (10/2/14); Nerve Block (11/9/2015);  Nerve Block (11/16/15); Nerve Block (11/23/15); Total hip arthroplasty (Right, 5/14/15); bronchoscopy (Left, 8/16/2017); Cardiac catheterization (x 3); Nerve Block (03/28/2018); Nerve Block (05/23/2018); Nerve Block (08/28/2018); pr perq vert agmntj cavity crtj uni/bi cannulj lmbr (N/A, 10/29/2018); and joint replacement (Bilateral, 5/27/2016). Pre-Sedation Documentation and Exam:   Vital signs have been reviewed (see flow sheet for vitals). Mallampati Airway Assessment:  class2    ASA Classification:  Class 3 - A patient with severe systemic disease that limits activity but is not incapacitating    Sedation/ Anesthesia Plan:   intravenous sedation   as needed    Medications Planned:   midazolam (Versed) / Fentanyl  Intravenously  as needed. Patient is an appropriate candidate for plan of sedation: yes  Patient's History and Physical examination was reviewed and there is no change. Electronically signed by Zachary Hampton MD on 9/30/2021 at 12:31 PM        Preoperative Diagnosis:    1. Lumbar spondylosis    2. Lumbar degenerative disc disease    3. Arthropathy of lumbar facet joint    4. Lumbar adjacent segment disease with spondylolisthesis        Postoperative Diagnosis:   1. Lumbar spondylosis    2. Lumbar degenerative disc disease    3. Arthropathy of lumbar facet joint    4. Lumbar adjacent segment disease with spondylolisthesis        Procedure Performed[de-identified]  Lumbar facet joint injections at the levels of  L1-L2, L2-L3, L3-L4, L4-L5, L5-S1 on the Right side with fluoroscopy guidance, without IV sedation. Indication for the Procedure: The patient had history of chronic low back pain which is not responding well to the conservative treatment. The patient's pain is mostly axial in nature. Pain is interfering with the activities of daily living. Physical examination revealed facet tenderness and facet loading is positive.   We decided to try lumbar facet joint injection for diagnostic as well as for therapeutic purposes. The procedure and its risks were discussed with the patient and an informed consent was obtained. .Current Pain Assessment  Pain Assessment  Pain Assessment: 0-10  Pain Level: 7  Patient's Stated Pain Goal: 2  Pain Type: Chronic pain  Pain Location: Back  Pain Orientation: Right, Left (worse right)  Pain Radiating Towards: left hip, left leg  Pain Descriptors: Shooting, Aching, Burning  Pain Frequency: Continuous  Pain Onset: On-going  Clinical Progression: Gradually worsening  Functional Pain Assessment: Prevents or interferes some active activities and ADLs  Non-Pharmaceutical Pain Intervention(s): Cold applied, Massage, Heat applied  POSS Score (Patient Ctrl Analgesia): 1   Procedure:      Patient's vital signs including BP, EKG and SaO2 were monitored by RN and they remained stable during the procedure. A meaningful communication was kept up with the patient throughout   the procedure. The patient is placed in prone position. Skin over the back was prepped and draped in sterile manner. Under fluoroscopy the facet joints were identified and were palpated, and the following joints were found to be tender: L1-L2, L2-L3, L3-L4, L4-L5, L5-S1 on the Right side. Hence we decided to inject these joints in the following way:  Using fluoroscopy the facet joints were identified and by adjusting the angle of the fluoroscopy, the view of the joint space was optimized. The skin and deep tissues over the joint space were anesthetized with 1 ml of 0.5% Marcaine. The #22-gauge, 3-1/2 inch spinal needle was introduced through the skin wheal under fluoroscopoc guidance such that the tip of the needle lies in the joint space. This was confirmed by injecting about 0.5 ml of Omnipaque-180 through the needle and observing the spread of the contrast along the joint space. Then after negative aspiration a mixture of 0.5% Marcaine with triamcinolone was injected into the joint space.  This was done at the levels of  L1-L2, L2-L3, L3-L4, L4-L5, L5-S1 on the Right side. A total of 80 mg of triamcinolone and 10 ml of 0.5% Marcaine was used and was divided in equal amounts among the joints. After removing the needles Band-Aids were placed over the needle insertion sites. Patient's vital signs remained stable and tolerated the procedure well. The patient was discharged home in stable condition and will be followed in the pain clinic in the next few weeks for further planning.     Electronically signed by Mykel Pappas MD on 9/30/2021 at 12:31 PM

## 2021-09-30 NOTE — CARE COORDINATION
[facility] received message that patient discharged her self from SNF due to be unhappy with their care. ACM left  for patient requesting a return call to [facility].

## 2021-09-30 NOTE — PROGRESS NOTES
Discharge criteria met. Patient alert and oriented x3  Post procedure dressing dry and intact. Sensory and motor function intact as per pre-procedure. Instructions and follow up reviewed with pt at patient at discharge. Patient discharged ambulatory @ 12:44pm    Patient discharged per wheelchair per RN to outpatient surgery entrance for ride home per [employer].

## 2021-09-30 NOTE — CARE COORDINATION
[facility] Transitions Initial Follow Up Call    Call within 2 business days of discharge: Yes    Patient: Jeanie Howe Patient : 1945   MRN: <[id number]>  Reason for Admission: Lumbar spondylosis  Discharge Date: 21 RARS: Readmission Risk Score: 9      Last Discharge [facility]       Complaint Diagnosis Description Type Department Provider    21 Fatigue Lumbar spondylosis . .. ED to Hosp-Admission (Discharged) (ADMITTED) Astrid Suarez MD; Georgina Navarro. .. Acute Care Course:  Pt to [facility] with Physical Debiity and a UTI. She then went to [facility] for one day. Per facility left AMA.       Sig Hx:   LUZ, COPD, obese, fibromyalgia, lumbar spinal stenosis,    DME:     Conversation:  Left HIPPA compliant message regarding the nature of the call and a request for a return call with my contact information for daughter Bessy Mcmillan, BSN, [phone]  Tessa Lewis / Margo Cummins Transition Nurse         Follow up plan:              Care Transitions 24 Hour Call    Do you have all of your prescriptions and are they filled?: Yes  Patient Home Equipment: Oxygen  Do you have support at home?: Alone  Are you an active caregiver in your home?: No  Care Transitions Interventions         Follow Up  Future Appointments   Date Time Provider Mayo Betts   2021 11:40 AM Abigail Coronado MD [address]   10/6/2021 12:30 PM Abigail Coronado  [facility], BSN, RN   [address] [facility] Transition Nurse  [phone]

## 2021-09-30 NOTE — CARE COORDINATION
left voicemail for Discharge Naye Headley at [facility] requesting a call back from more information on patients discharge disposition.

## 2021-10-01 ENCOUNTER — TELEPHONE (OUTPATIENT)
Dept: PAIN MANAGEMENT | Age: 76
End: 2021-10-01

## 2021-10-01 NOTE — CARE COORDINATION
Patient called back and stated that she would like to try [facility] and if not [facility]. ACM spoke with [facility] who requested CARLOTTA Cummings note and last PCP note be sent to them for review, fax: [phone]. They will call [facility] with update.

## 2021-10-01 NOTE — CARE COORDINATION
AC received a call from patient asking to call Anand Diaz from [address] [place] [address]. ACM left another  for [facility] requesting a call to [facility]. [facility] will have PCP write orders for admission for skilled facility to be prepared for requests from SNF. Anand Diaz from [facility] called back and stated that they are not in contract and even if they get her approved she would likely only get a 3 day stay. [facility] spoke with patient, explained and she stated that she is on the phone with Colletta Davidson now and will find out which facility she can go to.

## 2021-10-02 ENCOUNTER — HOSPITAL ENCOUNTER (OUTPATIENT)
Age: 76
Setting detail: OBSERVATION
Discharge: NURSING, REHAB OR LONG-TERM CARE | End: 2021-10-06
Attending: EMERGENCY MEDICINE | Admitting: INTERNAL MEDICINE
Payer: MEDICARE

## 2021-10-02 ENCOUNTER — APPOINTMENT (OUTPATIENT)
Dept: GENERAL RADIOLOGY | Age: 76
End: 2021-10-02
Payer: MEDICARE

## 2021-10-02 ENCOUNTER — TELEPHONE (OUTPATIENT)
Dept: OTHER | Facility: CLINIC | Age: 76
End: 2021-10-02
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
## 2021-10-02 DIAGNOSIS — M54.16 LUMBAR RADICULOPATHY: ICD-10-CM

## 2021-10-02 DIAGNOSIS — M54.50 ACUTE MIDLINE LOW BACK PAIN, UNSPECIFIED WHETHER SCIATICA PRESENT: ICD-10-CM

## 2021-10-02 DIAGNOSIS — M48.062 SPINAL STENOSIS OF LUMBAR REGION WITH NEUROGENIC CLAUDICATION: ICD-10-CM

## 2021-10-02 DIAGNOSIS — M51.36 DDD (DEGENERATIVE DISC DISEASE), LUMBAR: ICD-10-CM

## 2021-10-02 DIAGNOSIS — M43.22 CERVICAL SPINE ANKYLOSIS: ICD-10-CM

## 2021-10-02 DIAGNOSIS — M79.7 FIBROMYALGIA: ICD-10-CM

## 2021-10-02 DIAGNOSIS — M48.02 CERVICAL SPINAL STENOSIS: ICD-10-CM

## 2021-10-02 DIAGNOSIS — R53.1 GENERAL WEAKNESS: ICD-10-CM

## 2021-10-02 DIAGNOSIS — R07.9 CHEST PAIN, UNSPECIFIED TYPE: Primary | ICD-10-CM

## 2021-10-02 DIAGNOSIS — Z98.890 S/P KYPHOPLASTY: ICD-10-CM

## 2021-10-02 LAB
ABSOLUTE EOS #: 0 K/UL (ref 0–0.4)
ABSOLUTE IMMATURE GRANULOCYTE: ABNORMAL K/UL (ref 0–0.3)
ABSOLUTE LYMPH #: 1.5 K/UL (ref 1–4.8)
ABSOLUTE MONO #: 0.3 K/UL (ref 0.1–1.3)
ALBUMIN SERPL-MCNC: 4.1 G/DL (ref 3.5–5.2)
ALBUMIN/GLOBULIN RATIO: ABNORMAL (ref 1–2.5)
ALP BLD-CCNC: 68 U/L (ref 35–104)
ALT SERPL-CCNC: 13 U/L (ref 5–33)
ANION GAP SERPL CALCULATED.3IONS-SCNC: 11 MMOL/L (ref 9–17)
AST SERPL-CCNC: 13 U/L
BASOPHILS # BLD: 1 % (ref 0–2)
BASOPHILS ABSOLUTE: 0.1 K/UL (ref 0–0.2)
BILIRUB SERPL-MCNC: 0.29 MG/DL (ref 0.3–1.2)
BUN BLDV-MCNC: 30 MG/DL (ref 8–23)
BUN/CREAT BLD: ABNORMAL (ref 9–20)
CALCIUM SERPL-MCNC: 9.3 MG/DL (ref 8.6–10.4)
CHLORIDE BLD-SCNC: 103 MMOL/L (ref 98–107)
CO2: 23 MMOL/L (ref 20–31)
CREAT SERPL-MCNC: 1.12 MG/DL (ref 0.5–0.9)
D-DIMER QUANTITATIVE: 1.28 MG/L FEU (ref 0–0.59)
DIFFERENTIAL TYPE: ABNORMAL
EOSINOPHILS RELATIVE PERCENT: 0 % (ref 0–4)
GFR AFRICAN AMERICAN: 57 ML/MIN
GFR NON-AFRICAN AMERICAN: 47 ML/MIN
GFR SERPL CREATININE-BSD FRML MDRD: ABNORMAL ML/MIN/{1.73_M2}
GFR SERPL CREATININE-BSD FRML MDRD: ABNORMAL ML/MIN/{1.73_M2}
GLUCOSE BLD-MCNC: 94 MG/DL (ref 70–99)
HCT VFR BLD CALC: 33.3 % (ref 36–46)
HEMOGLOBIN: 10.7 G/DL (ref 12–16)
IMMATURE GRANULOCYTES: ABNORMAL %
LYMPHOCYTES # BLD: 25 % (ref 24–44)
MCH RBC QN AUTO: 26 PG (ref 26–34)
MCHC RBC AUTO-ENTMCNC: 32.1 G/DL (ref 31–37)
MCV RBC AUTO: 81 FL (ref 80–100)
MONOCYTES # BLD: 5 % (ref 1–7)
NRBC AUTOMATED: ABNORMAL PER 100 WBC
PDW BLD-RTO: 14.1 % (ref 11.5–14.9)
PLATELET # BLD: 291 K/UL (ref 150–450)
PLATELET ESTIMATE: ABNORMAL
PMV BLD AUTO: 8 FL (ref 6–12)
POTASSIUM SERPL-SCNC: 4.4 MMOL/L (ref 3.7–5.3)
RBC # BLD: 4.11 M/UL (ref 4–5.2)
RBC # BLD: ABNORMAL 10*6/UL
SEG NEUTROPHILS: 69 % (ref 36–66)
SEGMENTED NEUTROPHILS ABSOLUTE COUNT: 4.2 K/UL (ref 1.3–9.1)
SODIUM BLD-SCNC: 137 MMOL/L (ref 135–144)
TOTAL PROTEIN: 7.4 G/DL (ref 6.4–8.3)
TROPONIN INTERP: ABNORMAL
TROPONIN INTERP: ABNORMAL
TROPONIN T: ABNORMAL NG/ML
TROPONIN T: ABNORMAL NG/ML
TROPONIN, HIGH SENSITIVITY: 20 NG/L (ref 0–14)
TROPONIN, HIGH SENSITIVITY: 24 NG/L (ref 0–14)
WBC # BLD: 6.1 K/UL (ref 3.5–11)
WBC # BLD: ABNORMAL 10*3/UL

## 2021-10-02 PROCEDURE — 80053 COMPREHEN METABOLIC PANEL: CPT

## 2021-10-02 PROCEDURE — 85025 COMPLETE CBC W/AUTO DIFF WBC: CPT

## 2021-10-02 PROCEDURE — 93971 EXTREMITY STUDY: CPT

## 2021-10-02 PROCEDURE — 85379 FIBRIN DEGRADATION QUANT: CPT

## 2021-10-02 PROCEDURE — 6360000002 HC RX W HCPCS: Performed by: INTERNAL MEDICINE

## 2021-10-02 PROCEDURE — G0378 HOSPITAL OBSERVATION PER HR: HCPCS

## 2021-10-02 PROCEDURE — 84484 ASSAY OF TROPONIN QUANT: CPT

## 2021-10-02 PROCEDURE — 93005 ELECTROCARDIOGRAM TRACING: CPT | Performed by: EMERGENCY MEDICINE

## 2021-10-02 PROCEDURE — 99285 EMERGENCY DEPT VISIT HI MDM: CPT

## 2021-10-02 PROCEDURE — 36415 COLL VENOUS BLD VENIPUNCTURE: CPT

## 2021-10-02 PROCEDURE — 2580000003 HC RX 258: Performed by: INTERNAL MEDICINE

## 2021-10-02 PROCEDURE — 93005 ELECTROCARDIOGRAM TRACING: CPT

## 2021-10-02 PROCEDURE — 96372 THER/PROPH/DIAG INJ SC/IM: CPT

## 2021-10-02 PROCEDURE — 6370000000 HC RX 637 (ALT 250 FOR IP): Performed by: EMERGENCY MEDICINE

## 2021-10-02 PROCEDURE — 73610 X-RAY EXAM OF ANKLE: CPT

## 2021-10-02 RX ORDER — OXYCODONE AND ACETAMINOPHEN 7.5; 325 MG/1; MG/1
1 TABLET ORAL EVERY 6 HOURS PRN
Status: DISCONTINUED | OUTPATIENT
Start: 2021-10-02 | End: 2021-10-02 | Stop reason: CLARIF

## 2021-10-02 RX ORDER — ACETAMINOPHEN 325 MG/1
650 TABLET ORAL EVERY 6 HOURS PRN
Status: DISCONTINUED | OUTPATIENT
Start: 2021-10-02 | End: 2021-10-06 | Stop reason: HOSPADM

## 2021-10-02 RX ORDER — METOPROLOL SUCCINATE 50 MG/1
50 TABLET, EXTENDED RELEASE ORAL 2 TIMES DAILY
Status: DISCONTINUED | OUTPATIENT
Start: 2021-10-03 | End: 2021-10-03

## 2021-10-02 RX ORDER — NITROGLYCERIN 0.4 MG/1
0.4 TABLET SUBLINGUAL EVERY 5 MIN PRN
Status: DISCONTINUED | OUTPATIENT
Start: 2021-10-02 | End: 2021-10-06 | Stop reason: HOSPADM

## 2021-10-02 RX ORDER — SODIUM CHLORIDE 0.9 % (FLUSH) 0.9 %
5-40 SYRINGE (ML) INJECTION EVERY 12 HOURS SCHEDULED
Status: DISCONTINUED | OUTPATIENT
Start: 2021-10-02 | End: 2021-10-06 | Stop reason: HOSPADM

## 2021-10-02 RX ORDER — SODIUM CHLORIDE 9 MG/ML
25 INJECTION, SOLUTION INTRAVENOUS PRN
Status: DISCONTINUED | OUTPATIENT
Start: 2021-10-02 | End: 2021-10-06 | Stop reason: HOSPADM

## 2021-10-02 RX ORDER — POTASSIUM CHLORIDE 20 MEQ/1
40 TABLET, EXTENDED RELEASE ORAL PRN
Status: DISCONTINUED | OUTPATIENT
Start: 2021-10-02 | End: 2021-10-06 | Stop reason: HOSPADM

## 2021-10-02 RX ORDER — LANOLIN ALCOHOL/MO/W.PET/CERES
6 CREAM (GRAM) TOPICAL NIGHTLY PRN
Status: DISCONTINUED | OUTPATIENT
Start: 2021-10-02 | End: 2021-10-06 | Stop reason: HOSPADM

## 2021-10-02 RX ORDER — POTASSIUM CHLORIDE 7.45 MG/ML
10 INJECTION INTRAVENOUS PRN
Status: DISCONTINUED | OUTPATIENT
Start: 2021-10-02 | End: 2021-10-06 | Stop reason: HOSPADM

## 2021-10-02 RX ORDER — SODIUM CHLORIDE 0.9 % (FLUSH) 0.9 %
10 SYRINGE (ML) INJECTION PRN
Status: DISCONTINUED | OUTPATIENT
Start: 2021-10-02 | End: 2021-10-06 | Stop reason: HOSPADM

## 2021-10-02 RX ORDER — ONDANSETRON 4 MG/1
4 TABLET, ORALLY DISINTEGRATING ORAL EVERY 8 HOURS PRN
Status: DISCONTINUED | OUTPATIENT
Start: 2021-10-02 | End: 2021-10-06 | Stop reason: HOSPADM

## 2021-10-02 RX ORDER — CETIRIZINE HYDROCHLORIDE 10 MG/1
10 TABLET ORAL DAILY
Refills: 2 | Status: DISCONTINUED | OUTPATIENT
Start: 2021-10-03 | End: 2021-10-03

## 2021-10-02 RX ORDER — CARVEDILOL 3.12 MG/1
3.12 TABLET ORAL 2 TIMES DAILY WITH MEALS
Status: DISCONTINUED | OUTPATIENT
Start: 2021-10-02 | End: 2021-10-02

## 2021-10-02 RX ORDER — GABAPENTIN 100 MG/1
100 CAPSULE ORAL 3 TIMES DAILY
Status: DISCONTINUED | OUTPATIENT
Start: 2021-10-03 | End: 2021-10-03

## 2021-10-02 RX ORDER — ASPIRIN 81 MG/1
81 TABLET ORAL DAILY
Status: DISCONTINUED | OUTPATIENT
Start: 2021-10-03 | End: 2021-10-02

## 2021-10-02 RX ORDER — ASPIRIN 81 MG/1
81 TABLET, CHEWABLE ORAL DAILY
Status: DISCONTINUED | OUTPATIENT
Start: 2021-10-03 | End: 2021-10-06 | Stop reason: HOSPADM

## 2021-10-02 RX ORDER — ONDANSETRON 2 MG/ML
4 INJECTION INTRAMUSCULAR; INTRAVENOUS EVERY 6 HOURS PRN
Status: DISCONTINUED | OUTPATIENT
Start: 2021-10-02 | End: 2021-10-06 | Stop reason: HOSPADM

## 2021-10-02 RX ORDER — ASPIRIN 325 MG
325 TABLET ORAL ONCE
Status: COMPLETED | OUTPATIENT
Start: 2021-10-02 | End: 2021-10-02

## 2021-10-02 RX ORDER — OXYCODONE HYDROCHLORIDE AND ACETAMINOPHEN 5; 325 MG/1; MG/1
1 TABLET ORAL EVERY 6 HOURS PRN
Status: DISCONTINUED | OUTPATIENT
Start: 2021-10-02 | End: 2021-10-03 | Stop reason: CLARIF

## 2021-10-02 RX ORDER — ACETAMINOPHEN 650 MG/1
650 SUPPOSITORY RECTAL EVERY 6 HOURS PRN
Status: DISCONTINUED | OUTPATIENT
Start: 2021-10-02 | End: 2021-10-06 | Stop reason: HOSPADM

## 2021-10-02 RX ORDER — ALBUTEROL SULFATE 90 UG/1
2 AEROSOL, METERED RESPIRATORY (INHALATION) 4 TIMES DAILY PRN
Status: DISCONTINUED | OUTPATIENT
Start: 2021-10-02 | End: 2021-10-06 | Stop reason: HOSPADM

## 2021-10-02 RX ORDER — OXYCODONE HYDROCHLORIDE 5 MG/1
2.5 TABLET ORAL EVERY 6 HOURS PRN
Status: DISCONTINUED | OUTPATIENT
Start: 2021-10-02 | End: 2021-10-03 | Stop reason: CLARIF

## 2021-10-02 RX ORDER — DULOXETIN HYDROCHLORIDE 30 MG/1
30 CAPSULE, DELAYED RELEASE ORAL 2 TIMES DAILY
Status: DISCONTINUED | OUTPATIENT
Start: 2021-10-03 | End: 2021-10-06 | Stop reason: HOSPADM

## 2021-10-02 RX ORDER — MAGNESIUM SULFATE 1 G/100ML
1000 INJECTION INTRAVENOUS PRN
Status: DISCONTINUED | OUTPATIENT
Start: 2021-10-02 | End: 2021-10-06 | Stop reason: HOSPADM

## 2021-10-02 RX ORDER — DONEPEZIL HYDROCHLORIDE 5 MG/1
5 TABLET, FILM COATED ORAL NIGHTLY
Status: DISCONTINUED | OUTPATIENT
Start: 2021-10-03 | End: 2021-10-06 | Stop reason: HOSPADM

## 2021-10-02 RX ADMIN — SODIUM CHLORIDE, PRESERVATIVE FREE 10 ML: 5 INJECTION INTRAVENOUS at 22:08

## 2021-10-02 RX ADMIN — ASPIRIN 325 MG ORAL TABLET 325 MG: 325 PILL ORAL at 18:50

## 2021-10-02 RX ADMIN — ENOXAPARIN SODIUM 30 MG: 30 INJECTION SUBCUTANEOUS at 22:06

## 2021-10-02 ASSESSMENT — PAIN SCALES - GENERAL: PAINLEVEL_OUTOF10: 8

## 2021-10-02 ASSESSMENT — ENCOUNTER SYMPTOMS
ABDOMINAL PAIN: 0
VOMITING: 0

## 2021-10-02 ASSESSMENT — PAIN DESCRIPTION - LOCATION
LOCATION: ANKLE;WRIST
LOCATION: BACK;SHOULDER;HIP

## 2021-10-02 ASSESSMENT — PAIN DESCRIPTION - FREQUENCY: FREQUENCY: CONTINUOUS

## 2021-10-02 ASSESSMENT — PAIN DESCRIPTION - PAIN TYPE: TYPE: CHRONIC PAIN

## 2021-10-02 ASSESSMENT — PAIN DESCRIPTION - ORIENTATION: ORIENTATION: RIGHT;LEFT

## 2021-10-02 NOTE — ED TRIAGE NOTES
Arrived to ED by medic after fall. Patient fell transferring from motorized cart at [place] to car. Denies hitting head or loss of conscious. Initially complaint of right ankle pain and left wrist pain. Reports that she got \"twisted up\" and fell. Upon arrival reports concern for possible DVT to right leg, hx DVT. Upon arrival respirations even and unlaboted, skin p/w/d, alert and oriented.

## 2021-10-02 NOTE — TELEPHONE ENCOUNTER
Writer contacted [facility] ED provider to inform of 30 day readmission risk. ED provider informed writer of intention to discharge and follow up recommended. Would like follow up with PCP.    Added to appt sheet

## 2021-10-02 NOTE — ED NOTES
Report given to PCU, RN from ED. Report method by phone   The following was reviewed with receiving RN:   Current vital signs:  BP (!) 144/57   Pulse 60   Temp 98.4 °F (36.9 °C) (Oral)   Resp 18   Ht 5' 4\" (1.626 m)   Wt 277 lb (125.6 kg)   SpO2 96%   BMI 47.55 kg/m²                MEWS Score: 1     Any medication or safety alerts were reviewed. Any pending diagnostics and notifications were also reviewed, as well as any safety concerns or issues, abnormal labs, abnormal imaging, and abnormal assessment findings. Questions were answered.             Farideh Adams RN  10/02/21 2015

## 2021-10-02 NOTE — ED PROVIDER NOTES
EMERGENCY DEPARTMENT ENCOUNTER    Pt Name: Patricia Arambula  MRN: [id number]  [place] 1945  Date of evaluation: 10/2/21  CHIEF COMPLAINT       Chief Complaint   Patient presents with    Fall    Wrist Pain    Ankle Pain     HISTORY OF PRESENT ILLNESS     Fall  Incident onset: twisted right ankle going from a vehicle to her scooter, she uses a scooter all the time at baseline, has chronic back pain, had injections Thursday in low back. The fall occurred while walking. She fell from a height of 1 to 2 ft. She landed on concrete. Point of impact: right ankle. Pain location: right ankle. The pain is moderate. She was not ambulatory at the scene. There was no entrapment after the fall. Pertinent negatives include no abdominal pain, no vomiting and no loss of consciousness. Has chronic left leg pain, was going to come here for a DVT check  Having some chest discomfort today also  Asking to eat food    REVIEW OF SYSTEMS     Review of Systems   Gastrointestinal: Negative for abdominal pain and vomiting. Neurological: Negative for loss of consciousness. All other systems reviewed and are negative.     PASTMEDICAL HISTORY     Past Medical History:   Diagnosis Date    Abnormality of rib determined by X-ray 1/28/2016    Acute cystitis without hematuria 6/16/2018    Acute exacerbation of chronic bronchitis (HCC)     Acute on chronic diastolic heart failure ([facility] [address] 1/28/2016    Adjustment disorder with mixed anxiety and depressed mood 6/26/2015    Mild     Anemia 3/5/2014    Back pain     Bronchiectasis with acute lower respiratory infection ([facility] [address] 8/16/2017    Bronchitis     Chronic bronchitis (HCC) 1/28/2016    Chronic cough 7/23/2018    Degenerative joint disease (DJD) of hip 5/14/2015    Dyslipidemia 3/5/2014    Encephalopathy acute 5/29/2016    Essential hypertension 1/28/2016    Fibromyalgia     GERD (gastroesophageal reflux disease)     History of total bilateral knee replacement 5/28/2016    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 A    Osteopenia of lumbar spine M85.88    Pain of left hip joint M25.552    UTI (urinary tract infection) N39.0    Physical debility R53.81    COPD (chronic obstructive pulmonary disease) ([facility]) J44.9    Weakness of left lower extremity R29.898    Lumbar spondylosis M47.816     SURGICAL HISTORY       Past Surgical History:   Procedure Laterality Date    BRONCHOSCOPY Left 8/16/2017    BRONCHOSCOPY ALVEOLAR LAVAGE LOWER LOBE performed by Bart Morales MD at [address]  x 3    no stents    COLONOSCOPY  4 28 14    polyps biopsies     FOOT SURGERY Left     calcium deposit removal from top of foot    HYSTERECTOMY      JOINT REPLACEMENT Bilateral 5/27/2016    bilat total knees    NERVE BLOCK  5/13/2013    caudal celestone 6mg    NERVE BLOCK  5/28/13    Caudal #2, Celestone 9mg    NERVE BLOCK  2/14/14    rt hip inj celestone 9 mg    NERVE BLOCK  07/14/2014    caudal# 3- celestone 9 mg    NERVE BLOCK  7-21-14    caudal epidural #2, decadron 7mg, fentanyl 25mcg    NERVE BLOCK  10/2/14    duramorph 1.5  decadron 5mg    NERVE BLOCK  11/9/2015    caudal# 1 celestone 9mg    NERVE BLOCK  11/16/15    caudal #2  decadron 10mg    NERVE BLOCK  11/23/15    duramorph 1mg celestone 9mg    NERVE BLOCK  03/28/2018    CAUDAL EPIDURAL STEROID BLOCK  DECADRON 10 MG     NERVE BLOCK  05/23/2018    caudal # decadron 7mg    NERVE BLOCK  08/28/2018    natalia transforminal # 1, decadron 10mg gadoteridol, LONG NEEDLES    WI PERQ VERT AGMNTJ CAVITY CRTJ UNI/BI CANNULJ LMBR N/A 10/29/2018    KYPHOPLASTY L1 performed by Alice Baker MD at [address] 5/14/15    total hip replacement     CURRENT MEDICATIONS       Previous Medications    ALBUTEROL SULFATE  (90 BASE) MCG/ACT INHALER    Inhale 2 puffs into the lungs 4 times daily as needed for Wheezing    ASPIRIN 81 MG EC TABLET    Take 81 mg by mouth daily     CATHETERS (STANDARD CARE EXTERNAL CATH) MISC    1 Device by Does not apply route daily    CATHETERS (STANDARD CARE EXTERNAL CATH) MISC    1 each by Does not apply route daily    CYANOCOBALAMIN (VITAMIN B-12) 1000 MCG/15ML LIQD    Take 1,000 mcg by mouth daily     DONEPEZIL (ARICEPT) 5 MG TABLET    take 1 tablet by mouth at bedtime    DULOXETINE (CYMBALTA) 30 MG EXTENDED RELEASE CAPSULE    Take 30 mg by mouth 2 times daily    GABAPENTIN (NEURONTIN) 100 MG CAPSULE    Take 100 mg by mouth 3 times daily. LEVOCETIRIZINE (XYZAL) 5 MG TABLET    take 1 tablet by mouth once daily AT NIGHT    METOPROLOL SUCCINATE (TOPROL XL) 50 MG EXTENDED RELEASE TABLET    Take 1 tablet by mouth daily    MISC. DEVICES (WALKER) MISC    1 each by Does not apply route daily Upright walker with wheels and seat    OXYCODONE-ACETAMINOPHEN (PERCOCET) 7.5-325 MG PER TABLET    Take 1 tablet by mouth every 6 hours as needed for Pain for up to 30 days. PSYLLIUM (KONSYL) 28.3 % PACK    Take 1 packet by mouth daily     ALLERGIES     is allergic to rosuvastatin calcium, statins, bactrim [sulfamethoxazole-trimethoprim], caffeine, dye [iodides], food, ioxaglate, tomato, dicloxacillin, and pcn [penicillins]. FAMILY HISTORY     She indicated that her mother is . She indicated that her father is . She indicated that the status of her sister is unknown. She indicated that her brother is .      SOCIAL HISTORY       Social History     Tobacco Use    Smoking status: Former Smoker     Packs/day: 1.00     Years: 18.00     Pack years: 18.00     Quit date: 1993     Years since quittin.4    Smokeless tobacco: Never Used   Vaping Use    Vaping Use: Never used   Substance Use Topics    Alcohol use: No    Drug use: No     PHYSICAL EXAM     INITIAL VITALS: BP (!) 161/85   Pulse 56   Temp 98.4 °F (36.9 °C) (Oral)   Resp 16   Ht 5' 4\" (1.626 m)   Wt 277 lb (125.6 kg)   SpO2 96%   BMI 47.55 kg/m²    Physical Exam  Constitutional:       General: She is not in acute distress. Appearance: Normal appearance. She is well-developed. She is not diaphoretic. HENT:      Head: Normocephalic and atraumatic. Right Ear: External ear normal.      Left Ear: External ear normal.      Nose: Nose normal. No congestion. Mouth/Throat:      Mouth: Mucous membranes are moist.      Pharynx: Oropharynx is clear. Eyes:      General:         Right eye: No discharge. Left eye: No discharge. Conjunctiva/sclera: Conjunctivae normal.      Pupils: Pupils are equal, round, and reactive to light. Neck:      Trachea: No tracheal deviation. Cardiovascular:      Rate and Rhythm: Normal rate and regular rhythm. Pulses: Normal pulses. Heart sounds: Normal heart sounds. Comments: Dp/pt 2+ BL  Pulmonary:      Effort: Pulmonary effort is normal. No respiratory distress. Breath sounds: Normal breath sounds. No stridor. No wheezing or rales. Abdominal:      Palpations: Abdomen is soft. Tenderness: There is no abdominal tenderness. There is no guarding or rebound. Musculoskeletal:         General: No tenderness or deformity. Normal range of motion. Cervical back: Normal range of motion and neck supple. Skin:     General: Skin is warm and dry. Capillary Refill: Capillary refill takes less than 2 seconds. Findings: No erythema or rash. Neurological:      General: No focal deficit present.       Mental Status: She is alert and oriented to person, place, and time. Cranial Nerves: No cranial nerve deficit. Coordination: Coordination normal.      Comments: GCS 15  Strength in arms 5/5  Strength in legs 4/5  Sensation intact bl arms and legs  No facial droop  Speech is clear, no dysarthria or aphasia  No ataxia in arms or legs  No gaze preference or nystagums       Psychiatric:         Mood and Affect: Mood normal.         Behavior: Behavior normal.         Thought Content: Thought content normal.         Judgment: Judgment normal.         MEDICAL DECISION MAKING:   Wells DVT Criteria:    []YES  [x]NO :Active cancer treatment ongoing or within the previous six months or palliative (If yes 1 point)  []YES  [x]NO :Paralysis, paresis, or recent plaster immobilization of the lower extremities  (If yes 1 point)  []YES  [x]NO :Recently bedridden for more than three days, or major surgery within twelve weeks (If yes 1 point)  [x]YES  []NO :Localized tenderness along the distribution of the deep venous system (If yes 1 point)  []YES  [x]NO :Entire leg swollen (If yes 1 point)  [x]YES  []NO :Calf swelling by more than 3 cm when compared to the asymptomatic leg measured 10 cm below tibial tuberosity (If yes 1 point)  []YES  [x]NO :Pitting edema in the symptomatic leg (If yes 1 point)  []YES  [x]NO :Collateral superficial veins (nonvaricose) (If yes 1 point)  []YES  [x]NO :Previous deep vein thrombosis (If yes 1 point)  []YES  [x]NO :Alternative diagnosis as likely or more likely than that of deep venous thrombosis (-2 if yes)    Wells DVT Score Criteria Below TOTAL =  2  If Wells score is 2 or less, then d-dimer testing is recommended.   For left leg      ED Course as of Oct 02 6214   Sat Oct 02, 2021   1829 Positive ddimer  Ordering duplex scan left leg    [WM]      ED Course User Index  [WM] Silke Guerrero MD     Indeterminate trop level  Admitting for chest pain possible ACS  Do not suspect PE  Giving aspirin  DW Dr Georgette Jung accepts admit 6:39 PM EDT  Do not suspect a PE, normal pulse and pulse ox    Procedures    DIAGNOSTIC RESULTS   EKG:All EKG's are interpreted by the Emergency Department Physician who either signs or Co-signs this chart in the absence of a cardiologist.  NSR, nonspecific changes, no acute ischemic changes on ST segments, no change compared to old, normal rate and normal intervals        RADIOLOGY:All plain film, CT, MRI, and formal ultrasound images (except ED bedside ultrasound) are read by the radiologist, see reports below, unless otherwisenoted in MDM or here. XR ANKLE RIGHT (MIN 3 VIEWS)   Final Result   Right ankle soft tissue swelling with no evidence of an acute fracture. Degenerative changes in the mid and hindfoot visualized as above. VL DUP LOWER EXTREMITY VENOUS LEFT    (Results Pending)     LABS: All lab results were reviewed by myself, and all abnormals are listed below.   Labs Reviewed   CBC WITH AUTO DIFFERENTIAL - Abnormal; Notable for the following components:       Result Value    Hemoglobin 10.7 (*)     Hematocrit 33.3 (*)     Seg Neutrophils 69 (*)     All other components within normal limits   COMPREHENSIVE METABOLIC PANEL - Abnormal; Notable for the following components:    BUN 30 (*)     CREATININE 1.12 (*)     Total Bilirubin 0.29 (*)     GFR Non- 47 (*)     GFR  57 (*)     All other components within normal limits   TROPONIN - Abnormal; Notable for the following components:    Troponin, High Sensitivity 20 (*)     All other components within normal limits   D-DIMER, QUANTITATIVE - Abnormal; Notable for the following components:    D-Dimer, Quant 1.28 (*)     All other components within normal limits       EMERGENCY DEPARTMENTCOURSE:         Vitals:    Vitals:    10/02/21 1624   BP: (!) 161/85   Pulse: 56   Resp: 16   Temp: 98.4 °F (36.9 °C)   TempSrc: Oral   SpO2: 96%   Weight: 277 lb (125.6 kg)   Height: 5' 4\" (1.626 m)       The patient was given the following medications while in the emergency department:  Orders Placed This Encounter   Medications    aspirin tablet 325 mg     CONSULTS:  IP CONSULT TO INTERNAL MEDICINE    FINAL IMPRESSION      1. Chest pain, unspecified type    2. Fall, initial encounter    3. General weakness          DISPOSITION/PLAN   DISPOSITION        PATIENT REFERRED TO:  No follow-up provider specified.   DISCHARGE MEDICATIONS:  New Prescriptions    No medications on file     Froylan Munoz MD  Attending Emergency Physician                    Froylan Munoz MD  10/02/21 2424

## 2021-10-03 LAB
ALBUMIN SERPL-MCNC: 3.8 G/DL (ref 3.5–5.2)
ALBUMIN/GLOBULIN RATIO: ABNORMAL (ref 1–2.5)
ALP BLD-CCNC: 66 U/L (ref 35–104)
ALT SERPL-CCNC: 12 U/L (ref 5–33)
ANION GAP SERPL CALCULATED.3IONS-SCNC: 10 MMOL/L (ref 9–17)
AST SERPL-CCNC: 13 U/L
BILIRUB SERPL-MCNC: 0.28 MG/DL (ref 0.3–1.2)
BUN BLDV-MCNC: 29 MG/DL (ref 8–23)
BUN/CREAT BLD: ABNORMAL (ref 9–20)
CALCIUM SERPL-MCNC: 9.1 MG/DL (ref 8.6–10.4)
CHLORIDE BLD-SCNC: 104 MMOL/L (ref 98–107)
CHOLESTEROL/HDL RATIO: 6.3
CHOLESTEROL: 272 MG/DL
CO2: 24 MMOL/L (ref 20–31)
CREAT SERPL-MCNC: 0.98 MG/DL (ref 0.5–0.9)
GFR AFRICAN AMERICAN: >60 ML/MIN
GFR NON-AFRICAN AMERICAN: 55 ML/MIN
GFR SERPL CREATININE-BSD FRML MDRD: ABNORMAL ML/MIN/{1.73_M2}
GFR SERPL CREATININE-BSD FRML MDRD: ABNORMAL ML/MIN/{1.73_M2}
GLUCOSE BLD-MCNC: 116 MG/DL (ref 70–99)
HCT VFR BLD CALC: 33 % (ref 36–46)
HDLC SERPL-MCNC: 43 MG/DL
HEMOGLOBIN: 10.8 G/DL (ref 12–16)
LDL CHOLESTEROL: 214 MG/DL (ref 0–130)
MAGNESIUM: 2.5 MG/DL (ref 1.6–2.6)
MCH RBC QN AUTO: 26.6 PG (ref 26–34)
MCHC RBC AUTO-ENTMCNC: 32.7 G/DL (ref 31–37)
MCV RBC AUTO: 81.5 FL (ref 80–100)
NRBC AUTOMATED: ABNORMAL PER 100 WBC
PDW BLD-RTO: 14.2 % (ref 11.5–14.9)
PLATELET # BLD: 284 K/UL (ref 150–450)
PMV BLD AUTO: 8.3 FL (ref 6–12)
POTASSIUM SERPL-SCNC: 4.7 MMOL/L (ref 3.7–5.3)
RBC # BLD: 4.05 M/UL (ref 4–5.2)
SODIUM BLD-SCNC: 138 MMOL/L (ref 135–144)
TOTAL PROTEIN: 7.1 G/DL (ref 6.4–8.3)
TRIGL SERPL-MCNC: 73 MG/DL
TROPONIN INTERP: ABNORMAL
TROPONIN T: ABNORMAL NG/ML
TROPONIN, HIGH SENSITIVITY: 18 NG/L (ref 0–14)
VLDLC SERPL CALC-MCNC: ABNORMAL MG/DL (ref 1–30)
WBC # BLD: 6.1 K/UL (ref 3.5–11)

## 2021-10-03 PROCEDURE — 83735 ASSAY OF MAGNESIUM: CPT

## 2021-10-03 PROCEDURE — 6360000002 HC RX W HCPCS: Performed by: INTERNAL MEDICINE

## 2021-10-03 PROCEDURE — 80053 COMPREHEN METABOLIC PANEL: CPT

## 2021-10-03 PROCEDURE — 80061 LIPID PANEL: CPT

## 2021-10-03 PROCEDURE — 36415 COLL VENOUS BLD VENIPUNCTURE: CPT

## 2021-10-03 PROCEDURE — 85027 COMPLETE CBC AUTOMATED: CPT

## 2021-10-03 PROCEDURE — 6370000000 HC RX 637 (ALT 250 FOR IP): Performed by: INTERNAL MEDICINE

## 2021-10-03 PROCEDURE — G0378 HOSPITAL OBSERVATION PER HR: HCPCS

## 2021-10-03 PROCEDURE — 96372 THER/PROPH/DIAG INJ SC/IM: CPT

## 2021-10-03 PROCEDURE — 84484 ASSAY OF TROPONIN QUANT: CPT

## 2021-10-03 PROCEDURE — 2580000003 HC RX 258: Performed by: INTERNAL MEDICINE

## 2021-10-03 PROCEDURE — 99220 PR INITIAL OBSERVATION CARE/DAY 70 MINUTES: CPT | Performed by: INTERNAL MEDICINE

## 2021-10-03 RX ORDER — METOPROLOL TARTRATE 50 MG/1
50 TABLET, FILM COATED ORAL 2 TIMES DAILY
COMMUNITY
End: 2021-12-22

## 2021-10-03 RX ORDER — GABAPENTIN 100 MG/1
100 CAPSULE ORAL 3 TIMES DAILY
Status: DISCONTINUED | OUTPATIENT
Start: 2021-10-03 | End: 2021-10-03

## 2021-10-03 RX ORDER — AMLODIPINE BESYLATE 5 MG/1
5 TABLET ORAL DAILY
Status: DISCONTINUED | OUTPATIENT
Start: 2021-10-03 | End: 2021-10-04

## 2021-10-03 RX ORDER — GABAPENTIN 100 MG/1
200 CAPSULE ORAL 3 TIMES DAILY
Status: DISCONTINUED | OUTPATIENT
Start: 2021-10-03 | End: 2021-10-06 | Stop reason: HOSPADM

## 2021-10-03 RX ORDER — LEVOCETIRIZINE DIHYDROCHLORIDE 5 MG/1
5 TABLET, FILM COATED ORAL NIGHTLY
Status: DISCONTINUED | OUTPATIENT
Start: 2021-10-03 | End: 2021-10-06 | Stop reason: HOSPADM

## 2021-10-03 RX ORDER — METOPROLOL TARTRATE 50 MG/1
50 TABLET, FILM COATED ORAL 2 TIMES DAILY
Status: DISCONTINUED | OUTPATIENT
Start: 2021-10-03 | End: 2021-10-06 | Stop reason: HOSPADM

## 2021-10-03 RX ORDER — OXYCODONE AND ACETAMINOPHEN 7.5; 325 MG/1; MG/1
1 TABLET ORAL EVERY 6 HOURS PRN
Status: DISCONTINUED | OUTPATIENT
Start: 2021-10-03 | End: 2021-10-06 | Stop reason: HOSPADM

## 2021-10-03 RX ADMIN — Medication 6 MG: at 23:52

## 2021-10-03 RX ADMIN — DULOXETIN HYDROCHLORIDE 30 MG: 30 CAPSULE, DELAYED RELEASE ORAL at 20:09

## 2021-10-03 RX ADMIN — AMLODIPINE BESYLATE 5 MG: 5 TABLET ORAL at 09:29

## 2021-10-03 RX ADMIN — Medication 6 MG: at 00:53

## 2021-10-03 RX ADMIN — DONEPEZIL HYDROCHLORIDE 5 MG: 5 TABLET, FILM COATED ORAL at 20:07

## 2021-10-03 RX ADMIN — ENOXAPARIN SODIUM 30 MG: 30 INJECTION SUBCUTANEOUS at 20:11

## 2021-10-03 RX ADMIN — SODIUM CHLORIDE, PRESERVATIVE FREE 10 ML: 5 INJECTION INTRAVENOUS at 20:13

## 2021-10-03 RX ADMIN — DULOXETIN HYDROCHLORIDE 30 MG: 30 CAPSULE, DELAYED RELEASE ORAL at 13:50

## 2021-10-03 RX ADMIN — SODIUM CHLORIDE, PRESERVATIVE FREE 10 ML: 5 INJECTION INTRAVENOUS at 09:29

## 2021-10-03 RX ADMIN — ENOXAPARIN SODIUM 30 MG: 30 INJECTION SUBCUTANEOUS at 09:29

## 2021-10-03 RX ADMIN — AMLODIPINE BESYLATE 5 MG: 5 TABLET ORAL at 01:32

## 2021-10-03 RX ADMIN — GABAPENTIN 100 MG: 100 CAPSULE ORAL at 13:48

## 2021-10-03 RX ADMIN — METRONIDAZOLE 200 MG: 500 TABLET ORAL at 20:10

## 2021-10-03 RX ADMIN — METOPROLOL TARTRATE 50 MG: 50 TABLET, FILM COATED ORAL at 13:51

## 2021-10-03 RX ADMIN — LEVOCETIRIZINE DIHYDROCHLORIDE 5 MG: 5 TABLET, FILM COATED ORAL at 20:08

## 2021-10-03 ASSESSMENT — ENCOUNTER SYMPTOMS
BACK PAIN: 1
SHORTNESS OF BREATH: 0
ABDOMINAL PAIN: 0
CONSTIPATION: 0
VOMITING: 0
COUGH: 0
NAUSEA: 0
BOWEL INCONTINENCE: 0
SORE THROAT: 0
EYE PAIN: 0

## 2021-10-03 NOTE — H&P
History and Physical Service  [facility] Internal Medicine    HISTORY AND PHYSICAL EXAMINATION            Date:   10/3/2021  Patient name:  Puja Blanco  MRN:   [id number]  Account:  [de-identified]  YOB: 1945  PCP:    Ha Humphries MD  Code Status:    Full Code    Chief Complaint:   Fall and chest pain    History Obtained From:     Patient ,medical record ,nursing staff    History of Present Illnes       The patient is a 67 y.o.   Non- / non  female who presents   34-year-old -American lady morbidly obese BMI 47.5 weighs 276 pounds history of chronic DJD back pain poor mobility was at the [employer]  parking lot she was trying to get out of her car to get in the car where her ankle twisted and she fell  Emergency room patient told about her intermittent chest pain nonspecific hard to describe as patient is not very mobile and uses mostly the carts no exertional dyspnea no exertional chest pain not reproducible      Past Medical History:   Diagnosis Date    Abnormality of rib determined by X-ray 1/28/2016    Acute cystitis without hematuria 6/16/2018    Acute exacerbation of chronic bronchitis ([address]     Acute on chronic diastolic heart failure ([address] 1/28/2016    Adjustment disorder with mixed anxiety and depressed mood 6/26/2015    Mild     Anemia 3/5/2014    Back pain     Bronchiectasis with acute lower respiratory infection ([address] 8/16/2017    Bronchitis     Chronic bronchitis ([address] 1/28/2016    Chronic cough 7/23/2018    COPD (chronic obstructive pulmonary disease) (HCC)     asbestos related lung disease    Degenerative joint disease (DJD) of hip 5/14/2015    Dyslipidemia 3/5/2014    Encephalopathy acute 5/29/2016    Essential hypertension 1/28/2016    Fibromyalgia     GERD (gastroesophageal reflux disease)     History of total bilateral knee replacement 5/28/2016    Hx of blood clots     left leg and lung    Hyperlipidemia     Hypertension     performed by Tommy Hutchison MD at [address] 5/14/15    total hip replacement        Medications Prior to Admission:     Prior to Admission medications    Medication Sig Start Date End Date Taking? Authorizing Provider   metoprolol succinate (TOPROL XL) 50 MG extended release tablet Take 1 tablet by mouth daily  Patient taking differently: Take 50 mg by mouth 2 times daily  9/29/21  Yes Darcie Tabor MD   DULoxetine (CYMBALTA) 30 MG extended release capsule Take 30 mg by mouth 2 times daily   Yes Historical Provider, MD   gabapentin (NEURONTIN) 100 MG capsule Take 100 mg by mouth 3 times daily. Yes Historical Provider, MD   oxyCODONE-acetaminophen (PERCOCET) 7.5-325 MG per tablet Take 1 tablet by mouth every 6 hours as needed for Pain for up to 30 days. 9/13/21 10/13/21 Yes KARIN Santos - CNP   levocetirizine (XYZAL) 5 MG tablet take 1 tablet by mouth once daily AT NIGHT 9/8/21  Yes Lakisha Mckeon MD   donepezil (ARICEPT) 5 MG tablet take 1 tablet by mouth at bedtime 9/1/21  Yes Lakisha Mckeon MD   psyllium (KONSYL) 28.3 % PACK Take 1 packet by mouth daily   Yes Historical Provider, MD   albuterol sulfate  (90 Base) MCG/ACT inhaler Inhale 2 puffs into the lungs 4 times daily as needed for Wheezing 4/20/20  Yes Lakisha Mckeon MD   aspirin 81 MG EC tablet Take 81 mg by mouth daily    Yes Historical Provider, MD   Cyanocobalamin (VITAMIN B-12) 1000 MCG/15ML LIQD Take 1,000 mcg by mouth daily     Historical Provider, MD   Catheters (STANDARD CARE EXTERNAL CATH) MISC 1 each by Does not apply route daily 3/2/21   Marissa Gonzales MD   Catheters (STANDARD CARE EXTERNAL CATH) MISC 1 Device by Does not apply route daily 11/30/20   Lakisha Mckeon MD   Misc.  Devices [facility]) MISC 1 each by Does not apply route daily Upright walker with wheels and seat 11/12/18   oTmmy Hutchison MD        Allergies:     Rosuvastatin calcium, Statins, Bactrim [sulfamethoxazole-trimethoprim], Caffeine, Dye [iodides], Food, Ioxaglate, Tomato, Dicloxacillin, and Pcn [penicillins]    Social History:     Tobacco:    reports that she quit smoking about 28 years ago. She has a 18.00 pack-year smoking history. She has never used smokeless tobacco.  Alcohol:      reports no history of alcohol use. Drug Use:  reports no history of drug use. Family History:     Family History   Problem Relation Age of Onset    Stomach Cancer Brother         stomach    High Blood Pressure Mother     Heart Disease Sister         irregular heartbeat       Review of Systems:     Positive and Negative as described in HPI. CONSTITUTIONAL:  negative for fevers, chills, sweats, fatigue, weight loss  HEENT:  negative for vision, hearing changes, runny nose, throat pain  RESPIRATORY:  negative for shortness of breath, cough, congestion, wheezing. CARDIOVASCULAR: Positive for chest pain positive for ankle twist and fall. GASTROINTESTINAL:  negative for nausea, vomiting, diarrhea, constipation, change in bowel habits, abdominal pain   GENITOURINARY:  negative for difficulty of urination, burning with urination, frequency   INTEGUMENT:  negative for rash, skin lesions, easy bruising   HEMATOLOGIC/LYMPHATIC:  negative for swelling/edema   ALLERGIC/IMMUNOLOGIC:  negative for urticaria , itching  ENDOCRINE:  negative increase in drinking, increase in urination, hot or cold intolerance  MUSCULOSKELETAL: Chronic back pain negative joint pains, muscle aches, swelling of joints  NEUROLOGICAL:  negative for headaches, dizziness, lightheadedness, numbness, pain, tingling extremities  BEHAVIOR/PSYCH:  negative for depression, anxiety    Physical Exam:   BP (!) 178/72   Pulse 56   Temp 98.1 °F (36.7 °C) (Oral)   Resp 18   Ht 5' 4\" (1.626 m)   Wt 276 lb 10.8 oz (125.5 kg)   SpO2 97%   BMI 47.49 kg/m²   No results for input(s): POCGLU in the last 72 hours.   Severe morbid obesity BMI 47.5  General Basophils 1 0 - 2 %    Segs Absolute 4.20 1.3 - 9.1 k/uL    Absolute Lymph # 1.50 1.0 - 4.8 k/uL    Absolute Mono # 0.30 0.1 - 1.3 k/uL    Absolute Eos # 0.00 0.0 - 0.4 k/uL    Basophils Absolute 0.10 0.0 - 0.2 k/uL   Comprehensive Metabolic Panel    Collection Time: 10/02/21  6:00 PM   Result Value Ref Range    Glucose 94 70 - 99 mg/dL    BUN 30 (H) 8 - 23 mg/dL    CREATININE 1.12 (H) 0.50 - 0.90 mg/dL    Bun/Cre Ratio NOT REPORTED 9 - 20    Calcium 9.3 8.6 - 10.4 mg/dL    Sodium 137 135 - 144 mmol/L    Potassium 4.4 3.7 - 5.3 mmol/L    Chloride 103 98 - 107 mmol/L    CO2 23 20 - 31 mmol/L    Anion Gap 11 9 - 17 mmol/L    Alkaline Phosphatase 68 35 - 104 U/L    ALT 13 5 - 33 U/L    AST 13 <32 U/L    Total Bilirubin 0.29 (L) 0.3 - 1.2 mg/dL    Total Protein 7.4 6.4 - 8.3 g/dL    Albumin 4.1 3.5 - 5.2 g/dL    Albumin/Globulin Ratio NOT REPORTED 1.0 - 2.5    GFR Non- 47 (L) >60 mL/min    GFR  57 (L) >60 mL/min    GFR Comment          GFR Staging NOT REPORTED    Troponin    Collection Time: 10/02/21  6:00 PM   Result Value Ref Range    Troponin, High Sensitivity 20 (H) 0 - 14 ng/L    Troponin T NOT REPORTED <0.03 ng/mL    Troponin Interp NOT REPORTED    D-Dimer, Quantitative    Collection Time: 10/02/21  6:00 PM   Result Value Ref Range    D-Dimer, Quant 1.28 (H) 0.00 - 0.59 mg/L FEU   EKG 12 Lead    Collection Time: 10/02/21  6:14 PM   Result Value Ref Range    Ventricular Rate 59 BPM    Atrial Rate 59 BPM    P-R Interval 194 ms    QRS Duration 88 ms    Q-T Interval 450 ms    QTc Calculation (Bazett) 445 ms    P Axis 68 degrees    R Axis 6 degrees    T Axis 10 degrees   Troponin    Collection Time: 10/02/21 10:06 PM   Result Value Ref Range    Troponin, High Sensitivity 24 (H) 0 - 14 ng/L    Troponin T NOT REPORTED <0.03 ng/mL    Troponin Interp NOT REPORTED    Comprehensive Metabolic Panel w/ Reflex to MG    Collection Time: 10/03/21  5:31 AM   Result Value Ref Range    Glucose 116 (H) 70 - 99 mg/dL    BUN 29 (H) 8 - 23 mg/dL    CREATININE 0.98 (H) 0.50 - 0.90 mg/dL    Bun/Cre Ratio NOT REPORTED 9 - 20    Calcium 9.1 8.6 - 10.4 mg/dL    Sodium 138 135 - 144 mmol/L    Potassium 4.7 3.7 - 5.3 mmol/L    Chloride 104 98 - 107 mmol/L    CO2 24 20 - 31 mmol/L    Anion Gap 10 9 - 17 mmol/L    Alkaline Phosphatase 66 35 - 104 U/L    ALT 12 5 - 33 U/L    AST 13 <32 U/L    Total Bilirubin 0.28 (L) 0.3 - 1.2 mg/dL    Total Protein 7.1 6.4 - 8.3 g/dL    Albumin 3.8 3.5 - 5.2 g/dL    Albumin/Globulin Ratio NOT REPORTED 1.0 - 2.5    GFR Non-African American 55 (L) >60 mL/min    GFR African American >60 >60 mL/min    GFR Comment          GFR Staging NOT REPORTED    Magnesium    Collection Time: 10/03/21  5:31 AM   Result Value Ref Range    Magnesium 2.5 1.6 - 2.6 mg/dL   CBC    Collection Time: 10/03/21  5:31 AM   Result Value Ref Range    WBC 6.1 3.5 - 11.0 k/uL    RBC 4.05 4.0 - 5.2 m/uL    Hemoglobin 10.8 (L) 12.0 - 16.0 g/dL    Hematocrit 33.0 (L) 36 - 46 %    MCV 81.5 80 - 100 fL    MCH 26.6 26 - 34 pg    MCHC 32.7 31 - 37 g/dL    RDW 14.2 11.5 - 14.9 %    Platelets 467 052 - 962 k/uL    MPV 8.3 6.0 - 12.0 fL    NRBC Automated NOT REPORTED per 100 WBC   Lipid panel - fasting    Collection Time: 10/03/21  5:31 AM   Result Value Ref Range    Cholesterol 272 (H) <200 mg/dL    HDL 43 >40 mg/dL    LDL Cholesterol 214 (H) 0 - 130 mg/dL    Chol/HDL Ratio 6.3 (H) <5    Triglycerides 73 <150 mg/dL    VLDL NOT REPORTED 1 - 30 mg/dL       Recent Labs     10/03/21  0531   HGB 10.8*   HCT 33.0*   WBC 6.1   MCV 81.5      K 4.7      CO2 24   BUN 29*   CREATININE 0.98*   GLUCOSE 116*   AST 13   ALT 12   LABALBU 3.8       Hematology:  Recent Labs     10/02/21  1800 10/03/21  0531   WBC 6.1 6.1   RBC 4.11 4.05   HGB 10.7* 10.8*   HCT 33.3* 33.0*   MCV 81.0 81.5   MCH 26.0 26.6   MCHC 32.1 32.7   RDW 14.1 14.2    284   MPV 8.0 8.3   DDIMER 1.28*  --      Chemistry:  Recent Labs     10/02/21  1800 10/02/21  2206 10/03/21  0531     --  138   K 4.4  --  4.7     --  104   CO2 23  --  24   GLUCOSE 94  --  116*   BUN 30*  --  29*   CREATININE 1.12*  --  0.98*   MG  --   --  2.5   ANIONGAP 11  --  10   LABGLOM 47*  --  55*   GFRAA 57*  --  >60   CALCIUM 9.3  --  9.1   TROPONINT NOT REPORTED NOT REPORTED  --      Recent Labs     10/02/21  1800 10/03/21  0531   PROT 7.4 7.1   LABALBU 4.1 3.8   AST 13 13   ALT 13 12   ALKPHOS 68 66   BILITOT 0.29* 0.28*   CHOL  --  272*   HDL  --  43   LDLCHOLESTEROL  --  214*   CHOLHDLRATIO  --  6.3*   TRIG  --  73   VLDL  --  NOT REPORTED       Imaging/Diagnostics:       XR ANKLE RIGHT (MIN 3 VIEWS)    Result Date: 10/2/2021  EXAMINATION: THREE XRAY VIEWS OF THE RIGHT ANKLE 10/2/2021 5:44 pm COMPARISON: None. HISTORY: ORDERING SYSTEM PROVIDED HISTORY: pain TECHNOLOGIST PROVIDED HISTORY: pain Reason for Exam: twisted ankle while walking Acuity: Unknown Type of Exam: Unknown FINDINGS: Right ankle soft tissue swelling particularly laterally. There is no evidence of an acute fracture of the right ankle. Ankle mortise is symmetric. There is prominent calcaneal spurring and there is evidence of pes planus. There is prominent dorsal tarsal bone spurring. Right ankle soft tissue swelling with no evidence of an acute fracture. Degenerative changes in the mid and hindfoot visualized as above.      IR LUMBAR PUNCTURE FOR DIAGNOSIS    Result Date: 9/26/2021  EXAMINATION: FLUOROSCOPIC GUIDED LUMBAR PUNCTURE 9/25/2021 8:22 pm HISTORY: ORDERING SYSTEM PROVIDED HISTORY: GBS: bilateral lower extremity weakness; recent flu like illness TECHNOLOGIST PROVIDED HISTORY: GBS: bilateral lower extremity weakness; recent flu like illness FLUOROSCOPY DOSE AND TYPE OR TIME AND EXPOSURES:  centigrays cm squared PROCEDURE: :  Brian Van MD Informed consent was obtained after the risks and benefits of the procedure were discussed with the patient and all questions were answered fully. Universal protocol was observed and a standard timeout was performed. The patient was positioned prone and the back was prepped and draped in the normal sterile fashion. 1% lidocaine was used for local anesthesia. The subarachnoid space was accessed with a 20-gauge 6 inch \"spinal needle at the L1-L2 level. Free flow of clear CSF was noted. Approximately 6 ml of CSF was removed and sent for analysis. The stylet was reinserted, spinal needle was removed and brief pressure was applied at the puncture site. There were no immediate complications and the patient tolerated the procedure well. Successful fluoroscopic-guided lumbar puncture. FLUORO FOR SURGICAL PROCEDURES    Result Date: 9/30/2021  Radiology exam is complete. No Radiologist dictation. Please follow up with ordering provider. MRI BRAIN WO CONTRAST    Result Date: 9/24/2021  EXAMINATION: MRI OF THE BRAIN WITHOUT CONTRAST  9/24/2021 4:43 pm TECHNIQUE: Multiplanar multisequence MRI of the brain was performed without the administration of intravenous contrast. COMPARISON: None. HISTORY: ORDERING SYSTEM PROVIDED HISTORY: left lower extremity weakness FINDINGS: INTRACRANIAL STRUCTURES/VENTRICLES: There is no acute infarct. Mild periventricular and deep white matter T2/FLAIR hyperintensity, most consistent with chronic small vessel ischemic disease. No mass effect or midline shift. No evidence of an acute intracranial hemorrhage. The ventricles and sulci are prominent secondary to mild atrophy. The sellar/suprasellar regions appear unremarkable. The normal signal voids within the major intracranial vessels appear maintained. ORBITS: The visualized portion of the orbits demonstrate no acute abnormality. SINUSES: The visualized paranasal sinuses and mastoid air cells are well aerated. BONES/SOFT TISSUES: The bone marrow signal intensity appears normal. The soft tissues demonstrate no acute abnormality.      No acute intracranial abnormality. Mild chronic small vessel ischemic disease and mild atrophy.           Current Facility-Administered Medications   Medication Dose Route Frequency Provider Last Rate Last Admin    amLODIPine (NORVASC) tablet 5 mg  5 mg Oral Daily Latha Dc MD   5 mg at 10/03/21 0132    sodium chloride flush 0.9 % injection 5-40 mL  5-40 mL IntraVENous 2 times per day Jeniffer Kelley MD   10 mL at 10/02/21 2208    sodium chloride flush 0.9 % injection 10 mL  10 mL IntraVENous PRN El Hopkins MD        0.9 % sodium chloride infusion  25 mL IntraVENous PRN Jeniffer Kelley MD        ondansetron (ZOFRAN-ODT) disintegrating tablet 4 mg  4 mg Oral Q8H PRN El Hopkins MD        Or    ondansetron (ZOFRAN) injection 4 mg  4 mg IntraVENous Q6H PRN Jeniffer Kelley MD        acetaminophen (TYLENOL) tablet 650 mg  650 mg Oral Q6H PRN Jeniffer Kelley MD        Or    acetaminophen (TYLENOL) suppository 650 mg  650 mg Rectal Q6H PRN El Hopkins MD        magnesium hydroxide (MILK OF MAGNESIA) 400 MG/5ML suspension 30 mL  30 mL Oral Daily PRN El Hopkins MD        enoxaparin (LOVENOX) injection 30 mg  30 mg SubCUTAneous BID Jeniffer Kelley MD   30 mg at 10/02/21 2206    potassium chloride (KLOR-CON M) extended release tablet 40 mEq  40 mEq Oral PRN Jeniffer Kelley MD        Or    potassium bicarb-citric acid (EFFER-K) effervescent tablet 40 mEq  40 mEq Oral PRN Jeniffer Kelley MD        Or    potassium chloride 10 mEq/100 mL IVPB (Peripheral Line)  10 mEq IntraVENous PRN Jeniffer Kelley MD        potassium chloride 10 mEq/100 mL IVPB (Peripheral Line)  10 mEq IntraVENous PRN Jeniffer Kelley MD        magnesium sulfate 1000 mg in dextrose 5% 100 mL IVPB  1,000 mg IntraVENous PRN Jeniffer Kelley MD        nitroGLYCERIN (NITROSTAT) SL tablet 0.4 mg  0.4 mg SubLINGual Q5 Min PRN Jeniffer Kelley MD        aspirin chewable tablet 81 mg  81 mg Oral Daily Jeniffer Kelley MD  melatonin tablet 6 mg  6 mg Oral Nightly PRN Gigi Garcia MD   6 mg at 10/03/21 0053    albuterol sulfate  (90 Base) MCG/ACT inhaler 2 puff  2 puff Inhalation 4x Daily PRN El Dina Koehler MD        donepezil (ARICEPT) tablet 5 mg  5 mg Oral Nightly El Koehler MD        DULoxetine (CYMBALTA) extended release capsule 30 mg  30 mg Oral BID Lethea Morning, MD        gabapentin (NEURONTIN) capsule 100 mg  100 mg Oral TID Lethradha Garcia MD        cetirizine (ZYRTEC) tablet 10 mg  10 mg Oral Daily El Koehler MD        metoprolol succinate (TOPROL XL) extended release tablet 50 mg  50 mg Oral BID El Koehler MD        psyllium (METAMUCIL) 58.12 % packet 1 packet  1 packet Oral Daily El Koehler MD        oxyCODONE-acetaminophen (PERCOCET) 5-325 MG per tablet 1 tablet  1 tablet Oral Q6H PRN Gigi Garcia MD        And    oxyCODONE (ROXICODONE) immediate release tablet 2.5 mg  2.5 mg Oral Q6H PRN Gigi Morning, MD         Impressions :      1. Active Problems:    Chest pain  Resolved Problems:    * No resolved hospital problems.  *        2.  has a past medical history of Abnormality of rib determined by X-ray (1/28/2016), Acute cystitis without hematuria (6/16/2018), Acute exacerbation of chronic bronchitis ([address] Acute on chronic diastolic heart failure ([facility] [address] [address] (1/28/2016), Adjustment disorder with mixed anxiety and depressed mood (6/26/2015), Anemia (3/5/2014), Back pain, Bronchiectasis with acute lower respiratory infection ([address] (8/16/2017), Bronchitis, Chronic bronchitis ([address] (1/28/2016), Chronic cough (7/23/2018), COPD (chronic obstructive pulmonary disease) ([address] Degenerative joint disease (DJD) of hip (5/14/2015), Dyslipidemia (3/5/2014), Encephalopathy acute (5/29/2016), Essential hypertension (1/28/2016), Fibromyalgia, GERD (gastroesophageal reflux disease), History of total bilateral knee replacement (5/28/2016), blood clots, Hyperlipidemia, Hypertension, Incontinence of urine, Irregular heartbeat, Medication monitoring encounter (6/13/2018), Morbid obesity with BMI of 45.0-49.9, adult ([facility] [address] (1/28/2016), Obstructive sleep apnea syndrome, Osteoarthritis, Primary osteoarthritis of left knee (5/27/2016), PVC (premature ventricular contraction) (1/28/2016), S/P right and left heart catheterization (10/2/2015), Sleep apnea, SOB (shortness of breath), Spinal stenosis of lumbar region with neurogenic claudication (5/13/2013), Supplemental oxygen dependent (12/8/2017), Urine frequency, and UTI (urinary tract infection). Plans:     58-year-old -American lady ex-smoker quit 28 years ago  History of DJD poor mobility with spinal stenosis  Mechanical fall yesterday at [facility] parking lot  Chest pain intermittent for few weeks  Stress 3 years ago was normal  Flat troponin at 25  Rule out coronary artery disease  Stress test in a.m.   Poor mobility with chronic back issues recent admission for the same extensive work-up nonsurgical  Consult cardio dr Ava Briones MD  10/3/2021  8:24 AM

## 2021-10-03 NOTE — CONSULTS
Date:   10/3/2021  Patient name: Rupa Solomon  Date of admission:  10/2/2021  4:23 PM  MRN:   [id number]  YOB: 1945  PCP: Aliyah Momin MD    Reason for Admission: Chest pain [R07.9]  General weakness [R53.1]  Fall, initial encounter [W19. XXXA]  Chest pain, unspecified type [R07.9]    Cardiology consult       Impression    Admission 10- with atypical chest pain  H/O irregular heart beats  History of fibromyalgia   Morbid obesity, BMI 48  On oxygen at home, sleep apnea  Limited mobility  History of left leg DVT  Neuropathy  Chronic low back pain  Asbestosis      No history of coronary intervention      History of present illness    76-year-old female who lives alone, independent in her activities of daily living,  with past medical history of obstructive sleep apnea, asbestosis on home oxygen, morbid obesity, fibromyalgia and history of left leg DVT got admitted on 10/2/2021 with chest discomfort. She twisted her Rt ankle on going from a vehicle to to her scooter. She had fall. No LOC. No fever no chills no productive cough. ECG no ischemic changes    Current evaluation  Pt seen and examined  Morbidly obese female, did not appears in any distress  On O2 / NC 2L, saturation 97 %  No CP no SOB  No obvious sign of cardiac decompesation    Lab work    Sodium 137, potassium 4.4, BUN 30, creatinine 1.12  High-sensitivity troponin XX 4  Cholesterol 272, , HDL 43  Hemoglobin 10.7, WBC 6.1, platelets 287    ECG 10/2/2021  Sinus rhythm heart rate 59 otherwise normal ECG    2D echo 6/15/2018  Normal LV size ejection fraction 50 to 55%  Mild LVH  No significant valvular abnormality    Cardiac cath 6/9/2015  Mild CAD, normal LV function      Medications:   Scheduled Meds:   amLODIPine  5 mg Oral Daily    metoprolol tartrate  50 mg Oral BID    levocetirizine  5 mg Oral Nightly    gabapentin  100 mg Oral TID    sodium chloride flush  5-40 mL IntraVENous 2 times per day    enoxaparin  30 mg SubCUTAneous BID    aspirin  81 mg Oral Daily    donepezil  5 mg Oral Nightly    DULoxetine  30 mg Oral BID    psyllium  1 packet Oral Daily     Continuous Infusions:   sodium chloride       CBC:   Recent Labs     10/02/21  1800 10/03/21  0531   WBC 6.1 6.1   HGB 10.7* 10.8*    284     BMP:    Recent Labs     10/02/21  1800 10/03/21  0531    138   K 4.4 4.7    104   CO2 23 24   BUN 30* 29*   CREATININE 1.12* 0.98*   GLUCOSE 94 116*     Hepatic:   Recent Labs     10/02/21  1800 10/03/21  0531   AST 13 13   ALT 13 12   BILITOT 0.29* 0.28*   ALKPHOS 68 66     Troponin: No results for input(s): TROPONINI in the last 72 hours. BNP: No results for input(s): BNP in the last 72 hours. Lipids:   Recent Labs     10/03/21  0531   CHOL 272*   HDL 43     INR: No results for input(s): INR in the last 72 hours. Objective:   Vitals: BP (!) 170/74   Pulse 60   Temp 98.1 °F (36.7 °C) (Oral)   Resp 18   Ht 5' 4\" (1.626 m)   Wt 276 lb 10.8 oz (125.5 kg)   SpO2 97%   BMI 47.49 kg/m²   General appearance: alert and cooperative with exam  HEENT: Head: Normal, normocephalic, atraumatic. Neck: supple, symmetrical, trachea midline  Lungs: diminished breath sounds bibasilar  Heart: regular rate and rhythm  Abdomen: Very obese BS +  Extremities: Homans sign is negative, no sign of DVT  Neurologic: Mental status: Alert, oriented, thought content appropriate    EKG: normal EKG,   ECHO: reviewed.    Ejection fraction: 55%    Assessment / Acute Cardiac Problems:   Atypical CP no ischemic ECG changes  Morbid obesity  No significant CAD, cardiac cath 2015  Fibromyalgia  LUZ on O2    Patient Active Problem List:     Spinal stenosis of lumbar region with neurogenic claudication     Dyslipidemia     Anemia     Fibromyalgia     Degenerative joint disease (DJD) of hip     Adjustment disorder with mixed anxiety and depressed mood     S/P right and left heart catheterization     SOB (shortness of breath)     Acute on chronic diastolic heart failure ([facility])     Essential hypertension     Morbid obesity with BMI of 45.0-49.9, adult ([facility])     PVC (premature ventricular contraction)     Abnormality of rib determined by X-ray     Chronic bronchitis ([facility])     Sleep apnea     History of total bilateral knee replacement     Encephalopathy acute     Obstructive sleep apnea syndrome     Primary osteoarthritis of left knee     Bronchiectasis with acute lower respiratory infection ([facility])     Supplemental oxygen dependent     Back pain     Medication monitoring encounter     Acute cystitis without hematuria     Chronic cough     Acquired spondylolisthesis     Cervical spine ankylosis     Acute low back pain     Neck pain     Closed compression fracture of L1 lumbar vertebra     Cervical spinal stenosis     BMI 40.0-44.9, adult ([facility])     Chronic prescription opiate use     Age-related osteoporosis with current pathological fracture     S/P kyphoplasty     Morbid obesity ([facility])     DDD (degenerative disc disease), lumbar     Lumbar radiculopathy     Depression     Osteopenia of lumbar spine     Pain of left hip joint     UTI (urinary tract infection)     Physical debility     COPD (chronic obstructive pulmonary disease) ([facility])     Weakness of left lower extremity     Lumbar spondylosis     Chest pain      Plan of Treatment:   Medications checked  Continue current dose of amlidipine and BB  Add Lipitor 40 mg daily  Ambulate as tolerated  If Pt C/O CP during exertion she will need Mary scan stress test    Electronically signed by Biju Gonzalez MD on 10/3/2021 at 12:33 PM

## 2021-10-03 NOTE — FLOWSHEET NOTE
10/02/21 2205   Provider Notification   Reason for Communication Review case   Provider Name Dr. Carrie Cuello   Provider Notification Physician   Method of Communication Call   Response No new orders   Notification Time [phone]     RN updated Dr. Carrie Cuello of pt's new admission, lab values and pt not wanting to take Coreg. RN ok'd for pt to continue to take home meds and Dr. Carrie Cuello will see pt in the morning.

## 2021-10-03 NOTE — PROGRESS NOTES
RN went in to provide karthik care and purewick change. Pt refused explaining that she wanted to sleep more.

## 2021-10-03 NOTE — FLOWSHEET NOTE
10/03/21 0056   Provider Notification   Reason for Communication Review case   Provider Name Dr. Ilene Young   Provider Notification Physician   Method of Communication Call   Response See orders   Notification Time      RN updated Dr. Ilene Young of pt's BP continueing to rise even with pt taking home med of 50mg Metoprolol. RN to order Norvasc 5mg daily with one dose starting now. See MAR/orders.

## 2021-10-03 NOTE — CARE COORDINATION
CASE MANAGEMENT NOTE:    Admission Date:  10/2/2021 Raul Nguyen is a 67 y.o.  female    Admitted for : Chest pain [R07.9]  General weakness [R53.1]  Fall, initial encounter [W19. XXXA]  Chest pain, unspecified type [R07.9]    Met with:  Patient    PCP:  Dr. Rivas Gtz:  [employer]      Is patient alert and oriented at time of discussion:  Yes    Current Residence/ Living Arrangements:  at home dependent on family care             Current Services PTA:  No    Does patient go to outpatient dialysis: No  If yes, location and chair time: N/A    Is patient agreeable to VNS: Yes    Freedom of choice provided:  Yes    List of [address] provided: Yes    VNS chosen:  No    DME:  straight cane, walker and wheelchair    Home Oxygen: Yes    Nebulizer: Yes    CPAP/BIPAP: No    Supplier: [employer]    Potential Assistance Needed: Yes    SNF needed: Yes    Freedom of choice and list provided: Yes - Wants [address] of [place] or [address]:  [facility]       Does Patient want to use MEDS to BEDS? No    Is patient currently receiving oral anticoagulation therapy? No    Is the Patient an [facility] with Readmission Risk Score greater than 14%? NA  If yes, pt needs a follow up appointment made within 7 days. Family Members/Caregivers that pt would like involved in their care:    Yes    If yes, list name here:  Daughter Wilfrid Carias and Lakshmi Quintanilla    Transportation Provider:  Patient and Family             Discharge Plan:  10/3: Amee discharge to [facility]. She is currently from home and now wants either [address] [place] [address] vs [facility] to follow on Monday. Cardio consult with stress test ordered. Need PT/OT orders.  BIBIANA NEEDS SIGNED/COMPLETED. Xiao Soto                 Electronically signed by: Juan Jose Edmondson RN on 10/3/2021 at 4:22 PM

## 2021-10-03 NOTE — PROGRESS NOTES
Rupa Solomon is a 67 y.o. female evaluated on 10/6/2021. Modality of virtual service provided -via telephone   Consent:  Patient and/or health care decision maker is aware that that patient may receive a bill for this telephone service, depending on one's insurance coverage, and has provided verbal consent to proceed: Yes    Patient identification was verified at the start of the visit: Yes    Chief complaint: Rupa Solomon is 67 y.o.,  female, with  with chief complaint of pain involving low back. .    Patient is complaining of pain involving the low back area worse on the left side. This patient had undergone lumbar facet joint injections at the levels of L1-2 through L5-S1 on 9/13/2021. She reports about 90% improvement in the pain and overall she felt better. Apparently she twisted her ankle on Sunday and went to the hospital was admitted to the hospital and was released to an extended care facility today. Patient reports she just got into the extended care facility. Patient is not sure whether she is going to use her own medication there will be giving her pain medication. She reported that in the hospital she used her medications as she was under observation status. Patient also reports she is having pain in her left hip area which is somewhat worse which she rates about somewhere between 5-7 on a scale of 0-7. Back Pain  This is a chronic problem. The current episode started more than 1 year ago (Worse since the fall in December 21st ,2019). The problem occurs constantly. The problem has been gradually worsening since onset. The pain is present in the sacro-iliac, thoracic spine and lumbar spine. The quality of the pain is described as aching Zack Poll, nagging). Radiates to: Towards both hips worse on the right. The pain is at a severity of 3/10 (Pain is worse on the left side at about 5). The pain is moderate. The pain is worse during the day.  The symptoms are aggravated by bending, position, standing and twisting (Walking lifting her ADLs ,with any movement). Associated symptoms include weakness. Pertinent negatives include no abdominal pain, bladder incontinence, bowel incontinence, chest pain, dysuria, fever, numbness or tingling. (Legs feel numb and weak) Risk factors include lack of exercise, obesity and history of osteoporosis. Alleviating factors:heat and rest   Lifestyle changes experienced with pain: Prevents or limits ADLs, Increases w/activity. Increases w/prolonged sitting/standing/walking  Mood changes,none  Patient currently unemployed. Physical therapy did not help the pain. Are you under psychological counseling at present: No  Goals for treatment include:  Decrease in pain  Enjoy daily and recreational activities, return to previous status. Last procedure was  Lumbar facet joint injections at the levels of  L1-L2, L2-L3, L3-L4, L4-L5, L5-S1 on the Right side on 9/30/2021    Patient relates current medications are helping the pain. Patient reports taking pain medications as prescribed, denies obtaining medications from different sources and denies use of illegal drugs. Patient denies side effects from medications like nausea, vomiting, constipation or drowsiness. Patient reports current activities of daily living ar possible due to medications and would like to continue them. ACTIVITY/SOCIAL/EMOTIONAL:  Sleep Pattern: 7 hours per night.  generally restful sleep  Home Exercises:  no regular exercise  Activity:not significantly changed  Emotional Issues: normal.   Currently seeing a Psychiatrist or Psychologist:  No     ADVERSE MEDICATION EFFECTS:   Nausea and vomiting: no   Constipation: no-Undercontrol-: yes  Dizziness/drowsy/sleepy--no  Urinary Retention: no    ABERRANT BEHAVIORS SINCE LAST VISIT  Lost rx/pills:------------------------------------------ no  Taking  medication as prescribed: ----------- yes  Urine Drug Screen --------------------------------- yes             Date------------------------------------------------- 12/14/2020              Results as expected ---------------------yes    Recent ER visits: -------------------------------------No  Pill count is appropriate: ---------------------------yes   Refills for prescriptions appropriate:---------- yes      Past Medical History:   Diagnosis Date    Abnormality of rib determined by X-ray 1/28/2016    Acute cystitis without hematuria 6/16/2018    Acute exacerbation of chronic [facility]itis [facility]     Acute on chronic diastolic heart failure ([facility] 1/28/2016    Adjustment disorder with mixed anxiety and depressed mood 6/26/2015    Mild     Anemia 3/5/2014    Back pain     Bronchiectasis with acute lower respiratory infection ([facility] 8/16/2017    Bronchitis     Chronic bronchitis (HCC) 1/28/2016    Chronic cough 7/23/2018    COPD (chronic obstructive pulmonary disease) (HCC)     asbestos related lung disease    Degenerative joint disease (DJD) of hip 5/14/2015    Dyslipidemia 3/5/2014    Encephalopathy acute 5/29/2016    Essential hypertension 1/28/2016    Fibromyalgia     GERD (gastroesophageal reflux disease)     History of total bilateral knee replacement 5/28/2016    Hx of blood clots     left leg and lung    Hyperlipidemia     Hypertension     Incontinence of urine     Irregular heartbeat     DR. Olivia Romero Medication monitoring encounter 6/13/2018    Morbid obesity with BMI of 45.0-49.9, adult ([facility] 1/28/2016    Obstructive sleep apnea syndrome     Osteoarthritis     Primary osteoarthritis of left knee 5/27/2016    PVC (premature ventricular contraction) 1/28/2016    S/P right and left heart catheterization 10/2/2015    Sleep apnea     no machine    SOB (shortness of breath)     Spinal stenosis of lumbar region with neurogenic claudication 5/13/2013    Supplemental oxygen dependent 12/8/2017    Urine frequency     UTI (urinary tract infection)     hospitalized early july 2014 for kidney infection       Past Surgical History:   Procedure Laterality Date    BRONCHOSCOPY Left 8/16/2017    BRONCHOSCOPY ALVEOLAR LAVAGE LOWER LOBE performed by Rozanna Shone, [facility]  x 3    no stents    COLONOSCOPY  4 28 14    polyps biopsies     FOOT SURGERY Left     calcium deposit removal from top of foot    HYSTERECTOMY      JOINT REPLACEMENT Bilateral 5/27/2016    bilat total knees    NERVE BLOCK  5/13/2013    caudal celestone 6mg    NERVE BLOCK  5/28/13    Caudal #2, Celestone 9mg    NERVE BLOCK  2/14/14    rt hip inj celestone 9 mg    NERVE BLOCK  07/14/2014    caudal# 3- celestone 9 mg    NERVE BLOCK  7-21-14    caudal epidural #2, decadron 7mg, fentanyl 25mcg    NERVE BLOCK  10/2/14    duramorph 1.5  decadron 5mg    NERVE BLOCK  11/9/2015    caudal# 1 celestone 9mg    NERVE BLOCK  11/16/15    caudal #2  decadron 10mg    NERVE BLOCK  11/23/15    duramorph 1mg celestone 9mg    NERVE BLOCK  03/28/2018    CAUDAL EPIDURAL STEROID BLOCK  DECADRON 10 MG     NERVE BLOCK  05/23/2018    caudal # decadron 7mg    NERVE BLOCK  08/28/2018    natalia transforminal # 1, decadron 10mg gadoteridol, LONG NEEDLES    MO PERQ VERT AGMNTJ CAVITY CRTJ UNI/BI CANNULJ LMBR N/A 10/29/2018    KYPHOPLASTY L1 performed by Antonio Mancia MD at [address] TOTAL HIP ARTHROPLASTY Right 5/14/15    total hip replacement       Family History   Problem Relation Age of Onset    Stomach Cancer Brother         stomach    High Blood Pressure Mother     Heart Disease Sister         irregular heartbeat       Social History     Socioeconomic History    Marital status:      Spouse name: Not on file    Number of children: 2    Years of education: Not on file    Highest education level: Not on file   Occupational History    Not on file   Tobacco Use    Smoking status: Former Smoker     Packs/day: 1.00     Years: 18.00     Pack years: 18.00     Quit date: 1993     Years since quittin.4    Smokeless tobacco: Never Used   Vaping Use    Vaping Use: Never used   Substance and Sexual Activity    Alcohol use: No    Drug use: No    Sexual activity: Not Currently   Other Topics Concern    Not on file   Social History Narrative    Not on file     Social Determinants of Health     Financial Resource Strain: Low Risk     Difficulty of Paying Living Expenses: Not hard at all   Food Insecurity: No Food Insecurity    Worried About Running Out of Food in the Last Year: Never true    Bobby of Food in the Last Year: Never true   Transportation Needs: Unmet Transportation Needs    Lack of Transportation (Medical): Yes    Lack of Transportation (Non-Medical): Yes   Physical Activity: Unknown    Days of Exercise per Week: 0 days    Minutes of Exercise per Session: Not on file   Stress: No Stress Concern Present    Feeling of Stress : Only a little   Social Connections: Unknown    Frequency of Communication with Friends and Family: Twice a week    Frequency of Social Gatherings with Friends and Family: Twice a week    Attends Yazidi Services: Not on file   [employer] [address] [place] [address] or Organizations: Not on file    Attends Club or Organization Meetings: Not on file    Marital Status:    Intimate Partner Violence:     Fear of Current or Ex-Partner:     Emotionally Abused:     Physically Abused:     Sexually Abused:         Allergies   Allergen Reactions    Rosuvastatin Calcium Anaphylaxis     Hair fell out    Statins Anaphylaxis     Hair fell out    Bactrim [Sulfamethoxazole-Trimethoprim] Diarrhea    Caffeine Other (See Comments)     pain  pain    Dye [Iodides] Other (See Comments)     Iv dye= blood clots in arm    Food      Tomatoes, pain    Ioxaglate Other (See Comments)     Iv dye= blood clots in arm    Tomato     Dicloxacillin Rash    Pcn [Penicillins] Rash       Current Outpatient Medications on File Prior to Encounter Medication Sig Dispense Refill    DULoxetine (CYMBALTA) 30 MG extended release capsule Take 1 capsule by mouth 2 times daily for 3 days 6 capsule 0    gabapentin (NEURONTIN) 100 MG capsule Take 2 capsules by mouth 3 times daily for 3 days. 18 capsule 0    metoprolol tartrate (LOPRESSOR) 50 MG tablet Take 50 mg by mouth 2 times daily      Cyanocobalamin (VITAMIN B-12) 1000 MCG/15ML LIQD Take 1,000 mcg by mouth daily       levocetirizine (XYZAL) 5 MG tablet take 1 tablet by mouth once daily AT NIGHT 30 tablet 2    donepezil (ARICEPT) 5 MG tablet take 1 tablet by mouth at bedtime 90 tablet 1    psyllium (KONSYL) 28.3 % PACK Take 1 packet by mouth daily      Catheters (STANDARD CARE EXTERNAL CATH) MISC 1 each by Does not apply route daily 5 each 1    Catheters (STANDARD CARE EXTERNAL CATH) MISC 1 Device by Does not apply route daily 30 each 2    albuterol sulfate  (90 Base) MCG/ACT inhaler Inhale 2 puffs into the lungs 4 times daily as needed for Wheezing 1 Inhaler 2    Misc. Devices (WALKER) MISC 1 each by Does not apply route daily Upright walker with wheels and seat 1 each 0    aspirin 81 MG EC tablet Take 81 mg by mouth daily        No current facility-administered medications on file prior to encounter. Review of Systems   Constitutional: Positive for activity change and fatigue. Negative for appetite change, fever and unexpected weight change. HENT: Negative for congestion, hearing loss and sore throat. Eyes: Negative for pain and visual disturbance. Respiratory: Negative for cough and shortness of breath. Cardiovascular: Negative for chest pain and palpitations. Gastrointestinal: Negative for abdominal pain, bowel incontinence, constipation, nausea and vomiting. Endocrine: Negative for cold intolerance and polyuria. Genitourinary: Negative for bladder incontinence, dysuria and hematuria. Musculoskeletal: Positive for arthralgias, back pain and neck pain. Twisted her left ankle and is painful   Skin: Negative for rash. Neurological: Positive for weakness. Negative for tingling and numbness. Hematological:        History of DVT   Psychiatric/Behavioral: Negative for self-injury, sleep disturbance and suicidal ideas. The patient is not nervous/anxious. Physical Exam  Skin:         Neurological:      Mental Status: She is alert and oriented to person, place, and time. Psychiatric:         Mood and Affect: Mood normal.        Ortho Exam     DATA:  LAB.:  12/14/2020  1:43 PM - [facility], [facility] Incoming Lab Results From Nursing Home Quality    Component Value Ref Range & Units Status Collected Lab   Pain Management Drug Panel Interp, Urine Consistent   Final 12/10/2020 12:32 PM [facility]   (NOTE)   ________________________________________________________________   DRUGS EXPECTED:   OXYCODONE [TODAY]   ________________________________________________________________   CONSISTENT with medications provided:   OXYCODONE : based on oxycodone, noroxycodone, noroxymorphone        X-Ray reports:     STONE PROTOCOL CT OF THE ABDOMEN AND PELVIS       9/29/2020 8:14 pm; 9/29/2020 8:11 pm       TECHNIQUE:   CT of the left hip was performed without the administration of intravenous   contrast.  Multiplanar reformatted images are provided for review. Dose   modulation, iterative reconstruction, and/or weight based adjustment of the   mA/kV was utilized to reduce the radiation dose to as low as reasonably   achievable.; CT of the abdomen and pelvis was performed without the   administration of intravenous contrast. Multiplanar reformatted images are   provided for review. Dose modulation, iterative reconstruction, and/or weight   based adjustment of the mA/kV was utilized to reduce the radiation dose to as   low as reasonably achievable.        COMPARISON:   Left hip radiograph September 28, 2018; pelvis and left hip radiograph   January 1, 2020       HISTORY:   ORDERING SYSTEM PROVIDED HISTORY: fall pain   TECHNOLOGIST PROVIDED HISTORY:   fall pain   Reason for Exam: pt states she fell 10 days ago; abd pain and left hip pain   Acuity: Acute   Type of Exam: Initial; ORDERING SYSTEM PROVIDED HISTORY: abdominal pain, fall   TECHNOLOGIST PROVIDED HISTORY:   abdominal pain, fall       Reason for Exam: pt states she fell 10 days ago; abd pain and left hip pain   Acuity: Acute   Type of Exam: Initial   Relevant Medical/Surgical History: surg - hyst, right hip replacement,   kyphoplasty       FINDINGS:   CT RENAL STONE PROTOCOL:       LOWER CHEST:  Visualized portion of the lower chest demonstrates no acute   abnormality. Calcifications of the partially visualized aortic and mitral   valves. Chronic appearing scarring versus fibrotic change at the lung bases. Severe atherosclerotic changes of the imaged descending thoracic aorta. KIDNEYS AND URINARY TRACT: No renal calculi are identified. There is no   evidence for hydronephrosis. The ureters are of normal course and caliber. ORGANS: Lack of intravenous contrast limits evaluation of the solid organs   and bowel. The solid organs are grossly unremarkable. GI/BOWEL: No bowel obstruction. No evidence of acute appendicitis. PELVIS: The bladder and pelvic organs are unremarkable. PERITONEUM/RETROPERITONEUM: Severe atherosclerotic changes of the imaged   abdominal aorta. No retroperitoneal adenopathy identified. No free fluid or   free air identified within the abdomen and pelvis. BONES/SOFT TISSUES: No acute osseous or soft tissue abnormality identified. Postoperative changes of right total hip arthroplasty. Multilevel   degenerative changes of the spine which are moderate to severe. Note is made   of prior kyphoplasty at the L1 level. Grade 1 anterolisthesis of L4 on L5   which appears degenerative. Severe degenerative change of the left hip as   described below.   Degenerative changes of the pubic symphysis and arthropathy diffusely. Possible abdominal aortic aneurysm on radiography measuring 3.0 cm AP. Correlation with prior lumbar spine CT. Impression    Multilevel disc degeneration throughout the lumbar spine without evidence of    acute abnormality. If concern for acute fracture exists, CT versus MRI    should be considered. Probable abdominal aortic aneurysm. Nonemergent CT or ultrasound correlation    should be considered. Clinical  impression:  1. Spinal stenosis of lumbar region with neurogenic claudication    2. Fibromyalgia    3. Cervical spine ankylosis    4. DDD (degenerative disc disease), lumbar    5. S/P kyphoplasty    6. Cervical spinal stenosis    7. Lumbar radiculopathy    8. Acute midline low back pain, unspecified whether sciatica present        Plan of care: We will continue current pain medications  Current medications are being tolerated without any Adverse side effects. Orders Placed This Encounter   Medications    oxyCODONE-acetaminophen (PERCOCET) 7.5-325 MG per tablet     Sig: Take 1 tablet by mouth every 6 hours as needed for Pain for up to 3 days. Dispense:  120 tablet     Refill:  0     Reduce doses taken as pain becomes manageable     Urine drug screens have been appropriate. No aberrant activity noted. Analgesia is achieved. Activities of daily living are possible because of medications. Safe use of medications explained to patient. PDMP Monitoring:    Last PDMP Meeta Cummings as Reviewed [facility]):  Review User Review Instant Review Result   Mehrdad Naima 10/3/2021  4:31 AM Reviewed PDMP [1]     Counselling/Preventive measures for pain  Control:    [x]  Spine strengthening exercises are discussed with patient in detail. [x] Ill effects of being on chronic pain medications such as sleep disturbances, hormonal changes, withdrawal symptoms,  chronic opioid dependence and tolerance were discussed with patient.  I had asked the patient to minimize medication use and utilize pain medications only for uncontrolled rest pain or pain with exertional activities. I advised patient not to self escalate pain medications without consulting with us. At each of patient's future visits we will try to taper pain medications, while adjusting the adjunct medications, and re-evaluating for Physical Therapy to improve spinal and joint strength. We will continue to have discussions to decrease pain medications as tolerated. I also discussed with the patient regarding the dangers of combining narcotic pain medication with tranquilizers, alcohol or illegal drugs or taking the medication any other than prescribed. The dangers including the respiratory depression and death. Patient was told to tell  to all  physicians regarding the medications he is getting from pain clinic. Patient is warned not to take any unprescribed medications over-the-counter medications that can depress breathing . Patient is advised to talk to the pharmacist or physicians if planning to take any over-the-counter medications before  takeing them. Patient is strongly advised to avoid tranquilizers or  Relaxants for any medications that can depress breathing or recreational drugs. Patient is also advised to tell us if there is any changes in his medications from other physicians. We discussed the same at today's visit and have not been to implement it, as the patient's pain is not under control with current medications. Patient is complaining of pain in the low back especially worse on the left side. Patient would like to have her left lumbar facet joints injected and her pain is worse and she had good relief on the right side with the procedure. Patient at present is recovering from ankle sprain and is in an extended care facility.   She is advised to call the pain clinic after she recovers from the injury and discharged from the extended care facility for possible lumbar facet joint conducted, with patient's consent, to reduce the patient's risk of exposure to COVID-19 and provide continuity of care for an established patient. Services were provided through a video synchronous discussion virtually to substitute for in-person appointment. \"  Documentation:  I communicated with the patient and/or health care decision maker about plan of care  Details of this discussion including any medical advice provided: Total Time: minutes: 21-30 minutes    I affirm this is a Patient Initiated Episode with an Established Patient who has not had a related appointment within my department in the past 7 days or scheduled within the next 24 hours.     Electronically signed by Deborah Phillips MD on 10/6/2021 at 1:01 PM

## 2021-10-03 NOTE — FLOWSHEET NOTE
10/02/21 3781   Provider Notification   Reason for Communication Patient request   Provider Name Dr. Luisa Girard   Provider Notification Physician   Method of Communication Call   Response See orders   Notification Time [phone]     RN updated Dr. Luisa Girard that pt wanting Melatonin to help with sleep. RN also updated Dr. Luisa Girard that pt's home meds are ready for review if he would like to restart any. Dr. Luisa Girard to place own orders. See MAR/orders.

## 2021-10-03 NOTE — PLAN OF CARE
Problem: Falls - Risk of:  Goal: Will remain free from falls  Description: Will remain free from falls  10/3/2021 1550 by Kevin Anglin RN  Outcome: Ongoing   Pt assessed as a fall risk this shift. Remains free from falls and accidental injury at this time. Fall precautions in place, including falling star sign and fall risk band on pt. Floor free from obstacles, and bed is locked and in lowest position. Adequate lighting provided. Pt encouraged to call before getting OOB for any need. Bed alarm activated. Will continue to monitor needs during hourly rounding, and reinforce education on use of call light.     Problem: Skin Integrity:  Goal: Will show no infection signs and symptoms  Description: Will show no infection signs and symptoms  10/3/2021 1550 by Kevin Anglin RN  Outcome: Ongoing     Problem: Pain:  Goal: Pain level will decrease  Description: Pain level will decrease  10/3/2021 1550 by Kevin Anglin RN  Outcome: Ongoing

## 2021-10-03 NOTE — PLAN OF CARE
Problem: Falls - Risk of:  Goal: Will remain free from falls  Description: Will remain free from falls  10/3/2021 0311 by Kelli Montoya RN  Outcome: Ongoing  Note: Pt assessed as a fall risk this shift. Remains free from falls and accidental injury at this time. Fall precautions in place, including falling star sign and fall risk band on pt. Floor free from obstacles, and bed is locked and in lowest position. Adequate lighting provided. Pt encouraged to call before getting OOB for any need. Bed alarm activated. Will continue to monitor needs during hourly rounding, and reinforce education on use of call light. 10/3/2021 0311 by Kelli Montoya RN  Outcome: Ongoing     Problem: Skin Integrity:  Goal: Will show no infection signs and symptoms  Description: Will show no infection signs and symptoms  10/3/2021 0311 by Kelli Montoya RN  Outcome: Ongoing  Note: Skin assessment complete. Waffle mattress in place. Turned and repositioned every two hours per self. Area kept free from moisture. Proper nourishment and fluids encouraged, as appropriate. Will continue to monitor for additional needs and changes in skin breakdown. 10/3/2021 0311 by Kelli Montoya RN  Outcome: Ongoing     Problem: Pain:  Goal: Pain level will decrease  Description: Pain level will decrease  Outcome: Ongoing  Note: Pt medicated with pain medication prn. Assessed all pain characteristics including level, type, location, frequency, and onset. Non-pharmacologic interventions offered to pt as well. Pt states pain is tolerable at this time. Will continue to monitor.

## 2021-10-04 ENCOUNTER — APPOINTMENT (OUTPATIENT)
Dept: NUCLEAR MEDICINE | Age: 76
End: 2021-10-04
Payer: MEDICARE

## 2021-10-04 LAB
LV EF: 52 %
LVEF MODALITY: NORMAL

## 2021-10-04 PROCEDURE — 6370000000 HC RX 637 (ALT 250 FOR IP): Performed by: INTERNAL MEDICINE

## 2021-10-04 PROCEDURE — 6360000002 HC RX W HCPCS: Performed by: INTERNAL MEDICINE

## 2021-10-04 PROCEDURE — 3430000000 HC RX DIAGNOSTIC RADIOPHARMACEUTICAL: Performed by: INTERNAL MEDICINE

## 2021-10-04 PROCEDURE — 2580000003 HC RX 258: Performed by: INTERNAL MEDICINE

## 2021-10-04 PROCEDURE — 97166 OT EVAL MOD COMPLEX 45 MIN: CPT

## 2021-10-04 PROCEDURE — G0378 HOSPITAL OBSERVATION PER HR: HCPCS

## 2021-10-04 PROCEDURE — 93017 CV STRESS TEST TRACING ONLY: CPT

## 2021-10-04 PROCEDURE — 96374 THER/PROPH/DIAG INJ IV PUSH: CPT

## 2021-10-04 PROCEDURE — 97162 PT EVAL MOD COMPLEX 30 MIN: CPT

## 2021-10-04 PROCEDURE — 99232 SBSQ HOSP IP/OBS MODERATE 35: CPT | Performed by: INTERNAL MEDICINE

## 2021-10-04 PROCEDURE — 96372 THER/PROPH/DIAG INJ SC/IM: CPT

## 2021-10-04 PROCEDURE — 78452 HT MUSCLE IMAGE SPECT MULT: CPT

## 2021-10-04 PROCEDURE — A9500 TC99M SESTAMIBI: HCPCS | Performed by: INTERNAL MEDICINE

## 2021-10-04 PROCEDURE — 97116 GAIT TRAINING THERAPY: CPT

## 2021-10-04 PROCEDURE — 97530 THERAPEUTIC ACTIVITIES: CPT

## 2021-10-04 PROCEDURE — 93010 ELECTROCARDIOGRAM REPORT: CPT | Performed by: INTERNAL MEDICINE

## 2021-10-04 RX ORDER — METOPROLOL TARTRATE 5 MG/5ML
5 INJECTION INTRAVENOUS EVERY 5 MIN PRN
Status: ACTIVE | OUTPATIENT
Start: 2021-10-04 | End: 2021-10-04

## 2021-10-04 RX ORDER — SODIUM CHLORIDE 0.9 % (FLUSH) 0.9 %
10 SYRINGE (ML) INJECTION PRN
Status: DISCONTINUED | OUTPATIENT
Start: 2021-10-04 | End: 2021-10-06 | Stop reason: HOSPADM

## 2021-10-04 RX ORDER — AMLODIPINE BESYLATE 5 MG/1
5 TABLET ORAL ONCE
Status: COMPLETED | OUTPATIENT
Start: 2021-10-04 | End: 2021-10-04

## 2021-10-04 RX ORDER — SODIUM CHLORIDE 0.9 % (FLUSH) 0.9 %
5-40 SYRINGE (ML) INJECTION PRN
Status: ACTIVE | OUTPATIENT
Start: 2021-10-04 | End: 2021-10-04

## 2021-10-04 RX ORDER — AMINOPHYLLINE DIHYDRATE 25 MG/ML
50 INJECTION, SOLUTION INTRAVENOUS PRN
Status: ACTIVE | OUTPATIENT
Start: 2021-10-04 | End: 2021-10-04

## 2021-10-04 RX ORDER — ALBUTEROL SULFATE 90 UG/1
2 AEROSOL, METERED RESPIRATORY (INHALATION) PRN
Status: ACTIVE | OUTPATIENT
Start: 2021-10-04 | End: 2021-10-04

## 2021-10-04 RX ORDER — SODIUM CHLORIDE 9 MG/ML
500 INJECTION, SOLUTION INTRAVENOUS CONTINUOUS PRN
Status: ACTIVE | OUTPATIENT
Start: 2021-10-04 | End: 2021-10-04

## 2021-10-04 RX ORDER — AMLODIPINE BESYLATE 10 MG/1
10 TABLET ORAL DAILY
Status: DISCONTINUED | OUTPATIENT
Start: 2021-10-05 | End: 2021-10-06 | Stop reason: HOSPADM

## 2021-10-04 RX ORDER — NITROGLYCERIN 0.4 MG/1
0.4 TABLET SUBLINGUAL EVERY 5 MIN PRN
Status: ACTIVE | OUTPATIENT
Start: 2021-10-04 | End: 2021-10-04

## 2021-10-04 RX ORDER — ATROPINE SULFATE 0.1 MG/ML
0.5 INJECTION INTRAVENOUS EVERY 5 MIN PRN
Status: ACTIVE | OUTPATIENT
Start: 2021-10-04 | End: 2021-10-04

## 2021-10-04 RX ADMIN — TETRAKIS(2-METHOXYISOBUTYLISOCYANIDE)COPPER(I) TETRAFLUOROBORATE 10.9 MILLICURIE: 1 INJECTION, POWDER, LYOPHILIZED, FOR SOLUTION INTRAVENOUS at 07:50

## 2021-10-04 RX ADMIN — SODIUM CHLORIDE, PRESERVATIVE FREE 10 ML: 5 INJECTION INTRAVENOUS at 09:20

## 2021-10-04 RX ADMIN — OXYCODONE AND ACETAMINOPHEN 1 TABLET: 7.5; 325 TABLET ORAL at 03:19

## 2021-10-04 RX ADMIN — SODIUM CHLORIDE, PRESERVATIVE FREE 10 ML: 5 INJECTION INTRAVENOUS at 21:00

## 2021-10-04 RX ADMIN — OXYCODONE AND ACETAMINOPHEN 1 TABLET: 7.5; 325 TABLET ORAL at 13:57

## 2021-10-04 RX ADMIN — REGADENOSON 0.4 MG: 0.08 INJECTION, SOLUTION INTRAVENOUS at 09:19

## 2021-10-04 RX ADMIN — ENOXAPARIN SODIUM 30 MG: 30 INJECTION SUBCUTANEOUS at 13:13

## 2021-10-04 RX ADMIN — SODIUM CHLORIDE, PRESERVATIVE FREE 10 ML: 5 INJECTION INTRAVENOUS at 08:48

## 2021-10-04 RX ADMIN — TETRAKIS(2-METHOXYISOBUTYLISOCYANIDE)COPPER(I) TETRAFLUOROBORATE 39.4 MILLICURIE: 1 INJECTION, POWDER, LYOPHILIZED, FOR SOLUTION INTRAVENOUS at 09:20

## 2021-10-04 RX ADMIN — METOPROLOL TARTRATE 50 MG: 50 TABLET, FILM COATED ORAL at 21:01

## 2021-10-04 RX ADMIN — PSYLLIUM HUSK 1 PACKET: 3.4 POWDER ORAL at 13:16

## 2021-10-04 RX ADMIN — OXYCODONE AND ACETAMINOPHEN 1 TABLET: 7.5; 325 TABLET ORAL at 21:08

## 2021-10-04 RX ADMIN — AMLODIPINE BESYLATE 5 MG: 5 TABLET ORAL at 13:09

## 2021-10-04 RX ADMIN — ENOXAPARIN SODIUM 30 MG: 30 INJECTION SUBCUTANEOUS at 21:07

## 2021-10-04 RX ADMIN — LEVOCETIRIZINE DIHYDROCHLORIDE 5 MG: 5 TABLET, FILM COATED ORAL at 21:00

## 2021-10-04 RX ADMIN — DULOXETIN HYDROCHLORIDE 30 MG: 30 CAPSULE, DELAYED RELEASE ORAL at 13:14

## 2021-10-04 RX ADMIN — SODIUM CHLORIDE, PRESERVATIVE FREE 10 ML: 5 INJECTION INTRAVENOUS at 07:50

## 2021-10-04 RX ADMIN — METOPROLOL TARTRATE 50 MG: 50 TABLET, FILM COATED ORAL at 13:16

## 2021-10-04 RX ADMIN — METRONIDAZOLE 200 MG: 500 TABLET ORAL at 13:09

## 2021-10-04 RX ADMIN — DONEPEZIL HYDROCHLORIDE 5 MG: 5 TABLET, FILM COATED ORAL at 21:08

## 2021-10-04 RX ADMIN — METRONIDAZOLE 200 MG: 500 TABLET ORAL at 21:08

## 2021-10-04 RX ADMIN — ASPIRIN 81 MG: 81 TABLET, CHEWABLE ORAL at 13:10

## 2021-10-04 RX ADMIN — ONDANSETRON 4 MG: 2 INJECTION INTRAMUSCULAR; INTRAVENOUS at 00:17

## 2021-10-04 RX ADMIN — DULOXETIN HYDROCHLORIDE 30 MG: 30 CAPSULE, DELAYED RELEASE ORAL at 21:01

## 2021-10-04 RX ADMIN — SODIUM CHLORIDE, PRESERVATIVE FREE 10 ML: 5 INJECTION INTRAVENOUS at 13:12

## 2021-10-04 ASSESSMENT — PAIN SCALES - GENERAL
PAINLEVEL_OUTOF10: 7
PAINLEVEL_OUTOF10: 8
PAINLEVEL_OUTOF10: 0
PAINLEVEL_OUTOF10: 7
PAINLEVEL_OUTOF10: 7
PAINLEVEL_OUTOF10: 5
PAINLEVEL_OUTOF10: 7

## 2021-10-04 ASSESSMENT — PAIN DESCRIPTION - PAIN TYPE
TYPE: CHRONIC PAIN

## 2021-10-04 ASSESSMENT — PAIN DESCRIPTION - LOCATION: LOCATION: BACK;HIP

## 2021-10-04 ASSESSMENT — PAIN DESCRIPTION - ORIENTATION: ORIENTATION: RIGHT;LEFT

## 2021-10-04 NOTE — PLAN OF CARE
Problem: Falls - Risk of:  Goal: Will remain free from falls  10/4/2021 0328 by Radha Garcia RN  Outcome: Ongoing  10/3/2021 1550 by Gladys Johnson RN  Outcome: Ongoing  Goal: Absence of physical injury  10/4/2021 0328 by Radha aGrcia RN  Outcome: Ongoing  Note: Pt remained free of any injury or fall throughout the shift. Bed remained in lowest position, side rails up x2, call light within reach. 10/3/2021 1550 by Gladys Johnson RN  Outcome: Ongoing     Problem: Skin Integrity:  Goal: Will show no infection signs and symptoms  10/4/2021 0328 by Radha Garcia RN  Outcome: Ongoing  10/3/2021 1550 by Gladys Johnson RN  Outcome: Ongoing  Goal: Absence of new skin breakdown  10/4/2021 0328 by Radha Garcia RN  Outcome: Ongoing  Note: Patient showed no s/s of skin breakdown. Patient was repositioned and assessed frequently. Skin integrity maintained. 10/3/2021 1550 by Gladys Johnson RN  Outcome: Ongoing     Problem: Pain:  Goal: Pain level will decrease  10/4/2021 0328 by Radha Garcia RN  Outcome: Ongoing  10/3/2021 1550 by Gladys Johnson RN  Outcome: Ongoing  Goal: Control of acute pain  10/4/2021 0328 by Radha Garcia RN  Outcome: Ongoing  Note: Patient was given medication and non pharmacological care to help control pain and increase comfort. Patient tolerating well.      10/3/2021 1550 by Gladys Johnson RN  Outcome: Ongoing  Goal: Control of chronic pain  10/4/2021 0328 by Radha Garcia RN  Outcome: Ongoing  10/3/2021 1550 by Gladys Johnson RN  Outcome: Ongoing

## 2021-10-04 NOTE — PROCEDURES
[address] [facility]                    [address] [facility]. [address]                              CARDIAC STRESS TEST    PATIENT NAME: Gissel Mays                    :        1945  MED REC NO:   [id number]                              ROOM:       2108  ACCOUNT NO:   [de-identified]                           ADMIT DATE: 10/02/2021  PROVIDER:     Elbert Dash    DATE OF STUDY:  10/04/2021    TEST TYPE: LEXISCAN CARDIOLYTE STRESS TEST  INDICATION: CHEST PAIN  REFERRING PHYSICIAN: DR. Talib Harden    RESTING HEART RATE: 60 BEATS PER MINUTE  RESTING BLOOD PRESSURE: 151/69    MEDICATION(S) GIVEN: 0.4 MG IV LEXISCAN  REASON FOR TERMINATION: MEDICATION INFUSION COMPLETE    RESTING EKG: NORMAL  COMMENTS: NORMAL SINUS RHYTHM, HEART RATE 56 BEATS PER MINUTE  STRESS HEART RESPONSE: NORMAL RESPONSE  BLOOD PRESSURE RESPONSE: NORMAL RESPONSE  STRESS EKGs: RARE PREMATURE VENTRICULAR CONTRACTION  CHEST DISCOMFORT: NO PAIN  ISCHEMIC EKG CHANGES: NONE    EKG IMPRESSION: ELECTROCARDIOGRAPHICALLY NEGATIVE LEXISCAN STRESS TEST. RADIOISOTOPE RESULTS TO FOLLOW FROM THE DEPARTMENT OF NUCLEAR MEDICINE.           Danial Garza    D: 10/04/2021 10:40:44       T: 10/04/2021 10:41:35     [id number]  Job#: [id number]     Doc#: Unknown    CC:    (Retain this field even if not dictated or not decipherable)

## 2021-10-04 NOTE — PROGRESS NOTES
[facility]   Occupational Therapy Evaluation  Date: 10/4/21  Patient Name: Bon Albarado       Room: 7242/0640-53  MRN: [id number]  Account: [de-identified]   : 1945  (76 y.o.) Gender: female     Discharge Recommendations:  Further Occupational Therapy is recommended upon facility discharge. Equipment Needed:  (TBD)    Referring Practitioner: Dr. Dasia Wallace  Diagnosis: Chest pain, general weakness, fell in [place] parking lot when her R ankle twisted when transferring from car to scooter    Treatment Diagnosis: Impaired self-care status  Past Medical History:  has a past medical history of Abnormality of rib determined by X-ray, Acute cystitis without hematuria, Acute exacerbation of chronic bronchitis ([address] Acute on chronic diastolic heart failure ([address] Adjustment disorder with mixed anxiety and depressed mood, Anemia, Back pain, Bronchiectasis with acute lower respiratory infection ([address] Bronchitis, Chronic bronchitis ([address] Chronic cough, COPD (chronic obstructive pulmonary disease) ([address] Degenerative joint disease (DJD) of hip, Dyslipidemia, Encephalopathy acute, Essential hypertension, Fibromyalgia, GERD (gastroesophageal reflux disease), History of total bilateral knee replacement, Hx of blood clots, Hyperlipidemia, Hypertension, Incontinence of urine, Irregular heartbeat, Medication monitoring encounter, Morbid obesity with BMI of 45.0-49.9, adult ([address] Obstructive sleep apnea syndrome, Osteoarthritis, Primary osteoarthritis of left knee, PVC (premature ventricular contraction), S/P right and left heart catheterization, Sleep apnea, SOB (shortness of breath), Spinal stenosis of lumbar region with neurogenic claudication, Supplemental oxygen dependent, Urine frequency, and UTI (urinary tract infection). Past Surgical History:   has a past surgical history that includes Hysterectomy; Nerve Block (2013); Nerve Block (13); sinus surgery; Foot surgery (Left);  Nerve Block (2/14/14); Colonoscopy (4 28 14); Nerve Block (07/14/2014); Nerve Block (7-21-14); Nerve Block (10/2/14); Nerve Block (11/9/2015); Nerve Block (11/16/15); Nerve Block (11/23/15); Total hip arthroplasty (Right, 5/14/15); bronchoscopy (Left, 8/16/2017); Cardiac catheterization (x 3); Nerve Block (03/28/2018); Nerve Block (05/23/2018); Nerve Block (08/28/2018); pr perq vert agmntj cavity crtj uni/bi cannulj lmbr (N/A, 10/29/2018); and joint replacement (Bilateral, 5/27/2016). Restrictions  Restrictions/Precautions: Fall Risk, General Precautions  Other position/activity restrictions: R ankle x-rays-Right ankle soft tissue swelling with no evidence of an acute fracture. Required Braces or Orthoses?: No     Vitals  Temp: 97.7 °F (36.5 °C)  Pulse: 61  Resp: 18  BP: (!) 172/75  Height: 5' 4\" (162.6 cm)  Weight: 276 lb 10.8 oz (125.5 kg)  BMI (Calculated): 47.6  Oxygen Therapy  SpO2: 99 %  Pulse Oximeter Device Mode: Intermittent  Pulse Oximeter Device Location: Finger  O2 Device: None (Room air)  O2 Flow Rate (L/min): 2 L/min  Level of Consciousness: Alert (0)    Subjective  Subjective: \"I was doing so good too! \"  Comments: Pt reports that she was just discharged from the hospital on 9/30/21 and was doing well until this incident.   Overall Orientation Status: Within Functional Limits  Vision  Vision: Impaired  Vision Exceptions: Wears glasses for reading (\"Sometimes\")  Hearing  Hearing: Exceptions to [facility]  Hearing Exceptions: Hard of hearing/hearing concerns, No hearing aid  Social/Functional History  Lives With: Alone  Type of Home: Apartment  Home Layout: One level  Home Access: Level entry  Bathroom Shower/Tub: Walk-in shower  Bathroom Toilet: Handicap height  Bathroom Equipment: Hand-held shower, Tub transfer bench, Grab bars around toilet  Bathroom Accessibility: [place] accessible  Home Equipment: 4 wheeled walker, [employer], Electric scooter, Sock aid, Reacher, Oxygen (rollator is broken; upright walker w/4 wheels works)  [employer] Help From: Family  ADL Assistance: Needs assistance (Daughter A with bathing)  Homemaking Assistance: Independent  Homemaking Responsibilities: Yes  Ambulation Assistance: Independent (Uses upright walker household distances)  Transfer Assistance: Independent  Active : Yes  Mode of Transportation: Car  IADL Comments: Sleeps in an adjustable bed. Additional Comments: Pt just discharged from hospital on 9/30/21 and was doing well until she fell and twisted her ankle. Pain Assessment  Pain Assessment: 0-10  Pain Level: 7  Pain Type: Chronic pain  Pain Location: Shoulder, Hip, Wrist, Back  Pain Orientation: Right, Left (B shoulder; L wrist/hip)    Objective  Vision - Basic Assessment  Patient Visual Report: No visual complaint reported. 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 Pt reports that she uses a reacher and a sock-aid at home. UE Function  LUE Tone: Normotonic     LUE AROM (degrees)  LUE AROM : WFL  LUE General AROM: Reports shoulder ROM is painful     Left Hand AROM (degrees)  Left Hand AROM: WFL      RUE Tone: Normotonic     RUE AROM (degrees)  RUE AROM : WFL  RUE General AROM: Reports shoulder ROM is painful     Right Hand AROM (degrees)  Right Hand AROM: WFL    Fine Motor Skills  Coordination  Movements Are Fluid And Coordinated: Yes     Mobility  Supine to Sit: Modified independent  Sit to Supine:  Moderate assistance       Balance  Sitting Balance: Supervision  Standing Balance: Minimal assistance (w/RW; occasional knee buckling but able to recover)     Bed mobility  Supine to Sit: Modified independent  Sit to Supine: Moderate assistance  Scooting: Modified independent     Transfers  Stand Step Transfers: Minimal assistance  Stand Pivot Transfers: Minimal assistance  Sit to stand: Minimal assistance (Several attempts, rocking technique)  Stand to sit: Minimal assistance  Transfer Comments: w/RW  Functional Activity Tolerance  Functional Activity Tolerance: Tolerates 10 - 20 min exercise with multiple rests     Assessment  Assessment  Performance deficits / Impairments: Decreased functional mobility , Decreased ADL status, Decreased endurance, Decreased balance, Decreased high-level IADLs  Treatment Diagnosis: Impaired self-care status  Prognosis: Good  Decision Making: Medium Complexity  REQUIRES OT FOLLOW UP: Yes  Discharge Recommendations: Patient would benefit from continued therapy after discharge  Activity Tolerance: Patient Tolerated treatment well    Goals  Patient Goals   Patient goals : Pt planning to go somewhere for rehab at discharge. Short term goals  Time Frame for Short term goals: By Discharge  Short term goal 1: Pt will actively participate in self-care routine and complete tasks at [facility] level of IND using AE as needed. Short term goal 2: Pt will complete toilet transfer and toileting with Min A and Good safety using least restrictive device. Short term goal 3: Pt will V/D at least 3 fall prevention techniques that she can utilize during daily routines. Short term goal 4: Pt will actively participate in 15-20 minutes of therapeutic exercise/activity to promote increased independence and safety with self-care and mobility.     Plan  Safety Devices  Safety Devices in place: Yes  Type of devices: Patient at risk for falls, Gait belt, Left in bed, Call light within reach, Nurse notified     Plan  Times per week: 5  Times per day: Daily  Current Treatment Recommendations: Balance Training, Functional Mobility Training, Endurance Training, Pain Management, Safety Education & Training, Patient/Caregiver Education & Training, Self-Care / ADL    Equipment Recommendations  Equipment Needed:  (TBD)  OT Individual Minutes  Time In: 8120  Time Out: 8210  Minutes: 36    Electronically signed by Colton Levy on 10/4/21 at 3:29 PM EDT

## 2021-10-04 NOTE — PROGRESS NOTES
Physical Therapy    Facility/Department: [address] [place] [address] CARE  Initial Assessment    NAME: Karen Gracia  :   MRN: [id number]    Date of Service: 10/4/2021    Discharge Recommendations:  Patient would benefit from continued therapy after discharge        Assessment   Body structures, Functions, Activity limitations: Decreased functional mobility ; Decreased balance; Increased pain;Decreased strength  Assessment: Pt presents with generalized weakness, and pain. Pt recently had a fall, requires min/mod A for transferss and fewstesp with rolling walker, LLE bibi. Pt is fall risk, will benefit from continued therapy to address her deficits. Treatment Diagnosis: Impaired mobility, weakness, fall  Prognosis: Fair  Decision Making: Medium Complexity  History: Recetn Lumbar injection, Fibromyalgia  REQUIRES PT FOLLOW UP: Yes  Activity Tolerance  Activity Tolerance: Patient limited by pain; Patient limited by fatigue;Patient limited by endurance       Patient Diagnosis(es): The primary encounter diagnosis was Chest pain, unspecified type. Diagnoses of Fall, initial encounter and General weakness were also pertinent to this visit.      has a past medical history of Abnormality of rib determined by X-ray, Acute cystitis without hematuria, Acute exacerbation of chronic bronchitis (HCC), Acute on chronic diastolic heart failure ([address] Adjustment disorder with mixed anxiety and depressed mood, Anemia, Back pain, Bronchiectasis with acute lower respiratory infection ([address] Bronchitis, Chronic bronchitis ([address] Chronic cough, COPD (chronic obstructive pulmonary disease) ([address] Degenerative joint disease (DJD) of hip, Dyslipidemia, Encephalopathy acute, Essential hypertension, Fibromyalgia, GERD (gastroesophageal reflux disease), History of total bilateral knee replacement, Hx of blood clots, Hyperlipidemia, Hypertension, Incontinence of urine, Irregular heartbeat, Medication monitoring encounter, Morbid obesity with BMI a vehicle to to her scooter. She had fall. No LOC. No fever no chills no productive cough. ECG no ischemic changes. Referral Date : 10/04/21  Diagnosis: Chest pain  Subjective  Subjective: Why can not lift my left leg up, i used to be able to, I can  my right leg\". Pt referring her inability to perform SLR in bed, able to perform heel slides in bed. Pain Screening  Patient Currently in Pain: Yes  Pain Assessment  Pain Assessment: 0-10  Pain Level: 7  Pain Type: Chronic pain  Pain Location: Hip; Shoulder;Wrist;Back  Pain Orientation:  (B shoulder, L wrist/Hip)  Response to Pain Intervention: Asleep with RR greater than 10  Vital Signs  BP Location: Right lower arm  Level of Consciousness: Alert (0)  Patient Currently in Pain: Yes  Oxygen Therapy  O2 Device: None (Room air)       Orientation     Social/Functional History  Social/Functional History  Lives With: Alone  Type of Home: Apartment  Home Layout: One level  Home Access: Level entry  Bathroom Shower/Tub: Walk-in shower  Bathroom Toilet: Handicap height  Bathroom Equipment: Hand-held shower, Tub transfer bench, Grab bars around toilet  Bathroom Accessibility: Walker accessible  Home Equipment: 4 wheeled walker, Wheelchair-manual, Electric scooter, Sock aid, Reacher, Oxygen (rollator is broken; upright walker w/4 wheels works)  Receives Help From: Family  ADL Assistance: Needs assistance (Daughter A with bathing)  Homemaking Assistance: Independent  Homemaking Responsibilities: Yes  Ambulation Assistance: Independent (Uses upright walker household distances)  Transfer Assistance: Independent  Active : Yes  Mode of Transportation: Car  IADL Comments: Sleeps in an adjustable bed. Additional Comments: Pt just discharged from hospital on 9/30/21 and was doing well until she fell and twisted her ankle.   Cognition        Objective          AROM RLE (degrees)  RLE AROM: WFL  RLE General AROM: Slight swellling noted lateral ankle joint  AROM LLE (degrees)  LLE General AROM: AAROM to AROM WFL. AROM RUE (degrees)  RUE General AROM: See OT eval  AROM LUE (degrees)  LUE General AROM: See OT eval.  Strength RLE  Comment: Hip 3/5, knee/ankle 3+to 4-/5  Strength LLE  Comment: Hip < 3/5, knee and ankle 3 to 3+/5. Pt hypersenstive at times due to pain. 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  Bed mobility  Supine to Sit: Stand by assistance  Sit to Supine:  Moderate assistance (Assist to lift B LE into bed.)  Scooting: Minimal assistance  Transfers  Sit to Stand: Minimal Assistance (Pt attempted 3 times, 3rd attemp tp rocks and got up min A)  Stand to sit: Minimal Assistance  Ambulation  Ambulation?: Yes  Ambulation 1  Surface: level tile  Device: Rolling Walker (Bariatric)  Assistance: Minimal assistance (2nd person SBA for safety)  Quality of Gait: Pt ambulates with short steps, L LE buckled once  Gait Deviations: Decreased step length;Decreased step height  Distance: 4 small steps forward, 4 steps backwards, 1 side step     Balance  Posture: Good  Sitting - Static: Good  Sitting - Dynamic: Fair  Standing - Static: Fair (RW)  Standing - Dynamic: Fair;- (RW)        Plan   Plan  Times per week: 5 x/week  Current Treatment Recommendations: Strengthening, Balance Training, Functional Mobility Training, Transfer Training, Endurance Training, Gait Training, Safety Education & Training, Patient/Caregiver Education & Training  Safety Devices  Type of devices: Left in bed, Gait belt, Call light within reach, Bed alarm in place    G-Code       OutComes Score                                                  AM-PAC Score     AM-PAC Inpatient Mobility without Stair Climbing Raw Score : 13 (10/04/21 1407)  AM-PAC Inpatient without Stair Climbing T-Scale Score : 38.96 (10/04/21 1407)  Mobility Inpatient CMS 0-100% Score: 58.44 (10/04/21 1407)  Mobility Inpatient without Stair CMS G-Code Modifier :

## 2021-10-04 NOTE — PROGRESS NOTES
[place] [address] Internal [facility]    Progress Note     10/4/2021    1:00 PM    Name:   Sheldon Bolton  MRN:     [id number]     Acct:      [de-identified]   Room:   [address] [place] [address] Day:  0  Admit Date:  10/2/2021  4:23 PM    PCP:   Virginia Jones MD  Code Status:  Full Code    Subjective:     C/C:   Chief Complaint   Patient presents with   Nori Mccann    Wrist Pain    Ankle Pain     Active Problems:    Sleep apnea    Essential hypertension    BMI 40.0-44.9, adult ([address]    Chest pain  Resolved Problems:    * No resolved hospital problems. *      Patient past medical history multiple medical problems are morbid obesity, hypertension, fibromyalgia, history of asbestos exposure, chronic back pain  She recently got cortisone injection in her lower back, back pain is much better  Patient twisted her left ankle, admitted with chest pain  Her chest pain is improved  Underwent stress test           Significant last 24 hr data reviewed ;   Vitals:    10/03/21 1919 10/03/21 2354 10/04/21 0720 10/04/21 1209   BP: 132/67 (!) 167/54 (!) 185/82 (!) 183/80   Pulse: 51 53 62 61   Resp: 18 18 18 18   Temp: 98.1 °F (36.7 °C) 98.1 °F (36.7 °C) 97.9 °F (36.6 °C) 97.7 °F (36.5 °C)   TempSrc: Oral Oral Oral Oral   SpO2: 99% 98% 99% 97%   Weight:       Height:          Recent Results (from the past 24 hour(s))   Troponin    Collection Time: 10/03/21  1:43 PM   Result Value Ref Range    Troponin, High Sensitivity 18 (H) 0 - 14 ng/L    Troponin T NOT REPORTED <0.03 ng/mL    Troponin Interp NOT REPORTED      No results for input(s): POCGLU in the last 72 hours. XR ANKLE RIGHT (MIN 3 VIEWS)    Result Date: 10/2/2021  EXAMINATION: THREE XRAY VIEWS OF THE RIGHT ANKLE 10/2/2021 5:44 pm COMPARISON: None.  HISTORY: ORDERING SYSTEM PROVIDED HISTORY: pain TECHNOLOGIST PROVIDED HISTORY: pain Reason for Exam: twisted ankle while walking Acuity: Unknown Type of Exam: Unknown FINDINGS: Right ankle soft tissue swelling particularly laterally. There is no evidence of an acute fracture of the right ankle. Ankle mortise is symmetric. There is prominent calcaneal spurring and there is evidence of pes planus. There is prominent dorsal tarsal bone spurring. Right ankle soft tissue swelling with no evidence of an acute fracture. Degenerative changes in the mid and hindfoot visualized as above. VL DUP LOWER EXTREMITY VENOUS LEFT    Result Date: 10/3/2021    [facility]  Vascular Lower Extremities DVT Study Procedure   Patient Name    JUANPABLO        Date of Study             10/02/2021                  Bobbi Ohm   Date of Birth   1945   Gender                    Female   Age             67 year(s)   Race                      Black   Room Number     2108   Corporate ID #  [id number]   Patient Acct #  [de-identified]   MR #            [id number]       Sonographer               Quan Cooper   Accession #     [id number]   Interpreting Physician    Ira Koroma   Referring Nurse              Referring Physician       Milton Lowery  Practitioner  Procedure Type of Study:   Veins: Lower Extremities DVT Study, Venous Scan Lower Left. Indications for Study:Pain and swelling and Elevated D-Dimer. Patient Status:ER. Technical Quality:Adequate visualization. Conclusions   Summary   Simultaneous real time imaging utilizing B-Mode, color doppler and  spectral waveform analysis was performed on the left lower extremity for  venous examination of the deep and superficial systems. Findings are:   Left:  No evidence of deep or superficial venous thrombosis.    Signature   ----------------------------------------------------------------  Electronically signed by Quan Cooper(Sonographer) on  10/02/2021 07:33 PM  ----------------------------------------------------------------   ----------------------------------------------------------------  Electronically signed by Merlin Beers, Lupe(Interpreting  physician) on 10/03/2021 11:59 AM ----------------------------------------------------------------  Findings:   Right Impression:                Left Impression:  The common femoral vein          The common femoral, femoral, popliteal  demonstrates normal              and tibial veins demonstrate normal  compressibility and              compressibility and augmentation. augmentation. Normal compressibility of the great                                   saphenous vein. Normal compressibility of the small                                   saphenous vein. Risk Factors History +--------------------------+----------+------------------------------------+ ! Diagnosis                 ! Date      ! Comments                            ! +--------------------------+----------+------------------------------------+ ! Other                     !          !bilateral TKA                       ! +--------------------------+----------+------------------------------------+ ! DVT                       !07/15/2013! rt: Pop partial                     ! +--------------------------+----------+------------------------------------+ ! Previous Scan             !05/28/2016!                                    ! +--------------------------+----------+------------------------------------+ Allergies   - Allergy:Bactrim(Drug). - Allergy:Penicillin(Drug). - Allergy:Contrast(Miscellaneous). Velocities are measured in cm/s ; Diameters are measured in cm Right Lower Extremities DVT Study Measurements Right 2D Measurements +------------------------------------+----------+---------------+----------+ ! Location                            ! Visualized! Compressibility! Thrombosis! +------------------------------------+----------+---------------+----------+ ! Common Femoral                      !Yes       ! Yes            ! None      ! +------------------------------------+----------+---------------+----------+ Right Doppler Measurements +----------------------------+------+------+-------------------------------+ ! Location                    ! Signal!Reflux! Reflux (msec)                  ! +----------------------------+------+------+-------------------------------+ ! Common Femoral              !Phasic!      !                               ! +----------------------------+------+------+-------------------------------+ Left Lower Extremities DVT Study Measurements Left 2D Measurements +------------------------------------+----------+---------------+----------+ ! Location                            ! Visualized! Compressibility! Thrombosis! +------------------------------------+----------+---------------+----------+ ! Common Femoral                      !Yes       ! Yes            ! None      ! +------------------------------------+----------+---------------+----------+ ! Prox Femoral                        !Yes       ! Yes            ! None      ! +------------------------------------+----------+---------------+----------+ ! Mid Femoral                         !Yes       ! Yes            ! None      ! +------------------------------------+----------+---------------+----------+ ! Dist Femoral                        !Yes       ! Yes            ! None      ! +------------------------------------+----------+---------------+----------+ ! Deep Femoral                        !Partial   !Yes            ! None      ! +------------------------------------+----------+---------------+----------+ ! Popliteal                           !Yes       ! Yes            ! None      ! +------------------------------------+----------+---------------+----------+ ! Sapheno Femoral Junction            ! Yes       ! Yes            ! None      ! +------------------------------------+----------+---------------+----------+ ! PTV                                 ! Partial   !Yes            ! None      ! +------------------------------------+----------+---------------+----------+ ! Estevan !Partial   !Yes            ! None      ! +------------------------------------+----------+---------------+----------+ ! Gastroc                             ! Partial   !Yes            ! None      ! +------------------------------------+----------+---------------+----------+ ! GSV Thigh                           ! Yes       ! Yes            ! None      ! +------------------------------------+----------+---------------+----------+ ! GSV Knee                            ! Yes       ! Yes            ! None      ! +------------------------------------+----------+---------------+----------+ ! GSV Ankle                           ! Yes       ! Yes            ! None      ! +------------------------------------+----------+---------------+----------+ ! SSV                                 ! Yes       ! Yes            ! None      ! +------------------------------------+----------+---------------+----------+ Left Doppler Measurements +---------------------------+------+------+--------------------------------+ ! Location                   ! Signal!Reflux! Reflux (msec)                   ! +---------------------------+------+------+--------------------------------+ ! Common Femoral             !Phasic!      !                                ! +---------------------------+------+------+--------------------------------+ ! Prox Femoral               !Phasic!      !                                ! +---------------------------+------+------+--------------------------------+ ! Popliteal                  !Phasic! Yes   ! 1320                            !  +---------------------------+------+------+--------------------------------+    NM MYOCARDIAL SPECT REST EXERCISE OR RX    Result Date: 10/4/2021  EXAMINATION: MYOCARDIAL PERFUSION IMAGING 10/4/2021 7:48 am TECHNIQUE: Rest dose:  10.9 mCi Tc-99m sestamibi intravenously Stress dose:  39.4 mCi Tc-99m sestamibi intravenously Under cardiology supervision, 0.4 mg Lexiscan was infused intravenously prior to injection of the stress dose. SPECT imaging was acquired following injection of the sestamibi. ECG gating was obtained following the stress acquisition. COMPARISON: 06/08/2015 HISTORY: ORDERING SYSTEM PROVIDED HISTORY: cp TECHNOLOGIST PROVIDED HISTORY: cp Reason for Exam: Chest pain Procedure Type->Rx Reason for Exam: Chest pain Acuity: Unknown Type of Exam: Unknown 71-year-old female with chest pain FINDINGS: The patient achieved a maximum heart rate of 90 beats per minute, 62 % of the maximum age predicted heart rate of 144 beats per minute. Perfusion: Fixed perfusion defect involving the inferior and apical wall. No reversible perfusion defect to suggest reversible ischemia. Function: The gated SPECT data demonstrates normal left ventricular size and normal wall motion. Left ventricular ejection fraction:  52% TID score:  1.06 (threshold value of 1.39 is used for Lexiscan stress with Tc-99m). There is no stress-induced cavitary dilatation to suggest compensated triple vessel disease. End diastolic volume:  636AV Scores are visually adjusted to account for potential artifact. Summed stress score:  5 Summed rest score:  5 Summed reversibility score:  0     1. Findings suggesting infarct involving the inferior and a portion of the apical wall. 2. No definitive scintigraphic evidence for reversible ischemia. 3. Left ventricular ejection fraction of 52%. 4.  Please see report for EKG portion of the examination which will be performed separately by physician from cardiology. Risk stratification:  Intermediate risk Note:  Risk stratification incorporates both clinical history and test results. Final risk determination is the responsibility of the ordering provider as history and other test results may increase or decrease the risk stratification reported for this examination. Risk stratification criteria are adapted from \"Noninvasive Risk Stratification\" criteria from [employer].   Al, ACC/AATS/AHA/ASE/ASNC/SCAI/SCCT/STS 2017 Appropriate Use Criteria For Coronary Revascularization in Patients With Stable Ischemic Heart Disease [facility] Volume 69, Issue 17, May 2017 High risk (>3% annual death or MI) 1. Severe resting LV dysfunction (LVEF >35%) not readily explained by non coronary causes 2. Resting perfusion abnormalities greater than 10% of the myocardium in patients without prior history or evidence of MI 3. Stress-induced perfusion abnormalities encumbering greater than or equal to 10% myocardium or stress segmental scores indicating multiple vascular territories with abnormalities 4. Stress-induced LV dilatation (TID ratio greater than 1.19 for exercise and greater than 1.39 for regadenoson) Intermediate risk (1% to 3% annual death or MI) 1. Mild/moderate resting LV dysfunction (LVEF 35% to 49%) not readily explained by non coronary causes. 2.  Resting perfusion abnormalities in 5%-9.9% of the myocardium in patients without a history or prior evidence of MI 3. Stress-induced perfusion abnormality encumbering 5%-9.9% of the myocardium or stress segmental scores indicating 1 vascular territory with abnormalities but without LV dilation 4. Small wall motion abnormality involving 1-2 segments and only 1 coronary bed. Low Risk (Less than 1% annual death or MI) 1. Normal or small myocardial perfusion defect at rest or with stress encumbering less than 5% of the myocardium.              On admission         Review of Systems:     Constitutional:  negative for chills, fevers, sweats  Respiratory:  negative for cough, dyspnea on exertion, hemoptysis, shortness of breath, wheezing  Cardiovascular: Had chest pain which is improved  Gastrointestinal:  negative for abdominal pain, constipation, diarrhea, nausea, vomiting  Neurological:  negative for dizziness, headache  Extremity-pain in left ankle, improved  Data:     Past Medical History:  no change     Social History:  no change    Family History: @no change    Vitals:      I/O (24Hr): Intake/Output Summary (Last 24 hours) at 10/4/2021 1300  Last data filed at 10/4/2021 0830  Gross per 24 hour   Intake 300 ml   Output 700 ml   Net -400 ml       Labs:    Radiology:    Medications: Allergies:      Current Meds:   Scheduled Meds:    [START ON 10/5/2021] amLODIPine  10 mg Oral Daily    amLODIPine  5 mg Oral Once    metoprolol tartrate  50 mg Oral BID    levocetirizine  5 mg Oral Nightly    gabapentin  200 mg Oral TID    sodium chloride flush  5-40 mL IntraVENous 2 times per day    enoxaparin  30 mg SubCUTAneous BID    aspirin  81 mg Oral Daily    donepezil  5 mg Oral Nightly    DULoxetine  30 mg Oral BID    psyllium  1 packet Oral Daily     Continuous Infusions:    sodium chloride      sodium chloride       PRN Meds: sodium chloride flush, sodium chloride flush, sodium chloride, albuterol sulfate HFA, atropine, nitroGLYCERIN, metoprolol, aminophylline, sodium chloride flush, oxyCODONE-acetaminophen, sodium chloride flush, sodium chloride, ondansetron **OR** ondansetron, acetaminophen **OR** acetaminophen, magnesium hydroxide, potassium chloride **OR** potassium alternative oral replacement **OR** potassium chloride, potassium chloride, magnesium sulfate, nitroGLYCERIN, melatonin, albuterol sulfate HFA      Physical Examination:        BP (!) 183/80   Pulse 61   Temp 97.7 °F (36.5 °C) (Oral)   Resp 18   Ht 5' 4\" (1.626 m)   Wt 276 lb 10.8 oz (125.5 kg)   SpO2 97%   BMI 47.49 kg/m²   Temp (24hrs), Av °F (36.7 °C), Min:97.7 °F (36.5 °C), Max:98.1 °F (36.7 °C)    No results for input(s): POCGLU in the last 72 hours. Intake/Output Summary (Last 24 hours) at 10/4/2021 1300  Last data filed at 10/4/2021 0830  Gross per 24 hour   Intake 300 ml   Output 700 ml   Net -400 ml       General Appearance:  alert, well appearing, and in no acute distress, central obesity present  Mental status:   Head:  normocephalic, atraumatic.   Eye: no icterus, redness, pupils equal and reactive, extraocular eye movements intact, conjunctiva clear  Ear: normal external ear, no discharge, hearing intact  Nose:  no drainage noted  Mouth: mucous membranes moist  Neck: supple, no carotid bruits, thyroid not palpable  Lungs: Bilateral equal air entry, clear to ausculation, no wheezing, rales or rhonchi, normal effort  Cardiovascular: normal rate, regular rhythm, no murmur, gallop, rub. Abdomen: Soft, nontender, nondistended, normal bowel sounds, no hepatomegaly or splenomegaly  Neurologic: There are no new focal motor or sensory deficits,   Skin: No gross lesions, rashes, bruising or bleeding on exposed skin area  Extremities: Tenderness left ankle  Psych:             Assessment:        Primary Problem  <principal problem not specified>    Active Problems:    Sleep apnea    Essential hypertension    BMI 40.0-44.9, adult ([facility])    Chest pain  Resolved Problems:    * No resolved hospital problems. *       Plan:          10/4/21    Admitted with intermittent chest pain, stress test done which was negative, cardiology following  Hypertension, increasing dose of Norvasc from 5 mg to 10 mg  Chronic back pain, had extensive work-up including lumbar puncture during previous admission, seen by Dr. Jon Jacobs as outpatient, underwent epidural cortisone injection, back pain is improved  PT/OT consulted  History of fibromyalgia, on gabapentin, Cymbalta  DC planning after PT evaluation      . Hospital Problems         Last Modified POA    Sleep apnea 10/3/2021 Yes    Overview Signed 5/28/2016  1:15 PM by Richard Pierre Blood, DO     no machine         Essential hypertension 10/3/2021 Yes    BMI 40.0-44.9, adult ([facility] [address] 10/3/2021 Yes    Chest pain 10/2/2021 Yes                         Thanks for consulting us . Will monitor vitals and clinical course , and  Optimize therapy  as needed .            Bria Frey MD

## 2021-10-04 NOTE — PLAN OF CARE
Problem: Falls - Risk of:  Goal: Will remain free from falls  Outcome: Met This Shift: Patient remained free from falls and injury per shift; call light within reach, bed kept at lowest position, pathway clear, hourly rounds implemented.        Problem: Skin Integrity:  Goal: Absence of new skin breakdown  Outcome: Met This Shift: No new skin breakdown/pressure ulcer per shift

## 2021-10-04 NOTE — CARE COORDINATION
JANIA spoke with this patient to see which of the two facilities would be her first choice. The patient chose The [address] [place] [address] as her first choice. SW called admissions at that facility, they are in network and they do have bed availability. JANIA informed Vanda Mendoza RN Clinical lead that there would need to be orders for therapies for pre-cert. SW faxed the face sheet to this facility but they will be unable to start pre-cert until PT and OT evaluations are completed.

## 2021-10-04 NOTE — PROGRESS NOTES
Date:   10/4/2021  Patient name: Jagdish Smith  Date of admission:  10/2/2021  4:23 PM  MRN:   [id number]  YOB: 1945  PCP: Christine Hughes MD    Reason for Admission: Chest pain [R07.9]  General weakness [R53.1]  Fall, initial encounter [W19. XXXA]  Chest pain, unspecified type [R07.9]    Cardiology follow-up: Chest pain       Impression     Admission 10- with atypical chest pain  H/O irregular heart beats  History of fibromyalgia   Morbid obesity, BMI 48  Elevated systolic blood pressure  Hyperlipidemia, , patient refusing for statin  Aortic systolic murmur  On oxygen at home, sleep apnea  Limited mobility, weakness lower extremities  History of left leg DVT  Neuropathy  Chronic low back pain  Asbestosis  Anemia, low MCV     No history of coronary intervention    Past surgical history  Bilateral knee replacement, right hip replacement        History of present illness     69-year-old female who lives alone, independent in her activities of daily living,  with past medical history of obstructive sleep apnea, asbestosis on home oxygen, morbid obesity, fibromyalgia and history of left leg DVT got admitted on 10/2/2021 with chest discomfort. She twisted her Rt ankle on going from a vehicle to to her scooter. She had fall. No LOC. No fever no chills no productive cough. ECG no ischemic changes     Current evaluation  Pt seen and examined  Morbidly obese female, did not appears in any distress  She had a Jakob Greek Myoview stress test today  No reversible ischemia probable old inferior, apical MI  No ischemic ECG changes  Does not want to go home because of poor mobility he wants to go to rehab unit  Complaining weakness of both lower extremities  Hemodynamic stable  Elevated systolic blood pressure 673    Investigation work-up  Lexiscan Myoview stress test 10/4/2021  Findings suggesting infarct involving the inferior portion of the apical defect  No definitive skin to graphic evidence for reversible ischemia, ejection fraction 52%   Normal wall motion    Lab work     10/4/2021  Sodium 138, potassium 4.7, BUN 29, creatinine 0.98  High-sensitivity troponin XVIII  Hemoglobin 10.8, platelets 558, WBC 6.1    10/3/2021  Sodium 137, potassium 4.4, BUN 30, creatinine 1.12  High-sensitivity troponin XX 4  Cholesterol 272, , HDL 43  Hemoglobin 10.7, WBC 6.1, platelets 515     ECG 10/2/2021  Sinus rhythm heart rate 59 otherwise normal ECG     2D echo 6/15/2018  Normal LV size ejection fraction 50 to 55%  Mild LVH  No significant valvular abnormality     Cardiac cath 6/9/2015  Mild CAD, normal LV function      Medications:   Scheduled Meds:   [START ON 10/5/2021] amLODIPine  10 mg Oral Daily    metoprolol tartrate  50 mg Oral BID    levocetirizine  5 mg Oral Nightly    gabapentin  200 mg Oral TID    sodium chloride flush  5-40 mL IntraVENous 2 times per day    enoxaparin  30 mg SubCUTAneous BID    aspirin  81 mg Oral Daily    donepezil  5 mg Oral Nightly    DULoxetine  30 mg Oral BID    psyllium  1 packet Oral Daily     Continuous Infusions:   sodium chloride       CBC:   Recent Labs     10/02/21  1800 10/03/21  0531   WBC 6.1 6.1   HGB 10.7* 10.8*    284     BMP:    Recent Labs     10/02/21  1800 10/03/21  0531    138   K 4.4 4.7    104   CO2 23 24   BUN 30* 29*   CREATININE 1.12* 0.98*   GLUCOSE 94 116*     Hepatic:   Recent Labs     10/02/21  1800 10/03/21  0531   AST 13 13   ALT 13 12   BILITOT 0.29* 0.28*   ALKPHOS 68 66     Troponin: No results for input(s): TROPONINI in the last 72 hours. BNP: No results for input(s): BNP in the last 72 hours. Lipids:   Recent Labs     10/03/21  0531   CHOL 272*   HDL 43     INR: No results for input(s): INR in the last 72 hours.     Objective:   Vitals: BP (!) 183/80   Pulse 61   Temp 97.7 °F (36.5 °C) (Oral)   Resp 18   Ht 5' 4\" (1.626 m)   Wt 276 lb 10.8 oz (125.5 kg)   SpO2 97%   BMI 47.49 kg/m²   General appearance: alert and cooperative with exam  HEENT: Head: Normal, normocephalic, atraumatic. Neck: no JVD and supple, symmetrical, trachea midline  Lungs: diminished breath sounds bibasilar  Heart: Aortic systolic murmur  Abdomen: Very obese bowel sounds present  Extremities: Homans sign is negative, no sign of DVT  Neurologic: Mental status: Alert, oriented, thought content appropriate    EKG: normal sinus rhythm. ECHO: reviewed. Ejection fraction: 50-55%  Stress Test: reviewed. No reversible defect, probable inferior and apical scar, normal wall motion  Cardiac Angiography: reviewed.   No significant CAD        Assessment / Acute Cardiac Problems:   Atypical CP no ischemic ECG changes negative troponin  Morbid obesity, BMI 48  No significant CAD, cardiac cath 2015  Fibromyalgia  Poor mobility  LUZ on O2    Patient Active Problem List:     Spinal stenosis of lumbar region with neurogenic claudication     Dyslipidemia     Anemia     Fibromyalgia     Degenerative joint disease (DJD) of hip     Adjustment disorder with mixed anxiety and depressed mood     S/P right and left heart catheterization     SOB (shortness of breath)     Acute on chronic diastolic heart failure (HCC)     Essential hypertension     Morbid obesity with BMI of 45.0-49.9, adult (HCC)     PVC (premature ventricular contraction)     Abnormality of rib determined by X-ray     Chronic bronchitis (HCC)     Sleep apnea     History of total bilateral knee replacement     Encephalopathy acute     Obstructive sleep apnea syndrome     Primary osteoarthritis of left knee     Bronchiectasis with acute lower respiratory infection (HCC)     Supplemental oxygen dependent     Back pain     Medication monitoring encounter     Acute cystitis without hematuria     Chronic cough     Acquired spondylolisthesis     Cervical spine ankylosis     Acute low back pain     Neck pain     Closed compression fracture of L1 lumbar vertebra     Cervical spinal stenosis     BMI 40.0-44.9, adult [facility])     Chronic prescription opiate use     Age-related osteoporosis with current pathological fracture     S/P kyphoplasty     Morbid obesity ([address]     DDD (degenerative disc disease), lumbar     Lumbar radiculopathy     Depression     Osteopenia of lumbar spine     Pain of left hip joint     UTI (urinary tract infection)     Physical debility     COPD (chronic obstructive pulmonary disease) (HCC)     Weakness of left lower extremity     Lumbar spondylosis     Chest pain      Plan of Treatment:   Medication checked  Continue current dose of Norvasc and metoprolol  Patient is reluctant to take a statins it causes hair fall  Check serum iron, B12 and folate  Patient wants to go to rehab unit because severely limited mobility      Electronically signed by Jevon Booth MD on 10/4/2021 at 2:34 PM

## 2021-10-04 NOTE — PROGRESS NOTES
CST Lexiscan. Stress Tech performs patient preparation of physical comfort, review test procedures, pre-stress EKG. Lung Sounds clear t/o. Consent verified by pt. .  Educated patient on test procedure and possible side effects of Lexiscan as well as s/s to report. Cardiologist reviewed pre-test EKG and is present for test.  Patient tolerated test well with minor SOB and \"funny feeling\", both of which resolved to baseline after test with caffeine. Start /69 HR 60  Stop /69 HR 75  EKG portion of testing is completed and negative, nuc. med. portion is still pending.

## 2021-10-04 NOTE — PROGRESS NOTES
Took patient's home medication \"oxycodone\" down to pharmacy to verify and count. After pharmacy was done, the medication was retrieved by RN. RN went to patient room to ask if they would like their dose and patient stated, \"I already took one at 9pm. I took the oxycodone out of the container when my daughter first brought them up. \"

## 2021-10-05 PROCEDURE — 2580000003 HC RX 258: Performed by: INTERNAL MEDICINE

## 2021-10-05 PROCEDURE — 6370000000 HC RX 637 (ALT 250 FOR IP): Performed by: INTERNAL MEDICINE

## 2021-10-05 PROCEDURE — G0378 HOSPITAL OBSERVATION PER HR: HCPCS

## 2021-10-05 PROCEDURE — 6360000002 HC RX W HCPCS: Performed by: INTERNAL MEDICINE

## 2021-10-05 PROCEDURE — 96372 THER/PROPH/DIAG INJ SC/IM: CPT

## 2021-10-05 PROCEDURE — 97110 THERAPEUTIC EXERCISES: CPT

## 2021-10-05 PROCEDURE — 99232 SBSQ HOSP IP/OBS MODERATE 35: CPT | Performed by: INTERNAL MEDICINE

## 2021-10-05 RX ORDER — DULOXETIN HYDROCHLORIDE 30 MG/1
30 CAPSULE, DELAYED RELEASE ORAL 2 TIMES DAILY
Qty: 6 CAPSULE | Refills: 0 | Status: SHIPPED | OUTPATIENT
Start: 2021-10-05 | End: 2022-07-05 | Stop reason: SDUPTHER

## 2021-10-05 RX ORDER — OXYCODONE AND ACETAMINOPHEN 7.5; 325 MG/1; MG/1
1 TABLET ORAL EVERY 6 HOURS PRN
Qty: 12 TABLET | Refills: 0 | Status: SHIPPED | OUTPATIENT
Start: 2021-10-05 | End: 2021-10-06 | Stop reason: SDUPTHER

## 2021-10-05 RX ORDER — GABAPENTIN 100 MG/1
200 CAPSULE ORAL 3 TIMES DAILY
Qty: 18 CAPSULE | Refills: 0 | Status: SHIPPED | OUTPATIENT
Start: 2021-10-05 | End: 2022-01-18 | Stop reason: SDUPTHER

## 2021-10-05 RX ADMIN — OXYCODONE AND ACETAMINOPHEN 1 TABLET: 7.5; 325 TABLET ORAL at 20:13

## 2021-10-05 RX ADMIN — METRONIDAZOLE 200 MG: 500 TABLET ORAL at 14:03

## 2021-10-05 RX ADMIN — LEVOCETIRIZINE DIHYDROCHLORIDE 5 MG: 5 TABLET, FILM COATED ORAL at 20:15

## 2021-10-05 RX ADMIN — SODIUM CHLORIDE, PRESERVATIVE FREE 10 ML: 5 INJECTION INTRAVENOUS at 20:19

## 2021-10-05 RX ADMIN — METOPROLOL TARTRATE 50 MG: 50 TABLET, FILM COATED ORAL at 09:07

## 2021-10-05 RX ADMIN — AMLODIPINE BESYLATE 10 MG: 10 TABLET ORAL at 09:03

## 2021-10-05 RX ADMIN — OXYCODONE AND ACETAMINOPHEN 1 TABLET: 7.5; 325 TABLET ORAL at 14:03

## 2021-10-05 RX ADMIN — ENOXAPARIN SODIUM 30 MG: 30 INJECTION SUBCUTANEOUS at 09:04

## 2021-10-05 RX ADMIN — SODIUM CHLORIDE, PRESERVATIVE FREE 10 ML: 5 INJECTION INTRAVENOUS at 09:19

## 2021-10-05 RX ADMIN — DULOXETIN HYDROCHLORIDE 30 MG: 30 CAPSULE, DELAYED RELEASE ORAL at 09:07

## 2021-10-05 RX ADMIN — PSYLLIUM HUSK 1 PACKET: 3.4 POWDER ORAL at 09:19

## 2021-10-05 RX ADMIN — OXYCODONE AND ACETAMINOPHEN 1 TABLET: 7.5; 325 TABLET ORAL at 05:34

## 2021-10-05 RX ADMIN — ASPIRIN 81 MG: 81 TABLET, CHEWABLE ORAL at 09:03

## 2021-10-05 RX ADMIN — METRONIDAZOLE 200 MG: 500 TABLET ORAL at 20:12

## 2021-10-05 RX ADMIN — METOPROLOL TARTRATE 50 MG: 50 TABLET, FILM COATED ORAL at 20:14

## 2021-10-05 RX ADMIN — ENOXAPARIN SODIUM 30 MG: 30 INJECTION SUBCUTANEOUS at 20:15

## 2021-10-05 RX ADMIN — METRONIDAZOLE 200 MG: 500 TABLET ORAL at 09:03

## 2021-10-05 ASSESSMENT — PAIN SCALES - GENERAL
PAINLEVEL_OUTOF10: 6
PAINLEVEL_OUTOF10: 4
PAINLEVEL_OUTOF10: 6
PAINLEVEL_OUTOF10: 7
PAINLEVEL_OUTOF10: 5
PAINLEVEL_OUTOF10: 6
PAINLEVEL_OUTOF10: 0

## 2021-10-05 ASSESSMENT — PAIN DESCRIPTION - LOCATION
LOCATION: BACK;HIP;SHOULDER;KNEE
LOCATION: BACK;HIP;SHOULDER;KNEE
LOCATION: BACK;HIP

## 2021-10-05 ASSESSMENT — PAIN DESCRIPTION - PAIN TYPE
TYPE: CHRONIC PAIN
TYPE: CHRONIC PAIN

## 2021-10-05 NOTE — PROGRESS NOTES
[place] [address] Internal Medicine    Progress Note     10/5/2021    9:43 AM    Name:   Patricia Arambula  MRN:     [id number]     Acct:      [de-identified]   Room:   [facility] Day:  0  Admit Date:  10/2/2021  4:23 PM    PCP:   Gerrianne Cranker, MD  Code Status:  Full Code    Subjective:     C/C:   Chief Complaint   Patient presents with   Lexine Luis    Wrist Pain    Ankle Pain     Active Problems:    Sleep apnea    Essential hypertension    BMI 40.0-44.9, adult ([address]    Chest pain  Resolved Problems:    * No resolved hospital problems. *      Patient past medical history multiple medical problems are morbid obesity, hypertension, fibromyalgia, history of asbestos exposure, chronic back pain  She recently got cortisone injection in her lower back, back pain is much better  Patient twisted her left ankle, admitted with chest pain  Her chest pain is improved  Underwent stress test  10/5    Patient doing much better today  No back pain, no chest pain  Stress test negative  Blood pressure better controlled        Significant last 24 hr data reviewed ;   Vitals:    10/04/21 1526 10/04/21 1914 10/04/21 2247 10/05/21 0745   BP: (!) 172/75 (!) 145/62 (!) 165/82 (!) 151/66   Pulse: 61 61 55 56   Resp: 18 16 18 18   Temp:  97.3 °F (36.3 °C) 97.7 °F (36.5 °C) 98.2 °F (36.8 °C)   TempSrc:  Oral Oral Oral   SpO2: 99% 94% 96% 99%   Weight:       Height:          No results found for this or any previous visit (from the past 24 hour(s)). No results for input(s): POCGLU in the last 72 hours. XR ANKLE RIGHT (MIN 3 VIEWS)    Result Date: 10/2/2021  EXAMINATION: THREE XRAY VIEWS OF THE RIGHT ANKLE 10/2/2021 5:44 pm COMPARISON: None. HISTORY: ORDERING SYSTEM PROVIDED HISTORY: pain TECHNOLOGIST PROVIDED HISTORY: pain Reason for Exam: twisted ankle while walking Acuity: Unknown Type of Exam: Unknown FINDINGS: Right ankle soft tissue swelling particularly laterally.   There is no evidence of an acute fracture of the right ankle. Ankle mortise is symmetric. There is prominent calcaneal spurring and there is evidence of pes planus. There is prominent dorsal tarsal bone spurring. Right ankle soft tissue swelling with no evidence of an acute fracture. Degenerative changes in the mid and hindfoot visualized as above. VL DUP LOWER EXTREMITY VENOUS LEFT    Result Date: 10/3/2021    [address] [place] [address]  Vascular Lower Extremities DVT Study Procedure   Patient Name    JUANPABLO        Date of Study             10/02/2021                  [place]ian Manual   Date of Birth   1945   Gender                    Female   Age             67 year(s)   Race                      Black   Room Number     2108   Corporate ID #  [id number]   Patient Acct #  [de-identified]   MR #            [id number]       Sonographer               Quan Cooper   Accession #     [id number]   Interpreting Physician    Zak Engel   Referring Nurse              Referring Physician       Jackie Bone  Practitioner  Procedure Type of Study:   Veins: Lower Extremities DVT Study, Venous Scan Lower Left. Indications for Study:Pain and swelling and Elevated D-Dimer. Patient Status:ER. Technical Quality:Adequate visualization. Conclusions   Summary   Simultaneous real time imaging utilizing B-Mode, color doppler and  spectral waveform analysis was performed on the left lower extremity for  venous examination of the deep and superficial systems. Findings are:   Left:  No evidence of deep or superficial venous thrombosis.    Signature   ----------------------------------------------------------------  Electronically signed by Quan Cooepr(Sonographer) on  10/02/2021 07:33 PM  ----------------------------------------------------------------   ----------------------------------------------------------------  Electronically signed by Lupe Amanda(Interpreting  physician) on 10/03/2021 11:59 AM  ---------------------------------------------------------------- +----------------------------+------+------+-------------------------------+ ! Location                    ! Signal!Reflux! Reflux (msec)                  ! +----------------------------+------+------+-------------------------------+ ! Common Femoral              !Phasic!      !                               ! +----------------------------+------+------+-------------------------------+ Left Lower Extremities DVT Study Measurements Left 2D Measurements +------------------------------------+----------+---------------+----------+ ! Location                            ! Visualized! Compressibility! Thrombosis! +------------------------------------+----------+---------------+----------+ ! Common Femoral                      !Yes       ! Yes            ! None      ! +------------------------------------+----------+---------------+----------+ ! Prox Femoral                        !Yes       ! Yes            ! None      ! +------------------------------------+----------+---------------+----------+ ! Mid Femoral                         !Yes       ! Yes            ! None      ! +------------------------------------+----------+---------------+----------+ ! Dist Femoral                        !Yes       ! Yes            ! None      ! +------------------------------------+----------+---------------+----------+ ! Deep Femoral                        !Partial   !Yes            ! None      ! +------------------------------------+----------+---------------+----------+ ! Popliteal                           !Yes       ! Yes            ! None      ! +------------------------------------+----------+---------------+----------+ ! Sapheno Femoral Junction            ! Yes       ! Yes            ! None      ! +------------------------------------+----------+---------------+----------+ ! PTV                                 ! Partial   !Yes            ! None      ! +------------------------------------+----------+---------------+----------+ ! Estevan !Partial   !Yes            ! None      ! +------------------------------------+----------+---------------+----------+ ! Gastroc                             ! Partial   !Yes            ! None      ! +------------------------------------+----------+---------------+----------+ ! GSV Thigh                           ! Yes       ! Yes            ! None      ! +------------------------------------+----------+---------------+----------+ ! GSV Knee                            ! Yes       ! Yes            ! None      ! +------------------------------------+----------+---------------+----------+ ! GSV Ankle                           ! Yes       ! Yes            ! None      ! +------------------------------------+----------+---------------+----------+ ! SSV                                 ! Yes       ! Yes            ! None      ! +------------------------------------+----------+---------------+----------+ Left Doppler Measurements +---------------------------+------+------+--------------------------------+ ! Location                   ! Signal!Reflux! Reflux (msec)                   ! +---------------------------+------+------+--------------------------------+ ! Common Femoral             !Phasic!      !                                ! +---------------------------+------+------+--------------------------------+ ! Prox Femoral               !Phasic!      !                                ! +---------------------------+------+------+--------------------------------+ ! Popliteal                  !Phasic! Yes   ! 1320                            !  +---------------------------+------+------+--------------------------------+    NM MYOCARDIAL SPECT REST EXERCISE OR RX    Result Date: 10/4/2021  EXAMINATION: MYOCARDIAL PERFUSION IMAGING 10/4/2021 7:48 am TECHNIQUE: Rest dose:  10.9 mCi Tc-99m sestamibi intravenously Stress dose:  39.4 mCi Tc-99m sestamibi intravenously Under cardiology supervision, 0.4 mg Lexiscan was infused intravenously prior to injection of the stress dose. SPECT imaging was acquired following injection of the sestamibi. ECG gating was obtained following the stress acquisition. COMPARISON: 06/08/2015 HISTORY: ORDERING SYSTEM PROVIDED HISTORY: cp TECHNOLOGIST PROVIDED HISTORY: cp Reason for Exam: Chest pain Procedure Type->Rx Reason for Exam: Chest pain Acuity: Unknown Type of Exam: Unknown 54-year-old female with chest pain FINDINGS: The patient achieved a maximum heart rate of 90 beats per minute, 62 % of the maximum age predicted heart rate of 144 beats per minute. Perfusion: Fixed perfusion defect involving the inferior and apical wall. No reversible perfusion defect to suggest reversible ischemia. Function: The gated SPECT data demonstrates normal left ventricular size and normal wall motion. Left ventricular ejection fraction:  52% TID score:  1.06 (threshold value of 1.39 is used for Lexiscan stress with Tc-99m). There is no stress-induced cavitary dilatation to suggest compensated triple vessel disease. End diastolic volume:  805NY Scores are visually adjusted to account for potential artifact. Summed stress score:  5 Summed rest score:  5 Summed reversibility score:  0     1. Findings suggesting infarct involving the inferior and a portion of the apical wall. 2. No definitive scintigraphic evidence for reversible ischemia. 3. Left ventricular ejection fraction of 52%. 4.  Please see report for EKG portion of the examination which will be performed separately by physician from cardiology. Risk stratification:  Intermediate risk Note:  Risk stratification incorporates both clinical history and test results. Final risk determination is the responsibility of the ordering provider as history and other test results may increase or decrease the risk stratification reported for this examination. Risk stratification criteria are adapted from \"Noninvasive Risk Stratification\" criteria from Diana Keith.   Al, ACC/AATS/AHA/ASE/ASNC/SCAI/SCCT/STS 2017 (24Hr): Intake/Output Summary (Last 24 hours) at 10/5/2021 0943  Last data filed at 10/5/2021 0536  Gross per 24 hour   Intake 240 ml   Output 2075 ml   Net -1835 ml       Labs:    Radiology:    Medications: Allergies:      Current Meds:   Scheduled Meds:    amLODIPine  10 mg Oral Daily    metoprolol tartrate  50 mg Oral BID    levocetirizine  5 mg Oral Nightly    gabapentin  200 mg Oral TID    sodium chloride flush  5-40 mL IntraVENous 2 times per day    enoxaparin  30 mg SubCUTAneous BID    aspirin  81 mg Oral Daily    donepezil  5 mg Oral Nightly    DULoxetine  30 mg Oral BID    psyllium  1 packet Oral Daily     Continuous Infusions:    sodium chloride       PRN Meds: sodium chloride flush, sodium chloride flush, oxyCODONE-acetaminophen, sodium chloride flush, sodium chloride, ondansetron **OR** ondansetron, acetaminophen **OR** acetaminophen, magnesium hydroxide, potassium chloride **OR** potassium alternative oral replacement **OR** potassium chloride, potassium chloride, magnesium sulfate, nitroGLYCERIN, melatonin, albuterol sulfate HFA      Physical Examination:        BP (!) 151/66   Pulse 56   Temp 98.2 °F (36.8 °C) (Oral)   Resp 18   Ht 5' 4\" (1.626 m)   Wt 276 lb 10.8 oz (125.5 kg)   SpO2 99%   BMI 47.49 kg/m²   Temp (24hrs), Av.7 °F (36.5 °C), Min:97.3 °F (36.3 °C), Max:98.2 °F (36.8 °C)    No results for input(s): POCGLU in the last 72 hours. Intake/Output Summary (Last 24 hours) at 10/5/2021 0943  Last data filed at 10/5/2021 0536  Gross per 24 hour   Intake 240 ml   Output 2075 ml   Net -1835 ml       General Appearance:  alert, well appearing, and in no acute distress, central obesity present  Mental status:   Head:  normocephalic, atraumatic.   Eye: no icterus, redness, pupils equal and reactive, extraocular eye movements intact, conjunctiva clear  Ear: normal external ear, no discharge, hearing intact  Nose:  no drainage noted  Mouth: mucous membranes moist  Neck:

## 2021-10-05 NOTE — DISCHARGE INSTR - COC
Continuity of Care Form    Patient Name: Pretty Cote   :    MRN:  [id number]    Admit date:  10/2/2021  Discharge date:  10/06/2021    Code Status Order: Full Code   Advance Directives:      Admitting Physician:  Maryann De León MD  PCP: Yari Bronson MD    Discharging Nurse: [facility] Unit/Room#: 2108/2108-01  Discharging Unit Phone Number: [phone]    Emergency Contact:   Extended Emergency Contact Information  Primary Emergency Contact: Gita Caba  Address: [address]  Phone: [phone]  Work Phone: [phone]  Mobile Phone: [phone]  Relation: Child  Secondary Emergency Contact: Jenelle Reagan  Address: [facility] [address] [facility] Phone: [phone]  Work Phone: [phone]  Mobile Phone: [phone]  Relation: Child    Past Surgical History:  Past Surgical History:   Procedure Laterality Date    BRONCHOSCOPY Left 2017    BRONCHOSCOPY ALVEOLAR LAVAGE LOWER LOBE performed by Ron Marlow MD at [address]  x 3    no stents    COLONOSCOPY  4 28 14    polyps biopsies     FOOT SURGERY Left     calcium deposit removal from top of foot    HYSTERECTOMY      JOINT REPLACEMENT Bilateral 2016    bilat total knees    NERVE BLOCK  2013    caudal celestone 6mg    NERVE BLOCK  13    Caudal #2, Celestone 9mg    NERVE BLOCK  14    rt hip inj celestone 9 mg    NERVE BLOCK  2014    caudal# 3- celestone 9 mg    NERVE BLOCK  14    caudal epidural #2, decadron 7mg, fentanyl 25mcg    NERVE BLOCK  10/2/14    duramorph 1.5  decadron 5mg    NERVE BLOCK  2015    caudal# 1 celestone 9mg    NERVE BLOCK  11/16/15    caudal #2  decadron 10mg    NERVE BLOCK  11/23/15    duramorph 1mg celestone 9mg    NERVE BLOCK  2018    CAUDAL EPIDURAL STEROID BLOCK  DECADRON 10 MG     NERVE BLOCK  2018    caudal # decadron 7mg vertebra S32.010A    Cervical spinal stenosis M48.02    BMI 40.0-44.9, adult ([facility]) Z68.41    Chronic prescription opiate use Z79.891    Age-related osteoporosis with current pathological fracture M80.00XA    S/P kyphoplasty Z98.890    Morbid obesity ([facility]) E66.01    DDD (degenerative disc disease), lumbar M51.36    Lumbar radiculopathy M54.16    Depression F32. A    Osteopenia of lumbar spine M85.88    Pain of left hip joint M25.552    UTI (urinary tract infection) N39.0    Physical debility R53.81    COPD (chronic obstructive pulmonary disease) ([facility]) J44.9    Weakness of left lower extremity R29.898    Lumbar spondylosis M47.816    Chest pain R07.9       Isolation/Infection:   Isolation            No Isolation          Patient Infection Status       Infection Onset Added Last Indicated Last Indicated By Review Planned Expiration Resolved Resolved By    None active    Resolved    COVID-19 Rule Out 09/30/20 09/30/20 09/30/20 COVID-19 (Ordered)   10/01/20 Rule-Out Test Resulted            Nurse Assessment:  Last Vital Signs: BP (!) 151/66   Pulse 56   Temp 98.2 °F (36.8 °C) (Oral)   Resp 18   Ht 5' 4\" (1.626 m)   Wt 276 lb 10.8 oz (125.5 kg)   SpO2 99%   BMI 47.49 kg/m²     Last documented pain score (0-10 scale): Pain Level: 6  Last Weight:   Wt Readings from Last 1 Encounters:   10/03/21 276 lb 10.8 oz (125.5 kg)     Mental Status:  oriented and alert    IV Access:  - None    Nursing Mobility/ADLs:  Walking   Assisted  Transfer  Assisted  Bathing  Assisted  Dressing  Assisted  Toileting  Assisted  Feeding  Independent  Med Admin  Assisted  Med Delivery   whole    Wound Care Documentation and Therapy:        Elimination:  Continence:   · Bowel:  Yes  · Bladder: Yes  Urinary Catheter: None   Colostomy/Ileostomy/Ileal Conduit: No       Date of Last BM: 10/03/2021    Intake/Output Summary (Last 24 hours) at 10/5/2021 0907  Last data filed at 10/5/2021 0536  Gross per 24 hour   Intake 240 ml   Output 2075 ml   Net -1835 ml     I/O last 3 completed shifts: In: 240 [P.O.:240]  Out: 2775 [Urine:2775]    Safety Concerns: At Risk for Falls    Impairments/Disabilities:      None    Nutrition Therapy:  Current Nutrition Therapy:   - Oral Diet:  General    Routes of Feeding: Oral  Liquids: No Restrictions  Daily Fluid Restriction: no  Last Modified Barium Swallow with Video (Video Swallowing Test): not done    Treatments at the Time of Hospital Discharge:   Respiratory Treatments: N/A  Oxygen Therapy:  is on oxygen at 2 L/min per nasal cannula. at night  Ventilator:    - No ventilator support    Rehab Therapies: Physical Therapy and Occupational Therapy  Weight Bearing Status/Restrictions: Other Medical Equipment (for information only, NOT a DME order): Other Treatments: skilled nursing assessment per protocol medication education     Patient's personal belongings (please select all that are sent with patient):  None    RN SIGNATURE:  Electronically signed by Ammon Lott RN on 10/5/21 at 4:25 PM EDT    CASE MANAGEMENT/SOCIAL WORK SECTION    Inpatient Status Date: 10/2/21    Readmission Risk Assessment Score:  Readmission Risk              Risk of Unplanned Readmission:  0           Discharging to Facility/ Agency   · [facility]  · [address] [facility]  · [phone]  · [phone]    / signature: Electronically signed by Linda Luong RN on 10/5/21 at 9:08 AM EDT  Electronically signed by GRECIA Nunez on 10/5/2021 at 3:28 PM    PHYSICIAN SECTION    Prognosis: Good    Condition at Discharge: Stable    Rehab Potential (if transferring to Rehab): Fair    Recommended Labs or Other Treatments After Discharge: pt/ot    Physician Certification: I certify the above information and transfer of Evie Moss  is necessary for the continuing treatment of the diagnosis listed and that she requires [facility] for less 30 days.      Update Admission H&P: No change in H&P    PHYSICIAN SIGNATURE:  Electronically signed by Amy Wallace MD on 10/5/21 at 3:28 PM EDT

## 2021-10-05 NOTE — PLAN OF CARE
Pt remained free of falls and showed no signs of infection. Pt remained compliant with medications to control chronic and acute pain.

## 2021-10-05 NOTE — PROGRESS NOTES
RN called report to Dave on [facility]. Pt transfer with all belongings including home meds. Home narcs were taken over and counted with Dave QUINN an locked in [address] [place] [address]. no

## 2021-10-05 NOTE — PROGRESS NOTES
Date:   10/5/2021  Patient name: Rupa Solomon  Date of admission:  10/2/2021  4:23 PM  MRN:   [id number]  YOB: 1945  PCP: Aliyah Momin MD    Reason for Admission: Chest pain [R07.9]  General weakness [R53.1]  Fall, initial encounter [W19. XXXA]  Chest pain, unspecified type [R07.9]    Cardiology follow-up: Chest pain       Impression     Admission 10- with atypical chest pain  H/O irregular heart beats  History of fibromyalgia   Morbid obesity, BMI 48  Elevated systolic blood pressure  Hyperlipidemia, , patient refusing for statin  Aortic systolic murmur  On oxygen at home, sleep apnea  Limited mobility, weakness lower extremities  History of left leg DVT  Neuropathy  Chronic low back pain  Asbestosis  Anemia, low MCV     No history of coronary intervention     Past surgical history  Bilateral knee replacement, right hip replacement    History of present illness     76-year-old female who lives alone, independent in her activities of daily living,  with past medical history of obstructive sleep apnea, asbestosis on home oxygen, morbid obesity, fibromyalgia and history of left leg DVT got admitted on 10/2/2021 with chest discomfort. She twisted her Rt ankle on going from a vehicle to to her scooter. She had fall.  No LOC.  No fever no chills no productive cough. ECG no ischemic changes     Current evaluation  Pt seen and examined  Morbidly obese female, did not appears in any distress  She had a Lexiscan Myoview stress test 10/4/2021  No reversible ischemia probable old inferior, apical MI  No ischemic ECG changes  Patient remains bed bound  No chest pain no palpitation  ECG monitor sinus rhythm  Blood pressure 158/70, oxygen saturation 98%      Medications:   Scheduled Meds:   amLODIPine  10 mg Oral Daily    metoprolol tartrate  50 mg Oral BID    levocetirizine  5 mg Oral Nightly    gabapentin  200 mg Oral TID    sodium chloride flush  5-40 mL IntraVENous 2 times per day    enoxaparin  30 mg SubCUTAneous BID    aspirin  81 mg Oral Daily    donepezil  5 mg Oral Nightly    DULoxetine  30 mg Oral BID    psyllium  1 packet Oral Daily     Continuous Infusions:   sodium chloride       CBC:   Recent Labs     10/02/21  1800 10/03/21  0531   WBC 6.1 6.1   HGB 10.7* 10.8*    284     BMP:    Recent Labs     10/02/21  1800 10/03/21  0531    138   K 4.4 4.7    104   CO2 23 24   BUN 30* 29*   CREATININE 1.12* 0.98*   GLUCOSE 94 116*     Hepatic:   Recent Labs     10/02/21  1800 10/03/21  0531   AST 13 13   ALT 13 12   BILITOT 0.29* 0.28*   ALKPHOS 68 66     Troponin: No results for input(s): TROPONINI in the last 72 hours. BNP: No results for input(s): BNP in the last 72 hours. Lipids:   Recent Labs     10/03/21  0531   CHOL 272*   HDL 43     INR: No results for input(s): INR in the last 72 hours. Objective:   Vitals: BP (!) 158/72   Pulse 56   Temp 97.9 °F (36.6 °C) (Oral)   Resp 18   Ht 5' 4\" (1.626 m)   Wt 276 lb 10.8 oz (125.5 kg)   SpO2 96%   BMI 47.49 kg/m²   General appearance: alert and cooperative with exam  HEENT: Head: Normal, normocephalic, atraumatic. Neck: no JVD and supple, symmetrical, trachea midline  Lungs: diminished breath sounds bibasilar  Heart: regular rate and rhythm  Abdomen: Very obese bowel sounds present  Extremities: Homans sign is negative, no sign of DVT  Neurologic: Mental status: Alert, oriented, thought content appropriate    EKG: sinus bradycardia. Heart rate 58  ECHO: reviewed. Ejection fraction: 50-55%  Stress Test: Reviewed no ischemia ejection fraction 52% normal wall motion  Cardiac Angiography: Minimal CAD cardiac cath June 9 2015.         Assessment / Acute Cardiac Problems:   Atypical CP no ischemic ECG changes negative troponin  Negative Lexiscan Myoview stress test no ischemia  Morbid obesity, BMI 48  No significant CAD, cardiac cath 2015  Fibromyalgia  Poor mobility  Hypertension  Hyperlipidemia patient does not want to take statin  LUZ on O2    Patient Active Problem List:     Spinal stenosis of lumbar region with neurogenic claudication     Dyslipidemia     Anemia     Fibromyalgia     Degenerative joint disease (DJD) of hip     Adjustment disorder with mixed anxiety and depressed mood     S/P right and left heart catheterization     SOB (shortness of breath)     Acute on chronic diastolic heart failure ([facility])     Essential hypertension     Morbid obesity with BMI of 45.0-49.9, adult ([facility])     PVC (premature ventricular contraction)     Abnormality of rib determined by X-ray     Chronic bronchitis ([facility])     Sleep apnea     History of total bilateral knee replacement     Encephalopathy acute     Obstructive sleep apnea syndrome     Primary osteoarthritis of left knee     Bronchiectasis with acute lower respiratory infection ([facility])     Supplemental oxygen dependent     Back pain     Medication monitoring encounter     Acute cystitis without hematuria     Chronic cough     Acquired spondylolisthesis     Cervical spine ankylosis     Acute low back pain     Neck pain     Closed compression fracture of L1 lumbar vertebra     Cervical spinal stenosis     BMI 40.0-44.9, adult ([facility])     Chronic prescription opiate use     Age-related osteoporosis with current pathological fracture     S/P kyphoplasty     Morbid obesity ([facility])     DDD (degenerative disc disease), lumbar     Lumbar radiculopathy     Depression     Osteopenia of lumbar spine     Pain of left hip joint     UTI (urinary tract infection)     Physical debility     COPD (chronic obstructive pulmonary disease) ([facility])     Weakness of left lower extremity     Lumbar spondylosis     Chest pain      Plan of Treatment:   Medication checked continue current dose of amlodipine and metoprolol  Patient is reluctant to take a statin  Waiting for replacement to rehab unit    Electronically signed by Hai Donohue MD on 10/5/2021 at 3:09 PM

## 2021-10-05 NOTE — CARE COORDINATION
JANIA received info from [address] [place] [address] in admissions with The [address] [place] [address] that they have received authorization for this patient to admit to their facility on this date. JANIA noted that there is a DC order. JANIA spoke with Vanda Mendoza RN Clinical lead and she did report that the patient can go to the facility on this date. JANIA intern set up transport and informed the staff here, the facility, and JANIA also called the patient's daughter Nichol Orozco to inform her of DC/transport time. JANIA attempted to get a transport time for this date but all companies are running late and there is nothing prior to 10:00 or 11:00 pm.  JANIA set up transport for 9:00 AM on Wednesday Morning. The patient will go via stretcher by [employer]. The number for report is [phone]. JANIA completed and submitted the 7000 form via [employer].

## 2021-10-05 NOTE — CARE COORDINATION
JANIA spoke with Montse Rivas in admissions this AM.  She reported that they can accept this patient and they are starting the pre-cert on this date.

## 2021-10-05 NOTE — PLAN OF CARE
Problem: Falls - Risk of:  Goal: Will remain free from falls  Description: Will remain free from falls  Outcome: Ongoing  Note: Pt assessed as a fall risk this shift. Remains free from falls and accidental injury at this time. Fall precautions in place, including falling star sign. Floor free from obstacles, and bed is locked and in lowest position. Adequate lighting provided. Pt encouraged to call before getting OOB for any need. Bed alarm activated. Will continue to monitor needs during hourly rounding, and reinforce education on use of call light. Problem: Skin Integrity:  Goal: Absence of new skin breakdown  Description: Absence of new skin breakdown  Outcome: Ongoing  Note: Skin assessment complete. Pt turned and repositioned per self. Area kept free from moisture. Proper nourishment and fluids encouraged, as appropriate. Skin remains clean, dry, and intact. Will continue to monitor for additional needs and changes in skin breakdown. Problem: Pain:  Goal: Pain level will decrease  Description: Pain level will decrease  Outcome: Ongoing  Note: Pt medicated with pain medication prn. Assessed all pain characteristics including level, type, location, frequency, and onset. Non-pharmacologic interventions offered to pt as well. Pt states pain is tolerable at this time.  Will continue to monitor

## 2021-10-06 ENCOUNTER — HOSPITAL ENCOUNTER (OUTPATIENT)
Dept: PAIN MANAGEMENT | Age: 76
Discharge: HOME | End: 2021-10-06
Payer: MEDICARE

## 2021-10-06 VITALS
OXYGEN SATURATION: 98 % | WEIGHT: 276.68 LBS | TEMPERATURE: 98 F | SYSTOLIC BLOOD PRESSURE: 168 MMHG | DIASTOLIC BLOOD PRESSURE: 64 MMHG | HEIGHT: 64 IN | RESPIRATION RATE: 17 BRPM | HEART RATE: 58 BPM | BODY MASS INDEX: 47.24 KG/M2

## 2021-10-06 DIAGNOSIS — M43.22 CERVICAL SPINE ANKYLOSIS: ICD-10-CM

## 2021-10-06 DIAGNOSIS — M48.062 SPINAL STENOSIS OF LUMBAR REGION WITH NEUROGENIC CLAUDICATION: ICD-10-CM

## 2021-10-06 DIAGNOSIS — M54.50 ACUTE MIDLINE LOW BACK PAIN, UNSPECIFIED WHETHER SCIATICA PRESENT: ICD-10-CM

## 2021-10-06 DIAGNOSIS — M48.02 CERVICAL SPINAL STENOSIS: ICD-10-CM

## 2021-10-06 DIAGNOSIS — M51.36 DDD (DEGENERATIVE DISC DISEASE), LUMBAR: ICD-10-CM

## 2021-10-06 DIAGNOSIS — Z98.890 S/P KYPHOPLASTY: ICD-10-CM

## 2021-10-06 DIAGNOSIS — M79.7 FIBROMYALGIA: ICD-10-CM

## 2021-10-06 DIAGNOSIS — M54.16 LUMBAR RADICULOPATHY: ICD-10-CM

## 2021-10-06 LAB
EKG ATRIAL RATE: 59 BPM
EKG P AXIS: 68 DEGREES
EKG P-R INTERVAL: 194 MS
EKG Q-T INTERVAL: 450 MS
EKG QRS DURATION: 88 MS
EKG QTC CALCULATION (BAZETT): 445 MS
EKG R AXIS: 6 DEGREES
EKG T AXIS: 10 DEGREES
EKG VENTRICULAR RATE: 59 BPM

## 2021-10-06 PROCEDURE — 99239 HOSP IP/OBS DSCHRG MGMT >30: CPT | Performed by: INTERNAL MEDICINE

## 2021-10-06 PROCEDURE — 99214 OFFICE O/P EST MOD 30 MIN: CPT | Performed by: PAIN MEDICINE

## 2021-10-06 PROCEDURE — G0378 HOSPITAL OBSERVATION PER HR: HCPCS

## 2021-10-06 PROCEDURE — 99213 OFFICE O/P EST LOW 20 MIN: CPT

## 2021-10-06 PROCEDURE — 6370000000 HC RX 637 (ALT 250 FOR IP): Performed by: INTERNAL MEDICINE

## 2021-10-06 PROCEDURE — 6360000002 HC RX W HCPCS: Performed by: INTERNAL MEDICINE

## 2021-10-06 PROCEDURE — 96372 THER/PROPH/DIAG INJ SC/IM: CPT

## 2021-10-06 RX ORDER — OXYCODONE AND ACETAMINOPHEN 7.5; 325 MG/1; MG/1
1 TABLET ORAL EVERY 6 HOURS PRN
Qty: 120 TABLET | Refills: 0 | Status: SHIPPED | OUTPATIENT
Start: 2021-10-14 | End: 2021-10-17

## 2021-10-06 RX ADMIN — ENOXAPARIN SODIUM 30 MG: 30 INJECTION SUBCUTANEOUS at 08:14

## 2021-10-06 RX ADMIN — AMLODIPINE BESYLATE 10 MG: 10 TABLET ORAL at 08:13

## 2021-10-06 RX ADMIN — ASPIRIN 81 MG: 81 TABLET, CHEWABLE ORAL at 08:14

## 2021-10-06 RX ADMIN — METRONIDAZOLE 200 MG: 500 TABLET ORAL at 08:12

## 2021-10-06 ASSESSMENT — PAIN SCALES - GENERAL: PAINLEVEL_OUTOF10: 6

## 2021-10-06 NOTE — PLAN OF CARE
Problem: Falls - Risk of:  Goal: Will remain free from falls  Description: Will remain free from falls  10/6/2021 0511 by Leandra Mendez RN  Outcome: Ongoing  Note: Patient remains free of falls and injuries throughout shift. Bed remains in the lowest position, wheels locked, call light and bedside table are within reach. 10/5/2021 1739 by Chon Hernandez RN  Outcome: Ongoing  Note: Pt assessed as a fall risk this shift. Remains free from falls and accidental injury at this time. Fall precautions in place, including falling star sign. Floor free from obstacles, and bed is locked and in lowest position. Adequate lighting provided. Pt encouraged to call before getting OOB for any need. Bed alarm activated. Will continue to monitor needs during hourly rounding, and reinforce education on use of call light. Goal: Absence of physical injury  Description: Absence of physical injury  Outcome: Ongoing     Problem: Skin Integrity:  Goal: Will show no infection signs and symptoms  Description: Will show no infection signs and symptoms  Outcome: Ongoing  Goal: Absence of new skin breakdown  Description: Absence of new skin breakdown  10/6/2021 0511 by Leandra Mendez RN  Outcome: Ongoing  Note: Assess the overall condition of the skin. Check on bony prominences such as the sacrum, trochanters, scapulae, elbows, heels, inner and outer malleolus, inner and outer knees, back of head). Reinforce the importance of turning, mobility, and ambulation. 10/5/2021 1739 by Chon Hernandez RN  Outcome: Ongoing  Note: Skin assessment complete. Pt turned and repositioned per self. Area kept free from moisture. Proper nourishment and fluids encouraged, as appropriate. Skin remains clean, dry, and intact. Will continue to monitor for additional needs and changes in skin breakdown.       Problem: Pain:  Goal: Pain level will decrease  Description: Pain level will decrease  10/6/2021 0511 by Leandra Mendez RN  Outcome: Ongoing  10/5/2021 1739 by Anthony Schwab RN  Outcome: Ongoing  Note: Pt medicated with pain medication prn. Assessed all pain characteristics including level, type, location, frequency, and onset. Non-pharmacologic interventions offered to pt as well. Pt states pain is tolerable at this time.  Will continue to monitor   Goal: Control of acute pain  Description: Control of acute pain  Outcome: Ongoing  Goal: Control of chronic pain  Description: Control of chronic pain  Outcome: Ongoing

## 2021-10-06 NOTE — PROGRESS NOTES
Pt. Discharged in stable condition with all personal belongings including home medications. Percocet and Gabapentin count verified with EMT transport. Report called to The [address] [place] [address]. Reviewed pt. Case and answered all questions.

## 2021-10-06 NOTE — PROGRESS NOTES
Physical Therapy  Facility/Department: [facility] MED SURG  Daily Treatment Note  NAME: Nela Sanchez  :   MRN: [id number]    Date of Service: 10/5/2021    Discharge Recommendations:  Patient would benefit from continued therapy after discharge        Assessment   Body structures, Functions, Activity limitations: Decreased functional mobility ; Decreased balance; Increased pain;Decreased strength  Treatment Diagnosis: Impaired mobility, weakness, fall  Prognosis: Fair  History: Recent Lumbar injection, Fibromyalgia  REQUIRES PT FOLLOW UP: Yes  Activity Tolerance  Activity Tolerance: Patient limited by pain; Patient limited by fatigue;Patient limited by endurance     Patient Diagnosis(es): The primary encounter diagnosis was Chest pain, unspecified type. Diagnoses of Fall, initial encounter, General weakness, Spinal stenosis of lumbar region with neurogenic claudication, Fibromyalgia, Cervical spine ankylosis, DDD (degenerative disc disease), lumbar, S/P kyphoplasty, Cervical spinal stenosis, Lumbar radiculopathy, and Acute midline low back pain, unspecified whether sciatica present were also pertinent to this visit.      has a past medical history of Abnormality of rib determined by X-ray, Acute cystitis without hematuria, Acute exacerbation of chronic bronchitis (HCC), Acute on chronic diastolic heart failure ([address] Adjustment disorder with mixed anxiety and depressed mood, Anemia, Back pain, Bronchiectasis with acute lower respiratory infection ([address] Bronchitis, Chronic bronchitis ([address] Chronic cough, COPD (chronic obstructive pulmonary disease) ([address] Degenerative joint disease (DJD) of hip, Dyslipidemia, Encephalopathy acute, Essential hypertension, Fibromyalgia, GERD (gastroesophageal reflux disease), History of total bilateral knee replacement, Hx of blood clots, Hyperlipidemia, Hypertension, Incontinence of urine, Irregular heartbeat, Medication monitoring encounter, Morbid obesity with BMI of 45.0-49.9, adult [facility]), Obstructive sleep apnea syndrome, Osteoarthritis, Primary osteoarthritis of left knee, PVC (premature ventricular contraction), S/P right and left heart catheterization, Sleep apnea, SOB (shortness of breath), Spinal stenosis of lumbar region with neurogenic claudication, Supplemental oxygen dependent, Urine frequency, and UTI (urinary tract infection). has a past surgical history that includes Hysterectomy; Nerve Block (5/13/2013); Nerve Block (5/28/13); sinus surgery; Foot surgery (Left); Nerve Block (2/14/14); Colonoscopy (4 28 14); Nerve Block (07/14/2014); Nerve Block (7-21-14); Nerve Block (10/2/14); Nerve Block (11/9/2015); Nerve Block (11/16/15); Nerve Block (11/23/15); Total hip arthroplasty (Right, 5/14/15); bronchoscopy (Left, 8/16/2017); Cardiac catheterization (x 3); Nerve Block (03/28/2018); Nerve Block (05/23/2018); Nerve Block (08/28/2018); pr perq vert agmntj cavity crtj uni/bi cannulj lmbr (N/A, 10/29/2018); and joint replacement (Bilateral, 5/27/2016). Restrictions  Restrictions/Precautions  Restrictions/Precautions: Fall Risk, General Precautions  Required Braces or Orthoses?: No  Position Activity Restriction  Other position/activity restrictions: R ankle x-rays-Right ankle soft tissue swelling with no evidence of an acute fracture. recently had Lumbar injection on 9/30/21. Subjective   Subjective  Subjective: Pt in bed upon entry, Pt agreeable to therapy. General Comment  Comments: Pt report feeling better due to the nerve blocks she received a few days ago. Pain Assessment  Pain Assessment: 0-10  Pain Level: 6  Pain Type: Chronic pain  Pain Location: Back;Hip; Shoulder;Knee  Non-Pharmaceutical Pain Intervention(s): Ambulation/Increased Activity;Repositioned  Oxygen Therapy  O2 Device: None (Room air)       Orientation  Orientation  Overall Orientation Status: Within Normal Limits  Cognition      Objective   Bed mobility  Supine to Sit: Moderate assistance;2 Person assistance  Scooting: Minimal assistance  Comment: Pt had to go to [facility] once up, patient assist with transfer and two PCT arrived to completed tasked. Transfers  Sit to Stand: Minimal Assistance  Stand to sit: Minimal Assistance  Comment: Pt assist with transfer  to [facility]. Two PCT arrived to assist with transfer bed> BSC. Ambulation  Ambulation?: Yes  Ambulation 1  Surface: level tile  Comments: Pt assist with transfer bed to [facility] with A from 2 PCT. (Treatment ended will attempt to return if possible.)  Stairs/Curb  Stairs?: No     Balance  Posture: Good  Sitting - Static: Good  Sitting - Dynamic: Fair  Standing - Static: Fair (RW)  Standing - Dynamic: Fair;- (RW)                           G-Code     OutComes Score                                                     AM-PAC Score             Goals  Short term goals  Time Frame for Short term goals: 3 to 4 visits.   Short term goal 1: Pt able to perform supine >sit at min A   Short term goal 2: Pt able to transfersBed<>chair min A   Short term goal 3: Pt gus to ambulate with Rolling walker distance of 10 to 20 ft, CGA/min A, 2nd person SBA for safety  Patient Goals   Patient goals : I want to walk again    Plan    Plan  Times per week: 5 x/week  Current Treatment Recommendations: Strengthening, Balance Training, Functional Mobility Training, Transfer Training, Endurance Training, Gait Training, Safety Education & Training, Patient/Caregiver Education & Training  Safety Devices  Type of devices: Left in bed, Gait belt, Call light within reach, Bed alarm in place, Nurse notified     Therapy Time   Individual Concurrent Group Co-treatment   Time In 1107         Time Out 1127         Minutes [address], PTA   Electronically signed by Pat Emery PTA on 10/5/2021 at 9:27 PM

## 2021-10-07 ENCOUNTER — CARE COORDINATION (OUTPATIENT)
Dept: CARE COORDINATION | Age: 76
End: 2021-10-07

## 2021-10-07 NOTE — CARE COORDINATION
ACM noted that patient was discharged to The [address] [place] [address] for rehab. ACM will remove self from care team and check on DC plans in 3-4 weeks.

## 2021-10-12 NOTE — DISCHARGE SUMMARY
[place] [address] Internal Medicine    Discharge Summary     Patient ID: Aracelis Valero  :     MRN: [id number]     ACCOUNT:  [de-identified]   Patient's PCP: Tori Leo MD  Admit Date: 10/2/2021   Discharge Date: 10/12/2021    Length of Stay: 0  Code Status:  Prior  Admitting Physician: Monty Morelos MD  Discharge Physician: Tori Leo MD     Active Discharge Diagnoses:     Primary Problem  <principal problem not specified>      [place] Problems    Diagnosis Date Noted    Sleep apnea [G47.30]      Priority: Medium    Chest pain [R07.9] 10/02/2021    BMI 40.0-44.9, adult [facility]) [Z68.41] 10/02/2018    Essential hypertension [I10] 2016       Admission Condition:  Poor     Discharged Condition: fair    Hospital Stay:     Hospital Course: Aracelis Valero is a 67 y.o. female who was admitted for the management of <principal problem not specified> , presented to ER with Fall, Wrist Pain, and Ankle Pain  Patient past medical history multiple medical problems are morbid obesity, hypertension, fibromyalgia, history of asbestos exposure, chronic back pain  She recently got cortisone injection in her lower back, back pain is much better  Patient, admitted with chest pain  Her chest pain is improved  Underwent stress test, which was negative , DC planning to SNF         Significant therapeutic interventions:     Significant Diagnostic Studies:   Labs / Micro:        ,     Radiology:    XR ANKLE RIGHT (MIN 3 VIEWS)    Result Date: 10/2/2021  EXAMINATION: THREE XRAY VIEWS OF THE RIGHT ANKLE 10/2/2021 5:44 pm COMPARISON: None. HISTORY: ORDERING SYSTEM PROVIDED HISTORY: pain TECHNOLOGIST PROVIDED HISTORY: pain Reason for Exam: twisted ankle while walking Acuity: Unknown Type of Exam: Unknown FINDINGS: Right ankle soft tissue swelling particularly laterally. There is no evidence of an acute fracture of the right ankle. Ankle mortise is symmetric. There is prominent calcaneal spurring and there is evidence of pes planus. There is prominent dorsal tarsal bone spurring. Right ankle soft tissue swelling with no evidence of an acute fracture. Degenerative changes in the mid and hindfoot visualized as above. IR LUMBAR PUNCTURE FOR DIAGNOSIS    Result Date: 9/26/2021  EXAMINATION: FLUOROSCOPIC GUIDED LUMBAR PUNCTURE 9/25/2021 8:22 pm HISTORY: ORDERING SYSTEM PROVIDED HISTORY: GBS: bilateral lower extremity weakness; recent flu like illness TECHNOLOGIST PROVIDED HISTORY: GBS: bilateral lower extremity weakness; recent flu like illness FLUOROSCOPY DOSE AND TYPE OR TIME AND EXPOSURES:  centigrays cm squared PROCEDURE: :  Ariella Willingham MD Informed consent was obtained after the risks and benefits of the procedure were discussed with the patient and all questions were answered fully. Pender protocol was observed and a standard timeout was performed. The patient was positioned prone and the back was prepped and draped in the normal sterile fashion. 1% lidocaine was used for local anesthesia. The subarachnoid space was accessed with a 20-gauge 6 inch \"spinal needle at the L1-L2 level. Free flow of clear CSF was noted. Approximately 6 ml of CSF was removed and sent for analysis. The stylet was reinserted, spinal needle was removed and brief pressure was applied at the puncture site. There were no immediate complications and the patient tolerated the procedure well. Successful fluoroscopic-guided lumbar puncture. FLUORO FOR SURGICAL PROCEDURES    Result Date: 9/30/2021  Radiology exam is complete. No Radiologist dictation. Please follow up with ordering provider.      VL DUP LOWER EXTREMITY VENOUS LEFT    Result Date: 10/3/2021    [address] [facility] [address]  Vascular Lower Extremities DVT Study Procedure   Patient Name    JUANPABLO        Date of Study             10/02/2021                  Bessy Reyes   Date of Birth   1945   Gender Female   Age             67 year(s)   Race                      Black   Room Number     2108   Corporate ID #  [id number]   Patient Acct #  [de-identified]   MR #            [id number]       Sonographer               Quan Cooper   Accession #     [id number]   Interpreting Physician    Ze Beasley   Referring Nurse              Referring Physician       Puja Victoria  Practitioner  Procedure Type of Study:   Veins: Lower Extremities DVT Study, Venous Scan Lower Left. Indications for Study:Pain and swelling and Elevated D-Dimer. Patient Status:ER. Technical Quality:Adequate visualization. Conclusions   Summary   Simultaneous real time imaging utilizing B-Mode, color doppler and  spectral waveform analysis was performed on the left lower extremity for  venous examination of the deep and superficial systems. Findings are:   Left:  No evidence of deep or superficial venous thrombosis. Signature   ----------------------------------------------------------------  Electronically signed by Quan Cooper(Sonographer) on  10/02/2021 07:33 PM  ----------------------------------------------------------------   ----------------------------------------------------------------  Electronically signed by Lupe Anguiano(Interpreting  physician) on 10/03/2021 11:59 AM  ----------------------------------------------------------------  Findings:   Right Impression:                Left Impression:  The common femoral vein          The common femoral, femoral, popliteal  demonstrates normal              and tibial veins demonstrate normal  compressibility and              compressibility and augmentation. augmentation. Normal compressibility of the great                                   saphenous vein. Normal compressibility of the small                                   saphenous vein.   Risk Factors History +--------------------------+----------+------------------------------------+ ! Diagnosis                 ! Date      ! Comments                            ! +--------------------------+----------+------------------------------------+ ! Other                     !          !bilateral TKA                       ! +--------------------------+----------+------------------------------------+ ! DVT                       !07/15/2013! rt: Pop partial                     ! +--------------------------+----------+------------------------------------+ ! Previous Scan             !05/28/2016!                                    ! +--------------------------+----------+------------------------------------+ Allergies   - Allergy:Bactrim(Drug). - Allergy:Penicillin(Drug). - Allergy:Contrast(Miscellaneous). Velocities are measured in cm/s ; Diameters are measured in cm Right Lower Extremities DVT Study Measurements Right 2D Measurements +------------------------------------+----------+---------------+----------+ ! Location                            ! Visualized! Compressibility! Thrombosis! +------------------------------------+----------+---------------+----------+ ! Common Femoral                      !Yes       ! Yes            ! None      ! +------------------------------------+----------+---------------+----------+ Right Doppler Measurements +----------------------------+------+------+-------------------------------+ ! Location                    ! Signal!Reflux! Reflux (msec)                  ! +----------------------------+------+------+-------------------------------+ ! Common Femoral              !Phasic!      !                               ! +----------------------------+------+------+-------------------------------+ Left Lower Extremities DVT Study Measurements Left 2D Measurements +------------------------------------+----------+---------------+----------+ ! Location                            ! Visualized! Compressibility! Thrombosis! +------------------------------------+----------+---------------+----------+ ! Common Femoral                      !Yes       ! Yes            ! None      ! +------------------------------------+----------+---------------+----------+ ! Prox Femoral                        !Yes       ! Yes            ! None      ! +------------------------------------+----------+---------------+----------+ ! Mid Femoral                         !Yes       ! Yes            ! None      ! +------------------------------------+----------+---------------+----------+ ! Dist Femoral                        !Yes       ! Yes            ! None      ! +------------------------------------+----------+---------------+----------+ ! Deep Femoral                        !Partial   !Yes            ! None      ! +------------------------------------+----------+---------------+----------+ ! Popliteal                           !Yes       ! Yes            ! None      ! +------------------------------------+----------+---------------+----------+ ! Sapheno Femoral Junction            ! Yes       ! Yes            ! None      ! +------------------------------------+----------+---------------+----------+ ! PTV                                 ! Partial   !Yes            ! None      ! +------------------------------------+----------+---------------+----------+ ! Peroneal                            !Partial   !Yes            ! None      ! +------------------------------------+----------+---------------+----------+ ! Gastroc                             ! Partial   !Yes            ! None      ! +------------------------------------+----------+---------------+----------+ ! GSV Thigh                           ! Yes       ! Yes            ! None      ! +------------------------------------+----------+---------------+----------+ ! GSV Knee                            ! Yes       ! Yes            ! None      ! +------------------------------------+----------+---------------+----------+ ! GSV Ankle !Yes       !Yes            ! None      ! +------------------------------------+----------+---------------+----------+ ! SSV                                 ! Yes       ! Yes            ! None      ! +------------------------------------+----------+---------------+----------+ Left Doppler Measurements +---------------------------+------+------+--------------------------------+ ! Location                   ! Signal!Reflux! Reflux (msec)                   ! +---------------------------+------+------+--------------------------------+ ! Common Femoral             !Phasic!      !                                ! +---------------------------+------+------+--------------------------------+ ! Prox Femoral               !Phasic!      !                                ! +---------------------------+------+------+--------------------------------+ ! Popliteal                  !Phasic! Yes   ! 1320                            ! +---------------------------+------+------+--------------------------------+    MRI BRAIN WO CONTRAST    Result Date: 9/24/2021  EXAMINATION: MRI OF THE BRAIN WITHOUT CONTRAST  9/24/2021 4:43 pm TECHNIQUE: Multiplanar multisequence MRI of the brain was performed without the administration of intravenous contrast. COMPARISON: None. HISTORY: ORDERING SYSTEM PROVIDED HISTORY: left lower extremity weakness FINDINGS: INTRACRANIAL STRUCTURES/VENTRICLES: There is no acute infarct. Mild periventricular and deep white matter T2/FLAIR hyperintensity, most consistent with chronic small vessel ischemic disease. No mass effect or midline shift. No evidence of an acute intracranial hemorrhage. The ventricles and sulci are prominent secondary to mild atrophy. The sellar/suprasellar regions appear unremarkable. The normal signal voids within the major intracranial vessels appear maintained. ORBITS: The visualized portion of the orbits demonstrate no acute abnormality.  SINUSES: The visualized paranasal sinuses and mastoid myocardium. Consultations:    Consults:     Final Specialist Recommendations/Findings:   IP CONSULT TO INTERNAL MEDICINE  IP CONSULT TO CARDIOLOGY  IP CONSULT TO CARDIOLOGY      The patient was seen and examined on day of discharge and this discharge summary is in conjunction with any daily progress note from day of discharge. Discharge plan:     Disposition: Home    Physician Follow Up:     No follow-up provider specified. Requiring Further Evaluation/Follow Up POST HOSPITALIZATION/Incidental Findings:    Diet: cardiac diet    Activity: As tolerated    Instructions to Patient:     Discharge Medications:      Medication List      CHANGE how you take these medications    gabapentin 100 MG capsule  Commonly known as: NEURONTIN  Take 2 capsules by mouth 3 times daily for 3 days. What changed: how much to take        CONTINUE taking these medications    albuterol sulfate  (90 Base) MCG/ACT inhaler  Inhale 2 puffs into the lungs 4 times daily as needed for Wheezing     aspirin 81 MG EC tablet     donepezil 5 MG tablet  Commonly known as: ARICEPT  take 1 tablet by mouth at bedtime     DULoxetine 30 MG extended release capsule  Commonly known as: CYMBALTA  Take 1 capsule by mouth 2 times daily for 3 days     levocetirizine 5 MG tablet  Commonly known as: XYZAL  take 1 tablet by mouth once daily AT NIGHT     metoprolol tartrate 50 MG tablet  Commonly known as: LOPRESSOR     psyllium 28.3 % Pack  Commonly known as: KONSYL     * Standard Care External Cath Misc  1 Device by Does not apply route daily     * Standard Care External Cath Misc  1 each by Does not apply route daily     Vitamin B-12 1000 MCG/15ML Liqd     Walker Misc  1 each by Does not apply route daily Upright walker with wheels and seat         * This list has 2 medication(s) that are the same as other medications prescribed for you. Read the directions carefully, and ask your doctor or other care provider to review them with you. STOP taking these medications    oxyCODONE-acetaminophen 7.5-325 MG per tablet  Commonly known as: PERCOCET           Where to Get Your Medications      You can get these medications from any pharmacy    Bring a paper prescription for each of these medications  · DULoxetine 30 MG extended release capsule  · gabapentin 100 MG capsule         Time Spent on discharge is  35 mins in patient examination, evaluation, counseling as well as medication reconciliation, prescriptions for required medications, discharge plan and follow up. Electronically signed by   Aliyah Momin MD  10/12/2021  4:32 PM      Thank you Dr. Aliyah Momin MD for the opportunity to be involved in this patient's care.

## 2021-10-19 ENCOUNTER — CARE COORDINATION (OUTPATIENT)
Dept: CARE COORDINATION | Age: 76
End: 2021-10-19

## 2021-10-19 ENCOUNTER — CARE COORDINATION (OUTPATIENT)
Dept: CASE MANAGEMENT | Age: 76
End: 2021-10-19

## 2021-10-19 DIAGNOSIS — R07.9 CHEST PAIN, UNSPECIFIED TYPE: Primary | ICD-10-CM

## 2021-10-19 PROCEDURE — 1111F DSCHRG MED/CURRENT MED MERGE: CPT | Performed by: INTERNAL MEDICINE

## 2021-10-19 NOTE — CARE COORDINATION
[facility] Transitions Initial Follow Up Call    Call within 2 business days of discharge: Yes    Patient: Shyanne Ellisoning Patient : 1945   MRN: [id number]  Reason for Admission: Fall/CP  Discharge Date: 10/6/21 RARS: Readmission Risk Score: 9      Last Discharge [facility]       Complaint Diagnosis Description Type Department Provider    10/2/21 Fall; Wrist Pain; Ankle Pain Chest pain, unspecified type . .. ED to Hosp-Admission (Discharged) (ADMITTED) MANPREET Garcia MD; Sher [facility]. .. Acute Care Course: Patient presented to [facility] ED following a fall with c/o wrist and R ankle pain on 10/2. No acute fx. She admitted for chest pain. Chest pain improved. She discharged to [address] [place] [address] from 10/6-10/16 for rehab, then to home. Sig Hx: Sleep apnea, Chest pain, Obesity, HTN    DME: [facility] [employer]    Conversation: Patient states her neck is hurting. States she has stenosis. She says she plans to see a orthopedic surgeon. States in the past they wanted to perform surgery. States she was not ready at that time. States she has exertional sob. She is on 2L oxygen continuous. Denies cp, swelling, n/v. States she has an occasionally cough that is chronic. States she has constipation. Last BM today. She manages with Metamucil and lots of water. States she is fearful of falling because her legs are weak. [facility] made a visit yesterday, but thought patient was not at home. Patient has a call out to them to see when they will reschedule. Patient had services through [facility] for a aide. [facility] states they are short of help and do not know when they can resume services. A neighbor assisted patient today bathing, dressing and made her breakfast. The neighbor did grocery shopping and obtained TV dinners. PCP f/u 10/21. States her daughter and grandchildren check on her when they can d/t working schedules. Medications reviewed. Follow up plan:  Will hand off to [facility]    Non-face-to-face services provided:      Care Transitions 24 Hour Call    Do you have any ongoing symptoms?: No  Do you have a copy of your discharge instructions?: Yes  Do you have all of your prescriptions and are they filled?: Yes  Have you been contacted by a [address]?: No  Have you scheduled your follow up appointment?: Yes (Comment: 10/21/21)  Were you discharged with any Home Care or Post Acute Services: Yes  Post Acute Services: [address] [place] [address] Ruben Batista (Comment: [facility])  Patient Home Equipment: Oxygen  Do you have support at home?: Alone  Do you feel like you have everything you need to keep you well at home?: Yes  Are you an active caregiver in your home?: No  Care Transitions Interventions         Follow Up  Future Appointments   Date Time Provider Mayo Betts   10/21/2021  2:30 PM Anika Troncoso  [facility] Initial Call    Was this an external facility discharge? No     Challenges to be reviewed by the provider   Additional needs identified to be addressed with provider: No  none             Method of communication with provider : none      Advance Care Planning:   Does patient have an Advance Directive: reviewed and current, reviewed and needs to be updated, not on file; education provided, not on file, patient declined education, decision maker updated and referral to internal ACP facilitator. Was this a readmission? No  Patient stated reason for admission: Fall/CP  Patients top risk factors for readmission: functional physical ability, falls, medical condition-Stenosis/Pain and support system    Care Transition Nurse (CTN) contacted the patient by telephone to perform post hospital discharge assessment. Verified name and  with patient as identifiers. Provided introduction to self, and explanation of the CTN role. CTN reviewed discharge instructions, medical action plan and red flags with patient who verbalized understanding.  Patient given an opportunity to ask questions and does not have any further questions or concerns at this time. Were discharge instructions available to patient? Yes. Reviewed appropriate site of care based on symptoms and resources available to patient including: PCP and When to call 911. The patient agrees to contact the PCP office for questions related to their healthcare. Medication reconciliation was performed with patient, who verbalizes understanding of administration of home medications. Advised obtaining a 90-day supply of all daily and as-needed medications. Covid Risk Education     Educated patient about risk for severe COVID-19 due to risk factors according to CDC guidelines. CTN reviewed discharge instructions, medical action plan and red flag symptoms with the patient who verbalized understanding. Discussed COVID vaccination status: Yes. Education provided on COVID-19 vaccination as appropriate. Discussed exposure protocols and quarantine with CDC Guidelines. Patient was given an opportunity to verbalize any questions and concerns and agrees to contact CTN or health care provider for questions related to their healthcare. Reviewed and educated patient on any new and changed medications related to discharge diagnosis. Was patient discharged with a pulse oximeter? No Discussed and confirmed pulse oximeter discharge instructions and when to notify provider or seek emergency care. CTN provided contact information. Plan for follow-up call in 3-5 days based on severity of symptoms and risk factors.   Plan for next call: self management-Functional ability/ADL's and follow up appointment-Changes to care after PCP f/u      Aries Philip RN

## 2021-10-19 NOTE — CARE COORDINATION
Patient called stating that she is home from rehab now. Per patient she is feeling a little stronger. She has not heard from [facility] since she has been home. She requested their number. [facility] gave patient the contact information for Jayy, Phone: [phone]. Patient will call [facility] with any concerns.

## 2021-10-20 ENCOUNTER — CARE COORDINATION (OUTPATIENT)
Dept: CARE COORDINATION | Age: 76
End: 2021-10-20

## 2021-10-20 NOTE — CARE COORDINATION
Ambulatory Care Coordination Note  10/20/2021  CM Risk Score: 10  Charlson 10 Year Mortality Risk Score: 79%     ACC: Lourdes Lopez, [place]    Summary Note: Spoke with patient, explained I would like to re enroll her into  care coordination since being DC home. Patient is accepting of program.  Patient reports that she has not heard from [facility] to start care. ACM spoke with [place] who stated they did attempt one time to go out and there was no answer. ACM was transferred to [address] and left  requesting that she touches base with patient to schedule her next visit. [facility] updated patient. Patient denied any additional needs today. Ambulatory Care Coordination Assessment    Care Coordination Protocol  Program Enrollment: Complex Care  Referral from Primary Care Provider: No  Week 1 - Initial Assessment     Do you have all of your prescriptions and are they filled?: Yes  Are you able to afford your medications?: Yes  How often do you have trouble taking your medications the way you have been told to take them?: I always take them as prescribed. Do you have Home O2 Therapy?: Yes      Ability to seek help/take action for Emergent Urgent situations i.e. fire, crime, inclement weather or health crisis. : Independent  Ability to ambulate to restroom: Independent  Ability handle personal hygeine needs (bathing/dressing/grooming): Independent  Ability to manage Medications: Independent  Ability to prepare Food Preparation: Independent  Ability to maintain home (clean home, laundry): Independent  Ability to drive and/or has transportation: Dependent  Ability to do shopping: Dependent  Ability to manage finances:  Independent  Is patient able to live independently?: Yes     Current Housing: Private Residence           Frequent urination at night?: Yes     Are you experiencing loss of meaning?: No  Are you experiencing loss of hope and peace?: No     Thinking about your patient's physical health needs, are there any symptoms or problems (risk indicators) you are unsure about that require further investigation?: No identified areas of uncertainly or problems already being investigated   Are the patients physical health problems impacting on their mental well-being?: No identified areas of concern   Are there any problems with your patients lifestyle behaviors (alcohol, drugs, diet, exercise) that are impacting on physical or mental well-being?: No identified areas of concern   Do you have any other concerns about your patients mental well-being? How would you rate their severity and impact on the patient?: No identified areas of concern   How would you rate their home environment in terms of safety and stability (including domestic violence, insecure housing, neighbor harassment)?: Consistently safe, supportive, stable, no identified problems   How do daily activities impact on the patient's well-being? (include current or anticipated unemployment, work, caregiving, access to transportation or other): Some general dissatisfaction but no concern   How would you rate their social network (family, work, friends)?: Adequate participation with social networks   How would you rate their financial resources (including ability to afford all required medical care)?: Financially secure, some resource challenges   How wells does the patient now understand their health and well-being (symptoms, signs or risk factors) and what they need to do to manage their health?: Reasonable to good understanding and already engages in managing health or is willing to undertake better management   How well do you think your patient can engage in healthcare discussions?  (Barriers include language, deafness, aphasia, alcohol or drug problems, learning difficulties, concentration): Clear and open communication, no identified barriers   Do other services need to be involved to help this patient?: Other care/services in place but not sufficient   Are current services involved with this patient well-coordinated? (Include coordination with other services you are now recommendation): Required care/services in place with some coordination barriers   Suggested Interventions and Javon: In Process   Zone Management Tools: In Process                  Prior to Admission medications    Medication Sig Start Date End Date Taking? Authorizing Provider   DULoxetine (CYMBALTA) 30 MG extended release capsule Take 1 capsule by mouth 2 times daily for 3 days 10/5/21 10/8/21  Baljit Damon MD   gabapentin (NEURONTIN) 100 MG capsule Take 2 capsules by mouth 3 times daily for 3 days. 10/5/21 10/8/21  Baljit Damon MD   metoprolol tartrate (LOPRESSOR) 50 MG tablet Take 50 mg by mouth 2 times daily    Historical Provider, MD   Cyanocobalamin (VITAMIN B-12) 1000 MCG/15ML LIQD Take 1,000 mcg by mouth daily     Historical Provider, MD   levocetirizine (XYZAL) 5 MG tablet take 1 tablet by mouth once daily AT NIGHT 9/8/21   Baljit Damon MD   donepezil (ARICEPT) 5 MG tablet take 1 tablet by mouth at bedtime 9/1/21   Baljit Damon MD   psyllium (KONSYL) 28.3 % PACK Take 1 packet by mouth daily  Patient not taking: Reported on 10/19/2021    Historical Provider, MD   Catheters (STANDARD CARE EXTERNAL CATH) MISC 1 each by Does not apply route daily 3/2/21   Karan Perrin MD   Catheters (STANDARD CARE EXTERNAL CATH) MISC 1 Device by Does not apply route daily 11/30/20   Baljit Damon MD   albuterol sulfate  (90 Base) MCG/ACT inhaler Inhale 2 puffs into the lungs 4 times daily as needed for Wheezing 4/20/20   Baljit Damon MD   Misc.  Devices [facility]) MISC 1 each by Does not apply route daily Upright walker with wheels and seat 11/12/18   Abner Elliott MD   aspirin 81 MG EC tablet Take 81 mg by mouth daily     Historical Provider, MD       Future Appointments   Date Time Provider Mayo Betts   10/21/2021  2:30 PM Baljit Damon MD [address]

## 2021-10-22 ENCOUNTER — TELEPHONE (OUTPATIENT)
Dept: PAIN MANAGEMENT | Age: 76
End: 2021-10-22

## 2021-10-22 NOTE — CARE COORDINATION
ACM received call from patient, crying and requesting a new home care agency that could provide an aide. Per patient [facility] does not send anyone. She needs help bathing and with ADL's. Patient stated she spends most of the day in bed, she is scared to ambulate due to hip pain and unsteadiness. ACM will check availability for aides with home health agencies. ACM offered to look into placement into SNF for rehab. Patient stated she may consider this. [facility] will add  referral to home health order.    Patient is willing to do a VV with provider if needed for referral.
Patient notified that [facility] will be working on getting services started. Referral placed, PT eval will need to be done to determine aide qualification. LSW eval will be included to assess stability at home and other needs, possible placement into SNF.
(0) swallows foods and liquids w/o difficulty

## 2021-10-23 PROBLEM — N39.0 UTI (URINARY TRACT INFECTION): Status: RESOLVED | Noted: 2021-09-23 | Resolved: 2021-10-23

## 2021-10-26 ENCOUNTER — CLINICAL DOCUMENTATION (OUTPATIENT)
Dept: CARE COORDINATION | Age: 76
End: 2021-10-26

## 2021-10-26 ENCOUNTER — CARE COORDINATION (OUTPATIENT)
Dept: CARE COORDINATION | Age: 76
End: 2021-10-26

## 2021-10-26 SDOH — ECONOMIC STABILITY: HOUSING INSECURITY: IN THE LAST 12 MONTHS, HOW MANY PLACES HAVE YOU LIVED?: 1

## 2021-10-26 SDOH — HEALTH STABILITY: PHYSICAL HEALTH: ON AVERAGE, HOW MANY MINUTES DO YOU ENGAGE IN EXERCISE AT THIS LEVEL?: 20 MIN

## 2021-10-26 SDOH — HEALTH STABILITY: PHYSICAL HEALTH: ON AVERAGE, HOW MANY DAYS PER WEEK DO YOU ENGAGE IN MODERATE TO STRENUOUS EXERCISE (LIKE A BRISK WALK)?: 3 DAYS

## 2021-10-26 SDOH — ECONOMIC STABILITY: HOUSING INSECURITY
IN THE LAST 12 MONTHS, WAS THERE A TIME WHEN YOU DID NOT HAVE A STEADY PLACE TO SLEEP OR SLEPT IN A SHELTER (INCLUDING NOW)?: NO

## 2021-10-26 SDOH — ECONOMIC STABILITY: INCOME INSECURITY: IN THE LAST 12 MONTHS, WAS THERE A TIME WHEN YOU WERE NOT ABLE TO PAY THE MORTGAGE OR RENT ON TIME?: NO

## 2021-10-26 ASSESSMENT — LIFESTYLE VARIABLES: HOW OFTEN DO YOU HAVE A DRINK CONTAINING ALCOHOL: NEVER

## 2021-10-26 NOTE — CARE COORDINATION
Ambulatory Care Coordination Note  10/26/2021  CM Risk Score: 10  Charlson 10 Year Mortality Risk Score: 79%     ACC: Hillary Deal RN    Patient's Plan of Care    [facility] Status: Active Yes    Reason for Encounter:   Follow up home care needs  COPD  CHF    Patient stated home health started she will have a nurse 1 x weekly and PT 3 x weekly. She stated her arthritis is acting up, mostly neck pain. No complaints regarding COPD or CHF today. She has medication that makes it tolerable. She denied any needs from [facility] or PCP today. ACM will follow up in 1 week. Care Coordination Interventions    Program Enrollment: Complex Care  Referral from Primary Care Provider: No  Suggested Interventions and [address]: In Process  Zone Management Tools: In Process         Goals Addressed                 This Visit's Progress    [employer] Goal   Improving     I will work with ACM and home care to establish more help. .    Barriers: overwhelmed by complexity of regimen and stress  Plan for overcoming my barriers: work with ACM  Confidence: 6/10  Anticipated Goal Completion Date: 12-21-21            Prior to Admission medications    Medication Sig Start Date End Date Taking? Authorizing Provider   DULoxetine (CYMBALTA) 30 MG extended release capsule Take 1 capsule by mouth 2 times daily for 3 days 10/5/21 10/8/21  Jennifer Bey MD   gabapentin (NEURONTIN) 100 MG capsule Take 2 capsules by mouth 3 times daily for 3 days.  10/5/21 10/8/21  Jennifer Bey MD   metoprolol tartrate (LOPRESSOR) 50 MG tablet Take 50 mg by mouth 2 times daily    Historical Provider, MD   Cyanocobalamin (VITAMIN B-12) 1000 MCG/15ML LIQD Take 1,000 mcg by mouth daily     Historical Provider, MD   levocetirizine (XYZAL) 5 MG tablet take 1 tablet by mouth once daily AT NIGHT 9/8/21   Jennifer Bey MD   donepezil (ARICEPT) 5 MG tablet take 1 tablet by mouth at bedtime 9/1/21   Jennifer Bey MD   psyllium (KONSYL) 28.3 % PACK Take 1 packet by mouth daily  Patient not taking: Reported on 10/19/2021    Historical Provider, MD   Catheters (STANDARD CARE EXTERNAL CATH) MISC 1 each by Does not apply route daily 3/2/21   Ann Beaulieu MD   Catheters (STANDARD CARE EXTERNAL CATH) MISC 1 Device by Does not apply route daily 11/30/20   Joseph Syed MD   albuterol sulfate  (90 Base) MCG/ACT inhaler Inhale 2 puffs into the lungs 4 times daily as needed for Wheezing 4/20/20   Joseph Syed MD   Misc. Devices [facility]) MISC 1 each by Does not apply route daily Upright walker with wheels and seat 11/12/18   Lars Olmos MD   aspirin 81 MG EC tablet Take 81 mg by mouth daily     Historical Provider, MD       No future appointments.   ,   Congestive Heart Failure Assessment    Are you currently restricting fluids?: Other  Do you understand a low sodium diet?: Yes  Do you understand how to read food labels?: Yes  Do you salt your food before tasting it?: No     No patient-reported symptoms      Symptoms:     Symptom course: stable  Weight trend: stable  Salt intake watch compared to last visit: stable     ,   COPD Assessment    Does the patient understand envrionmental exposure?: Yes  Is the patient able to verbalize Rescue vs. Long Acting medications?: Yes     No patient-reported symptoms         Symptoms:         and   General Assessment    Do you have any symptoms that are causing concern?: Yes  Progression since Onset: Intermittent - Waxing/Waning  Reported Symptoms: Pain (Comment: neck)

## 2021-10-27 ENCOUNTER — CARE COORDINATION (OUTPATIENT)
Dept: CARE COORDINATION | Age: 76
End: 2021-10-27

## 2021-10-27 NOTE — CARE COORDINATION
Patient called [facility] and asked for the name of the Neurologist that she seen during her last admission. [facility] was able to provide her the name and contact information for Dr. Darshana Ward.

## 2021-10-29 ENCOUNTER — CARE COORDINATION (OUTPATIENT)
Dept: CARE COORDINATION | Age: 76
End: 2021-10-29

## 2021-10-29 NOTE — CARE COORDINATION
Patient asked ACM for assistance with scheduling a VV to discuss the need for a home health aide. HH told her she would need documentation to support the need for an aide. LATHA spoke with PCP office who stated the providers would like to see her in person. LATHA explained to patient and she stated she will call the office.

## 2021-11-01 ENCOUNTER — CARE COORDINATION (OUTPATIENT)
Dept: CARE COORDINATION | Age: 76
End: 2021-11-01

## 2021-11-05 ENCOUNTER — OFFICE VISIT (OUTPATIENT)
Dept: INTERNAL MEDICINE CLINIC | Age: 76
End: 2021-11-05
Payer: MEDICARE

## 2021-11-05 VITALS
BODY MASS INDEX: 47.29 KG/M2 | HEIGHT: 64 IN | WEIGHT: 277 LBS | DIASTOLIC BLOOD PRESSURE: 62 MMHG | SYSTOLIC BLOOD PRESSURE: 136 MMHG

## 2021-11-05 DIAGNOSIS — I50.33 ACUTE ON CHRONIC DIASTOLIC HEART FAILURE (HCC): ICD-10-CM

## 2021-11-05 DIAGNOSIS — J42 CHRONIC BRONCHITIS, UNSPECIFIED CHRONIC BRONCHITIS TYPE (HCC): ICD-10-CM

## 2021-11-05 DIAGNOSIS — M54.89 MIDLINE BACK PAIN, UNSPECIFIED BACK LOCATION, UNSPECIFIED CHRONICITY: ICD-10-CM

## 2021-11-05 DIAGNOSIS — Z78.0 POST-MENOPAUSAL: Primary | ICD-10-CM

## 2021-11-05 PROCEDURE — 99495 TRANSJ CARE MGMT MOD F2F 14D: CPT | Performed by: INTERNAL MEDICINE

## 2021-11-05 PROCEDURE — 1111F DSCHRG MED/CURRENT MED MERGE: CPT | Performed by: INTERNAL MEDICINE

## 2021-11-09 ENCOUNTER — CARE COORDINATION (OUTPATIENT)
Dept: CARE COORDINATION | Age: 76
End: 2021-11-09

## 2021-11-09 NOTE — CARE COORDINATION
LATHA spoke briefly with patient who stated she is doing a little better. She said that someone was there to evaluate her for an aide at this time. LATHA asked patient to call with any needs and ended the call so that she could complete her assessment.

## 2021-11-10 ENCOUNTER — CARE COORDINATION (OUTPATIENT)
Dept: CARE COORDINATION | Age: 76
End: 2021-11-10

## 2021-11-10 NOTE — CARE COORDINATION
AC received message from patient requesting a return call regarding home health aide. AC returned call, patient reported that she is feeling better. She wanted [facility] to know that she has chosen to stick with her current home health aid agency. She is receiving aide from [facility] and [address] [place] [address]. She would like the new order sent to [facility].   ISAMAR will have PCP office fax order to [phone]

## 2021-11-12 ENCOUNTER — TELEPHONE (OUTPATIENT)
Dept: CARE COORDINATION | Age: 76
End: 2021-11-12

## 2021-11-12 ENCOUNTER — TELEPHONE (OUTPATIENT)
Dept: INTERNAL MEDICINE CLINIC | Age: 76
End: 2021-11-12

## 2021-11-12 DIAGNOSIS — J44.9 CHRONIC OBSTRUCTIVE PULMONARY DISEASE, UNSPECIFIED COPD TYPE (HCC): Primary | ICD-10-CM

## 2021-11-12 NOTE — TELEPHONE ENCOUNTER
Returned [employer]. Advised orders would not be signed if physician has not seen the pt recently, last appt with Dr Morgan Kirby was in 2019. Pt did have an appt on 11/5, so orders may be signed now. Louise Cervantes would let Mayo Connor know.

## 2021-11-12 NOTE — TELEPHONE ENCOUNTER
[facility] called & stated she was notified by [email or web address] that they are going to drop pt d/t physician not signing home care orders. Pt had appt w/ Dr. Paschal Sandhoff on 11/5/21, please advise what the issue is.

## 2021-11-12 NOTE — TELEPHONE ENCOUNTER
Patient requesting a nebulizer and medication for breathing treatments. Per patient she is SOB several times a day and used to have a nebulizer a long time ago that helped. RX for nebulizer pending, frequency, COPD or reason for equipment needs addressed. PCP will need to put in medication if approved. Patient would like order faxed to [id number] [facility] fax:[phone]  Along with last office note and demo.

## 2021-11-12 NOTE — TELEPHONE ENCOUNTER
Order and demographics printed. Awaiting for office note to fax to [address] [place] [address]

## 2021-11-14 ASSESSMENT — ENCOUNTER SYMPTOMS
BLOOD IN STOOL: 0
CHEST TIGHTNESS: 0
SHORTNESS OF BREATH: 1
COLOR CHANGE: 0
EYE ITCHING: 0
COUGH: 0
CHOKING: 0
BACK PAIN: 1
EYE DISCHARGE: 0
EYE PAIN: 0
DIARRHEA: 0
EYE REDNESS: 0
CONSTIPATION: 0
ABDOMINAL PAIN: 0
APNEA: 0
ABDOMINAL DISTENTION: 0

## 2021-11-14 NOTE — PROGRESS NOTES
Post-Discharge Transitional Care Management Services or Hospital Follow Up      Charlee Cain   YOB: 1945    Date of Office Visit:  11/5/2021  Date of Hospital Admission: 10/2/21  Date of Hospital Discharge: 10/6/21  Readmission Risk Score(high >=14%.  Medium >=10%):Readmission Risk Score: 9      Care management risk score Rising risk (score 2-5) and Complex Care (Scores >=6): 10     Non face to face  following discharge, date last encounter closed (first attempt may have been earlier): *No documented post hospital discharge outreach found in the last 14 days *No documented post hospital discharge outreach found in the last 14 days    Call initiated 2 business days of discharge: *No response recorded in the last 14 days     Patient Active Problem List   Diagnosis    Spinal stenosis of lumbar region with neurogenic claudication    Dyslipidemia    Anemia    Fibromyalgia    Degenerative joint disease (DJD) of hip    Adjustment disorder with mixed anxiety and depressed mood    S/P right and left heart catheterization    SOB (shortness of breath)    Acute on chronic diastolic heart failure ([address]    Essential hypertension    Morbid obesity with BMI of 45.0-49.9, adult ([address]    PVC (premature ventricular contraction)    Abnormality of rib determined by X-ray    Chronic bronchitis ([address]    Sleep apnea    History of total bilateral knee replacement    Encephalopathy acute    Obstructive sleep apnea syndrome    Primary osteoarthritis of left knee    Bronchiectasis with acute lower respiratory infection ([address]    Supplemental oxygen dependent    Back pain    Medication monitoring encounter    Acute cystitis without hematuria    Chronic cough    Acquired spondylolisthesis    Cervical spine ankylosis    Acute low back pain    Neck pain    Closed compression fracture of L1 lumbar vertebra    Cervical spinal stenosis    BMI 40.0-44.9, adult (HCC)    Chronic prescription opiate use  Age-related osteoporosis with current pathological fracture    S/P kyphoplasty    Morbid obesity ([address]    DDD (degenerative disc disease), lumbar    Lumbar radiculopathy    Depression    Osteopenia of lumbar spine    Pain of left hip joint    Physical debility    COPD (chronic obstructive pulmonary disease) ([facility])    Weakness of left lower extremity    Lumbar spondylosis    Chest pain       Allergies   Allergen Reactions    Rosuvastatin Calcium Anaphylaxis     Hair fell out    Statins Anaphylaxis     Hair fell out    Bactrim [Sulfamethoxazole-Trimethoprim] Diarrhea    Caffeine Other (See Comments)     pain  pain    Dye [Iodides] Other (See Comments)     Iv dye= blood clots in arm    Food      Tomatoes, pain    Ioxaglate Other (See Comments)     Iv dye= blood clots in arm    Tomato     Dicloxacillin Rash    Pcn [Penicillins] Rash       Medications listed as ordered at the time of discharge from hospital  @DISCHARGEMEDSLIST(<NOROUTINE> error)@      Medications marked \"taking\" at this time  Outpatient Medications Marked as Taking for the 11/5/21 encounter (Office Visit) with Phuc Weiss MD   Medication Sig Dispense Refill    DULoxetine (CYMBALTA) 30 MG extended release capsule Take 1 capsule by mouth 2 times daily for 3 days 6 capsule 0    gabapentin (NEURONTIN) 100 MG capsule Take 2 capsules by mouth 3 times daily for 3 days.  18 capsule 0    metoprolol tartrate (LOPRESSOR) 50 MG tablet Take 50 mg by mouth 2 times daily      Cyanocobalamin (VITAMIN B-12) 1000 MCG/15ML LIQD Take 1,000 mcg by mouth daily       levocetirizine (XYZAL) 5 MG tablet take 1 tablet by mouth once daily AT NIGHT 30 tablet 2    donepezil (ARICEPT) 5 MG tablet take 1 tablet by mouth at bedtime 90 tablet 1    Catheters (STANDARD CARE EXTERNAL CATH) MISC 1 each by Does not apply route daily 5 each 1    Catheters (STANDARD CARE EXTERNAL CATH) MISC 1 Device by Does not apply route daily 30 each 2    albuterol sulfate  (90 Base) MCG/ACT inhaler Inhale 2 puffs into the lungs 4 times daily as needed for Wheezing 1 Inhaler 2    Misc. Devices (WALKER) MISC 1 each by Does not apply route daily Upright walker with wheels and seat 1 each 0    aspirin 81 MG EC tablet Take 81 mg by mouth daily           Medications patient taking as of now reconciled against medications ordered at time of hospital discharge: Yes    Chief Complaint   Patient presents with    Chest Pain     Pt was in [facility] 10/2-10/6, has not had any chest pain since hospital stay    Fatigue    Orders     pt requesting home health aide [facility] care       HPI-patient is here for transitional care. She has multiple medical problems which include morbid obesity, hypertension, history of asbestos exposure, chronic back pain. Patient underwent cortisone injection in her back pain. Her back pain is much improved. Patient was admitted [facility], with chest pain. Patient underwent extensive testing which include stress test which was negative  Patient is wheelchair-bound because of obesity and chronic back pain  She does not have any chest pain since her discharge from the hospital  Patient is requesting home health aide, now she is discharged home nursing home, need help at home. Inpatient course: Discharge summary reviewed- see chart. Interval history/Current status: Improved    Review of Systems   Constitutional: Negative for activity change, appetite change, chills, diaphoresis, fatigue and fever. HENT: Negative for congestion, dental problem, drooling and ear discharge. Eyes: Negative for pain, discharge, redness and itching. Respiratory: Positive for shortness of breath (Occasionally). Negative for apnea, cough, choking and chest tightness. Cardiovascular: Negative for chest pain and leg swelling. Gastrointestinal: Negative for abdominal distention, abdominal pain, blood in stool, constipation and diarrhea. Endocrine: Negative for cold intolerance and heat intolerance. Genitourinary: Negative for difficulty urinating, dysuria, enuresis, flank pain and frequency. Musculoskeletal: Positive for arthralgias (Generalized body pain with history of fibromyalgia), back pain and gait problem (Wheelchair-bound). Negative for joint swelling. Skin: Negative for color change, pallor and rash. Neurological: Negative for dizziness, facial asymmetry, light-headedness, numbness and headaches. Psychiatric/Behavioral: Negative for agitation, behavioral problems, confusion, decreased concentration and dysphoric mood. Vitals:    11/05/21 1505   BP: 136/62   Weight: 277 lb (125.6 kg)   Height: 5' 4\" (1.626 m)     Body mass index is 47.55 kg/m². Wt Readings from Last 3 Encounters:   11/05/21 277 lb (125.6 kg)   10/03/21 276 lb 10.8 oz (125.5 kg)   09/30/21 297 lb (134.7 kg)     BP Readings from Last 3 Encounters:   11/05/21 136/62   10/06/21 (!) 168/64   09/30/21 137/61       Physical Exam  Constitutional:       Appearance: She is well-developed. She is obese. She is not diaphoretic. HENT:      Head: Normocephalic and atraumatic. Mouth/Throat:      Pharynx: No oropharyngeal exudate. Eyes:      General: No scleral icterus. Right eye: No discharge. Left eye: No discharge. Conjunctiva/sclera: Conjunctivae normal.      Pupils: Pupils are equal, round, and reactive to light. Neck:      Thyroid: No thyromegaly. Vascular: No JVD. Trachea: No tracheal deviation. Cardiovascular:      Rate and Rhythm: Normal rate. Heart sounds: Normal heart sounds. No murmur heard. No gallop. Pulmonary:      Effort: Pulmonary effort is normal. No respiratory distress. Breath sounds: Normal breath sounds. No stridor. No wheezing or rales. Chest:      Chest wall: No tenderness. Abdominal:      General: Bowel sounds are normal. There is no distension. Palpations: Abdomen is soft. Tenderness: There is no abdominal tenderness. There is no guarding or rebound. Musculoskeletal:         General: Tenderness (Tenderness lower back) present. Normal range of motion. Cervical back: Normal range of motion and neck supple. Comments: Wheelchair-bound   Neurological:      Mental Status: She is alert and oriented to person, place, and time. Assessment/Plan:  1. Post-menopausal      2. Acute on chronic diastolic heart failure ([address]  Compensated    3. Chronic bronchitis, unspecified chronic bronchitis type ([address]  Stable    4. Midline back pain, unspecified back location, unspecified chronicity  - Amb External Referral To Home Health        Medical Decision Making: high complexity      Face to face done with patient for nebulizer, he is agreeable to use nebulizing machine.   Understands the importance of regular nebulization next

## 2021-11-16 ENCOUNTER — CARE COORDINATION (OUTPATIENT)
Dept: CARE COORDINATION | Age: 76
End: 2021-11-16

## 2021-11-16 NOTE — TELEPHONE ENCOUNTER
Patient reached out to Eilleen Crimes and she is off. Patient is upset that she will not receive any more home care without orders. [facility] health still claim that have not received the orders dated from 09/05/21 to 11/03/21. Can some one please look into this?

## 2021-11-16 NOTE — TELEPHONE ENCOUNTER
Spoke ro Samir Mir. She stated she spoke to [address] [place] [address] and they just needed the Plan of Care dated 09/5/21-11/03/21. Paperwork was already signed and faxed and scanned in chart 09/15/21. Paperwork printed and re-faxed to [address] [place] [address]

## 2021-11-16 NOTE — TELEPHONE ENCOUNTER
[facility] returned call. I advised we do not have any orders for Dr Paschal Sandhoff to sign, once we receive we will get them signed as soon as Dr Paschal Sandhoff is available. I did let [facility] know Dr Paschal Sandhoff out of the office this week. She confirmed fax number.

## 2021-11-16 NOTE — CARE COORDINATION
(CYMBALTA) 30 MG extended release capsule Take 1 capsule by mouth 2 times daily for 3 days 10/5/21 11/5/21  Aliyah Momin MD   gabapentin (NEURONTIN) 100 MG capsule Take 2 capsules by mouth 3 times daily for 3 days. 10/5/21 11/5/21  Aliyah Momin MD   metoprolol tartrate (LOPRESSOR) 50 MG tablet Take 50 mg by mouth 2 times daily    Historical Provider, MD   Cyanocobalamin (VITAMIN B-12) 1000 MCG/15ML LIQD Take 1,000 mcg by mouth daily     Historical Provider, MD   levocetirizine (XYZAL) 5 MG tablet take 1 tablet by mouth once daily AT NIGHT 9/8/21   Aliyah Momin MD   donepezil (ARICEPT) 5 MG tablet take 1 tablet by mouth at bedtime 9/1/21   Aliyah Momin MD   psyllium (KONSYL) 28.3 % PACK Take 1 packet by mouth daily  Patient not taking: Reported on 10/19/2021    Historical MD Shen   Catheters (STANDARD CARE EXTERNAL CATH) MISC 1 each by Does not apply route daily 3/2/21   Caitlin Burkett MD   Catheters (STANDARD CARE EXTERNAL CATH) MISC 1 Device by Does not apply route daily 11/30/20   Aliyah Momin MD   albuterol sulfate  (90 Base) MCG/ACT inhaler Inhale 2 puffs into the lungs 4 times daily as needed for Wheezing 4/20/20   Aliyah Momin MD   Misc.  Devices [facility]) MISC 1 each by Does not apply route daily Upright walker with wheels and seat 11/12/18   Emily Meek MD   aspirin 81 MG EC tablet Take 81 mg by mouth daily     Historical Provider, MD       Future Appointments   Date Time Provider Mayo Betts   12/22/2021  2:30 PM Arik Knox MD [facility]

## 2021-11-17 ENCOUNTER — CARE COORDINATION (OUTPATIENT)
Dept: CARE COORDINATION | Age: 76
End: 2021-11-17

## 2021-11-17 NOTE — CARE COORDINATION
ACM touched base with patient to discuss home health aide orders and nebulizer order. ACM explained that [place] at the PCP office faxed information for both. Still waiting on PCP to addend note for nebulizer and it will get faxed back to [address]. Patient stated that she wants to move to assisted living in future in [facility]. [facility] has [facility] [address] [address] and [address] [facility] and likes that area and those facilities. She would like to wait until after the 1st of the year to start looking more into it. Agreed to Referral to GRECIA Cotter and Shashi Orellana [facility] [place]. when she is ready.

## 2021-11-23 ENCOUNTER — CARE COORDINATION (OUTPATIENT)
Dept: CARE COORDINATION | Age: 76
End: 2021-11-23

## 2021-11-23 NOTE — CARE COORDINATION
Ambulatory Care Coordination Note  11/23/2021  CM Risk Score: 10  Charlson 10 Year Mortality Risk Score: 79%     ACC: Corrinne Distel, RN    Summary Note: called, left message on voicemail. Will attempt to call this afternoon if no return call. Review CHF Holiday information in [employer]. Return call from patient. States she does not have a diagnosis of CHF but is willing to review information. Patient has not accessed My Chart for attachments related to CHF. Reviewed  - CHF Zone tool information  - HF handout information  - CHF food swaps information  - Medication, taking your Medication  - Medication, taking your Medication    All questions answered  Contact information provided for any future questions          Care Coordination Interventions    Program Enrollment: Complex Care  Referral from Primary Care Provider: No  Suggested Interventions and [address]: In Process  Zone Management Tools: In Process         Goals Addressed    None         Prior to Admission medications    Medication Sig Start Date End Date Taking? Authorizing Provider   albuterol (PROVENTIL) (5 MG/ML) 0.5% nebulizer solution Take 1 mL by nebulization every 4 hours as needed for Wheezing 11/12/21   Amanda Gillis MD   DULoxetine (CYMBALTA) 30 MG extended release capsule Take 1 capsule by mouth 2 times daily for 3 days 10/5/21 11/5/21  Amanda Gillis MD   gabapentin (NEURONTIN) 100 MG capsule Take 2 capsules by mouth 3 times daily for 3 days.  10/5/21 11/5/21  Amanda Gillis MD   metoprolol tartrate (LOPRESSOR) 50 MG tablet Take 50 mg by mouth 2 times daily    Historical Provider, MD   Cyanocobalamin (VITAMIN B-12) 1000 MCG/15ML LIQD Take 1,000 mcg by mouth daily     Historical Provider, MD   levocetirizine (XYZAL) 5 MG tablet take 1 tablet by mouth once daily AT NIGHT 9/8/21   Amanda Gillis MD   donepezil (ARICEPT) 5 MG tablet take 1 tablet by mouth at bedtime 9/1/21   Amanda Gillis MD   psyllium (Aneesh Grout) 28.3 % PACK Take 1 packet by mouth daily  Patient not taking: Reported on 10/19/2021    Historical Provider, MD   Catheters (STANDARD CARE EXTERNAL CATH) MISC 1 each by Does not apply route daily 3/2/21   May Tomas MD   Catheters (STANDARD CARE EXTERNAL CATH) MISC 1 Device by Does not apply route daily 11/30/20   Jabier Tay MD   albuterol sulfate  (90 Base) MCG/ACT inhaler Inhale 2 puffs into the lungs 4 times daily as needed for Wheezing 4/20/20   Jabier Tay MD   Misc.  Devices [facility]) MISC 1 each by Does not apply route daily Upright walker with wheels and seat 11/12/18   Stella Boyce MD   aspirin 81 MG EC tablet Take 81 mg by mouth daily     Historical Provider, MD       Future Appointments   Date Time Provider Mayo Betts   12/22/2021  2:30 PM Castro Wilkinson MD [id number]      and   Congestive Heart Failure Assessment    Are you currently restricting fluids?: Other  Do you understand a low sodium diet?: Yes  Do you understand how to read food labels?: Yes  Do you salt your food before tasting it?: No         Symptoms:

## 2021-11-24 ENCOUNTER — OFFICE VISIT (OUTPATIENT)
Dept: FAMILY MEDICINE CLINIC | Age: 76
End: 2021-11-24
Payer: MEDICARE

## 2021-11-24 ENCOUNTER — HOSPITAL ENCOUNTER (OUTPATIENT)
Age: 76
Setting detail: SPECIMEN
Discharge: HOME | End: 2021-11-24

## 2021-11-24 VITALS
TEMPERATURE: 98.1 F | HEART RATE: 71 BPM | DIASTOLIC BLOOD PRESSURE: 70 MMHG | OXYGEN SATURATION: 99 % | SYSTOLIC BLOOD PRESSURE: 134 MMHG

## 2021-11-24 DIAGNOSIS — R39.9 UTI SYMPTOMS: Primary | ICD-10-CM

## 2021-11-24 DIAGNOSIS — N30.01 ACUTE CYSTITIS WITH HEMATURIA: ICD-10-CM

## 2021-11-24 DIAGNOSIS — R39.9 UTI SYMPTOMS: ICD-10-CM

## 2021-11-24 LAB
BILIRUBIN, POC: NEGATIVE
BLOOD URINE, POC: ABNORMAL
CLARITY, POC: ABNORMAL
COLOR, POC: YELLOW
GLUCOSE URINE, POC: NEGATIVE
KETONES, POC: ABNORMAL
LEUKOCYTE EST, POC: ABNORMAL
NITRITE, POC: NEGATIVE
PH, POC: 5.5
PROTEIN, POC: 30
SPECIFIC GRAVITY, POC: 1.01
UROBILINOGEN, POC: 0.2

## 2021-11-24 PROCEDURE — 99214 OFFICE O/P EST MOD 30 MIN: CPT | Performed by: NURSE PRACTITIONER

## 2021-11-24 PROCEDURE — 81002 URINALYSIS NONAUTO W/O SCOPE: CPT | Performed by: NURSE PRACTITIONER

## 2021-11-24 RX ORDER — PHENAZOPYRIDINE HYDROCHLORIDE 200 MG/1
200 TABLET, FILM COATED ORAL 3 TIMES DAILY PRN
Qty: 9 TABLET | Refills: 0 | Status: SHIPPED | OUTPATIENT
Start: 2021-11-24 | End: 2021-11-27

## 2021-11-24 RX ORDER — NITROFURANTOIN 25; 75 MG/1; MG/1
100 CAPSULE ORAL 2 TIMES DAILY
Qty: 14 CAPSULE | Refills: 0 | Status: SHIPPED | OUTPATIENT
Start: 2021-11-24 | End: 2021-12-01

## 2021-11-24 ASSESSMENT — ENCOUNTER SYMPTOMS
EYE ITCHING: 0
ALLERGIC/IMMUNOLOGIC NEGATIVE: 1
ABDOMINAL PAIN: 0
EYE DISCHARGE: 0
CHEST TIGHTNESS: 0
COUGH: 0
NAUSEA: 0
VOMITING: 0
SHORTNESS OF BREATH: 0
DIARRHEA: 0
EYES NEGATIVE: 1
SORE THROAT: 0

## 2021-11-24 NOTE — PROGRESS NOTES
[address] [facility]   [address] [place] Morales  Dept: [phone]  Dept Fax: [phone]    Garrett Bland is a 67 y.o. female who presents today forher medical conditions/complaints as noted below. Garrett Bland is c/o of   Chief Complaint   Patient presents with    Urinary Frequency     HPI:     Urinary Tract Infection   This is a new problem. The current episode started in the past 7 days. The problem occurs every urination. The problem has been gradually worsening. The quality of the pain is described as burning and aching. There has been no fever. Associated symptoms include frequency and urgency. Pertinent negatives include no chills, nausea or vomiting. No fever or chills. Takes AZO PRN- effective. Past Medical History:   Diagnosis Date    Abnormality of rib determined by X-ray 1/28/2016    Acute cystitis without hematuria 6/16/2018    Acute exacerbation of chronic bronchitis (HCC)     Acute on chronic diastolic heart failure ([facility] [address] 1/28/2016    Adjustment disorder with mixed anxiety and depressed mood 6/26/2015    Mild     Anemia 3/5/2014    Back pain     Bronchiectasis with acute lower respiratory infection ([facility] [address] 8/16/2017    Bronchitis     Chronic bronchitis (HCC) 1/28/2016    Chronic cough 7/23/2018    COPD (chronic obstructive pulmonary disease) (HCC)     asbestos related lung disease    Degenerative joint disease (DJD) of hip 5/14/2015    Dyslipidemia 3/5/2014    Encephalopathy acute 5/29/2016    Essential hypertension 1/28/2016    Fibromyalgia     GERD (gastroesophageal reflux disease)     History of total bilateral knee replacement 5/28/2016    Hx of blood clots     left leg and lung    Hyperlipidemia     Hypertension     Incontinence of urine     Irregular heartbeat     DR. Teresa Muñoz Medication monitoring encounter 6/13/2018    Morbid obesity with BMI of 45.0-49.9, adult ([facility] [address] 1/28/2016    Cancer Brother         stomach    High Blood Pressure Mother     Heart Disease Sister         irregular heartbeat     Social History     Tobacco Use    Smoking status: Former Smoker     Packs/day: 1.00     Years: 18.00     Pack years: 18.00     Quit date: 1993     Years since quittin.5    Smokeless tobacco: Never Used   Substance Use Topics    Alcohol use: No      Current Outpatient Medications   Medication Sig Dispense Refill    nitrofurantoin, macrocrystal-monohydrate, (MACROBID) 100 MG capsule Take 1 capsule by mouth 2 times daily for 7 days 14 capsule 0    phenazopyridine (PYRIDIUM) 200 MG tablet Take 1 tablet by mouth 3 times daily as needed for Pain 9 tablet 0    DULoxetine (CYMBALTA) 30 MG extended release capsule Take 1 capsule by mouth 2 times daily for 3 days 6 capsule 0    gabapentin (NEURONTIN) 100 MG capsule Take 2 capsules by mouth 3 times daily for 3 days. 18 capsule 0    metoprolol tartrate (LOPRESSOR) 50 MG tablet Take 50 mg by mouth 2 times daily      Cyanocobalamin (VITAMIN B-12) 1000 MCG/15ML LIQD Take 1,000 mcg by mouth daily       levocetirizine (XYZAL) 5 MG tablet take 1 tablet by mouth once daily AT NIGHT 30 tablet 2    donepezil (ARICEPT) 5 MG tablet take 1 tablet by mouth at bedtime 90 tablet 1    psyllium (KONSYL) 28.3 % PACK Take 1 packet by mouth daily       albuterol sulfate  (90 Base) MCG/ACT inhaler Inhale 2 puffs into the lungs 4 times daily as needed for Wheezing 1 Inhaler 2    Misc.  Devices (WALKER) MISC 1 each by Does not apply route daily Upright walker with wheels and seat 1 each 0    aspirin 81 MG EC tablet Take 81 mg by mouth daily       albuterol (PROVENTIL) (5 MG/ML) 0.5% nebulizer solution Take 1 mL by nebulization every 4 hours as needed for Wheezing (Patient not taking: Reported on 2021) 120 each 3    Catheters (STANDARD CARE EXTERNAL CATH) MISC 1 each by Does not apply route daily (Patient not taking: Reported on 2021) 5 each 1    Catheters (STANDARD CARE EXTERNAL CATH) MISC 1 Device by Does not apply route daily (Patient not taking: Reported on 11/24/2021) 30 each 2     No current facility-administered medications for this visit. Allergies   Allergen Reactions    Rosuvastatin Calcium Anaphylaxis     Hair fell out    Statins Anaphylaxis     Hair fell out    Bactrim [Sulfamethoxazole-Trimethoprim] Diarrhea    Caffeine Other (See Comments)     pain  pain    Dye [Iodides] Other (See Comments)     Iv dye= blood clots in arm    Food      Tomatoes, pain    Ioxaglate Other (See Comments)     Iv dye= blood clots in arm    Tomato     Dicloxacillin Rash    Pcn [Penicillins] Rash     Subjective:      Review of Systems   Constitutional: Negative for appetite change, chills, fatigue and fever. HENT: Negative for congestion, postnasal drip and sore throat. Eyes: Negative. Negative for discharge and itching. Respiratory: Negative for cough, chest tightness and shortness of breath. Cardiovascular: Negative for chest pain, palpitations and leg swelling. Gastrointestinal: Negative for abdominal pain, diarrhea, nausea and vomiting. Endocrine: Negative. Genitourinary: Positive for dysuria, frequency and urgency. Musculoskeletal: Negative for neck pain and neck stiffness. Skin: Negative for rash. Allergic/Immunologic: Negative. Neurological: Negative for dizziness, weakness, light-headedness and headaches. Hematological: Negative for adenopathy. Psychiatric/Behavioral: Negative for confusion. Objective:      Physical Exam  Vitals reviewed. Constitutional:       Appearance: She is well-developed. HENT:      Head: Normocephalic. Right Ear: External ear normal.      Left Ear: External ear normal.      Nose: Nose normal.   Eyes:      Conjunctiva/sclera: Conjunctivae normal.      Pupils: Pupils are equal, round, and reactive to light.    Cardiovascular:      Rate and Rhythm: Normal rate and regular rhythm. Heart sounds: Normal heart sounds. No murmur heard. No friction rub. No gallop. Pulmonary:      Effort: Pulmonary effort is normal. No respiratory distress. Breath sounds: Normal breath sounds. Abdominal:      General: Bowel sounds are normal.      Palpations: Abdomen is soft. Musculoskeletal:         General: Normal range of motion. Cervical back: Normal range of motion and neck supple. Lymphadenopathy:      Cervical: No cervical adenopathy. Skin:     General: Skin is warm and dry. Findings: No rash. Neurological:      Mental Status: She is alert and oriented to person, place, and time. Cranial Nerves: No cranial nerve deficit. Coordination: Coordination normal.      Deep Tendon Reflexes: Reflexes are normal and symmetric. Psychiatric:         Thought Content: Thought content normal.       /70   Pulse 71   Temp 98.1 °F (36.7 °C)   SpO2 99%     Results for POC orders placed in visit on 11/24/21   POCT Urinalysis no Micro   Result Value Ref Range    Color, UA yellow     Clarity, UA cloudy     Glucose, UA POC negative     Bilirubin, UA negative     Ketones, UA trace     Spec Grav, UA 1.015     Blood, UA POC moderate     pH, UA 5.5     Protein, UA POC 30     Urobilinogen, UA 0.2     Leukocytes, UA large     Nitrite, UA negative      Assessment:       Diagnosis Orders   1. UTI symptoms  POCT Urinalysis no Micro    Culture, Urine    nitrofurantoin, macrocrystal-monohydrate, (MACROBID) 100 MG capsule    phenazopyridine (PYRIDIUM) 200 MG tablet   2.  Acute cystitis with hematuria  nitrofurantoin, macrocrystal-monohydrate, (MACROBID) 100 MG capsule    phenazopyridine (PYRIDIUM) 200 MG tablet           Plan:     1.) POCT U/A- noted above   2.) Urine CX- sent to lab- will call with results   3.) Start Macrobid today   4.) Pyridium PRN bladder pain   5.) Increase fluids   6.) RTO PRN   7.) Follow-up with PCP     Problem List     None         Patient given educationalmaterials - see patient instructions. Discussed use, benefit, and side effectsof prescribed medications. All patient questions answered. Pt verbalized understanding. Instructed to continue current medications, diet and exercise. Patient agreedwith treatment plan. Follow up as directed.      Electronically signed by KARIN Gates CNP on 11/24/2021 at 11:27 AM

## 2021-11-24 NOTE — PATIENT INSTRUCTIONS
Patient Education        Urinary Tract Infection (UTI) in Women: Care Instructions  Overview     A urinary tract infection, or UTI, is a general term for an infection anywhere between the kidneys and the urethra (where urine comes out). Most UTIs are bladder infections. They often cause pain or burning when you urinate. UTIs are caused by bacteria and can be cured with antibiotics. Be sure to complete your treatment so that the infection does not get worse. Follow-up care is a key part of your treatment and safety. Be sure to make and go to all appointments, and call your doctor if you are having problems. It's also a good idea to know your test results and keep a list of the medicines you take. How can you care for yourself at home? · Take your antibiotics as directed. Do not stop taking them just because you feel better. You need to take the full course of antibiotics. · Drink extra water and other fluids for the next day or two. This will help make the urine less concentrated and help wash out the bacteria that are causing the infection. (If you have kidney, heart, or liver disease and have to limit fluids, talk with your doctor before you increase the amount of fluids you drink.)  · Avoid drinks that are carbonated or have caffeine. They can irritate the bladder. · Urinate often. Try to empty your bladder each time. · To relieve pain, take a hot bath or lay a heating pad set on low over your lower belly or genital area. Never go to sleep with a heating pad in place. To prevent UTIs  · Drink plenty of water each day. This helps you urinate often, which clears bacteria from your system. (If you have kidney, heart, or liver disease and have to limit fluids, talk with your doctor before you increase the amount of fluids you drink.)  · Urinate when you need to. · If you are sexually active, urinate right after you have sex. · Change sanitary pads often.   · Avoid douches, bubble baths, feminine hygiene sprays, and other feminine hygiene products that have deodorants. · After going to the bathroom, wipe from front to back. When should you call for help? Call your doctor now or seek immediate medical care if:    · Symptoms such as fever, chills, nausea, or vomiting get worse or appear for the first time.     · You have new pain in your back just below your rib cage. This is called flank pain.     · There is new blood or pus in your urine.     · You have any problems with your antibiotic medicine. Watch closely for changes in your health, and be sure to contact your doctor if:    · You are not getting better after taking an antibiotic for 2 days.     · Your symptoms go away but then come back. Where can you learn more? Go to https://[employer]. org and sign in to your [employer] account. Enter W845 in the GoPlanit box to learn more about \"Urinary Tract Infection (UTI) in Women: Care Instructions. \"     If you do not have an account, please click on the \"Sign Up Now\" link. Current as of: February 10, 2021               Content Version: 13.0  © 9155-2172 [employer]wise, Inc[employer]. Care instructions adapted under license by [facility] ([facility]). If you have questions about a medical condition or this instruction, always ask your healthcare professional. [address] any warranty or liability for your use of this information.

## 2021-11-26 LAB
CULTURE: ABNORMAL
Lab: ABNORMAL
SPECIMEN DESCRIPTION: ABNORMAL

## 2021-11-29 ENCOUNTER — CARE COORDINATION (OUTPATIENT)
Dept: CARE COORDINATION | Age: 76
End: 2021-11-29

## 2021-12-14 ENCOUNTER — HOSPITAL ENCOUNTER (OUTPATIENT)
Dept: PAIN MANAGEMENT | Age: 76
Discharge: HOME | End: 2021-12-14
Payer: MEDICARE

## 2021-12-14 DIAGNOSIS — M48.02 CERVICAL SPINAL STENOSIS: ICD-10-CM

## 2021-12-14 DIAGNOSIS — M79.7 FIBROMYALGIA: ICD-10-CM

## 2021-12-14 DIAGNOSIS — M43.22 CERVICAL SPINE ANKYLOSIS: ICD-10-CM

## 2021-12-14 DIAGNOSIS — Z96.653 HISTORY OF TOTAL BILATERAL KNEE REPLACEMENT: ICD-10-CM

## 2021-12-14 DIAGNOSIS — Z98.890 S/P KYPHOPLASTY: ICD-10-CM

## 2021-12-14 DIAGNOSIS — M54.16 LUMBAR RADICULOPATHY: ICD-10-CM

## 2021-12-14 DIAGNOSIS — M54.50 ACUTE MIDLINE LOW BACK PAIN, UNSPECIFIED WHETHER SCIATICA PRESENT: ICD-10-CM

## 2021-12-14 DIAGNOSIS — M48.062 SPINAL STENOSIS OF LUMBAR REGION WITH NEUROGENIC CLAUDICATION: ICD-10-CM

## 2021-12-14 DIAGNOSIS — M51.36 DDD (DEGENERATIVE DISC DISEASE), LUMBAR: ICD-10-CM

## 2021-12-14 PROCEDURE — 99213 OFFICE O/P EST LOW 20 MIN: CPT

## 2021-12-14 PROCEDURE — 99213 OFFICE O/P EST LOW 20 MIN: CPT | Performed by: NURSE PRACTITIONER

## 2021-12-14 RX ORDER — OXYCODONE HYDROCHLORIDE 5 MG/1
7.5 TABLET ORAL EVERY 6 HOURS PRN
Qty: 180 TABLET | Refills: 0 | Status: SHIPPED | OUTPATIENT
Start: 2021-12-14 | End: 2022-01-13

## 2021-12-14 ASSESSMENT — ENCOUNTER SYMPTOMS
SHORTNESS OF BREATH: 0
CONSTIPATION: 0
COUGH: 0
BACK PAIN: 1

## 2021-12-14 NOTE — PROGRESS NOTES
Patient completed a telephone visit today to review medication contract. Chief Complaint: back pain    [facility] Pt reports chronic history of low back pain that radiates into right hip and buttocks at times She is s/p bilateral knee and right hip replaced. She has tried NSAIDS heat chiropractic care and with little relief. Just completed in home PT to increase strength and help with ambulation and balance. She had a fall on 9/28/18 with resulting L1 compression fracture. She had kyphoplasty of L1 which did help her back pain.  She goes to the chiropractor regularly with relief. She tried aquatic PT with no benefit. She had LESI 3/25 with 40-50%  relief of the pain. She had Right lumbar facet injections 9/2021 with 90% relief. She is requesting to repeat lumbar facet injection on the Left side. She was in the hospital for about a week after a fall at the grocery store - she was then admitted to [facility] for about a week and did not have an appointment last month. She is requesting to go back to oxycodone - she does not want to take percocet - concerned about taking acetaminophen     Back Pain  This is a chronic problem. The current episode started more than 1 year ago. The problem occurs constantly. The problem is unchanged. The pain is present in the lumbar spine. The quality of the pain is described as aching. The pain does not radiate. The pain is at a severity of 7/10. The pain is moderate. The symptoms are aggravated by position and sitting. Associated symptoms include paresthesias and tingling. Pertinent negatives include no chest pain, fever or numbness. She has tried analgesics and bed rest (facet injection) for the symptoms. The treatment provided mild relief. Patient denies any new neurological symptoms. No bowel or bladder incontinence, no weakness, and no falling.     Pill count: appropriate was due 11/13 - was in LTC following injury to ankle    Morphine equivalent: 45    Periodic Controlled Substance Monitoring: Possible medication side effects, risk of tolerance/dependence & alternative treatments discussed., No signs of potential drug abuse or diversion identified., Obtaining appropriate analgesic effect of treatment. Simon Kwong, APRN - CNP)      Past Medical History:   Diagnosis Date    Abnormality of rib determined by X-ray 1/28/2016    Acute cystitis without hematuria 6/16/2018    Acute exacerbation of chronic bronchitis (HCC)     Acute on chronic diastolic heart failure ([facility] 1/28/2016    Adjustment disorder with mixed anxiety and depressed mood 6/26/2015    Mild     Anemia 3/5/2014    Back pain     Bronchiectasis with acute lower respiratory infection ([facility] 8/16/2017    Bronchitis     Chronic bronchitis (HCC) 1/28/2016    Chronic cough 7/23/2018    COPD (chronic obstructive pulmonary disease) (HCC)     asbestos related lung disease    Degenerative joint disease (DJD) of hip 5/14/2015    Dyslipidemia 3/5/2014    Encephalopathy acute 5/29/2016    Essential hypertension 1/28/2016    Fibromyalgia     GERD (gastroesophageal reflux disease)     History of total bilateral knee replacement 5/28/2016    Hx of blood clots     left leg and lung    Hyperlipidemia     Hypertension     Incontinence of urine     Irregular heartbeat     DR. Adam Gamino Medication monitoring encounter 6/13/2018    Morbid obesity with BMI of 45.0-49.9, adult ([facility] 1/28/2016    Obstructive sleep apnea syndrome     Osteoarthritis     Primary osteoarthritis of left knee 5/27/2016    PVC (premature ventricular contraction) 1/28/2016    S/P right and left heart catheterization 10/2/2015    Sleep apnea     no machine    SOB (shortness of breath)     Spinal stenosis of lumbar region with neurogenic claudication 5/13/2013    Supplemental oxygen dependent 12/8/2017    Urine frequency     UTI (urinary tract infection)     hospitalized early july 2014 for kidney infection Past Surgical History:   Procedure Laterality Date    BRONCHOSCOPY Left 8/16/2017    BRONCHOSCOPY ALVEOLAR LAVAGE LOWER LOBE performed by Dorie Campbell MD at [address]  x 3    no stents    COLONOSCOPY  4 28 14    polyps biopsies     FOOT SURGERY Left     calcium deposit removal from top of foot    HYSTERECTOMY      JOINT REPLACEMENT Bilateral 5/27/2016    bilat total knees    NERVE BLOCK  5/13/2013    caudal celestone 6mg    NERVE BLOCK  5/28/13    Caudal #2, Celestone 9mg    NERVE BLOCK  2/14/14    rt hip inj celestone 9 mg    NERVE BLOCK  07/14/2014    caudal# 3- celestone 9 mg    NERVE BLOCK  7-21-14    caudal epidural #2, decadron 7mg, fentanyl 25mcg    NERVE BLOCK  10/2/14    duramorph 1.5  decadron 5mg    NERVE BLOCK  11/9/2015    caudal# 1 celestone 9mg    NERVE BLOCK  11/16/15    caudal #2  decadron 10mg    NERVE BLOCK  11/23/15    duramorph 1mg celestone 9mg    NERVE BLOCK  03/28/2018    CAUDAL EPIDURAL STEROID BLOCK  DECADRON 10 MG     NERVE BLOCK  05/23/2018    caudal # decadron 7mg    NERVE BLOCK  08/28/2018    natalia transforminal # 1, decadron 10mg gadoteridol, LONG NEEDLES    MS PERQ VERT AGMNTJ CAVITY CRTJ UNI/BI CANNULJ LMBR N/A 10/29/2018    KYPHOPLASTY L1 performed by Albert Neri MD at [address] [facility] [address] HIP ARTHROPLASTY Right 5/14/15    total hip replacement       Allergies   Allergen Reactions    Rosuvastatin Calcium Anaphylaxis     Hair fell out    Statins Anaphylaxis     Hair fell out    Bactrim [Sulfamethoxazole-Trimethoprim] Diarrhea    Caffeine Other (See Comments)     pain  pain    Dye [Iodides] Other (See Comments)     Iv dye= blood clots in arm    Food      Tomatoes, pain    Ioxaglate Other (See Comments)     Iv dye= blood clots in arm    Tomato     Dicloxacillin Rash    Pcn [Penicillins] Rash         Current Outpatient Medications:     albuterol (PROVENTIL) (5 MG/ML) 0.5% nebulizer solution, Take 1 mL by nebulization every 4 hours as needed for Wheezing, Disp: 120 each, Rfl: 3    DULoxetine (CYMBALTA) 30 MG extended release capsule, Take 1 capsule by mouth 2 times daily for 3 days, Disp: 6 capsule, Rfl: 0    gabapentin (NEURONTIN) 100 MG capsule, Take 2 capsules by mouth 3 times daily for 3 days. , Disp: 18 capsule, Rfl: 0    metoprolol tartrate (LOPRESSOR) 50 MG tablet, Take 50 mg by mouth 2 times daily, Disp: , Rfl:     Cyanocobalamin (VITAMIN B-12) 1000 MCG/15ML LIQD, Take 1,000 mcg by mouth daily , Disp: , Rfl:     levocetirizine (XYZAL) 5 MG tablet, take 1 tablet by mouth once daily AT NIGHT, Disp: 30 tablet, Rfl: 2    donepezil (ARICEPT) 5 MG tablet, take 1 tablet by mouth at bedtime, Disp: 90 tablet, Rfl: 1    psyllium (KONSYL) 28.3 % PACK, Take 1 packet by mouth daily , Disp: , Rfl:     Catheters (STANDARD CARE EXTERNAL CATH) MISC, 1 each by Does not apply route daily, Disp: 5 each, Rfl: 1    Catheters (STANDARD CARE EXTERNAL CATH) MISC, 1 Device by Does not apply route daily, Disp: 30 each, Rfl: 2    albuterol sulfate  (90 Base) MCG/ACT inhaler, Inhale 2 puffs into the lungs 4 times daily as needed for Wheezing, Disp: 1 Inhaler, Rfl: 2    Misc.  Devices (WALKER) MISC, 1 each by Does not apply route daily Upright walker with wheels and seat, Disp: 1 each, Rfl: 0    aspirin 81 MG EC tablet, Take 81 mg by mouth daily , Disp: , Rfl:     Family History   Problem Relation Age of Onset    Stomach Cancer Brother         stomach    High Blood Pressure Mother     Heart Disease Sister         irregular heartbeat       Social History     Socioeconomic History    Marital status:      Spouse name: Not on file    Number of children: 2    Years of education: Not on file    Highest education level: Not on file   Occupational History    Not on file   Tobacco Use    Smoking status: Former Smoker     Packs/day: 1.00     Years: 18.00     Pack years: 18.00     Quit date: 5/13/1993 Years since quittin.6    Smokeless tobacco: Never Used   Vaping Use    Vaping Use: Never used   Substance and Sexual Activity    Alcohol use: No    Drug use: No    Sexual activity: Not Currently   Other Topics Concern    Not on file   Social History Narrative    Not on file     Social Determinants of Health     Financial Resource Strain: Low Risk     Difficulty of Paying Living Expenses: Not hard at all   Food Insecurity: No Food Insecurity    Worried About Running Out of Food in the Last Year: Never true    Bobby of Food in the Last Year: Never true   Transportation Needs: Unmet Transportation Needs    Lack of Transportation (Medical): Yes    Lack of Transportation (Non-Medical): Yes   Physical Activity: Insufficiently Active    Days of Exercise per Week: 3 days    Minutes of Exercise per Session: 20 min   Stress: No Stress Concern Present    Feeling of Stress : Only a little   Social Connections: Unknown    Frequency of Communication with Friends and Family: Twice a week    Frequency of Social Gatherings with Friends and Family: Twice a week    Attends Christianity Services: Not on file    Active Member of Clubs or Organizations: Not on file    Attends Club or Organization Meetings: Not on file    Marital Status:    Intimate Partner Violence:     Fear of Current or Ex-Partner: Not on file    Emotionally Abused: Not on file    Physically Abused: Not on file    Sexually Abused: Not on file   Housing Stability: [address] Unable to Pay for Housing in the Last Year: No    Number of [place] in the Last Year: 1    Unstable Housing in the Last Year: No       Review of Systems:  Review of Systems   Constitutional: Negative for chills and fever. Cardiovascular: Negative for chest pain and palpitations. Respiratory: Negative for cough and shortness of breath. Musculoskeletal: Positive for back pain. Gastrointestinal: Negative for constipation.    Neurological: Positive for paresthesias and tingling. Negative for disturbances in coordination, loss of balance and numbness. Physical Exam:  There were no vitals taken for this visit. Physical Exam  Neurological:      Mental Status: She is alert. Psychiatric:         Mood and Affect: Mood normal.         Record/Diagnostics Review:    Last tiffanie 12/2020 and was appropriate     ABERRANT BEHAVIORS SINCE LAST VISIT  Lost rx/pills:------------------------------------------ no  Taking  medication as prescribed: ----------- yes  Urine Drug Screen ---------------------------------  yes             Date------------------------------------------------- 12/14/2020              Results as expected ---------------------yes     Recent ER visits: -------------------------------------No  Pill count is appropriate: ---------------------------yes   Refills for prescriptions appropriate:---------- yes    Assessment:  Problem List Items Addressed This Visit     Spinal stenosis of lumbar region with neurogenic claudication (Chronic)    Relevant Medications    oxyCODONE (ROXICODONE) 5 MG immediate release tablet    Other Relevant Orders    SD INJ DX/THER AGNT PARAVERT FACET JOINT, LUMBAR/SAC, 1ST LEVEL    Saline lock IV    FLUORO FOR SURGICAL PROCEDURES    Fibromyalgia    Relevant Medications    oxyCODONE (ROXICODONE) 5 MG immediate release tablet    History of total bilateral knee replacement    Relevant Medications    oxyCODONE (ROXICODONE) 5 MG immediate release tablet    Back pain    Cervical spine ankylosis    Cervical spinal stenosis    S/P kyphoplasty    DDD (degenerative disc disease), lumbar    Relevant Medications    oxyCODONE (ROXICODONE) 5 MG immediate release tablet    Other Relevant Orders    SD INJ DX/THER AGNT PARAVERT FACET JOINT, LUMBAR/SAC, 1ST LEVEL    Saline lock IV    FLUORO FOR SURGICAL PROCEDURES    Lumbar radiculopathy             Treatment Plan:  Patient relates current medications are helping the pain.  Patient reports taking pain medications as prescribed, denies obtaining medications from different sources and denies use of illegal drugs. Patient denies side effects from medications like nausea, vomiting, constipation or drowsiness. Patient reports current activities of daily living are possible due to medications and would like to continue them. As always, we encourage daily stretching and strengthening exercises, and recommend minimizing use of pain medications unless patient cannot get through daily activities due to pain. Contract requirements met. Continue opioid therapy. Script written for oxycodone  D/C percocet - pt does not want to take acetaminophen daily  Significant relief following right lumbar facet injections  Pt complains of continued back pain on the left side  Pt would like to have left lumbar facet injections   Lumbar facet joint injections at the levels of  L1-L2, L2-L3, L3-L4, L4-L5, L5-S1 on the Left side   Follow up appointment made for 4 weeks    Sophie Retana, was evaluated through a synchronous (real-time) audio-video encounter. The patient (or guardian if applicable) is aware that this is a billable service. Verbal consent to proceed has been obtained within the past 12 months. The visit was conducted pursuant to the emergency declaration under the [address] [place] [address] authority and the [employer] General Act. Patient identification was verified, and a caregiver was present when appropriate. The patient was located in a state where the provider was credentialed to provide care. Total time spent for this encounter: 20 minutes    --Severo Booze, APRN - CNP on 12/14/2021 at 4:35 PM    An electronic signature was used to authenticate this note.

## 2021-12-15 ENCOUNTER — TELEPHONE (OUTPATIENT)
Dept: PAIN MANAGEMENT | Age: 76
End: 2021-12-15

## 2021-12-15 ENCOUNTER — OFFICE VISIT (OUTPATIENT)
Dept: INTERNAL MEDICINE CLINIC | Age: 76
End: 2021-12-15
Payer: MEDICARE

## 2021-12-15 VITALS
BODY MASS INDEX: 46.15 KG/M2 | SYSTOLIC BLOOD PRESSURE: 136 MMHG | WEIGHT: 277 LBS | DIASTOLIC BLOOD PRESSURE: 78 MMHG | HEIGHT: 65 IN

## 2021-12-15 DIAGNOSIS — I50.33 ACUTE ON CHRONIC DIASTOLIC HEART FAILURE (HCC): ICD-10-CM

## 2021-12-15 DIAGNOSIS — G47.30 SLEEP APNEA, UNSPECIFIED TYPE: ICD-10-CM

## 2021-12-15 DIAGNOSIS — I10 ESSENTIAL HYPERTENSION: ICD-10-CM

## 2021-12-15 DIAGNOSIS — N30.00 ACUTE CYSTITIS WITHOUT HEMATURIA: ICD-10-CM

## 2021-12-15 DIAGNOSIS — E66.01 MORBID OBESITY WITH BMI OF 45.0-49.9, ADULT (HCC): ICD-10-CM

## 2021-12-15 DIAGNOSIS — Z78.0 POST-MENOPAUSAL: Primary | ICD-10-CM

## 2021-12-15 DIAGNOSIS — G47.33 OBSTRUCTIVE SLEEP APNEA SYNDROME: ICD-10-CM

## 2021-12-15 DIAGNOSIS — M47.816 LUMBAR SPONDYLOSIS: ICD-10-CM

## 2021-12-15 DIAGNOSIS — M16.0 PRIMARY OSTEOARTHRITIS OF BOTH HIPS: ICD-10-CM

## 2021-12-15 DIAGNOSIS — J43.1 PANLOBULAR EMPHYSEMA (HCC): ICD-10-CM

## 2021-12-15 PROCEDURE — 99214 OFFICE O/P EST MOD 30 MIN: CPT | Performed by: INTERNAL MEDICINE

## 2021-12-17 ENCOUNTER — TELEPHONE (OUTPATIENT)
Dept: INTERNAL MEDICINE CLINIC | Age: 76
End: 2021-12-17

## 2021-12-17 ENCOUNTER — TELEPHONE (OUTPATIENT)
Dept: PAIN MANAGEMENT | Age: 76
End: 2021-12-17

## 2021-12-17 ENCOUNTER — CARE COORDINATION (OUTPATIENT)
Dept: CARE COORDINATION | Age: 76
End: 2021-12-17

## 2021-12-17 DIAGNOSIS — R29.898 WEAKNESS OF LEFT LOWER EXTREMITY: ICD-10-CM

## 2021-12-17 DIAGNOSIS — M51.36 DDD (DEGENERATIVE DISC DISEASE), LUMBAR: Primary | ICD-10-CM

## 2021-12-17 ASSESSMENT — ENCOUNTER SYMPTOMS
RESPIRATORY NEGATIVE: 1
ALLERGIC/IMMUNOLOGIC NEGATIVE: 1
GASTROINTESTINAL NEGATIVE: 1
EYES NEGATIVE: 1

## 2021-12-17 NOTE — CARE COORDINATION
Ambulatory Care Coordination Note  12/17/2021  CM Risk Score: 10  Charlson 10 Year Mortality Risk Score: 79%     ACC: Jose L Crowe RN    Summary Note: This ACM covering for Isaac Tse RN, ACM. Received a phone call from [facility] stating that she is waiting to hear about a bariatric wheelchair and hospital bed. She stated that she had an office visit earlier this week with Dr. Dulce Frey. Informed patient that a message had been sent to the provider and the orders were pending. She is also asking about the [facility] paperwork, and if it was faxed to them or mailed back to her. She said to check with [employer]. Routed Encounter to "[employer]. [facility] stated that she was trying to get in to an Assisted Living facility. She is working with the [facility] on finding a facility. Care Coordination Interventions    Program Enrollment: Complex Care  Referral from Primary Care Provider: No  Suggested Interventions and [address]: In Process  Zone Management Tools: In Process         Goals Addressed    None         Prior to Admission medications    Medication Sig Start Date End Date Taking? Authorizing Provider   oxyCODONE (ROXICODONE) 5 MG immediate release tablet Take 1.5 tablets by mouth every 6 hours as needed for Pain for up to 30 days. 12/14/21 1/13/22  Puneet Mckeon APRN - CNP   albuterol (PROVENTIL) (5 MG/ML) 0.5% nebulizer solution Take 1 mL by nebulization every 4 hours as needed for Wheezing 11/12/21   Amanda Gillis MD   DULoxetine (CYMBALTA) 30 MG extended release capsule Take 1 capsule by mouth 2 times daily for 3 days 10/5/21 12/14/21  Amanda Gillis MD   gabapentin (NEURONTIN) 100 MG capsule Take 2 capsules by mouth 3 times daily for 3 days.  10/5/21 12/14/21  Amanda Gillis MD   metoprolol tartrate (LOPRESSOR) 50 MG tablet Take 50 mg by mouth 2 times daily    Historical Provider, MD   Cyanocobalamin (VITAMIN B-12) 1000 MCG/15ML LIQD Take 1,000 mcg by mouth daily     Historical Provider, MD   levocetirizine (XYZAL) 5 MG tablet take 1 tablet by mouth once daily AT NIGHT 9/8/21   Yari Bronson MD   donepezil (ARICEPT) 5 MG tablet take 1 tablet by mouth at bedtime 9/1/21   Yari Bronson MD   psyllium (KONSYL) 28.3 % PACK Take 1 packet by mouth daily     Historical Provider, MD   Catheters (STANDARD CARE EXTERNAL CATH) MISC 1 each by Does not apply route daily  Patient not taking: Reported on 12/15/2021 3/2/21   Jyoti Meehan MD   Catheters (STANDARD CARE EXTERNAL CATH) MISC 1 Device by Does not apply route daily  Patient not taking: Reported on 12/15/2021 11/30/20   Yari Bronson MD   albuterol sulfate  (90 Base) MCG/ACT inhaler Inhale 2 puffs into the lungs 4 times daily as needed for Wheezing 4/20/20   Yari Bronson MD   Misc.  Devices [facility]) MISC 1 each by Does not apply route daily Upright walker with wheels and seat 11/12/18   Zachary Hampton MD   aspirin 81 MG EC tablet Take 81 mg by mouth daily     Historical Provider, MD       Future Appointments   Date Time Provider Mayo Betts   12/22/2021  2:30 PM MD Shy Saxena [facility]   3/9/2022  2:00 PM Yari Bronson MD [address] [place] [address]

## 2021-12-17 NOTE — TELEPHONE ENCOUNTER
Please include the medical necessity for the bariatric wheelchair and hospital bed. Please sign OV note and sign pending orders.

## 2021-12-17 NOTE — TELEPHONE ENCOUNTER
Patient is requesting these orders from her last OV with Dr. Jeni Weaver. Please sign, will fax to [facility] when complete.

## 2021-12-21 ENCOUNTER — CARE COORDINATION (OUTPATIENT)
Dept: CARE COORDINATION | Age: 76
End: 2021-12-21

## 2021-12-21 NOTE — CARE COORDINATION
Ambulatory Care Coordination Note  12/21/2021  CM Risk Score: 10  Charlson 10 Year Mortality Risk Score: 79%     ACC: Rose Cerrato RN  Summary Note: spoke with patient. Confirms that she did receive information in My Chart about CHF and the holidays. She was agreeable to review the information. Reviewed  - CHF Zone tool information  - HF handout information  - CHF food swaps information  - Medication, taking your Medication  All questions answered  Contact information provided for any future questions  Patient is waiting on equipment order from [address] home equipment. She was provided with their number. She reported that she has had increased secretions and increased urination at night lately. ACM suggested limiting her fluids in the evening hours. Will call with any increased symptoms or concerns. 2.3.22 appointment with pulmonology  ACM will follow up in 1-2 weeks. Care Coordination Interventions    Program Enrollment: Complex Care  Referral from Primary Care Provider: No  Suggested Interventions and [address]: In Process  Zone Management Tools: In Process         Goals Addressed                 This Visit's Progress    [facility] Goal   On track     I will work with ACM and home care to establish more help. .    Barriers: overwhelmed by complexity of regimen and stress  Plan for overcoming my barriers: work with ACM  Confidence: 6/10  Anticipated Goal Completion Date: 12-21-21            Prior to Admission medications    Medication Sig Start Date End Date Taking? Authorizing Provider   oxyCODONE (ROXICODONE) 5 MG immediate release tablet Take 1.5 tablets by mouth every 6 hours as needed for Pain for up to 30 days.  12/14/21 1/13/22  Wendell Goldberg Vriezelaar, APRN - CNP   albuterol (PROVENTIL) (5 MG/ML) 0.5% nebulizer solution Take 1 mL by nebulization every 4 hours as needed for Wheezing 11/12/21   Bria Frey MD   DULoxetine (CYMBALTA) 30 MG extended release capsule Take 1 capsule by mouth 2 times daily for 3 days 10/5/21 12/14/21  Chaya Solano MD   gabapentin (NEURONTIN) 100 MG capsule Take 2 capsules by mouth 3 times daily for 3 days. 10/5/21 12/14/21  Chaya Solano MD   metoprolol tartrate (LOPRESSOR) 50 MG tablet Take 50 mg by mouth 2 times daily    Historical Provider, MD   Cyanocobalamin (VITAMIN B-12) 1000 MCG/15ML LIQD Take 1,000 mcg by mouth daily     Historical Provider, MD   levocetirizine (XYZAL) 5 MG tablet take 1 tablet by mouth once daily AT NIGHT 9/8/21   Chaya Solano MD   donepezil (ARICEPT) 5 MG tablet take 1 tablet by mouth at bedtime 9/1/21   Chaya Solano MD   psyllium (KONSYL) 28.3 % PACK Take 1 packet by mouth daily     Historical Provider, MD   Catheters (STANDARD CARE EXTERNAL CATH) MISC 1 each by Does not apply route daily  Patient not taking: Reported on 12/15/2021 3/2/21   Bryan Zhou MD   Catheters (STANDARD CARE EXTERNAL CATH) MISC 1 Device by Does not apply route daily  Patient not taking: Reported on 12/15/2021 11/30/20   Chaya Solano MD   albuterol sulfate  (90 Base) MCG/ACT inhaler Inhale 2 puffs into the lungs 4 times daily as needed for Wheezing 4/20/20   Chaya Solano MD   Misc.  Devices [facility]) MISC 1 each by Does not apply route daily Upright walker with wheels and seat 11/12/18   Chapo Dash MD   aspirin 81 MG EC tablet Take 81 mg by mouth daily     Historical Provider, MD       Future Appointments   Date Time Provider Mayo Roxane   3/9/2022  2:00 PM Chaya Solano MD [address]     ,   Congestive Heart Failure Assessment    Are you currently restricting fluids?: Other  Do you understand a low sodium diet?: Yes  Do you understand how to read food labels?: Yes  Do you salt your food before tasting it?: No     No patient-reported symptoms      Symptoms:     Symptom course: stable  Weight trend: stable  Salt intake watch compared to last visit: stable     ,   COPD Assessment Does the patient understand envrionmental exposure?: Yes  Is the patient able to verbalize Rescue vs. Long Acting medications?: Yes  Does the patient have a nebulizer?: No            Symptoms:         and   General Assessment    Do you have any symptoms that are causing concern?: Yes  Reported Symptoms: Fatigue

## 2021-12-22 ENCOUNTER — TELEPHONE (OUTPATIENT)
Dept: INTERNAL MEDICINE CLINIC | Age: 76
End: 2021-12-22

## 2021-12-22 RX ORDER — METOPROLOL TARTRATE 50 MG/1
TABLET, FILM COATED ORAL
Qty: 180 TABLET | Refills: 3 | Status: SHIPPED | OUTPATIENT
Start: 2021-12-22

## 2021-12-22 NOTE — TELEPHONE ENCOUNTER
----- Message from Christina Leyva sent at 12/22/2021 10:31 AM EST -----  Subject: Message to Provider    QUESTIONS  Information for Provider? PT would like to speak with Genoveva from the   office. She states she wants to change her bariatric bed to a bariatric   mattress. Please call patient, she states she has been trying to contact   someone in regards to this.   ---------------------------------------------------------------------------  --------------  CALL BACK INFO  What is the best way for the office to contact you? OK to leave message on   voicemail  Preferred Call Back Phone Number? [phone]  ---------------------------------------------------------------------------  --------------  SCRIPT ANSWERS  Relationship to Patient?  Self

## 2021-12-22 NOTE — TELEPHONE ENCOUNTER
Please addend last office visit note to include the patients medical necessity for the bariatric hospital bed and wheelchair. Please also include the associated diagnosis for each item.

## 2021-12-23 NOTE — TELEPHONE ENCOUNTER
Left message informing patient that the orders were faxed as well as the form for her [facility].

## 2022-01-04 ENCOUNTER — CARE COORDINATION (OUTPATIENT)
Dept: CARE COORDINATION | Age: 77
End: 2022-01-04

## 2022-01-04 NOTE — CARE COORDINATION
Patient contacted [facility] stating that her pulmonology appt was moved out until March. She stated she has some paper work that she needs him to fill out before March for occupational health? ISAMAR asked if she explained this to that office, she stated no. She was encouraged to call back and see if they can give her a sooner appointment. She agreed and will call [facility] back with any other issues.

## 2022-01-07 ENCOUNTER — CARE COORDINATION (OUTPATIENT)
Dept: CARE COORDINATION | Age: 77
End: 2022-01-07

## 2022-01-07 NOTE — CARE COORDINATION
Patient called [facility] asking which urologist she was suppose to see when she had to cancel due to being in the hospital.   [facility] verified that she was scheduled with Dr. Bridgett Riedel. This office information was provide, patient will call to reschedule. Patient mentioned she did get a sooner appointment at the pulmonology office as well. She has no additional questions or concerns today.

## 2022-01-07 NOTE — TELEPHONE ENCOUNTER
[facility] requesting an update in the office visit notes. Details will be given to Dr. Lisa Johnson on Monday. Will fax note upon completion and update patient on Monday.

## 2022-01-10 ENCOUNTER — OFFICE VISIT (OUTPATIENT)
Dept: UROLOGY | Age: 77
End: 2022-01-10
Payer: MEDICARE

## 2022-01-10 VITALS
HEIGHT: 64 IN | SYSTOLIC BLOOD PRESSURE: 125 MMHG | TEMPERATURE: 97.1 F | WEIGHT: 277 LBS | HEART RATE: 80 BPM | BODY MASS INDEX: 47.29 KG/M2 | DIASTOLIC BLOOD PRESSURE: 79 MMHG | RESPIRATION RATE: 15 BRPM

## 2022-01-10 DIAGNOSIS — R35.1 NOCTURIA: ICD-10-CM

## 2022-01-10 DIAGNOSIS — R39.15 URGENCY OF URINATION: Primary | ICD-10-CM

## 2022-01-10 DIAGNOSIS — R35.0 URINARY FREQUENCY: ICD-10-CM

## 2022-01-10 DIAGNOSIS — N39.0 RECURRENT UTI: ICD-10-CM

## 2022-01-10 PROCEDURE — 81002 URINALYSIS NONAUTO W/O SCOPE: CPT | Performed by: UROLOGY

## 2022-01-10 PROCEDURE — 99204 OFFICE O/P NEW MOD 45 MIN: CPT | Performed by: UROLOGY

## 2022-01-10 RX ORDER — MIRABEGRON 50 MG/1
50 TABLET, FILM COATED, EXTENDED RELEASE ORAL DAILY
Qty: 30 TABLET | Refills: 5 | Status: SHIPPED | OUTPATIENT
Start: 2022-01-10

## 2022-01-10 ASSESSMENT — ENCOUNTER SYMPTOMS
CONSTIPATION: 0
VOMITING: 0
EYE PAIN: 0
NAUSEA: 0
COUGH: 0
SHORTNESS OF BREATH: 0
BACK PAIN: 0
DIARRHEA: 0
ABDOMINAL PAIN: 0
EYE REDNESS: 0
WHEEZING: 0

## 2022-01-10 NOTE — TELEPHONE ENCOUNTER
Patient requesting refill of external catheters.   Requested RX be faxed to [phone] [facility] medical   RX pending PCP approval

## 2022-01-10 NOTE — PROGRESS NOTES
[address] [address]  Dept:  Yadira Cordero [facility] Urology Office Note - New Patient    Patient:  Delta Caraballo  YOB: 1945  Date: 1/10/2022    The patient is a 67 y.o. female who presentstoday for evaluation of the following problems:   Chief Complaint   Patient presents with    New Patient     frequency incontince we were unsuccessful with getting sample    referred by Kate Araya MD.    HPI  Thelma Alvarado is a very pleasant 48-year-old female who has urge incontinence. She has very significant incontinence. She does keep washcloth between her legs because she gets irritation from pads. She does void about every half an hour during the day and 7-8 times per night. She is very frustrated with her symptoms. She has never tried medications for overactive bladder. (Patient's old records have been requested, reviewed and summarized in today's note.)    Summary of old records: N/A    History: N/A    ProceduresToday: N/A    Urinalysis today:  No results found for this visit on 01/10/22. AUA Symptom Score (1/10/2022):   INCOMPLETE EMPTYING: How often have you had the sensation of not emptying your bladder?: Not at all  FREQUENCY: How often do you have to urinate less than every two hours?: Not at all  INTERMITTENCY: How often have you found you stopped and started again several times when you urinated?: Not at all  URGENCY: How often have you found it difficult to postpone urination?: Almost always  WEAK STREAM: How often have you had a weak urinary stream?: Not at all  STRAINING: How often have you had to strain to start  urination?: Not at all  NOCTURIA: How many times did you typically get up at night to uriniate?: 2 Times  TOTAL I-PSS SCORE[de-identified] 7       Last BUN andcreatinine:  Lab Results   Component Value Date    BUN 29 (H) 10/03/2021     Lab Results   Component Value Date    CREATININE 0.98 (H) 10/03/2021       Additional Lab/Culture results: none    Reviewed during this Office Visit: none    (results were independently reviewed byphysician and radiology report verified)    PAST MEDICAL, FAMILY AND SOCIAL HISTORY:  Past Medical History:   Diagnosis Date    Abnormality of rib determined by X-ray 1/28/2016    Acute cystitis without hematuria 6/16/2018    Acute exacerbation of chronic bronchitis ([address]     Acute on chronic diastolic heart failure ([address] 1/28/2016    Adjustment disorder with mixed anxiety and depressed mood 6/26/2015    Mild     Anemia 3/5/2014    Back pain     Bronchiectasis with acute lower respiratory infection ([address] 8/16/2017    Bronchitis     Chronic bronchitis (HCC) 1/28/2016    Chronic cough 7/23/2018    COPD (chronic obstructive pulmonary disease) (HCC)     asbestos related lung disease    Degenerative joint disease (DJD) of hip 5/14/2015    Dyslipidemia 3/5/2014    Encephalopathy acute 5/29/2016    Essential hypertension 1/28/2016    Fibromyalgia     GERD (gastroesophageal reflux disease)     History of total bilateral knee replacement 5/28/2016    Hx of blood clots     left leg and lung    Hyperlipidemia     Hypertension     Incontinence of urine     Irregular heartbeat     DR. Ca Alberto Medication monitoring encounter 6/13/2018    Morbid obesity with BMI of 45.0-49.9, adult ([facility] 1/28/2016    Obstructive sleep apnea syndrome     Osteoarthritis     Primary osteoarthritis of left knee 5/27/2016    PVC (premature ventricular contraction) 1/28/2016    S/P right and left heart catheterization 10/2/2015    Sleep apnea     no machine    SOB (shortness of breath)     Spinal stenosis of lumbar region with neurogenic claudication 5/13/2013    Supplemental oxygen dependent 12/8/2017    Urine frequency     UTI (urinary tract infection)     hospitalized early july 2014 for kidney infection     Past Surgical History:   Procedure Laterality Date    BRONCHOSCOPY Left 8/16/2017    BRONCHOSCOPY ALVEOLAR LAVAGE LOWER LOBE performed by Luzmaria Belle MD at [id number]  x 3    no stents    COLONOSCOPY  4 28 14    polyps biopsies     FOOT SURGERY Left     calcium deposit removal from top of foot    HYSTERECTOMY      JOINT REPLACEMENT Bilateral 5/27/2016    bilat total knees    NERVE BLOCK  5/13/2013    caudal celestone 6mg    NERVE BLOCK  5/28/13    Caudal #2, Celestone 9mg    NERVE BLOCK  2/14/14    rt hip inj celestone 9 mg    NERVE BLOCK  07/14/2014    caudal# 3- celestone 9 mg    NERVE BLOCK  7-21-14    caudal epidural #2, decadron 7mg, fentanyl 25mcg    NERVE BLOCK  10/2/14    duramorph 1.5  decadron 5mg    NERVE BLOCK  11/9/2015    caudal# 1 celestone 9mg    NERVE BLOCK  11/16/15    caudal #2  decadron 10mg    NERVE BLOCK  11/23/15    duramorph 1mg celestone 9mg    NERVE BLOCK  03/28/2018    CAUDAL EPIDURAL STEROID BLOCK  DECADRON 10 MG     NERVE BLOCK  05/23/2018    caudal # decadron 7mg    NERVE BLOCK  08/28/2018    natalia transforminal # 1, decadron 10mg gadoteridol, LONG NEEDLES    CT PERQ VERT AGMNTJ CAVITY CRTJ UNI/BI CANNULJ LMBR N/A 10/29/2018    KYPHOPLASTY L1 performed by Remy Rivera MD at [facility] [address] Right 5/14/15    total hip replacement     Family History   Problem Relation Age of Onset    Stomach Cancer Brother         stomach    High Blood Pressure Mother     Heart Disease Sister         irregular heartbeat     Outpatient Medications Marked as Taking for the 1/10/22 encounter (Office Visit) with Bonnie Cowart MD   Medication Sig Dispense Refill    MYRBETRIQ 50 MG TB24 Take 50 mg by mouth daily 30 tablet 5    metoprolol tartrate (LOPRESSOR) 50 MG tablet TAKE 1 TABLET TWICE A  tablet 3    oxyCODONE (ROXICODONE) 5 MG immediate release tablet Take 1.5 tablets by mouth every 6 hours as needed for Pain for up to 30 days.  [address] [place] [address] tablet 0    albuterol (PROVENTIL) (5 MG/ML) 0.5% nebulizer solution Take 1 mL by nebulization every 4 hours as needed for Wheezing 120 each 3    Cyanocobalamin (VITAMIN B-12) 1000 MCG/15ML LIQD Take 1,000 mcg by mouth daily       levocetirizine (XYZAL) 5 MG tablet take 1 tablet by mouth once daily AT NIGHT 30 tablet 2    donepezil (ARICEPT) 5 MG tablet take 1 tablet by mouth at bedtime 90 tablet 1    psyllium (KONSYL) 28.3 % PACK Take 1 packet by mouth daily       Catheters (STANDARD CARE EXTERNAL CATH) MISC 1 each by Does not apply route daily 5 each 1    Catheters (STANDARD CARE EXTERNAL CATH) MISC 1 Device by Does not apply route daily 30 each 2    albuterol sulfate  (90 Base) MCG/ACT inhaler Inhale 2 puffs into the lungs 4 times daily as needed for Wheezing 1 Inhaler 2    Misc. Devices (WALKER) MISC 1 each by Does not apply route daily Upright walker with wheels and seat 1 each 0    aspirin 81 MG EC tablet Take 81 mg by mouth daily          Rosuvastatin calcium, Statins, Bactrim [sulfamethoxazole-trimethoprim], Caffeine, Dye [iodides], Food, Ioxaglate, Tomato, Dicloxacillin, and Pcn [penicillins]  Social History     Tobacco Use   Smoking Status Former Smoker    Packs/day: 1.00    Years: 18.00    Pack years: 18.00    Quit date: 1993    Years since quittin.6   Smokeless Tobacco Never Used      (If patient a smoker, smoking cessation counseling offered)   Social History     Substance and Sexual Activity   Alcohol Use No       REVIEW OF SYSTEMS:  Review of Systems    Physical Exam:    This a 67 y.o. female      Vitals:    01/10/22 1203   BP: 125/79   Pulse: 80   Resp: 15   Temp: 97.1 °F (36.2 °C)     Body mass index is 47.55 kg/m². Physical Exam  Constitutional: Patient in no acute distress, ggod grooming, appropriately dressed  Neuro: Alert and oriented to person, place and time.   Psych:Mood normal, affect normal  Skin: No rash noted  HEENT: Head: Normocephalic and atraumatic,Conjunctivae and EOM are normal,Nose- normal, Right/Left External Ear: normal, Mouth: Mucosa Moist  Neck: Supple  Lungs: Respiratory effort is normal  Cardiovascular: strong and regular, no lower leg edema  Abdomen: Soft, non-tender, non-distended with no CVA,    Lymphatics: No cervical palpable lymphadenopathy. Bladder non-tender and not distended. Musculoskeletal: Normal gait and station        Assessment and Plan      1. Urgency of urination    2. Urinary frequency    3. Nocturia    4. Body mass index (BMI) 45.0-49.9, adult ([facility]    5. Recurrent UTI            Plan:   myrbetriq  F/u 2 mo       Prescriptions Ordered:  Orders Placed This Encounter   Medications    MYRBETRIQ 50 MG TB24     Sig: Take 50 mg by mouth daily     Dispense:  30 tablet     Refill:  5      Orders Placed:  Orders Placed This Encounter   Procedures    POCT Urinalysis no Luis Orourke MD    Agree with the ROS entered by the MA.

## 2022-01-12 ENCOUNTER — CARE COORDINATION (OUTPATIENT)
Dept: CARE COORDINATION | Age: 77
End: 2022-01-12

## 2022-01-12 NOTE — CARE COORDINATION
Patient left  for ACM requesting an update on wheel chair and hospital bed order. ACM called [facility] and was told that PCP needs to update his note for necessity of equipment. Specific wording needs to be documented. Example:   Documentation for bed as follows:  Patient requires a hospital bed for positioning of body not feasible with an ordinary bed. Patient requires elevation of head more than 30 degrees due to CHF. Documentation for wheel chair:  Mobility limitation impairs ability to perform MRADL within reasonable amount of time.    Updated note and orders will need to be faxed to [phone]  ACM will route to PCP

## 2022-01-13 ENCOUNTER — CARE COORDINATION (OUTPATIENT)
Dept: CARE COORDINATION | Age: 77
End: 2022-01-13

## 2022-01-13 NOTE — CARE COORDINATION
Patient called stating that her patient assistance from the [address] was denied. They told her that the PCP did not check that she is on home oxygen. They are mailing her new forms. AC recommended to patient to put a note on form to PCP office reminding them that she is on home O2  2.5 liters continuous before turning them into office. Patient agreed, [facility] will remind Alondra VILLA at PCP office as well.

## 2022-01-17 ENCOUNTER — TELEPHONE (OUTPATIENT)
Dept: PAIN MANAGEMENT | Age: 77
End: 2022-01-17

## 2022-01-17 NOTE — TELEPHONE ENCOUNTER
[facility]. Pre procedure instructions reviewed. Patient instructed to wear a mask on entrance into the hospital, and discussed entrance. Patient stated she does have a  to take home. Patient denies any recent vaccinations. No questions or concerns.

## 2022-01-18 ENCOUNTER — HOSPITAL ENCOUNTER (OUTPATIENT)
Dept: PAIN MANAGEMENT | Age: 77
Discharge: HOME | End: 2022-01-18
Payer: MEDICARE

## 2022-01-18 ENCOUNTER — HOSPITAL ENCOUNTER (OUTPATIENT)
Dept: GENERAL RADIOLOGY | Age: 77
Discharge: HOME | End: 2022-01-20
Payer: MEDICARE

## 2022-01-18 VITALS
HEIGHT: 64 IN | RESPIRATION RATE: 20 BRPM | OXYGEN SATURATION: 98 % | DIASTOLIC BLOOD PRESSURE: 79 MMHG | BODY MASS INDEX: 47.29 KG/M2 | HEART RATE: 60 BPM | WEIGHT: 277 LBS | TEMPERATURE: 97.2 F | SYSTOLIC BLOOD PRESSURE: 128 MMHG

## 2022-01-18 DIAGNOSIS — M51.36 LUMBAR ADJACENT SEGMENT DISEASE WITH SPONDYLOLISTHESIS: ICD-10-CM

## 2022-01-18 DIAGNOSIS — Z98.890 S/P KYPHOPLASTY: ICD-10-CM

## 2022-01-18 DIAGNOSIS — M43.16 LUMBAR ADJACENT SEGMENT DISEASE WITH SPONDYLOLISTHESIS: ICD-10-CM

## 2022-01-18 DIAGNOSIS — M48.062 SPINAL STENOSIS OF LUMBAR REGION WITH NEUROGENIC CLAUDICATION: ICD-10-CM

## 2022-01-18 DIAGNOSIS — M48.02 CERVICAL SPINAL STENOSIS: ICD-10-CM

## 2022-01-18 DIAGNOSIS — M54.50 ACUTE MIDLINE LOW BACK PAIN, UNSPECIFIED WHETHER SCIATICA PRESENT: ICD-10-CM

## 2022-01-18 DIAGNOSIS — M54.16 LUMBAR RADICULOPATHY: Primary | ICD-10-CM

## 2022-01-18 DIAGNOSIS — M51.36 DDD (DEGENERATIVE DISC DISEASE), LUMBAR: ICD-10-CM

## 2022-01-18 DIAGNOSIS — M47.816 LUMBAR SPONDYLOSIS: ICD-10-CM

## 2022-01-18 DIAGNOSIS — M43.22 CERVICAL SPINE ANKYLOSIS: ICD-10-CM

## 2022-01-18 DIAGNOSIS — M79.7 FIBROMYALGIA: ICD-10-CM

## 2022-01-18 PROCEDURE — 3209999900 FLUORO FOR SURGICAL PROCEDURES

## 2022-01-18 PROCEDURE — 64493 INJ PARAVERT F JNT L/S 1 LEV: CPT

## 2022-01-18 PROCEDURE — 6360000002 HC RX W HCPCS: Performed by: PAIN MEDICINE

## 2022-01-18 PROCEDURE — 64494 INJ PARAVERT F JNT L/S 2 LEV: CPT

## 2022-01-18 PROCEDURE — 64495 INJ PARAVERT F JNT L/S 3 LEV: CPT

## 2022-01-18 PROCEDURE — 64495 INJ PARAVERT F JNT L/S 3 LEV: CPT | Performed by: PAIN MEDICINE

## 2022-01-18 PROCEDURE — 64493 INJ PARAVERT F JNT L/S 1 LEV: CPT | Performed by: PAIN MEDICINE

## 2022-01-18 PROCEDURE — 64494 INJ PARAVERT F JNT L/S 2 LEV: CPT | Performed by: PAIN MEDICINE

## 2022-01-18 PROCEDURE — 2500000003 HC RX 250 WO HCPCS: Performed by: PAIN MEDICINE

## 2022-01-18 RX ORDER — MIDAZOLAM HYDROCHLORIDE 1 MG/ML
INJECTION INTRAMUSCULAR; INTRAVENOUS
Status: COMPLETED | OUTPATIENT
Start: 2022-01-18 | End: 2022-01-18

## 2022-01-18 RX ORDER — BUPIVACAINE HYDROCHLORIDE 5 MG/ML
INJECTION, SOLUTION EPIDURAL; INTRACAUDAL
Status: COMPLETED | OUTPATIENT
Start: 2022-01-18 | End: 2022-01-18

## 2022-01-18 RX ORDER — GABAPENTIN 100 MG/1
200 CAPSULE ORAL 3 TIMES DAILY
Qty: 90 CAPSULE | Refills: 1 | Status: SHIPPED | OUTPATIENT
Start: 2022-01-18 | End: 2022-02-21 | Stop reason: SDUPTHER

## 2022-01-18 RX ORDER — TRIAMCINOLONE ACETONIDE 40 MG/ML
INJECTION, SUSPENSION INTRA-ARTICULAR; INTRAMUSCULAR
Status: COMPLETED | OUTPATIENT
Start: 2022-01-18 | End: 2022-01-18

## 2022-01-18 RX ORDER — OXYCODONE AND ACETAMINOPHEN 7.5; 325 MG/1; MG/1
1 TABLET ORAL EVERY 6 HOURS PRN
Qty: 120 TABLET | Refills: 0 | Status: SHIPPED | OUTPATIENT
Start: 2022-01-18 | End: 2022-02-21 | Stop reason: SDUPTHER

## 2022-01-18 RX ADMIN — TRIAMCINOLONE ACETONIDE 80 MG: 40 INJECTION, SUSPENSION INTRA-ARTICULAR; INTRAMUSCULAR at 12:09

## 2022-01-18 RX ADMIN — BUPIVACAINE HYDROCHLORIDE 25 ML: 5 INJECTION, SOLUTION EPIDURAL; INTRACAUDAL; PERINEURAL at 12:09

## 2022-01-18 RX ADMIN — MIDAZOLAM 1 MG: 1 INJECTION INTRAMUSCULAR; INTRAVENOUS at 12:00

## 2022-01-18 RX ADMIN — MIDAZOLAM 1 MG: 1 INJECTION INTRAMUSCULAR; INTRAVENOUS at 11:50

## 2022-01-18 ASSESSMENT — PAIN DESCRIPTION - ONSET: ONSET: ON-GOING

## 2022-01-18 ASSESSMENT — PAIN DESCRIPTION - DESCRIPTORS: DESCRIPTORS: ACHING

## 2022-01-18 ASSESSMENT — PAIN DESCRIPTION - LOCATION: LOCATION: BACK

## 2022-01-18 ASSESSMENT — PAIN - FUNCTIONAL ASSESSMENT: PAIN_FUNCTIONAL_ASSESSMENT: PREVENTS OR INTERFERES SOME ACTIVE ACTIVITIES AND ADLS

## 2022-01-18 ASSESSMENT — PAIN SCALES - GENERAL: PAINLEVEL_OUTOF10: 3

## 2022-01-18 ASSESSMENT — PAIN DESCRIPTION - ORIENTATION: ORIENTATION: RIGHT;LEFT

## 2022-01-18 ASSESSMENT — PAIN DESCRIPTION - PROGRESSION: CLINICAL_PROGRESSION: GRADUALLY WORSENING

## 2022-01-18 ASSESSMENT — PAIN DESCRIPTION - FREQUENCY: FREQUENCY: CONTINUOUS

## 2022-01-18 ASSESSMENT — PAIN DESCRIPTION - PAIN TYPE: TYPE: CHRONIC PAIN

## 2022-01-18 ASSESSMENT — PAIN DESCRIPTION - DIRECTION: RADIATING_TOWARDS: LEFT HIP AND LEFT LEG

## 2022-01-18 NOTE — PROGRESS NOTES
Discharge criteria met. Post procedure dressing dry and intact. Sensory and motor function intact as per pre-procedure. Patient alert and oriented x3  Instructions and follow up reviewed with pt at patient at discharge. Discharged home with son. Taken to car via wheel chair per son.   Discharged @  1151

## 2022-01-18 NOTE — PROCEDURES
Pre-Procedure Note    Patient Name: Deryl Harada   YOB: 1945  Room/Bed: Room/bed info not found  Medical Record Number: [id number]  Date: 1/18/2022       Indication:    1. Lumbar radiculopathy    2. DDD (degenerative disc disease), lumbar    3. Lumbar spondylosis    4. Lumbar adjacent segment disease with spondylolisthesis        Consent: On file. Vital Signs:   Vitals:    01/18/22 1205   BP: 139/73   Pulse: 56   Resp: 14   Temp:    SpO2: 96%       Past Medical History:   has a past medical history of Abnormality of rib determined by X-ray, Acute cystitis without hematuria, Acute exacerbation of chronic bronchitis (HCC), Acute on chronic diastolic heart failure ([address] Adjustment disorder with mixed anxiety and depressed mood, Anemia, Back pain, Bronchiectasis with acute lower respiratory infection ([address] Bronchitis, Chronic bronchitis ([address] Chronic cough, COPD (chronic obstructive pulmonary disease) ([address] Degenerative joint disease (DJD) of hip, Dyslipidemia, Encephalopathy acute, Essential hypertension, Fibromyalgia, GERD (gastroesophageal reflux disease), History of total bilateral knee replacement, Hx of blood clots, Hyperlipidemia, Hypertension, Incontinence of urine, Irregular heartbeat, Medication monitoring encounter, Morbid obesity with BMI of 45.0-49.9, adult ([address] Obstructive sleep apnea syndrome, Osteoarthritis, Primary osteoarthritis of left knee, PVC (premature ventricular contraction), S/P right and left heart catheterization, Sleep apnea, SOB (shortness of breath), Spinal stenosis of lumbar region with neurogenic claudication, Supplemental oxygen dependent, Urine frequency, and UTI (urinary tract infection). Past Surgical History:   has a past surgical history that includes Hysterectomy; Nerve Block (5/13/2013); Nerve Block (5/28/13); sinus surgery; Foot surgery (Left); Nerve Block (2/14/14); Colonoscopy (4 28 14); Nerve Block (07/14/2014); Nerve Block (7-21-14);  Nerve Block (10/2/14); Nerve Block (11/9/2015); Nerve Block (11/16/15); Nerve Block (11/23/15); Total hip arthroplasty (Right, 5/14/15); bronchoscopy (Left, 8/16/2017); Cardiac catheterization (x 3); Nerve Block (03/28/2018); Nerve Block (05/23/2018); Nerve Block (08/28/2018); pr perq vert agmntj cavity crtj uni/bi cannulj lmbr (N/A, 10/29/2018); and joint replacement (Bilateral, 5/27/2016). Pre-Sedation Documentation and Exam:   Vital signs have been reviewed (see flow sheet for vitals). Mallampati Airway Assessment:  Mallampati Class III - (soft palate & base of uvula are visible)    ASA Classification:  Class 3 - A patient with severe systemic disease that limits activity but is not incapacitating    Sedation/ Anesthesia Plan:   intravenous sedation   as needed    Medications Planned:   midazolam (Versed) / Fentanyl  Intravenously  as needed. Patient is an appropriate candidate for plan of sedation: yes  Patient's History and Physical examination was reviewed and there is no change. Electronically signed by Nicholes Sandhoff, MD on 1/18/2022 at 12:15 PM        Preoperative Diagnosis:    1. Lumbar radiculopathy    2. DDD (degenerative disc disease), lumbar    3. Lumbar spondylosis    4. Lumbar adjacent segment disease with spondylolisthesis        Postoperative Diagnosis:   1. Lumbar radiculopathy    2. DDD (degenerative disc disease), lumbar    3. Lumbar spondylosis    4. Lumbar adjacent segment disease with spondylolisthesis        Procedure Performed[de-identified]  Lumbar facet joint injections at the levels of  L1-L2, L2-L3, L3-L4, L4-L5, L5-S1 on the Left side with fluoroscopy guidance, with IV sedation. Indication for the Procedure: The patient had history of chronic low back pain which is not responding well to the conservative treatment. The patient's pain is mostly axial in nature. Pain is interfering with the activities of daily living.   Physical examination revealed facet tenderness and facet loading is positive. We decided to try lumbar facet joint injection for diagnostic as well as for therapeutic purposes. The procedure and its risks were discussed with the patient and an informed consent was obtained. .Current Pain Assessment  Pain Assessment  Pain Assessment: 0-10  Pain Level: 3  Patient's Stated Pain Goal: 2  Pain Type: Chronic pain  Pain Location: Back  Pain Orientation: Right,Left  Pain Radiating Towards: left hip and left leg  Pain Descriptors: Aching  Pain Frequency: Continuous  Pain Onset: On-going  Clinical Progression: Gradually worsening  Functional Pain Assessment: Prevents or interferes some active activities and ADLs  Non-Pharmaceutical Pain Intervention(s): Rest  RASS Score: Alert and calm   Procedure:   After starting IV patient was sedated with 2 mg of Midazolam and 0 mcg of Fentanyl intravenously by the RN under my direct supervision. Patient's vital signs including BP, EKG and SaO2 were monitored by RN and they remained stable during the procedure. A meaningful communication was kept up with the patient throughout   the procedure. The patient is placed in prone position. Skin over the back was prepped and draped in sterile manner. Under fluoroscopy the facet joints were identified and were palpated, and the following joints were found to be tender:  , L1-L2, L2-L3, L3-L4, L4-L5, L5-S1 on the Left side. Hence we decided to inject these joints in the following way:  Using fluoroscopy the facet joints were identified and by adjusting the angle of the fluoroscopy, the view of the joint space was optimized. The skin and deep tissues over the joint space were anesthetized with 1 ml of 0.5% Marcaine. The #22-gauge, 3-1/2 inch spinal needle was introduced through the skin wheal under fluoroscopoc guidance such that the tip of the needle lies in the joint space.    This was confirmed by injecting about 0.5 ml of Omnipaque-180 through the needle and observing the spread of the contrast

## 2022-01-19 ENCOUNTER — TELEPHONE (OUTPATIENT)
Dept: PAIN MANAGEMENT | Age: 77
End: 2022-01-19

## 2022-01-19 NOTE — TELEPHONE ENCOUNTER
Post procedure call made. Patient stated had a little headache with no postural position changes. Writer informed patient that is a normal side effect from steroid. Patient understood. No other questions or concerns at this moment.

## 2022-01-20 ENCOUNTER — CARE COORDINATION (OUTPATIENT)
Dept: CARE COORDINATION | Age: 77
End: 2022-01-20

## 2022-01-20 NOTE — CARE COORDINATION
Patient contacted [facility] to ask what she needed to do with the new rickie form. ACM explained that it will just need to be dropped off to the PCP office. Patient also stated that she received a call from a [phone] [employer] number but when she answered no one spoke. ACM called the number back, no answer, no documentation in patients chart of a missed call. Patient wants to postpone office visit for bed and wheel chair until after she sees if the injection she received from pain management worked.    ACM will follow up with patient in 1-2 weeks

## 2022-01-21 ENCOUNTER — TELEPHONE (OUTPATIENT)
Dept: UROLOGY | Age: 77
End: 2022-01-21

## 2022-01-21 NOTE — TELEPHONE ENCOUNTER
Patient called in and stated \" gave me samples of myrbetriq he told me in advance that It could elevate the BP which it has done. Is there any other medication I can take? \"    Patient was told to stop taking the medication.     Will talk with [facility] Parents

## 2022-01-27 ENCOUNTER — HOSPITAL ENCOUNTER (OUTPATIENT)
Dept: GENERAL RADIOLOGY | Age: 77
Discharge: HOME | End: 2022-01-29
Payer: MEDICARE

## 2022-01-27 ENCOUNTER — HOSPITAL ENCOUNTER (OUTPATIENT)
Age: 77
Discharge: HOME | End: 2022-01-29
Payer: MEDICARE

## 2022-01-27 DIAGNOSIS — Z77.090 ASBESTOS EXPOSURE: ICD-10-CM

## 2022-01-27 PROCEDURE — 71046 X-RAY EXAM CHEST 2 VIEWS: CPT

## 2022-02-02 ENCOUNTER — CARE COORDINATION (OUTPATIENT)
Dept: CARE COORDINATION | Age: 77
End: 2022-02-02

## 2022-02-02 SDOH — ECONOMIC STABILITY: FOOD INSECURITY: WITHIN THE PAST 12 MONTHS, YOU WORRIED THAT YOUR FOOD WOULD RUN OUT BEFORE YOU GOT MONEY TO BUY MORE.: NEVER TRUE

## 2022-02-02 SDOH — ECONOMIC STABILITY: FOOD INSECURITY: WITHIN THE PAST 12 MONTHS, THE FOOD YOU BOUGHT JUST DIDN'T LAST AND YOU DIDN'T HAVE MONEY TO GET MORE.: NEVER TRUE

## 2022-02-02 ASSESSMENT — SOCIAL DETERMINANTS OF HEALTH (SDOH): HOW HARD IS IT FOR YOU TO PAY FOR THE VERY BASICS LIKE FOOD, HOUSING, MEDICAL CARE, AND HEATING?: NOT VERY HARD

## 2022-02-11 ENCOUNTER — CARE COORDINATION (OUTPATIENT)
Dept: CARE COORDINATION | Age: 77
End: 2022-02-11

## 2022-02-11 NOTE — CARE COORDINATION
Ambulatory Care Coordination Note  2/11/2022  CM Risk Score: 10  Charlson 10 Year Mortality Risk Score: 79%     ACC: Bailee Mejias, RN    Summary Note: Spoke with patient who stated that she is doing well. She mentioned wanting to get some information on assisted living for the future. She is not ready to commit yet but wants to be prepared or to be put on a waiting list if they have one. She agreed to Referral to [facility] and Isacc Conde [facility]. ACM will send referral and follow up with patient in 1-2 weeks. Patient denied any additional needs today. Care Coordination Interventions    Program Enrollment: Complex Care  Referral from Primary Care Provider: No  Suggested Interventions and [place]: In Process  Zone Management Tools: In Process         Goals Addressed                 This Visit's Progress    [facility] Goal   On track     I will work with ACM and home care to establish more help. .    Barriers: overwhelmed by complexity of regimen and stress  Plan for overcoming my barriers: work with ACM  Confidence: 6/10  Anticipated Goal Completion Date: 12-21-21            Prior to Admission medications    Medication Sig Start Date End Date Taking? Authorizing Provider   gabapentin (NEURONTIN) 100 MG capsule Take 2 capsules by mouth 3 times daily for 30 days. 1/18/22 2/17/22  Elsa Ascencio MD   oxyCODONE-acetaminophen (PERCOCET) 7.5-325 MG per tablet Take 1 tablet by mouth every 6 hours as needed for Pain for up to 30 days.  1/18/22 2/17/22  Elsa Ascencio MD   Catheters (STANDARD CARE EXTERNAL CATH) MISC 1 Device by Does not apply route daily 1/10/22   Shahida Rossi MD   MYRBETRIQ 50 MG TB24 Take 50 mg by mouth daily 1/10/22   Kate Henderson MD   metoprolol tartrate (LOPRESSOR) 50 MG tablet TAKE 1 TABLET TWICE A DAY 12/22/21   Shahida Rossi MD   albuterol (PROVENTIL) (5 MG/ML) 0.5% nebulizer solution Take 1 mL by nebulization every 4 hours as needed for Wheezing 11/12/21   Scott Licona MD   DULoxetine (CYMBALTA) 30 MG extended release capsule Take 1 capsule by mouth 2 times daily for 3 days 10/5/21 1/18/22  Scott Licona MD   Cyanocobalamin (VITAMIN B-12) 1000 MCG/15ML LIQD Take 1,000 mcg by mouth daily     Historical Provider, MD   levocetirizine (XYZAL) 5 MG tablet take 1 tablet by mouth once daily AT NIGHT 9/8/21   Scott Licona MD   donepezil (ARICEPT) 5 MG tablet take 1 tablet by mouth at bedtime 9/1/21   Scott Licona MD   psyllium (KONSYL) 28.3 % PACK Take 1 packet by mouth daily     Historical Provider, MD   Catheters (STANDARD CARE EXTERNAL CATH) MISC 1 each by Does not apply route daily 3/2/21   Anny Lopez MD   albuterol sulfate  (90 Base) MCG/ACT inhaler Inhale 2 puffs into the lungs 4 times daily as needed for Wheezing 4/20/20   Scott Licona MD   Misc.  Devices [facility]) MISC 1 each by Does not apply route daily Upright walker with wheels and seat 11/12/18   Mitul Aceves MD   aspirin 81 MG EC tablet Take 81 mg by mouth daily     Historical Provider, MD       Future Appointments   Date Time Provider Mayo Betts   2/15/2022  1:00 PM Harsihl Arnold, APRN - [id number]   3/9/2022  2:00 PM Scott Licona[address]   3/14/2022 11:50 AM Paty Randhawa MD [facility]     ,   Congestive Heart Failure Assessment    Are you currently restricting fluids?: Other  Do you understand a low sodium diet?: Yes  Do you understand how to read food labels?: Yes  Do you salt your food before tasting it?: No     No patient-reported symptoms      Symptoms:     Symptom course: stable  Weight trend: stable  Salt intake watch compared to last visit: stable     ,   COPD Assessment    Does the patient understand envrionmental exposure?: Yes  Is the patient able to verbalize Rescue vs. Long Acting medications?: Yes  Does the patient have a nebulizer?: No     No patient-reported symptoms Symptoms:         and   General Assessment    Do you have any symptoms that are causing concern?: Yes  Progression since Onset: Intermittent - Waxing/Waning  Reported Symptoms: Pain

## 2022-02-15 ENCOUNTER — CARE COORDINATION (OUTPATIENT)
Dept: CARE COORDINATION | Age: 77
End: 2022-02-15

## 2022-02-15 NOTE — CARE COORDINATION
Patient was referred to  by Arvid Minors seeking assistance for the patient finding Assisted Living facilities that she might be able to get information and get on their waiting list for future plans. HC contacted the patient and realized that she had serviced her previously. HC   made introductions with the patient and explained reason for calling. Patient   Informed the [facility] that she is in a lease for approximately another year and would like to prepare herself for when her lease is up. Patient realizes that her  Health is declining, yet remains hopeful that she may rebound and can live independently. Patient states that she wants to prepare in advance, and would like to receive information as well as be placed on a waiting list with three (3) Assisted Living Facilities, [facility], [facility], and [facility]. HC will contact the facilities to make an inquiry and request that information is mailed out to the patient, along with placing her on their waiting list if possible.       Plan of Care  [facility] will contact the three Assisted Living Facilities  [facility]. will get back in touch with patient   [facility] will complete patients [facility] and [facility] for documentation

## 2022-02-16 ENCOUNTER — CARE COORDINATION (OUTPATIENT)
Dept: CARE COORDINATION | Age: 77
End: 2022-02-16

## 2022-02-16 NOTE — CARE COORDINATION
HC contacted Skyler Emmanuel for patient regarding Assisted Living. A packet will be mailed out to the patients home. [facility] was contacted as well and will mail a packet to the patient. The representative stated that there is such thing as a medical waiver that can get the individual freed from her lease. Representative from [employer] will contact patient and was encouraged not to apply pressure to the patient, as she is planning for a year from now. Representative stated an understanding of patient's position. HC also contacted and left a message for [address] [place] [address] and left a message, requesting that a packet is mailed to the patient. HC left her contact information in the event that some contact needs to be made. HC left a message for the patient as well, informing her that the [facility], [facility]. will follow up with  her next week.     Plan of Care  [facility]. will follow up with information next week

## 2022-02-17 NOTE — PROGRESS NOTES
Rick 88 PROGRESS NOTE      Patient  completed []  video visit   []   phone call:         Minutes :   [] in person visit to pain clinic    []    to  review Medication Agreement    []  Follow up after procedure   []  Discuss treatment options      Location:  Provider:  working from    []    home    []   [facility] - JAUN CHIN ,   patient at   [] pain clinic     []  home         Chief Complaint: low back pain      She had x-ray of thoracic and lumbar spine   2- which read as :\" multilevel  Disc degeneration through out the lumbar spine\" In 2018, she had kyphoplasty L1 vertebra. She had Lumbar facet joint injections at the levels of  L1-L2, L2-L3, L3-L4, L4-L5, L5-S1 on the Left side  on 1-18-22. She had lumbar epidural injection L5, S1 3/25/2012 with 40            Treatment goals:  Functional status:       Aberrancy:   Any alcoholic beverages            Any illegal drugs         Analgesia: Adverse  Effects :      ADL;s :      Data:    When was thelast UDS:   12-10-20         Was the UDS appropriate:  [] yes []   no      Record/Diagnostics Review:      As above, I did review the imaging     DRUG SCREEN, PAIN  Order: [id number]   Status: Final result     Visible to patient: Yes (not seen)     Next appt: 02/18/2022 at 11:00 AM in Pain Management KARIN Smith - CNP)     Dx: Medication monitoring encounter; Spin. ..     0 Result Notes    Component Ref Range & Units 12/10/20 1232 8/27/19 1445 5/16/18 1604 4/13/16 1624 8/12/15 1430 9/11/14 1555 9/11/14 1555   Pain Management Drug Panel Interp, Urine  Consistent ARUP  Consistent CMARUP   Consistent CM  Consistent CM      Comment: (NOTE)   ________________________________________________________________   DRUGS EXPECTED:   OXYCODONE [TODAY]   ________________________________________________________________   CONSISTENT with medications provided:   OXYCODONE : based on oxycodone, noroxycodone, noroxymorphone ________________________________________________________________   INTERPRETIVE INFORMATION: Targeted drug profile Interp   Interpretation depends on accuracy and completeness of patient   medication information submitted by client. 6-Acetylmorphine, Ur  Not Detected ARUP  Not Detected ARUP  Not Detected               EXAMINATION:   3 XRAY VIEWS OF THE LUMBAR SPINE; THREE XRAY VIEWS OF THE THORACIC SPINE       2/11/2020 1:09 pm       COMPARISON:   CT lumbar spine from 09/28/2018       HISTORY:   ORDERING SYSTEM PROVIDED HISTORY: Osteopenia of lumbar spine   TECHNOLOGIST PROVIDED HISTORY:   Reason for Exam: SEVERE BACK PAIN UPPER AND LOWER, FALL IN DECEMBER 2019   Acuity: Unknown   Type of Exam: Ongoing       FINDINGS:   Thoracic spine: Demineralized skeleton and multifocal pulmonary opacities   somewhat limits the evaluation of the thoracic spine.  Within this limit,   multilevel degenerative changes of thoracic spine.  Intact pedicles on   frontal view.  Vertebral body heights appear maintained.       Lumbar spine: L1 compression fracture with vertebral body augmentation   cement.  No new compression fracture.  Minimal anterolisthesis of L4 on L5   measuring 0.6 cm.  Disc degeneration with endplate osteophyte formation. Facet arthropathy diffusely.       Possible abdominal aortic aneurysm on radiography measuring 3.0 cm AP. Correlation with prior lumbar spine CT.           Impression   Multilevel disc degeneration throughout the lumbar spine without evidence of   acute abnormality.  If concern for acute fracture exists, CT versus MRI   should be considered.       Probable abdominal aortic aneurysm.  Nonemergent CT or ultrasound correlation   should be considered.               MR lumbar without contrast      Narrative    MR LUMBAR SPINE WO CONT     CLINICAL INFORMATION: Bilateral lower back pain and numbness in the the lower extremities for 5 years. No known injury. History of fibromyalgia.      COMPARISON: 6/28/2005     PROCEDURE: Routine lumbosacral spine protocol MRI was obtained without contrast material. Multisequence, multiplanar imaging was obtained. FINDINGS:     Modic type II changes at L2-L3.  Diffuse osteophytic spurring within the lumbar spine. 7 mm anterolisthesis of L4 on L5.  4 mm retrolisthesis of L5 on S1. There is compression deformity and loss of signal within the L1 vertebral body likely relating to prior kyphoplasty. Loss of disc space height at L2-L3, L3-L4, L4-L5, and L5-S1. No suspicious bone marrow signal.     The visualized cord is within normal limits.  Normal appearance of the cauda equina nerve roots.       The conus medullaris terminates at L1-L2. Paravertebral and visualized intraabdominal structures are unremarkable. Level by level:     L1-L2: Small left lateral zone disc protrusion.  No significant neuroforaminal narrowing or central canal stenosis. L2-L3: Broad-based posterior disc bulge extending from the right subarticular zone through the left extraforaminal zone.  There is accompanying endplate osteophyte formation bilaterally, right greater than left.  Significant facet hypertrophic changes.  Resulting moderate thecal sac narrowing and   moderate left and mild right neuroforaminal narrowing. L3-L4: Broad-based posterior disc bulge extending from the right to left subarticular zone.  There is significant facet arthrosis causing moderate thecal sac narrowing and mild left and right neural foraminal narrowing. L4-L5: Broad-based posterior disc bulge extending from the right to left neuroforaminal zone with advanced facet arthrosis causing moderate to severe thecal sac narrowing and severe bilateral neural foraminal narrowing.  There is mass effect on the exiting L4 nerve roots bilaterally.      L5-S1: There is a circumferential disc bulge with mild facet arthrosis causing mild thecal sac narrowing and moderate to severe bilateral neural foraminal narrowing.  There is mild mass effect on the exiting L5 nerve roots bilaterally. IMPRESSION:     *  Severe multilevel degenerative changes of the lumbar spine as mentioned in the body of the report.  Mass effect is noted on the exiting L4 nerve roots and L5 nerve roots at L4-L5 and L5-S1 respectively. *  7 mm anterolisthesis of L4 on L5. *  Remote compression deformity with likely kyphoplasty material at the L1 vertebral body. Approved by Resident Tamika Pace DO  on 9/7/2021 8:12 AM     Melia BAEZA have personally reviewed the image(s) and agree with and/or edited the report     Workstation:[id number]     Finalized by Melia Evangelista on 9/7/2021 2:56 PM    Other Result Text    Kylah Odom MD - 09/07/2021   Formatting of this note might be different from the original.   MR LUMBAR SPINE WO CONT     CLINICAL INFORMATION: Bilateral lower back pain and numbness in the the lower extremities for 5 years. No known injury. History of fibromyalgia. COMPARISON: 6/28/2005     PROCEDURE: Routine lumbosacral spine protocol MRI was obtained without contrast material. Multisequence, multiplanar imaging was obtained. FINDINGS:     Modic type II changes at L2-L3.  Diffuse osteophytic spurring within the lumbar spine. 7 mm anterolisthesis of L4 on L5.  4 mm retrolisthesis of L5 on S1. There is compression deformity and loss of signal within the L1 vertebral body likely relating to prior kyphoplasty. Loss of disc space height at L2-L3, L3-L4, L4-L5, and L5-S1. No suspicious bone marrow signal.     The visualized cord is within normal limits.  Normal appearance of the cauda equina nerve roots.       The conus medullaris terminates at L1-L2. Paravertebral and visualized intraabdominal structures are unremarkable. Level by level:     L1-L2: Small left lateral zone disc protrusion.  No significant neuroforaminal narrowing or central canal stenosis.      L2-L3: Broad-based posterior disc bulge extending from the right subarticular zone through the left extraforaminal zone.  There is accompanying endplate osteophyte formation bilaterally, right greater than left.  Significant facet hypertrophic changes.  Resulting moderate thecal sac narrowing and   moderate left and mild right neuroforaminal narrowing. L3-L4: Broad-based posterior disc bulge extending from the right to left subarticular zone.  There is significant facet arthrosis causing moderate thecal sac narrowing and mild left and right neural foraminal narrowing. L4-L5: Broad-based posterior disc bulge extending from the right to left neuroforaminal zone with advanced facet arthrosis causing moderate to severe thecal sac narrowing and severe bilateral neural foraminal narrowing.  There is mass effect on the exiting L4 nerve roots bilaterally. L5-S1: There is a circumferential disc bulge with mild facet arthrosis causing mild thecal sac narrowing and moderate to severe bilateral neural foraminal narrowing.  There is mild mass effect on the exiting L5 nerve roots bilaterally. IMPRESSION:     *  Severe multilevel degenerative changes of the lumbar spine as mentioned in the body of the report.  Mass effect is noted on the exiting L4 nerve roots and L5 nerve roots at L4-L5 and L5-S1 respectively. *  7 mm anterolisthesis of L4 on L5. *  Remote compression deformity with likely kyphoplasty material at the L1 vertebral body.      Approved by Resident Efraín Yoon DO  on 9/7/2021 8:12 AM     Marissa BAEZA have personally reviewed the image(s) and agree with and/or edited the report     Workstation:[id number]     Finalized by Marissa Smith on 9/7/2021 2:56 PM  Specimen Collected:    Date: 09/07/21   Received From: [facility]                     Pill count: appropriate    fill date : 2-17-22    Morphine equivalent dose as reported on OARRS:45     Review ofOARRS does not show any aberrant prescription behavior. Medication is helping the patient stay active. Patient denies any side effects and reports adequate analgesia. No sign of misuse/abuse. Past Medical History:   Diagnosis Date    Abnormality of rib determined by X-ray 1/28/2016    Acute cystitis without hematuria 6/16/2018    Acute exacerbation of chronic bronchitis (HCC)     Acute on chronic diastolic heart failure ([facility] 1/28/2016    Adjustment disorder with mixed anxiety and depressed mood 6/26/2015    Mild     Anemia 3/5/2014    Back pain     Bronchiectasis with acute lower respiratory infection ([facility] 8/16/2017    Bronchitis     Chronic bronchitis (HCC) 1/28/2016    Chronic cough 7/23/2018    COPD (chronic obstructive pulmonary disease) ([facility]     asbestos related lung disease    Degenerative joint disease (DJD) of hip 5/14/2015    Dyslipidemia 3/5/2014    Encephalopathy acute 5/29/2016    Essential hypertension 1/28/2016    Fibromyalgia     GERD (gastroesophageal reflux disease)     History of total bilateral knee replacement 5/28/2016    Hx of blood clots     left leg and lung    Hyperlipidemia     Hypertension     Incontinence of urine     Irregular heartbeat     DR. Mitch Green Medication monitoring encounter 6/13/2018    Morbid obesity with BMI of 45.0-49.9, adult ([facility] 1/28/2016    Obstructive sleep apnea syndrome     Osteoarthritis     Primary osteoarthritis of left knee 5/27/2016    PVC (premature ventricular contraction) 1/28/2016    S/P right and left heart catheterization 10/2/2015    Sleep apnea     no machine    SOB (shortness of breath)     Spinal stenosis of lumbar region with neurogenic claudication 5/13/2013    Supplemental oxygen dependent 12/8/2017    Urine frequency     UTI (urinary tract infection)     hospitalized early july 2014 for kidney infection       Past Surgical History:   Procedure Laterality Date    BRONCHOSCOPY Left 8/16/2017    BRONCHOSCOPY ALVEOLAR LAVAGE LOWER LOBE performed by Ricky Ramirez MD at [address]  x 3    no stents    COLONOSCOPY  4 28 14    polyps biopsies     FOOT SURGERY Left     calcium deposit removal from top of foot    HYSTERECTOMY      JOINT REPLACEMENT Bilateral 5/27/2016    bilat total knees    NERVE BLOCK  5/13/2013    caudal celestone 6mg    NERVE BLOCK  5/28/13    Caudal #2, Celestone 9mg    NERVE BLOCK  2/14/14    rt hip inj celestone 9 mg    NERVE BLOCK  07/14/2014    caudal# 3- celestone 9 mg    NERVE BLOCK  7-21-14    caudal epidural #2, decadron 7mg, fentanyl 25mcg    NERVE BLOCK  10/2/14    duramorph 1.5  decadron 5mg    NERVE BLOCK  11/9/2015    caudal# 1 celestone 9mg    NERVE BLOCK  11/16/15    caudal #2  decadron 10mg    NERVE BLOCK  11/23/15    duramorph 1mg celestone 9mg    NERVE BLOCK  03/28/2018    CAUDAL EPIDURAL STEROID BLOCK  DECADRON 10 MG     NERVE BLOCK  05/23/2018    caudal # decadron 7mg    NERVE BLOCK  08/28/2018    natalia transforminal # 1, decadron 10mg gadoteridol, LONG NEEDLES    WV PERQ VERT AGMNTJ CAVITY CRTJ UNI/BI CANNULJ LMBR N/A 10/29/2018    KYPHOPLASTY L1 performed by Nicholes Sandhoff, MD at [address] Right 5/14/15    total hip replacement       Allergies   Allergen Reactions    Rosuvastatin Calcium Anaphylaxis     Hair fell out    Statins Anaphylaxis     Hair fell out    Bactrim [Sulfamethoxazole-Trimethoprim] Diarrhea    Caffeine Other (See Comments)     pain  pain    Dye [Iodides] Other (See Comments)     Iv dye= blood clots in arm    Food      Tomatoes, pain    Ioxaglate Other (See Comments)     Iv dye= blood clots in arm    Tomato     Dicloxacillin Rash    Pcn [Penicillins] Rash         Current Outpatient Medications:     gabapentin (NEURONTIN) 100 MG capsule, Take 2 capsules by mouth 3 times daily for 30 days. , Disp: 90 capsule, Rfl: 1    oxyCODONE-acetaminophen (PERCOCET) 7.5-325 MG per tablet, Take 1 tablet by mouth every 6 hours as needed for Pain for up to 30 days. , Disp: 120 tablet, Rfl: 0    Catheters (STANDARD CARE EXTERNAL CATH) MISC, 1 Device by Does not apply route daily, Disp: 30 each, Rfl: 2    MYRBETRIQ 50 MG TB24, Take 50 mg by mouth daily, Disp: 30 tablet, Rfl: 5    metoprolol tartrate (LOPRESSOR) 50 MG tablet, TAKE 1 TABLET TWICE A DAY, Disp: 180 tablet, Rfl: 3    albuterol (PROVENTIL) (5 MG/ML) 0.5% nebulizer solution, Take 1 mL by nebulization every 4 hours as needed for Wheezing, Disp: 120 each, Rfl: 3    DULoxetine (CYMBALTA) 30 MG extended release capsule, Take 1 capsule by mouth 2 times daily for 3 days, Disp: 6 capsule, Rfl: 0    Cyanocobalamin (VITAMIN B-12) 1000 MCG/15ML LIQD, Take 1,000 mcg by mouth daily , Disp: , Rfl:     levocetirizine (XYZAL) 5 MG tablet, take 1 tablet by mouth once daily AT NIGHT, Disp: 30 tablet, Rfl: 2    donepezil (ARICEPT) 5 MG tablet, take 1 tablet by mouth at bedtime, Disp: 90 tablet, Rfl: 1    psyllium (KONSYL) 28.3 % PACK, Take 1 packet by mouth daily , Disp: , Rfl:     Catheters (STANDARD CARE EXTERNAL CATH) MISC, 1 each by Does not apply route daily, Disp: 5 each, Rfl: 1    albuterol sulfate  (90 Base) MCG/ACT inhaler, Inhale 2 puffs into the lungs 4 times daily as needed for Wheezing, Disp: 1 Inhaler, Rfl: 2    Misc.  Devices (WALKER) MISC, 1 each by Does not apply route daily Upright walker with wheels and seat, Disp: 1 each, Rfl: 0    aspirin 81 MG EC tablet, Take 81 mg by mouth daily , Disp: , Rfl:     Family History   Problem Relation Age of Onset    Stomach Cancer Brother         stomach    High Blood Pressure Mother     Heart Disease Sister         irregular heartbeat       Social History     Socioeconomic History    Marital status:      Spouse name: Not on file    Number of children: 2    Years of education: Not on file    Highest education level: Not on file   Occupational History    Not on file   Tobacco Use  Smoking status: Former Smoker     Packs/day: 1.00     Years: 18.00     Pack years: 18.00     Quit date: 1993     Years since quittin.7    Smokeless tobacco: Never Used   Vaping Use    Vaping Use: Never used   Substance and Sexual Activity    Alcohol use: No    Drug use: No    Sexual activity: Not Currently   Other Topics Concern    Not on file   Social History Narrative    Not on file     Social Determinants of Health     Financial Resource Strain: Low Risk     Difficulty of Paying Living Expenses: Not very hard   Food Insecurity: No Food Insecurity    Worried About Running Out of Food in the Last Year: Never true    Bobby of Food in the Last Year: Never true   Transportation Needs: Unmet Transportation Needs    Lack of Transportation (Medical): Yes    Lack of Transportation (Non-Medical): Yes   Physical Activity: Insufficiently Active    Days of Exercise per Week: 3 days    Minutes of Exercise per Session: 20 min   Stress:     Feeling of Stress : Not on file   Social Connections:     Frequency of Communication with Friends and Family: Not on file    Frequency of Social Gatherings with Friends and Family: Not on file    Attends Congregational Services: Not on file    Active Member of Clubs or Organizations: Not on file    Attends Club or Organization Meetings: Not on file    Marital Status: Not on file   Intimate Partner Violence:     Fear of Current or Ex-Partner: Not on file    Emotionally Abused: Not on file    Physically Abused: Not on file    Sexually Abused: Not on file   Housing Stability: [address] Unable to Pay for Housing in the Last Year: No    Number of [place] in the Last Year: 1    Unstable Housing in the Last Year: No         Review of Systems:  ROS      Physical Exam:  There were no vitals taken for this visit.     Physical Exam      Assessment:            Treatment Plan:  DISCUSSION: Treatment options discussed withpatient and all questions answered to patient's satisfaction. Possible side effects, risk of tolerance and or dependence and alternative treatments discussed    Obtaining appropriate analgesic effect of treatment   No signs of potential drug abuse or diversion identified    [x] Ill effects of being on chronic pain medications such as sleep disturbances, respiratory depression, hormonal changes, withdrawal symptoms, chronic opioid dependence and tolerance as well as risk of taking opioids with Benzodiazepines and taking opioids along with alcohol,  werediscussed with patient. I had asked the patient to minimize medication use and utilize pain medications only for uncontrolled rest pain or pain with exertional activities. I advised patient not to self-escalate painmedications without consulting with us. At each of patient's future visits we will try to taper pain medications, while adjusting the adjunct medications, and re-evaluating for Physical Therapy to improve spinal andjoint strength. We will continue to have discussions to decrease pain medications as tolerated. Counseled patient on effects their pain medication and /or their medical condition mayhave on their  ability to drive or operate machinery. Instructed not to drive or operate machinery if drowsy     I also discussed with the patient regarding the dangers of combining narcotic pain medication with tranquilizers, alcohol or illegal drugs or taking the medication any way other than prescribed. The dangers were discussed  including respiratory depression and death. Patient was told to tell  all  physicians regarding the medications he is getting from pain clinic. Patient is warned not to take any unprescribed medications over-the-countermedications that can depress breathing . Patient is advised to talk to the pharmacist or physicians if planning to take any over-the-counter medications before  takeing them.  Patient is strongly advised to avoid tranquilizers or  relaxants, illegal drugs  or any medications that can depress breathing  Patient is also advised to tell us if there is any changes in their medications from other physicians. David Hobson, was evaluated through a synchronous (real-time) audio-video  encounter. The patient (or guardian if applicable) is aware that this is a billable  service, which includes applicable co-pays. This Virtual Visit was conducted with  patient's (and/or legal guardian's) consent. The visit was conducted pursuant to  the emergency declaration under the [address] [place] [address], [address] [place] [address] authority and the [employer] and  [employer] Act. Patient identification was verified,  and a caregiver was present when appropriate. The patient was located in a  state where the provider was licensed to provide care.         TREATMENT OPTIONS:       Medication Agreement Requirements Met  Continue Opioid therapy  Script written for    Follow up appointment made

## 2022-02-18 ENCOUNTER — HOSPITAL ENCOUNTER (OUTPATIENT)
Dept: PAIN MANAGEMENT | Age: 77
Discharge: HOME | End: 2022-02-18

## 2022-02-18 DIAGNOSIS — Z79.891 CHRONIC PRESCRIPTION OPIATE USE: Chronic | ICD-10-CM

## 2022-02-18 DIAGNOSIS — Z96.653 HISTORY OF TOTAL BILATERAL KNEE REPLACEMENT: ICD-10-CM

## 2022-02-18 DIAGNOSIS — M51.36 DDD (DEGENERATIVE DISC DISEASE), LUMBAR: ICD-10-CM

## 2022-02-18 DIAGNOSIS — M43.10 ACQUIRED SPONDYLOLISTHESIS: Primary | ICD-10-CM

## 2022-02-18 DIAGNOSIS — M54.16 LUMBAR RADICULOPATHY: ICD-10-CM

## 2022-02-18 DIAGNOSIS — Z51.81 MEDICATION MONITORING ENCOUNTER: Chronic | ICD-10-CM

## 2022-02-18 DIAGNOSIS — M85.88 OSTEOPENIA OF LUMBAR SPINE: ICD-10-CM

## 2022-02-18 DIAGNOSIS — M48.062 SPINAL STENOSIS OF LUMBAR REGION WITH NEUROGENIC CLAUDICATION: Chronic | ICD-10-CM

## 2022-02-18 DIAGNOSIS — M48.02 CERVICAL SPINAL STENOSIS: ICD-10-CM

## 2022-02-18 DIAGNOSIS — M47.816 LUMBAR SPONDYLOSIS: ICD-10-CM

## 2022-02-18 DIAGNOSIS — M79.7 FIBROMYALGIA: ICD-10-CM

## 2022-02-18 DIAGNOSIS — M43.22 CERVICAL SPINE ANKYLOSIS: ICD-10-CM

## 2022-02-21 ENCOUNTER — HOSPITAL ENCOUNTER (OUTPATIENT)
Dept: PAIN MANAGEMENT | Age: 77
Discharge: HOME | End: 2022-02-21
Payer: MEDICARE

## 2022-02-21 ENCOUNTER — HOSPITAL ENCOUNTER (OUTPATIENT)
Dept: CT IMAGING | Age: 77
Discharge: HOME | End: 2022-02-23
Payer: MEDICARE

## 2022-02-21 VITALS
OXYGEN SATURATION: 98 % | TEMPERATURE: 98.2 F | HEART RATE: 49 BPM | SYSTOLIC BLOOD PRESSURE: 159 MMHG | RESPIRATION RATE: 20 BRPM | DIASTOLIC BLOOD PRESSURE: 72 MMHG

## 2022-02-21 DIAGNOSIS — J47.9 BRONCHIECTASIS WITHOUT COMPLICATION (HCC): ICD-10-CM

## 2022-02-21 DIAGNOSIS — M54.16 LUMBAR RADICULOPATHY: ICD-10-CM

## 2022-02-21 DIAGNOSIS — M48.062 SPINAL STENOSIS OF LUMBAR REGION WITH NEUROGENIC CLAUDICATION: ICD-10-CM

## 2022-02-21 DIAGNOSIS — M47.816 LUMBAR SPONDYLOSIS: Primary | ICD-10-CM

## 2022-02-21 DIAGNOSIS — Z51.81 MEDICATION MONITORING ENCOUNTER: Chronic | ICD-10-CM

## 2022-02-21 DIAGNOSIS — M79.7 FIBROMYALGIA: ICD-10-CM

## 2022-02-21 DIAGNOSIS — M48.02 CERVICAL SPINAL STENOSIS: ICD-10-CM

## 2022-02-21 DIAGNOSIS — M43.22 CERVICAL SPINE ANKYLOSIS: ICD-10-CM

## 2022-02-21 DIAGNOSIS — M54.50 ACUTE MIDLINE LOW BACK PAIN, UNSPECIFIED WHETHER SCIATICA PRESENT: ICD-10-CM

## 2022-02-21 DIAGNOSIS — M51.36 DDD (DEGENERATIVE DISC DISEASE), LUMBAR: ICD-10-CM

## 2022-02-21 DIAGNOSIS — Z98.890 S/P KYPHOPLASTY: ICD-10-CM

## 2022-02-21 PROCEDURE — 71250 CT THORAX DX C-: CPT

## 2022-02-21 PROCEDURE — 99213 OFFICE O/P EST LOW 20 MIN: CPT

## 2022-02-21 PROCEDURE — 99213 OFFICE O/P EST LOW 20 MIN: CPT | Performed by: NURSE PRACTITIONER

## 2022-02-21 PROCEDURE — 80307 DRUG TEST PRSMV CHEM ANLYZR: CPT

## 2022-02-21 RX ORDER — OXYCODONE AND ACETAMINOPHEN 7.5; 325 MG/1; MG/1
1 TABLET ORAL EVERY 6 HOURS PRN
Qty: 120 TABLET | Refills: 0 | Status: SHIPPED | OUTPATIENT
Start: 2022-02-21 | End: 2022-03-23 | Stop reason: SDUPTHER

## 2022-02-21 RX ORDER — GABAPENTIN 100 MG/1
200 CAPSULE ORAL 3 TIMES DAILY
Qty: 270 CAPSULE | Refills: 0 | Status: SHIPPED | OUTPATIENT
Start: 2022-02-21 | End: 2022-04-21 | Stop reason: ALTCHOICE

## 2022-02-21 ASSESSMENT — ENCOUNTER SYMPTOMS
BACK PAIN: 1
SHORTNESS OF BREATH: 0
CONSTIPATION: 0
COUGH: 0

## 2022-02-21 NOTE — PROGRESS NOTES
Chief Complaint: back pain    [facility] Pt reports chronic history of low back pain that radiates into right hip and buttocks at times She is s/p bilateral knee and right hip replaced. She has tried NSAIDS heat chiropractic care and with little relief. Just completed in home PT to increase strength and help with ambulation and balance. She had a fall on 9/28/18 with resulting L1 compression fracture. She had kyphoplasty of L1 which did help her back pain.  She goes to the chiropractor regularly with relief. She tried aquatic PT with no benefit. She had LESI 3/25 with 40-50%  relief of the pain. She had Right lumbar facet injections 9/2021 with 90% relief. She is requesting to repeat lumbar facet injection on the Left side.      Back Pain  This is a chronic problem. The current episode started more than 1 year ago. The problem occurs constantly. The problem is unchanged. The pain is present in the lumbar spine. The quality of the pain is described as aching, burning and stabbing. The pain does not radiate. The pain is at a severity of 7/10. The pain is moderate. The symptoms are aggravated by position. Associated symptoms include numbness and tingling. Pertinent negatives include no chest pain or fever. She has tried analgesics, bed rest, heat and ice for the symptoms. The treatment provided mild relief. Patient denies any new neurological symptoms. No bowel or bladder incontinence, no weakness, and no falling. Pill count: appropriate was due 2/17    Morphine equivalent: 45    Periodic Controlled Substance Monitoring: Possible medication side effects, risk of tolerance/dependence & alternative treatments discussed. ,No signs of potential drug abuse or diversion identified. ,Obtaining appropriate analgesic effect of treatment.  KARIN Gallegos - CNP)      Past Medical History:   Diagnosis Date    Abnormality of rib determined by X-ray 1/28/2016    Acute cystitis without hematuria 6/16/2018    Acute exacerbation of chronic bronchitis (HCC)     Acute on chronic diastolic heart failure ([facility] 1/28/2016    Adjustment disorder with mixed anxiety and depressed mood 6/26/2015    Mild     Anemia 3/5/2014    Back pain     Bronchiectasis with acute lower respiratory infection ([facility] 8/16/2017    Bronchitis     Chronic bronchitis (HCC) 1/28/2016    Chronic cough 7/23/2018    COPD (chronic obstructive pulmonary disease) (HCC)     asbestos related lung disease    Degenerative joint disease (DJD) of hip 5/14/2015    Dyslipidemia 3/5/2014    Encephalopathy acute 5/29/2016    Essential hypertension 1/28/2016    Fibromyalgia     GERD (gastroesophageal reflux disease)     History of total bilateral knee replacement 5/28/2016    Hx of blood clots     left leg and lung    Hyperlipidemia     Hypertension     Incontinence of urine     Irregular heartbeat     DR. Adelina Miller Medication monitoring encounter 6/13/2018    Morbid obesity with BMI of 45.0-49.9, adult ([facility] 1/28/2016    Obstructive sleep apnea syndrome     Osteoarthritis     Primary osteoarthritis of left knee 5/27/2016    PVC (premature ventricular contraction) 1/28/2016    S/P right and left heart catheterization 10/2/2015    Sleep apnea     no machine    SOB (shortness of breath)     Spinal stenosis of lumbar region with neurogenic claudication 5/13/2013    Supplemental oxygen dependent 12/8/2017    Urine frequency     UTI (urinary tract infection)     hospitalized early july 2014 for kidney infection       Past Surgical History:   Procedure Laterality Date    BRONCHOSCOPY Left 8/16/2017    BRONCHOSCOPY ALVEOLAR LAVAGE LOWER LOBE performed by Axel Anderson MD at [address]  x 3    no stents    COLONOSCOPY  4 28 14    polyps biopsies     FOOT SURGERY Left     calcium deposit removal from top of foot    HYSTERECTOMY      JOINT REPLACEMENT Bilateral 5/27/2016    bilat total knees    NERVE BLOCK  5/13/2013 caudal celestone 6mg    NERVE BLOCK  5/28/13    Caudal #2, Celestone 9mg    NERVE BLOCK  2/14/14    rt hip inj celestone 9 mg    NERVE BLOCK  07/14/2014    caudal# 3- celestone 9 mg    NERVE BLOCK  7-21-14    caudal epidural #2, decadron 7mg, fentanyl 25mcg    NERVE BLOCK  10/2/14    duramorph 1.5  decadron 5mg    NERVE BLOCK  11/9/2015    caudal# 1 celestone 9mg    NERVE BLOCK  11/16/15    caudal #2  decadron 10mg    NERVE BLOCK  11/23/15    duramorph 1mg celestone 9mg    NERVE BLOCK  03/28/2018    CAUDAL EPIDURAL STEROID BLOCK  DECADRON 10 MG     NERVE BLOCK  05/23/2018    caudal # decadron 7mg    NERVE BLOCK  08/28/2018    natalia transforminal # 1, decadron 10mg gadoteridol, LONG NEEDLES    MT PERQ VERT AGMNTJ CAVITY CRTJ UNI/BI CANNULJ LMBR N/A 10/29/2018    KYPHOPLASTY L1 performed by Dayna Jefferson MD at [address] Right 5/14/15    total hip replacement       Allergies   Allergen Reactions    Rosuvastatin Calcium Anaphylaxis     Hair fell out    Statins Anaphylaxis     Hair fell out    Bactrim [Sulfamethoxazole-Trimethoprim] Diarrhea    Caffeine Other (See Comments)     pain  pain    Dye [Iodides] Other (See Comments)     Iv dye= blood clots in arm    Food      Tomatoes, pain    Ioxaglate Other (See Comments)     Iv dye= blood clots in arm    Tomato     Dicloxacillin Rash    Pcn [Penicillins] Rash         Current Outpatient Medications:     gabapentin (NEURONTIN) 100 MG capsule, Take 2 capsules by mouth 3 times daily for 30 days. , Disp: 90 capsule, Rfl: 1    Catheters (STANDARD CARE EXTERNAL CATH) MISC, 1 Device by Does not apply route daily, Disp: 30 each, Rfl: 2    MYRBETRIQ 50 MG TB24, Take 50 mg by mouth daily, Disp: 30 tablet, Rfl: 5    metoprolol tartrate (LOPRESSOR) 50 MG tablet, TAKE 1 TABLET TWICE A DAY, Disp: 180 tablet, Rfl: 3    albuterol (PROVENTIL) (5 MG/ML) 0.5% nebulizer solution, Take 1 mL by nebulization every 4 hours as needed for Wheezing, Disp: 120 each, Rfl: 3    DULoxetine (CYMBALTA) 30 MG extended release capsule, Take 1 capsule by mouth 2 times daily for 3 days, Disp: 6 capsule, Rfl: 0    Cyanocobalamin (VITAMIN B-12) 1000 MCG/15ML LIQD, Take 1,000 mcg by mouth daily , Disp: , Rfl:     levocetirizine (XYZAL) 5 MG tablet, take 1 tablet by mouth once daily AT NIGHT, Disp: 30 tablet, Rfl: 2    donepezil (ARICEPT) 5 MG tablet, take 1 tablet by mouth at bedtime, Disp: 90 tablet, Rfl: 1    psyllium (KONSYL) 28.3 % PACK, Take 1 packet by mouth daily , Disp: , Rfl:     Catheters (STANDARD CARE EXTERNAL CATH) MISC, 1 each by Does not apply route daily, Disp: 5 each, Rfl: 1    albuterol sulfate  (90 Base) MCG/ACT inhaler, Inhale 2 puffs into the lungs 4 times daily as needed for Wheezing, Disp: 1 Inhaler, Rfl: 2    Misc.  Devices (WALKER) MISC, 1 each by Does not apply route daily Upright walker with wheels and seat, Disp: 1 each, Rfl: 0    aspirin 81 MG EC tablet, Take 81 mg by mouth daily , Disp: , Rfl:     Family History   Problem Relation Age of Onset    Stomach Cancer Brother         stomach    High Blood Pressure Mother     Heart Disease Sister         irregular heartbeat       Social History     Socioeconomic History    Marital status:      Spouse name: Not on file    Number of children: 2    Years of education: Not on file    Highest education level: Not on file   Occupational History    Not on file   Tobacco Use    Smoking status: Former Smoker     Packs/day: 1.00     Years: 18.00     Pack years: 18.00     Quit date: 1993     Years since quittin.7    Smokeless tobacco: Never Used   Vaping Use    Vaping Use: Never used   Substance and Sexual Activity    Alcohol use: No    Drug use: No    Sexual activity: Not Currently   Other Topics Concern    Not on file   Social History Narrative    Not on file     Social Determinants of Health     Financial Resource Strain: Low Risk  Difficulty of Paying Living Expenses: Not very hard   Food Insecurity: No Food Insecurity    Worried About Running Out of Food in the Last Year: Never true    Ran Out of Food in the Last Year: Never true   Transportation Needs: Unmet Transportation Needs    Lack of Transportation (Medical): Yes    Lack of Transportation (Non-Medical): Yes   Physical Activity: Insufficiently Active    Days of Exercise per Week: 3 days    Minutes of Exercise per Session: 20 min   Stress:     Feeling of Stress : Not on file   Social Connections:     Frequency of Communication with Friends and Family: Not on file    Frequency of Social Gatherings with Friends and Family: Not on file    Attends [employer] Services: Not on file    Active Member of Clubs or Organizations: Not on file    Attends Club or Organization Meetings: Not on file    Marital Status: Not on file   Intimate Partner Violence:     Fear of Current or Ex-Partner: Not on file    Emotionally Abused: Not on file    Physically Abused: Not on file    Sexually Abused: Not on file   Housing Stability: [address] Unable to Pay for Housing in the Last Year: No    Number of [place] in the Last Year: 1    Unstable Housing in the Last Year: No       Review of Systems:  Review of Systems   Constitutional: Negative for chills and fever. Cardiovascular: Negative for chest pain and palpitations. Respiratory: Negative for cough and shortness of breath. Musculoskeletal: Positive for back pain. Gastrointestinal: Negative for constipation. Neurological: Positive for numbness and tingling. Negative for disturbances in coordination and loss of balance. Physical Exam:  BP (!) 159/72   Pulse (!) 49   Temp 98.2 °F (36.8 °C) (Skin)   Resp 20   SpO2 98%     Physical Exam  HENT:      Head: Normocephalic. Pulmonary:      Effort: Pulmonary effort is normal.   Musculoskeletal:         General: Normal range of motion.       Cervical back: Normal range of motion. Lumbar back: Tenderness present. Skin:     General: Skin is warm and dry. Neurological:      Mental Status: She is alert and oriented to person, place, and time. Record/Diagnostics Review:    Last tiffanie 12/2020 and was appropriate     Assessment:  Problem List Items Addressed This Visit     Spinal stenosis of lumbar region with neurogenic claudication (Chronic)    Relevant Medications    oxyCODONE-acetaminophen (PERCOCET) 7.5-325 MG per tablet    gabapentin (NEURONTIN) 100 MG capsule    Medication monitoring encounter (Chronic)    Relevant Orders    DRUG SCREEN, PAIN    Fibromyalgia    Relevant Medications    oxyCODONE-acetaminophen (PERCOCET) 7.5-325 MG per tablet    gabapentin (NEURONTIN) 100 MG capsule    Back pain    Relevant Medications    oxyCODONE-acetaminophen (PERCOCET) 7.5-325 MG per tablet    Cervical spine ankylosis    Relevant Medications    oxyCODONE-acetaminophen (PERCOCET) 7.5-325 MG per tablet    Cervical spinal stenosis    Relevant Medications    oxyCODONE-acetaminophen (PERCOCET) 7.5-325 MG per tablet    S/P kyphoplasty    Relevant Medications    oxyCODONE-acetaminophen (PERCOCET) 7.5-325 MG per tablet    DDD (degenerative disc disease), lumbar    Relevant Medications    oxyCODONE-acetaminophen (PERCOCET) 7.5-325 MG per tablet    Other Relevant Orders    DRUG SCREEN, PAIN    Lumbar radiculopathy    Relevant Medications    oxyCODONE-acetaminophen (PERCOCET) 7.5-325 MG per tablet    gabapentin (NEURONTIN) 100 MG capsule    Lumbar spondylosis - Primary    Relevant Medications    oxyCODONE-acetaminophen (PERCOCET) 7.5-325 MG per tablet    Other Relevant Orders    DRUG SCREEN, PAIN             Treatment Plan:  Patient relates current medications are helping the pain. Patient reports taking pain medications as prescribed, denies obtaining medications from different sources and denies use of illegal drugs.  Patient denies side effects from medications like nausea, vomiting,

## 2022-02-24 ENCOUNTER — TELEPHONE (OUTPATIENT)
Dept: INTERNAL MEDICINE CLINIC | Age: 77
End: 2022-02-24

## 2022-02-24 ENCOUNTER — CARE COORDINATION (OUTPATIENT)
Dept: CARE COORDINATION | Age: 77
End: 2022-02-24

## 2022-02-24 LAB
6-ACETYLMORPHINE, UR: NOT DETECTED
7-AMINOCLONAZEPAM, URINE: NOT DETECTED
ALPHA-OH-ALPRAZ, URINE: NOT DETECTED
ALPHA-OH-MIDAZOLAM, URINE: NOT DETECTED
ALPRAZOLAM, URINE: NOT DETECTED
AMPHETAMINES, URINE: NOT DETECTED
BARBITURATES, URINE: NOT DETECTED
BENZOYLECGONINE, UR: NOT DETECTED
BUPRENORPHINE URINE: NOT DETECTED
CARISOPRODOL, UR: NOT DETECTED
CLONAZEPAM, URINE: NOT DETECTED
CODEINE, URINE: NOT DETECTED
CREATININE URINE: 86.4 MG/DL (ref 20–400)
DIAZEPAM, URINE: NOT DETECTED
DRUGS EXPECTED, UR: NORMAL
EER HI RES INTERP UR: NORMAL
ETHYL GLUCURONIDE UR: NOT DETECTED
FENTANYL URINE: NOT DETECTED
GABAPENTIN: PRESENT
HYDROCODONE, URINE: NOT DETECTED
HYDROMORPHONE, URINE: NOT DETECTED
LORAZEPAM, URINE: NOT DETECTED
MARIJUANA METAB, UR: NOT DETECTED
MDA, UR: NOT DETECTED
MDEA, EVE, UR: NOT DETECTED
MDMA URINE: NOT DETECTED
MEPERIDINE METAB, UR: NOT DETECTED
METHADONE, URINE: NOT DETECTED
METHAMPHETAMINE, URINE: NOT DETECTED
METHYLPHENIDATE: NOT DETECTED
MIDAZOLAM, URINE: NOT DETECTED
MORPHINE URINE: NOT DETECTED
NALOXONE URINE: NOT DETECTED
NORBUPRENORPHINE, URINE: NOT DETECTED
NORDIAZEPAM, URINE: NOT DETECTED
NORFENTANYL, URINE: NOT DETECTED
NORHYDROCODONE, URINE: NOT DETECTED
NOROXYCODONE, URINE: PRESENT
NOROXYMORPHONE, URINE: NOT DETECTED
OXAZEPAM, URINE: NOT DETECTED
OXYCODONE URINE: PRESENT
OXYMORPHONE, URINE: PRESENT
PAIN MANAGEMENT DRUG PANEL INTERP, URINE: NORMAL
PAIN MGT DRUG PANEL, HI RES, UR: NORMAL
PCP,URINE: NOT DETECTED
PHENTERMINE, UR: NOT DETECTED
PREGABALIN: NOT DETECTED
TAPENTADOL, URINE: NOT DETECTED
TAPENTADOL-O-SULFATE, URINE: NOT DETECTED
TEMAZEPAM, URINE: NOT DETECTED
TRAMADOL, URINE: NOT DETECTED
ZOLPIDEM METABOLITE (ZCA), URINE: NOT DETECTED
ZOLPIDEM, URINE: NOT DETECTED

## 2022-02-24 NOTE — TELEPHONE ENCOUNTER
----- Message from Aubrey Velasquez sent at 2/24/2022 12:15 PM EST -----  Subject: Results Request    QUESTIONS  Which lab or imaging result is the patient calling about? ct scan  Which provider ordered the test?   At what location was the test performed? Date the test was performed? Additional Information for Provider? Patient is requesting another call   back regarding these results. She can not get into [employer].  ---------------------------------------------------------------------------  --------------  CALL BACK INFO  What is the best way for the office to contact you? OK to leave message on   voicemail  Preferred Call Back Phone Number?  [phone]

## 2022-02-24 NOTE — TELEPHONE ENCOUNTER
Spoke with patient and advised her to speak with Dr. Tahira Dalton, who ordered the CT. Patient verbalized understanding and agreed to call ordering provider for results.

## 2022-02-24 NOTE — CARE COORDINATION
Patient had left a message for [facility] to return her call. HC returned patient's call. Patient explained that she was experiencing difficulty with MyChart and was perplexed as she was attempting to get some test results. HC contacted Jamal Parks, who suggested that the [facility] have patient to contact her PCP's office for assistance. HC suggested that the patient contact her PCP's office, patient stated that she would make the contact. HC was able to quickly speak with the patient regarding the contacts that were made to three assisted living facilities. Patient stated that she has received information from [facility], [facility] and [address]   [address]. Plan of Care   [facility] will contact patient after 7-10 days to discuss assisted living facilities.

## 2022-02-25 ENCOUNTER — CARE COORDINATION (OUTPATIENT)
Dept: CARE COORDINATION | Age: 77
End: 2022-02-25

## 2022-02-25 ASSESSMENT — SOCIAL DETERMINANTS OF HEALTH (SDOH)
DO YOU BELONG TO ANY CLUBS OR ORGANIZATIONS SUCH AS CHURCH GROUPS UNIONS, FRATERNAL OR ATHLETIC GROUPS, OR SCHOOL GROUPS?: YES
HOW OFTEN DO YOU ATTENT MEETINGS OF THE CLUB OR ORGANIZATION YOU BELONG TO?: MORE THAN 4 TIMES PER YEAR
HOW OFTEN DO YOU ATTEND CHURCH OR RELIGIOUS SERVICES?: MORE THAN 4 TIMES PER YEAR
HOW OFTEN DO YOU GET TOGETHER WITH FRIENDS OR RELATIVES?: ONCE A WEEK
IN A TYPICAL WEEK, HOW MANY TIMES DO YOU TALK ON THE PHONE WITH FAMILY, FRIENDS, OR NEIGHBORS?: MORE THAN THREE TIMES A WEEK

## 2022-02-25 NOTE — CARE COORDINATION
Ambulatory Care Coordination Note  2/25/2022  CM Risk Score: 10  Charlson 10 Year Mortality Risk Score: 79%     ACC: Madi Velarde RN    Summary Note: Spoke with patient who stated that she is doing \"ok\". Denied any needs from PCP today. She stated she is confused about her CT results, ACM asked if she had a follow up with her pulmonologist. Patient stated that she had not scheduled it yet, she read her results on [facility]. She continued to ask ACM about her results and ACM explained that there are no notes from specialist and that she would need to speak with that office for better understanding. Patient voiced frustration and seemed confused during this conversation. She agreed to call and schedule a follow up. ACM encouraged patient to call with any needs. ACM will follow up in 2 weeks. Care Coordination Interventions    Program Enrollment: Complex Care  Referral from Primary Care Provider: No  Suggested Interventions and [address]: Completed  Social Work: In Process  Zone Management Tools: In Process         Goals Addressed                 This Visit's Progress    [employer] Goal   Improving     I will work with ACM and home care to establish more help. .    Barriers: overwhelmed by complexity of regimen and stress  Plan for overcoming my barriers: work with ACM  Confidence: 6/10  Anticipated Goal Completion Date: 12-21-21            Prior to Admission medications    Medication Sig Start Date End Date Taking? Authorizing Provider   oxyCODONE-acetaminophen (PERCOCET) 7.5-325 MG per tablet Take 1 tablet by mouth every 6 hours as needed for Pain for up to 30 days. 2/21/22 3/23/22  KdKARIN Pham CNP   gabapentin (NEURONTIN) 100 MG capsule Take 2 capsules by mouth 3 times daily for 90 days.  2/21/22 5/22/22  KdKARIN Pham CNP   Catheters (STANDARD CARE EXTERNAL CATH) MISC 1 Device by Does not apply route daily 1/10/22   Juan Caryl Walton MD   MYRBETRIQ 50 MG TB24 Take 50 mg by mouth daily 1/10/22   Bonnie Cowart MD   metoprolol tartrate (LOPRESSOR) 50 MG tablet TAKE 1 TABLET TWICE A DAY 12/22/21   Yumi Burger MD   albuterol (PROVENTIL) (5 MG/ML) 0.5% nebulizer solution Take 1 mL by nebulization every 4 hours as needed for Wheezing 11/12/21   Yumi Burger MD   DULoxetine (CYMBALTA) 30 MG extended release capsule Take 1 capsule by mouth 2 times daily for 3 days 10/5/21 2/21/22  Yumi Burger MD   Cyanocobalamin (VITAMIN B-12) 1000 MCG/15ML LIQD Take 1,000 mcg by mouth daily     Historical Provider, MD   levocetirizine (XYZAL) 5 MG tablet take 1 tablet by mouth once daily AT NIGHT 9/8/21   Yumi Burger MD   donepezil (ARICEPT) 5 MG tablet take 1 tablet by mouth at bedtime 9/1/21   Yumi Burger MD   psyllium (KONSYL) 28.3 % PACK Take 1 packet by mouth daily     Historical Provider, MD   Catheters (STANDARD CARE EXTERNAL CATH) MISC 1 each by Does not apply route daily 3/2/21   Kenia Cerrato MD   albuterol sulfate  (90 Base) MCG/ACT inhaler Inhale 2 puffs into the lungs 4 times daily as needed for Wheezing 4/20/20   Yumi Burger MD   Misc.  Devices [facility]) MISC 1 each by Does not apply route daily Upright walker with wheels and seat 11/12/18   Remy Rivera MD   aspirin 81 MG EC tablet Take 81 mg by mouth daily     Historical Provider, MD       Future Appointments   Date Time Provider Mayo Betts   3/14/2022 11:50 AM Bonnie Cowart MD [facility]   3/21/2022  1:20 PM KARIN Morejon - CNP [address]     ,   Congestive Heart Failure Assessment    Are you currently restricting fluids?: Other  Do you understand a low sodium diet?: Yes  Do you understand how to read food labels?: Yes  Do you salt your food before tasting it?: No     No patient-reported symptoms      Symptoms:     Symptom course: stable  Weight trend: stable  Salt intake watch compared to last visit: stable     , COPD Assessment    Does the patient understand envrionmental exposure?: Yes  Is the patient able to verbalize Rescue vs. Long Acting medications?: Yes  Does the patient have a nebulizer?: No     No patient-reported symptoms         Symptoms:         and   General Assessment    Do you have any symptoms that are causing concern?: No

## 2022-02-28 ENCOUNTER — TELEPHONE (OUTPATIENT)
Dept: INTERNAL MEDICINE CLINIC | Age: 77
End: 2022-02-28

## 2022-02-28 ENCOUNTER — CARE COORDINATION (OUTPATIENT)
Dept: CARE COORDINATION | Age: 77
End: 2022-02-28

## 2022-02-28 DIAGNOSIS — J43.1 PANLOBULAR EMPHYSEMA (HCC): Primary | ICD-10-CM

## 2022-02-28 RX ORDER — ULTRASOUND COUPLING MEDIUM
GEL (GRAM) TOPICAL
Qty: 1 EACH | Refills: 1 | Status: CANCELLED | OUTPATIENT
Start: 2022-02-28 | End: 2022-03-10

## 2022-02-28 NOTE — CARE COORDINATION
Patient asked AC to call her home O2 supplier to see what they recommend. She is wondering if there is a humidifier or anything. ACM spoke with [employer] [facility] [employer] and was told that they can send her a humidifier that goes with her O2 if they receive an order. RX request for humidifier for home O2. Sent to provider. Order to be  Faxed to [phone] same DX for home O2.

## 2022-02-28 NOTE — TELEPHONE ENCOUNTER
Patient called asking for something to relieve the discomfort from her nasal cannual rubbing and causing dryness. Pended Surgilube, unsure of this RX please review and change or approve.

## 2022-02-28 NOTE — CARE COORDINATION
Patient called asking for something to relieve the discomfort from her nasal cannual rubbing and causing dryness. ACM will route to provider for advise and request RX.

## 2022-02-28 NOTE — TELEPHONE ENCOUNTER
Patient asked ACM to check with her DME company regarding a humidifier for her O2. [address] stated there is a device that can be provided to patient with an order. RX request for humidifier for home O2 pending .  Order to be  Faxed to [phone] same DX for home O2.

## 2022-03-01 ENCOUNTER — CARE COORDINATION (OUTPATIENT)
Dept: CARE COORDINATION | Age: 77
End: 2022-03-01

## 2022-03-01 NOTE — CARE COORDINATION
Patient notified that provider did place order for the humidifier for her home O2. Order will be faxed to [facility] at patients request.   Patient denied any additional needs today.

## 2022-03-07 ENCOUNTER — CARE COORDINATION (OUTPATIENT)
Dept: CARE COORDINATION | Age: 77
End: 2022-03-07

## 2022-03-07 NOTE — CARE COORDINATION
Phoned patient today to follow up on the information that was mailed out to her for Assisted Living. Patient stated that she   has reviewed the information, and is well aware of the information, and has actually visited a few of the places. Patient stated that she will consider the information for the  future, after her lease is up, and until then, \"she wants to keep  fighting\".     Plan of Care  [facility]. will check in with patient for future social needs

## 2022-03-17 ENCOUNTER — CARE COORDINATION (OUTPATIENT)
Dept: CARE COORDINATION | Age: 77
End: 2022-03-17

## 2022-03-17 NOTE — CARE COORDINATION
Ambulatory Care Coordination Note  3/17/2022  CM Risk Score: 10  Charlson 10 Year Mortality Risk Score: 79%     ACC: Dolly Tariq, RN    Summary Note: Spoke with patient who reported being a little fatigued. She stated that she did not hear anything about the humidifier for her home O2 yet. ACM called [facility] health who stated they did not get the order. ACM re faxed and let patient know. Patient denied any other needs today. ACM will follow up in 1 week, check on DME. Heart Failure Education outreach Date/Time: 3/17/2022 1:03 PM    Ambulatory Care Manager (ACM) contacted the patient by telephone to perform Ambulatory Care Coordination. Verified name and  with patient as identifiers. Provided introduction to self, and explanation of the Ambulatory Care Manager's role. ACM reviewed that a Health Healthy tips for the Spring packet has been sent to [employer]. ACM reviewed CHF zones, daily weights, fluid restriction, the importance of low sodium diet and healthy tips packet with the patient. Instructed patient to call their PCP if they have a weight gain of 3 lbs in 2 days or 5 lbs in a week. Patient reminded that there is a physician on call 24 hours a day / 7 days a week should the patient have questions or concerns. The patient verbalized understanding. Care Coordination Interventions    Program Enrollment: Complex Care  Referral from Primary Care Provider: No  Suggested Interventions and [address]: Completed  Social Work: In Process  Zone Management Tools: In Process         Goals Addressed                 This Visit's Progress    [employer] Goal   Improving     I will work with ACM and home care to establish more help. .    Barriers: overwhelmed by complexity of regimen and stress  Plan for overcoming my barriers: work with ACM  Confidence: 6/10  Anticipated Goal Completion Date: 21       maintain weight, call office if gain   On track

## 2022-03-23 ENCOUNTER — HOSPITAL ENCOUNTER (OUTPATIENT)
Dept: PAIN MANAGEMENT | Age: 77
Discharge: HOME | End: 2022-03-23
Payer: MEDICARE

## 2022-03-23 VITALS
DIASTOLIC BLOOD PRESSURE: 57 MMHG | BODY MASS INDEX: 47.46 KG/M2 | HEIGHT: 64 IN | HEART RATE: 50 BPM | WEIGHT: 278 LBS | SYSTOLIC BLOOD PRESSURE: 135 MMHG

## 2022-03-23 DIAGNOSIS — M47.816 LUMBAR SPONDYLOSIS: Primary | ICD-10-CM

## 2022-03-23 DIAGNOSIS — M54.50 ACUTE MIDLINE LOW BACK PAIN, UNSPECIFIED WHETHER SCIATICA PRESENT: ICD-10-CM

## 2022-03-23 DIAGNOSIS — M48.062 SPINAL STENOSIS OF LUMBAR REGION WITH NEUROGENIC CLAUDICATION: ICD-10-CM

## 2022-03-23 DIAGNOSIS — M54.16 LUMBAR RADICULOPATHY: ICD-10-CM

## 2022-03-23 DIAGNOSIS — M43.22 CERVICAL SPINE ANKYLOSIS: ICD-10-CM

## 2022-03-23 DIAGNOSIS — Z51.81 MEDICATION MONITORING ENCOUNTER: Chronic | ICD-10-CM

## 2022-03-23 DIAGNOSIS — M79.7 FIBROMYALGIA: ICD-10-CM

## 2022-03-23 DIAGNOSIS — M51.36 DDD (DEGENERATIVE DISC DISEASE), LUMBAR: ICD-10-CM

## 2022-03-23 DIAGNOSIS — M48.02 CERVICAL SPINAL STENOSIS: ICD-10-CM

## 2022-03-23 DIAGNOSIS — Z98.890 S/P KYPHOPLASTY: ICD-10-CM

## 2022-03-23 PROCEDURE — 99213 OFFICE O/P EST LOW 20 MIN: CPT

## 2022-03-23 PROCEDURE — 99213 OFFICE O/P EST LOW 20 MIN: CPT | Performed by: NURSE PRACTITIONER

## 2022-03-23 RX ORDER — OXYCODONE AND ACETAMINOPHEN 7.5; 325 MG/1; MG/1
1 TABLET ORAL EVERY 6 HOURS PRN
Qty: 120 TABLET | Refills: 0 | Status: SHIPPED | OUTPATIENT
Start: 2022-03-23 | End: 2022-04-21 | Stop reason: SDUPTHER

## 2022-03-23 RX ORDER — CIPROFLOXACIN 250 MG/1
250 TABLET, FILM COATED ORAL
COMMUNITY

## 2022-03-23 ASSESSMENT — PAIN DESCRIPTION - LOCATION
LOCATION: LEG;SHOULDER;BACK
LOCATION: LEG;SHOULDER

## 2022-03-23 ASSESSMENT — ENCOUNTER SYMPTOMS
BACK PAIN: 1
CONSTIPATION: 0
SHORTNESS OF BREATH: 0
COUGH: 0

## 2022-03-23 ASSESSMENT — PAIN SCALES - GENERAL
PAINLEVEL_OUTOF10: 6
PAINLEVEL_OUTOF10: 6

## 2022-03-23 NOTE — PROGRESS NOTES
Chief Complaint: back pain    [facility] Pt reports chronic history of low back pain that radiates into right hip and buttocks at times She is s/p bilateral knee and right hip replaced. She has tried NSAIDS heat chiropractic care and with little relief. Just completed in home PT to increase strength and help with ambulation and balance. She had a fall on 9/28/18 with resulting L1 compression fracture. She had kyphoplasty of L1 which did help her back pain.  She goes to the chiropractor regularly with relief. She tried aquatic PT with no benefit. She had LESI 3/25 with 40-50%  relief of the pain. She had Right lumbar facet injections 9/2021 with 90% relief.      Back Pain  This is a chronic problem. The current episode started more than 1 year ago. The problem occurs constantly. The problem is unchanged. The pain is present in the lumbar spine. The quality of the pain is described as aching. The pain does not radiate. The pain is at a severity of 5/10. The pain is moderate. The symptoms are aggravated by position. Pertinent negatives include no chest pain, fever or numbness. Risk factors include obesity and sedentary lifestyle. She has tried analgesics and bed rest for the symptoms. The treatment provided mild relief. Patient denies any new neurological symptoms. No bowel or bladder incontinence, no weakness, and no falling. Pill count: appropriate due today    Morphine equivalent: 45    Periodic Controlled Substance Monitoring: Possible medication side effects, risk of tolerance/dependence & alternative treatments discussed. ,No signs of potential drug abuse or diversion identified. ,Obtaining appropriate analgesic effect of treatment.  Richardson Rainey, KARIN - [place])      Past Medical History:   Diagnosis Date    Abnormality of rib determined by X-ray 1/28/2016    Acute cystitis without hematuria 6/16/2018    Acute exacerbation of chronic bronchitis (HCC)     Acute on chronic diastolic heart failure ([facility] 1/28/2016    Adjustment disorder with mixed anxiety and depressed mood 6/26/2015    Mild     Anemia 3/5/2014    Back pain     Bronchiectasis with acute lower respiratory infection ([facility] 8/16/2017    Bronchitis     Chronic bronchitis (HCC) 1/28/2016    Chronic cough 7/23/2018    COPD (chronic obstructive pulmonary disease) (HCC)     asbestos related lung disease    Degenerative joint disease (DJD) of hip 5/14/2015    Dyslipidemia 3/5/2014    Encephalopathy acute 5/29/2016    Essential hypertension 1/28/2016    Fibromyalgia     GERD (gastroesophageal reflux disease)     History of total bilateral knee replacement 5/28/2016    Hx of blood clots     left leg and lung    Hyperlipidemia     Hypertension     Incontinence of urine     Irregular heartbeat     DR. Joss Blake Medication monitoring encounter 6/13/2018    Morbid obesity with BMI of 45.0-49.9, adult ([facility] 1/28/2016    Obstructive sleep apnea syndrome     Osteoarthritis     Primary osteoarthritis of left knee 5/27/2016    PVC (premature ventricular contraction) 1/28/2016    S/P right and left heart catheterization 10/2/2015    Sleep apnea     no machine    SOB (shortness of breath)     Spinal stenosis of lumbar region with neurogenic claudication 5/13/2013    Supplemental oxygen dependent 12/8/2017    Urine frequency     UTI (urinary tract infection)     hospitalized early july 2014 for kidney infection       Past Surgical History:   Procedure Laterality Date    BRONCHOSCOPY Left 8/16/2017    BRONCHOSCOPY ALVEOLAR LAVAGE LOWER LOBE performed by Lizzette Tobin MD at [address]  x 3    no stents    COLONOSCOPY  4 28 14    polyps biopsies     FOOT SURGERY Left     calcium deposit removal from top of foot    HYSTERECTOMY      JOINT REPLACEMENT Bilateral 5/27/2016    bilat total knees    NERVE BLOCK  5/13/2013    caudal celestone 6mg    NERVE BLOCK  5/28/13    Caudal #2, Celestone 9mg    NERVE BLOCK 2/14/14    rt hip inj celestone 9 mg    NERVE BLOCK  07/14/2014    caudal# 3- celestone 9 mg    NERVE BLOCK  7-21-14    caudal epidural #2, decadron 7mg, fentanyl 25mcg    NERVE BLOCK  10/2/14    duramorph 1.5  decadron 5mg    NERVE BLOCK  11/9/2015    caudal# 1 celestone 9mg    NERVE BLOCK  11/16/15    caudal #2  decadron 10mg    NERVE BLOCK  11/23/15    duramorph 1mg celestone 9mg    NERVE BLOCK  03/28/2018    CAUDAL EPIDURAL STEROID BLOCK  DECADRON 10 MG     NERVE BLOCK  05/23/2018    caudal # decadron 7mg    NERVE BLOCK  08/28/2018    natalia transforminal # 1, decadron 10mg gadoteridol, LONG NEEDLES    MO PERQ VERT AGMNTJ CAVITY CRTJ UNI/BI CANNULJ LMBR N/A 10/29/2018    KYPHOPLASTY L1 performed by Tono Valderrama MD at [address] Right 5/14/15    total hip replacement       Allergies   Allergen Reactions    Rosuvastatin Calcium Anaphylaxis     Hair fell out    Statins Anaphylaxis     Hair fell out    Bactrim [Sulfamethoxazole-Trimethoprim] Diarrhea    Caffeine Other (See Comments)     pain  pain    Dye [Iodides] Other (See Comments)     Iv dye= blood clots in arm    Food      Tomatoes, pain    Ioxaglate Other (See Comments)     Iv dye= blood clots in arm    Tomato     Dicloxacillin Rash    Pcn [Penicillins] Rash         Current Outpatient Medications:     oxyCODONE-acetaminophen (PERCOCET) 7.5-325 MG per tablet, Take 1 tablet by mouth every 6 hours as needed for Pain for up to 30 days. , Disp: 120 tablet, Rfl: 0    gabapentin (NEURONTIN) 100 MG capsule, Take 2 capsules by mouth 3 times daily for 90 days. , Disp: 270 capsule, Rfl: 0    Catheters (STANDARD CARE EXTERNAL CATH) MISC, 1 Device by Does not apply route daily, Disp: 30 each, Rfl: 2    MYRBETRIQ 50 MG TB24, Take 50 mg by mouth daily, Disp: 30 tablet, Rfl: 5    metoprolol tartrate (LOPRESSOR) 50 MG tablet, TAKE 1 TABLET TWICE A DAY, Disp: 180 tablet, Rfl: 3    albuterol (PROVENTIL) (5 MG/ML) 0.5% nebulizer solution, Take 1 mL by nebulization every 4 hours as needed for Wheezing, Disp: 120 each, Rfl: 3    DULoxetine (CYMBALTA) 30 MG extended release capsule, Take 1 capsule by mouth 2 times daily for 3 days, Disp: 6 capsule, Rfl: 0    Cyanocobalamin (VITAMIN B-12) 1000 MCG/15ML LIQD, Take 1,000 mcg by mouth daily , Disp: , Rfl:     levocetirizine (XYZAL) 5 MG tablet, take 1 tablet by mouth once daily AT NIGHT, Disp: 30 tablet, Rfl: 2    donepezil (ARICEPT) 5 MG tablet, take 1 tablet by mouth at bedtime, Disp: 90 tablet, Rfl: 1    psyllium (KONSYL) 28.3 % PACK, Take 1 packet by mouth daily , Disp: , Rfl:     Catheters (STANDARD CARE EXTERNAL CATH) MISC, 1 each by Does not apply route daily, Disp: 5 each, Rfl: 1    albuterol sulfate  (90 Base) MCG/ACT inhaler, Inhale 2 puffs into the lungs 4 times daily as needed for Wheezing, Disp: 1 Inhaler, Rfl: 2    Misc.  Devices (WALKER) MISC, 1 each by Does not apply route daily Upright walker with wheels and seat, Disp: 1 each, Rfl: 0    aspirin 81 MG EC tablet, Take 81 mg by mouth daily , Disp: , Rfl:     Family History   Problem Relation Age of Onset    Stomach Cancer Brother         stomach    High Blood Pressure Mother     Heart Disease Sister         irregular heartbeat       Social History     Socioeconomic History    Marital status:      Spouse name: Not on file    Number of children: 2    Years of education: Not on file    Highest education level: Not on file   Occupational History    Not on file   Tobacco Use    Smoking status: Former Smoker     Packs/day: 1.00     Years: 18.00     Pack years: 18.00     Quit date: 1993     Years since quittin.8    Smokeless tobacco: Never Used   Vaping Use    Vaping Use: Never used   Substance and Sexual Activity    Alcohol use: No    Drug use: No    Sexual activity: Not Currently   Other Topics Concern    Not on file   Social History Narrative    Not on file Social Determinants of Health     Financial Resource Strain: Low Risk     Difficulty of Paying Living Expenses: Not very hard   Food Insecurity: No Food Insecurity    Worried About Running Out of Food in the Last Year: Never true    Bobby of Food in the Last Year: Never true   Transportation Needs: Unmet Transportation Needs    Lack of Transportation (Medical): Yes    Lack of Transportation (Non-Medical): Yes   Physical Activity: Insufficiently Active    Days of Exercise per Week: 3 days    Minutes of Exercise per Session: 20 min   Stress: No Stress Concern Present    Feeling of Stress : Only a little   Social Connections: Moderately Integrated    Frequency of Communication with Friends and Family: More than three times a week    Frequency of Social Gatherings with Friends and Family: Once a week    Attends Restorationism Services: More than 4 times per year    Active Member of [address] [place] [address] [employer] or Organizations: Yes    Attends Club or Organization Meetings: More than 4 times per year    Marital Status:    Intimate Partner Violence:     Fear of Current or Ex-Partner: Not on file    Emotionally Abused: Not on file    Physically Abused: Not on file    Sexually Abused: Not on file   Housing Stability: [address] Unable to Pay for Housing in the Last Year: No    Number of [place] in the Last Year: 1    Unstable Housing in the Last Year: No       Review of Systems:  Review of Systems   Constitutional: Negative for chills and fever. Cardiovascular: Negative for chest pain and palpitations. Respiratory: Negative for cough and shortness of breath. Musculoskeletal: Positive for back pain. Gastrointestinal: Negative for constipation. Neurological: Negative for disturbances in coordination, loss of balance and numbness. Physical Exam:  BP (!) 135/57   Pulse 50   Ht 5' 4\" (1.626 m)   Wt 278 lb (126.1 kg)   BMI 47.72 kg/m²     Physical Exam  HENT:      Head: Normocephalic. Pulmonary:      Effort: Pulmonary effort is normal.   Musculoskeletal:         General: Normal range of motion. Cervical back: Normal range of motion. Lumbar back: Tenderness present. Skin:     General: Skin is warm and dry. Neurological:      Mental Status: She is alert and oriented to person, place, and time. Record/Diagnostics Review:    Last tiffanie 2/2022 and was appropriate     Assessment:  Problem List Items Addressed This Visit     Spinal stenosis of lumbar region with neurogenic claudication (Chronic)    Relevant Medications    oxyCODONE-acetaminophen (PERCOCET) 7.5-325 MG per tablet    Medication monitoring encounter (Chronic)    Fibromyalgia    Relevant Medications    oxyCODONE-acetaminophen (PERCOCET) 7.5-325 MG per tablet    Back pain    Relevant Medications    oxyCODONE-acetaminophen (PERCOCET) 7.5-325 MG per tablet    Cervical spine ankylosis    Relevant Medications    oxyCODONE-acetaminophen (PERCOCET) 7.5-325 MG per tablet    Cervical spinal stenosis    Relevant Medications    oxyCODONE-acetaminophen (PERCOCET) 7.5-325 MG per tablet    S/P kyphoplasty    Relevant Medications    oxyCODONE-acetaminophen (PERCOCET) 7.5-325 MG per tablet    DDD (degenerative disc disease), lumbar    Relevant Medications    oxyCODONE-acetaminophen (PERCOCET) 7.5-325 MG per tablet    Lumbar radiculopathy    Relevant Medications    oxyCODONE-acetaminophen (PERCOCET) 7.5-325 MG per tablet    Lumbar spondylosis - Primary    Relevant Medications    oxyCODONE-acetaminophen (PERCOCET) 7.5-325 MG per tablet             Treatment Plan:  Patient relates current medications are helping the pain. Patient reports taking pain medications as prescribed, denies obtaining medications from different sources and denies use of illegal drugs. Patient denies side effects from medications like nausea, vomiting, constipation or drowsiness.  Patient reports current activities of daily living are possible due to medications and would like to continue them. As always, we encourage daily stretching and strengthening exercises, and recommend minimizing use of pain medications unless patient cannot get through daily activities due to pain. Contract requirements met. Continue opioid therapy. Script written for percocet  Encouraged to continue to follow with PCP for swelling in left ankle and foot. She will make an appt.   Follow up appointment made for 4 weeks

## 2022-03-28 ENCOUNTER — CARE COORDINATION (OUTPATIENT)
Dept: CARE COORDINATION | Age: 77
End: 2022-03-28

## 2022-03-28 NOTE — CARE COORDINATION
Ambulatory Care Coordination Note  3/28/2022  CM Risk Score: 6  Charlson 10 Year Mortality Risk Score: 79%     ACC: Victoria Felder, RN   Summary  Date Care Coordination Episode Started:  10.20.21    Reason for Call Today:       Topics Discussed Today:     1) Humidifier for home O2  2) Needs from PCP  3) COPD,CHF    Interventions completed today:    Completed CHF and COPD education    Assessments completed today:     1. Fall risk  2. Initial assessment  3. Medication reconciliation  4. SDOH  5. COPD CHF (Health assessments)      Care Coordinator plan of care:    Patient denied any needs today from PCP or ACM. She reports having all of her medications and that the humidifier for her home O2 is working well. ACM will graduate at this time but will be happy to assist with any needs in the future. Care Coordination Interventions    Program Enrollment: Complex Care  Referral from Primary Care Provider: No  Suggested Interventions and [address]: Completed  Social Work: In Process  Zone Management Tools: In Process         Goals Addressed                 This Visit's Progress     COMPLETED: [employer]   On track     I will work with ACM and home care to establish more help. .    Barriers: overwhelmed by complexity of regimen and stress  Plan for overcoming my barriers: work with ACM  Confidence: 6/10  Anticipated Goal Completion Date: 12-21-21       COMPLETED: maintain weight, call office if gain        2-3 pounds in 1 night, or 5 lbs in 1 week            Prior to Admission medications    Medication Sig Start Date End Date Taking? Authorizing Provider   ciprofloxacin (CIPRO) 250 MG tablet Take 250 mg by mouth every 8-12 hours as needed    Historical Provider, MD   oxyCODONE-acetaminophen (PERCOCET) 7.5-325 MG per tablet Take 1 tablet by mouth every 6 hours as needed for Pain for up to 30 days.  3/23/22 4/22/22  Idania Mckeon, APRN - CNP   gabapentin (NEURONTIN) 100 MG capsule Take 2 capsules by mouth 3 times daily for 90 days. 2/21/22 5/22/22  KARIN Em CNP   Catheters (STANDARD CARE EXTERNAL CATH) MISC 1 Device by Does not apply route daily 1/10/22   Merle White MD   MYRBETRIQ 50 MG TB24 Take 50 mg by mouth daily 1/10/22   Kenzie Loera MD   metoprolol tartrate (LOPRESSOR) 50 MG tablet TAKE 1 TABLET TWICE A DAY 12/22/21   Merle White MD   albuterol (PROVENTIL) (5 MG/ML) 0.5% nebulizer solution Take 1 mL by nebulization every 4 hours as needed for Wheezing 11/12/21   Merle White MD   DULoxetine (CYMBALTA) 30 MG extended release capsule Take 1 capsule by mouth 2 times daily for 3 days 10/5/21 2/21/22  Merle White MD   Cyanocobalamin (VITAMIN B-12) 1000 MCG/15ML LIQD Take 1,000 mcg by mouth daily     Historical Provider, MD   levocetirizine (XYZAL) 5 MG tablet take 1 tablet by mouth once daily AT NIGHT 9/8/21   Merle White MD   donepezil (ARICEPT) 5 MG tablet take 1 tablet by mouth at bedtime 9/1/21   Merle White MD   psyllium (KONSYL) 28.3 % PACK Take 1 packet by mouth daily     Historical Provider, MD   Catheters (STANDARD CARE EXTERNAL CATH) MISC 1 each by Does not apply route daily 3/2/21   Makayla Oliver MD   albuterol sulfate  (90 Base) MCG/ACT inhaler Inhale 2 puffs into the lungs 4 times daily as needed for Wheezing 4/20/20   Merle White MD   Misc.  Devices [facility]) MISC 1 each by Does not apply route daily Upright walker with wheels and seat 11/12/18   Gabriela Oneill MD   aspirin 81 MG EC tablet Take 81 mg by mouth daily     Historical Provider, MD       Future Appointments   Date Time Provider Mayo Betts   4/21/2022  2:20 PM KARIN Stewart - CNP [address]     ,   Congestive Heart Failure Assessment    Are you currently restricting fluids?: Other  Do you understand a low sodium diet?: Yes  Do you understand how to read food labels?: Yes  Do you salt your food before tasting it?: No     No patient-reported symptoms      Symptoms:     Symptom course: stable  Weight trend: stable  Salt intake watch compared to last visit: stable     ,   COPD Assessment    Does the patient understand envrionmental exposure?: Yes  Is the patient able to verbalize Rescue vs. Long Acting medications?: Yes  Does the patient have a nebulizer?: No     No patient-reported symptoms         Symptoms:         and   General Assessment    Do you have any symptoms that are causing concern?: No

## 2022-04-11 ENCOUNTER — CARE COORDINATION (OUTPATIENT)
Dept: CARE COORDINATION | Age: 77
End: 2022-04-11

## 2022-04-11 DIAGNOSIS — J43.1 PANLOBULAR EMPHYSEMA (HCC): Primary | ICD-10-CM

## 2022-04-11 NOTE — CARE COORDINATION
Patient called stating she was having some trouble catching her breath earlier but is doing ok now. Per patient she used to have a nebulizer for when she needed it but no longer has one.    ACM will route to PCP  DME equipment patient preference  [facility] fax:[phone]

## 2022-04-18 ENCOUNTER — CARE COORDINATION (OUTPATIENT)
Dept: CARE COORDINATION | Age: 77
End: 2022-04-18

## 2022-04-18 DIAGNOSIS — J44.9 CHRONIC OBSTRUCTIVE PULMONARY DISEASE, UNSPECIFIED COPD TYPE (HCC): ICD-10-CM

## 2022-04-18 NOTE — CARE COORDINATION
[facility] received a VM asking ACM to look into her nebulizer. Order was sent to [facility] on 4.11.22. [facility] called [facility] and left a VM requesting a return call with an update on equipment. [facility] received a call back and [facility] stated they did not receive this order. The order will also need to state which medication, doses and strength will be given through nebulizer.    [facility] will route to PCP's Kaylan VILLA

## 2022-04-20 ENCOUNTER — CARE COORDINATION (OUTPATIENT)
Dept: CARE COORDINATION | Age: 77
End: 2022-04-20

## 2022-04-20 NOTE — CARE COORDINATION
HC contacted this patient today to follow up on her social needs. Patient stated that she is fine regarding social needs. She states that she is still planning to stay in her apartment until her lease is up, before moving into assisted living. Plan of Care  [facility] [place], [facility]. will sign off of patient, has given her all of the requested information.

## 2022-04-21 ENCOUNTER — HOSPITAL ENCOUNTER (OUTPATIENT)
Dept: PAIN MANAGEMENT | Age: 77
Discharge: HOME | End: 2022-04-21
Payer: MEDICARE

## 2022-04-21 VITALS
HEART RATE: 54 BPM | DIASTOLIC BLOOD PRESSURE: 64 MMHG | TEMPERATURE: 98.2 F | RESPIRATION RATE: 20 BRPM | SYSTOLIC BLOOD PRESSURE: 121 MMHG | HEIGHT: 64 IN | WEIGHT: 278 LBS | OXYGEN SATURATION: 95 % | BODY MASS INDEX: 47.46 KG/M2

## 2022-04-21 DIAGNOSIS — Z79.891 CHRONIC PRESCRIPTION OPIATE USE: Chronic | ICD-10-CM

## 2022-04-21 DIAGNOSIS — M51.36 DDD (DEGENERATIVE DISC DISEASE), LUMBAR: ICD-10-CM

## 2022-04-21 DIAGNOSIS — M47.816 LUMBAR SPONDYLOSIS: ICD-10-CM

## 2022-04-21 DIAGNOSIS — Z98.890 S/P KYPHOPLASTY: ICD-10-CM

## 2022-04-21 DIAGNOSIS — M43.22 CERVICAL SPINE ANKYLOSIS: ICD-10-CM

## 2022-04-21 DIAGNOSIS — M54.16 LUMBAR RADICULOPATHY: ICD-10-CM

## 2022-04-21 DIAGNOSIS — M79.7 FIBROMYALGIA: ICD-10-CM

## 2022-04-21 DIAGNOSIS — M54.50 ACUTE MIDLINE LOW BACK PAIN, UNSPECIFIED WHETHER SCIATICA PRESENT: ICD-10-CM

## 2022-04-21 DIAGNOSIS — M48.02 CERVICAL SPINAL STENOSIS: ICD-10-CM

## 2022-04-21 DIAGNOSIS — M48.062 SPINAL STENOSIS OF LUMBAR REGION WITH NEUROGENIC CLAUDICATION: Primary | Chronic | ICD-10-CM

## 2022-04-21 DIAGNOSIS — M43.10 ACQUIRED SPONDYLOLISTHESIS: ICD-10-CM

## 2022-04-21 PROCEDURE — 99213 OFFICE O/P EST LOW 20 MIN: CPT | Performed by: NURSE PRACTITIONER

## 2022-04-21 PROCEDURE — 99213 OFFICE O/P EST LOW 20 MIN: CPT

## 2022-04-21 RX ORDER — PREGABALIN 75 MG/1
75 CAPSULE ORAL 2 TIMES DAILY
Qty: 180 CAPSULE | Refills: 0 | Status: SHIPPED | OUTPATIENT
Start: 2022-04-21 | End: 2022-05-24 | Stop reason: SDUPTHER

## 2022-04-21 RX ORDER — OXYCODONE AND ACETAMINOPHEN 7.5; 325 MG/1; MG/1
1 TABLET ORAL EVERY 6 HOURS PRN
Qty: 120 TABLET | Refills: 0 | Status: SHIPPED | OUTPATIENT
Start: 2022-04-25 | End: 2022-05-24 | Stop reason: SDUPTHER

## 2022-04-21 ASSESSMENT — ENCOUNTER SYMPTOMS
COUGH: 0
BOWEL INCONTINENCE: 0
SHORTNESS OF BREATH: 0
BACK PAIN: 1
CONSTIPATION: 0

## 2022-04-21 ASSESSMENT — PAIN SCALES - GENERAL: PAINLEVEL_OUTOF10: 6

## 2022-04-21 NOTE — PROGRESS NOTES
Chief Complaint   Patient presents with    Back Pain    Medication Refill         [facility]     Pt reports chronic history of low back pain that radiates into right hip and buttocks at times She is s/p bilateral knee and right hip replaced. She has tried NSAIDS heat chiropractic care and with little relief. she had right Lumbar facet joint injections at the levels of  L1-L2, L2-L3, L3-L4, L4-L5, L5-S1 on 9/30/2021 with significant improvement and  left Lumbar facet joint injections at the levels of  L1-L2, L2-L3, L3-L4, L4-L5, L5-S1 1/2022 and reported  90% relief. Last imaging 20/2020 lumbar XR that showed pervious L1 compression fracture which was corrected with kyphoplasty with multilevel degenerative changes and Lumbar Ct 2018 with diffuse facet arthopathy    HPI:     Back Pain  This is a chronic problem. The current episode started more than 1 year ago. The problem occurs constantly. The problem is unchanged. The pain is present in the lumbar spine. The quality of the pain is described as aching, shooting and stabbing. The pain does not radiate. The pain is at a severity of 6/10. The symptoms are aggravated by bending, sitting and standing. Associated symptoms include headaches, numbness, tingling and weakness. Pertinent negatives include no bladder incontinence, bowel incontinence, chest pain, fever or leg pain. Risk factors include menopause, obesity, poor posture, sedentary lifestyle and lack of exercise. She has tried ice and heat (PT, injections) for the symptoms. The treatment provided mild relief. Patient denies any new neurological symptoms. No bowel or bladder incontinence, no weakness, and no falling.     Pill count: appropriate / Oxycodone - 21 4/22 prescription adjusted appropriately  4/25    Morphine equivalent: 45    Controlled Substance Monitoring:    Acute and Chronic Pain Monitoring:   RX Monitoring 4/21/2022   Attestation -   Acute Pain Prescriptions -   Periodic Controlled Substance Monitoring Possible medication side effects, risk of tolerance/dependence & alternative treatments discussed. ;No signs of potential drug abuse or diversion identified. ;Assessed functional status. ;Obtaining appropriate analgesic effect of treatment. Chronic Pain > 50 MEDD -   Chronic Pain > 80 MEDD -         Periodic Controlled Substance Monitoring: Possible medication side effects, risk of tolerance/dependence & alternative treatments discussed. ,No signs of potential drug abuse or diversion identified. ,Assessed functional status. ,Obtaining appropriate analgesic effect of treatment. KARIN Galvez - CNP)      Past Medical History:   Diagnosis Date    Abnormality of rib determined by X-ray 1/28/2016    Acute cystitis without hematuria 6/16/2018    Acute exacerbation of chronic bronchitis (HCC)     Acute on chronic diastolic heart failure ([facility] [address] 1/28/2016    Adjustment disorder with mixed anxiety and depressed mood 6/26/2015    Mild     Anemia 3/5/2014    Back pain     Bronchiectasis with acute lower respiratory infection ([facility] [address] 8/16/2017    Bronchitis     Chronic bronchitis (HCC) 1/28/2016    Chronic cough 7/23/2018    COPD (chronic obstructive pulmonary disease) (HCC)     asbestos related lung disease    Degenerative joint disease (DJD) of hip 5/14/2015    Dyslipidemia 3/5/2014    Encephalopathy acute 5/29/2016    Essential hypertension 1/28/2016    Fibromyalgia     GERD (gastroesophageal reflux disease)     History of total bilateral knee replacement 5/28/2016    Hx of blood clots     left leg and lung    Hyperlipidemia     Hypertension     Incontinence of urine     Irregular heartbeat     DR. Mark Hawkins Medication monitoring encounter 6/13/2018    Morbid obesity with BMI of 45.0-49.9, adult ([facility] [address] 1/28/2016    Obstructive sleep apnea syndrome     Osteoarthritis     Primary osteoarthritis of left knee 5/27/2016    PVC (premature ventricular contraction) 1/28/2016    S/P right and left heart catheterization 10/2/2015    Sleep apnea     no machine    SOB (shortness of breath)     Spinal stenosis of lumbar region with neurogenic claudication 5/13/2013    Supplemental oxygen dependent 12/8/2017    Urine frequency     UTI (urinary tract infection)     hospitalized early july 2014 for kidney infection       Past Surgical History:   Procedure Laterality Date    BRONCHOSCOPY Left 8/16/2017    BRONCHOSCOPY ALVEOLAR LAVAGE LOWER LOBE performed by Alanna Woo MD at [address]  x 3    no stents    COLONOSCOPY  4 28 14    polyps biopsies     FOOT SURGERY Left     calcium deposit removal from top of foot    HYSTERECTOMY      JOINT REPLACEMENT Bilateral 5/27/2016    bilat total knees    NERVE BLOCK  5/13/2013    caudal celestone 6mg    NERVE BLOCK  5/28/13    Caudal #2, Celestone 9mg    NERVE BLOCK  2/14/14    rt hip inj celestone 9 mg    NERVE BLOCK  07/14/2014    caudal# 3- celestone 9 mg    NERVE BLOCK  7-21-14    caudal epidural #2, decadron 7mg, fentanyl 25mcg    NERVE BLOCK  10/2/14    duramorph 1.5  decadron 5mg    NERVE BLOCK  11/9/2015    caudal# 1 celestone 9mg    NERVE BLOCK  11/16/15    caudal #2  decadron 10mg    NERVE BLOCK  11/23/15    duramorph 1mg celestone 9mg    NERVE BLOCK  03/28/2018    CAUDAL EPIDURAL STEROID BLOCK  DECADRON 10 MG     NERVE BLOCK  05/23/2018    caudal # decadron 7mg    NERVE BLOCK  08/28/2018    natalia transforminal # 1, decadron 10mg gadoteridol, LONG NEEDLES    SC PERQ VERT AGMNTJ CAVITY CRTJ UNI/BI CANNULJ LMBR N/A 10/29/2018    KYPHOPLASTY L1 performed by Maciej Ridley MD at [address] Right 5/14/15    total hip replacement       Allergies   Allergen Reactions    Rosuvastatin Calcium Anaphylaxis     Hair fell out    Statins Anaphylaxis     Hair fell out    Bactrim [Sulfamethoxazole-Trimethoprim] Diarrhea    Caffeine Other (See Comments)     pain  pain  Dye [Iodides] Other (See Comments)     Iv dye= blood clots in arm    Food      Tomatoes, pain    Ioxaglate Other (See Comments)     Iv dye= blood clots in arm    Tomato     Dicloxacillin Rash    Pcn [Penicillins] Rash         Current Outpatient Medications:     [START ON 4/25/2022] oxyCODONE-acetaminophen (PERCOCET) 7.5-325 MG per tablet, Take 1 tablet by mouth every 6 hours as needed for Pain for up to 30 days. , Disp: 120 tablet, Rfl: 0    albuterol (PROVENTIL) (5 MG/ML) 0.5% nebulizer solution, Take 1 mL by nebulization every 4 hours as needed for Wheezing, Disp: 120 each, Rfl: 3    ciprofloxacin (CIPRO) 250 MG tablet, Take 250 mg by mouth every 8-12 hours as needed, Disp: , Rfl:     Catheters (STANDARD CARE EXTERNAL CATH) MISC, 1 Device by Does not apply route daily, Disp: 30 each, Rfl: 2    MYRBETRIQ 50 MG TB24, Take 50 mg by mouth daily, Disp: 30 tablet, Rfl: 5    metoprolol tartrate (LOPRESSOR) 50 MG tablet, TAKE 1 TABLET TWICE A DAY, Disp: 180 tablet, Rfl: 3    DULoxetine (CYMBALTA) 30 MG extended release capsule, Take 1 capsule by mouth 2 times daily for 3 days, Disp: 6 capsule, Rfl: 0    Cyanocobalamin (VITAMIN B-12) 1000 MCG/15ML LIQD, Take 1,000 mcg by mouth daily , Disp: , Rfl:     levocetirizine (XYZAL) 5 MG tablet, take 1 tablet by mouth once daily AT NIGHT, Disp: 30 tablet, Rfl: 2    donepezil (ARICEPT) 5 MG tablet, take 1 tablet by mouth at bedtime, Disp: 90 tablet, Rfl: 1    psyllium (KONSYL) 28.3 % PACK, Take 1 packet by mouth daily , Disp: , Rfl:     Catheters (STANDARD CARE EXTERNAL CATH) MISC, 1 each by Does not apply route daily, Disp: 5 each, Rfl: 1    albuterol sulfate  (90 Base) MCG/ACT inhaler, Inhale 2 puffs into the lungs 4 times daily as needed for Wheezing, Disp: 1 Inhaler, Rfl: 2    Misc.  Devices (WALKER) MISC, 1 each by Does not apply route daily Upright walker with wheels and seat, Disp: 1 each, Rfl: 0    aspirin 81 MG EC tablet, Take 81 mg by mouth daily , Disp: , Rfl:     Family History   Problem Relation Age of Onset    Stomach Cancer Brother         stomach    High Blood Pressure Mother     Heart Disease Sister         irregular heartbeat       Social History     Socioeconomic History    Marital status:      Spouse name: Not on file    Number of children: 2    Years of education: Not on file    Highest education level: Not on file   Occupational History    Not on file   Tobacco Use    Smoking status: Former Smoker     Packs/day: 1.00     Years: 18.00     Pack years: 18.00     Quit date: 1993     Years since quittin.9    Smokeless tobacco: Never Used   Vaping Use    Vaping Use: Never used   Substance and Sexual Activity    Alcohol use: No    Drug use: No    Sexual activity: Not Currently   Other Topics Concern    Not on file   Social History Narrative    Not on file     Social Determinants of Health     Financial Resource Strain: Low Risk     Difficulty of Paying Living Expenses: Not very hard   Food Insecurity: No Food Insecurity    Worried About Running Out of Food in the Last Year: Never true    Bobby of Food in the Last Year: Never true   Transportation Needs: Unmet Transportation Needs    Lack of Transportation (Medical): Yes    Lack of Transportation (Non-Medical): Yes   Physical Activity: Insufficiently Active    Days of Exercise per Week: 3 days    Minutes of Exercise per Session: 20 min   Stress: No Stress Concern Present    Feeling of Stress : Only a little   Social Connections: Moderately Integrated    Frequency of Communication with Friends and Family: More than three times a week    Frequency of Social Gatherings with Friends and Family: Once a week    Attends Sabianism Services: More than 4 times per year    Active Member of [address] [place] [address] [employer] or Organizations:  Yes    Attends Club or Organization Meetings: More than 4 times per year    Marital Status:    Intimate Partner Violence:     Fear of Current or Ex-Partner: Not on file    Emotionally Abused: Not on file    Physically Abused: Not on file    Sexually Abused: Not on file   Housing Stability: Low Risk     Unable to Pay for Housing in the Last Year: No    Number of Places Lived in the Last Year: 1    Unstable Housing in the Last Year: No       Review of Systems:  Review of Systems   Constitutional: Negative for chills and fever. Cardiovascular: Negative for chest pain. Respiratory: Negative for cough and shortness of breath. Musculoskeletal: Positive for back pain. Gastrointestinal: Negative for bowel incontinence and constipation. Genitourinary: Negative for bladder incontinence. Neurological: Positive for headaches, numbness, tingling and weakness. Physical Exam:  /64   Pulse 54   Temp 98.2 °F (36.8 °C)   Resp 20   Ht 5' 4\" (1.626 m)   Wt 278 lb (126.1 kg)   SpO2 95%   BMI 47.72 kg/m²     Physical Exam  Cardiovascular:      Rate and Rhythm: Normal rate. Pulmonary:      Effort: Pulmonary effort is normal.   Musculoskeletal:      Lumbar back: Decreased range of motion. Comments: In w/c for visit    Skin:     General: Skin is warm and dry. Neurological:      Mental Status: She is alert and oriented to person, place, and time.          Record/Diagnostics Review:    Last tiffanie 2/22  and was appropriate     Assessment:  Problem List Items Addressed This Visit     Spinal stenosis of lumbar region with neurogenic claudication - Primary (Chronic)    Relevant Medications    oxyCODONE-acetaminophen (PERCOCET) 7.5-325 MG per tablet (Start on 4/25/2022)    Other Relevant Orders    Amb External Referral To Physical Therapy    Chronic prescription opiate use (Chronic)    Fibromyalgia    Relevant Medications    oxyCODONE-acetaminophen (PERCOCET) 7.5-325 MG per tablet (Start on 4/25/2022)    Back pain    Relevant Medications    oxyCODONE-acetaminophen (PERCOCET) 7.5-325 MG per tablet (Start on 4/25/2022)    Acquired spondylolisthesis    Relevant Orders    Amb External Referral To Physical Therapy    Cervical spine ankylosis    Relevant Medications    oxyCODONE-acetaminophen (PERCOCET) 7.5-325 MG per tablet (Start on 4/25/2022)    Cervical spinal stenosis    Relevant Medications    oxyCODONE-acetaminophen (PERCOCET) 7.5-325 MG per tablet (Start on 4/25/2022)    S/P kyphoplasty    Relevant Medications    oxyCODONE-acetaminophen (PERCOCET) 7.5-325 MG per tablet (Start on 4/25/2022)    DDD (degenerative disc disease), lumbar    Relevant Medications    oxyCODONE-acetaminophen (PERCOCET) 7.5-325 MG per tablet (Start on 4/25/2022)    Lumbar radiculopathy    Relevant Medications    oxyCODONE-acetaminophen (PERCOCET) 7.5-325 MG per tablet (Start on 4/25/2022)    Lumbar spondylosis    Relevant Medications    oxyCODONE-acetaminophen (PERCOCET) 7.5-325 MG per tablet (Start on 4/25/2022)    Other Relevant Orders    Amb External Referral To Physical Therapy             Treatment Plan:  Patient relates current medications are helping the pain. Patient reports taking pain medications as prescribed, denies obtaining medications from different sources and denies use of illegal drugs. Patient denies side effects from medications like nausea, vomiting, constipation or drowsiness. Patient reports current activities of daily living are possible due to medications and would like to continue them. As always, we encourage daily stretching and strengthening exercises, and recommend minimizing use of pain medications unless patient cannot get through daily activities due to pain. Contract requirements met. Continue opioid therapy.  Script written for percocet  Gabapentin d/c and changed to lyrica   Referral to PT   Follow up appointment made for 4 weeks    I have reviewed the chief complaint and history of present illness (including ROS and PFSH) and vital documentation by my staff and I agree with their documentation and have added where

## 2022-04-25 ENCOUNTER — CARE COORDINATION (OUTPATIENT)
Dept: CARE COORDINATION | Age: 77
End: 2022-04-25

## 2022-04-25 NOTE — CARE COORDINATION
[facility] called ACM stating that they need an updated office note to state why she is using the nebulizer and what meds will be used in it. I believe she needs to have a more recent office note. Patient is requesting a VV due to not feeling well. Will route to PCP office for assistance with scheduling.

## 2022-04-25 NOTE — CARE COORDINATION
ACM received a message from patient requesting an update on her nebulizer order. ACM left  for [facility], asked for a return call.

## 2022-04-28 RX ORDER — GABAPENTIN 100 MG/1
CAPSULE ORAL
Qty: 270 CAPSULE | Refills: 7 | OUTPATIENT
Start: 2022-04-28

## 2022-05-13 ENCOUNTER — TELEPHONE (OUTPATIENT)
Dept: PAIN MANAGEMENT | Age: 77
End: 2022-05-13

## 2022-05-13 NOTE — TELEPHONE ENCOUNTER
Patient called in stating that she needs a new script for Lyrica sent to [facility]. Please advise.    Last OV 04/21/2022  Next OV 05/24/2022

## 2022-05-16 ENCOUNTER — TELEMEDICINE (OUTPATIENT)
Dept: INTERNAL MEDICINE CLINIC | Age: 77
End: 2022-05-16
Payer: MEDICARE

## 2022-05-16 DIAGNOSIS — I20.8 OTHER FORMS OF ANGINA PECTORIS (HCC): ICD-10-CM

## 2022-05-16 DIAGNOSIS — I71.40 AAA (ABDOMINAL AORTIC ANEURYSM) WITHOUT RUPTURE: ICD-10-CM

## 2022-05-16 DIAGNOSIS — I50.33 ACUTE ON CHRONIC DIASTOLIC HEART FAILURE (HCC): ICD-10-CM

## 2022-05-16 DIAGNOSIS — J30.9 ALLERGIC RHINITIS, UNSPECIFIED SEASONALITY, UNSPECIFIED TRIGGER: Primary | ICD-10-CM

## 2022-05-16 DIAGNOSIS — J43.1 PANLOBULAR EMPHYSEMA (HCC): ICD-10-CM

## 2022-05-16 PROBLEM — N18.30 CHRONIC RENAL DISEASE, STAGE III (HCC): Status: ACTIVE | Noted: 2022-05-16

## 2022-05-16 PROCEDURE — 99213 OFFICE O/P EST LOW 20 MIN: CPT | Performed by: INTERNAL MEDICINE

## 2022-05-16 RX ORDER — GUAIFENESIN 100 MG/5ML
10 SYRUP ORAL EVERY 4 HOURS PRN
Qty: 1 EACH | Refills: 1 | Status: SHIPPED | OUTPATIENT
Start: 2022-05-16

## 2022-05-16 RX ORDER — METHYLPREDNISOLONE 4 MG/1
TABLET ORAL
COMMUNITY
Start: 2022-04-05

## 2022-05-16 RX ORDER — FLUTICASONE PROPIONATE 50 MCG
1 SPRAY, SUSPENSION (ML) NASAL DAILY
Qty: 32 G | Refills: 1 | Status: SHIPPED | OUTPATIENT
Start: 2022-05-16

## 2022-05-16 RX ORDER — GABAPENTIN 100 MG/1
CAPSULE ORAL
COMMUNITY
Start: 2022-05-05 | End: 2022-05-24 | Stop reason: SDUPTHER

## 2022-05-16 ASSESSMENT — PATIENT HEALTH QUESTIONNAIRE - PHQ9
5. POOR APPETITE OR OVEREATING: 0
8. MOVING OR SPEAKING SO SLOWLY THAT OTHER PEOPLE COULD HAVE NOTICED. OR THE OPPOSITE, BEING SO FIGETY OR RESTLESS THAT YOU HAVE BEEN MOVING AROUND A LOT MORE THAN USUAL: 0
1. LITTLE INTEREST OR PLEASURE IN DOING THINGS: 0
SUM OF ALL RESPONSES TO PHQ QUESTIONS 1-9: 0
SUM OF ALL RESPONSES TO PHQ QUESTIONS 1-9: 0
4. FEELING TIRED OR HAVING LITTLE ENERGY: 0
SUM OF ALL RESPONSES TO PHQ9 QUESTIONS 1 & 2: 0
9. THOUGHTS THAT YOU WOULD BE BETTER OFF DEAD, OR OF HURTING YOURSELF: 0
2. FEELING DOWN, DEPRESSED OR HOPELESS: 0
7. TROUBLE CONCENTRATING ON THINGS, SUCH AS READING THE NEWSPAPER OR WATCHING TELEVISION: 0
10. IF YOU CHECKED OFF ANY PROBLEMS, HOW DIFFICULT HAVE THESE PROBLEMS MADE IT FOR YOU TO DO YOUR WORK, TAKE CARE OF THINGS AT HOME, OR GET ALONG WITH OTHER PEOPLE: 0
SUM OF ALL RESPONSES TO PHQ QUESTIONS 1-9: 0
6. FEELING BAD ABOUT YOURSELF - OR THAT YOU ARE A FAILURE OR HAVE LET YOURSELF OR YOUR FAMILY DOWN: 0
SUM OF ALL RESPONSES TO PHQ QUESTIONS 1-9: 0
3. TROUBLE FALLING OR STAYING ASLEEP: 0

## 2022-05-22 ASSESSMENT — ENCOUNTER SYMPTOMS
EYE ITCHING: 0
BLOOD IN STOOL: 0
CHOKING: 0
ABDOMINAL PAIN: 0
APNEA: 0
COLOR CHANGE: 0
SHORTNESS OF BREATH: 0
EYE PAIN: 0
COUGH: 1
ABDOMINAL DISTENTION: 0
CONSTIPATION: 0
CHEST TIGHTNESS: 0
EYE REDNESS: 0
DIARRHEA: 0
BACK PAIN: 0
EYE DISCHARGE: 0

## 2022-05-22 NOTE — PROGRESS NOTES
[address] [id number]  Dept: [phone]  Dept Fax: [phone]    Office Progress/Follow Up Note  Date of patient's visit: 5/22/2022  Patient's Name:  Stefanie Olivares YOB: 1945            Patient Care Team:  Tirso Winston MD as PCP - General (Internal Medicine)  Tirso Winston MD as PCP - [facility]  Janet Lunsford MD as Consulting Physician (Gastroenterology)  Marycruz Ohara MD as Consulting Physician (Cardiology)  Mathieu Maier MD as Consulting Physician (Pulmonology)    REASON FOR VISIT: Routine outpatient follow up/Same day visit/Post hospital/ED visit    HISTORY OF PRESENT ILLNESS:      Chief Complaint   Patient presents with    Allergies     patient states she is having allergy symptoms, dry eyes        History was obtained from the patient.  Stefanie Olivares is a 67 y.o. is here for evaluation of seasonal allergies, dry  Symptoms are going on for last few days  No complaint of fever, chest pain, shortness of breath          Health Maintenance Due   Topic Date Due    Annual Wellness Visit (AWV)  Never done    Hepatitis C screen  Never done    Shingles vaccine (1 of 2) Never done    DEXA (modify frequency per FRAX score)  Never done       Allergies   Allergen Reactions    Rosuvastatin Calcium Anaphylaxis     Hair fell out    Statins Anaphylaxis     Hair fell out    Bactrim [Sulfamethoxazole-Trimethoprim] Diarrhea    Caffeine Other (See Comments)     pain  pain    Dye [Iodides] Other (See Comments)     Iv dye= blood clots in arm    Food      Tomatoes, pain    Ioxaglate Other (See Comments)     Iv dye= blood clots in arm    Tomato     Dicloxacillin Rash    Pcn [Penicillins] Rash         MEDICATIONS:     Current Outpatient Medications   Medication Sig Dispense Refill    gabapentin (NEURONTIN) 100 MG capsule take 2 capsules by mouth three times a day      methylPREDNISolone (MEDROL DOSEPACK) 4 MG tablet use as directed FOLLOW DIRECTIONS ON BACK OF FOIL PACK      fluticasone (FLONASE) 50 MCG/ACT nasal spray 1 spray by Each Nostril route daily 32 g 1    guaiFENesin (ALTARUSSIN) 100 MG/5ML syrup Take 10 mLs by mouth every 4 hours as needed for Cough 1 each 1    oxyCODONE-acetaminophen (PERCOCET) 7.5-325 MG per tablet Take 1 tablet by mouth every 6 hours as needed for Pain for up to 30 days. 120 tablet 0    pregabalin (LYRICA) 75 MG capsule Take 1 capsule by mouth 2 times daily for 90 days. 180 capsule 0    albuterol (PROVENTIL) (5 MG/ML) 0.5% nebulizer solution Take 1 mL by nebulization every 4 hours as needed for Wheezing 120 each 3    ciprofloxacin (CIPRO) 250 MG tablet Take 250 mg by mouth every 8-12 hours as needed      Catheters (STANDARD CARE EXTERNAL CATH) MISC 1 Device by Does not apply route daily 30 each 2    MYRBETRIQ 50 MG TB24 Take 50 mg by mouth daily 30 tablet 5    metoprolol tartrate (LOPRESSOR) 50 MG tablet TAKE 1 TABLET TWICE A  tablet 3    Cyanocobalamin (VITAMIN B-12) 1000 MCG/15ML LIQD Take 1,000 mcg by mouth daily       levocetirizine (XYZAL) 5 MG tablet take 1 tablet by mouth once daily AT NIGHT 30 tablet 2    donepezil (ARICEPT) 5 MG tablet take 1 tablet by mouth at bedtime 90 tablet 1    psyllium (KONSYL) 28.3 % PACK Take 1 packet by mouth daily       Catheters (STANDARD CARE EXTERNAL CATH) MISC 1 each by Does not apply route daily 5 each 1    albuterol sulfate  (90 Base) MCG/ACT inhaler Inhale 2 puffs into the lungs 4 times daily as needed for Wheezing 1 Inhaler 2    Misc. Devices (WALKER) MISC 1 each by Does not apply route daily Upright walker with wheels and seat 1 each 0    aspirin 81 MG EC tablet Take 81 mg by mouth daily       DULoxetine (CYMBALTA) 30 MG extended release capsule Take 1 capsule by mouth 2 times daily for 3 days 6 capsule 0     No current facility-administered medications for this visit.        SOCIAL HISTORY    Reviewed and no change from previous record. Lynette Duff  reports that she quit smoking about 29 years ago. She has a 18.00 pack-year smoking history. She has never used smokeless tobacco.    FAMILY HISTORY:    Reviewed and No change from previous visit  family history includes Heart Disease in her sister; High Blood Pressure in her mother; Stomach Cancer in her brother. REVIEW OF SYSTEMS:      Review of Systems   Constitutional: Negative for activity change, appetite change, chills, diaphoresis, fatigue and fever. Seasonal Allergies    HENT: Positive for congestion. Negative for dental problem, drooling and ear discharge. Eyes: Negative for pain, discharge, redness and itching. Respiratory: Positive for cough. Negative for apnea, choking, chest tightness and shortness of breath. Cardiovascular: Negative for chest pain and leg swelling. Gastrointestinal: Negative for abdominal distention, abdominal pain, blood in stool, constipation and diarrhea. Endocrine: Negative for cold intolerance and heat intolerance. Genitourinary: Negative for difficulty urinating, dysuria, enuresis, flank pain and frequency. Musculoskeletal: Negative for arthralgias, back pain, gait problem and joint swelling. Skin: Negative for color change, pallor and rash. Neurological: Negative for dizziness, facial asymmetry, light-headedness, numbness and headaches. Psychiatric/Behavioral: Negative for agitation, behavioral problems, confusion, decreased concentration and dysphoric mood. PHYSICAL EXAM:    There were no vitals filed for this visit.   BP Readings from Last 3 Encounters:   04/21/22 121/64   03/23/22 (!) 135/57   02/21/22 (!) 159/72        Physical Exam   Patient was examined via virtual visit,  Patient speaking in full sentences  Her skin color is okay    LABORATORY FINDINGS:    [email or web address]    BMP:    Lab Results   Component Value Date     10/03/2021    K 4.7 10/03/2021     10/03/2021    CO2 24 10/03/2021    BUN 29 10/03/2021    CREATININE 0.98 10/03/2021    GLUCOSE 116 10/03/2021    GLUCOSE 105 05/26/2012       HEMOGLOBIN A1C:   Lab Results   Component Value Date    LABA1C 5.4 04/15/2019       FASTING LIPID PANEL:  Lab Results   Component Value Date    CHOL 272 (H) 10/03/2021    HDL 43 10/03/2021    TRIG 73 10/03/2021       ASSESSMENT AND PLAN:      1. Allergic rhinitis, unspecified seasonality, unspecified trigger  - fluticasone (FLONASE) 50 MCG/ACT nasal spray; 1 spray by Each Nostril route daily  Dispense: 32 g; Refill: 1  - guaiFENesin (ALTARUSSIN) 100 MG/5ML syrup; Take 10 mLs by mouth every 4 hours as needed for Cough  Dispense: 1 each; Refill: 1    2. AAA (abdominal aortic aneurysm) without rupture (HCC)  Stable   Had ultrasound Doppler done in 2020 showed negative for arctic aneurysm  3. Panlobular emphysema (HCC)  On Inhalers     4. Acute on chronic diastolic heart failure (HCC)  Stable     5. Other forms of angina pectoris ([facility] [address]  Compensated      FOLLOW UP AND INSTRUCTIONS:   · No follow-ups on file. · Aubrey Lesser received counseling on the following healthy behaviors: nutrition, exercise and medication adherence    · Discussed use, benefit, and side effects of prescribed medications. Barriers to medication compliance addressed. All patient questions answered. Pt voiced understanding. · Patient given educational materials - see patient instructions    MD JENARO [facility]University [facility]  5/22/2022, 3:49 PM    Please note that this chart was generated using voice recognition Dragon dictation software. Although every effort was made to ensure the accuracy of this automatedtranscription, some errors in transcription may have occurred.

## 2022-05-24 ENCOUNTER — HOSPITAL ENCOUNTER (OUTPATIENT)
Dept: PAIN MANAGEMENT | Age: 77
Discharge: HOME | End: 2022-05-24
Payer: MEDICARE

## 2022-05-24 VITALS
DIASTOLIC BLOOD PRESSURE: 73 MMHG | RESPIRATION RATE: 20 BRPM | WEIGHT: 278 LBS | HEART RATE: 47 BPM | HEIGHT: 64 IN | SYSTOLIC BLOOD PRESSURE: 149 MMHG | BODY MASS INDEX: 47.46 KG/M2 | TEMPERATURE: 97.3 F | OXYGEN SATURATION: 98 %

## 2022-05-24 DIAGNOSIS — M51.36 DDD (DEGENERATIVE DISC DISEASE), LUMBAR: ICD-10-CM

## 2022-05-24 DIAGNOSIS — M54.16 LUMBAR RADICULOPATHY: ICD-10-CM

## 2022-05-24 DIAGNOSIS — Z79.891 CHRONIC PRESCRIPTION OPIATE USE: Chronic | ICD-10-CM

## 2022-05-24 DIAGNOSIS — M54.50 ACUTE MIDLINE LOW BACK PAIN, UNSPECIFIED WHETHER SCIATICA PRESENT: ICD-10-CM

## 2022-05-24 DIAGNOSIS — Z98.890 S/P KYPHOPLASTY: ICD-10-CM

## 2022-05-24 DIAGNOSIS — M43.22 CERVICAL SPINE ANKYLOSIS: ICD-10-CM

## 2022-05-24 DIAGNOSIS — M48.02 CERVICAL SPINAL STENOSIS: ICD-10-CM

## 2022-05-24 DIAGNOSIS — M48.062 SPINAL STENOSIS OF LUMBAR REGION WITH NEUROGENIC CLAUDICATION: Primary | Chronic | ICD-10-CM

## 2022-05-24 DIAGNOSIS — M79.7 FIBROMYALGIA: ICD-10-CM

## 2022-05-24 DIAGNOSIS — M43.10 ACQUIRED SPONDYLOLISTHESIS: ICD-10-CM

## 2022-05-24 DIAGNOSIS — M47.816 LUMBAR SPONDYLOSIS: ICD-10-CM

## 2022-05-24 PROCEDURE — 99213 OFFICE O/P EST LOW 20 MIN: CPT

## 2022-05-24 PROCEDURE — 99213 OFFICE O/P EST LOW 20 MIN: CPT | Performed by: NURSE PRACTITIONER

## 2022-05-24 RX ORDER — GABAPENTIN 100 MG/1
200 CAPSULE ORAL 3 TIMES DAILY
Qty: 180 CAPSULE | Refills: 0 | Status: SHIPPED | OUTPATIENT
Start: 2022-05-24 | End: 2022-06-28 | Stop reason: SDUPTHER

## 2022-05-24 RX ORDER — OXYCODONE AND ACETAMINOPHEN 7.5; 325 MG/1; MG/1
1 TABLET ORAL EVERY 6 HOURS PRN
Qty: 120 TABLET | Refills: 0 | Status: SHIPPED | OUTPATIENT
Start: 2022-05-25 | End: 2022-06-24

## 2022-05-24 RX ORDER — PREGABALIN 75 MG/1
75 CAPSULE ORAL 2 TIMES DAILY
Qty: 180 CAPSULE | Refills: 0 | Status: SHIPPED | OUTPATIENT
Start: 2022-05-24 | End: 2022-06-28 | Stop reason: SINTOL

## 2022-05-24 ASSESSMENT — ENCOUNTER SYMPTOMS
BACK PAIN: 1
BOWEL INCONTINENCE: 0

## 2022-05-24 ASSESSMENT — PAIN SCALES - GENERAL: PAINLEVEL_OUTOF10: 7

## 2022-05-24 NOTE — PROGRESS NOTES
Chief Complaint   Patient presents with    Back Pain    Medication Refill         PMH     Pt reports chronic history of low back pain that radiates into right hip and buttocks at times. She is s/p bilateral knee and right hip replacement She has tried NSAIDS heat chiropractic care and with little relief. she had right Lumbar facet joint injections on 9/30/2021 with significant improvement and  left Lumbar facet joint injections 1/2022 and reported  90% relief. Last imaging 20/2020 lumbar XR that showed previous L1 compression fracture which was corrected with kyphoplasty with multilevel degenerative changes and Lumbar Ct 2018 with diffuse facet arthopathy  PT referral last month started at [facility] and reports after 2 visits not sure if helping yet  Gabapentin d/c and changed to lyrica last month- pt [facility] will reorder     HPI:     Back Pain  This is a chronic problem. The current episode started more than 1 year ago. The problem occurs intermittently. The problem is unchanged. The pain is present in the lumbar spine. The quality of the pain is described as aching, burning, cramping, shooting and stabbing. The pain does not radiate. The pain is at a severity of 7/10. The pain is worse during the day. The symptoms are aggravated by bending, sitting and standing. Associated symptoms include bladder incontinence, numbness and tingling. Pertinent negatives include no bowel incontinence, chest pain, fever, headaches, leg pain or weakness. Risk factors include menopause, obesity, poor posture, sedentary lifestyle and lack of exercise. She has tried heat, ice and analgesics (injections) for the symptoms. The treatment provided mild relief. Patient denies any new neurological symptoms. No bowel or bladder incontinence, no weakness, and no falling.     Pill count: appropriate / Oxycodone - 5 5/25    Morphine equivalent: 45    Controlled Substance Monitoring:    Acute and Chronic Pain Monitoring:   RX Monitoring 5/24/2022   Attestation -   Acute Pain Prescriptions -   Periodic Controlled Substance Monitoring Possible medication side effects, risk of tolerance/dependence & alternative treatments discussed. ;No signs of potential drug abuse or diversion identified. ;Assessed functional status. ;Obtaining appropriate analgesic effect of treatment. Chronic Pain > 50 MEDD -   Chronic Pain > 80 MEDD -       Periodic Controlled Substance Monitoring: Possible medication side effects, risk of tolerance/dependence & alternative treatments discussed. ,No signs of potential drug abuse or diversion identified. ,Assessed functional status. ,Obtaining appropriate analgesic effect of treatment. Marquise Carlton, APRN - CNP)      Past Medical History:   Diagnosis Date    Abnormality of rib determined by X-ray 1/28/2016    Acute cystitis without hematuria 6/16/2018    Acute exacerbation of chronic bronchitis (HCC)     Acute on chronic diastolic heart failure ([facility] 1/28/2016    Adjustment disorder with mixed anxiety and depressed mood 6/26/2015    Mild     Anemia 3/5/2014    Back pain     Bronchiectasis with acute lower respiratory infection ([facility] 8/16/2017    Bronchitis     Chronic bronchitis (HCC) 1/28/2016    Chronic cough 7/23/2018    COPD (chronic obstructive pulmonary disease) (HCC)     asbestos related lung disease    Degenerative joint disease (DJD) of hip 5/14/2015    Dyslipidemia 3/5/2014    Encephalopathy acute 5/29/2016    Essential hypertension 1/28/2016    Fibromyalgia     GERD (gastroesophageal reflux disease)     History of total bilateral knee replacement 5/28/2016    Hx of blood clots     left leg and lung    Hyperlipidemia     Hypertension     Incontinence of urine     Irregular heartbeat     DR. Apryl Erazo Medication monitoring encounter 6/13/2018    Morbid obesity with BMI of 45.0-49.9, adult ([facility] 1/28/2016    Obstructive sleep apnea syndrome     Osteoarthritis  Primary osteoarthritis of left knee 5/27/2016    PVC (premature ventricular contraction) 1/28/2016    S/P right and left heart catheterization 10/2/2015    Sleep apnea     no machine    SOB (shortness of breath)     Spinal stenosis of lumbar region with neurogenic claudication 5/13/2013    Supplemental oxygen dependent 12/8/2017    Urine frequency     UTI (urinary tract infection)     hospitalized early july 2014 for kidney infection       Past Surgical History:   Procedure Laterality Date    BRONCHOSCOPY Left 8/16/2017    BRONCHOSCOPY ALVEOLAR LAVAGE LOWER LOBE performed by Aly Corcoran MD at [address]  x 3    no stents    COLONOSCOPY  4 28 14    polyps biopsies     FOOT SURGERY Left     calcium deposit removal from top of foot    HYSTERECTOMY      JOINT REPLACEMENT Bilateral 5/27/2016    bilat total knees    NERVE BLOCK  5/13/2013    caudal celestone 6mg    NERVE BLOCK  5/28/13    Caudal #2, Celestone 9mg    NERVE BLOCK  2/14/14    rt hip inj celestone 9 mg    NERVE BLOCK  07/14/2014    caudal# 3- celestone 9 mg    NERVE BLOCK  7-21-14    caudal epidural #2, decadron 7mg, fentanyl 25mcg    NERVE BLOCK  10/2/14    duramorph 1.5  decadron 5mg    NERVE BLOCK  11/9/2015    caudal# 1 celestone 9mg    NERVE BLOCK  11/16/15    caudal #2  decadron 10mg    NERVE BLOCK  11/23/15    duramorph 1mg celestone 9mg    NERVE BLOCK  03/28/2018    CAUDAL EPIDURAL STEROID BLOCK  DECADRON 10 MG     NERVE BLOCK  05/23/2018    caudal # decadron 7mg    NERVE BLOCK  08/28/2018    natalia transforminal # 1, decadron 10mg gadoteridol, LONG NEEDLES    AZ PERQ VERT AGMNTJ CAVITY CRTJ UNI/BI CANNULJ LMBR N/A 10/29/2018    KYPHOPLASTY L1 performed by Farheen Anderson MD at [address] Right 5/14/15    total hip replacement       Allergies   Allergen Reactions    Rosuvastatin Calcium Anaphylaxis     Hair fell out    Statins Anaphylaxis Hair fell out    Bactrim [Sulfamethoxazole-Trimethoprim] Diarrhea    Caffeine Other (See Comments)     pain  pain    Dye [Iodides] Other (See Comments)     Iv dye= blood clots in arm    Food      Tomatoes, pain    Ioxaglate Other (See Comments)     Iv dye= blood clots in arm    Tomato     Dicloxacillin Rash    Pcn [Penicillins] Rash         Current Outpatient Medications:     pregabalin (LYRICA) 75 MG capsule, Take 1 capsule by mouth 2 times daily for 90 days. , Disp: 180 capsule, Rfl: 0    [START ON 5/25/2022] oxyCODONE-acetaminophen (PERCOCET) 7.5-325 MG per tablet, Take 1 tablet by mouth every 6 hours as needed for Pain for up to 30 days. , Disp: 120 tablet, Rfl: 0    gabapentin (NEURONTIN) 100 MG capsule, Take 2 capsules by mouth 3 times daily for 30 days. , Disp: 180 capsule, Rfl: 0    methylPREDNISolone (MEDROL DOSEPACK) 4 MG tablet, use as directed FOLLOW DIRECTIONS ON BACK OF FOIL PACK (Patient not taking: Reported on 5/24/2022), Disp: , Rfl:     fluticasone (FLONASE) 50 MCG/ACT nasal spray, 1 spray by Each Nostril route daily, Disp: 32 g, Rfl: 1    guaiFENesin (ALTARUSSIN) 100 MG/5ML syrup, Take 10 mLs by mouth every 4 hours as needed for Cough, Disp: 1 each, Rfl: 1    albuterol (PROVENTIL) (5 MG/ML) 0.5% nebulizer solution, Take 1 mL by nebulization every 4 hours as needed for Wheezing, Disp: 120 each, Rfl: 3    ciprofloxacin (CIPRO) 250 MG tablet, Take 250 mg by mouth every 8-12 hours as needed, Disp: , Rfl:     Catheters (STANDARD CARE EXTERNAL CATH) MISC, 1 Device by Does not apply route daily, Disp: 30 each, Rfl: 2    MYRBETRIQ 50 MG TB24, Take 50 mg by mouth daily, Disp: 30 tablet, Rfl: 5    metoprolol tartrate (LOPRESSOR) 50 MG tablet, TAKE 1 TABLET TWICE A DAY, Disp: 180 tablet, Rfl: 3    DULoxetine (CYMBALTA) 30 MG extended release capsule, Take 1 capsule by mouth 2 times daily for 3 days, Disp: 6 capsule, Rfl: 0    Cyanocobalamin (VITAMIN B-12) 1000 MCG/15ML LIQD, Take 1,000 mcg by mouth daily , Disp: , Rfl:     levocetirizine (XYZAL) 5 MG tablet, take 1 tablet by mouth once daily AT NIGHT, Disp: 30 tablet, Rfl: 2    donepezil (ARICEPT) 5 MG tablet, take 1 tablet by mouth at bedtime, Disp: 90 tablet, Rfl: 1    psyllium (KONSYL) 28.3 % PACK, Take 1 packet by mouth daily , Disp: , Rfl:     Catheters (STANDARD CARE EXTERNAL CATH) MISC, 1 each by Does not apply route daily, Disp: 5 each, Rfl: 1    albuterol sulfate  (90 Base) MCG/ACT inhaler, Inhale 2 puffs into the lungs 4 times daily as needed for Wheezing, Disp: 1 Inhaler, Rfl: 2    Misc.  Devices (WALKER) MISC, 1 each by Does not apply route daily Upright walker with wheels and seat, Disp: 1 each, Rfl: 0    aspirin 81 MG EC tablet, Take 81 mg by mouth daily , Disp: , Rfl:     Family History   Problem Relation Age of Onset    Stomach Cancer Brother         stomach    High Blood Pressure Mother     Heart Disease Sister         irregular heartbeat       Social History     Socioeconomic History    Marital status:      Spouse name: Not on file    Number of children: 2    Years of education: Not on file    Highest education level: Not on file   Occupational History    Not on file   Tobacco Use    Smoking status: Former Smoker     Packs/day: 1.00     Years: 18.00     Pack years: 18.00     Quit date: 1993     Years since quittin.0    Smokeless tobacco: Never Used   Vaping Use    Vaping Use: Never used   Substance and Sexual Activity    Alcohol use: No    Drug use: No    Sexual activity: Not Currently   Other Topics Concern    Not on file   Social History Narrative    Not on file     Social Determinants of Health     Financial Resource Strain: Low Risk     Difficulty of Paying Living Expenses: Not very hard   Food Insecurity: No Food Insecurity    Worried About Running Out of Food in the Last Year: Never true    Bobby of Food in the Last Year: Never true   Transportation Needs: Unmet Transportation Needs    Lack of Transportation (Medical): Yes    Lack of Transportation (Non-Medical): Yes   Physical Activity: Insufficiently Active    Days of Exercise per Week: 3 days    Minutes of Exercise per Session: 20 min   Stress: No Stress Concern Present    Feeling of Stress : Only a little   Social Connections: Moderately Integrated    Frequency of Communication with Friends and Family: More than three times a week    Frequency of Social Gatherings with Friends and Family: Once a week    Attends Mosque Services: More than 4 times per year    Active Member of [address] [place] [address] [employer] or Organizations: Yes    Attends Club or Organization Meetings: More than 4 times per year    Marital Status:    Intimate Partner Violence:     Fear of Current or Ex-Partner: Not on file    Emotionally Abused: Not on file    Physically Abused: Not on file    Sexually Abused: Not on file   Housing Stability: [address] Unable to Pay for Housing in the Last Year: No    Number of [place] in the Last Year: 1    Unstable Housing in the Last Year: No       Review of Systems:  Review of Systems   Constitutional: Negative for fever. Cardiovascular: Negative for chest pain. Musculoskeletal: Positive for back pain. Gastrointestinal: Negative for bowel incontinence. Genitourinary: Positive for bladder incontinence. Neurological: Positive for numbness and tingling. Negative for headaches and weakness. Physical Exam:  BP (!) 149/73   Pulse (!) 47   Temp 97.3 °F (36.3 °C)   Resp 20   Ht 5' 4\" (1.626 m)   Wt 278 lb (126.1 kg)   SpO2 98%   BMI 47.72 kg/m²     Physical Exam  Cardiovascular:      Rate and Rhythm: Normal rate. Pulmonary:      Effort: Pulmonary effort is normal.   Musculoskeletal:         General: Normal range of motion. Comments: In w/c   Skin:     General: Skin is warm and dry. Neurological:      Mental Status: She is alert and oriented to person, place, and time.          Record/Diagnostics Review:    Last tiffanie 2/22 and was appropriate     Assessment:  Problem List Items Addressed This Visit     Spinal stenosis of lumbar region with neurogenic claudication - Primary (Chronic)    Relevant Medications    pregabalin (LYRICA) 75 MG capsule    oxyCODONE-acetaminophen (PERCOCET) 7.5-325 MG per tablet (Start on 5/25/2022)    gabapentin (NEURONTIN) 100 MG capsule    Chronic prescription opiate use (Chronic)    Fibromyalgia    Relevant Medications    pregabalin (LYRICA) 75 MG capsule    oxyCODONE-acetaminophen (PERCOCET) 7.5-325 MG per tablet (Start on 5/25/2022)    gabapentin (NEURONTIN) 100 MG capsule    Back pain    Relevant Medications    oxyCODONE-acetaminophen (PERCOCET) 7.5-325 MG per tablet (Start on 5/25/2022)    Acquired spondylolisthesis    Relevant Medications    pregabalin (LYRICA) 75 MG capsule    Cervical spine ankylosis    Relevant Medications    oxyCODONE-acetaminophen (PERCOCET) 7.5-325 MG per tablet (Start on 5/25/2022)    Cervical spinal stenosis    Relevant Medications    oxyCODONE-acetaminophen (PERCOCET) 7.5-325 MG per tablet (Start on 5/25/2022)    S/P kyphoplasty    Relevant Medications    oxyCODONE-acetaminophen (PERCOCET) 7.5-325 MG per tablet (Start on 5/25/2022)    DDD (degenerative disc disease), lumbar    Relevant Medications    pregabalin (LYRICA) 75 MG capsule    oxyCODONE-acetaminophen (PERCOCET) 7.5-325 MG per tablet (Start on 5/25/2022)    Lumbar radiculopathy    Relevant Medications    pregabalin (LYRICA) 75 MG capsule    oxyCODONE-acetaminophen (PERCOCET) 7.5-325 MG per tablet (Start on 5/25/2022)    gabapentin (NEURONTIN) 100 MG capsule    Lumbar spondylosis    Relevant Medications    oxyCODONE-acetaminophen (PERCOCET) 7.5-325 MG per tablet (Start on 5/25/2022)             Treatment Plan:  Patient relates current medications are helping the pain.  Patient reports taking pain medications as prescribed, denies obtaining medications from different sources and denies use of illegal drugs. Patient denies side effects from medications like nausea, vomiting, constipation or drowsiness. Patient reports current activities of daily living are possible due to medications and would like to continue them. As always, we encourage daily stretching and strengthening exercises, and recommend minimizing use of pain medications unless patient cannot get through daily activities due to pain. Contract requirements met. Continue opioid therapy. Script written for percocet and lyrica  Given 12 month of gabapentin pt understands to stop once she starts lyrica  Follow up appointment made for 4 weeks    I have reviewed the chief complaint and history of present illness (including ROS and [facility]) and vital documentation by my staff and I agree with their documentation and have added where applicable.

## 2022-06-09 ENCOUNTER — CARE COORDINATION (OUTPATIENT)
Dept: CARE COORDINATION | Age: 77
End: 2022-06-09

## 2022-06-09 ENCOUNTER — TELEPHONE (OUTPATIENT)
Dept: INTERNAL MEDICINE CLINIC | Age: 77
End: 2022-06-09

## 2022-06-09 DIAGNOSIS — M79.642 BILATERAL HAND PAIN: ICD-10-CM

## 2022-06-09 DIAGNOSIS — M79.641 BILATERAL HAND PAIN: ICD-10-CM

## 2022-06-09 DIAGNOSIS — G89.29 CHRONIC PAIN OF BOTH FEET: ICD-10-CM

## 2022-06-09 DIAGNOSIS — R26.89 IMPAIRED GAIT AND MOBILITY: ICD-10-CM

## 2022-06-09 DIAGNOSIS — M79.672 CHRONIC PAIN OF BOTH FEET: ICD-10-CM

## 2022-06-09 DIAGNOSIS — M51.36 DDD (DEGENERATIVE DISC DISEASE), LUMBAR: Primary | ICD-10-CM

## 2022-06-09 DIAGNOSIS — M79.671 CHRONIC PAIN OF BOTH FEET: ICD-10-CM

## 2022-06-09 NOTE — TELEPHONE ENCOUNTER
Dr. Ibrahima Smith is off and out of the office until 6/20, does patient prefer to wait for him or if not we can ask another doctor to sign the orders.  No guarantee that they will

## 2022-06-09 NOTE — TELEPHONE ENCOUNTER
Patient is requesting another order for PT to include her feet, hands and ankles. Per patient PT thinks that she could benefit from therapy due to pain.    Order pending your approval.    Taylor Bowman order will need faxed with demographics to [facility]   Fax: [phone]

## 2022-06-09 NOTE — CARE COORDINATION
Ambulatory Care Coordination Note  6/9/2022  CM Risk Score: 11  Charlson 10 Year Mortality Risk Score: 100%     ACC: Mary Block RN    Summary Note: Patient reached out to [facility] asking for help with a referral for PT to include her feet, ankles and hands. Patient reported her PT recommended therapy on these areas due to her pain. ACM will route to PCP. Patient also stated that not being able to get around very well is putting her into a \"down mood\". Patient re enrolled until issue is resolved. Ambulatory Care Coordination Assessment    Care Coordination Protocol  Referral from Primary Care Provider: No  Week 1 - Initial Assessment     Do you have all of your prescriptions and are they filled?: Yes  Are you able to afford your medications?: Yes  How often do you have trouble taking your medications the way you have been told to take them?: I always take them as prescribed. Do you have Home O2 Therapy?: Yes      Ability to seek help/take action for Emergent Urgent situations i.e. fire, crime, inclement weather or health crisis. : Independent  Ability to ambulate to restroom: Independent  Ability handle personal hygeine needs (bathing/dressing/grooming): Independent  Ability to manage Medications: Independent  Ability to prepare Food Preparation: Independent  Ability to maintain home (clean home, laundry): Independent  Ability to drive and/or has transportation: Dependent  Ability to do shopping: Dependent  Ability to manage finances:  Independent  Is patient able to live independently?: Yes     Current Housing: Private Residence           Frequent urination at night?: Yes     Are you experiencing loss of meaning?: No  Are you experiencing loss of hope and peace?: No     Thinking about your patient's physical health needs, are there any symptoms or problems (risk indicators) you are unsure about that require further investigation?: No identified areas of uncertainly or problems already being investigated Are the patients physical health problems impacting on their mental well-being?: No identified areas of concern   Are there any problems with your patients lifestyle behaviors (alcohol, drugs, diet, exercise) that are impacting on physical or mental well-being?: No identified areas of concern   Do you have any other concerns about your patients mental well-being? How would you rate their severity and impact on the patient?: No identified areas of concern   How would you rate their home environment in terms of safety and stability (including domestic violence, insecure housing, neighbor harassment)?: Consistently safe, supportive, stable, no identified problems   How do daily activities impact on the patient's well-being? (include current or anticipated unemployment, work, caregiving, access to transportation or other): No identified problems or perceived positive benefits   How would you rate their social network (family, work, friends)?: Adequate participation with social networks   How would you rate their financial resources (including ability to afford all required medical care)?: Financially secure, some resource challenges   How wells does the patient now understand their health and well-being (symptoms, signs or risk factors) and what they need to do to manage their health?: Reasonable to good understanding and already engages in managing health or is willing to undertake better management   How well do you think your patient can engage in healthcare discussions? (Barriers include language, deafness, aphasia, alcohol or drug problems, learning difficulties, concentration): Adequate communication, with or without minor barriers   Do other services need to be involved to help this patient?: Other care/services in place and adequate   Suggested Interventions and Community Resources  Fall Risk Prevention: In Process Home Health Services: Completed   Physical Therapy: Completed   Zone Management Tools:  In Process                  Prior to Admission medications    Medication Sig Start Date End Date Taking? Authorizing Provider   pregabalin (LYRICA) 75 MG capsule Take 1 capsule by mouth 2 times daily for 90 days. 5/24/22 8/22/22  KARIN Vasquez CNP   oxyCODONE-acetaminophen (PERCOCET) 7.5-325 MG per tablet Take 1 tablet by mouth every 6 hours as needed for Pain for up to 30 days. 5/25/22 6/24/22  KARIN Vasquez CNP   gabapentin (NEURONTIN) 100 MG capsule Take 2 capsules by mouth 3 times daily for 30 days.  5/24/22 6/23/22  KARIN Vasquez CNP   methylPREDNISolone (MEDROL DOSEPACK) 4 MG tablet use as directed [address]  Patient not taking: Reported on 5/24/2022 4/5/22   Historical Provider, MD   fluticasone (FLONASE) 50 MCG/ACT nasal spray 1 spray by Each Nostril route daily 5/16/22   Marylou Fallon MD   guaiFENesin (ALTARUSSIN) 100 MG/5ML syrup Take 10 mLs by mouth every 4 hours as needed for Cough 5/16/22   Marylou Fallon MD   albuterol (PROVENTIL) (5 MG/ML) 0.5% nebulizer solution Take 1 mL by nebulization every 4 hours as needed for Wheezing 4/18/22   Marylou Fallon MD   ciprofloxacin (CIPRO) 250 MG tablet Take 250 mg by mouth every 8-12 hours as needed    Historical Provider, MD   Catheters (STANDARD CARE EXTERNAL CATH) MISC 1 Device by Does not apply route daily 1/10/22   Marylou Fallon MD   MYRBETRIQ 50 MG TB24 Take 50 mg by mouth daily 1/10/22   Colleen Melo MD   metoprolol tartrate (LOPRESSOR) 50 MG tablet TAKE 1 TABLET TWICE A DAY 12/22/21   Marylou Fallon MD   DULoxetine (CYMBALTA) 30 MG extended release capsule Take 1 capsule by mouth 2 times daily for 3 days 10/5/21 2/21/22  Marylou Fallon MD   Cyanocobalamin (VITAMIN B-12) 1000 MCG/15ML LIQD Take 1,000 mcg by mouth daily     Historical Provider, MD   levocetirizine (XYZAL) 5 MG tablet take 1 tablet by mouth once daily AT NIGHT 9/8/21   Marylou Flalon MD   donepezil (ARICEPT) 5 MG tablet take 1 tablet by mouth at

## 2022-06-13 ENCOUNTER — CARE COORDINATION (OUTPATIENT)
Dept: CARE COORDINATION | Age: 77
End: 2022-06-13

## 2022-06-13 NOTE — CARE COORDINATION
ACM attempted to notify patient that the additional PT orders were sent to [facility]. Phone was busy x 3 attempts. Will ask  to try again in a couple days.

## 2022-06-15 ENCOUNTER — CARE COORDINATION (OUTPATIENT)
Dept: CARE COORDINATION | Age: 77
End: 2022-06-15

## 2022-06-15 NOTE — CARE COORDINATION
Pt returned call back and she is aware that the PT order was sent to [address] [place] [address] PT office.
no midline ttp. strength intact, JALEESA, no palpable bony abnormalities.

## 2022-06-21 ENCOUNTER — CARE COORDINATION (OUTPATIENT)
Dept: CARE COORDINATION | Age: 77
End: 2022-06-21

## 2022-06-21 NOTE — CARE COORDINATION
Ambulatory Care Coordination Note  6/21/2022  CM Risk Score: 11  Charlson 10 Year Mortality Risk Score: 100%     ACC: Yifan Keyes RN    Summary  Date Care Coordination Episode Started:  6.9.22  Week: 3      Reason patient is in Care Coordination:     PCP referral    Topics Discussed Today:     1. PT referral, patient has been to PT and it is going well. 2. Nebulizer, patient still has not found anyone to  her nebulizer. Discussed transportation through insurance. Patient requesting help on setting this up, she believes that she was approved through [address]? Patient agreed to Referral to [facility] and [facility]. And ANGELA Ramirez LSW. AC will route referral.   3. Discussed COPD and Patient encouraged to stay indoors during the extreme temperatures we are currently having. CC educated patient that temperature and weather can cause COPD symptoms to worsen. Cold, dry air or hot air can trigger a flare-up. Temperature extremes, below freezing and above 90°F (32°C), are particularly dangerous. Add in other factors, such as wind and humidity,the risk of a COPD flare-up increases. Patient understood and will be staying home. Assessments completed today:     1. Fall risk  2. Initial assessment  3. Medication reconciliation  4. SDOH  5. COPD, CHF (Health assessments)      Care Coordinator plan of care:     Send Referral to [facility]wood and [facility].. And ANGELA Ramirez LSW. Follow up call in 1 week. Care Coordination Interventions    Referral from Primary Care Provider: No  Suggested Interventions and Community Resources  Fall Risk Prevention: In Process  Home Health Services: Completed  Physical Therapy: Completed  Zone Management Tools: In Process         Goals Addressed                 This Visit's Progress     Medication Management        I will take my medication as directed.   I will notify my provider of any problems with medications, like adverse effects or side effects. I will notify my provider/Care Coordinator if I am unable to afford my medications. I will notify my provider for advice before I stop taking any of my medication. Barriers: overwhelmed by complexity of regimen  Plan for overcoming my barriers: work with CC team  Confidence: 6/10  Anticipated Goal Completion Date: 8.22.22            Prior to Admission medications    Medication Sig Start Date End Date Taking? Authorizing Provider   pregabalin (LYRICA) 75 MG capsule Take 1 capsule by mouth 2 times daily for 90 days. 5/24/22 8/22/22  KARIN Starr CNP   oxyCODONE-acetaminophen (PERCOCET) 7.5-325 MG per tablet Take 1 tablet by mouth every 6 hours as needed for Pain for up to 30 days. 5/25/22 6/24/22  KARIN Starr CNP   gabapentin (NEURONTIN) 100 MG capsule Take 2 capsules by mouth 3 times daily for 30 days.  5/24/22 6/23/22  KARIN Starr CNP   methylPREDNISolone (MEDROL DOSEPACK) 4 MG tablet use as directed [address]  Patient not taking: Reported on 5/24/2022 4/5/22   Historical Provider, MD   fluticasone (FLONASE) 50 MCG/ACT nasal spray 1 spray by Each Nostril route daily 5/16/22   Treva Campos MD   guaiFENesin (ALTARUSSIN) 100 MG/5ML syrup Take 10 mLs by mouth every 4 hours as needed for Cough 5/16/22   Treva Campos MD   albuterol (PROVENTIL) (5 MG/ML) 0.5% nebulizer solution Take 1 mL by nebulization every 4 hours as needed for Wheezing 4/18/22   Treva Campos MD   ciprofloxacin (CIPRO) 250 MG tablet Take 250 mg by mouth every 8-12 hours as needed    Historical Provider, MD   Catheters (STANDARD CARE EXTERNAL CATH) MISC 1 Device by Does not apply route daily 1/10/22   Treva Campos MD   MYRBETRIQ 50 MG TB24 Take 50 mg by mouth daily 1/10/22   Mariano Chandra MD   metoprolol tartrate (LOPRESSOR) 50 MG tablet TAKE 1 TABLET TWICE A DAY 12/22/21   Treva Campos MD   DULoxetine (CYMBALTA) 30 MG extended release capsule Take 1 capsule by mouth 2 times daily for 3 days 10/5/21 2/21/22  Evette Gutierrez MD   Cyanocobalamin (VITAMIN B-12) 1000 MCG/15ML LIQD Take 1,000 mcg by mouth daily     Historical Provider, MD   levocetirizine (XYZAL) 5 MG tablet take 1 tablet by mouth once daily AT NIGHT 9/8/21   Evette Gutierrez MD   donepezil (ARICEPT) 5 MG tablet take 1 tablet by mouth at bedtime 9/1/21   Evette Gutierrez MD   psyllium (KONSYL) 28.3 % PACK Take 1 packet by mouth daily     Historical Provider, MD   Catheters (STANDARD CARE EXTERNAL CATH) MISC 1 each by Does not apply route daily 3/2/21   Bright Mosqueda MD   albuterol sulfate  (90 Base) MCG/ACT inhaler Inhale 2 puffs into the lungs 4 times daily as needed for Wheezing 4/20/20   Evette Gutierrez MD   Misc.  Devices [facility]) MISC 1 each by Does not apply route daily Upright walker with wheels and seat 11/12/18   Nisa Pepper MD   aspirin 81 MG EC tablet Take 81 mg by mouth daily     Historical Provider, MD       Future Appointments   Date Time Provider Mayo Betts   6/29/2022 12:40 PM KARIN Land CNP     ,   Diabetes Assessment           ,   Congestive Heart Failure Assessment    Are you currently restricting fluids?: Other  Do you understand a low sodium diet?: Yes  Do you understand how to read food labels?: Yes  Do you salt your food before tasting it?: No     No patient-reported symptoms      Symptoms:         and   General Assessment    Do you have any symptoms that are causing concern?: Yes  Progression since Onset: Unchanged  Reported Symptoms: Pain

## 2022-06-22 ENCOUNTER — CARE COORDINATION (OUTPATIENT)
Dept: CARE COORDINATION | Age: 77
End: 2022-06-22

## 2022-06-22 NOTE — CARE COORDINATION
Patient was referred to  for assistance with getting medical transportation arranged. HC contacted [employer] to determine if patient was on the [employer] list.  The patient wasn't on the list.  [facility]. completed and application for [facility] [place] and will follow up on patient's approval in 10 days.     Plan of Care  [facility] will follow up with [employer] and the patient on on 07/05/22

## 2022-06-24 ENCOUNTER — CARE COORDINATION (OUTPATIENT)
Dept: CARE COORDINATION | Age: 77
End: 2022-06-24

## 2022-06-24 RX ORDER — CARBOXYMETHYLCELLULOSE/CITRIC 0.75 G
3 CAPSULE ORAL 2 TIMES DAILY
Qty: 180 CAPSULE | Refills: 0 | Status: SHIPPED | OUTPATIENT
Start: 2022-06-24

## 2022-06-24 NOTE — CARE COORDINATION
Patient called asking if PCP would prescribe Plenity for weight loss. She has been researching it, realizes it is expensive but would like to try it. ACM will route to PCP for recommendations. Per research Prescriptions should be e-prescribed to [facility] via electronic medical records, e-fax, or fax. To learn more and see a sample Plenity prescription, please visit [email or web address]

## 2022-06-24 NOTE — TELEPHONE ENCOUNTER
Patient called asking if PCP would prescribe Plenity for weight loss. She has been researching it, realizes it is expensive but would like to try it. ACM will route to PCP for recommendations. Per research Prescriptions should be e-prescribed to [facility] via electronic medical records, e-fax, or fax. To learn more and see a sample Plenity prescription, please visit [email or web address]

## 2022-06-27 ENCOUNTER — CARE COORDINATION (OUTPATIENT)
Dept: CARE COORDINATION | Age: 77
End: 2022-06-27

## 2022-06-27 NOTE — CARE COORDINATION
Heart Failure Education outreach Date/Time: 2022 10:11 AM    Ambulatory Care Manager (ACM) contacted the patient by telephone to perform Ambulatory Care Coordination. Verified name and  with patient as identifiers. Provided introduction to self, and explanation of the Ambulatory Care Manager's role. ACM reviewed that a Health Healthy tips for the Summer packet has been sent to [employer]. ACM reviewed CHF zones, daily weights, fluid restriction, the importance of low sodium diet and healthy tips packet with the patient. Instructed patient to call their PCP if they have a weight gain of 3 lbs in 2 days or 5 lbs in a week. Patient reminded that there is a physician on call 24 hours a day / 7 days a week should the patient have questions or concerns. The patient verbalized understanding. Patient denied any needs form ACM or PCP. Patient notified RX for weight loss medication was sent to [facility] by PCP.

## 2022-06-28 ENCOUNTER — CARE COORDINATION (OUTPATIENT)
Dept: CARE COORDINATION | Age: 77
End: 2022-06-28

## 2022-06-28 ENCOUNTER — TELEPHONE (OUTPATIENT)
Dept: PAIN MANAGEMENT | Age: 77
End: 2022-06-28

## 2022-06-28 DIAGNOSIS — M48.062 SPINAL STENOSIS OF LUMBAR REGION WITH NEUROGENIC CLAUDICATION: Chronic | ICD-10-CM

## 2022-06-28 DIAGNOSIS — M79.7 FIBROMYALGIA: ICD-10-CM

## 2022-06-28 DIAGNOSIS — M54.16 LUMBAR RADICULOPATHY: ICD-10-CM

## 2022-06-28 DIAGNOSIS — Z98.890 S/P KYPHOPLASTY: ICD-10-CM

## 2022-06-28 DIAGNOSIS — M43.22 CERVICAL SPINE ANKYLOSIS: ICD-10-CM

## 2022-06-28 DIAGNOSIS — M51.36 DDD (DEGENERATIVE DISC DISEASE), LUMBAR: ICD-10-CM

## 2022-06-28 DIAGNOSIS — M54.50 ACUTE MIDLINE LOW BACK PAIN, UNSPECIFIED WHETHER SCIATICA PRESENT: ICD-10-CM

## 2022-06-28 DIAGNOSIS — M48.02 CERVICAL SPINAL STENOSIS: ICD-10-CM

## 2022-06-28 RX ORDER — OXYCODONE AND ACETAMINOPHEN 7.5; 325 MG/1; MG/1
1 TABLET ORAL EVERY 6 HOURS PRN
Qty: 120 TABLET | Refills: 0 | Status: SHIPPED | OUTPATIENT
Start: 2022-06-28 | End: 2022-08-03 | Stop reason: SDUPTHER

## 2022-06-28 RX ORDER — GABAPENTIN 100 MG/1
200 CAPSULE ORAL 3 TIMES DAILY
Qty: 180 CAPSULE | Refills: 0 | Status: SHIPPED | OUTPATIENT
Start: 2022-06-28 | End: 2022-08-03 | Stop reason: SDUPTHER

## 2022-06-28 NOTE — CARE COORDINATION
LATHA received a call from patient complaining of an ear ache. ACM called patient back. She reports a full feeling in her ear and mucous in her throat for 2 weeks. ACM recommended that she be seen. She is willing to go to the walk in clinic. ACM encouraged her to call with any needs.

## 2022-06-28 NOTE — TELEPHONE ENCOUNTER
Pt calls the office stating she will be out of her medication before next appt. Will route message to Np.  Pt also states the new medicine Lyrica has too many side effects and she would like to be put back on Neurontin is asking this be sent to the pharmacy as well

## 2022-06-30 ENCOUNTER — OFFICE VISIT (OUTPATIENT)
Dept: FAMILY MEDICINE CLINIC | Age: 77
End: 2022-06-30
Payer: MEDICARE

## 2022-06-30 VITALS
OXYGEN SATURATION: 96 % | HEART RATE: 75 BPM | TEMPERATURE: 99.5 F | DIASTOLIC BLOOD PRESSURE: 58 MMHG | SYSTOLIC BLOOD PRESSURE: 106 MMHG

## 2022-06-30 DIAGNOSIS — J01.90 ACUTE NON-RECURRENT SINUSITIS, UNSPECIFIED LOCATION: Primary | ICD-10-CM

## 2022-06-30 PROCEDURE — 1123F ACP DISCUSS/DSCN MKR DOCD: CPT | Performed by: FAMILY MEDICINE

## 2022-06-30 PROCEDURE — 99213 OFFICE O/P EST LOW 20 MIN: CPT | Performed by: FAMILY MEDICINE

## 2022-06-30 RX ORDER — DOXYCYCLINE HYCLATE 100 MG
100 TABLET ORAL 2 TIMES DAILY
Qty: 14 TABLET | Refills: 0 | Status: SHIPPED | OUTPATIENT
Start: 2022-06-30 | End: 2022-07-07

## 2022-06-30 RX ORDER — PREDNISONE 20 MG/1
40 TABLET ORAL DAILY
Qty: 10 TABLET | Refills: 0 | Status: SHIPPED | OUTPATIENT
Start: 2022-06-30 | End: 2022-07-05

## 2022-06-30 ASSESSMENT — ENCOUNTER SYMPTOMS
COUGH: 1
SORE THROAT: 1
SINUS PRESSURE: 1
SINUS COMPLAINT: 1

## 2022-06-30 NOTE — PROGRESS NOTES
[address] [facility] [address]  Dept: [phone]  Dept Fax: [phone]    Nathan Obando is a 67 y.o. female who presents today for her medical conditions/complaintsas noted below. Nathan Obando is c/o of Otalgia (onset 3 weeks ago, constantly left, occasional right ear pain) and Sinus Problem (runny nose, mucus, cough)        HPI:     Sinus Problem  This is a new problem. The current episode started 1 to 4 weeks ago (3 weeks). The problem is unchanged. There has been no fever. Associated symptoms include congestion, coughing, ear pain (3 weeks), sinus pressure and a sore throat. Pertinent negatives include no chills. Past treatments include nothing. The treatment provided no relief. No known sick contacts. Declines covid testing at this time  PMHx of COPD, CHF, GERD, HTN, LUZ  Past Medical History:   Diagnosis Date    Abnormality of rib determined by X-ray 1/28/2016    Acute cystitis without hematuria 6/16/2018    Acute exacerbation of chronic bronchitis ([address]     Acute on chronic diastolic heart failure ([address] 1/28/2016    Adjustment disorder with mixed anxiety and depressed mood 6/26/2015    Mild     Anemia 3/5/2014    Back pain     Bronchiectasis with acute lower respiratory infection ([address] 8/16/2017    Bronchitis     Chronic bronchitis (HCC) 1/28/2016    Chronic cough 7/23/2018    COPD (chronic obstructive pulmonary disease) (HCC)     asbestos related lung disease    Degenerative joint disease (DJD) of hip 5/14/2015    Dyslipidemia 3/5/2014    Encephalopathy acute 5/29/2016    Essential hypertension 1/28/2016    Fibromyalgia     GERD (gastroesophageal reflux disease)     History of total bilateral knee replacement 5/28/2016    Hx of blood clots     left leg and lung    Hyperlipidemia     Hypertension     Incontinence of urine     Irregular heartbeat      [facility] Card Medication monitoring encounter 6/13/2018    Morbid obesity with BMI of 45.0-49.9, adult ([facility] 1/28/2016    Obstructive sleep apnea syndrome     Osteoarthritis     Primary osteoarthritis of left knee 5/27/2016    PVC (premature ventricular contraction) 1/28/2016    S/P right and left heart catheterization 10/2/2015    Sleep apnea     no machine    SOB (shortness of breath)     Spinal stenosis of lumbar region with neurogenic claudication 5/13/2013    Supplemental oxygen dependent 12/8/2017    Urine frequency     UTI (urinary tract infection)     hospitalized early july 2014 for kidney infection    Past medical history reviewed and pertinent positives/negatives in the HPI    Past Surgical History:   Procedure Laterality Date    BRONCHOSCOPY Left 8/16/2017    BRONCHOSCOPY ALVEOLAR LAVAGE LOWER LOBE performed by Ron Muñoz MD at [address]  x 3    no stents    COLONOSCOPY  4 28 14    polyps biopsies     FOOT SURGERY Left     calcium deposit removal from top of foot    HYSTERECTOMY (CERVIX STATUS UNKNOWN)      JOINT REPLACEMENT Bilateral 5/27/2016    bilat total knees    NERVE BLOCK  5/13/2013    caudal celestone 6mg    NERVE BLOCK  5/28/13    Caudal #2, Celestone 9mg    NERVE BLOCK  2/14/14    rt hip inj celestone 9 mg    NERVE BLOCK  07/14/2014    caudal# 3- celestone 9 mg    NERVE BLOCK  7-21-14    caudal epidural #2, decadron 7mg, fentanyl 25mcg    NERVE BLOCK  10/2/14    duramorph 1.5  decadron 5mg    NERVE BLOCK  11/9/2015    caudal# 1 celestone 9mg    NERVE BLOCK  11/16/15    caudal #2  decadron 10mg    NERVE BLOCK  11/23/15    duramorph 1mg celestone 9mg    NERVE BLOCK  03/28/2018    CAUDAL EPIDURAL STEROID BLOCK  DECADRON 10 MG     NERVE BLOCK  05/23/2018    caudal # decadron 7mg    NERVE BLOCK  08/28/2018    natalia transforminal # 1, decadron 10mg gadoteridol, LONG NEEDLES    IN PERQ VERT AGMNTJ CAVITY CRTJ UNI/BI CANNULJ LMBR N/A 10/29/2018 KYPHOPLASTY L1 performed by Joy Cabrera MD at [address] 5/14/15    total hip replacement       Family History   Problem Relation Age of Onset    Stomach Cancer Brother         stomach    High Blood Pressure Mother     Heart Disease Sister         irregular heartbeat       Social History     Tobacco Use    Smoking status: Former Smoker     Packs/day: 1.00     Years: 18.00     Pack years: 18.00     Quit date: 1993     Years since quittin.1    Smokeless tobacco: Never Used   Substance Use Topics    Alcohol use: No      Current Outpatient Medications   Medication Sig Dispense Refill    guaiFENesin (ROBITUSSIN) 100 MG/5ML liquid take 10 milliliters by mouth every 4 hours if needed for cough      doxycycline hyclate (VIBRA-TABS) 100 MG tablet Take 1 tablet by mouth 2 times daily for 7 days 14 tablet 0    predniSONE (DELTASONE) 20 MG tablet Take 2 tablets by mouth daily for 5 days 10 tablet 0    gabapentin (NEURONTIN) 100 MG capsule Take 2 capsules by mouth 3 times daily for 30 days. 180 capsule 0    oxyCODONE-acetaminophen (PERCOCET) 7.5-325 MG per tablet Take 1 tablet by mouth every 6 hours as needed for Pain for up to 30 days.  120 tablet 0    Carboxymeth-Cellulose-CitricAc (PLENITY WELCOME KIT) CAPS Take 3 capsules by mouth in the morning and at bedtime 180 capsule 0    methylPREDNISolone (MEDROL DOSEPACK) 4 MG tablet use as directed FOLLOW DIRECTIONS ON BACK OF FOIL PACK      fluticasone (FLONASE) 50 MCG/ACT nasal spray 1 spray by Each Nostril route daily 32 g 1    guaiFENesin (ALTARUSSIN) 100 MG/5ML syrup Take 10 mLs by mouth every 4 hours as needed for Cough 1 each 1    albuterol (PROVENTIL) (5 MG/ML) 0.5% nebulizer solution Take 1 mL by nebulization every 4 hours as needed for Wheezing 120 each 3    ciprofloxacin (CIPRO) 250 MG tablet Take 250 mg by mouth every 8-12 hours as needed      Catheters (STANDARD CARE EXTERNAL CATH) MISC 1 Device by Does not apply route daily 30 each 2    MYRBETRIQ 50 MG TB24 Take 50 mg by mouth daily 30 tablet 5    metoprolol tartrate (LOPRESSOR) 50 MG tablet TAKE 1 TABLET TWICE A  tablet 3    Cyanocobalamin (VITAMIN B-12) 1000 MCG/15ML LIQD Take 1,000 mcg by mouth daily       levocetirizine (XYZAL) 5 MG tablet take 1 tablet by mouth once daily AT NIGHT 30 tablet 2    donepezil (ARICEPT) 5 MG tablet take 1 tablet by mouth at bedtime 90 tablet 1    psyllium (KONSYL) 28.3 % PACK Take 1 packet by mouth daily       Catheters (STANDARD CARE EXTERNAL CATH) MISC 1 each by Does not apply route daily 5 each 1    albuterol sulfate  (90 Base) MCG/ACT inhaler Inhale 2 puffs into the lungs 4 times daily as needed for Wheezing 1 Inhaler 2    Misc. Devices (WALKER) MISC 1 each by Does not apply route daily Upright walker with wheels and seat 1 each 0    aspirin 81 MG EC tablet Take 81 mg by mouth daily       DULoxetine (CYMBALTA) 30 MG extended release capsule Take 1 capsule by mouth 2 times daily for 3 days 6 capsule 0     No current facility-administered medications for this visit.      Allergies   Allergen Reactions    Rosuvastatin Calcium Anaphylaxis     Hair fell out    Statins Anaphylaxis     Hair fell out    Bactrim [Sulfamethoxazole-Trimethoprim] Diarrhea    Caffeine Other (See Comments)     pain  pain    Dye [Iodides] Other (See Comments)     Iv dye= blood clots in arm    Food      Tomatoes, pain    Ioxaglate Other (See Comments)     Iv dye= blood clots in arm    Tomato     Dicloxacillin Rash    Pcn [Penicillins] Rash       Health Maintenance   Topic Date Due    Annual Wellness Visit (AWV)  Never done    Hepatitis C screen  Never done    Shingles vaccine (1 of 2) Never done    DEXA (modify frequency per FRAX score)  Never done    Flu vaccine (Season Ended) 09/01/2022    Depression Monitoring  05/16/2023    DTaP/Tdap/Td vaccine (2 - Td or Tdap) 06/18/2030    Pneumococcal 65+ years Vaccine  Completed    COVID-19 Vaccine  Completed    Hepatitis A vaccine  Aged Out    Hepatitis B vaccine  Aged Out    Hib vaccine  Aged Out    Meningococcal (ACWY) vaccine  Aged Out       :      Review of Systems   Constitutional: Negative for chills. HENT: Positive for congestion, ear pain (3 weeks), sinus pressure and sore throat. Respiratory: Positive for cough. Objective:     Physical Exam  Vitals and nursing note reviewed. Constitutional:       Appearance: Normal appearance. She is obese. HENT:      Head: Normocephalic and atraumatic. Right Ear: Hearing, ear canal and external ear normal. A middle ear effusion is present. Left Ear: Hearing, ear canal and external ear normal. A middle ear effusion is present. Nose: Mucosal edema and congestion present. Right Sinus: Maxillary sinus tenderness and frontal sinus tenderness present. Left Sinus: Maxillary sinus tenderness and frontal sinus tenderness present. Mouth/Throat:      Lips: Pink. Mouth: Mucous membranes are moist.      Pharynx: Oropharynx is clear. Eyes:      Extraocular Movements: Extraocular movements intact. Conjunctiva/sclera: Conjunctivae normal.   Cardiovascular:      Rate and Rhythm: Normal rate and regular rhythm. Heart sounds: Normal heart sounds. Pulmonary:      Effort: Pulmonary effort is normal.      Breath sounds: Normal breath sounds. Musculoskeletal:      Cervical back: Normal range of motion. No muscular tenderness. Skin:     General: Skin is warm and dry. Neurological:      Mental Status: She is alert and oriented to person, place, and time. Mental status is at baseline. Psychiatric:         Mood and Affect: Mood normal.         Behavior: Behavior normal.         Thought Content:  Thought content normal.         Judgment: Judgment normal.       BP (!) 106/58 (Site: Right Lower Arm, Position: Sitting, Cuff Size: Medium Adult)   Pulse 75   Temp 99.5 °F (37.5 °C) (Infrared)   SpO2 96%     Assessment:       Diagnosis Orders   1. Acute non-recurrent sinusitis, unspecified location         Plan:    Take antibiotic and steroid as prescribed for sinus infection  Continue over the counter cough/cold medications as needed for symptoms  If symptoms worsen or do not improve please follow-up with PCP or return to clinic        No orders of the defined types were placed in this encounter. Orders Placed This Encounter   Medications    doxycycline hyclate (VIBRA-TABS) 100 MG tablet     Sig: Take 1 tablet by mouth 2 times daily for 7 days     Dispense:  14 tablet     Refill:  0    predniSONE (DELTASONE) 20 MG tablet     Sig: Take 2 tablets by mouth daily for 5 days     Dispense:  10 tablet     Refill:  0      Patient given educational materials - see patient instructions. Discussed use, benefit, and side effects of prescribed medications. All patient questions answered. Pt voiced understanding. Patient agreed with treatment plan. Follow up as directed.      Electronicallysigned by Francesca Green MD on 6/30/2022 at 1:19 PM

## 2022-06-30 NOTE — PATIENT INSTRUCTIONS
[address] any warranty or liability for your use of this information.        Take antibiotic and steroid as prescribed for sinus infection  Continue over the counter cough/cold medications as needed for symptoms  If symptoms worsen or do not improve please follow-up with PCP or return to clinic

## 2022-07-05 ENCOUNTER — CARE COORDINATION (OUTPATIENT)
Dept: CARE COORDINATION | Age: 77
End: 2022-07-05

## 2022-07-05 RX ORDER — DULOXETIN HYDROCHLORIDE 30 MG/1
30 CAPSULE, DELAYED RELEASE ORAL 2 TIMES DAILY
Qty: 6 CAPSULE | Refills: 0 | Status: SHIPPED | OUTPATIENT
Start: 2022-07-05 | End: 2022-07-14

## 2022-07-05 NOTE — CARE COORDINATION
HC contacted [employer] for patients approval for the [employer] program.  Patient has been approved. HC contacted this patient and found that she was still asleep and discussed calling her later today. HC phoned the patient and shared the information with her. Patient isn't up to pare today to get absorb the information, and asked that the [facility]. call her back next week.       Plan of Care  [facility] will contact the patient next week with the B/W information

## 2022-07-11 ENCOUNTER — CARE COORDINATION (OUTPATIENT)
Dept: CARE COORDINATION | Age: 77
End: 2022-07-11

## 2022-07-11 NOTE — CARE COORDINATION
Patient called ACM stating that she is curious why she needs to take metoprolol twice a day. She reports only taking it 1 time daily and her BP has been normal. ACM explained that she should not change her medication without a physician telling her to. She stated she will keep track of her BP for a week and call ACM back with the readings to report to PCP. ACM again explained that she should not change her dose. Patient insisted so ACM explained to be sure to start taking it 2 times a day with any increase of her BP and call ACM with any concerns.

## 2022-07-12 ENCOUNTER — CARE COORDINATION (OUTPATIENT)
Dept: CARE COORDINATION | Age: 77
End: 2022-07-12

## 2022-07-12 NOTE — CARE COORDINATION
Patient called [facility] asking about the diet medication she spoke of last month. M reminded patient that PCP did order the medication and it had to go through [employer], [address]. [facility] provided patient with information and she will call there to pay whatever co pay there is. Patient denied any other needs.

## 2022-07-13 ENCOUNTER — TELEPHONE (OUTPATIENT)
Dept: INTERNAL MEDICINE CLINIC | Age: 77
End: 2022-07-13

## 2022-07-13 ENCOUNTER — CARE COORDINATION (OUTPATIENT)
Dept: CARE COORDINATION | Age: 77
End: 2022-07-13

## 2022-07-13 DIAGNOSIS — R26.89 IMPAIRED GAIT AND MOBILITY: Primary | ICD-10-CM

## 2022-07-13 NOTE — CARE COORDINATION
Patient called stating the diet pills are $90 per month and she is not going to get them at this time. Patient is requesting a new referral for   [facility]. Per patient her provider left and she would like to see Dr. Ligia Manuel. [facility] will route referral to provider at PCP office.

## 2022-07-13 NOTE — TELEPHONE ENCOUNTER
Patient is requesting a new referral to [facility] pain management Dr. Angie Dow. Due to her PM physician leaving [facility]. Please advise.

## 2022-07-13 NOTE — CARE COORDINATION
HC phoned the patient to share instructions for scheduling her [employer]. Patient was busy speaking with her grandson and the [facility]. encouraged the patient  to speak with her grandson and she will call her again in the next day or two. Plan of Care  [facility] will call patient back and complete the information for scheduling her ride.

## 2022-07-14 ENCOUNTER — CARE COORDINATION (OUTPATIENT)
Dept: CARE COORDINATION | Age: 77
End: 2022-07-14

## 2022-07-14 RX ORDER — DULOXETIN HYDROCHLORIDE 30 MG/1
30 CAPSULE, DELAYED RELEASE ORAL 2 TIMES DAILY
Qty: 180 CAPSULE | Refills: 3 | Status: SHIPPED | OUTPATIENT
Start: 2022-07-14

## 2022-07-14 NOTE — CARE COORDINATION
HC shared the information for scheduling [employer]. Patient states that she feels that she can handle scheduling her   transportation.       Plan of Care  [facility] [place], [facility]. will follow up with patient regarding her transportation

## 2022-07-14 NOTE — CARE COORDINATION
Patient called ACM asking why her Cymbalta was only ordered for 6 capsules? ACM will route to provider for advice.

## 2022-07-15 ENCOUNTER — CARE COORDINATION (OUTPATIENT)
Dept: CARE COORDINATION | Age: 77
End: 2022-07-15

## 2022-07-15 ENCOUNTER — TELEPHONE (OUTPATIENT)
Dept: INTERNAL MEDICINE CLINIC | Age: 77
End: 2022-07-15

## 2022-07-15 RX ORDER — DULOXETIN HYDROCHLORIDE 30 MG/1
30 CAPSULE, DELAYED RELEASE ORAL 2 TIMES DAILY
Qty: 180 CAPSULE | Refills: 3 | Status: CANCELLED
Start: 2022-07-15

## 2022-07-15 NOTE — CARE COORDINATION
Patient called stating that she received a call saying her RX is ready for  at [facility]. She has [facility]. ACNICOLASA routed to Franklyn Massey MA at PCP office.

## 2022-07-21 ENCOUNTER — CARE COORDINATION (OUTPATIENT)
Dept: CARE COORDINATION | Age: 77
End: 2022-07-21

## 2022-07-21 NOTE — CARE COORDINATION
Patient called checking on the referral to PM in [facility]. ACM explained that referral was placed on 7.13.22 and is in her chart. PM number provided to patient to call and schedule.

## 2022-07-26 ENCOUNTER — TELEPHONE (OUTPATIENT)
Dept: INTERNAL MEDICINE CLINIC | Age: 77
End: 2022-07-26

## 2022-07-26 NOTE — TELEPHONE ENCOUNTER
FYI ONLY  Called to inform that they have been providing care and are going to re-certify her for 2 more months of services.      PCP out of office

## 2022-07-28 ENCOUNTER — CARE COORDINATION (OUTPATIENT)
Dept: CARE COORDINATION | Age: 77
End: 2022-07-28

## 2022-07-28 NOTE — CARE COORDINATION
HC reached out to patient to follow up on her transportation. She reported that she hasn't used  [employer] yet because she doesn't have any appointments yet.       Plan of Care  [facility] [place], [facility]. will follow up with patient to assist with prioritize her goals in her life

## 2022-07-29 ENCOUNTER — CARE COORDINATION (OUTPATIENT)
Dept: CARE COORDINATION | Age: 77
End: 2022-07-29

## 2022-07-29 NOTE — TELEPHONE ENCOUNTER
Spoke with the pharmacy and corrected the issue with the script and informed patient that [facility] did expedite the delivery on the shipment, patient voiced understanding

## 2022-07-29 NOTE — CARE COORDINATION
Patient called stating that the pharmacy did not receive the RX for cymbalta on 7.14.22. ACM will route to PCP's MA to look into this.

## 2022-08-02 ENCOUNTER — CARE COORDINATION (OUTPATIENT)
Dept: CARE COORDINATION | Age: 77
End: 2022-08-02

## 2022-08-02 NOTE — CARE COORDINATION
[facility], [facility]. phoned this patient today to assist with prioritizing the various things that are upcoming, and pressing on the patient. Patient answered and wasn't quite awake. HC suggested that we talk at another time. Patient agreed.     Plan of Care  [facility] will reach out to patient again next week

## 2022-08-03 ENCOUNTER — HOSPITAL ENCOUNTER (OUTPATIENT)
Dept: PAIN MANAGEMENT | Age: 77
Discharge: HOME | End: 2022-08-03
Payer: MEDICARE

## 2022-08-03 VITALS
OXYGEN SATURATION: 97 % | HEART RATE: 55 BPM | WEIGHT: 278 LBS | HEIGHT: 64 IN | RESPIRATION RATE: 20 BRPM | BODY MASS INDEX: 47.46 KG/M2 | DIASTOLIC BLOOD PRESSURE: 55 MMHG | TEMPERATURE: 97.3 F | SYSTOLIC BLOOD PRESSURE: 141 MMHG

## 2022-08-03 DIAGNOSIS — M79.7 FIBROMYALGIA: ICD-10-CM

## 2022-08-03 DIAGNOSIS — M54.16 LUMBAR RADICULOPATHY: ICD-10-CM

## 2022-08-03 DIAGNOSIS — Z98.890 S/P KYPHOPLASTY: ICD-10-CM

## 2022-08-03 DIAGNOSIS — M54.50 ACUTE MIDLINE LOW BACK PAIN, UNSPECIFIED WHETHER SCIATICA PRESENT: ICD-10-CM

## 2022-08-03 DIAGNOSIS — M51.36 DDD (DEGENERATIVE DISC DISEASE), LUMBAR: ICD-10-CM

## 2022-08-03 DIAGNOSIS — M48.062 SPINAL STENOSIS OF LUMBAR REGION WITH NEUROGENIC CLAUDICATION: Chronic | ICD-10-CM

## 2022-08-03 DIAGNOSIS — M43.22 CERVICAL SPINE ANKYLOSIS: ICD-10-CM

## 2022-08-03 DIAGNOSIS — M48.02 CERVICAL SPINAL STENOSIS: ICD-10-CM

## 2022-08-03 PROCEDURE — 99213 OFFICE O/P EST LOW 20 MIN: CPT | Performed by: NURSE PRACTITIONER

## 2022-08-03 PROCEDURE — 99213 OFFICE O/P EST LOW 20 MIN: CPT

## 2022-08-03 RX ORDER — OXYCODONE AND ACETAMINOPHEN 7.5; 325 MG/1; MG/1
1 TABLET ORAL EVERY 6 HOURS PRN
Qty: 120 TABLET | Refills: 0 | Status: SHIPPED | OUTPATIENT
Start: 2022-08-03 | End: 2022-08-30 | Stop reason: SDUPTHER

## 2022-08-03 RX ORDER — GABAPENTIN 100 MG/1
200 CAPSULE ORAL 3 TIMES DAILY
Qty: 180 CAPSULE | Refills: 0 | Status: SHIPPED | OUTPATIENT
Start: 2022-08-03 | End: 2022-08-30 | Stop reason: SDUPTHER

## 2022-08-03 ASSESSMENT — ENCOUNTER SYMPTOMS
BACK PAIN: 1
BOWEL INCONTINENCE: 0

## 2022-08-03 ASSESSMENT — PAIN SCALES - GENERAL: PAINLEVEL_OUTOF10: 7

## 2022-08-03 NOTE — PROGRESS NOTES
Chief Complaint   Patient presents with    Medication Refill    Back Pain         [facility] Pt reports chronic history of low back pain that radiates into right hip and buttocks at times. She is s/p bilateral knee and right hip replacement She has tried NSAIDS heat chiropractic care and with little relief. she had right Lumbar facet joint injections on 9/30/2021 with significant improvement and  left Lumbar facet joint injections 1/2022 and reported  90% relief. Last imaging 20/2020 lumbar XR that showed previous L1 compression fracture which was corrected with kyphoplasty with multilevel degenerative changes and Lumbar Ct 2018 with diffuse facet arthopathy  PT referral last month started at [facility] and reports she plans on continuing sessions. Gabapentin d/c and changed to lyrica last month- pt did not start take this  - she was concerned with side effects. She started back on the gabapentin    Back Pain  This is a chronic problem. The current episode started more than 1 year ago. The problem occurs constantly. The problem has been gradually improving since onset. The pain is present in the lumbar spine. The quality of the pain is described as shooting, burning and aching. The pain radiates to the left knee, left thigh and left foot. The pain is at a severity of 7/10. The pain is moderate. The symptoms are aggravated by position, standing, twisting and bending. Stiffness is present At night. Associated symptoms include bladder incontinence, numbness, tingling and weakness. Pertinent negatives include no bowel incontinence, chest pain, fever or headaches. She has tried bed rest, home exercises, walking, heat, ice, chiropractic manipulation and NSAIDs for the symptoms. The treatment provided no relief. Patient denies any new neurological symptoms. No bowel or bladder incontinence, no weakness, and no falling.     Pill count: appropriate / oxycodone 2 was due 7/28    Morphine equivalent: 45    Controlled Substance Monitoring:    Acute and Chronic Pain Monitoring:   RX Monitoring 8/3/2022   Attestation -   Acute Pain Prescriptions -   Periodic Controlled Substance Monitoring Possible medication side effects, risk of tolerance/dependence & alternative treatments discussed. ;No signs of potential drug abuse or diversion identified.;Obtaining appropriate analgesic effect of treatment. Chronic Pain > 50 MEDD -   Chronic Pain > 80 MEDD -            Past Medical History:   Diagnosis Date    Abnormality of rib determined by X-ray 1/28/2016    Acute cystitis without hematuria 6/16/2018    Acute exacerbation of chronic bronchitis (HCC)     Acute on chronic diastolic heart failure ([facility] [address] 1/28/2016    Adjustment disorder with mixed anxiety and depressed mood 6/26/2015    Mild     Anemia 3/5/2014    Back pain     Bronchiectasis with acute lower respiratory infection ([facility] [address] 8/16/2017    Bronchitis     Chronic bronchitis ([facility] [address] 1/28/2016    Chronic cough 7/23/2018    COPD (chronic obstructive pulmonary disease) (HCC)     asbestos related lung disease    Degenerative joint disease (DJD) of hip 5/14/2015    Dyslipidemia 3/5/2014    Encephalopathy acute 5/29/2016    Essential hypertension 1/28/2016    Fibromyalgia     GERD (gastroesophageal reflux disease)     History of total bilateral knee replacement 5/28/2016    Hx of blood clots     left leg and lung    Hyperlipidemia     Hypertension     Incontinence of urine     Irregular heartbeat     DR. Durant    Medication monitoring encounter 6/13/2018    Morbid obesity with BMI of 45.0-49.9, adult ([facility] [address] 1/28/2016    Obstructive sleep apnea syndrome     Osteoarthritis     Primary osteoarthritis of left knee 5/27/2016    PVC (premature ventricular contraction) 1/28/2016    S/P right and left heart catheterization 10/2/2015    Sleep apnea     no machine    SOB (shortness of breath)     Spinal stenosis of lumbar region with neurogenic claudication 5/13/2013    Supplemental oxygen dependent 12/8/2017    Urine frequency     UTI (urinary tract infection)     hospitalized early july 2014 for kidney infection       Past Surgical History:   Procedure Laterality Date    BRONCHOSCOPY Left 8/16/2017    BRONCHOSCOPY ALVEOLAR LAVAGE LOWER LOBE performed by Griffin Ortega MD at [address]  x 3    no stents    COLONOSCOPY  4 28 14    polyps biopsies     FOOT SURGERY Left     calcium deposit removal from top of foot    HYSTERECTOMY (CERVIX STATUS UNKNOWN)      JOINT REPLACEMENT Bilateral 5/27/2016    bilat total knees    NERVE BLOCK  5/13/2013    caudal celestone 6mg    NERVE BLOCK  5/28/13    Caudal #2, Celestone 9mg    NERVE BLOCK  2/14/14    rt hip inj celestone 9 mg    NERVE BLOCK  07/14/2014    caudal# 3- celestone 9 mg    NERVE BLOCK  7-21-14    caudal epidural #2, decadron 7mg, fentanyl 25mcg    NERVE BLOCK  10/2/14    duramorph 1.5  decadron 5mg    NERVE BLOCK  11/9/2015    caudal# 1 celestone 9mg    NERVE BLOCK  11/16/15    caudal #2  decadron 10mg    NERVE BLOCK  11/23/15    duramorph 1mg celestone 9mg    NERVE BLOCK  03/28/2018    CAUDAL EPIDURAL STEROID BLOCK  DECADRON 10 MG     NERVE BLOCK  05/23/2018    caudal # decadron 7mg    NERVE BLOCK  08/28/2018    natalia transforminal # 1, decadron 10mg gadoteridol, LONG NEEDLES    NC PERQ VERT AGMNTJ CAVITY CRTJ UNI/BI CANNULJ LMBR N/A 10/29/2018    KYPHOPLASTY L1 performed by Donna Gamboa MD at [address] Right 5/14/15    total hip replacement       Allergies   Allergen Reactions    Rosuvastatin Calcium Anaphylaxis     Hair fell out    Statins Anaphylaxis     Hair fell out    Bactrim [Sulfamethoxazole-Trimethoprim] Diarrhea    Caffeine Other (See Comments)     pain  pain    Dye [Iodides] Other (See Comments)     Iv dye= blood clots in arm    Food      Tomatoes, pain    Ioxaglate Other (See Comments)     Iv dye= blood clots in arm    Tomato     Dicloxacillin Rash    Pcn 1    albuterol sulfate  (90 Base) MCG/ACT inhaler, Inhale 2 puffs into the lungs 4 times daily as needed for Wheezing, Disp: 1 Inhaler, Rfl: 2    Misc. Devices (WALKER) MISC, 1 each by Does not apply route daily Upright walker with wheels and seat, Disp: 1 each, Rfl: 0    aspirin 81 MG EC tablet, Take 81 mg by mouth daily , Disp: , Rfl:     Family History   Problem Relation Age of Onset    Stomach Cancer Brother         stomach    High Blood Pressure Mother     Heart Disease Sister         irregular heartbeat       Social History     Socioeconomic History    Marital status:      Spouse name: Not on file    Number of children: 2    Years of education: Not on file    Highest education level: Not on file   Occupational History    Not on file   Tobacco Use    Smoking status: Former     Packs/day: 1.00     Years: 18.00     Pack years: 18.00     Types: Cigarettes     Quit date: 1993     Years since quittin.2    Smokeless tobacco: Never   Vaping Use    Vaping Use: Never used   Substance and Sexual Activity    Alcohol use: No    Drug use: No    Sexual activity: Not Currently   Other Topics Concern    Not on file   Social History Narrative    Not on file     Social Determinants of Health     Financial Resource Strain: Low Risk     Difficulty of Paying Living Expenses: Not very hard   Food Insecurity: No Food Insecurity    Worried About Running Out of Food in the Last Year: Never true    Ran Out of Food in the Last Year: Never true   Transportation Needs: Unmet Transportation Needs    Lack of Transportation (Medical): Yes    Lack of Transportation (Non-Medical): Yes   Physical Activity: Insufficiently Active    Days of Exercise per Week: 3 days    Minutes of Exercise per Session: 20 min   Stress: No Stress Concern Present    Feeling of Stress : Only a little   Social Connections:  Moderately Integrated    Frequency of Communication with Friends and Family: More than three times a week    Frequency of Social Gatherings with Friends and Family: Once a week    Attends [employer] Services: More than 4 times per year    Active Member of [employer] Group or Organizations: Yes    Attends Club or Organization Meetings: More than 4 times per year    Marital Status:    Intimate Partner Violence: Not on file   Housing Stability: [address] to Pay for Housing in the Last Year: No    Number of [place] in the Last Year: 1    Unstable Housing in the Last Year: No       Review of Systems:  Review of Systems   Constitutional: Negative for fever. Cardiovascular:  Negative for chest pain and palpitations. Musculoskeletal:  Positive for back pain and joint pain. Gastrointestinal:  Negative for bowel incontinence. Genitourinary:  Positive for bladder incontinence. Neurological:  Positive for numbness, tingling and weakness. Negative for disturbances in coordination, headaches and loss of balance. Physical Exam:  BP (!) 141/55   Pulse 55   Temp 97.3 °F (36.3 °C)   Resp 20   Ht 5' 4\" (1.626 m)   Wt 278 lb (126.1 kg)   SpO2 97%   BMI 47.72 kg/m²     Physical Exam  HENT:      Head: Normocephalic. Pulmonary:      Effort: Pulmonary effort is normal.   Musculoskeletal:         General: Normal range of motion. Cervical back: Normal range of motion. Lumbar back: Tenderness present. Skin:     General: Skin is warm and dry. Neurological:      Mental Status: She is alert and oriented to person, place, and time.        Record/Diagnostics Review:    Last tiffanie 2/2022 and was appropriate     Assessment:  Problem List Items Addressed This Visit       Spinal stenosis of lumbar region with neurogenic claudication (Chronic)    Relevant Medications    gabapentin (NEURONTIN) 100 MG capsule    oxyCODONE-acetaminophen (PERCOCET) 7.5-325 MG per tablet    Fibromyalgia    Relevant Medications    gabapentin (NEURONTIN) 100 MG capsule    oxyCODONE-acetaminophen (PERCOCET) 7.5-325 MG per tablet    Back pain Relevant Medications    oxyCODONE-acetaminophen (PERCOCET) 7.5-325 MG per tablet    Cervical spine ankylosis    Relevant Medications    oxyCODONE-acetaminophen (PERCOCET) 7.5-325 MG per tablet    Cervical spinal stenosis    Relevant Medications    oxyCODONE-acetaminophen (PERCOCET) 7.5-325 MG per tablet    S/P kyphoplasty    Relevant Medications    oxyCODONE-acetaminophen (PERCOCET) 7.5-325 MG per tablet    DDD (degenerative disc disease), lumbar    Relevant Medications    oxyCODONE-acetaminophen (PERCOCET) 7.5-325 MG per tablet    Lumbar radiculopathy    Relevant Medications    gabapentin (NEURONTIN) 100 MG capsule    oxyCODONE-acetaminophen (PERCOCET) 7.5-325 MG per tablet          Treatment Plan:  Patient relates current medications are helping the pain. Patient reports taking pain medications as prescribed, denies obtaining medications from different sources and denies use of illegal drugs. Patient denies side effects from medications like nausea, vomiting, constipation or drowsiness. Patient reports current activities of daily living are possible due to medications and would like to continue them. As always, we encourage daily stretching and strengthening exercises, and recommend minimizing use of pain medications unless patient cannot get through daily activities due to pain. Contract requirements met. Continue opioid therapy. Script written for percocet  Pt plans to continue PT  Follow up appointment made for 4 weeks    I have reviewed the chief complaint and history of present illness (including ROS and PFSH) and vital documentation by my staff and I agree with their documentation and have added where applicable.

## 2022-08-09 ENCOUNTER — TELEPHONE (OUTPATIENT)
Dept: INTERNAL MEDICINE CLINIC | Age: 77
End: 2022-08-09

## 2022-08-09 NOTE — TELEPHONE ENCOUNTER
Patient will need to establish a face to face with another provider until Dr. Lilo Rios returns in order for Home Care orders to be signed for the patient from [facility].  Attempted to call patient and schedule her an appointment with one of physicians here in the office, no answer, unable to leave a VM on the machine

## 2022-08-10 ENCOUNTER — CARE COORDINATION (OUTPATIENT)
Dept: CARE COORDINATION | Age: 77
End: 2022-08-10

## 2022-08-10 RX ORDER — ALBUTEROL SULFATE 90 UG/1
2 AEROSOL, METERED RESPIRATORY (INHALATION) 4 TIMES DAILY PRN
Qty: 1 EACH | Refills: 2 | Status: SHIPPED | OUTPATIENT
Start: 2022-08-10

## 2022-08-10 NOTE — CARE COORDINATION
[facility]. phoned patient today to follow up on her social needs. Patient stated that she was doing pretty well, and doing her laundry. HC inquired of any social needs. Patient stated that she needs a home health aide. Patient is willing to speak to her ACM to get scheduled with her PCP, to discuss the matter. Plan of Care  [facility] will follow up with patient regarding her social needs.

## 2022-08-10 NOTE — TELEPHONE ENCOUNTER
Dr. Alfredo Tomas patient:  Patient requesting a refill of inhaler. Last refill 4.20.22. RX pending please advise.

## 2022-08-10 NOTE — CARE COORDINATION
Ambulatory Care Coordination Note  8/10/2022    ACC: Zoltanjuan Paz, RN    Summary  Date Care Coordination Episode Started:    6.9.22    Reason patient is in Care Coordination:     PCP referral    Topics Discussed Today:     Medications, patient needs a refill of her inhaler. Per patient she has needed it more lately in the heat. ACM will pend to provider. Home aide, per patient she gets the aide through [address] and has not been happy, they have not come to her home in a couple of weeks. ACM encouraged her to call [facility] and discuss this. No other needs today per patient. Assessments completed today:     Fall risk  Initial assessment  Medication reconciliation  SDOH  COPD (Health assessments)      Care Coordinator plan of care: Follow up call, continue education and check on needs in 1-2 weeks. Route refill to provider. Care Coordination Interventions    Referral from Primary Care Provider: No  Suggested Interventions and Community Resources  Fall Risk Prevention: In Process  Home Health Services: Completed  Physical Therapy: Completed  Social Work: In Process  Transportation Support: In Process  Zone Management Tools: In Process          Goals Addressed                   This Visit's Progress     Community Resource Goal   No change     Patient needs assistance with getting transportation to her medical appointments    Barriers: fear of failure, lack of motivation, financial, lack of support, overwhelmed by complexity of regimen, stress, time constraints, medication side effects, and lack of education    Plan for overcoming my barriers: [facility] [employer]. completed an application for the [employer]    Confidence: 9/10    Anticipated Goal Completion Date: 09/22/22       Medication Management   On track     I will take my medication as directed. I will notify my provider of any problems with medications, like adverse effects or side effects.   I will notify my provider/Care Coordinator if I am unable to afford my medications. I will notify my provider for advice before I stop taking any of my medication. Barriers: overwhelmed by complexity of regimen  Plan for overcoming my barriers: work with CC team  Confidence: 6/10  Anticipated Goal Completion Date: 8.22.22              Prior to Admission medications    Medication Sig Start Date End Date Taking? Authorizing Provider   gabapentin (NEURONTIN) 100 MG capsule Take 2 capsules by mouth in the morning and 2 capsules at noon and 2 capsules before bedtime. Do all this for 30 days. 8/3/22 9/2/22  KARIN Cain - CNP   oxyCODONE-acetaminophen (PERCOCET) 7.5-325 MG per tablet Take 1 tablet by mouth every 6 hours as needed for Pain for up to 30 days.  8/3/22 9/2/22  KARIN Cain CNP   DULoxetine (CYMBALTA) 30 MG extended release capsule Take 1 capsule by mouth 2 times daily 7/14/22   May Tomas MD   guaiFENesin (ROBITUSSIN) 100 MG/5ML liquid take 10 milliliters by mouth every 4 hours if needed for cough 5/17/22   Historical Provider, MD   Carboxymeth-Cellulose-CitricAc (PLENITY WELCOME KIT) CAPS Take 3 capsules by mouth in the morning and at bedtime 6/24/22   Jabier Tay MD   methylPREDNISolone (MEDROL DOSEPACK) 4 MG tablet use as directed [address] 4/5/22   Historical Provider, MD   fluticasone (FLONASE) 50 MCG/ACT nasal spray 1 spray by Each Nostril route daily 5/16/22   Jabier Tay MD   guaiFENesin (ALTARUSSIN) 100 MG/5ML syrup Take 10 mLs by mouth every 4 hours as needed for Cough 5/16/22   Jabier Tay MD   albuterol (PROVENTIL) (5 MG/ML) 0.5% nebulizer solution Take 1 mL by nebulization every 4 hours as needed for Wheezing 4/18/22   Jabier Tay MD   ciprofloxacin (CIPRO) 250 MG tablet Take 250 mg by mouth every 8-12 hours as needed    Historical Provider, MD   Catheters (STANDARD CARE EXTERNAL CATH) MISC 1 Device by Does not apply route daily 1/10/22   Jabier Tay MD   MYRBETRIQ 50 MG TB24 Take 50 mg by mouth daily 1/10/22   Frank Li MD   metoprolol tartrate (LOPRESSOR) 50 MG tablet TAKE 1 TABLET TWICE A DAY 12/22/21   Christine Hughes MD   Cyanocobalamin (VITAMIN B-12) 1000 MCG/15ML LIQD Take 1,000 mcg by mouth daily     Historical Provider, MD   levocetirizine (XYZAL) 5 MG tablet take 1 tablet by mouth once daily AT NIGHT 9/8/21   Christine Hughes MD   donepezil (ARICEPT) 5 MG tablet take 1 tablet by mouth at bedtime 9/1/21   Christine Hughes MD   psyllium (KONSYL) 28.3 % PACK Take 1 packet by mouth daily     Historical Provider, MD   Catheters (STANDARD CARE EXTERNAL CATH) MISC 1 each by Does not apply route daily 3/2/21   Yamile Chang MD   albuterol sulfate  (90 Base) MCG/ACT inhaler Inhale 2 puffs into the lungs 4 times daily as needed for Wheezing 4/20/20   Christine Hughes MD   Misc.  Devices [facility]) MISC 1 each by Does not apply route daily Upright walker with wheels and seat 11/12/18   Mickey Winters MD   aspirin 81 MG EC tablet Take 81 mg by mouth daily     Historical Provider, MD       Future Appointments   Date Time Provider Mayo Betts   8/30/2022  1:00 PM Zoey Pena, APRN - CNP [address]   ,   Congestive Heart Failure Assessment    Are you currently restricting fluids?: Other  Do you understand a low sodium diet?: Yes  Do you understand how to read food labels?: Yes  Do you salt your food before tasting it?: No         Symptoms:     Weight trend: stable  Salt intake watch compared to last visit: stable     ,   COPD Assessment    Does the patient understand envrionmental exposure?: Yes  Is the patient able to verbalize Rescue vs. Long Acting medications?: Yes  Does the patient have a nebulizer?: No     Shortness of breath (worse than baseline)         Symptoms:          , and   General Assessment    Do you have any symptoms that are causing concern?: Yes  Progression since Onset: Intermittent - Waxing/Waning  Reported Symptoms: Weakness, Shortness of Breath

## 2022-08-12 ENCOUNTER — CARE COORDINATION (OUTPATIENT)
Dept: CARE COORDINATION | Age: 77
End: 2022-08-12

## 2022-08-12 ENCOUNTER — TELEPHONE (OUTPATIENT)
Dept: INTERNAL MEDICINE CLINIC | Age: 77
End: 2022-08-12

## 2022-08-12 DIAGNOSIS — M54.40 LOW BACK PAIN WITH SCIATICA, SCIATICA LATERALITY UNSPECIFIED, UNSPECIFIED BACK PAIN LATERALITY, UNSPECIFIED CHRONICITY: Primary | ICD-10-CM

## 2022-08-12 NOTE — CARE COORDINATION
Patient called asking about her inhaler, ACM confirmed that the RX was sent to her mail in pharmacy on 8.10.22. Patient denied any additional needs today.

## 2022-08-12 NOTE — TELEPHONE ENCOUNTER
Dr. Sandee Iqbal patient  Patient has been waiting on a referral for pain management to [facility], unsure what happened to the one we sent before, please advise.    Referral pending

## 2022-08-12 NOTE — CARE COORDINATION
Patient called today stating that she would like for the [facility]. to check into the Flex Card for her. Patient stated that she's seen this advertised on TV, and would like to know more about the service for Seniors 65 and over. Plan of Care  [facility] will research information for patient and will respond to her.

## 2022-08-16 ENCOUNTER — CARE COORDINATION (OUTPATIENT)
Dept: CARE COORDINATION | Age: 77
End: 2022-08-16

## 2022-08-16 NOTE — CARE COORDINATION
HC attempted to contact the patient today. There was no answer, HC left a message and will reach out to the patient again.     Plan of Care  [facility]. will attempt to reach out to the patient

## 2022-08-17 ENCOUNTER — CARE COORDINATION (OUTPATIENT)
Dept: CARE COORDINATION | Age: 77
End: 2022-08-17

## 2022-08-17 NOTE — CARE COORDINATION
[facility]. phoned the patient this morning to share information that she requested regarding Flex, and HHA's. HC shared information about the Flex Spending Account and the [place] [id number].      will email the HHA list to the patient

## 2022-08-23 ENCOUNTER — CARE COORDINATION (OUTPATIENT)
Dept: CARE COORDINATION | Age: 77
End: 2022-08-23

## 2022-08-23 NOTE — CARE COORDINATION
Ambulatory Care Coordination Note  8/23/2022    ACC: Charlie Eason, [place]    Summary  Date Care Coordination Episode Started: 6.9.22    Reason patient is in Care Coordination:     PCP referral     Topics Discussed Today:     Medications, no needs today  COPD, no concerns per patient  DME equipment, per patient she has everything that she needs. Patient will call if she has any concerns. Assessments completed today:     Fall risk  Initial assessment  Medication reconciliation  SDOH  COPD (Health assessments)      Care Coordinator plan of care: Follow up in 1-2 weeks, continue education, check on needs. Care Coordination Interventions    Referral from Primary Care Provider: No  Suggested Interventions and Community Resources  Fall Risk Prevention: In Process  Home Health Services: Completed  Physical Therapy: Completed  Social Work: In Process  Transportation Support: In Process  Zone Management Tools: In Process          Goals Addressed                   This Visit's Progress     Medication Management   On track     I will take my medication as directed. I will notify my provider of any problems with medications, like adverse effects or side effects. I will notify my provider/Care Coordinator if I am unable to afford my medications. I will notify my provider for advice before I stop taking any of my medication. Barriers: overwhelmed by complexity of regimen  Plan for overcoming my barriers: work with CC team  Confidence: 6/10  Anticipated Goal Completion Date: 8.22.22              Prior to Admission medications    Medication Sig Start Date End Date Taking? Authorizing Provider   albuterol sulfate HFA (PROVENTIL;VENTOLIN;PROAIR) 108 (90 Base) MCG/ACT inhaler Inhale 2 puffs into the lungs 4 times daily as needed for Wheezing 8/10/22   Jackelyn Mcgarry MD   gabapentin (NEURONTIN) 100 MG capsule Take 2 capsules by mouth in the morning and 2 capsules at noon and 2 capsules before bedtime.  Do all this for 30 days. 8/3/22 9/2/22  KARIN Roberts - CNP   oxyCODONE-acetaminophen (PERCOCET) 7.5-325 MG per tablet Take 1 tablet by mouth every 6 hours as needed for Pain for up to 30 days.  8/3/22 9/2/22  KARIN Roberts - CNP   DULoxetine (CYMBALTA) 30 MG extended release capsule Take 1 capsule by mouth 2 times daily 7/14/22   Brendan Nicole MD   guaiFENesin (ROBITUSSIN) 100 MG/5ML liquid take 10 milliliters by mouth every 4 hours if needed for cough 5/17/22   Historical Provider, MD   Carboxymeth-Cellulose-CitricAc (PLENITY WELCOME KIT) CAPS Take 3 capsules by mouth in the morning and at bedtime 6/24/22   Phuc Weiss MD   methylPREDNISolone (MEDROL DOSEPACK) 4 MG tablet use as directed [address] 4/5/22   Historical Provider, MD   fluticasone (FLONASE) 50 MCG/ACT nasal spray 1 spray by Each Nostril route daily 5/16/22   Phuc Weiss MD   guaiFENesin (ALTARUSSIN) 100 MG/5ML syrup Take 10 mLs by mouth every 4 hours as needed for Cough 5/16/22   Phuc Weiss MD   albuterol (PROVENTIL) (5 MG/ML) 0.5% nebulizer solution Take 1 mL by nebulization every 4 hours as needed for Wheezing 4/18/22   Phuc Weiss MD   ciprofloxacin (CIPRO) 250 MG tablet Take 250 mg by mouth every 8-12 hours as needed    Historical Provider, MD   Catheters (STANDARD CARE EXTERNAL CATH) MISC 1 Device by Does not apply route daily 1/10/22   Phuc Weiss MD   MYRBETRIQ 50 MG TB24 Take 50 mg by mouth daily 1/10/22   Johana Cortez MD   metoprolol tartrate (LOPRESSOR) 50 MG tablet TAKE 1 TABLET TWICE A DAY 12/22/21   Phuc Weiss MD   Cyanocobalamin (VITAMIN B-12) 1000 MCG/15ML LIQD Take 1,000 mcg by mouth daily     Historical Provider, MD   levocetirizine (XYZAL) 5 MG tablet take 1 tablet by mouth once daily AT NIGHT 9/8/21   Phuc Weiss MD   donepezil (ARICEPT) 5 MG tablet take 1 tablet by mouth at bedtime 9/1/21   Phuc Weiss MD   psyllium (KONSYL) 28.3 % PACK Take 1 packet by mouth daily     Historical Provider, MD   Catheters (STANDARD CARE EXTERNAL CATH) MISC 1 each by Does not apply route daily 3/2/21   Mary Jo Moseley MD   Mis.  Devices [facility]) MISC 1 each by Does not apply route daily Upright walker with wheels and seat 11/12/18   Sonal Verde MD   aspirin 81 MG EC tablet Take 81 mg by mouth daily     Historical Provider, MD       Future Appointments   Date Time Provider Mayo Betts   8/30/2022  1:00 PM KARIN Rose - CNP [address]   9/12/2022  3:15 PM Dao Yousif MD [address]   ,   [facility] Assessment    Does the patient understand envrionmental exposure?: Yes  Is the patient able to verbalize Rescue vs. Long Acting medications?: Yes  Does the patient have a nebulizer?: No     No patient-reported symptoms         Symptoms:          , and   General Assessment    Do you have any symptoms that are causing concern?: No

## 2022-08-23 NOTE — CARE COORDINATION
contact Jaden Kelly regarding message she left for assistance with transportation.  spoke with Ms. Caitie Brar who thanked  for calling.  informed Ms. Caitie Brar that Brenda Hairston has been off work and she requested I call to follow up on transportation needs. Ms. Caitie Brar, thanked  for calling and stated that she made an appointment in September and would need to arrange transportation.  inquired about details of appointment (time, date, location). Ms. Caitie Brar reports that she can call Brenda Hairston tomorrow or later this week with details.  informed Ms. Caitie Brar that she would call back if Ms. Brenda Hairston did not return to work Wednesday 8/24. Plan:   Ms. Caitie Brar will contact Brenda Hairston tomorrow to arrange transportation.

## 2022-08-25 ENCOUNTER — CARE COORDINATION (OUTPATIENT)
Dept: CARE COORDINATION | Age: 77
End: 2022-08-25

## 2022-08-25 NOTE — CARE COORDINATION
[facility]. phoned patient today to assist her with her transportation arrangements for September. Patient stated that she was very sleepy, HC suggested that she contact patient on 08/26/22, at around 1:40p. Patient stated that she will be just finished with her daily bible study.       Plan of Care  [facility]. will contact patient on tomorrow

## 2022-08-26 ENCOUNTER — CARE COORDINATION (OUTPATIENT)
Dept: CARE COORDINATION | Age: 77
End: 2022-08-26

## 2022-08-29 RX ORDER — GABAPENTIN 100 MG/1
CAPSULE ORAL
Qty: 180 CAPSULE | Refills: 11 | OUTPATIENT
Start: 2022-08-29

## 2022-08-30 ENCOUNTER — HOSPITAL ENCOUNTER (OUTPATIENT)
Dept: PAIN MANAGEMENT | Age: 77
Discharge: HOME | End: 2022-08-30
Payer: MEDICARE

## 2022-08-30 VITALS
OXYGEN SATURATION: 96 % | SYSTOLIC BLOOD PRESSURE: 128 MMHG | DIASTOLIC BLOOD PRESSURE: 58 MMHG | TEMPERATURE: 97.6 F | HEART RATE: 52 BPM | RESPIRATION RATE: 20 BRPM

## 2022-08-30 DIAGNOSIS — M51.36 DDD (DEGENERATIVE DISC DISEASE), LUMBAR: ICD-10-CM

## 2022-08-30 DIAGNOSIS — M79.7 FIBROMYALGIA: ICD-10-CM

## 2022-08-30 DIAGNOSIS — Z79.891 CHRONIC PRESCRIPTION OPIATE USE: Chronic | ICD-10-CM

## 2022-08-30 DIAGNOSIS — Z98.890 S/P KYPHOPLASTY: ICD-10-CM

## 2022-08-30 DIAGNOSIS — M43.22 CERVICAL SPINE ANKYLOSIS: ICD-10-CM

## 2022-08-30 DIAGNOSIS — M54.16 LUMBAR RADICULOPATHY: ICD-10-CM

## 2022-08-30 DIAGNOSIS — M54.50 ACUTE MIDLINE LOW BACK PAIN, UNSPECIFIED WHETHER SCIATICA PRESENT: ICD-10-CM

## 2022-08-30 DIAGNOSIS — M48.062 SPINAL STENOSIS OF LUMBAR REGION WITH NEUROGENIC CLAUDICATION: Primary | Chronic | ICD-10-CM

## 2022-08-30 DIAGNOSIS — M48.02 CERVICAL SPINAL STENOSIS: ICD-10-CM

## 2022-08-30 PROCEDURE — 99213 OFFICE O/P EST LOW 20 MIN: CPT | Performed by: NURSE PRACTITIONER

## 2022-08-30 PROCEDURE — 99213 OFFICE O/P EST LOW 20 MIN: CPT

## 2022-08-30 RX ORDER — OXYCODONE AND ACETAMINOPHEN 7.5; 325 MG/1; MG/1
1 TABLET ORAL EVERY 6 HOURS PRN
Qty: 120 TABLET | Refills: 0 | Status: SHIPPED | OUTPATIENT
Start: 2022-09-03 | End: 2022-10-04 | Stop reason: SDUPTHER

## 2022-08-30 RX ORDER — GABAPENTIN 100 MG/1
300 CAPSULE ORAL 3 TIMES DAILY
Qty: 810 CAPSULE | Refills: 0 | Status: SHIPPED | OUTPATIENT
Start: 2022-08-30 | End: 2022-10-04 | Stop reason: ALTCHOICE

## 2022-08-30 ASSESSMENT — ENCOUNTER SYMPTOMS
CONSTIPATION: 0
COUGH: 0
BACK PAIN: 1
SHORTNESS OF BREATH: 0

## 2022-08-30 ASSESSMENT — PAIN SCALES - GENERAL: PAINLEVEL_OUTOF10: 10

## 2022-08-30 NOTE — PROGRESS NOTES
Chief Complaint   Patient presents with    Back Pain    Medication Refill         PM     Pt reports chronic history of low back pain that radiates into right hip and buttocks at times. She is s/p bilateral knee and right hip replacement She has tried NSAIDS heat chiropractic care and with little relief. she had right Lumbar facet joint injections on 9/30/2021 with significant improvement and  left Lumbar facet joint injections 1/2022 and reported  90% relief. Last imaging 20/2020 lumbar XR that showed previous L1 compression fracture which was corrected with kyphoplasty with multilevel degenerative changes and Lumbar Ct 2018 with diffuse facet arthopathy  PT at [facility] and reports she plans on continuing sessions. HPI:     Back Pain  This is a chronic problem. The current episode started more than 1 year ago. The problem occurs constantly. The problem has been gradually worsening since onset. The pain is present in the lumbar spine and gluteal. The quality of the pain is described as aching. The pain does not radiate. The pain is at a severity of 10/10. The pain is severe. The pain is The same all the time. The symptoms are aggravated by bending, position, sitting, twisting and standing. Stiffness is present All day. Associated symptoms include headaches, numbness, tingling and weakness. Pertinent negatives include no chest pain or fever. She has tried bed rest, heat, home exercises, ice, walking and NSAIDs for the symptoms. The treatment provided moderate relief. Patient denies any new neurological symptoms. No bowel or bladder incontinence, no weakness, and no falling. Pill count: oxycodone 12    Morphine equivalent: 45    Periodic Controlled Substance Monitoring: Possible medication side effects, risk of tolerance/dependence & alternative treatments discussed., No signs of potential drug abuse or diversion identified. , Assessed functional status., Obtaining appropriate analgesic effect of treatment. Veronica Heredia, APRN - CNP)      Past Medical History:   Diagnosis Date    Abnormality of rib determined by X-ray 1/28/2016    Acute cystitis without hematuria 6/16/2018    Acute exacerbation of chronic bronchitis (HCC)     Acute on chronic diastolic heart failure ([facility] 1/28/2016    Adjustment disorder with mixed anxiety and depressed mood 6/26/2015    Mild     Anemia 3/5/2014    Back pain     Bronchiectasis with acute lower respiratory infection ([facility] 8/16/2017    Bronchitis     Chronic bronchitis ([facility] 1/28/2016    Chronic cough 7/23/2018    COPD (chronic obstructive pulmonary disease) (HCC)     asbestos related lung disease    Degenerative joint disease (DJD) of hip 5/14/2015    Dyslipidemia 3/5/2014    Encephalopathy acute 5/29/2016    Essential hypertension 1/28/2016    Fibromyalgia     GERD (gastroesophageal reflux disease)     History of total bilateral knee replacement 5/28/2016    Hx of blood clots     left leg and lung    Hyperlipidemia     Hypertension     Incontinence of urine     Irregular heartbeat     DR. Durant    Medication monitoring encounter 6/13/2018    Morbid obesity with BMI of 45.0-49.9, adult ([facility] 1/28/2016    Obstructive sleep apnea syndrome     Osteoarthritis     Primary osteoarthritis of left knee 5/27/2016    PVC (premature ventricular contraction) 1/28/2016    S/P right and left heart catheterization 10/2/2015    Sleep apnea     no machine    SOB (shortness of breath)     Spinal stenosis of lumbar region with neurogenic claudication 5/13/2013    Supplemental oxygen dependent 12/8/2017    Urine frequency     UTI (urinary tract infection)     hospitalized early july 2014 for kidney infection       Past Surgical History:   Procedure Laterality Date    BRONCHOSCOPY Left 8/16/2017    BRONCHOSCOPY ALVEOLAR LAVAGE LOWER LOBE performed by Eddie Ballesteros MD at [address]  x 3    no stents    COLONOSCOPY  4 28 14    polyps biopsies     FOOT SURGERY Left calcium deposit removal from top of foot    HYSTERECTOMY (CERVIX STATUS UNKNOWN)      JOINT REPLACEMENT Bilateral 5/27/2016    bilat total knees    NERVE BLOCK  5/13/2013    caudal celestone 6mg    NERVE BLOCK  5/28/13    Caudal #2, Celestone 9mg    NERVE BLOCK  2/14/14    rt hip inj celestone 9 mg    NERVE BLOCK  07/14/2014    caudal# 3- celestone 9 mg    NERVE BLOCK  7-21-14    caudal epidural #2, decadron 7mg, fentanyl 25mcg    NERVE BLOCK  10/2/14    duramorph 1.5  decadron 5mg    NERVE BLOCK  11/9/2015    caudal# 1 celestone 9mg    NERVE BLOCK  11/16/15    caudal #2  decadron 10mg    NERVE BLOCK  11/23/15    duramorph 1mg celestone 9mg    NERVE BLOCK  03/28/2018    CAUDAL EPIDURAL STEROID BLOCK  DECADRON 10 MG     NERVE BLOCK  05/23/2018    caudal # decadron 7mg    NERVE BLOCK  08/28/2018    natalia transforminal # 1, decadron 10mg gadoteridol, LONG NEEDLES    [place] PERQ VERT AGMNTJ CAVITY CRTJ UNI/BI CANNULJ LMBR N/A 10/29/2018    KYPHOPLASTY L1 performed by Johana Jauregui MD at [address] Right 5/14/15    total hip replacement       Allergies   Allergen Reactions    Rosuvastatin Calcium Anaphylaxis     Hair fell out    Statins Anaphylaxis     Hair fell out    Bactrim [Sulfamethoxazole-Trimethoprim] Diarrhea    Caffeine Other (See Comments)     pain  pain    Dye [Iodides] Other (See Comments)     Iv dye= blood clots in arm    Food      Tomatoes, pain    Ioxaglate Other (See Comments)     Iv dye= blood clots in arm    Tomato     Dicloxacillin Rash    Pcn [Penicillins] Rash         Current Outpatient Medications:     albuterol sulfate HFA (PROVENTIL;VENTOLIN;PROAIR) 108 (90 Base) MCG/ACT inhaler, Inhale 2 puffs into the lungs 4 times daily as needed for Wheezing, Disp: 1 each, Rfl: 2    gabapentin (NEURONTIN) 100 MG capsule, Take 2 capsules by mouth in the morning and 2 capsules at noon and 2 capsules before bedtime. Do all this for 30 days. , Disp: 180 capsule, Rfl: 0 oxyCODONE-acetaminophen (PERCOCET) 7.5-325 MG per tablet, Take 1 tablet by mouth every 6 hours as needed for Pain for up to 30 days. , Disp: 120 tablet, Rfl: 0    DULoxetine (CYMBALTA) 30 MG extended release capsule, Take 1 capsule by mouth 2 times daily, Disp: 180 capsule, Rfl: 3    guaiFENesin (ROBITUSSIN) 100 MG/5ML liquid, take 10 milliliters by mouth every 4 hours if needed for cough, Disp: , Rfl:     Carboxymeth-Cellulose-CitricAc (PLENITY WELCOME KIT) CAPS, Take 3 capsules by mouth in the morning and at bedtime, Disp: 180 capsule, Rfl: 0    methylPREDNISolone (MEDROL DOSEPACK) 4 MG tablet, use as directed FOLLOW DIRECTIONS ON BACK OF FOIL PACK, Disp: , Rfl:     fluticasone (FLONASE) 50 MCG/ACT nasal spray, 1 spray by Each Nostril route daily, Disp: 32 g, Rfl: 1    guaiFENesin (ALTARUSSIN) 100 MG/5ML syrup, Take 10 mLs by mouth every 4 hours as needed for Cough, Disp: 1 each, Rfl: 1    albuterol (PROVENTIL) (5 MG/ML) 0.5% nebulizer solution, Take 1 mL by nebulization every 4 hours as needed for Wheezing, Disp: 120 each, Rfl: 3    ciprofloxacin (CIPRO) 250 MG tablet, Take 250 mg by mouth every 8-12 hours as needed, Disp: , Rfl:     Catheters (STANDARD CARE EXTERNAL CATH) MISC, 1 Device by Does not apply route daily, Disp: 30 each, Rfl: 2    MYRBETRIQ 50 MG TB24, Take 50 mg by mouth daily, Disp: 30 tablet, Rfl: 5    metoprolol tartrate (LOPRESSOR) 50 MG tablet, TAKE 1 TABLET TWICE A DAY, Disp: 180 tablet, Rfl: 3    Cyanocobalamin (VITAMIN B-12) 1000 MCG/15ML LIQD, Take 1,000 mcg by mouth daily , Disp: , Rfl:     levocetirizine (XYZAL) 5 MG tablet, take 1 tablet by mouth once daily AT NIGHT, Disp: 30 tablet, Rfl: 2    donepezil (ARICEPT) 5 MG tablet, take 1 tablet by mouth at bedtime, Disp: 90 tablet, Rfl: 1    psyllium (KONSYL) 28.3 % PACK, Take 1 packet by mouth daily , Disp: , Rfl:     Catheters (STANDARD CARE EXTERNAL CATH) MISC, 1 each by Does not apply route daily, Disp: 5 each, Rfl: 1    Misc.  Devices (WALKER) MISC, 1 each by Does not apply route daily Upright walker with wheels and seat, Disp: 1 each, Rfl: 0    aspirin 81 MG EC tablet, Take 81 mg by mouth daily , Disp: , Rfl:     Family History   Problem Relation Age of Onset    Stomach Cancer Brother         stomach    High Blood Pressure Mother     Heart Disease Sister         irregular heartbeat       Social History     Socioeconomic History    Marital status:      Spouse name: Not on file    Number of children: 2    Years of education: Not on file    Highest education level: Not on file   Occupational History    Not on file   Tobacco Use    Smoking status: Former     Packs/day: 1.00     Years: 18.00     Pack years: 18.00     Types: Cigarettes     Quit date: 1993     Years since quittin.3    Smokeless tobacco: Never   Vaping Use    Vaping Use: Never used   Substance and Sexual Activity    Alcohol use: No    Drug use: No    Sexual activity: Not Currently   Other Topics Concern    Not on file   Social History Narrative    Not on file     Social Determinants of Health     Financial Resource Strain: Low Risk     Difficulty of Paying Living Expenses: Not very hard   Food Insecurity: No Food Insecurity    Worried About Running Out of Food in the Last Year: Never true    Ran Out of Food in the Last Year: Never true   Transportation Needs: Unmet Transportation Needs    Lack of Transportation (Medical): Yes    Lack of Transportation (Non-Medical): Yes   Physical Activity: Insufficiently Active    Days of Exercise per Week: 3 days    Minutes of Exercise per Session: 20 min   Stress: No Stress Concern Present    Feeling of Stress : Only a little   Social Connections: Moderately Integrated    Frequency of Communication with Friends and Family: More than three times a week    Frequency of Social Gatherings with Friends and Family: Once a week    Attends Church Services: More than 4 times per year    Active Member of [address] [place] [address] or Organizations:  Yes unless patient cannot get through daily activities due to pain. Contract requirements met. Continue opioid therapy. Script written for percocet  Will try gabapentin 300mg, pt requests to stay at 100mg capsules so can go to 200mg if having side efeects   Pt again requesting increase of meds for breakthrough neuropathic pain  Discussed SCS trial and pt given info to review  Pt does have appt in [place] office next week for second opinion  Follow up appointment made for 4 weeks    I have reviewed the chief complaint and history of present illness (including ROS and [address]) and vital documentation by my staff and I agree with their documentation and have added where applicable.

## 2022-09-07 ENCOUNTER — CARE COORDINATION (OUTPATIENT)
Dept: CARE COORDINATION | Age: 77
End: 2022-09-07

## 2022-09-07 NOTE — CARE COORDINATION
Ambulatory Care Coordination Note  9/7/2022    ACC: Minnie Lawton, [place]    Summary  Date Care Coordination Episode Started:  6.8.22    Reason patient is in Care Coordination:     PCP referral    Topics Discussed Today:     [employer] form, patient was provided with the fax to give the Elfreda [employer] to send over the form. Moving, patient wants to start looking more serious to move into a senior apartment or assisted living. She has until the end of the year. [facility] about some options, ACM will route to [facility]. RPM enrollment declined by patient. No other concerns today. Assessments completed today:     Fall risk  Initial assessment  Medication reconciliation  SDOH  COPD, CHF (Health assessments)      Care Coordinator plan of care: Follow up call in 1-2 weeks      Care Coordination Interventions    Referral from Primary Care Provider: No  Suggested Interventions and Community Resources  Fall Risk Prevention: In Process  Home Health Services: Completed  Physical Therapy: Completed  Social Work: In Process  Transportation Support: In Process  Zone Management Tools: In Process          Goals Addressed                   This Visit's Progress     Community Resource Goal   On track     Patient needs assistance with getting transportation to her medical appointments    Barriers: fear of failure, lack of motivation, financial, lack of support, overwhelmed by complexity of regimen, stress, time constraints, medication side effects, and lack of education    Plan for overcoming my barriers: [facility] completed an application for the [employer]    Confidence: 9/10    Anticipated Goal Completion Date: 09/22/22              Prior to Admission medications    Medication Sig Start Date End Date Taking? Authorizing Provider   oxyCODONE-acetaminophen (PERCOCET) 7.5-325 MG per tablet Take 1 tablet by mouth every 6 hours as needed for Pain for up to 30 days.  9/3/22 10/3/22  Jolinda Landau, APRN - CNP gabapentin (NEURONTIN) 100 MG capsule Take 3 capsules by mouth 3 times daily for 90 days.  8/30/22 11/28/22  KARIN Rose - CNP   albuterol sulfate HFA (PROVENTIL;VENTOLIN;PROAIR) 108 (90 Base) MCG/ACT inhaler Inhale 2 puffs into the lungs 4 times daily as needed for Wheezing 8/10/22   Lg Fowler MD   DULoxetine (CYMBALTA) 30 MG extended release capsule Take 1 capsule by mouth 2 times daily 7/14/22   Mary Jo Moseley MD   guaiFENesin (ROBITUSSIN) 100 MG/5ML liquid take 10 milliliters by mouth every 4 hours if needed for cough 5/17/22   Historical Provider, MD   Carboxymeth-Cellulose-CitricAc ([facility]IT) CAPS Take 3 capsules by mouth in the morning and at bedtime 6/24/22   Sharee Guzmán MD   methylPREDNISolone (MEDROL DOSEPACK) 4 MG tablet use as directed [address] [facility] [facility] [address] 4/5/22   Historical Provider, MD   fluticasone (FLONASE) 50 MCG/ACT nasal spray 1 spray by Each Nostril route daily 5/16/22   Sharee Guzmán MD   guaiFENesin (ALTARUSSIN) 100 MG/5ML syrup Take 10 mLs by mouth every 4 hours as needed for Cough 5/16/22   Sharee Guzmán MD   albuterol (PROVENTIL) (5 MG/ML) 0.5% nebulizer solution Take 1 mL by nebulization every 4 hours as needed for Wheezing 4/18/22   Sharee Guzmán MD   ciprofloxacin (CIPRO) 250 MG tablet Take 250 mg by mouth every 8-12 hours as needed    Historical Provider, MD   Catheters (STANDARD CARE EXTERNAL CATH) MISC 1 Device by Does not apply route daily 1/10/22   Sharee Guzmán MD   MYRBETRIQ 50 MG TB24 Take 50 mg by mouth daily 1/10/22   Lola Lechuga MD   metoprolol tartrate (LOPRESSOR) 50 MG tablet TAKE 1 TABLET TWICE A DAY 12/22/21   Sharee Guzmán MD   Cyanocobalamin (VITAMIN B-12) 1000 MCG/15ML LIQD Take 1,000 mcg by mouth daily     Historical Provider, MD   levocetirizine (XYZAL) 5 MG tablet take 1 tablet by mouth once daily AT NIGHT 9/8/21   Sharee Guzmán MD   donepezil (ARICEPT) 5 MG tablet take 1 tablet by mouth at bedtime 9/1/21 Jennifer Bey MD   psyllium (KONSYL) 28.3 % PACK Take 1 packet by mouth daily     Historical Provider, MD   Catheters (STANDARD CARE EXTERNAL CATH) MISC 1 each by Does not apply route daily 3/2/21   Geneva Tapia MD   Mis.  Devices [facility]) MISC 1 each by Does not apply route daily Upright walker with wheels and seat 11/12/18   Eufemia Brady MD   aspirin 81 MG EC tablet Take 81 mg by mouth daily     Historical Provider, MD       Future Appointments   Date Time Provider Mayo Crespoi   9/12/2022  3:15 PM MD Erma Berg 1948 PAIN [facility]   10/27/2022  1:00 PM Navya Sierra APRN - CNP [address]   ,   Congestive Heart Failure Assessment    Are you currently restricting fluids?: Other  Do you understand a low sodium diet?: Yes  Do you understand how to read food labels?: Yes  Do you salt your food before tasting it?: No         Symptoms:          ,   COPD Assessment    Does the patient understand envrionmental exposure?: Yes  Is the patient able to verbalize Rescue vs. Long Acting medications?: Yes  Does the patient have a nebulizer?: No            Symptoms:          , and   General Assessment    Do you have any symptoms that are causing concern?: No

## 2022-09-08 ENCOUNTER — CARE COORDINATION (OUTPATIENT)
Dept: CARE COORDINATION | Age: 77
End: 2022-09-08

## 2022-09-08 NOTE — CARE COORDINATION
HC received a message from ROWAN Bird/LATHA stating that the patient is interested in speaking with the [facility]. about options for her to move. [facility] phoned the patient to inquire of her schedule on 09/09/22. Patient stated that she's available after her bible study time, around 1:30p. HC will plan to contact the patient after that time. Plan of Care  [facility] will reach out to the patient to discuss relocating to assisted living or a senior apartment.

## 2022-09-09 ENCOUNTER — CARE COORDINATION (OUTPATIENT)
Dept: CARE COORDINATION | Age: 77
End: 2022-09-09

## 2022-09-09 NOTE — CARE COORDINATION
HC phoned and spoke with the patient today regarding her desire to move prior to her lease being up in 6 months. Patient stated that she desires to move either into a senior apartment that are handicap accessible. Patient states that the apartment that she lives in isn't handicap accessible and she has to climb into the bathtub and the other needed amenities. Patient would hope that her rent will be less than what she pays right now. Patient also wanted to cancel her transportation for Monday, 09/12/22. She states that she will be cancelling her pain management appointment and won't need transportation. HC phoned [employer] and cancelled patients' transportation. Plan of Care  [facility] [place], [facility]. will look up Senior Apartments in the patients' area that she presently lives in. ..

## 2022-09-14 ENCOUNTER — CARE COORDINATION (OUTPATIENT)
Dept: CARE COORDINATION | Age: 77
End: 2022-09-14

## 2022-09-20 ENCOUNTER — CARE COORDINATION (OUTPATIENT)
Dept: CARE COORDINATION | Age: 77
End: 2022-09-20

## 2022-09-20 NOTE — CARE COORDINATION
HC assisting the patient with trying to find housing. HC along with the patient on the three way call, attempted to contact several places. Patient and HC will continue to research other places. HC also  attempted to contact  the [address], patient left a message. Plan of Care  [facility]. will research other senior housing for the patient .

## 2022-09-20 NOTE — CARE COORDINATION
Ambulatory Care Coordination Note  9/20/2022    ACC: Janie Yo, RN    Summary  Date Care Coordination Episode Started: 6.9.22    Reason patient is in Care Coordination:     PCP referral     Topics Discussed Today:     Equipment needs, patient wants to rent a wheel chair for her grand daughters wedding. She has 2 wheel chairs that are too small for her. [facility] provided her with [facility] equipment and the ability center. She is going to see if the ability center would want to trade her 2 smaller chairs for 1 bigger one. Medications, no needs at this time. Moving, patient has begun calling places to inquire about a new place to live. Assessments completed today:     Fall risk  Initial assessment  Medication reconciliation  SDOH  COPD, CHF (Health assessments)      Care Coordinator plan of care: Follow up call in 2 weeks, check on needs, continue support. Care Coordination Interventions    Referral from Primary Care Provider: No  Suggested Interventions and Community Resources  Fall Risk Prevention: In Process  Home Health Services: Completed  Physical Therapy: Completed  Social Work: In Process  Transportation Support: In Process  Zone Management Tools: In Process          Goals Addressed                   This Visit's Progress     Community Resource Goal   Improving     Patient needs assistance with getting transportation to her medical appointments    Barriers: fear of failure, lack of motivation, financial, lack of support, overwhelmed by complexity of regimen, stress, time constraints, medication side effects, and lack of education    Plan for overcoming my barriers: [facility]. completed an application for the [employer]    Confidence: 9/10    Anticipated Goal Completion Date: 09/22/22       Medication Management   On track     I will take my medication as directed. I will notify my provider of any problems with medications, like adverse effects or side effects.   I will notify my provider/Care Coordinator if I am unable to afford my medications. I will notify my provider for advice before I stop taking any of my medication. Barriers: overwhelmed by complexity of regimen  Plan for overcoming my barriers: work with CC team  Confidence: 6/10  Anticipated Goal Completion Date: 8.22.22              Prior to Admission medications    Medication Sig Start Date End Date Taking? Authorizing Provider   oxyCODONE-acetaminophen (PERCOCET) 7.5-325 MG per tablet Take 1 tablet by mouth every 6 hours as needed for Pain for up to 30 days. 9/3/22 10/3/22  KARIN Stack CNP   gabapentin (NEURONTIN) 100 MG capsule Take 3 capsules by mouth 3 times daily for 90 days.  8/30/22 11/28/22  KARIN Stack CNP   albuterol sulfate HFA (PROVENTIL;VENTOLIN;PROAIR) 108 (90 Base) MCG/ACT inhaler Inhale 2 puffs into the lungs 4 times daily as needed for Wheezing 8/10/22   Lorene De La Cruz MD   DULoxetine (CYMBALTA) 30 MG extended release capsule Take 1 capsule by mouth 2 times daily 7/14/22   Dede Gaytan MD   guaiFENesin (ROBITUSSIN) 100 MG/5ML liquid take 10 milliliters by mouth every 4 hours if needed for cough 5/17/22   Historical Provider, MD   Carboxymeth-Cellulose-CitricAc (PLENITY WELCOME KIT) CAPS Take 3 capsules by mouth in the morning and at bedtime 6/24/22   Gerrianne Cranker, MD   methylPREDNISolone (MEDROL DOSEPACK) 4 MG tablet use as directed [address] 4/5/22   Historical Provider, MD   fluticasone (FLONASE) 50 MCG/ACT nasal spray 1 spray by Each Nostril route daily 5/16/22   Gerrianne Cranker, MD   guaiFENesin (ALTARUSSIN) 100 MG/5ML syrup Take 10 mLs by mouth every 4 hours as needed for Cough 5/16/22   Gerrianne Cranker, MD   albuterol (PROVENTIL) (5 MG/ML) 0.5% nebulizer solution Take 1 mL by nebulization every 4 hours as needed for Wheezing 4/18/22   Gerrianne Cranker, MD   ciprofloxacin (CIPRO) 250 MG tablet Take 250 mg by mouth every 8-12 hours as needed    Historical Provider, MD

## 2022-09-21 ENCOUNTER — CARE COORDINATION (OUTPATIENT)
Dept: CARE COORDINATION | Age: 77
End: 2022-09-21

## 2022-09-21 NOTE — CARE COORDINATION
returned the patients' missed call from yesterday. Patient couldn't remember what her call was about. , and she apologized.     Plan of Care  [facility] will follow up with patient for other needs

## 2022-09-23 ENCOUNTER — CARE COORDINATION (OUTPATIENT)
Dept: CARE COORDINATION | Age: 77
End: 2022-09-23

## 2022-09-23 NOTE — CARE COORDINATION
HC phoned the patient to follow with her request for information for food resources. HC explained the various services that are available to our patients. Patient acknowledged that she isn't looking for a hand out. She was speaking of something that she had seen online regarding discounts for seniors to get food. HC reminded the patient that we had spoken of that before and it's a particular insurance that is assisting seniors with some food discounts. Plan of Care  [facility] OF [place], [facility]. will reach out to patient next week regarding researching other housing for seniors.

## 2022-09-27 ENCOUNTER — TELEPHONE (OUTPATIENT)
Dept: INTERNAL MEDICINE CLINIC | Age: 77
End: 2022-09-27

## 2022-09-27 ENCOUNTER — CARE COORDINATION (OUTPATIENT)
Dept: CARE COORDINATION | Age: 77
End: 2022-09-27

## 2022-09-27 NOTE — TELEPHONE ENCOUNTER
Per kayla care coordinator the patient had called her this morning to state that she need her electric form filled out to keep her electric on and that they were faxing it to the office, have not received the form as of now. Furthermore patient will need an appointment to have this filled out since she has not been seen by another provider in the office.  Attempted to contact patient to inform her of this, no answer, left message on machine requesting that patient call back

## 2022-09-27 NOTE — CARE COORDINATION
Patient called asking if PCP office received a form from [facility] yet. ACM checked with PCP office and was told that patient will need any appointment, she has not been seen in a long time. Office will call to schedule her.

## 2022-09-28 ENCOUNTER — CARE COORDINATION (OUTPATIENT)
Dept: CARE COORDINATION | Age: 77
End: 2022-09-28

## 2022-09-28 NOTE — CARE COORDINATION
HC phoned and spoke with the patient today, and inquired of she had contacted  [employer]. Patient stated that she has left a few messages and haven't received a return call yet. HC informed the patient that she has attempted to call as well. Plan of Care   [facility] OF [place], [facility]. will reach out to [employer] again if no response.

## 2022-09-30 NOTE — TELEPHONE ENCOUNTER
Patient told of refill and quantity. Is called [facility] to confirm rx was received and why it was not filled properly. Patient told I will follow up with her on Monday 10/3/22.

## 2022-09-30 NOTE — TELEPHONE ENCOUNTER
Last filled on 08/30/22 by AARON Pate, Pt has appt with Dr. Robin Wilson on 10/04/22. Please advise.

## 2022-10-03 NOTE — TELEPHONE ENCOUNTER
[facility] spoke with [facility] and has her prescription properly filled and is on it's way to her

## 2022-10-04 ENCOUNTER — HOSPITAL ENCOUNTER (OUTPATIENT)
Dept: PAIN MANAGEMENT | Age: 77
Discharge: HOME | End: 2022-10-04
Payer: MEDICARE

## 2022-10-04 ENCOUNTER — CARE COORDINATION (OUTPATIENT)
Dept: CARE COORDINATION | Age: 77
End: 2022-10-04

## 2022-10-04 VITALS
OXYGEN SATURATION: 97 % | DIASTOLIC BLOOD PRESSURE: 64 MMHG | BODY MASS INDEX: 47.46 KG/M2 | HEIGHT: 64 IN | TEMPERATURE: 97.3 F | RESPIRATION RATE: 20 BRPM | HEART RATE: 49 BPM | SYSTOLIC BLOOD PRESSURE: 147 MMHG | WEIGHT: 278 LBS

## 2022-10-04 DIAGNOSIS — E66.01 CLASS 3 SEVERE OBESITY WITH BODY MASS INDEX (BMI) OF 45.0 TO 49.9 IN ADULT, UNSPECIFIED OBESITY TYPE, UNSPECIFIED WHETHER SERIOUS COMORBIDITY PRESENT (HCC): ICD-10-CM

## 2022-10-04 DIAGNOSIS — Z79.891 LONG-TERM CURRENT USE OF OPIATE ANALGESIC: ICD-10-CM

## 2022-10-04 DIAGNOSIS — M51.36 DDD (DEGENERATIVE DISC DISEASE), LUMBAR: ICD-10-CM

## 2022-10-04 DIAGNOSIS — M47.817 LUMBOSACRAL SPONDYLOSIS WITHOUT MYELOPATHY: Primary | ICD-10-CM

## 2022-10-04 PROCEDURE — 99213 OFFICE O/P EST LOW 20 MIN: CPT

## 2022-10-04 PROCEDURE — 99214 OFFICE O/P EST MOD 30 MIN: CPT | Performed by: STUDENT IN AN ORGANIZED HEALTH CARE EDUCATION/TRAINING PROGRAM

## 2022-10-04 RX ORDER — GABAPENTIN 100 MG/1
CAPSULE ORAL
Qty: 180 CAPSULE | OUTPATIENT
Start: 2022-10-04

## 2022-10-04 RX ORDER — GABAPENTIN 300 MG/1
300 CAPSULE ORAL 3 TIMES DAILY
Qty: 90 CAPSULE | Refills: 2 | Status: SHIPPED | OUTPATIENT
Start: 2022-10-04 | End: 2023-01-02

## 2022-10-04 RX ORDER — OXYCODONE AND ACETAMINOPHEN 7.5; 325 MG/1; MG/1
1 TABLET ORAL EVERY 6 HOURS PRN
Qty: 120 TABLET | Refills: 0 | Status: SHIPPED | OUTPATIENT
Start: 2022-10-04 | End: 2022-11-01 | Stop reason: SDUPTHER

## 2022-10-04 NOTE — PROGRESS NOTES
Chronic Pain Clinic Note     Encounter Date: 10/4/2022     SUBJECTIVE:  Chief Complaint   Patient presents with    Back Pain     Med refill       History of Present Illness:   Omid Mckeon is a 67 y.o. female who presents with back pain    Medication Refill: Oxycodone - 2    Current Complaints of Pain:   Location:  back    Radiation: Left leg  Severity: mod/severe  Pain Numerical Score -    Average: 8     Highest: 10+  Lowest: 4  Character/Quality: Complains of pain that is aching, burning, cramping, shooting, stabbing  Timing: Constant  Associated symptoms: none  Numbness:  yes    Weakness:  yes    Exacerbating factors:  walking, standing, bending  Alleviating factors:  sitting with a pillow behind the back  Length of time pain has been present: Started several years  Inciting event/injury:  work injury  Bowel/Bladder incontinence: no   Falls: no   Physical Therapy: no    History of Interventions:   Surgery: No   Injections: None    Imaging:    Lumbar CT 9/28/2018    CT LUMBAR SPINE:       Superior endplate compression of L1. Although this was present on prior   plain film imaging in March 2018, it has worsened, and there are findings   suggesting acute exacerbation of the pre-existing compression. Severe   degenerative disc disease is noted with severe bilateral neural foraminal   narrowing and canal stenosis as above.        Past Medical History:   Diagnosis Date    Abnormality of rib determined by X-ray 1/28/2016    Acute cystitis without hematuria 6/16/2018    Acute exacerbation of chronic bronchitis (HCC)     Acute on chronic diastolic heart failure ([address] 1/28/2016    Adjustment disorder with mixed anxiety and depressed mood 6/26/2015    Mild     Anemia 3/5/2014    Back pain     Bronchiectasis with acute lower respiratory infection ([address] 8/16/2017    Bronchitis     Chronic bronchitis ([address] 1/28/2016    Chronic cough 7/23/2018    COPD (chronic obstructive pulmonary disease) (HCC)     asbestos related lung disease    Degenerative joint disease (DJD) of hip 5/14/2015    Dyslipidemia 3/5/2014    Encephalopathy acute 5/29/2016    Essential hypertension 1/28/2016    Fibromyalgia     GERD (gastroesophageal reflux disease)     History of total bilateral knee replacement 5/28/2016    Hx of blood clots     left leg and lung    Hyperlipidemia     Hypertension     Incontinence of urine     Irregular heartbeat     DR. Durant    Medication monitoring encounter 6/13/2018    Morbid obesity with BMI of 45.0-49.9, adult ([address] 1/28/2016    Obstructive sleep apnea syndrome     Osteoarthritis     Primary osteoarthritis of left knee 5/27/2016    PVC (premature ventricular contraction) 1/28/2016    S/P right and left heart catheterization 10/2/2015    Sleep apnea     no machine    SOB (shortness of breath)     Spinal stenosis of lumbar region with neurogenic claudication 5/13/2013    Supplemental oxygen dependent 12/8/2017    Urine frequency     UTI (urinary tract infection)     hospitalized early july 2014 for kidney infection       Past Surgical History:   Procedure Laterality Date    BRONCHOSCOPY Left 8/16/2017    BRONCHOSCOPY ALVEOLAR LAVAGE LOWER LOBE performed by Prudencio Alfonso MD at [facility]  x 3    no stents    COLONOSCOPY  4 28 14    polyps biopsies     FOOT SURGERY Left     calcium deposit removal from top of foot    HYSTERECTOMY (CERVIX STATUS UNKNOWN)      JOINT REPLACEMENT Bilateral 5/27/2016    bilat total knees    NERVE BLOCK  5/13/2013    caudal celestone 6mg    NERVE BLOCK  5/28/13    Caudal #2, Celestone 9mg    NERVE BLOCK  2/14/14    rt hip inj celestone 9 mg    NERVE BLOCK  07/14/2014    caudal# 3- celestone 9 mg    NERVE BLOCK  7-21-14    caudal epidural #2, decadron 7mg, fentanyl 25mcg    NERVE BLOCK  10/2/14    duramorph 1.5  decadron 5mg    NERVE BLOCK  11/9/2015    caudal# 1 celestone 9mg    NERVE BLOCK  11/16/15    caudal #2  decadron 10mg    NERVE BLOCK  11/23/15    duramorph 1mg celestone 9mg    NERVE BLOCK  2018    CAUDAL EPIDURAL STEROID BLOCK  DECADRON 10 MG     NERVE BLOCK  2018    caudal # decadron 7mg    NERVE BLOCK  2018    natalia transforminal # 1, decadron 10mg gadoteridol, LONG NEEDLES    AZ PERQ VERT AGMNTJ CAVITY CRTJ UNI/BI CANNULJ LMBR N/A 10/29/2018    KYPHOPLASTY L1 performed by Fede Bland MD at [address] Right 5/14/15    total hip replacement       Family History   Problem Relation Age of Onset    Stomach Cancer Brother         stomach    High Blood Pressure Mother     Heart Disease Sister         irregular heartbeat       Social History     Socioeconomic History    Marital status:      Spouse name: Not on file    Number of children: 2    Years of education: Not on file    Highest education level: Not on file   Occupational History    Not on file   Tobacco Use    Smoking status: Former     Packs/day: 1.00     Years: 18.00     Pack years: 18.00     Types: Cigarettes     Quit date: 1993     Years since quittin.4    Smokeless tobacco: Never   Vaping Use    Vaping Use: Never used   Substance and Sexual Activity    Alcohol use: No    Drug use: No    Sexual activity: Not Currently   Other Topics Concern    Not on file   Social History Narrative    Not on file     Social Determinants of Health     Financial Resource Strain: Low Risk     Difficulty of Paying Living Expenses: Not very hard   Food Insecurity: No Food Insecurity    Worried About Running Out of Food in the Last Year: Never true    Ran Out of Food in the Last Year: Never true   Transportation Needs: Not on file   Physical Activity: Insufficiently Active    Days of Exercise per Week: 3 days    Minutes of Exercise per Session: 20 min   Stress: No Stress Concern Present    Feeling of Stress : Only a little   Social Connections:  Moderately Integrated    Frequency of Communication with Friends and Family: More than three times a week Frequency of Social Gatherings with Friends and Family: Once a week    Attends [employer] Services: More than 4 times per year    Active Member of [employer] or Organizations: Yes    Attends Club or Organization Meetings: More than 4 times per year    Marital Status:    Intimate Partner Violence: Not on file   Housing Stability: [address] to Pay for Housing in the Last Year: No    Number of [place] in the Last Year: 1    Unstable Housing in the Last Year: No       Medications & Allergies:   Current Outpatient Medications   Medication Instructions    albuterol (PROVENTIL) 5 mg, Nebulization, EVERY 4 HOURS PRN    albuterol sulfate HFA (PROVENTIL;VENTOLIN;PROAIR) 108 (90 Base) MCG/ACT inhaler 2 puffs, Inhalation, 4 TIMES DAILY PRN    aspirin 81 mg, Oral, DAILY    Carboxymeth-Cellulose-CitricAc (PLENITY WELCOME KIT) CAPS 3 capsules, Oral, 2 times daily    Catheters (STANDARD CARE EXTERNAL CATH) MISC 1 each, Does not apply, DAILY    Catheters (STANDARD CARE EXTERNAL CATH) MISC 1 Device, Does not apply, DAILY    ciprofloxacin (CIPRO) 250 mg, Oral, EVERY 8-12 HOURS PRN    donepezil (ARICEPT) 5 MG tablet take 1 tablet by mouth at bedtime    DULoxetine (CYMBALTA) 30 mg, Oral, 2 TIMES DAILY    fluticasone (FLONASE) 50 MCG/ACT nasal spray 1 spray, Each Nostril, DAILY    gabapentin (NEURONTIN) 300 mg, Oral, 3 TIMES DAILY    guaiFENesin (ALTARUSSIN) 100 MG/5ML syrup 10 mLs, Oral, EVERY 4 HOURS PRN    guaiFENesin (ROBITUSSIN) 100 MG/5ML liquid take 10 milliliters by mouth every 4 hours if needed for cough    levocetirizine (XYZAL) 5 MG tablet take 1 tablet by mouth once daily AT NIGHT    methylPREDNISolone (MEDROL DOSEPACK) 4 MG tablet use as directed FOLLOW DIRECTIONS ON BACK OF FOIL PACK    metoprolol tartrate (LOPRESSOR) 50 MG tablet TAKE 1 TABLET TWICE A DAY    Misc.  Devices (WALKER) MISC 1 each, Does not apply, DAILY, Upright walker with wheels and seat    Myrbetriq 50 mg, Oral, DAILY bilaterally  Lumbar range of motion is limited in extension due to pain  NEUROMUSCULAR:  Patient ambulates with wheelchair  Gait is antalgic  Sensation to light touch is intact throughout lower extremities  Strength is full in lower extremities  No ankle clonus    Special Tests:  Lumbar facet loading is positive bilaterally  Seated straight leg raise is negative bilaterally    DIAGNOSIS:    ICD-10-CM    1. Lumbosacral spondylosis without myelopathy  M47.817 FACET JOINT L/S     FACET JOINT L/S 2ND LEVEL      2. DDD (degenerative disc disease), lumbar  M51.36 oxyCODONE-acetaminophen (PERCOCET) 7.5-325 MG per tablet      3. Long-term current use of opiate analgesic  Z79.891       4. Class 3 severe obesity with body mass index (BMI) of 45.0 to 49.9 in adult, unspecified obesity type, unspecified whether serious comorbidity present [facility])  E66.01     Z68.42            ASSESSMENT:    Wagner Gaytan is a 67 y. o.female presenting to the pain clinic for evaluation of chronic low back pain and neuropathy. She denies any previous low back surgeries. To review, patient has had previous lumbar facet therapeutic injections with great relief in her low back pain. The patient's history and physical examination are consistent with lumbar spondylosis as she has tenderness to palpation along the lumbar paraspinal musculature with positive lumbar facet loading bilaterally. Additionally, her lumbar CT on 9/28/2018 reveals multilevel degenerative changes with facet hypertrophy throughout. I will plan to repeat bilateral lumbar L3, L4, L5 medial branch blocks therapeutically with steroid. Additionally there is evidence of peripheral neuropathy as she has numbness and tingling chronically in her hands and feet, therefore I will increase her gabapentin at today's visit. Neurologically, it appears the patient has full strength and normal sensation. There is no evidence of radiculopathy or myelopathy on examination.  There are no red flags in the patient's history. The patient has failed conservative measures including greater than 3 medications for pain relief, a self-directed therapy program, as well as activity modification all within the last 6 weeks over 3 months. The patient's pain has been causing worsening quality of life and function. The patient continues to take opioid medications to improve pain, function and quality of life. The patient denies any side effects from the medications including constipation or respiratory depression. The patient reports adequate analgesia with the medication. There is no evidence of aberrant behavior. PLAN:  Medications: For nonopioid therapy, the following medications were prescribed:    -Increase gabapentin from 200 mg 3 times daily to 300 mg 3 times daily    Opioid therapy:  -Continue Percocet 7.5/325 mg 4 times daily as needed, refilled  -Pain Treatment agreement: Up to date  -Urine Drug Screen: 2/21/2022, reviewed and appropriate  -OARRS reviewed and appropriate    Interventions:  -Plan for bilateral lumbar L3, L4, L5 medial branch block therapeutic with steroid    Imaging:  -Reviewed lumbar CT with patient in room    Behavioral Therapies:  -Continue daily stretching and home exercise program    Referrals:  -None    Follow-up Plan:  -After procedure    Patient was offered intervention where appropriate. Multi-modal Pain Therapy: The patient was explicitly considered for multimodal and interdisciplinary therapy. Non-opioid and non-pharmacological opportunities to enhance analgesia and quality of life have been and will continue to be pursued. Opioid Therapy: Education provided to patient regarding short term and long term implications of opioid medication use. Repeat opioid risk stratification today, discussion regarding functional achievements, safe storage, and optimization of non-opioid interventional, behavioral, and pharmaceutical modalities.  Will continue attempt to wean off medication as appropriate. Penny Brumfield, DO  Interventional Pain Management/PM&R   [address]    Orders Placed This Encounter    FACET JOINT L/S     Standing Status:   Future     Standing Expiration Date:   10/4/2023     Scheduling Instructions:      Bilateral L3, L4, L5 medial branch block - therapeutic with steroid x 1    FACET JOINT L/S 2ND LEVEL     Standing Status:   Future     Standing Expiration Date:   10/4/2023    oxyCODONE-acetaminophen (PERCOCET) 7.5-325 MG per tablet     Sig: Take 1 tablet by mouth every 6 hours as needed for Pain for up to 30 days. Dispense:  120 tablet     Refill:  0     Reduce doses taken as pain becomes manageable    gabapentin (NEURONTIN) 300 MG capsule     Sig: Take 1 capsule by mouth 3 times daily for 90 days.      Dispense:  90 capsule     Refill:  2

## 2022-10-05 NOTE — CARE COORDINATION
HC attempted to contact the patient today, there was no answer. HC left a message for the patient and provided contact information for a return call.     Plan of Care   [facility] OF [facility]. will await a return call from patient

## 2022-10-06 ENCOUNTER — CARE COORDINATION (OUTPATIENT)
Dept: CARE COORDINATION | Age: 77
End: 2022-10-06

## 2022-10-06 ENCOUNTER — TELEPHONE (OUTPATIENT)
Dept: PAIN MANAGEMENT | Age: 77
End: 2022-10-06

## 2022-10-06 NOTE — TELEPHONE ENCOUNTER
Pt calls the office regarding visit 10/4 pt is concerned with leg pain and what the plan of care is for that.  Will route a message to Dr. Pfeiffer Sheridan

## 2022-10-06 NOTE — CARE COORDINATION
[facility] and stated that she was leaving home briefly and would be back in about an hour. HC contacted the patient and informed her that she had no information and suggested that she call her friend who lives in the complex. Plan of Care  [facility] will attempt to reach out to [facility] by the [facility] again to inquire about a vacancy.

## 2022-10-10 ENCOUNTER — CARE COORDINATION (OUTPATIENT)
Dept: CARE COORDINATION | Age: 77
End: 2022-10-10

## 2022-10-11 ENCOUNTER — CARE COORDINATION (OUTPATIENT)
Dept: CARE COORDINATION | Age: 77
End: 2022-10-11

## 2022-10-11 NOTE — CARE COORDINATION
Patient phoned the [facility] to inquire if she had received a return call from [facility]. HC informed the patient that she had called and left another message today. HC informed the patient that she would go to the management office today to make inquiries on the patients' behalf. HC visited [facility] and met the Amalia . Mr. Jose Francisco Goncalves explained the waiting list for [facility], stating that there is a year to a year and a half waiting list.  HC was given an application for the patient and told to have her complete it and not to put any dates on the application. [facility] informed Mr. Jose Francisco Goncalves that she was unsure of patients' income, but believed that she would be able to pay the higher rent. [facility] informed Mr. Jose Francisco Goncalves that she would get the information to the patient and have her to contact him.       Plan of Care  [facility] will mail the application out to the patient

## 2022-10-18 ENCOUNTER — CARE COORDINATION (OUTPATIENT)
Dept: CARE COORDINATION | Age: 77
End: 2022-10-18

## 2022-10-18 NOTE — CARE COORDINATION
Ambulatory Care Coordination Note  10/18/2022    ACC: Dulce Easton, [place]    Summary  Date Care Coordination Episode Started: 6.9.22     Reason patient is in Care Coordination:     PCP referral     Topics Discussed Today:     Patient reports she caught a cold at her grand daughters wedding. Complaints of a slight cough and congestion. No fever that she knows of. ACM asked if she is using her breathing treatments, she stated no but she will. CHF, other than slight cough no concerns per patient. Equipment - medication needs, none today per patient. ACM recommended calling if symptoms worsen or do not improve. Assessments completed today:     Fall risk  Initial assessment  Medication reconciliation  SDOH  COPD, CHF (Health assessments)      Care Coordinator plan of care: Follow up call in 1-2 weeks, possibly graduate at this time if no needs. Offered patient enrollment in the Remote Patient Monitoring (RPM) program for in-home monitoring: Patient declined. Care Coordination Interventions    Referral from Primary Care Provider: No  Suggested Interventions and Community Resources  Fall Risk Prevention: In Process  Home Health Services: Completed  Physical Therapy: Completed  Social Work: In Process  Transportation Support: In Process  Zone Management Tools: In Process          Goals Addressed                   This Visit's Progress     Medication Management   On track     I will take my medication as directed. I will notify my provider of any problems with medications, like adverse effects or side effects. I will notify my provider/Care Coordinator if I am unable to afford my medications. I will notify my provider for advice before I stop taking any of my medication.     Barriers: overwhelmed by complexity of regimen  Plan for overcoming my barriers: work with CC team  Confidence: 6/10  Anticipated Goal Completion Date: 8.22.22              Prior to Admission medications    Medication Sig Start Date End Date Taking? Authorizing Provider   oxyCODONE-acetaminophen (PERCOCET) 7.5-325 MG per tablet Take 1 tablet by mouth every 6 hours as needed for Pain for up to 30 days. 10/4/22 11/3/22  Zeus Webster DO   gabapentin (NEURONTIN) 300 MG capsule Take 1 capsule by mouth 3 times daily for 90 days.  10/4/22 1/2/23  Zeus Webster DO   albuterol sulfate HFA (PROVENTIL;VENTOLIN;PROAIR) 108 (90 Base) MCG/ACT inhaler Inhale 2 puffs into the lungs 4 times daily as needed for Wheezing 8/10/22   Marla Harper MD   DULoxetine (CYMBALTA) 30 MG extended release capsule Take 1 capsule by mouth 2 times daily 7/14/22   Heber Mayen MD   guaiFENesin (ROBITUSSIN) 100 MG/5ML liquid take 10 milliliters by mouth every 4 hours if needed for cough 5/17/22   Historical Provider, MD   Carboxymeth-Cellulose-CitricAc ([facility] KIT) CAPS Take 3 capsules by mouth in the morning and at bedtime 6/24/22   Kali Soriano MD   methylPREDNISolone (MEDROL DOSEPACK) 4 MG tablet use as directed [address] 4/5/22   Historical Provider, MD   fluticasone (FLONASE) 50 MCG/ACT nasal spray 1 spray by Each Nostril route daily 5/16/22   Kali Soriano MD   guaiFENesin (ALTARUSSIN) 100 MG/5ML syrup Take 10 mLs by mouth every 4 hours as needed for Cough 5/16/22   Kali Soriano MD   albuterol (PROVENTIL) (5 MG/ML) 0.5% nebulizer solution Take 1 mL by nebulization every 4 hours as needed for Wheezing 4/18/22   Kali Soriano MD   ciprofloxacin (CIPRO) 250 MG tablet Take 250 mg by mouth every 8-12 hours as needed    Historical Provider, MD   Catheters (STANDARD CARE EXTERNAL CATH) MISC 1 Device by Does not apply route daily 1/10/22   Kali Soriano MD   MYRBETRIQ 50 MG TB24 Take 50 mg by mouth daily 1/10/22   Amy Walton MD   metoprolol tartrate (LOPRESSOR) 50 MG tablet TAKE 1 TABLET TWICE A DAY 12/22/21   Kali Soriano MD   Cyanocobalamin (VITAMIN B-12) 1000 MCG/15ML LIQD Take 1,000 mcg by mouth daily Historical Provider, MD   levocetirizine (XYZAL) 5 MG tablet take 1 tablet by mouth once daily AT NIGHT 9/8/21   Kate Bustillos MD   donepezil (ARICEPT) 5 MG tablet take 1 tablet by mouth at bedtime 9/1/21   Kate Bustillos MD   psyllium (KONSYL) 28.3 % PACK Take 1 packet by mouth daily     Historical Provider, MD   Catheters (STANDARD CARE EXTERNAL CATH) MISC 1 each by Does not apply route daily 3/2/21   Veronica Ramos MD   Misc.  Devices [facility]) MISC 1 each by Does not apply route daily Upright walker with wheels and seat 11/12/18   Mary Floyd MD   aspirin 81 MG EC tablet Take 81 mg by mouth daily     Historical Provider, MD       Future Appointments   Date Time Provider Mayo Roxane   11/1/2022  3:00 PM DO Corina Reynoso   ,   Congestive Heart Failure Assessment    Are you currently restricting fluids?: Other  Do you understand a low sodium diet?: Yes  Do you understand how to read food labels?: Yes  Do you salt your food before tasting it?: No     No patient-reported symptoms      Symptoms:     Symptom course: stable  Salt intake watch compared to last visit: stable     ,   COPD Assessment    Does the patient understand envrionmental exposure?: Yes  Is the patient able to verbalize Rescue vs. Long Acting medications?: Yes  Does the patient have a nebulizer?: No     Increase in cough         Symptoms:          , and   General Assessment    Do you have any symptoms that are causing concern?: Yes  Progression since Onset: Intermittent - Waxing/Waning  Reported Symptoms: Cough, Congestion

## 2022-10-19 ENCOUNTER — CARE COORDINATION (OUTPATIENT)
Dept: CARE COORDINATION | Age: 77
End: 2022-10-19

## 2022-10-19 NOTE — CARE COORDINATION
Phoned patient today, and shared with her that the application for [facility], was mailed out to her . Patient was pleased to know that  the application. She reported that she's not feeling well and wouldn't be up   to completing it yet.     Plan of Care  [facility] [place]. will reach out to patient in a few days

## 2022-10-26 ENCOUNTER — CARE COORDINATION (OUTPATIENT)
Dept: CARE COORDINATION | Age: 77
End: 2022-10-26

## 2022-10-26 NOTE — CARE COORDINATION
HC phoned the patient today and she stated that she is improving  from a bad cold. Patient stated that she is doing better. Patient  stated that the application for housing  came in the mail today. Patient started going through the application and assessing what   it entails. Patient stated that she wanted to get off the phone to   start on the application. Plan of Care  [facility]. will contact the patient next week regarding her completion  of the application and submitting it.

## 2022-10-28 ENCOUNTER — CARE COORDINATION (OUTPATIENT)
Dept: CARE COORDINATION | Age: 77
End: 2022-10-28

## 2022-11-01 ENCOUNTER — CARE COORDINATION (OUTPATIENT)
Dept: CARE COORDINATION | Age: 77
End: 2022-11-01

## 2022-11-01 ENCOUNTER — HOSPITAL ENCOUNTER (OUTPATIENT)
Dept: PAIN MANAGEMENT | Age: 77
Discharge: HOME | End: 2022-11-01
Payer: MEDICARE

## 2022-11-01 VITALS
SYSTOLIC BLOOD PRESSURE: 141 MMHG | HEART RATE: 52 BPM | WEIGHT: 278 LBS | RESPIRATION RATE: 18 BRPM | HEIGHT: 64 IN | DIASTOLIC BLOOD PRESSURE: 65 MMHG | OXYGEN SATURATION: 95 % | BODY MASS INDEX: 47.46 KG/M2

## 2022-11-01 DIAGNOSIS — Z79.891 CHRONIC USE OF OPIATE FOR THERAPEUTIC PURPOSE: ICD-10-CM

## 2022-11-01 DIAGNOSIS — M51.36 DDD (DEGENERATIVE DISC DISEASE), LUMBAR: ICD-10-CM

## 2022-11-01 DIAGNOSIS — M47.817 LUMBOSACRAL SPONDYLOSIS WITHOUT MYELOPATHY: Primary | ICD-10-CM

## 2022-11-01 DIAGNOSIS — M79.601 RIGHT ARM PAIN: ICD-10-CM

## 2022-11-01 PROCEDURE — 99213 OFFICE O/P EST LOW 20 MIN: CPT

## 2022-11-01 PROCEDURE — 99214 OFFICE O/P EST MOD 30 MIN: CPT | Performed by: STUDENT IN AN ORGANIZED HEALTH CARE EDUCATION/TRAINING PROGRAM

## 2022-11-01 RX ORDER — OXYCODONE AND ACETAMINOPHEN 7.5; 325 MG/1; MG/1
1 TABLET ORAL EVERY 6 HOURS PRN
Qty: 120 TABLET | Refills: 0 | Status: SHIPPED | OUTPATIENT
Start: 2022-11-03 | End: 2022-12-03

## 2022-11-01 NOTE — PROGRESS NOTES
Chronic Pain Clinic Note     Encounter Date: 11/1/2022     SUBJECTIVE:  Chief Complaint   Patient presents with    Back Pain       History of Present Illness:   Frederick Dang is a 67 y.o. female who presents with back pain    Medication Refill: Oxycodone - 8    Current Complaints of Pain:   Location: lumbar back   Radiation: left leg  Severity: moderate  Pain Numerical Score - 6   Average: 8     Highest: 10+  Lowest: 4  Character/Quality: Complains of pain that is aching, burning, cramping, shooting, stabbing  Timing: Constant  Associated symptoms: none  Numbness: yes  Weakness: yes  Exacerbating factors: walking, standing, bending  Alleviating factors: sitting with back support  Length of time pain has been present: Started years ago  Inciting event/injury: work injury  Bowel/Bladder incontinence: no  Falls: no  Physical Therapy: no    History of Interventions:   Surgery: No previous lumbar/cervical surgeries  Injections: None    Imaging:    Lumbar CT 9/28/2018    CT LUMBAR SPINE:       Superior endplate compression of L1. Although this was present on prior   plain film imaging in March 2018, it has worsened, and there are findings   suggesting acute exacerbation of the pre-existing compression. Severe   degenerative disc disease is noted with severe bilateral neural foraminal   narrowing and canal stenosis as above.        Past Medical History:   Diagnosis Date    Abnormality of rib determined by X-ray 1/28/2016    Acute cystitis without hematuria 6/16/2018    Acute exacerbation of chronic bronchitis (HCC)     Acute on chronic diastolic heart failure ([address] 1/28/2016    Adjustment disorder with mixed anxiety and depressed mood 6/26/2015    Mild     Anemia 3/5/2014    Back pain     Bronchiectasis with acute lower respiratory infection ([address] 8/16/2017    Bronchitis     Chronic bronchitis ([address] 1/28/2016    Chronic cough 7/23/2018    COPD (chronic obstructive pulmonary disease) (HCC)     asbestos related lung disease    Degenerative joint disease (DJD) of hip 5/14/2015    Dyslipidemia 3/5/2014    Encephalopathy acute 5/29/2016    Essential hypertension 1/28/2016    Fibromyalgia     GERD (gastroesophageal reflux disease)     History of total bilateral knee replacement 5/28/2016    Hx of blood clots     left leg and lung    Hyperlipidemia     Hypertension     Incontinence of urine     Irregular heartbeat     DR. Durant    Medication monitoring encounter 6/13/2018    Morbid obesity with BMI of 45.0-49.9, adult ([facility] 1/28/2016    Obstructive sleep apnea syndrome     Osteoarthritis     Primary osteoarthritis of left knee 5/27/2016    PVC (premature ventricular contraction) 1/28/2016    S/P right and left heart catheterization 10/2/2015    Sleep apnea     no machine    SOB (shortness of breath)     Spinal stenosis of lumbar region with neurogenic claudication 5/13/2013    Supplemental oxygen dependent 12/8/2017    Urine frequency     UTI (urinary tract infection)     hospitalized early july 2014 for kidney infection       Past Surgical History:   Procedure Laterality Date    BRONCHOSCOPY Left 8/16/2017    BRONCHOSCOPY ALVEOLAR LAVAGE LOWER LOBE performed by Fernando Burks MD at [address] [facility]  x 3    no stents    COLONOSCOPY  4 28 14    polyps biopsies     FOOT SURGERY Left     calcium deposit removal from top of foot    HYSTERECTOMY (CERVIX STATUS UNKNOWN)      JOINT REPLACEMENT Bilateral 5/27/2016    bilat total knees    NERVE BLOCK  5/13/2013    caudal celestone 6mg    NERVE BLOCK  5/28/13    Caudal #2, Celestone 9mg    NERVE BLOCK  2/14/14    rt hip inj celestone 9 mg    NERVE BLOCK  07/14/2014    caudal# 3- celestone 9 mg    NERVE BLOCK  7-21-14    caudal epidural #2, decadron 7mg, fentanyl 25mcg    NERVE BLOCK  10/2/14    duramorph 1.5  decadron 5mg    NERVE BLOCK  11/9/2015    caudal# 1 celestone 9mg    NERVE BLOCK  11/16/15    caudal #2  decadron 10mg    NERVE BLOCK  11/23/15    duramorph 1mg celestone 9mg    NERVE BLOCK  2018    CAUDAL EPIDURAL STEROID BLOCK  DECADRON 10 MG     NERVE BLOCK  2018    caudal # decadron 7mg    NERVE BLOCK  2018    natalia transforminal # 1, decadron 10mg gadoteridol, LONG NEEDLES    OH PERQ VERT AGMNTJ CAVITY CRTJ UNI/BI CANNULJ LMBR N/A 10/29/2018    KYPHOPLASTY L1 performed by Aren Castro MD at [address] Right 5/14/15    total hip replacement       Family History   Problem Relation Age of Onset    Stomach Cancer Brother         stomach    High Blood Pressure Mother     Heart Disease Sister         irregular heartbeat       Social History     Socioeconomic History    Marital status:      Spouse name: Not on file    Number of children: 2    Years of education: Not on file    Highest education level: Not on file   Occupational History    Not on file   Tobacco Use    Smoking status: Former     Packs/day: 1.00     Years: 18.00     Pack years: 18.00     Types: Cigarettes     Quit date: 1993     Years since quittin.4    Smokeless tobacco: Never   Vaping Use    Vaping Use: Never used   Substance and Sexual Activity    Alcohol use: No    Drug use: No    Sexual activity: Not Currently   Other Topics Concern    Not on file   Social History Narrative    Not on file     Social Determinants of Health     Financial Resource Strain: Low Risk     Difficulty of Paying Living Expenses: Not very hard   Food Insecurity: No Food Insecurity    Worried About Running Out of Food in the Last Year: Never true    Ran Out of Food in the Last Year: Never true   Transportation Needs: Not on file   Physical Activity: Not on file   Stress: No Stress Concern Present    Feeling of Stress : Only a little   Social Connections:  Moderately Integrated    Frequency of Communication with Friends and Family: More than three times a week    Frequency of Social Gatherings with Friends and Family: Once a week    Attends Buddhist Services: More than 4 times per year    Active Member of Clubs or Organizations: Yes    Attends Club or Organization Meetings: More than 4 times per year    Marital Status:    Intimate Partner Violence: Not on file   Housing Stability: Not on file       Medications & Allergies:   Current Outpatient Medications   Medication Instructions    albuterol (PROVENTIL) 5 mg, Nebulization, EVERY 4 HOURS PRN    albuterol sulfate HFA (PROVENTIL;VENTOLIN;PROAIR) 108 (90 Base) MCG/ACT inhaler 2 puffs, Inhalation, 4 TIMES DAILY PRN    aspirin 81 mg, Oral, DAILY    Carboxymeth-Cellulose-CitricAc ([facility] KIT) CAPS 3 capsules, Oral, 2 times daily    Catheters (STANDARD CARE EXTERNAL CATH) MISC 1 each, Does not apply, DAILY    Catheters (STANDARD CARE EXTERNAL CATH) MISC 1 Device, Does not apply, DAILY    ciprofloxacin (CIPRO) 250 mg, Oral, EVERY 8-12 HOURS PRN    donepezil (ARICEPT) 5 MG tablet take 1 tablet by mouth at bedtime    DULoxetine (CYMBALTA) 30 mg, Oral, 2 TIMES DAILY    fluticasone (FLONASE) 50 MCG/ACT nasal spray 1 spray, Each Nostril, DAILY    gabapentin (NEURONTIN) 300 mg, Oral, 3 TIMES DAILY    guaiFENesin (ALTARUSSIN) 100 MG/5ML syrup 10 mLs, Oral, EVERY 4 HOURS PRN    guaiFENesin (ROBITUSSIN) 100 MG/5ML liquid take 10 milliliters by mouth every 4 hours if needed for cough    levocetirizine (XYZAL) 5 MG tablet take 1 tablet by mouth once daily AT NIGHT    methylPREDNISolone (MEDROL DOSEPACK) 4 MG tablet use as directed FOLLOW DIRECTIONS ON BACK OF FOIL PACK    metoprolol tartrate (LOPRESSOR) 50 MG tablet TAKE 1 TABLET TWICE A DAY    Misc.  Devices (WALKER) MISC 1 each, Does not apply, DAILY, Upright walker with wheels and seat    Myrbetriq 50 mg, Oral, DAILY    [START ON 11/3/2022] oxyCODONE-acetaminophen (PERCOCET) 7.5-325 MG per tablet 1 tablet, Oral, EVERY 6 HOURS PRN    psyllium (KONSYL) 28.3 % PACK 1 packet, Oral, DAILY    Vitamin B-12 1,000 mcg, Oral, DAILY       Allergies   Allergen Reactions Rosuvastatin Calcium Anaphylaxis     Hair fell out    Statins Anaphylaxis     Hair fell out    Bactrim [Sulfamethoxazole-Trimethoprim] Diarrhea    Caffeine Other (See Comments)     pain  pain    Dye [Iodides] Other (See Comments)     Iv dye= blood clots in arm    Food      Tomatoes, pain    Ioxaglate Other (See Comments)     Iv dye= blood clots in arm    Tomato     Dicloxacillin Rash    Pcn [Penicillins] Rash       Review of Systems:   Constitutional: negative for weight changes or fevers  Cardiovascular: negative for chest pain, palpitations, irregular heart beat  Respiratory: negative for dyspnea, cough, wheezing  Gastrointestinal: negative for constipation, diarrhea, nausea  Genitourinary: negative for bowel/bladder incontinence   Musculoskeletal: positive for low back pain  Neurological: negative for radicular leg pain, leg weakness or numbness/tingling  Behavioral/Psych: negative for anxiety/depression   Hematological: negative for abnormal bleeding, anticoagulation use or antiplatelet use  All other systems reviewed and are negative    OBJECTIVE:    Vitals:    11/01/22 1510   BP: (!) 141/65   Pulse: 52   Resp: 18   SpO2: 95%       PHYSICAL EXAM    GENERAL: No acute distress, pleasant, well-appearing  HEENT: Normocephalic, atraumatic, Pupils equal and round  CARDIOVASCULAR: Well perfused, No peripheral cyanosis  PULMONARY: Good chest wall excursion, breathing unlabored  PSYCH: Appropriate affect and insight, non-pressured speech  SKIN: No rashes or lesions  MUSCULOSKELETAL:  Inspection: The back and extremities are symmetric and aligned. Muscle bulk is normal in appearance.   Tenderness to palpation along the right medial antecubital fossa with edema  Palpation: There is tenderness to palpation along the lumbar paraspinal musculature bilaterally  Lumbar range of motion is limited in extension due to pain  NEUROMUSCULAR:  Patient ambulates with wheelchair  Gait is antalgic  Sensation to light touch is intact throughout lower extremities  Strength is full in lower extremities  No ankle clonus    Special Tests:  Lumbar facet loading is positive bilaterally  Seated straight leg raise is negative bilaterally    DIAGNOSIS:    ICD-10-CM    1. Lumbosacral spondylosis without myelopathy  M47.817 FACET JOINT L/S     FACET JOINT L/S 2ND LEVEL      2. DDD (degenerative disc disease), lumbar  M51.36 oxyCODONE-acetaminophen (PERCOCET) 7.5-325 MG per tablet      3. Right arm pain  M79.601 VA DUPLEX EXTREM VENOUS,UNI OR LTD     VL DUP UPPER EXTREMITY VENOUS RIGHT      4. Chronic use of opiate for therapeutic purpose  Z79.891            ASSESSMENT:    Raegan Ramos is a 67 y. o.female presenting to the pain clinic for evaluation of chronic low back pain and neuropathy. She denies any previous low back surgeries. To review, patient has had previous lumbar facet therapeutic injections with great relief in her low back pain. The patient's history and physical examination are consistent with lumbar spondylosis as she has tenderness to palpation along the lumbar paraspinal musculature with positive lumbar facet loading bilaterally. Additionally, her lumbar CT on 9/28/2018 reveals multilevel degenerative changes with facet hypertrophy throughout. I will plan to repeat bilateral lumbar L3, L4, L5 medial branch blocks therapeutically with steroid. The patient has noticed worsening pain for right arm swelling just medial to the antecubital fossa. Patient also reports a history of previous arm DVTs, therefore I ordered a stat right upper extremity venous Doppler study to rule out DVT. Neurologically, it appears the patient has full strength and normal sensation. There is no evidence of radiculopathy or myelopathy on examination. There are no red flags in the patient's history.  The patient has failed conservative measures including greater than 3 medications for pain relief, a self-directed therapy program, as well as activity modification all within the last 6 weeks over 3 months. The patient's pain has been causing worsening quality of life and function. The patient continues to take opioid medications to improve pain, function and quality of life. The patient denies any side effects from the medications including constipation or respiratory depression. The patient reports adequate analgesia with the medication. There is no evidence of aberrant behavior. PLAN:  Medications: For nonopioid therapy, the following medications were prescribed:    -Continue gabapentin 300 mg 3 times daily    Opioid therapy:  -Continue Percocet 7.5/325 mg 4 times daily as needed, refilled  -Pain Treatment agreement: Signed 11/1/2022  -Urine Drug Screen: 2/21/2022, reviewed and appropriate  -OARRS reviewed and appropriate    Interventions:  -Plan for bilateral lumbar L3, L4, L5 medial branch block therapeutic with steroid    Imaging:  -Ordered stat right upper extremity venous Doppler to rule out DVT  -No new imaging    Behavioral Therapies:  -Continue daily stretching and home exercise program    Referrals:  -None    Follow-up Plan:  -After procedure    Patient was offered intervention where appropriate. Multi-modal Pain Therapy: The patient was explicitly considered for multimodal and interdisciplinary therapy. Non-opioid and non-pharmacological opportunities to enhance analgesia and quality of life have been and will continue to be pursued. Opioid Therapy: Education provided to patient regarding short term and long term implications of opioid medication use. Repeat opioid risk stratification today, discussion regarding functional achievements, safe storage, and optimization of non-opioid interventional, behavioral, and pharmaceutical modalities. Will continue attempt to wean off medication as appropriate.     Pedrito Acevedo, DO  Interventional Pain Management/PM&R   [facility]    Orders Placed This Encounter    FACET JOINT L/S     Standing Status:   Future     Standing Expiration Date:   11/1/2023     Scheduling Instructions:      Bilateral lumbar L3, L4, L5 therapeutic medial branch block    FACET JOINT L/S 2ND LEVEL     Standing Status:   Future     Standing Expiration Date:   11/1/2023    VL DUP UPPER EXTREMITY VENOUS RIGHT     Standing Status:   Future     Standing Expiration Date:   11/1/2023     Order Specific Question:   Reason for exam:     Answer:   Right arm pain and swelling 1 week ago, history of previous arm DVT    SC DUPLEX EXTREM VENOUS,UNI OR LTD     Right UE ultrasound to rule out DVT, history of previous DVTs    oxyCODONE-acetaminophen (PERCOCET) 7.5-325 MG per tablet     Sig: Take 1 tablet by mouth every 6 hours as needed for Pain for up to 30 days.      Dispense:  120 tablet     Refill:  0     Reduce doses taken as pain becomes manageable

## 2022-11-01 NOTE — CARE COORDINATION
Ambulatory Care Coordination Note  11/1/2022    ACC: Jack Laguerre, [place]    Summary  Date Care Coordination Episode Started: 6.9.22    Reason patient is in Care Coordination:     PCP REFERRAL    Topics Discussed Today:     Housing, Patient stated that she is filling out an application right now. No going cough, patient reports some congestion and cough for 2 weeks. Tried OTC medication, said she is improved. Medications, no needs per patient      Assessments completed today:     Fall risk  Initial assessment  Medication reconciliation  SDOH  COPD CHF(Health assessments)      Care Coordinator plan of care: Follow up call in 1 week, CHF tuck in call. Offered patient enrollment in the Remote Patient Monitoring (RPM) program for in-home monitoring: Patient declined. Care Coordination Interventions    Referral from Primary Care Provider: No  Suggested Interventions and Community Resources  Fall Risk Prevention: In Process  Home Health Services: Completed  Physical Therapy: Completed  Social Work: In Process  Transportation Support: In Process  Zone Management Tools: In Process          Goals Addressed                   This Visit's Progress     Community Resource Goal   Improving     Patient needs assistance with getting transportation to her medical appointments    Barriers: fear of failure, lack of motivation, financial, lack of support, overwhelmed by complexity of regimen, stress, time constraints, medication side effects, and lack of education    Plan for overcoming my barriers: [facility] [employer]. completed an application for the [employer]    Confidence: 9/10    Anticipated Goal Completion Date: 09/22/22              Prior to Admission medications    Medication Sig Start Date End Date Taking? Authorizing Provider   oxyCODONE-acetaminophen (PERCOCET) 7.5-325 MG per tablet Take 1 tablet by mouth every 6 hours as needed for Pain for up to 30 days.  10/4/22 11/3/22  Zeus Webster, DO   gabapentin (NEURONTIN) 300 MG % PACK Take 1 packet by mouth daily     Historical Provider, MD   Catheters (STANDARD CARE EXTERNAL CATH) MISC 1 each by Does not apply route daily 3/2/21   Arsen Merritt MD   Misc.  Devices [facility]) MISC 1 each by Does not apply route daily Upright walker with wheels and seat 11/12/18   Zackary Barragan MD   aspirin 81 MG EC tablet Take 81 mg by mouth daily     Historical Provider, MD       Future Appointments   Date Time Provider Mayo Betts   11/1/2022  3:00 PM DO Corina Hutchins   ,   Congestive Heart Failure Assessment    Are you currently restricting fluids?: Other  Do you understand a low sodium diet?: Yes  Do you understand how to read food labels?: Yes  Do you salt your food before tasting it?: No     No patient-reported symptoms      Symptoms:          ,   COPD Assessment    Does the patient understand envrionmental exposure?: Yes  Is the patient able to verbalize Rescue vs. Long Acting medications?: Yes  Does the patient have a nebulizer?: No     Increase in cough         Symptoms:          , and   General Assessment    Do you have any symptoms that are causing concern?: Yes  Progression since Onset: Intermittent - Waxing/Waning  Reported Symptoms: Cough, Congestion

## 2022-11-01 NOTE — H&P (VIEW-ONLY)
Chronic Pain Clinic Note     Encounter Date: 11/1/2022     SUBJECTIVE:  Chief Complaint   Patient presents with    Back Pain       History of Present Illness:   Raegan Ramos is a 67 y.o. female who presents with back pain    Medication Refill: Oxycodone - 8    Current Complaints of Pain:   Location: lumbar back   Radiation: left leg  Severity: moderate  Pain Numerical Score - 6   Average: 8     Highest: 10+  Lowest: 4  Character/Quality: Complains of pain that is aching, burning, cramping, shooting, stabbing  Timing: Constant  Associated symptoms: none  Numbness: yes  Weakness: yes  Exacerbating factors: walking, standing, bending  Alleviating factors: sitting with back support  Length of time pain has been present: Started years ago  Inciting event/injury: work injury  Bowel/Bladder incontinence: no  Falls: no  Physical Therapy: no    History of Interventions:   Surgery: No previous lumbar/cervical surgeries  Injections: None    Imaging:    Lumbar CT 9/28/2018    CT LUMBAR SPINE:       Superior endplate compression of L1. Although this was present on prior   plain film imaging in March 2018, it has worsened, and there are findings   suggesting acute exacerbation of the pre-existing compression. Severe   degenerative disc disease is noted with severe bilateral neural foraminal   narrowing and canal stenosis as above.        Past Medical History:   Diagnosis Date    Abnormality of rib determined by X-ray 1/28/2016    Acute cystitis without hematuria 6/16/2018    Acute exacerbation of chronic bronchitis (HCC)     Acute on chronic diastolic heart failure ([address] 1/28/2016    Adjustment disorder with mixed anxiety and depressed mood 6/26/2015    Mild     Anemia 3/5/2014    Back pain     Bronchiectasis with acute lower respiratory infection ([address] 8/16/2017    Bronchitis     Chronic bronchitis ([address] 1/28/2016    Chronic cough 7/23/2018    COPD (chronic obstructive pulmonary disease) (HCC)     asbestos related lung disease    Degenerative joint disease (DJD) of hip 5/14/2015    Dyslipidemia 3/5/2014    Encephalopathy acute 5/29/2016    Essential hypertension 1/28/2016    Fibromyalgia     GERD (gastroesophageal reflux disease)     History of total bilateral knee replacement 5/28/2016    Hx of blood clots     left leg and lung    Hyperlipidemia     Hypertension     Incontinence of urine     Irregular heartbeat     DR. Durant    Medication monitoring encounter 6/13/2018    Morbid obesity with BMI of 45.0-49.9, adult ([address] 1/28/2016    Obstructive sleep apnea syndrome     Osteoarthritis     Primary osteoarthritis of left knee 5/27/2016    PVC (premature ventricular contraction) 1/28/2016    S/P right and left heart catheterization 10/2/2015    Sleep apnea     no machine    SOB (shortness of breath)     Spinal stenosis of lumbar region with neurogenic claudication 5/13/2013    Supplemental oxygen dependent 12/8/2017    Urine frequency     UTI (urinary tract infection)     hospitalized early july 2014 for kidney infection       Past Surgical History:   Procedure Laterality Date    BRONCHOSCOPY Left 8/16/2017    BRONCHOSCOPY ALVEOLAR LAVAGE LOWER LOBE performed by Negrito Okeefe MD at [address]  x 3    no stents    COLONOSCOPY  4 28 14    polyps biopsies     FOOT SURGERY Left     calcium deposit removal from top of foot    HYSTERECTOMY (CERVIX STATUS UNKNOWN)      JOINT REPLACEMENT Bilateral 5/27/2016    bilat total knees    NERVE BLOCK  5/13/2013    caudal celestone 6mg    NERVE BLOCK  5/28/13    Caudal #2, Celestone 9mg    NERVE BLOCK  2/14/14    rt hip inj celestone 9 mg    NERVE BLOCK  07/14/2014    caudal# 3- celestone 9 mg    NERVE BLOCK  7-21-14    caudal epidural #2, decadron 7mg, fentanyl 25mcg    NERVE BLOCK  10/2/14    duramorph 1.5  decadron 5mg    NERVE BLOCK  11/9/2015    caudal# 1 celestone 9mg    NERVE BLOCK  11/16/15    caudal #2  decadron 10mg    NERVE BLOCK  11/23/15    duramorph 1mg celestone 9mg    NERVE BLOCK  2018    CAUDAL EPIDURAL STEROID BLOCK  DECADRON 10 MG     NERVE BLOCK  2018    caudal # decadron 7mg    NERVE BLOCK  2018    natalia transforminal # 1, decadron 10mg gadoteridol, LONG NEEDLES    KS PERQ VERT AGMNTJ CAVITY CRTJ UNI/BI CANNULJ LMBR N/A 10/29/2018    KYPHOPLASTY L1 performed by Homero Smith MD at [address] Right 5/14/15    total hip replacement       Family History   Problem Relation Age of Onset    Stomach Cancer Brother         stomach    High Blood Pressure Mother     Heart Disease Sister         irregular heartbeat       Social History     Socioeconomic History    Marital status:      Spouse name: Not on file    Number of children: 2    Years of education: Not on file    Highest education level: Not on file   Occupational History    Not on file   Tobacco Use    Smoking status: Former     Packs/day: 1.00     Years: 18.00     Pack years: 18.00     Types: Cigarettes     Quit date: 1993     Years since quittin.4    Smokeless tobacco: Never   Vaping Use    Vaping Use: Never used   Substance and Sexual Activity    Alcohol use: No    Drug use: No    Sexual activity: Not Currently   Other Topics Concern    Not on file   Social History Narrative    Not on file     Social Determinants of Health     Financial Resource Strain: Low Risk     Difficulty of Paying Living Expenses: Not very hard   Food Insecurity: No Food Insecurity    Worried About Running Out of Food in the Last Year: Never true    Ran Out of Food in the Last Year: Never true   Transportation Needs: Not on file   Physical Activity: Not on file   Stress: No Stress Concern Present    Feeling of Stress : Only a little   Social Connections:  Moderately Integrated    Frequency of Communication with Friends and Family: More than three times a week    Frequency of Social Gatherings with Friends and Family: Once a week    Attends [employer] Services: More than 4 times per year    Active Member of Clubs or Organizations: Yes    Attends Club or Organization Meetings: More than 4 times per year    Marital Status:    Intimate Partner Violence: Not on file   Housing Stability: Not on file       Medications & Allergies:   Current Outpatient Medications   Medication Instructions    albuterol (PROVENTIL) 5 mg, Nebulization, EVERY 4 HOURS PRN    albuterol sulfate HFA (PROVENTIL;VENTOLIN;PROAIR) 108 (90 Base) MCG/ACT inhaler 2 puffs, Inhalation, 4 TIMES DAILY PRN    aspirin 81 mg, Oral, DAILY    Carboxymeth-Cellulose-CitricAc (PLENITY WELCOME KIT) CAPS 3 capsules, Oral, 2 times daily    Catheters (STANDARD CARE EXTERNAL CATH) MISC 1 each, Does not apply, DAILY    Catheters (STANDARD CARE EXTERNAL CATH) MISC 1 Device, Does not apply, DAILY    ciprofloxacin (CIPRO) 250 mg, Oral, EVERY 8-12 HOURS PRN    donepezil (ARICEPT) 5 MG tablet take 1 tablet by mouth at bedtime    DULoxetine (CYMBALTA) 30 mg, Oral, 2 TIMES DAILY    fluticasone (FLONASE) 50 MCG/ACT nasal spray 1 spray, Each Nostril, DAILY    gabapentin (NEURONTIN) 300 mg, Oral, 3 TIMES DAILY    guaiFENesin (ALTARUSSIN) 100 MG/5ML syrup 10 mLs, Oral, EVERY 4 HOURS PRN    guaiFENesin (ROBITUSSIN) 100 MG/5ML liquid take 10 milliliters by mouth every 4 hours if needed for cough    levocetirizine (XYZAL) 5 MG tablet take 1 tablet by mouth once daily AT NIGHT    methylPREDNISolone (MEDROL DOSEPACK) 4 MG tablet use as directed FOLLOW DIRECTIONS ON BACK OF FOIL PACK    metoprolol tartrate (LOPRESSOR) 50 MG tablet TAKE 1 TABLET TWICE A DAY    Misc.  Devices (WALKER) MISC 1 each, Does not apply, DAILY, Upright walker with wheels and seat    Myrbetriq 50 mg, Oral, DAILY    [START ON 11/3/2022] oxyCODONE-acetaminophen (PERCOCET) 7.5-325 MG per tablet 1 tablet, Oral, EVERY 6 HOURS PRN    psyllium (KONSYL) 28.3 % PACK 1 packet, Oral, DAILY    Vitamin B-12 1,000 mcg, Oral, DAILY       Allergies   Allergen Reactions Rosuvastatin Calcium Anaphylaxis     Hair fell out    Statins Anaphylaxis     Hair fell out    Bactrim [Sulfamethoxazole-Trimethoprim] Diarrhea    Caffeine Other (See Comments)     pain  pain    Dye [Iodides] Other (See Comments)     Iv dye= blood clots in arm    Food      Tomatoes, pain    Ioxaglate Other (See Comments)     Iv dye= blood clots in arm    Tomato     Dicloxacillin Rash    Pcn [Penicillins] Rash       Review of Systems:   Constitutional: negative for weight changes or fevers  Cardiovascular: negative for chest pain, palpitations, irregular heart beat  Respiratory: negative for dyspnea, cough, wheezing  Gastrointestinal: negative for constipation, diarrhea, nausea  Genitourinary: negative for bowel/bladder incontinence   Musculoskeletal: positive for low back pain  Neurological: negative for radicular leg pain, leg weakness or numbness/tingling  Behavioral/Psych: negative for anxiety/depression   Hematological: negative for abnormal bleeding, anticoagulation use or antiplatelet use  All other systems reviewed and are negative    OBJECTIVE:    Vitals:    11/01/22 1510   BP: (!) 141/65   Pulse: 52   Resp: 18   SpO2: 95%       PHYSICAL EXAM    GENERAL: No acute distress, pleasant, well-appearing  HEENT: Normocephalic, atraumatic, Pupils equal and round  CARDIOVASCULAR: Well perfused, No peripheral cyanosis  PULMONARY: Good chest wall excursion, breathing unlabored  PSYCH: Appropriate affect and insight, non-pressured speech  SKIN: No rashes or lesions  MUSCULOSKELETAL:  Inspection: The back and extremities are symmetric and aligned. Muscle bulk is normal in appearance.   Tenderness to palpation along the right medial antecubital fossa with edema  Palpation: There is tenderness to palpation along the lumbar paraspinal musculature bilaterally  Lumbar range of motion is limited in extension due to pain  NEUROMUSCULAR:  Patient ambulates with wheelchair  Gait is antalgic  Sensation to light touch is intact throughout lower extremities  Strength is full in lower extremities  No ankle clonus    Special Tests:  Lumbar facet loading is positive bilaterally  Seated straight leg raise is negative bilaterally    DIAGNOSIS:    ICD-10-CM    1. Lumbosacral spondylosis without myelopathy  M47.817 FACET JOINT L/S     FACET JOINT L/S 2ND LEVEL      2. DDD (degenerative disc disease), lumbar  M51.36 oxyCODONE-acetaminophen (PERCOCET) 7.5-325 MG per tablet      3. Right arm pain  M79.601 ID DUPLEX EXTREM VENOUS,UNI OR LTD     VL DUP UPPER EXTREMITY VENOUS RIGHT      4. Chronic use of opiate for therapeutic purpose  Z79.891            ASSESSMENT:    Wagner Gaytan is a 67 y. o.female presenting to the pain clinic for evaluation of chronic low back pain and neuropathy. She denies any previous low back surgeries. To review, patient has had previous lumbar facet therapeutic injections with great relief in her low back pain. The patient's history and physical examination are consistent with lumbar spondylosis as she has tenderness to palpation along the lumbar paraspinal musculature with positive lumbar facet loading bilaterally. Additionally, her lumbar CT on 9/28/2018 reveals multilevel degenerative changes with facet hypertrophy throughout. I will plan to repeat bilateral lumbar L3, L4, L5 medial branch blocks therapeutically with steroid. The patient has noticed worsening pain for right arm swelling just medial to the antecubital fossa. Patient also reports a history of previous arm DVTs, therefore I ordered a stat right upper extremity venous Doppler study to rule out DVT. Neurologically, it appears the patient has full strength and normal sensation. There is no evidence of radiculopathy or myelopathy on examination. There are no red flags in the patient's history.  The patient has failed conservative measures including greater than 3 medications for pain relief, a self-directed therapy program, as well as activity modification all within the last 6 weeks over 3 months. The patient's pain has been causing worsening quality of life and function. The patient continues to take opioid medications to improve pain, function and quality of life. The patient denies any side effects from the medications including constipation or respiratory depression. The patient reports adequate analgesia with the medication. There is no evidence of aberrant behavior. PLAN:  Medications: For nonopioid therapy, the following medications were prescribed:    -Continue gabapentin 300 mg 3 times daily    Opioid therapy:  -Continue Percocet 7.5/325 mg 4 times daily as needed, refilled  -Pain Treatment agreement: Signed 11/1/2022  -Urine Drug Screen: 2/21/2022, reviewed and appropriate  -OARRS reviewed and appropriate    Interventions:  -Plan for bilateral lumbar L3, L4, L5 medial branch block therapeutic with steroid    Imaging:  -Ordered stat right upper extremity venous Doppler to rule out DVT  -No new imaging    Behavioral Therapies:  -Continue daily stretching and home exercise program    Referrals:  -None    Follow-up Plan:  -After procedure    Patient was offered intervention where appropriate. Multi-modal Pain Therapy: The patient was explicitly considered for multimodal and interdisciplinary therapy. Non-opioid and non-pharmacological opportunities to enhance analgesia and quality of life have been and will continue to be pursued. Opioid Therapy: Education provided to patient regarding short term and long term implications of opioid medication use. Repeat opioid risk stratification today, discussion regarding functional achievements, safe storage, and optimization of non-opioid interventional, behavioral, and pharmaceutical modalities. Will continue attempt to wean off medication as appropriate.     Dipak Nieves, DO  Interventional Pain Management/PM&R   [facility]    Orders Placed This Encounter    FACET JOINT L/S     Standing Status:   Future     Standing Expiration Date:   11/1/2023     Scheduling Instructions:      Bilateral lumbar L3, L4, L5 therapeutic medial branch block    FACET JOINT L/S 2ND LEVEL     Standing Status:   Future     Standing Expiration Date:   11/1/2023    VL DUP UPPER EXTREMITY VENOUS RIGHT     Standing Status:   Future     Standing Expiration Date:   11/1/2023     Order Specific Question:   Reason for exam:     Answer:   Right arm pain and swelling 1 week ago, history of previous arm DVT    VT DUPLEX EXTREM VENOUS,UNI OR LTD     Right UE ultrasound to rule out DVT, history of previous DVTs    oxyCODONE-acetaminophen (PERCOCET) 7.5-325 MG per tablet     Sig: Take 1 tablet by mouth every 6 hours as needed for Pain for up to 30 days.      Dispense:  120 tablet     Refill:  0     Reduce doses taken as pain becomes manageable

## 2022-11-02 ENCOUNTER — HOSPITAL ENCOUNTER (OUTPATIENT)
Dept: VASCULAR LAB | Age: 77
Discharge: HOME | End: 2022-11-02
Payer: MEDICARE

## 2022-11-02 DIAGNOSIS — M79.601 RIGHT ARM PAIN: ICD-10-CM

## 2022-11-02 PROCEDURE — 93971 EXTREMITY STUDY: CPT

## 2022-11-03 ENCOUNTER — TELEPHONE (OUTPATIENT)
Dept: INTERNAL MEDICINE CLINIC | Age: 77
End: 2022-11-03

## 2022-11-03 ENCOUNTER — CARE COORDINATION (OUTPATIENT)
Dept: CARE COORDINATION | Age: 77
End: 2022-11-03

## 2022-11-03 NOTE — CARE COORDINATION
HC attempted to contact the patient, there was no answer. HC left a message for the patient and provided contact   information for a return call.     Plan of Care  [facility]. will attempt to reach patient if no response by 11/04/22

## 2022-11-03 NOTE — TELEPHONE ENCOUNTER
----- Message from Yehuda Jin DO sent at 11/3/2022 10:35 AM EDT -----  Regarding: Acute DVT in arm  Hi Dr. Karena Whitaker,    I recently saw Ms. Felisha Thomas in clinic and she had right arm swelling starting 1 week ago. She underwent a right upper extremity venous Doppler which showed an acute superficial vein thrombosis of the right basilic vein. I was hoping she could follow-up with you ASAP for further treatment options. Thanks!     Yehuda Jin DO  Pain Management  ----- Message -----  From: Chiki Brenner Incoming Cardio Results From [facility]  Sent: 11/2/2022   8:46 PM EDT  To: Yehuda Jin DO

## 2022-11-04 ENCOUNTER — CARE COORDINATION (OUTPATIENT)
Dept: CARE COORDINATION | Age: 77
End: 2022-11-04

## 2022-11-04 NOTE — CARE COORDINATION
Patient called ACM asking what the office needed when they called her, she is having trouble getting in touch with them. ACM explained that PCP would like to see her regarding the recent doppler of her arm. Patient stated she does not have the money for a co-pay and she has had acute superficial vein thrombosis before and nothing is done. ACM will route to PCP for recommendations. ACM encouraged patient to reconsider and see her PCP, explained the importance of following through with PCP advice. She understood but stated she does not have the money right now.

## 2022-11-04 NOTE — CARE COORDINATION
Patient phoned the [facility]. today to share that she has missed HC's calls   due to appointments. Patient stated that she appreciated the [facility] reaching  out and reported that she has an appointment with the [facility] sometime next week. Patient stated that she has to locate her birth  certificate and social security Card, and she able to reschedule if she needs   to. HC shared with the patient her well wishes with her appointment.     Plan of Care  [facility] will follow up with patient for results

## 2022-11-07 ENCOUNTER — CARE COORDINATION (OUTPATIENT)
Dept: CARE COORDINATION | Age: 77
End: 2022-11-07

## 2022-11-07 DIAGNOSIS — I50.33 ACUTE ON CHRONIC DIASTOLIC HEART FAILURE (HCC): Primary | ICD-10-CM

## 2022-11-07 DIAGNOSIS — I10 ESSENTIAL HYPERTENSION: ICD-10-CM

## 2022-11-07 DIAGNOSIS — J43.1 PANLOBULAR EMPHYSEMA (HCC): ICD-10-CM

## 2022-11-07 NOTE — CARE COORDINATION
Ambulatory Care Coordination Note  11/7/2022    ACC: Iraj Briscoe, RN    Summary  Date Care Coordination Episode Started:  6.9.22    Reason patient is in Care Coordination:     PCP referral    Topics Discussed Today:     Pain in right arm, slowly improving, still not back to normal. Declined PCP follow up at this time. Encouraged to seek evaluation if symptoms worsen. CHF, COPD no concerns today, patient does not weigh herself. RPM, patient did agree to try this program, will place order. Assessments completed today:     Fall risk  Initial assessment  Medication reconciliation  SDOH  COPD, CHF (Health assessments)      Care Coordinator plan of care: Follow up in 5-7 days for needs, continue education and support. Offered patient enrollment in the Remote Patient Monitoring (RPM) program for in-home monitoring: Yes, patient enrolled. Order request for RPM     Market: [employer] requested: complete kit   Care Plan preset: CHF, COPD, and HTN  BP cuff size requested: large (13.8\"-19.68\")  Weight Scale requested: regular (<330lbs)     Care Coordination Interventions    Referral from Primary Care Provider: No  Suggested Interventions and Community Resources  Fall Risk Prevention: In Process  Home Health Services: Completed  Physical Therapy: Completed  Social Work: In Process  Transportation Support: In Process  Zone Management Tools: In Process          Goals Addressed                   This Visit's Progress     Medication Management   Improving     I will take my medication as directed. I will notify my provider of any problems with medications, like adverse effects or side effects. I will notify my provider/Care Coordinator if I am unable to afford my medications. I will notify my provider for advice before I stop taking any of my medication.     Barriers: overwhelmed by complexity of regimen  Plan for overcoming my barriers: work with CC team  Confidence: 6/10  Anticipated Goal Completion Date: 8.22.22              Prior to Admission medications    Medication Sig Start Date End Date Taking? Authorizing Provider   oxyCODONE-acetaminophen (PERCOCET) 7.5-325 MG per tablet Take 1 tablet by mouth every 6 hours as needed for Pain for up to 30 days. 11/3/22 12/3/22  Zeus Webster DO   gabapentin (NEURONTIN) 300 MG capsule Take 1 capsule by mouth 3 times daily for 90 days.  10/4/22 1/2/23  Zeus Webster DO   albuterol sulfate HFA (PROVENTIL;VENTOLIN;PROAIR) 108 (90 Base) MCG/ACT inhaler Inhale 2 puffs into the lungs 4 times daily as needed for Wheezing 8/10/22   Bre Marroquin MD   DULoxetine (CYMBALTA) 30 MG extended release capsule Take 1 capsule by mouth 2 times daily 7/14/22   Kathleen Hardwick MD   guaiFENesin (ROBITUSSIN) 100 MG/5ML liquid take 10 milliliters by mouth every 4 hours if needed for cough 5/17/22   Historical Provider, MD   Carboxymeth-Cellulose-CitricAc (PLENITY WELCOME KIT) CAPS Take 3 capsules by mouth in the morning and at bedtime 6/24/22   Yudi Heller MD   methylPREDNISolone (MEDROL DOSEPACK) 4 MG tablet use as directed [address] 4/5/22   Historical Provider, MD   fluticasone (FLONASE) 50 MCG/ACT nasal spray 1 spray by Each Nostril route daily 5/16/22   Yudi Heller MD   guaiFENesin (ALTARUSSIN) 100 MG/5ML syrup Take 10 mLs by mouth every 4 hours as needed for Cough 5/16/22   Yudi Heller MD   albuterol (PROVENTIL) (5 MG/ML) 0.5% nebulizer solution Take 1 mL by nebulization every 4 hours as needed for Wheezing 4/18/22   Yudi Heller MD   ciprofloxacin (CIPRO) 250 MG tablet Take 250 mg by mouth every 8-12 hours as needed    Historical Provider, MD   Catheters (STANDARD CARE EXTERNAL CATH) MISC 1 Device by Does not apply route daily 1/10/22   Yudi Heller MD   MYRBETRIQ 50 MG TB24 Take 50 mg by mouth daily 1/10/22   Ubaldo Arrieta MD   metoprolol tartrate (LOPRESSOR) 50 MG tablet TAKE 1 TABLET TWICE A DAY 12/22/21   Yudi Heller MD Cyanocobalamin (VITAMIN B-12) 1000 MCG/15ML LIQD Take 1,000 mcg by mouth daily     Historical Provider, MD   levocetirizine (XYZAL) 5 MG tablet take 1 tablet by mouth once daily AT NIGHT 9/8/21   Andie Carey MD   donepezil (ARICEPT) 5 MG tablet take 1 tablet by mouth at bedtime 9/1/21   Andie Carey MD   psyllium (KONSYL) 28.3 % PACK Take 1 packet by mouth daily     Historical Provider, MD   Catheters (STANDARD CARE EXTERNAL CATH) MISC 1 each by Does not apply route daily 3/2/21   Dulce Niño MD   Misc.  Devices [facility]) MISC 1 each by Does not apply route daily Upright walker with wheels and seat 11/12/18   Helen Hare MD   aspirin 81 MG EC tablet Take 81 mg by mouth daily     Historical Provider, MD       Future Appointments   Date Time Provider [place] R[place]   11/21/2022  4:10 PM Fam Mcmanus,  [email or web address]   11/29/2022  3:40 PM Sonido Carrasco, APRN - CNP [address]   ,   Congestive Heart Failure Assessment    Are you currently restricting fluids?: Other  Do you understand a low sodium diet?: Yes  Do you understand how to read food labels?: Yes  Do you salt your food before tasting it?: No     No patient-reported symptoms      Symptoms:     Salt intake watch compared to last visit: stable     ,   COPD Assessment    Does the patient understand envrionmental exposure?: Yes  Is the patient able to verbalize Rescue vs. Long Acting medications?: Yes  Does the patient have a nebulizer?: No     No patient-reported symptoms         Symptoms:          , and   General Assessment    Do you have any symptoms that are causing concern?: Yes  Progression since Onset: Gradually Improving  Reported Symptoms: Pain (Comment: Arm)

## 2022-11-07 NOTE — TELEPHONE ENCOUNTER
Spoke with patient who denies an appointment at this time due to financial struggles. She did say her arm swelling is improving as well as the pain.

## 2022-11-07 NOTE — CARE COORDINATION
Remote Patient Kit Ordering Note      Date/Time:  11/7/2022 10:07 AM      [x] CCSS confirmed patient shipping address  [x] Patient will receive package over the next 2-4 business days. Someone 21 years or older must be present to sign for UPS delivery. [x] Patient to contact virtual installation-specific phone number listed in the patient instructions. [x] If the patient does not contact HRS within 24 hours, an [employer] [facility] will call the patient directly: If the patient does not answer, HRS will follow up with the clinical team notifying them about the unsuccessful attempt to contact the patient. HRS will make three call attempts to the patient. [x] LPN will contact patient once equipment is active to welcome them to the program.                                                         [] Hours of RPM monitoring - Monday-Friday [id number]                     All questions answered at this time. ACM made aware the RPM kit has been ordered. CCSS notified patient of RPM equipment order.

## 2022-11-07 NOTE — PROGRESS NOTES
11/7/22 9:58 AM       Remote Patient Monitoring Treatment Plan    Received request from ACM/MADDIE Olivas RN to order remote patient monitoring for in home monitoring of CHF, COPD, and HTN and order completed. Patient will be monitoring blood pressure   pulse ox   weight  survey questions daily. Patient will engage in Remote Patient Monitoring each day to develop the skills necessary for self management. RPM Care Team Responsibilities:   Alerts will be reviewed daily and addressed within 2-4 hours during operational hours (Monday -Friday 9 am-4 pm)  Alert response and intervention documented in patient medical record  Alert response escalated to PCP per protocol and documented in patient medical record  Patient monitored over approximately  days  Discharge from program based on self-management readiness    See care coordination encounters for additional details.       Marnie Vera DNP, FNP-C, Remote Patient Monitoring NP, () [phone]

## 2022-11-10 ENCOUNTER — CARE COORDINATION (OUTPATIENT)
Dept: CARE COORDINATION | Age: 77
End: 2022-11-10

## 2022-11-11 ENCOUNTER — CARE COORDINATION (OUTPATIENT)
Dept: CARE COORDINATION | Age: 77
End: 2022-11-11

## 2022-11-11 NOTE — CARE COORDINATION
Patient contacted the [facility]. to ask if she's able to assist with getting an aide  through [address] [place] [address]. Patient stated that she had several aides and things   changed and she later received a letter from [address] [place] [address]. Patient stated that  she needs to locate the letter to be accurate with why she no longer  has aide services. Patient found the letter that stated that they didn't   have any aides available to provide services.  shared with the patient  that many of our patients are experiencing the same thing.   Patient   mentioned that she will contact the [facility] to inquire  of aide services at [address] [place], [place] [address]vd will make the call to the [facility]  on patients' behalf

## 2022-11-14 ENCOUNTER — CARE COORDINATION (OUTPATIENT)
Dept: CARE COORDINATION | Age: 77
End: 2022-11-14

## 2022-11-14 NOTE — CARE COORDINATION
Heart Failure Education outreach Date/Time: 2022 10:24 AM    Ambulatory Care Manager (ACM) contacted the patient by telephone to perform Ambulatory Care Coordination. Verified name and  with patient as identifiers. Provided introduction to self, and explanation of the Ambulatory Care Manager's role. ACM reviewed that a Health Healthy tips for the Holiday packet has been sent to [facility] [employer]. ACM reviewed CHF zones, daily weights, fluid restriction, the importance of low sodium diet, and healthy tips packet with the patient. Instructed patient to call their PCP if they have a weight gain of 3 lbs in 2 days or 5 lbs in a week. Patient reminded that there is a physician on call 24 hours a day / 7 days a week should the patient have questions or concerns. The patient verbalized understanding. Per patient her arm pain had improved.

## 2022-11-15 ENCOUNTER — CARE COORDINATION (OUTPATIENT)
Dept: CARE COORDINATION | Age: 77
End: 2022-11-15

## 2022-11-15 NOTE — CARE COORDINATION
Phoned the patient today to share information that the patient had   requested on [facility], and [employer].  informed the patient that [employer] does services individuals that   are referred by doctors, etc and they also provide services for private  individuals. Light housekeeping cost $22. per hour. [facility] stated that they have no individuals that are available for   light housekeeping.     Plan of Care  [facility] [place], [facility]. will contact patient for other social needs

## 2022-11-17 ENCOUNTER — CARE COORDINATION (OUTPATIENT)
Dept: CARE COORDINATION | Age: 77
End: 2022-11-17

## 2022-11-21 ENCOUNTER — HOSPITAL ENCOUNTER (OUTPATIENT)
Dept: PAIN MANAGEMENT | Age: 77
Discharge: HOME | End: 2022-11-21
Payer: MEDICARE

## 2022-11-21 ENCOUNTER — HOSPITAL ENCOUNTER (OUTPATIENT)
Dept: GENERAL RADIOLOGY | Age: 77
Discharge: HOME | End: 2022-11-23
Payer: MEDICARE

## 2022-11-21 VITALS
TEMPERATURE: 98 F | HEART RATE: 62 BPM | DIASTOLIC BLOOD PRESSURE: 81 MMHG | SYSTOLIC BLOOD PRESSURE: 157 MMHG | HEIGHT: 64 IN | WEIGHT: 278 LBS | RESPIRATION RATE: 14 BRPM | OXYGEN SATURATION: 96 % | BODY MASS INDEX: 47.46 KG/M2

## 2022-11-21 DIAGNOSIS — R52 PAIN MANAGEMENT: ICD-10-CM

## 2022-11-21 PROCEDURE — 2500000003 HC RX 250 WO HCPCS: Performed by: STUDENT IN AN ORGANIZED HEALTH CARE EDUCATION/TRAINING PROGRAM

## 2022-11-21 PROCEDURE — 64493 INJ PARAVERT F JNT L/S 1 LEV: CPT

## 2022-11-21 PROCEDURE — 64494 INJ PARAVERT F JNT L/S 2 LEV: CPT

## 2022-11-21 PROCEDURE — 64495 INJ PARAVERT F JNT L/S 3 LEV: CPT

## 2022-11-21 PROCEDURE — 3209999900 FLUORO FOR SURGICAL PROCEDURES

## 2022-11-21 PROCEDURE — 64493 INJ PARAVERT F JNT L/S 1 LEV: CPT | Performed by: STUDENT IN AN ORGANIZED HEALTH CARE EDUCATION/TRAINING PROGRAM

## 2022-11-21 PROCEDURE — 6360000002 HC RX W HCPCS: Performed by: STUDENT IN AN ORGANIZED HEALTH CARE EDUCATION/TRAINING PROGRAM

## 2022-11-21 PROCEDURE — 64494 INJ PARAVERT F JNT L/S 2 LEV: CPT | Performed by: STUDENT IN AN ORGANIZED HEALTH CARE EDUCATION/TRAINING PROGRAM

## 2022-11-21 RX ORDER — TRIAMCINOLONE ACETONIDE 40 MG/ML
INJECTION, SUSPENSION INTRA-ARTICULAR; INTRAMUSCULAR
Status: COMPLETED | OUTPATIENT
Start: 2022-11-21 | End: 2022-11-21

## 2022-11-21 RX ORDER — LIDOCAINE HYDROCHLORIDE 20 MG/ML
INJECTION, SOLUTION EPIDURAL; INFILTRATION; INTRACAUDAL; PERINEURAL
Status: COMPLETED | OUTPATIENT
Start: 2022-11-21 | End: 2022-11-21

## 2022-11-21 RX ADMIN — TRIAMCINOLONE ACETONIDE 40 MG: 40 INJECTION, SUSPENSION INTRA-ARTICULAR; INTRAMUSCULAR at 13:37

## 2022-11-21 RX ADMIN — LIDOCAINE HYDROCHLORIDE 2 ML: 20 INJECTION, SOLUTION EPIDURAL; INFILTRATION; INTRACAUDAL; PERINEURAL at 13:32

## 2022-11-21 RX ADMIN — LIDOCAINE HYDROCHLORIDE 2 ML: 20 INJECTION, SOLUTION EPIDURAL; INFILTRATION; INTRACAUDAL; PERINEURAL at 13:34

## 2022-11-21 RX ADMIN — TRIAMCINOLONE ACETONIDE 40 MG: 40 INJECTION, SUSPENSION INTRA-ARTICULAR; INTRAMUSCULAR at 13:32

## 2022-11-21 ASSESSMENT — PAIN - FUNCTIONAL ASSESSMENT
PAIN_FUNCTIONAL_ASSESSMENT: 0-10
PAIN_FUNCTIONAL_ASSESSMENT: 0-10
PAIN_FUNCTIONAL_ASSESSMENT: PREVENTS OR INTERFERES WITH ALL ACTIVE AND SOME PASSIVE ACTIVITIES

## 2022-11-21 ASSESSMENT — PAIN DESCRIPTION - DESCRIPTORS: DESCRIPTORS: STABBING;BURNING

## 2022-11-21 NOTE — OP NOTE
PROCEDURE PERFORMED: Bilateral Lumbar medial branch block    PREOPERATIVE DIAGNOSIS: Lumbar spondylosis    INDICATIONS: Chronic low back pain    The patient's history and physical exam were reviewed. The risk, benefits, and alternatives of the procedure were discussed and all questions were answered to the patient's satisfaction. The patient agreed to proceed and written informed consent was obtained. POSTOPERATIVE DIAGNOSIS: Lumbar spondylosis    PHYSICIAN:  Dr. Sandy Canales DO    ANESTHESIA:  LOCAL    ASSISTANT:  NONE    PATHOLOGY:  NONE    ESTIMATED BLOOD LOSS:  N/A    IMPLANTS:  NONE    PROCEDURE DESCRIPTION: Therapeutic bilateral lumbar medial branch block using fluoroscopy    The patient was placed on the operative bed in prone position. The area was prepped with  Chlorhexidine. The area was then draped in a sterile fashion. Targeted levels: Bilateral lumbar L3, L4, L5 medial branch block    An AP  fluoroscopy image was used to identify and ines Wells's point at the L3, L4 levels on the targeted side. Additionally, the junction of the SAP and sacral ala was also marked on the same side. A 22-gauge 5 inch Quincke spinal needle was then advanced toward each of these points under fluoroscopic guidance. Once bone was contacted, negative aspiration was confirmed and 1 mL of an injectate solution containing Kenalog 40 mg and 2 mL of lidocaine 2% was injected each level. The needles were removed and the needle sites were dressed appropriately. The same procedure was performed on the opposite side. The patient was transferred to the postoperative care unit in stable condition. Written discharge instructions were given to the patient. The patient was given a pain diary upon discharge. COMPLICATIONS:  There were no apparent complications. The patient tolerated the procedure well.

## 2022-11-21 NOTE — INTERVAL H&P NOTE
Update History & Physical    The patient's History and Physical of November 1, 2022 was reviewed with the patient and I examined the patient. There was no change. The surgical site was confirmed by the patient and me. Plan: The risks, benefits, expected outcome, and alternative to the recommended procedure have been discussed with the patient. Patient understands and wants to proceed with the procedure.      Electronically signed by Pallavi King DO on 11/21/2022 at 1:10 PM

## 2022-11-21 NOTE — DISCHARGE INSTRUCTIONS
General Instructions:  Do not take a tub bath for 72 hours after procedure (this includes hot tubs and swimming pools). You may shower, but avoid hot water to injection site. Avoid strenuous activity TODAY especially if you experience dizziness. Remove band-aid the next day. Wash off any residual iodine   Do not use heat, heating pad, or any other heating device over the injection site for 3 days after the procedure. If you experience pain after your procedure, you may continue with your current pain medication as prescribed. (DO NOT INCREASE YOUR PAIN MEDICATION WITHOUT TALKING TO DOCTOR)  Soreness and pain at injection site is common, may use ice to reduce soreness. Please complete pain diary as instructed.      Call Jeremy Alex at [phone] if you experience:   Fever, chills or temperature over 100    Vomiting, Headache, persistent stiff neck, nausea, blurred vision   Difficulty in urinating or unable to urinate with 8 hours   Increase in weakness, numbness or loss of function   Increased redness, swelling or drainage at the injection site

## 2022-11-23 ENCOUNTER — CARE COORDINATION (OUTPATIENT)
Dept: CARE COORDINATION | Age: 77
End: 2022-11-23

## 2022-11-23 NOTE — CARE COORDINATION
HC phoned the patient, who reported that she is doing well. She reported that things are well and she hopes that she   will be able to go to be with family on tomorrow.     Plan of Care  [facility] [place], [facility]. will follow up with patient for social needs

## 2022-11-29 ENCOUNTER — HOSPITAL ENCOUNTER (OUTPATIENT)
Dept: PAIN MANAGEMENT | Age: 77
Discharge: HOME | End: 2022-11-29
Payer: MEDICARE

## 2022-11-29 VITALS — WEIGHT: 278 LBS | HEIGHT: 64 IN | BODY MASS INDEX: 47.46 KG/M2 | RESPIRATION RATE: 18 BRPM

## 2022-11-29 DIAGNOSIS — K59.03 THERAPEUTIC OPIOID INDUCED CONSTIPATION: ICD-10-CM

## 2022-11-29 DIAGNOSIS — M48.062 SPINAL STENOSIS OF LUMBAR REGION WITH NEUROGENIC CLAUDICATION: Primary | Chronic | ICD-10-CM

## 2022-11-29 DIAGNOSIS — T40.2X5A THERAPEUTIC OPIOID INDUCED CONSTIPATION: ICD-10-CM

## 2022-11-29 DIAGNOSIS — M51.36 DDD (DEGENERATIVE DISC DISEASE), LUMBAR: ICD-10-CM

## 2022-11-29 DIAGNOSIS — M79.7 FIBROMYALGIA: ICD-10-CM

## 2022-11-29 DIAGNOSIS — M17.12 PRIMARY OSTEOARTHRITIS OF LEFT KNEE: ICD-10-CM

## 2022-11-29 DIAGNOSIS — M47.817 LUMBOSACRAL SPONDYLOSIS WITHOUT MYELOPATHY: ICD-10-CM

## 2022-11-29 DIAGNOSIS — Z96.653 HISTORY OF TOTAL BILATERAL KNEE REPLACEMENT: ICD-10-CM

## 2022-11-29 DIAGNOSIS — Z79.891 CHRONIC USE OF OPIATE FOR THERAPEUTIC PURPOSE: ICD-10-CM

## 2022-11-29 PROBLEM — Z51.81 MEDICATION MONITORING ENCOUNTER: Chronic | Status: RESOLVED | Noted: 2018-06-13 | Resolved: 2022-11-29

## 2022-11-29 PROCEDURE — 99214 OFFICE O/P EST MOD 30 MIN: CPT | Performed by: NURSE PRACTITIONER

## 2022-11-29 PROCEDURE — 99213 OFFICE O/P EST LOW 20 MIN: CPT

## 2022-11-29 RX ORDER — LIDOCAINE 50 MG/G
1 PATCH TOPICAL DAILY
Qty: 30 PATCH | Refills: 0 | Status: SHIPPED | OUTPATIENT
Start: 2022-11-29 | End: 2022-12-29

## 2022-11-29 RX ORDER — OXYCODONE HYDROCHLORIDE 5 MG/1
5 TABLET ORAL 4 TIMES DAILY PRN
Qty: 120 TABLET | Refills: 0 | Status: SHIPPED | OUTPATIENT
Start: 2022-12-03 | End: 2023-01-02

## 2022-11-29 RX ORDER — LUBIPROSTONE 8 UG/1
8 CAPSULE ORAL DAILY
Qty: 30 CAPSULE | Refills: 3 | Status: SHIPPED | OUTPATIENT
Start: 2022-11-29

## 2022-11-29 ASSESSMENT — ENCOUNTER SYMPTOMS
CONSTIPATION: 1
BACK PAIN: 1
COUGH: 0
SHORTNESS OF BREATH: 0
BOWEL INCONTINENCE: 0

## 2022-11-29 NOTE — PROGRESS NOTES
Chief Complaint   Patient presents with    Back Pain       PMH     Pt reports chronic history of low back pain that radiates into right hip and buttocks at times. She is s/p bilateral knee and right hip replacement She has tried NSAIDS heat chiropractic care and with little relief. Last imaging 20/2020 lumbar XR that showed previous L1 compression fracture which was corrected with kyphoplasty with multilevel degenerative changes and Lumbar Ct 2018 with diffuse facet arthopathy  Has completed PT at [facility] and reports she plans on continuing sessions. Here today to f/u after therapeutic with steroid  Bilateral lumbar L3, L4, L5 medial branch block 11/21/22 and reports  almost 75% relief of pain and improved activity tolerance    Today main c/o is left hip pain - requesting lidocaine patch  Also c/o constipation will try medication for OIC has fail ed  OTC meds, metamucil colace and senna  Requesting to go back to oxycodone  Does not want to continue with percocet and just use tylenol prn. HPI:     Back Pain  This is a chronic problem. The current episode started more than 1 year ago. The problem occurs constantly. The problem has been gradually improving since onset. The pain is present in the lumbar spine. The quality of the pain is described as aching, burning, cramping, shooting and stabbing. The pain radiates to the left thigh, left knee and left foot. The pain is at a severity of 5/10. The pain is mild. The pain is The same all the time. The symptoms are aggravated by bending, position and twisting. Associated symptoms include numbness and weakness. Pertinent negatives include no bladder incontinence, bowel incontinence, chest pain, fever or tingling. She has tried bed rest, home exercises, heat, ice and walking (Therapeutic MBB) for the symptoms. Patient denies any new neurological symptoms. No bowel or bladder incontinence, no weakness, and no falling.     Pill count: percocet 8 12/3    Morphine equivalent: 45    Controlled Substance Monitoring:    Acute and Chronic Pain Monitoring:   RX Monitoring 11/29/2022   Attestation -   Acute Pain Prescriptions -   Periodic Controlled Substance Monitoring Possible medication side effects, risk of tolerance/dependence & alternative treatments discussed. ;No signs of potential drug abuse or diversion identified. ;Assessed functional status. ;Obtaining appropriate analgesic effect of treatment. Chronic Pain > 50 MEDD -   Chronic Pain > 80 MEDD -          Periodic Controlled Substance Monitoring: Possible medication side effects, risk of tolerance/dependence & alternative treatments discussed., No signs of potential drug abuse or diversion identified. , Assessed functional status., Obtaining appropriate analgesic effect of treatment. Kavitha Balbuena, APRN - CNP)      Past Medical History:   Diagnosis Date    Abnormality of rib determined by X-ray 1/28/2016    Acute cystitis without hematuria 6/16/2018    Acute exacerbation of chronic bronchitis (HCC)     Acute on chronic diastolic heart failure ([address] 1/28/2016    Adjustment disorder with mixed anxiety and depressed mood 6/26/2015    Mild     Anemia 3/5/2014    Back pain     Bronchiectasis with acute lower respiratory infection ([address] 8/16/2017    Bronchitis     Chronic bronchitis ([address] 1/28/2016    Chronic cough 7/23/2018    COPD (chronic obstructive pulmonary disease) (HCC)     asbestos related lung disease    Degenerative joint disease (DJD) of hip 5/14/2015    Dyslipidemia 3/5/2014    Encephalopathy acute 5/29/2016    Essential hypertension 1/28/2016    Fibromyalgia     GERD (gastroesophageal reflux disease)     History of total bilateral knee replacement 5/28/2016    Hx of blood clots     left leg and lung    Hyperlipidemia     Hypertension     Incontinence of urine     Irregular heartbeat     DR. Patricia Youssef    Medication monitoring encounter 6/13/2018    Morbid obesity with BMI of 45.0-49.9, adult ([address] 1/28/2016    Obstructive sleep apnea syndrome     Osteoarthritis     Primary osteoarthritis of left knee 5/27/2016    PVC (premature ventricular contraction) 1/28/2016    S/P right and left heart catheterization 10/2/2015    Sleep apnea     no machine    SOB (shortness of breath)     Spinal stenosis of lumbar region with neurogenic claudication 5/13/2013    Supplemental oxygen dependent 12/8/2017    Urine frequency     UTI (urinary tract infection)     hospitalized early july 2014 for kidney infection       Past Surgical History:   Procedure Laterality Date    BRONCHOSCOPY Left 8/16/2017    BRONCHOSCOPY ALVEOLAR LAVAGE LOWER LOBE performed by Fernanda Rojas MD at [facility]  x 3    no stents    COLONOSCOPY  4 28 14    polyps biopsies     FOOT SURGERY Left     calcium deposit removal from top of foot    HYSTERECTOMY (CERVIX STATUS UNKNOWN)      JOINT REPLACEMENT Bilateral 5/27/2016    bilat total knees    NERVE BLOCK  5/13/2013    caudal celestone 6mg    NERVE BLOCK  5/28/13    Caudal #2, Celestone 9mg    NERVE BLOCK  2/14/14    rt hip inj celestone 9 mg    NERVE BLOCK  07/14/2014    caudal# 3- celestone 9 mg    NERVE BLOCK  7-21-14    caudal epidural #2, decadron 7mg, fentanyl 25mcg    NERVE BLOCK  10/2/14    duramorph 1.5  decadron 5mg    NERVE BLOCK  11/9/2015    caudal# 1 celestone 9mg    NERVE BLOCK  11/16/15    caudal #2  decadron 10mg    NERVE BLOCK  11/23/15    duramorph 1mg celestone 9mg    NERVE BLOCK  03/28/2018    CAUDAL EPIDURAL STEROID BLOCK  DECADRON 10 MG     NERVE BLOCK  05/23/2018    caudal # decadron 7mg    NERVE BLOCK  08/28/2018    natalia transforminal # 1, decadron 10mg gadoteridol, LONG NEEDLES    IL PERQ VERT AGMNTJ CAVITY CRTJ UNI/BI CANNULJ LMBR N/A 10/29/2018    KYPHOPLASTY L1 performed by Homero Smith MD at [address] [place] [address],[address] Right 5/14/15    total hip replacement       Allergies   Allergen Reactions    Rosuvastatin Calcium Anaphylaxis     Hair fell out    Statins Anaphylaxis     Hair fell out    Bactrim [Sulfamethoxazole-Trimethoprim] Diarrhea    Caffeine Other (See Comments)     pain  pain    Dye [Iodides] Other (See Comments)     Iv dye= blood clots in arm    Food      Tomatoes, pain    Ioxaglate Other (See Comments)     Iv dye= blood clots in arm    Tomato     Dicloxacillin Rash    Pcn [Penicillins] Rash         Current Outpatient Medications:     [START ON 12/3/2022] oxyCODONE (ROXICODONE) 5 MG immediate release tablet, Take 1 tablet by mouth 4 times daily as needed for Pain for up to 30 days. , Disp: 120 tablet, Rfl: 0    lidocaine (LIDODERM) 5 %, Place 1 patch onto the skin daily 12 hours on, 12 hours off., Disp: 30 patch, Rfl: 0    lubiprostone (AMITIZA) 8 MCG CAPS capsule, Take 1 capsule by mouth daily, Disp: 30 capsule, Rfl: 3    oxyCODONE-acetaminophen (PERCOCET) 7.5-325 MG per tablet, Take 1 tablet by mouth every 6 hours as needed for Pain for up to 30 days. , Disp: 120 tablet, Rfl: 0    gabapentin (NEURONTIN) 300 MG capsule, Take 1 capsule by mouth 3 times daily for 90 days. , Disp: 90 capsule, Rfl: 2    albuterol sulfate HFA (PROVENTIL;VENTOLIN;PROAIR) 108 (90 Base) MCG/ACT inhaler, Inhale 2 puffs into the lungs 4 times daily as needed for Wheezing, Disp: 1 each, Rfl: 2    DULoxetine (CYMBALTA) 30 MG extended release capsule, Take 1 capsule by mouth 2 times daily, Disp: 180 capsule, Rfl: 3    guaiFENesin (ROBITUSSIN) 100 MG/5ML liquid, take 10 milliliters by mouth every 4 hours if needed for cough, Disp: , Rfl:     Carboxymeth-Cellulose-CitricAc (PLENITY WELCOME KIT) CAPS, Take 3 capsules by mouth in the morning and at bedtime, Disp: 180 capsule, Rfl: 0    methylPREDNISolone (MEDROL DOSEPACK) 4 MG tablet, use as directed FOLLOW DIRECTIONS ON BACK OF FOIL PACK, Disp: , Rfl:     fluticasone (FLONASE) 50 MCG/ACT nasal spray, 1 spray by Each Nostril route daily, Disp: 32 g, Rfl: 1    guaiFENesin (ALTARUSSIN) 100 MG/5ML syrup, Take 10 mLs by mouth every 4 hours as needed for Cough, Disp: 1 each, Rfl: 1    albuterol (PROVENTIL) (5 MG/ML) 0.5% nebulizer solution, Take 1 mL by nebulization every 4 hours as needed for Wheezing, Disp: 120 each, Rfl: 3    ciprofloxacin (CIPRO) 250 MG tablet, Take 250 mg by mouth every 8-12 hours as needed (Patient not taking: No sig reported), Disp: , Rfl:     Catheters (STANDARD CARE EXTERNAL CATH) MISC, 1 Device by Does not apply route daily, Disp: 30 each, Rfl: 2    MYRBETRIQ 50 MG TB24, Take 50 mg by mouth daily, Disp: 30 tablet, Rfl: 5    metoprolol tartrate (LOPRESSOR) 50 MG tablet, TAKE 1 TABLET TWICE A DAY, Disp: 180 tablet, Rfl: 3    Cyanocobalamin (VITAMIN B-12) 1000 MCG/15ML LIQD, Take 1,000 mcg by mouth daily , Disp: , Rfl:     levocetirizine (XYZAL) 5 MG tablet, take 1 tablet by mouth once daily AT NIGHT, Disp: 30 tablet, Rfl: 2    donepezil (ARICEPT) 5 MG tablet, take 1 tablet by mouth at bedtime (Patient not taking: No sig reported), Disp: 90 tablet, Rfl: 1    psyllium (KONSYL) 28.3 % PACK, Take 1 packet by mouth daily , Disp: , Rfl:     Catheters (STANDARD CARE EXTERNAL CATH) MISC, 1 each by Does not apply route daily, Disp: 5 each, Rfl: 1    Misc.  Devices (WALKER) MISC, 1 each by Does not apply route daily Upright walker with wheels and seat, Disp: 1 each, Rfl: 0    aspirin 81 MG EC tablet, Take 81 mg by mouth daily , Disp: , Rfl:     Family History   Problem Relation Age of Onset    Stomach Cancer Brother         stomach    High Blood Pressure Mother     Heart Disease Sister         irregular heartbeat       Social History     Socioeconomic History    Marital status:      Spouse name: Not on file    Number of children: 2    Years of education: Not on file    Highest education level: Not on file   Occupational History    Not on file   Tobacco Use    Smoking status: Former     Packs/day: 1.00     Years: 18.00     Pack years: 18.00     Types: Cigarettes     Quit date: 5/13/1993     Years since quittin.5    Smokeless tobacco: Never   Vaping Use    Vaping Use: Never used   Substance and Sexual Activity    Alcohol use: No    Drug use: No    Sexual activity: Not Currently   Other Topics Concern    Not on file   Social History Narrative    Not on file     Social Determinants of Health     Financial Resource Strain: Low Risk     Difficulty of Paying Living Expenses: Not very hard   Food Insecurity: No Food Insecurity    Worried About Running Out of Food in the Last Year: Never true    Ran Out of Food in the Last Year: Never true   Transportation Needs: Not on file   Physical Activity: Not on file   Stress: No Stress Concern Present    Feeling of Stress : Only a little   Social Connections: Moderately Integrated    Frequency of Communication with Friends and Family: More than three times a week    Frequency of Social Gatherings with Friends and Family: Once a week    Attends [employer] Services: More than 4 times per year    Active Member of [employer] or Organizations: Yes    Attends Club or Organization Meetings: More than 4 times per year    Marital Status:    Intimate Partner Violence: Not on file   Housing Stability: Not on file       Review of Systems:  Review of Systems   Constitutional: Negative for chills and fever. Cardiovascular:  Negative for chest pain. Respiratory:  Negative for cough and shortness of breath. Musculoskeletal:  Positive for back pain. Gastrointestinal:  Positive for constipation. Negative for bowel incontinence. Genitourinary:  Negative for bladder incontinence. Neurological:  Positive for numbness and weakness. Negative for tingling.      Physical Exam:  Resp 18   Ht 5' 4\" (1.626 m)   Wt 278 lb (126.1 kg)   BMI 47.72 kg/m²     Physical Exam    Record/Diagnostics Review:    Last tiffanie  and was appropriate     Assessment:  Problem List Items Addressed This Visit       Lumbosacral spondylosis without myelopathy    Relevant Medications    oxyCODONE (ROXICODONE) 5 MG immediate release tablet (Start on 12/3/2022)    Therapeutic opioid induced constipation    Spinal stenosis of lumbar region with neurogenic claudication - Primary (Chronic)    Relevant Medications    oxyCODONE (ROXICODONE) 5 MG immediate release tablet (Start on 12/3/2022)    Fibromyalgia    Relevant Medications    oxyCODONE (ROXICODONE) 5 MG immediate release tablet (Start on 12/3/2022)    History of total bilateral knee replacement    Relevant Medications    oxyCODONE (ROXICODONE) 5 MG immediate release tablet (Start on 12/3/2022)    Primary osteoarthritis of left knee    Relevant Medications    oxyCODONE (ROXICODONE) 5 MG immediate release tablet (Start on 12/3/2022)    Chronic use of opiate for therapeutic purpose    DDD (degenerative disc disease), lumbar    Relevant Medications    oxyCODONE (ROXICODONE) 5 MG immediate release tablet (Start on 12/3/2022)          Treatment Plan:  Patient relates current medications are helping the pain. Patient reports taking pain medications as prescribed, denies obtaining medications from different sources and denies use of illegal drugs. Medication risk and benefits have been discussed. Patient denies side effects from medications like nausea, vomiting, constipation or drowsiness. Patient reports current activities of daily living are possible due to medications and would like to continue them. As always, we encourage daily stretching and strengthening exercises, and recommend minimizing use of pain medications unless patient cannot get through daily activities due to pain. Due to the high risk nature of this patient's pain medication close monitoring is required. Continue current medication management, pt has been stable and compliant.   Script written for oxycodone  Will start amitiza for OIC  Will try lidocaine for joint pain  Patient relates significant relief from recent intervention   Follow up appointment made for 4 weeks    I have reviewed the chief complaint and history of present illness (including ROS and PFSH) and vital documentation by my staff and I agree with their documentation and have added where applicable. Mel Alvarez, was evaluated through a synchronous (real-time) audio-video encounter. The patient (or guardian if applicable) is aware that this is a billable service, which includes applicable co-pays. This Virtual Visit was conducted with patient's (and/or legal guardian's) consent. The visit was conducted pursuant to the emergency declaration under the [address] [place] [address] authority and the [employer] Act. Patient identification was verified, and a caregiver was present when appropriate. The patient was located at Home: [place] [id number]. Provider was located at [facility] (Appt Dept): Tristan Coker [address] [place] [address]. Total time spent for this encounter: Not billed by time    --KARIN Jensen CNP on 11/29/2022 at 4:26 PM    An electronic signature was used to authenticate this note.

## 2022-12-01 ENCOUNTER — CARE COORDINATION (OUTPATIENT)
Dept: CARE COORDINATION | Age: 77
End: 2022-12-01

## 2022-12-02 NOTE — CARE COORDINATION
Valentina phoned the patient and she was busy and stated that she would call the Valentina right back. HC and patient connected again and the conversation was about it being time to sign her  lease for another 14 months. Patient stated that she is believing God that she will get an  apartment in [facility] and it might happen before her lease is up, but God  will handle it for her. Valentina agreed with the patient.     Plan of Care  Valentina will follow up with the patient next week

## 2022-12-07 ENCOUNTER — CARE COORDINATION (OUTPATIENT)
Dept: CARE COORDINATION | Age: 77
End: 2022-12-07

## 2022-12-07 NOTE — CARE COORDINATION
Heart Failure Education outreach Date/Time: 2022 11:42 AM    Ambulatory Care Manager (ACM) contacted the patient by telephone to perform Ambulatory Care Coordination. Verified name and  with patient as identifiers. Provided introduction to self, and explanation of the Ambulatory Care Manager's role. ACM reviewed that a Health Healthy tips for the Holiday packet has been sent to [employer]. ACM reviewed CHF zones, daily weights, fluid restriction, the importance of low sodium diet, and healthy tips packet with the patient. Instructed patient to call their PCP if they have a weight gain of 3 lbs in 2 days or 5 lbs in a week. Patient reminded that there is a physician on call 24 hours a day / 7 days a week should the patient have questions or concerns. The patient verbalized understanding. No concerns today.

## 2022-12-09 ENCOUNTER — CARE COORDINATION (OUTPATIENT)
Dept: CARE COORDINATION | Age: 77
End: 2022-12-09

## 2022-12-09 NOTE — CARE COORDINATION
HC phoned the patient to follow up on her social needs. Patient stated that the patient mentioned she is having   some difficulties with her stomach. She also mentioned  that she is feeling a little blue. She stated that she suffers  from seasonal anxiety. HC informed the patient that she  will research tips for overcoming seasonal anxiety. Patient  agreed to the plan.     Plan of Care  [facility] will follow up with the patient regarding seasonal anxiety

## 2022-12-14 ENCOUNTER — CARE COORDINATION (OUTPATIENT)
Dept: CARE COORDINATION | Age: 77
End: 2022-12-14

## 2022-12-14 DIAGNOSIS — I50.33 ACUTE ON CHRONIC DIASTOLIC HEART FAILURE (HCC): Primary | ICD-10-CM

## 2022-12-14 DIAGNOSIS — I10 ESSENTIAL HYPERTENSION: ICD-10-CM

## 2022-12-14 DIAGNOSIS — J43.1 PANLOBULAR EMPHYSEMA (HCC): ICD-10-CM

## 2022-12-14 NOTE — PROGRESS NOTES
12/14/22 3:56 PM.    Remote Patient Order Discontinued    Received request from Kamille Mo RN to discontinue order for remote patient monitoring of CHF, COPD, and HTN and order completed.      Denise Bravo DNP, FNP-C, Remote Patient Monitoring NP, () [phone]

## 2022-12-14 NOTE — CARE COORDINATION
-writer spoke to pt, she was notified that [id number] will  RPM kit within 1-3 business days. -RPM return kit order is complete.

## 2022-12-14 NOTE — CARE COORDINATION
Spoke with patient who stated she is not going to use the equipment. After looking at it she stated she has all of this equipment. ACM explained that the RPM equipment is connected to a provider to review any concerns. Patient stated she does not feel like using it. Will have RPM discontinue services. Patient stated she needs to take her breathing treatment, she has been SOB more today. She will take treatment now, check pulse ox and call ACM back with any concerns.

## 2022-12-16 ENCOUNTER — CARE COORDINATION (OUTPATIENT)
Dept: CARE COORDINATION | Age: 77
End: 2022-12-16

## 2022-12-16 NOTE — CARE COORDINATION
Attempted to contact the patient. There was no answer, HC left a message for  the patient and provided contact information for a return call.     Plan of Care  [facility]. will again attempt to reach the patient on 12/19/22

## 2022-12-21 ENCOUNTER — CARE COORDINATION (OUTPATIENT)
Dept: CARE COORDINATION | Age: 77
End: 2022-12-21

## 2022-12-21 NOTE — CARE COORDINATION
Ambulatory Care Coordination Note  12/21/2022    ACC: Patti Siegel, RN    Summary  Date Care Coordination Episode Started: 6.9.22        Reason patient is in Care Coordination:     PCP referral    Topics Discussed Today:     Spoke briefly to patient who denied any needs from [facility] or PCP today. She was on her way to the dentist to have a tooth abstracted. [facility] encouraged patient to call with any needs. Assessments completed today:     Fall risk  Initial assessment  Medication reconciliation  SDOH  COPD, CHF (Health assessments)      Care Coordinator plan of care: Follow up call in 1-2 weeks, check on needs, possibly graduate. Offered patient enrollment in the Remote Patient Monitoring (RPM) program for in-home monitoring: Patient declined. Care Coordination Interventions    Referral from Primary Care Provider: No  Suggested Interventions and Community Resources  Fall Risk Prevention: In Process  Home Health Services: Completed  Physical Therapy: Completed  Social Work: In Process  Transportation Support: In Process  Zone Management Tools: In Process          Goals Addressed                   This Visit's Progress     Medication Management   On track     I will take my medication as directed. I will notify my provider of any problems with medications, like adverse effects or side effects. I will notify my provider/Care Coordinator if I am unable to afford my medications. I will notify my provider for advice before I stop taking any of my medication. Barriers: overwhelmed by complexity of regimen  Plan for overcoming my barriers: work with CC team  Confidence: 6/10  Anticipated Goal Completion Date: 8.22.22              Prior to Admission medications    Medication Sig Start Date End Date Taking? Authorizing Provider   oxyCODONE (ROXICODONE) 5 MG immediate release tablet Take 1 tablet by mouth 4 times daily as needed for Pain for up to 30 days.  12/3/22 1/2/23  KARIN Temple - CALISTA lidocaine (LIDODERM) 5 % Place 1 patch onto the skin daily 12 hours on, 12 hours off. 11/29/22 12/29/22  Kendra Ip, APRN - CNP   lubiprostone (AMITIZA) 8 MCG CAPS capsule Take 1 capsule by mouth daily 11/29/22   Kendra Ip, APRN - CNP   gabapentin (NEURONTIN) 300 MG capsule Take 1 capsule by mouth 3 times daily for 90 days.  10/4/22 1/2/23  Zeus Webster DO   albuterol sulfate HFA (PROVENTIL;VENTOLIN;PROAIR) 108 (90 Base) MCG/ACT inhaler Inhale 2 puffs into the lungs 4 times daily as needed for Wheezing 8/10/22   Charlotte Gerard MD   DULoxetine (CYMBALTA) 30 MG extended release capsule Take 1 capsule by mouth 2 times daily 7/14/22   Lorene Jin MD   guaiFENesin (ROBITUSSIN) 100 MG/5ML liquid take 10 milliliters by mouth every 4 hours if needed for cough 5/17/22   Historical Provider, MD   Carboxymeth-Cellulose-CitricAc (PLENITY WELCOME KIT) CAPS Take 3 capsules by mouth in the morning and at bedtime 6/24/22   Golden Matos MD   methylPREDNISolone (MEDROL DOSEPACK) 4 MG tablet use as directed [address] 4/5/22   Historical Provider, MD   fluticasone (FLONASE) 50 MCG/ACT nasal spray 1 spray by Each Nostril route daily 5/16/22   Golden Matos MD   guaiFENesin (ALTARUSSIN) 100 MG/5ML syrup Take 10 mLs by mouth every 4 hours as needed for Cough 5/16/22   Golden Matos MD   albuterol (PROVENTIL) (5 MG/ML) 0.5% nebulizer solution Take 1 mL by nebulization every 4 hours as needed for Wheezing 4/18/22   Golden Matos MD   ciprofloxacin (CIPRO) 250 MG tablet Take 250 mg by mouth every 8-12 hours as needed  Patient not taking: No sig reported    Historical Provider, MD   Catheters (STANDARD CARE EXTERNAL CATH) MISC 1 Device by Does not apply route daily 1/10/22   Golden Matos MD   MYRBETRIQ 50 MG TB24 Take 50 mg by mouth daily 1/10/22   Dale [place] MD   metoprolol tartrate (LOPRESSOR) 50 MG tablet TAKE 1 TABLET TWICE A DAY 12/22/21   Golden Matos MD   Cyanocobalamin (VITAMIN B-12) 1000 MCG/15ML LIQD Take 1,000 mcg by mouth daily     Historical Provider, MD   levocetirizine (XYZAL) 5 MG tablet take 1 tablet by mouth once daily AT NIGHT 9/8/21   Halley Mackay MD   donepezil (ARICEPT) 5 MG tablet take 1 tablet by mouth at bedtime  Patient not taking: No sig reported 9/1/21   Halley Mackay MD   psyllium (KONSYL) 28.3 % PACK Take 1 packet by mouth daily     Historical Provider, MD   Catheters (STANDARD CARE EXTERNAL CATH) MISC 1 each by Does not apply route daily 3/2/21   Brenda Power MD   Misc.  Devices [facility]) MISC 1 each by Does not apply route daily Upright walker with wheels and seat 11/12/18   Fede Bland MD   aspirin 81 MG EC tablet Take 81 mg by mouth daily     Historical Provider, MD       Future Appointments   Date Time Provider Mayo Betts   12/28/2022  2:20 PM Zeus Jones, DO Corina Alas   ,   Congestive Heart Failure Assessment    Are you currently restricting fluids?: Other  Do you understand a low sodium diet?: Yes  Do you understand how to read food labels?: Yes  Do you salt your food before tasting it?: No     No patient-reported symptoms      Symptoms:     Symptom course: stable  Salt intake watch compared to last visit: stable     ,   COPD Assessment    Does the patient understand envrionmental exposure?: Yes  Is the patient able to verbalize Rescue vs. Long Acting medications?: Yes  Does the patient have a nebulizer?: No     No patient-reported symptoms         Symptoms:          , and   General Assessment    Do you have any symptoms that are causing concern?: No

## 2022-12-23 ENCOUNTER — CARE COORDINATION (OUTPATIENT)
Dept: CARE COORDINATION | Age: 77
End: 2022-12-23

## 2022-12-23 NOTE — CARE COORDINATION
HC phoned the patient to follow up on her wellbeing. Patient reports that she is doing so much better since  having a tooth extracted. Patient stated that she is   staying in for the weekend and the holiday. Patient  asked the [facility] about contact the [facility] to find out if her birth certificate   is there, and to share some updated information.     Plan of Care  [facility] will follow up with the patient on 12/27/22

## 2022-12-27 ENCOUNTER — CARE COORDINATION (OUTPATIENT)
Dept: CARE COORDINATION | Age: 77
End: 2022-12-27

## 2022-12-29 ENCOUNTER — CARE COORDINATION (OUTPATIENT)
Dept: CARE COORDINATION | Age: 77
End: 2022-12-29

## 2022-12-29 NOTE — CARE COORDINATION
HC went to the [facility] for the patient, since she's been unable  to reach the . HC left her business card with the phone number requesting   that the  give her a call. The [facility]. phoned the patient and updated her on  the attempted visit to the [facility]. HC phoned the patient, updated her,  patient stated an understanding.     Plan of Care  [facility]. will attempt to phone the  and get back with the patient

## 2022-12-30 ENCOUNTER — CARE COORDINATION (OUTPATIENT)
Dept: CARE COORDINATION | Age: 77
End: 2022-12-30

## 2023-01-04 ENCOUNTER — CARE COORDINATION (OUTPATIENT)
Dept: CARE COORDINATION | Age: 78
End: 2023-01-04

## 2023-01-06 ENCOUNTER — CARE COORDINATION (OUTPATIENT)
Dept: CARE COORDINATION | Age: 78
End: 2023-01-06

## 2023-01-06 NOTE — CARE COORDINATION
HC contacted the patient today to follow up on her contact with Mr. Calzadara Jefe for the [employer]. II and III. Patient stated that she hasn't been able to reac  the . HC and the patient discussed the matter and the [facility]. will make calls  and visit the Property Office next week.     Plan of Care  [facility] will contact the patient and the

## 2023-01-09 ENCOUNTER — TELEPHONE (OUTPATIENT)
Dept: PAIN MANAGEMENT | Age: 78
End: 2023-01-09

## 2023-01-09 ENCOUNTER — CARE COORDINATION (OUTPATIENT)
Dept: CARE COORDINATION | Age: 78
End: 2023-01-09

## 2023-01-09 NOTE — CARE COORDINATION
HC phoned and spoke the  @ [address] [place] [address] II and III, who   stated that he is in the process of looking at an apartment that he can get the   patient into maybe before the month is over. HC asked if the  can contact the patient. HC did share with Mr. Mario Pretty   that she will assist the patient in whatever way that she can.     Plan of Care  [facility] [place]. will contact the patient on 01/10/23

## 2023-01-10 ENCOUNTER — CARE COORDINATION (OUTPATIENT)
Dept: CARE COORDINATION | Age: 78
End: 2023-01-10

## 2023-01-11 ENCOUNTER — CARE COORDINATION (OUTPATIENT)
Dept: CARE COORDINATION | Age: 78
End: 2023-01-11

## 2023-01-11 ENCOUNTER — HOSPITAL ENCOUNTER (OUTPATIENT)
Dept: PAIN MANAGEMENT | Age: 78
Discharge: HOME | End: 2023-01-11
Payer: MEDICARE

## 2023-01-11 VITALS
DIASTOLIC BLOOD PRESSURE: 53 MMHG | HEIGHT: 64 IN | HEART RATE: 55 BPM | TEMPERATURE: 97.3 F | WEIGHT: 278 LBS | OXYGEN SATURATION: 95 % | SYSTOLIC BLOOD PRESSURE: 120 MMHG | BODY MASS INDEX: 47.46 KG/M2

## 2023-01-11 DIAGNOSIS — M51.36 DDD (DEGENERATIVE DISC DISEASE), LUMBAR: ICD-10-CM

## 2023-01-11 DIAGNOSIS — E66.01 CLASS 3 SEVERE OBESITY WITH BODY MASS INDEX (BMI) OF 45.0 TO 49.9 IN ADULT, UNSPECIFIED OBESITY TYPE, UNSPECIFIED WHETHER SERIOUS COMORBIDITY PRESENT (HCC): ICD-10-CM

## 2023-01-11 DIAGNOSIS — M48.062 SPINAL STENOSIS OF LUMBAR REGION WITH NEUROGENIC CLAUDICATION: Primary | ICD-10-CM

## 2023-01-11 DIAGNOSIS — Z79.891 CHRONIC USE OF OPIATE FOR THERAPEUTIC PURPOSE: ICD-10-CM

## 2023-01-11 DIAGNOSIS — M47.817 LUMBOSACRAL SPONDYLOSIS WITHOUT MYELOPATHY: ICD-10-CM

## 2023-01-11 PROCEDURE — 99213 OFFICE O/P EST LOW 20 MIN: CPT

## 2023-01-11 PROCEDURE — 99214 OFFICE O/P EST MOD 30 MIN: CPT | Performed by: STUDENT IN AN ORGANIZED HEALTH CARE EDUCATION/TRAINING PROGRAM

## 2023-01-11 PROCEDURE — G0481 DRUG TEST DEF 8-14 CLASSES: HCPCS

## 2023-01-11 PROCEDURE — 80307 DRUG TEST PRSMV CHEM ANLYZR: CPT

## 2023-01-11 RX ORDER — OXYCODONE HYDROCHLORIDE 5 MG/1
5 TABLET ORAL 4 TIMES DAILY PRN
Qty: 120 TABLET | Refills: 0 | Status: SHIPPED | OUTPATIENT
Start: 2023-01-11 | End: 2023-02-10

## 2023-01-11 ASSESSMENT — PAIN SCALES - GENERAL: PAINLEVEL_OUTOF10: 4

## 2023-01-11 NOTE — PROGRESS NOTES
Chronic Pain Clinic Note     Encounter Date: 1/11/2023     SUBJECTIVE:  Chief Complaint   Patient presents with    Back Pain     Med refill       History of Present Illness:   Benjamin Rush is a 65 y.o. female who presents with back pain    Medication Refill: Oxycodone - 8    Current Complaints of Pain:   Location: lumbar back   Radiation: left leg  Severity: moderate  Pain Numerical Score - 6   Average: 8     Highest: 10+  Lowest: 4  Character/Quality: Complains of pain that is aching, burning, cramping, shooting, stabbing  Timing: Constant  Associated symptoms: none  Numbness: yes  Weakness: yes  Exacerbating factors: walking, standing, bending  Alleviating factors: sitting with back support  Length of time pain has been present: Started years ago  Inciting event/injury: work injury  Bowel/Bladder incontinence: no  Falls: no  Physical Therapy: no    History of Interventions:   Surgery: No previous lumbar/cervical surgeries  Injections: Lumbar MBB    Imaging:    Lumbar CT 9/28/2018    CT LUMBAR SPINE:       Superior endplate compression of L1. Although this was present on prior   plain film imaging in March 2018, it has worsened, and there are findings   suggesting acute exacerbation of the pre-existing compression. Severe   degenerative disc disease is noted with severe bilateral neural foraminal   narrowing and canal stenosis as above.        Past Medical History:   Diagnosis Date    Abnormality of rib determined by X-ray 1/28/2016    Acute cystitis without hematuria 6/16/2018    Acute exacerbation of chronic bronchitis (HCC)     Acute on chronic diastolic heart failure ([address] 1/28/2016    Adjustment disorder with mixed anxiety and depressed mood 6/26/2015    Mild     Anemia 3/5/2014    Back pain     Bronchiectasis with acute lower respiratory infection ([address] 8/16/2017    Bronchitis     Chronic bronchitis ([address] 1/28/2016    Chronic cough 7/23/2018    COPD (chronic obstructive pulmonary disease) ([address] asbestos related lung disease    Degenerative joint disease (DJD) of hip 5/14/2015    Dyslipidemia 3/5/2014    Encephalopathy acute 5/29/2016    Essential hypertension 1/28/2016    Fibromyalgia     GERD (gastroesophageal reflux disease)     History of total bilateral knee replacement 5/28/2016    Hx of blood clots     left leg and lung    Hyperlipidemia     Hypertension     Incontinence of urine     Irregular heartbeat     DR. Durant    Medication monitoring encounter 6/13/2018    Morbid obesity with BMI of 45.0-49.9, adult ([address] 1/28/2016    Obstructive sleep apnea syndrome     Osteoarthritis     Primary osteoarthritis of left knee 5/27/2016    PVC (premature ventricular contraction) 1/28/2016    S/P right and left heart catheterization 10/2/2015    Sleep apnea     no machine    SOB (shortness of breath)     Spinal stenosis of lumbar region with neurogenic claudication 5/13/2013    Supplemental oxygen dependent 12/8/2017    Urine frequency     UTI (urinary tract infection)     hospitalized early july 2014 for kidney infection       Past Surgical History:   Procedure Laterality Date    BRONCHOSCOPY Left 8/16/2017    BRONCHOSCOPY ALVEOLAR LAVAGE LOWER LOBE performed by Idolina Litten, MD at [facility]  x 3    no stents    COLONOSCOPY  4 28 14    polyps biopsies     FOOT SURGERY Left     calcium deposit removal from top of foot    HYSTERECTOMY (CERVIX STATUS UNKNOWN)      JOINT REPLACEMENT Bilateral 5/27/2016    bilat total knees    NERVE BLOCK  5/13/2013    caudal celestone 6mg    NERVE BLOCK  5/28/13    Caudal #2, Celestone 9mg    NERVE BLOCK  2/14/14    rt hip inj celestone 9 mg    NERVE BLOCK  07/14/2014    caudal# 3- celestone 9 mg    NERVE BLOCK  7-21-14    caudal epidural #2, decadron 7mg, fentanyl 25mcg    NERVE BLOCK  10/2/14    duramorph 1.5  decadron 5mg    NERVE BLOCK  11/9/2015    caudal# 1 celestone 9mg    NERVE BLOCK  11/16/15    caudal #2  decadron 10mg    NERVE BLOCK  11/23/15 duramorph 1mg celestone 9mg    NERVE BLOCK  2018    CAUDAL EPIDURAL STEROID BLOCK  DECADRON 10 MG     NERVE BLOCK  2018    caudal # decadron 7mg    NERVE BLOCK  2018    natalia transforminal # 1, decadron 10mg gadoteridol, LONG NEEDLES    WY PERQ VERT AGMNTJ CAVITY CRTJ UNI/BI CANNULJ LMBR N/A 10/29/2018    KYPHOPLASTY L1 performed by Adam Sosa MD at [address] [place] [address],[address] Right 5/14/15    total hip replacement       Family History   Problem Relation Age of Onset    Stomach Cancer Brother         stomach    High Blood Pressure Mother     Heart Disease Sister         irregular heartbeat       Social History     Socioeconomic History    Marital status:      Spouse name: Not on file    Number of children: 2    Years of education: Not on file    Highest education level: Not on file   Occupational History    Not on file   Tobacco Use    Smoking status: Former     Packs/day: 1.00     Years: 18.00     Pack years: 18.00     Types: Cigarettes     Quit date: 1993     Years since quittin.6    Smokeless tobacco: Never   Vaping Use    Vaping Use: Never used   Substance and Sexual Activity    Alcohol use: No    Drug use: No    Sexual activity: Not Currently   Other Topics Concern    Not on file   Social History Narrative    Not on file     Social Determinants of Health     Financial Resource Strain: Low Risk     Difficulty of Paying Living Expenses: Not very hard   Food Insecurity: No Food Insecurity    Worried About Running Out of Food in the Last Year: Never true    Ran Out of Food in the Last Year: Never true   Transportation Needs: Not on file   Physical Activity: Not on file   Stress: No Stress Concern Present    Feeling of Stress : Only a little   Social Connections:  Moderately Integrated    Frequency of Communication with Friends and Family: More than three times a week    Frequency of Social Gatherings with Friends and Family: Once a week Attends Buddhist Services: More than 4 times per year    Active Member of Clubs or Organizations: Yes    Attends Club or Organization Meetings: More than 4 times per year    Marital Status:    Intimate Partner Violence: Not on file   Housing Stability: Not on file       Medications & Allergies:   Current Outpatient Medications   Medication Instructions    albuterol (PROVENTIL) 5 mg, Nebulization, EVERY 4 HOURS PRN    albuterol sulfate HFA (PROVENTIL;VENTOLIN;PROAIR) 108 (90 Base) MCG/ACT inhaler 2 puffs, Inhalation, 4 TIMES DAILY PRN    aspirin 81 mg, Oral, DAILY    Carboxymeth-Cellulose-CitricAc (PLENITY WELCOME KIT) CAPS 3 capsules, Oral, 2 times daily    Catheters (STANDARD CARE EXTERNAL CATH) MISC 1 each, Does not apply, DAILY    Catheters (STANDARD CARE EXTERNAL CATH) MISC 1 Device, Does not apply, DAILY    ciprofloxacin (CIPRO) 250 mg, EVERY 8-12 HOURS PRN    donepezil (ARICEPT) 5 MG tablet take 1 tablet by mouth at bedtime    DULoxetine (CYMBALTA) 30 mg, Oral, 2 TIMES DAILY    fluticasone (FLONASE) 50 MCG/ACT nasal spray 1 spray, Each Nostril, DAILY    gabapentin (NEURONTIN) 300 mg, Oral, 3 TIMES DAILY    guaiFENesin (ALTARUSSIN) 100 MG/5ML syrup 10 mLs, Oral, EVERY 4 HOURS PRN    guaiFENesin (ROBITUSSIN) 100 MG/5ML liquid take 10 milliliters by mouth every 4 hours if needed for cough    levocetirizine (XYZAL) 5 MG tablet take 1 tablet by mouth once daily AT NIGHT    lubiprostone (AMITIZA) 8 mcg, Oral, DAILY    methylPREDNISolone (MEDROL DOSEPACK) 4 MG tablet use as directed FOLLOW DIRECTIONS ON BACK OF FOIL PACK    metoprolol tartrate (LOPRESSOR) 50 MG tablet TAKE 1 TABLET TWICE A DAY    Misc.  Devices (WALKER) MISC 1 each, Does not apply, DAILY, Upright walker with wheels and seat    Myrbetriq 50 mg, Oral, DAILY    oxyCODONE (ROXICODONE) 5 mg, Oral, 4 TIMES DAILY PRN    psyllium (KONSYL) 28.3 % PACK 1 packet, Oral, DAILY    Vitamin B-12 1,000 mcg, Oral, DAILY       Allergies   Allergen Reactions Rosuvastatin Calcium Anaphylaxis     Hair fell out    Statins Anaphylaxis     Hair fell out    Bactrim [Sulfamethoxazole-Trimethoprim] Diarrhea    Caffeine Other (See Comments)     pain  pain    Dye [Iodides] Other (See Comments)     Iv dye= blood clots in arm    Food      Tomatoes, pain    Ioxaglate Other (See Comments)     Iv dye= blood clots in arm    Tomato     Dicloxacillin Rash    Pcn [Penicillins] Rash       Review of Systems:   Constitutional: negative for weight changes or fevers  Cardiovascular: negative for chest pain, palpitations, irregular heart beat  Respiratory: negative for dyspnea, cough, wheezing  Gastrointestinal: negative for constipation, diarrhea, nausea  Genitourinary: negative for bowel/bladder incontinence   Musculoskeletal: positive for low back pain  Neurological: negative for radicular leg pain, leg weakness or numbness/tingling  Behavioral/Psych: negative for anxiety/depression   Hematological: negative for abnormal bleeding, anticoagulation use or antiplatelet use  All other systems reviewed and are negative    OBJECTIVE:    Vitals:    01/11/23 1306   BP: (!) 120/53   Pulse: 55   Temp: 97.3 °F (36.3 °C)   SpO2: 95%       PHYSICAL EXAM    GENERAL: No acute distress, pleasant, well-appearing  HEENT: Normocephalic, atraumatic, Pupils equal and round  CARDIOVASCULAR: Well perfused, No peripheral cyanosis  PULMONARY: Good chest wall excursion, breathing unlabored  PSYCH: Appropriate affect and insight, non-pressured speech  SKIN: No rashes or lesions  MUSCULOSKELETAL:  Inspection: The back and extremities are symmetric and aligned. Muscle bulk is normal in appearance.   Tenderness to palpation along the right medial antecubital fossa with edema  Palpation: There is tenderness to palpation along the lumbar paraspinal musculature bilaterally  Lumbar range of motion is limited in extension due to pain  NEUROMUSCULAR:  Patient ambulates with wheelchair  Gait is antalgic  Sensation to light touch is intact throughout lower extremities  Strength is full in lower extremities  No ankle clonus    Special Tests:  Lumbar facet loading is positive bilaterally  Seated straight leg raise is negative bilaterally    DIAGNOSIS:    ICD-10-CM    1. Spinal stenosis of lumbar region with neurogenic claudication  M48.062       2. Lumbosacral spondylosis without myelopathy  M47.817       3. DDD (degenerative disc disease), lumbar  M51.36       4. Chronic use of opiate for therapeutic purpose  Z79.891       5. Class 3 severe obesity with body mass index (BMI) of 45.0 to 49.9 in adult, unspecified obesity type, unspecified whether serious comorbidity present ([facility])  E66.01     Z68.42            ASSESSMENT:    Sunshine Hill is a 77 y.o.female presenting to the pain clinic for evaluation of chronic low back pain and neuropathy as well as opioid management visit.  She denies any previous low back surgeries.  To review, patient has had previous lumbar facet therapeutic injections with great relief in her low back pain.      The patient's history and physical examination are consistent with lumbar spondylosis as she has tenderness to palpation along the lumbar paraspinal musculature with positive lumbar facet loading bilaterally.  Additionally, her lumbar CT on 9/28/2018 reveals multilevel degenerative changes with facet hypertrophy throughout.  She underwent bilateral lumbar L3, L4, L5 medial branch block therapeutic with steroid on 11/21/2022 with 80% improvement in pain and function.    Neurologically, it appears the patient has full strength and normal sensation. There is no evidence of radiculopathy or myelopathy on examination. There are no red flags in the patient's history.    The patient continues to take opioid medications to improve pain, function and quality of life.  The patient denies any side effects from the medications including constipation or respiratory depression.  The patient reports adequate analgesia with  the medication. There is no evidence of aberrant behavior. The patient is due for a yearly urine drug screen which was ordered at today's visit to ensure the patient is taking the medication as prescribed. The patient reports taking oxycodone yesterday and today. I refilled her oxycodone at today's visit. PLAN:  Medications: For nonopioid therapy, the following medications were prescribed:    -Continue gabapentin 300 mg 3 times daily    Opioid therapy:  -Continue oxycodone 5 mg 4 times daily as needed, refilled  -Pain Treatment agreement: Updated 11/1/2022  -Urine Drug Screen: Ordered today 1/11/2023  -OARRS reviewed and appropriate    Interventions:  -Status post bilateral lumbar L3, L4, L5 medial branch block therapeutic with steroid on 11/21/2022 with 80% improvement in pain and function    Imaging:  -No new imaging    Behavioral Therapies:  -Continue daily stretching and home exercise program    Referrals:  -None    Follow-up Plan:  -1 month    Patient was offered intervention where appropriate. Multi-modal Pain Therapy: The patient was explicitly considered for multimodal and interdisciplinary therapy. Non-opioid and non-pharmacological opportunities to enhance analgesia and quality of life have been and will continue to be pursued. Opioid Therapy: Education provided to patient regarding short term and long term implications of opioid medication use. Repeat opioid risk stratification today, discussion regarding functional achievements, safe storage, and optimization of non-opioid interventional, behavioral, and pharmaceutical modalities. Will continue attempt to wean off medication as appropriate.     Geeta Saez, DO  Interventional Pain Management/PM&R   [facility]    Orders Placed This Encounter    DRUG SCREEN, PAIN     Oxycodone today and yesterday     Standing Status:   Standing     Number of Occurrences:   1    oxyCODONE (ROXICODONE) 5 MG immediate release tablet Sig: Take 1 tablet by mouth 4 times daily as needed for Pain for up to 30 days.      Dispense:  120 tablet     Refill:  0     Reduce doses taken as pain becomes manageable

## 2023-01-11 NOTE — CARE COORDINATION
Ambulatory Care Coordination Note  1/11/2023    ACC: Azam Ramirez RN    Summary  Date Care Coordination Episode Started:  6.9.22      Reason patient is in Care Coordination:     PCP referral    Topics Discussed Today:     Medications, no needs  Pain management appointment today. Patient is attending. Dental extraction, per patient it went well, no complications. Patient denied any needs today from [facility] or PCP. Assessments completed today:     Fall risk  Initial assessment  Medication reconciliation  SDOH      Care Coordinator plan of care: Follow up call in 1-2 weeks, if no needs will graduate. Care Coordination Interventions    Referral from Primary Care Provider: No  Suggested Interventions and Community Resources  Fall Risk Prevention: In Process  Home Health Services: Completed  Physical Therapy: Completed  Social Work: In Process  Transportation Support: In Process  Zone Management Tools: In Process          Goals Addressed                   This Visit's Progress     Community Resource Goal   On track     Patient needs assistance with getting transportation to her medical appointments. Patient also wanted assistance with finding senior housing. Barriers: fear of failure, lack of motivation, financial, lack of support, overwhelmed by complexity of regimen, stress, time constraints, medication side effects, and lack of education    Plan for overcoming my barriers[facility]. completed an application for the [employer]    Confidence: 9/10    Anticipated Goal Completion Date: 09/22/22              Prior to Admission medications    Medication Sig Start Date End Date Taking? Authorizing Provider   lubiprostone (AMITIZA) 8 MCG CAPS capsule Take 1 capsule by mouth daily 11/29/22   KARIN Maya CNP   gabapentin (NEURONTIN) 300 MG capsule Take 1 capsule by mouth 3 times daily for 90 days.  10/4/22 1/2/23  Zeus Webster DO   albuterol sulfate HFA (PROVENTIL;VENTOLIN;PROAIR) 108 (90 Base) MCG/ACT inhaler Inhale 2 puffs into the lungs 4 times daily as needed for Wheezing 8/10/22   Sobeida Osorio MD   DULoxetine (CYMBALTA) 30 MG extended release capsule Take 1 capsule by mouth 2 times daily 7/14/22   Katarina Garcia MD   guaiFENesin (ROBITUSSIN) 100 MG/5ML liquid take 10 milliliters by mouth every 4 hours if needed for cough 5/17/22   Historical Provider, MD   Carboxymeth-Cellulose-CitricAc (PLENITY WELCOME KIT) CAPS Take 3 capsules by mouth in the morning and at bedtime 6/24/22   Anna Wiseman MD   methylPREDNISolone (MEDROL DOSEPACK) 4 MG tablet use as directed [address] 4/5/22   Historical Provider, MD   fluticasone (FLONASE) 50 MCG/ACT nasal spray 1 spray by Each Nostril route daily 5/16/22   Anna Wiseman MD   guaiFENesin (ALTARUSSIN) 100 MG/5ML syrup Take 10 mLs by mouth every 4 hours as needed for Cough 5/16/22   Anna Wiseman MD   albuterol (PROVENTIL) (5 MG/ML) 0.5% nebulizer solution Take 1 mL by nebulization every 4 hours as needed for Wheezing 4/18/22   Anna Wiseman MD   ciprofloxacin (CIPRO) 250 MG tablet Take 250 mg by mouth every 8-12 hours as needed  Patient not taking: No sig reported    Historical Provider, MD   Catheters (STANDARD CARE EXTERNAL CATH) MISC 1 Device by Does not apply route daily 1/10/22   Anna Wiseman MD   MYRBETRIQ 50 MG TB24 Take 50 mg by mouth daily 1/10/22   Nora Newberry MD   metoprolol tartrate (LOPRESSOR) 50 MG tablet TAKE 1 TABLET TWICE A DAY 12/22/21   Anna Wiseman MD   Cyanocobalamin (VITAMIN B-12) 1000 MCG/15ML LIQD Take 1,000 mcg by mouth daily     Historical Provider, MD   levocetirizine (XYZAL) 5 MG tablet take 1 tablet by mouth once daily AT NIGHT 9/8/21   Anna Wiseman MD   donepezil (ARICEPT) 5 MG tablet take 1 tablet by mouth at bedtime  Patient not taking: No sig reported 9/1/21   Anna Wiseman MD   psyllium (KONSYL) 28.3 % PACK Take 1 packet by mouth daily     Historical Provider, MD   Catheters (STANDARD CARE EXTERNAL CATH) MISC 1 each by Does not apply route daily 3/2/21   Gabrielle Jerome MD   [place].  Devices [facility]) MISC 1 each by Does not apply route daily Upright walker with wheels and seat 11/12/18   Carlos Bennett MD   aspirin 81 MG EC tablet Take 81 mg by mouth daily     Historical Provider, MD       Future Appointments   Date Time Provider Mayo Betts   1/11/2023 12:45 PM Zeus Rome, DO Corina Alas   ,   Congestive Heart Failure Assessment    Are you currently restricting fluids?: Other  Do you understand a low sodium diet?: Yes  Do you understand how to read food labels?: Yes  Do you salt your food before tasting it?: No         Symptoms:     Symptom course: no change  Weight trend: stable  Salt intake watch compared to last visit: stable     ,   COPD Assessment    Does the patient understand envrionmental exposure?: Yes  Is the patient able to verbalize Rescue vs. Long Acting medications?: Yes  Does the patient have a nebulizer?: No     No patient-reported symptoms         Symptoms:          , and   General Assessment    Do you have any symptoms that are causing concern?: No

## 2023-01-12 ENCOUNTER — CARE COORDINATION (OUTPATIENT)
Dept: CARE COORDINATION | Age: 78
End: 2023-01-12

## 2023-01-12 NOTE — CARE COORDINATION
HC and patient have been playing phone tag this week. Patient left a message for the [facility]. and requested a return  call. HC phoned the patient who shared that her week has  been tremendously busy. HC inquired if the patient had   received a message from [employer], Property  Manager/Dave Kitchen. Patient stated that she hadn't heard  from him and was eager to hear the news. HC stated that he  had good news for her. HC suggested that the patient get off  and check her voicemail to see if Mr. Ximena Payne had called her. Patient agreed to the plan.     Plan of Care  [facility]. will follow up with the patient for results

## 2023-01-18 ENCOUNTER — CARE COORDINATION (OUTPATIENT)
Dept: CARE COORDINATION | Age: 78
End: 2023-01-18

## 2023-01-18 NOTE — CARE COORDINATION
HC had received a message on 01/17/23 from the patient, requesting a return call/  [facility]. attempted to contact the patient, there was no answer. HC left a message for   the patient,     Plan of Care  [facility]. will hope to hear from the patient or will follow up with her.

## 2023-01-19 ENCOUNTER — CARE COORDINATION (OUTPATIENT)
Dept: CARE COORDINATION | Age: 78
End: 2023-01-19

## 2023-01-20 ENCOUNTER — CARE COORDINATION (OUTPATIENT)
Dept: CARE COORDINATION | Age: 78
End: 2023-01-20

## 2023-01-20 NOTE — CARE COORDINATION
Patient called this morning stating that she had been successful at reaching  Mr. Livier Reyes and found out that he was presenting a [address] apartment  to the patient. Patient stated that she informed the  that she   wants one of the villas and not to live in the high rise.  stated  an understanding, and shared with the patient that she is presently 8th on the   list.  [facility]. informed the patient that she will stay in touch with her regarding other  social needs.     Plan of Care  [facility]. will follow up with patient for other social needs

## 2023-01-25 ENCOUNTER — CARE COORDINATION (OUTPATIENT)
Dept: CARE COORDINATION | Age: 78
End: 2023-01-25

## 2023-01-25 NOTE — CARE COORDINATION
Ambulatory Care Coordination Note  1/25/2023    ACC: Ad Arreguin, RN    Summary  Date Care Coordination Episode Started:  6.9.22    Reason patient is in Care Coordination:     PCP referral    Topics Discussed Today:     Medications, no needs per patient  Housing, patient is waiting on a new home to open up, she reports being 8th on the list and is ok with this due to her current lease. Patient denied any needs or concerns today. Assessments completed today:     Fall risk  Initial assessment  Medication reconciliation  SDOH      Care Coordinator plan of care:     ACM will graduate patient at this time but will be happy to assist with any needs in the future. Encouraged patient to call In the future with any concerns. Care Coordination Interventions    Referral from Primary Care Provider: No  Suggested Interventions and Community Resources  Fall Risk Prevention: In Process  Home Health Services: Completed  Physical Therapy: Completed  Social Work: In Process  Transportation Support: In Process  Zone Management Tools: In Process          Goals Addressed                   This Visit's Progress     COMPLETED: Medication Management        I will take my medication as directed. I will notify my provider of any problems with medications, like adverse effects or side effects. I will notify my provider/Care Coordinator if I am unable to afford my medications. I will notify my provider for advice before I stop taking any of my medication. Barriers: overwhelmed by complexity of regimen  Plan for overcoming my barriers: work with CC team  Confidence: 6/10  Anticipated Goal Completion Date: 8.22.22              Prior to Admission medications    Medication Sig Start Date End Date Taking? Authorizing Provider   oxyCODONE (ROXICODONE) 5 MG immediate release tablet Take 1 tablet by mouth 4 times daily as needed for Pain for up to 30 days.  1/11/23 2/10/23  Zeus Webster DO   lubiprostone (AMITIZA) 8 MCG CAPS capsule Take 1 capsule by mouth daily 11/29/22   Lindsey Brewer, APRN - CNP   gabapentin (NEURONTIN) 300 MG capsule Take 1 capsule by mouth 3 times daily for 90 days.  10/4/22 1/2/23  Zeus Webster DO   albuterol sulfate HFA (PROVENTIL;VENTOLIN;PROAIR) 108 (90 Base) MCG/ACT inhaler Inhale 2 puffs into the lungs 4 times daily as needed for Wheezing 8/10/22   Yessica Wyatt MD   DULoxetine (CYMBALTA) 30 MG extended release capsule Take 1 capsule by mouth 2 times daily 7/14/22   Neeta Matt MD   guaiFENesin (ROBITUSSIN) 100 MG/5ML liquid take 10 milliliters by mouth every 4 hours if needed for cough 5/17/22   Historical Provider, MD   Carboxymeth-Cellulose-CitricAc (PLENITY WELCOME KIT) CAPS Take 3 capsules by mouth in the morning and at bedtime 6/24/22   Khurram Myers MD   methylPREDNISolone (MEDROL DOSEPACK) 4 MG tablet use as directed [address] [facility]. 4/5/22   Historical Provider, MD   fluticasone (FLONASE) 50 MCG/ACT nasal spray 1 spray by Each Nostril route daily 5/16/22   Khurram Myers MD   guaiFENesin (ALTARUSSIN) 100 MG/5ML syrup Take 10 mLs by mouth every 4 hours as needed for Cough 5/16/22   Khurram Myers MD   albuterol (PROVENTIL) (5 MG/ML) 0.5% nebulizer solution Take 1 mL by nebulization every 4 hours as needed for Wheezing 4/18/22   Khurram Myers MD   ciprofloxacin (CIPRO) 250 MG tablet Take 250 mg by mouth every 8-12 hours as needed  Patient not taking: No sig reported    Historical Provider, MD   Catheters (STANDARD CARE EXTERNAL CATH) MISC 1 Device by Does not apply route daily 1/10/22   Khurram Myers MD   MYRBETRIQ 50 MG TB24 Take 50 mg by mouth daily 1/10/22   Abdulaziz Villela MD   metoprolol tartrate (LOPRESSOR) 50 MG tablet TAKE 1 TABLET TWICE A DAY 12/22/21   Khurram Myers MD   Cyanocobalamin (VITAMIN B-12) 1000 MCG/15ML LIQD Take 1,000 mcg by mouth daily     Historical Provider, MD   levocetirizine (XYZAL) 5 MG tablet take 1 tablet by mouth once daily AT NIGHT 9/8/21   Verito Moody MD   donepezil (ARICEPT) 5 MG tablet take 1 tablet by mouth at bedtime  Patient not taking: No sig reported 9/1/21   Verito Moody MD   psyllium (KONSYL) 28.3 % PACK Take 1 packet by mouth daily     Historical Provider, MD   Catheters (STANDARD CARE EXTERNAL CATH) MISC 1 each by Does not apply route daily 3/2/21   Rusty Sneed MD   Misc.  Devices [facility]) MISC 1 each by Does not apply route daily Upright walker with wheels and seat 11/12/18   Alanna Sosa MD   aspirin 81 MG EC tablet Take 81 mg by mouth daily     Historical Provider, MD       Future Appointments   Date Time Provider Mayo Betts   2/6/2023  3:20 PM Sudeep Meyers 23   ,   Congestive Heart Failure Assessment    Are you currently restricting fluids?: Other  Do you understand a low sodium diet?: Yes  Do you understand how to read food labels?: Yes  Do you salt your food before tasting it?: No         Symptoms:     Symptom course: stable  Salt intake watch compared to last visit: stable     ,   COPD Assessment    Does the patient understand envrionmental exposure?: Yes  Is the patient able to verbalize Rescue vs. Long Acting medications?: Yes  Does the patient have a nebulizer?: No     No patient-reported symptoms         Symptoms:          , and   General Assessment    Do you have any symptoms that are causing concern?: No

## 2023-01-27 ENCOUNTER — CARE COORDINATION (OUTPATIENT)
Dept: CARE COORDINATION | Age: 78
End: 2023-01-27

## 2023-01-30 ENCOUNTER — CARE COORDINATION (OUTPATIENT)
Dept: CARE COORDINATION | Age: 78
End: 2023-01-30

## 2023-01-30 NOTE — CARE COORDINATION
Patient called stating that she needs someone to assist her with her housework. HC provided the patient with the phone number for [facility].     Plan of Care  [facility]. will follow up with patient if no response

## 2023-02-06 ENCOUNTER — HOSPITAL ENCOUNTER (OUTPATIENT)
Dept: PAIN MANAGEMENT | Age: 78
Discharge: HOME | End: 2023-02-06
Payer: MEDICARE

## 2023-02-06 VITALS
WEIGHT: 278 LBS | SYSTOLIC BLOOD PRESSURE: 131 MMHG | OXYGEN SATURATION: 97 % | HEIGHT: 64 IN | HEART RATE: 46 BPM | DIASTOLIC BLOOD PRESSURE: 53 MMHG | BODY MASS INDEX: 47.46 KG/M2 | TEMPERATURE: 97.3 F

## 2023-02-06 DIAGNOSIS — M48.02 CERVICAL SPINAL STENOSIS: ICD-10-CM

## 2023-02-06 DIAGNOSIS — M47.816 LUMBAR SPONDYLOSIS: ICD-10-CM

## 2023-02-06 DIAGNOSIS — M43.22 CERVICAL SPINE ANKYLOSIS: ICD-10-CM

## 2023-02-06 DIAGNOSIS — K59.03 THERAPEUTIC OPIOID INDUCED CONSTIPATION: Primary | ICD-10-CM

## 2023-02-06 DIAGNOSIS — M54.16 LUMBAR RADICULOPATHY: ICD-10-CM

## 2023-02-06 DIAGNOSIS — M48.062 SPINAL STENOSIS OF LUMBAR REGION WITH NEUROGENIC CLAUDICATION: Chronic | ICD-10-CM

## 2023-02-06 DIAGNOSIS — M79.7 FIBROMYALGIA: ICD-10-CM

## 2023-02-06 DIAGNOSIS — Z79.891 CHRONIC USE OF OPIATE FOR THERAPEUTIC PURPOSE: ICD-10-CM

## 2023-02-06 DIAGNOSIS — T40.2X5A THERAPEUTIC OPIOID INDUCED CONSTIPATION: Primary | ICD-10-CM

## 2023-02-06 DIAGNOSIS — M51.36 DDD (DEGENERATIVE DISC DISEASE), LUMBAR: ICD-10-CM

## 2023-02-06 PROCEDURE — 99213 OFFICE O/P EST LOW 20 MIN: CPT | Performed by: NURSE PRACTITIONER

## 2023-02-06 PROCEDURE — 99213 OFFICE O/P EST LOW 20 MIN: CPT

## 2023-02-06 RX ORDER — OXYCODONE HYDROCHLORIDE 5 MG/1
5 TABLET ORAL 4 TIMES DAILY PRN
Qty: 120 TABLET | Refills: 0 | Status: SHIPPED | OUTPATIENT
Start: 2023-02-10 | End: 2023-03-12

## 2023-02-06 RX ORDER — LIDOCAINE 50 MG/G
1 PATCH TOPICAL DAILY
Qty: 30 PATCH | Refills: 0 | Status: SHIPPED | OUTPATIENT
Start: 2023-02-06

## 2023-02-06 RX ORDER — GABAPENTIN 300 MG/1
300 CAPSULE ORAL 3 TIMES DAILY
Qty: 90 CAPSULE | Refills: 2 | Status: SHIPPED | OUTPATIENT
Start: 2023-02-06 | End: 2023-05-07

## 2023-02-06 ASSESSMENT — ENCOUNTER SYMPTOMS
BACK PAIN: 1
BOWEL INCONTINENCE: 0

## 2023-02-06 NOTE — PROGRESS NOTES
Chief Complaint   Patient presents with    Back Pain     Med refill         PMH  Pt reports chronic history of low back pain that radiates into right hip and buttocks at times. She is s/p bilateral knee and right hip replacement She has tried NSAIDS heat chiropractic care and with little relief. Last imaging 20/2020 lumbar XR that showed previous L1 compression fracture which was corrected with kyphoplasty with multilevel degenerative changes and Lumbar Ct 2018 with diffuse facet arthopathy  Has completed PT at [facility] and reports she plans on continuing sessions. Bilateral lumbar L3, L4, L5 medial branch block with steroid 11/21/22 and reports  almost 75% relief of pain and improved activity tolerance       Back Pain  This is a chronic problem. The current episode started more than 1 year ago. The problem occurs constantly. The problem is unchanged. The pain is present in the lumbar spine. The quality of the pain is described as aching, burning, cramping, shooting and stabbing. The pain does not radiate. The pain is at a severity of 7/10. The pain is The same all the time. The symptoms are aggravated by bending, sitting and standing. Associated symptoms include numbness, tingling and weakness. Pertinent negatives include no bladder incontinence, bowel incontinence, chest pain, fever or headaches. She has tried ice and heat for the symptoms. Patient denies any new neurological symptoms. No bowel or bladder incontinence, no weakness, and no falling. Pill count: appropriate / Oxycodone - 14 due 2/10    Morphine equivalent: 30    Controlled Substance Monitoring:    Acute and Chronic Pain Monitoring:   RX Monitoring 2/6/2023   Attestation -   Acute Pain Prescriptions -   Periodic Controlled Substance Monitoring Possible medication side effects, risk of tolerance/dependence & alternative treatments discussed. ;No signs of potential drug abuse or diversion identified.;Obtaining appropriate analgesic effect of treatment. Chronic Pain > 50 MEDD -   Chronic Pain > 80 MEDD -            Past Medical History:   Diagnosis Date    Abnormality of rib determined by X-ray 1/28/2016    Acute cystitis without hematuria 6/16/2018    Acute exacerbation of chronic bronchitis (HCC)     Acute on chronic diastolic heart failure ([facility] [address] 1/28/2016    Adjustment disorder with mixed anxiety and depressed mood 6/26/2015    Mild     Anemia 3/5/2014    Back pain     Bronchiectasis with acute lower respiratory infection ([facility] [address] 8/16/2017    Bronchitis     Chronic bronchitis ([facility] [address] 1/28/2016    Chronic cough 7/23/2018    COPD (chronic obstructive pulmonary disease) (HCC)     asbestos related lung disease    Degenerative joint disease (DJD) of hip 5/14/2015    Dyslipidemia 3/5/2014    Encephalopathy acute 5/29/2016    Essential hypertension 1/28/2016    Fibromyalgia     GERD (gastroesophageal reflux disease)     History of total bilateral knee replacement 5/28/2016    Hx of blood clots     left leg and lung    Hyperlipidemia     Hypertension     Incontinence of urine     Irregular heartbeat     DR. Durant    Medication monitoring encounter 6/13/2018    Morbid obesity with BMI of 45.0-49.9, adult ([facility] [address] 1/28/2016    Obstructive sleep apnea syndrome     Osteoarthritis     Primary osteoarthritis of left knee 5/27/2016    PVC (premature ventricular contraction) 1/28/2016    S/P right and left heart catheterization 10/2/2015    Sleep apnea     no machine    SOB (shortness of breath)     Spinal stenosis of lumbar region with neurogenic claudication 5/13/2013    Supplemental oxygen dependent 12/8/2017    Urine frequency     UTI (urinary tract infection)     hospitalized early july 2014 for kidney infection       Past Surgical History:   Procedure Laterality Date    BRONCHOSCOPY Left 8/16/2017    BRONCHOSCOPY ALVEOLAR LAVAGE LOWER LOBE performed by Eliud Goodwin MD at [address]  x 3    no stents    COLONOSCOPY  4 28 14 polyps biopsies     FOOT SURGERY Left     calcium deposit removal from top of foot    HYSTERECTOMY (CERVIX STATUS UNKNOWN)      JOINT REPLACEMENT Bilateral 5/27/2016    bilat total knees    NERVE BLOCK  5/13/2013    caudal celestone 6mg    NERVE BLOCK  5/28/13    Caudal #2, Celestone 9mg    NERVE BLOCK  2/14/14    rt hip inj celestone 9 mg    NERVE BLOCK  07/14/2014    caudal# 3- celestone 9 mg    NERVE BLOCK  7-21-14    caudal epidural #2, decadron 7mg, fentanyl 25mcg    NERVE BLOCK  10/2/14    duramorph 1.5  decadron 5mg    NERVE BLOCK  11/9/2015    caudal# 1 celestone 9mg    NERVE BLOCK  11/16/15    caudal #2  decadron 10mg    NERVE BLOCK  11/23/15    duramorph 1mg celestone 9mg    NERVE BLOCK  03/28/2018    CAUDAL EPIDURAL STEROID BLOCK  DECADRON 10 MG     NERVE BLOCK  05/23/2018    caudal # decadron 7mg    NERVE BLOCK  08/28/2018    natalia transforminal # 1, decadron 10mg gadoteridol, LONG NEEDLES    CO PERQ VERT AGMNTJ CAVITY CRTJ UNI/BI CANNULJ LMBR N/A 10/29/2018    KYPHOPLASTY L1 performed by Kortney Zuluaga MD at [address] Right 5/14/15    total hip replacement       Allergies   Allergen Reactions    Rosuvastatin Calcium Anaphylaxis     Hair fell out    Statins Anaphylaxis     Hair fell out    Bactrim [Sulfamethoxazole-Trimethoprim] Diarrhea    Caffeine Other (See Comments)     pain  pain    Dye [Iodides] Other (See Comments)     Iv dye= blood clots in arm    Food      Tomatoes, pain    Ioxaglate Other (See Comments)     Iv dye= blood clots in arm    Tomato     Dicloxacillin Rash    Pcn [Penicillins] Rash         Current Outpatient Medications:     oxyCODONE (ROXICODONE) 5 MG immediate release tablet, Take 1 tablet by mouth 4 times daily as needed for Pain for up to 30 days. , Disp: 120 tablet, Rfl: 0    lubiprostone (AMITIZA) 8 MCG CAPS capsule, Take 1 capsule by mouth daily, Disp: 30 capsule, Rfl: 3    gabapentin (NEURONTIN) 300 MG capsule, Take 1 capsule by mouth 3 times daily for 90 days. , Disp: 90 capsule, Rfl: 2    albuterol sulfate HFA (PROVENTIL;VENTOLIN;PROAIR) 108 (90 Base) MCG/ACT inhaler, Inhale 2 puffs into the lungs 4 times daily as needed for Wheezing, Disp: 1 each, Rfl: 2    DULoxetine (CYMBALTA) 30 MG extended release capsule, Take 1 capsule by mouth 2 times daily, Disp: 180 capsule, Rfl: 3    guaiFENesin (ROBITUSSIN) 100 MG/5ML liquid, take 10 milliliters by mouth every 4 hours if needed for cough, Disp: , Rfl:     Carboxymeth-Cellulose-CitricAc (PLENITY WELCOME KIT) CAPS, Take 3 capsules by mouth in the morning and at bedtime, Disp: 180 capsule, Rfl: 0    methylPREDNISolone (MEDROL DOSEPACK) 4 MG tablet, use as directed FOLLOW DIRECTIONS ON BACK OF FOIL PACK, Disp: , Rfl:     fluticasone (FLONASE) 50 MCG/ACT nasal spray, 1 spray by Each Nostril route daily, Disp: 32 g, Rfl: 1    guaiFENesin (ALTARUSSIN) 100 MG/5ML syrup, Take 10 mLs by mouth every 4 hours as needed for Cough, Disp: 1 each, Rfl: 1    albuterol (PROVENTIL) (5 MG/ML) 0.5% nebulizer solution, Take 1 mL by nebulization every 4 hours as needed for Wheezing, Disp: 120 each, Rfl: 3    ciprofloxacin (CIPRO) 250 MG tablet, Take 250 mg by mouth every 8-12 hours as needed (Patient not taking: No sig reported), Disp: , Rfl:     Catheters (STANDARD CARE EXTERNAL CATH) MISC, 1 Device by Does not apply route daily, Disp: 30 each, Rfl: 2    MYRBETRIQ 50 MG TB24, Take 50 mg by mouth daily, Disp: 30 tablet, Rfl: 5    metoprolol tartrate (LOPRESSOR) 50 MG tablet, TAKE 1 TABLET TWICE A DAY, Disp: 180 tablet, Rfl: 3    Cyanocobalamin (VITAMIN B-12) 1000 MCG/15ML LIQD, Take 1,000 mcg by mouth daily , Disp: , Rfl:     levocetirizine (XYZAL) 5 MG tablet, take 1 tablet by mouth once daily AT NIGHT, Disp: 30 tablet, Rfl: 2    donepezil (ARICEPT) 5 MG tablet, take 1 tablet by mouth at bedtime (Patient not taking: No sig reported), Disp: 90 tablet, Rfl: 1    psyllium (KONSYL) 28.3 % PACK, Take 1 packet by mouth daily , Disp: , Rfl:     Catheters (STANDARD CARE EXTERNAL CATH) MISC, 1 each by Does not apply route daily, Disp: 5 each, Rfl: 1    Misc. Devices (WALKER) MISC, 1 each by Does not apply route daily Upright walker with wheels and seat, Disp: 1 each, Rfl: 0    aspirin 81 MG EC tablet, Take 81 mg by mouth daily , Disp: , Rfl:     Family History   Problem Relation Age of Onset    Stomach Cancer Brother         stomach    High Blood Pressure Mother     Heart Disease Sister         irregular heartbeat       Social History     Socioeconomic History    Marital status:      Spouse name: Not on file    Number of children: 2    Years of education: Not on file    Highest education level: Not on file   Occupational History    Not on file   Tobacco Use    Smoking status: Former     Packs/day: 1.00     Years: 18.00     Pack years: 18.00     Types: Cigarettes     Quit date: 1993     Years since quittin.7    Smokeless tobacco: Never   Vaping Use    Vaping Use: Never used   Substance and Sexual Activity    Alcohol use: No    Drug use: No    Sexual activity: Not Currently   Other Topics Concern    Not on file   Social History Narrative    Not on file     Social Determinants of Health     Financial Resource Strain: Not on file   Food Insecurity: Not on file   Transportation Needs: Not on file   Physical Activity: Not on file   Stress: No Stress Concern Present    Feeling of Stress : Only a little   Social Connections:  Moderately Integrated    Frequency of Communication with Friends and Family: More than three times a week    Frequency of Social Gatherings with Friends and Family: Once a week    Attends [employer] Services: More than 4 times per year    Active Member of [email or web address] or Organizations: Yes    Attends Club or Organization Meetings: More than 4 times per year    Marital Status:    Intimate Partner Violence: Not on file   Housing Stability: Not on file       Review of Systems:  Review of Systems Constitutional: Negative for fever. Cardiovascular:  Negative for chest pain and palpitations. Musculoskeletal:  Positive for back pain and joint pain. Gastrointestinal:  Negative for bowel incontinence. Genitourinary:  Negative for bladder incontinence. Neurological:  Positive for numbness, tingling and weakness. Negative for disturbances in coordination, headaches and loss of balance. Physical Exam:  BP (!) 131/53   Pulse (!) 46   Temp 97.3 °F (36.3 °C)   Ht 5' 4\" (1.626 m)   Wt 278 lb (126.1 kg)   SpO2 97%   BMI 47.72 kg/m²     Physical Exam  HENT:      Head: Normocephalic. Pulmonary:      Effort: Pulmonary effort is normal.   Musculoskeletal:         General: Normal range of motion. Cervical back: Normal range of motion. Lumbar back: Tenderness present. Skin:     General: Skin is warm and dry. Neurological:      Mental Status: She is alert and oriented to person, place, and time.        Record/Diagnostics Review:    Last tiffanie 1/2023 and was appropriate     Assessment:  Problem List Items Addressed This Visit       Therapeutic opioid induced constipation - Primary    Spinal stenosis of lumbar region with neurogenic claudication (Chronic)    Relevant Medications    gabapentin (NEURONTIN) 300 MG capsule    oxyCODONE (ROXICODONE) 5 MG immediate release tablet (Start on 2/10/2023)    Fibromyalgia    Relevant Medications    gabapentin (NEURONTIN) 300 MG capsule    oxyCODONE (ROXICODONE) 5 MG immediate release tablet (Start on 2/10/2023)    Cervical spine ankylosis    Cervical spinal stenosis    Chronic use of opiate for therapeutic purpose    DDD (degenerative disc disease), lumbar    Relevant Medications    oxyCODONE (ROXICODONE) 5 MG immediate release tablet (Start on 2/10/2023)    Lumbar radiculopathy    Relevant Medications    gabapentin (NEURONTIN) 300 MG capsule    Lumbar spondylosis    Relevant Medications    oxyCODONE (ROXICODONE) 5 MG immediate release tablet (Start on 2/10/2023)          Treatment Plan:  Patient relates current medications are helping the pain. Patient reports taking pain medications as prescribed, denies obtaining medications from different sources and denies use of illegal drugs. Medication risk and benefits have been discussed. Patient denies side effects from medications like nausea, vomiting, constipation or drowsiness. Patient reports current activities of daily living are possible due to medications and would like to continue them. As always, we encourage daily stretching and strengthening exercises, and recommend minimizing use of pain medications unless patient cannot get through daily activities due to pain. Due to the high risk nature of this patient's pain medication close monitoring is required. Continue current medication management, pt has been stable and compliant. Script written for oxycodone  Follow up appointment made for 4 weeks    I have reviewed the chief complaint and history of present illness (including ROS and PFSH) and vital documentation by my staff and I agree with their documentation and have added where applicable.

## 2023-02-10 ENCOUNTER — CARE COORDINATION (OUTPATIENT)
Dept: CARE COORDINATION | Age: 78
End: 2023-02-10

## 2023-02-10 RX ORDER — METOPROLOL TARTRATE 50 MG/1
50 TABLET, FILM COATED ORAL 2 TIMES DAILY
Qty: 180 TABLET | Refills: 3 | Status: SHIPPED | OUTPATIENT
Start: 2023-02-10

## 2023-02-10 NOTE — CARE COORDINATION
HC attempted to reach this patient, there was no answer. HC left a message for the patient and provided contact   Information for a return call.     Plan of Care  [facility] [place]. will reach out to patient next week

## 2023-02-15 ENCOUNTER — CARE COORDINATION (OUTPATIENT)
Dept: CARE COORDINATION | Age: 78
End: 2023-02-15

## 2023-02-15 NOTE — CARE COORDINATION
HC phoned and spoke to this patient to follow up on her wellbeing. Patient stated that she is experiencing a lot of pain  today and she   believes that it has a lot to do with the barametric pressure. She  believes that she will feel much better after the expected rain.     Plan of Care  [facility]. will follow up with the patient regarding her health

## 2023-03-01 RX ORDER — LEVOCETIRIZINE DIHYDROCHLORIDE 5 MG/1
TABLET, FILM COATED ORAL
Qty: 30 TABLET | Refills: 2 | Status: SHIPPED | OUTPATIENT
Start: 2023-03-01

## 2023-03-06 ENCOUNTER — HOSPITAL ENCOUNTER (OUTPATIENT)
Dept: PAIN MANAGEMENT | Age: 78
Discharge: HOME | End: 2023-03-06
Payer: MEDICARE

## 2023-03-06 VITALS
DIASTOLIC BLOOD PRESSURE: 71 MMHG | SYSTOLIC BLOOD PRESSURE: 149 MMHG | OXYGEN SATURATION: 98 % | BODY MASS INDEX: 47.46 KG/M2 | TEMPERATURE: 97.3 F | HEIGHT: 64 IN | WEIGHT: 278 LBS | HEART RATE: 76 BPM

## 2023-03-06 DIAGNOSIS — T40.2X5A THERAPEUTIC OPIOID INDUCED CONSTIPATION: ICD-10-CM

## 2023-03-06 DIAGNOSIS — M54.16 LUMBAR RADICULOPATHY: ICD-10-CM

## 2023-03-06 DIAGNOSIS — M48.062 SPINAL STENOSIS OF LUMBAR REGION WITH NEUROGENIC CLAUDICATION: Chronic | ICD-10-CM

## 2023-03-06 DIAGNOSIS — K59.03 THERAPEUTIC OPIOID INDUCED CONSTIPATION: ICD-10-CM

## 2023-03-06 DIAGNOSIS — Z79.891 CHRONIC USE OF OPIATE FOR THERAPEUTIC PURPOSE: ICD-10-CM

## 2023-03-06 DIAGNOSIS — M47.817 LUMBOSACRAL SPONDYLOSIS WITHOUT MYELOPATHY: Primary | ICD-10-CM

## 2023-03-06 PROCEDURE — 99213 OFFICE O/P EST LOW 20 MIN: CPT

## 2023-03-06 RX ORDER — OXYCODONE HYDROCHLORIDE 5 MG/1
5 TABLET ORAL 4 TIMES DAILY PRN
Qty: 120 TABLET | Refills: 0 | Status: SHIPPED | OUTPATIENT
Start: 2023-03-12 | End: 2023-04-11

## 2023-03-06 NOTE — PROGRESS NOTES
Chronic Pain Clinic Note     Encounter Date: 3/6/2023     SUBJECTIVE:  Chief Complaint   Patient presents with    Back Pain     Med refill       History of Present Illness:   Joe Torres is a 65 y.o. female who presents with back pain    Medication Refill: Percocet - 32    Current Complaints of Pain:   Location: back   Radiation: Left leg  Severity: moderate  Pain Numerical Score -    Average: 8     Highest: 10+  Lowest: 4  Character/Quality: Complains of pain that is aching, burning, cramping, shooting and stabbing  Timing: Constant  Associated symptoms: none  Numbness: yes  Weakness: yes  Exacerbating factors: walking, standing and bending  Alleviating factors: sitting with back support  Length of time pain has been present: Started years ago  Inciting event/injury: work injury  Bowel/Bladder incontinence: no  Falls: no  Physical Therapy: no    History of Interventions:   Surgery: No previous lumbar/cervical surgeries  Injections: Lumbar MBB    Imaging:    Lumbar CT 9/28/2018    CT LUMBAR SPINE:       Superior endplate compression of L1. Although this was present on prior   plain film imaging in March 2018, it has worsened, and there are findings   suggesting acute exacerbation of the pre-existing compression. Severe   degenerative disc disease is noted with severe bilateral neural foraminal   narrowing and canal stenosis as above.        Past Medical History:   Diagnosis Date    Abnormality of rib determined by X-ray 1/28/2016    Acute cystitis without hematuria 6/16/2018    Acute exacerbation of chronic bronchitis (HCC)     Acute on chronic diastolic heart failure ([address] 1/28/2016    Adjustment disorder with mixed anxiety and depressed mood 6/26/2015    Mild     Anemia 3/5/2014    Back pain     Bronchiectasis with acute lower respiratory infection ([address] 8/16/2017    Bronchitis     Chronic bronchitis ([address] 1/28/2016    Chronic cough 7/23/2018    COPD (chronic obstructive pulmonary disease) (HCC)     asbestos related lung disease    Degenerative joint disease (DJD) of hip 5/14/2015    Dyslipidemia 3/5/2014    Encephalopathy acute 5/29/2016    Essential hypertension 1/28/2016    Fibromyalgia     GERD (gastroesophageal reflux disease)     History of total bilateral knee replacement 5/28/2016    Hx of blood clots     left leg and lung    Hyperlipidemia     Hypertension     Incontinence of urine     Irregular heartbeat     DR. Durant    Medication monitoring encounter 6/13/2018    Morbid obesity with BMI of 45.0-49.9, adult ([facility] 1/28/2016    Obstructive sleep apnea syndrome     Osteoarthritis     Primary osteoarthritis of left knee 5/27/2016    PVC (premature ventricular contraction) 1/28/2016    S/P right and left heart catheterization 10/2/2015    Sleep apnea     no machine    SOB (shortness of breath)     Spinal stenosis of lumbar region with neurogenic claudication 5/13/2013    Supplemental oxygen dependent 12/8/2017    Urine frequency     UTI (urinary tract infection)     hospitalized early july 2014 for kidney infection       Past Surgical History:   Procedure Laterality Date    BRONCHOSCOPY Left 8/16/2017    BRONCHOSCOPY ALVEOLAR LAVAGE LOWER LOBE performed by Nicole Balderrama MD at [address] [facility] [address]  x 3    no stents    COLONOSCOPY  4 28 14    polyps biopsies     FOOT SURGERY Left     calcium deposit removal from top of foot    HYSTERECTOMY (CERVIX STATUS UNKNOWN)      JOINT REPLACEMENT Bilateral 5/27/2016    bilat total knees    NERVE BLOCK  5/13/2013    caudal celestone 6mg    NERVE BLOCK  5/28/13    Caudal #2, Celestone 9mg    NERVE BLOCK  2/14/14    rt hip inj celestone 9 mg    NERVE BLOCK  07/14/2014    caudal# 3- celestone 9 mg    NERVE BLOCK  7-21-14    caudal epidural #2, decadron 7mg, fentanyl 25mcg    NERVE BLOCK  10/2/14    duramorph 1.5  decadron 5mg    NERVE BLOCK  11/9/2015    caudal# 1 celestone 9mg    NERVE BLOCK  11/16/15    caudal #2  decadron 10mg    NERVE BLOCK  11/23/15 duramorph 1mg celestone 9mg    NERVE BLOCK  2018    CAUDAL EPIDURAL STEROID BLOCK  DECADRON 10 MG     NERVE BLOCK  2018    caudal # decadron 7mg    NERVE BLOCK  2018    natalia transforminal # 1, decadron 10mg gadoteridol, LONG NEEDLES    MA PERQ VERT AGMNTJ CAVITY CRTJ UNI/BI CANNULJ LMBR N/A 10/29/2018    KYPHOPLASTY L1 performed by Tommy Hutchison MD at [address] [place] [address],[address] Fl E Right 5/14/15    total hip replacement       Family History   Problem Relation Age of Onset    Stomach Cancer Brother         stomach    High Blood Pressure Mother     Heart Disease Sister         irregular heartbeat       Social History     Socioeconomic History    Marital status:      Spouse name: Not on file    Number of children: 2    Years of education: Not on file    Highest education level: Not on file   Occupational History    Not on file   Tobacco Use    Smoking status: Former     Packs/day: 1.00     Years: 18.00     Pack years: 18.00     Types: Cigarettes     Quit date: 1993     Years since quittin.8    Smokeless tobacco: Never   Vaping Use    Vaping Use: Never used   Substance and Sexual Activity    Alcohol use: No    Drug use: No    Sexual activity: Not Currently   Other Topics Concern    Not on file   Social History Narrative    Not on file     Social Determinants of Health     Financial Resource Strain: Not on file   Food Insecurity: Not on file   Transportation Needs: Not on file   Physical Activity: Not on file   Stress: Not on file   Social Connections: Not on file   Intimate Partner Violence: Not on file   Housing Stability: Not on file       Medications & Allergies:   Current Outpatient Medications   Medication Instructions    albuterol (PROVENTIL) 5 mg, Nebulization, EVERY 4 HOURS PRN    albuterol sulfate HFA (PROVENTIL;VENTOLIN;PROAIR) 108 (90 Base) MCG/ACT inhaler 2 puffs, Inhalation, 4 TIMES DAILY PRN    aspirin 81 mg, Oral, DAILY Carboxymeth-Cellulose-CitricAc (PLENITY WELCOME KIT) CAPS 3 capsules, Oral, 2 times daily    Catheters (STANDARD CARE EXTERNAL CATH) MISC 1 each, Does not apply, DAILY    Catheters (STANDARD CARE EXTERNAL CATH) MISC 1 Device, Does not apply, DAILY    ciprofloxacin (CIPRO) 250 mg, EVERY 8-12 HOURS PRN    donepezil (ARICEPT) 5 MG tablet take 1 tablet by mouth at bedtime    DULoxetine (CYMBALTA) 30 mg, Oral, 2 TIMES DAILY    fluticasone (FLONASE) 50 MCG/ACT nasal spray 1 spray, Each Nostril, DAILY    gabapentin (NEURONTIN) 300 mg, Oral, 3 TIMES DAILY    guaiFENesin (ALTARUSSIN) 100 MG/5ML syrup 10 mLs, Oral, EVERY 4 HOURS PRN    guaiFENesin (ROBITUSSIN) 100 MG/5ML liquid take 10 milliliters by mouth every 4 hours if needed for cough    levocetirizine (XYZAL) 5 MG tablet take 1 tablet by mouth DAILY AT NIGHT    lidocaine (LIDODERM) 5 % 1 patch, TransDERmal, DAILY, 12 hours on, 12 hours off.    lubiprostone (AMITIZA) 8 mcg, Oral, DAILY    methylPREDNISolone (MEDROL DOSEPACK) 4 MG tablet use as directed FOLLOW DIRECTIONS ON BACK OF FOIL PACK    metoprolol tartrate (LOPRESSOR) 50 mg, Oral, 2 TIMES DAILY    Misc.  Devices (WALKER) MISC 1 each, Does not apply, DAILY, Upright walker with wheels and seat    Myrbetriq 50 mg, Oral, DAILY    [START ON 3/12/2023] oxyCODONE (ROXICODONE) 5 mg, Oral, 4 TIMES DAILY PRN    psyllium (KONSYL) 28.3 % PACK 1 packet, Oral, DAILY    Vitamin B-12 1,000 mcg, Oral, DAILY       Allergies   Allergen Reactions    Rosuvastatin Calcium Anaphylaxis     Hair fell out    Statins Anaphylaxis     Hair fell out    Bactrim [Sulfamethoxazole-Trimethoprim] Diarrhea    Caffeine Other (See Comments)     pain  pain    Dye [Iodides] Other (See Comments)     Iv dye= blood clots in arm    Food      Tomatoes, pain    Ioxaglate Other (See Comments)     Iv dye= blood clots in arm    Tomato     Dicloxacillin Rash    Pcn [Penicillins] Rash       Review of Systems:   Constitutional: negative for weight changes or fevers  Cardiovascular: negative for chest pain, palpitations, irregular heart beat  Respiratory: negative for dyspnea, cough, wheezing  Gastrointestinal: negative for constipation, diarrhea, nausea  Genitourinary: negative for bowel/bladder incontinence   Musculoskeletal: positive for low back pain  Neurological: negative for radicular leg pain, leg weakness or numbness/tingling  Behavioral/Psych: negative for anxiety/depression   Hematological: negative for abnormal bleeding, anticoagulation use or antiplatelet use  All other systems reviewed and are negative    OBJECTIVE:    Vitals:    03/06/23 1142   BP: (!) 149/71   Pulse: 76   Temp: 97.3 °F (36.3 °C)   SpO2: 98%       PHYSICAL EXAM    GENERAL: No acute distress, pleasant, well-appearing  HEENT: Normocephalic, atraumatic, Pupils equal and round  CARDIOVASCULAR: Well perfused, No peripheral cyanosis  PULMONARY: Good chest wall excursion, breathing unlabored  PSYCH: Appropriate affect and insight, non-pressured speech  SKIN: No rashes or lesions  MUSCULOSKELETAL:  Inspection: The back and extremities are symmetric and aligned. Muscle bulk is normal in appearance. Tenderness to palpation along the right medial antecubital fossa with edema  Palpation: There is tenderness to palpation along the lumbar paraspinal musculature bilaterally  Lumbar range of motion is limited in extension due to pain  NEUROMUSCULAR:  Patient ambulates with wheelchair  Gait is antalgic  Sensation to light touch is intact throughout lower extremities  Strength is full in lower extremities  No ankle clonus    Special Tests:  Lumbar facet loading is positive bilaterally  Seated straight leg raise is negative bilaterally    DIAGNOSIS:    ICD-10-CM    1. Lumbosacral spondylosis without myelopathy  M47.817 PT aquatic therapy      2. Spinal stenosis of lumbar region with neurogenic claudication  M48.062 oxyCODONE (ROXICODONE) 5 MG immediate release tablet      3.  Chronic use of opiate for therapeutic purpose  Z79.891       4. Therapeutic opioid induced constipation  K59.03     T40.2X5A       5. Lumbar radiculopathy  M54.16            ASSESSMENT:    Raul Nguyen is a 65 y. o.female presenting to the pain clinic for evaluation of chronic low back pain and neuropathy as well as opioid management visit. She denies any previous low back surgeries. To review, patient has had previous lumbar facet therapeutic injections with great relief in her low back pain. The patient's history and physical examination are consistent with lumbar spondylosis as she has tenderness to palpation along the lumbar paraspinal musculature with positive lumbar facet loading bilaterally. Additionally, her lumbar CT on 9/28/2018 reveals multilevel degenerative changes with facet hypertrophy throughout. She underwent bilateral lumbar L3, L4, L5 medial branch block therapeutic with steroid on 11/21/2022 with 80% improvement in pain and function. Neurologically, it appears the patient has full strength and normal sensation. There is no evidence of radiculopathy or myelopathy on examination. There are no red flags in the patient's history. The patient continues to take opioid medications to improve pain, function and quality of life. The patient denies any side effects from the medications including constipation or respiratory depression. The patient reports adequate analgesia with the medication. There is no evidence of aberrant behavior. At today's visit, I reviewed the urine drug screen on 1/11/2023 which was appropriate. I refilled her oxycodone and gabapentin at today's visit. PLAN:  Medications:     For nonopioid therapy, the following medications were prescribed:    -Continue gabapentin 300 mg 3 times daily    Opioid therapy:  -Continue oxycodone 5 mg 4 times daily as needed, refilled  -Pain Treatment agreement: Updated 11/1/2022  -Urine Drug Screen: 1/11/2023, reviewed and appropriate  -OARRS reviewed and appropriate    Interventions:  -Status post bilateral lumbar L3, L4, L5 medial branch block therapeutic with steroid on 11/21/2022 with 80% improvement in pain and function    Imaging:  -No new imaging    Behavioral Therapies:  -Continue daily stretching and home exercise program    Referrals:  -Aquatherapy    Follow-up Plan:  -1 month    Patient was offered intervention where appropriate. Multi-modal Pain Therapy: The patient was explicitly considered for multimodal and interdisciplinary therapy. Non-opioid and non-pharmacological opportunities to enhance analgesia and quality of life have been and will continue to be pursued. Opioid Therapy: Education provided to patient regarding short term and long term implications of opioid medication use. Repeat opioid risk stratification today, discussion regarding functional achievements, safe storage, and optimization of non-opioid interventional, behavioral, and pharmaceutical modalities. Will continue attempt to wean off medication as appropriate. Ally Cash DO  Interventional Pain Management/PM&R   [facility] [place]    30 minutes was utilized for this patient encounter including face-to-face time with patient, reviewing previous imaging, injections, and medical history while reviewing plan of care with patient which included injection therapy, physical therapy, and medication management. Orders Placed This Encounter    PT aquatic therapy     Standing Status:   Future     Standing Expiration Date:   3/6/2024     Scheduling Instructions:      Please treat for lumbar spondylosis and bilateral foot pain    oxyCODONE (ROXICODONE) 5 MG immediate release tablet     Sig: Take 1 tablet by mouth 4 times daily as needed for Pain for up to 30 days.      Dispense:  120 tablet     Refill:  0     Reduce doses taken as pain becomes manageable

## 2023-03-10 ENCOUNTER — CARE COORDINATION (OUTPATIENT)
Dept: CARE COORDINATION | Age: 78
End: 2023-03-10

## 2023-03-10 NOTE — CARE COORDINATION
Patient called requesting an order from PCP for a home health aide. Patient stated that she spoke with someone at  [address] [place] [address], [phone] who stated that they just need an order from PCP. [facility] that [facility] need a [facility]CP appointment within the last 90 days. Patient is calling the office to schedule an appointment next week.

## 2023-03-14 ENCOUNTER — TELEPHONE (OUTPATIENT)
Dept: PAIN MANAGEMENT | Age: 78
End: 2023-03-14

## 2023-03-15 RX ORDER — LIDOCAINE 50 MG/G
1 PATCH TOPICAL DAILY
Qty: 30 PATCH | Refills: 0 | Status: SHIPPED | OUTPATIENT
Start: 2023-03-15

## 2023-03-21 ENCOUNTER — CARE COORDINATION (OUTPATIENT)
Dept: CARE COORDINATION | Age: 78
End: 2023-03-21

## 2023-03-21 NOTE — CARE COORDINATION
[facility]. contacted patient to inquire if she had heard anything from the senior [facility] [place]  @ [address] [place] [address]. Patient stated that she was wondering if the [facility] had heard  anything. Neither has any updated information. Patient was dressing to go to the   grocery store.       Plan of Care  [facility]. will contact patient again later this week

## 2023-03-24 ENCOUNTER — CARE COORDINATION (OUTPATIENT)
Dept: CARE COORDINATION | Age: 78
End: 2023-03-24

## 2023-03-24 NOTE — CARE COORDINATION
[facility]. phoned patient as promised for today. Patient reports that she's doing   better today than yesterday. She stated that she hasn't received a call from  the manager at the [address] [place] [address] to date and is wondering when they  may have a vacancy. Patient mentioned that her health has been challenging,  but she is content with the improvements. Patient will reach out to the Property  Manager at the [address] [place] [address].         Plan of Care  [facility], [facility]. will follow up with patient for results of

## 2023-03-29 ENCOUNTER — HOSPITAL ENCOUNTER (OUTPATIENT)
Dept: PAIN MANAGEMENT | Age: 78
Discharge: HOME | End: 2023-03-29
Payer: MEDICARE

## 2023-03-29 VITALS
HEART RATE: 51 BPM | DIASTOLIC BLOOD PRESSURE: 65 MMHG | RESPIRATION RATE: 20 BRPM | SYSTOLIC BLOOD PRESSURE: 141 MMHG | HEIGHT: 64 IN | WEIGHT: 278 LBS | OXYGEN SATURATION: 94 % | BODY MASS INDEX: 47.46 KG/M2 | TEMPERATURE: 97.3 F

## 2023-03-29 DIAGNOSIS — E66.01 CLASS 3 SEVERE OBESITY WITH BODY MASS INDEX (BMI) OF 45.0 TO 49.9 IN ADULT, UNSPECIFIED OBESITY TYPE, UNSPECIFIED WHETHER SERIOUS COMORBIDITY PRESENT (HCC): ICD-10-CM

## 2023-03-29 DIAGNOSIS — M47.817 LUMBOSACRAL SPONDYLOSIS WITHOUT MYELOPATHY: ICD-10-CM

## 2023-03-29 DIAGNOSIS — M51.36 DDD (DEGENERATIVE DISC DISEASE), LUMBAR: ICD-10-CM

## 2023-03-29 DIAGNOSIS — M48.062 SPINAL STENOSIS OF LUMBAR REGION WITH NEUROGENIC CLAUDICATION: Primary | Chronic | ICD-10-CM

## 2023-03-29 DIAGNOSIS — Z79.891 CHRONIC USE OF OPIATE FOR THERAPEUTIC PURPOSE: ICD-10-CM

## 2023-03-29 PROCEDURE — 99213 OFFICE O/P EST LOW 20 MIN: CPT

## 2023-03-29 RX ORDER — OXYCODONE HYDROCHLORIDE 5 MG/1
5 TABLET ORAL 4 TIMES DAILY PRN
Qty: 120 TABLET | Refills: 0 | Status: SHIPPED | OUTPATIENT
Start: 2023-04-11 | End: 2023-05-11

## 2023-03-29 NOTE — PROGRESS NOTES
[place]    42 minutes was utilized for this patient encounter including face-to-face time with patient, reviewing previous imaging, injections, and medical history while reviewing plan of care with patient which included injection therapy, physical therapy, and medication management. Orders Placed This [address] [place] [address] Orthopedics, [place]     Referral Priority:   Routine     Referral Type:   Eval and Treat     Referral Reason:   Specialty Services Required     Requested Specialty:   Medical Orthopedics     Number of Visits Requested:   1    oxyCODONE (ROXICODONE) 5 MG immediate release tablet     Sig: Take 1 tablet by mouth 4 times daily as needed for Pain for up to 30 days.      Dispense:  120 tablet     Refill:  0     Reduce doses taken as pain becomes manageable

## 2023-04-04 ENCOUNTER — OFFICE VISIT (OUTPATIENT)
Dept: FAMILY MEDICINE CLINIC | Age: 78
End: 2023-04-04
Payer: MEDICARE

## 2023-04-04 VITALS
DIASTOLIC BLOOD PRESSURE: 64 MMHG | OXYGEN SATURATION: 95 % | TEMPERATURE: 97.5 F | SYSTOLIC BLOOD PRESSURE: 136 MMHG | HEART RATE: 70 BPM

## 2023-04-04 DIAGNOSIS — B96.89 ACUTE BACTERIAL SINUSITIS: Primary | ICD-10-CM

## 2023-04-04 DIAGNOSIS — Z76.0 MEDICATION REFILL: ICD-10-CM

## 2023-04-04 DIAGNOSIS — J01.90 ACUTE BACTERIAL SINUSITIS: Primary | ICD-10-CM

## 2023-04-04 PROCEDURE — G8427 DOCREV CUR MEDS BY ELIG CLIN: HCPCS

## 2023-04-04 PROCEDURE — 1090F PRES/ABSN URINE INCON ASSESS: CPT

## 2023-04-04 PROCEDURE — 3075F SYST BP GE 130 - 139MM HG: CPT

## 2023-04-04 PROCEDURE — G8417 CALC BMI ABV UP PARAM F/U: HCPCS

## 2023-04-04 PROCEDURE — 1036F TOBACCO NON-USER: CPT

## 2023-04-04 PROCEDURE — 99213 OFFICE O/P EST LOW 20 MIN: CPT

## 2023-04-04 PROCEDURE — 3078F DIAST BP <80 MM HG: CPT

## 2023-04-04 PROCEDURE — 1123F ACP DISCUSS/DSCN MKR DOCD: CPT

## 2023-04-04 PROCEDURE — G8400 PT W/DXA NO RESULTS DOC: HCPCS

## 2023-04-04 RX ORDER — LEVOCETIRIZINE DIHYDROCHLORIDE 5 MG/1
TABLET, FILM COATED ORAL
Qty: 30 TABLET | Refills: 1 | Status: SHIPPED | OUTPATIENT
Start: 2023-04-04

## 2023-04-04 RX ORDER — DOXYCYCLINE HYCLATE 100 MG
100 TABLET ORAL 2 TIMES DAILY
Qty: 20 TABLET | Refills: 0 | Status: SHIPPED | OUTPATIENT
Start: 2023-04-04 | End: 2023-04-07 | Stop reason: SINTOL

## 2023-04-04 RX ORDER — PREDNISONE 20 MG/1
40 TABLET ORAL DAILY
Qty: 10 TABLET | Refills: 0 | Status: SHIPPED | OUTPATIENT
Start: 2023-04-04 | End: 2023-04-09

## 2023-04-04 ASSESSMENT — ENCOUNTER SYMPTOMS
HOARSE VOICE: 1
SINUS PRESSURE: 1
ORTHOPNEA: 0
SPUTUM PRODUCTION: 1
SHORTNESS OF BREATH: 1
VOMITING: 0
COUGH: 1
SINUS COMPLAINT: 1
SWOLLEN GLANDS: 0
CHEST TIGHTNESS: 0
ABDOMINAL PAIN: 0
SORE THROAT: 0
EYE REDNESS: 0
EYE PAIN: 0
DIARRHEA: 0
RHINORRHEA: 1

## 2023-04-04 NOTE — PROGRESS NOTES
Patient given educational materials - see patient instructions. Discussed use, benefit, and side effects of prescribed medications. All patient questions answered. Pt voiced understanding.     Electronically signed by KARIN Crawford NP on 4/4/2023 at 2:51 PM

## 2023-04-05 ENCOUNTER — CARE COORDINATION (OUTPATIENT)
Dept: CARE COORDINATION | Age: 78
End: 2023-04-05

## 2023-04-07 ENCOUNTER — TELEPHONE (OUTPATIENT)
Dept: FAMILY MEDICINE CLINIC | Age: 78
End: 2023-04-07

## 2023-04-07 DIAGNOSIS — B96.89 ACUTE BACTERIAL SINUSITIS: Primary | ICD-10-CM

## 2023-04-07 DIAGNOSIS — J01.90 ACUTE BACTERIAL SINUSITIS: Primary | ICD-10-CM

## 2023-04-07 RX ORDER — AZITHROMYCIN 250 MG/1
250 TABLET, FILM COATED ORAL SEE ADMIN INSTRUCTIONS
Qty: 6 TABLET | Refills: 0 | Status: SHIPPED | OUTPATIENT
Start: 2023-04-07 | End: 2023-04-12

## 2023-04-07 NOTE — TELEPHONE ENCOUNTER
Pt is having nausea with abx. She has been eating something before taking abx but its not helping. Please advise.

## 2023-04-19 ENCOUNTER — CARE COORDINATION (OUTPATIENT)
Dept: CARE COORDINATION | Age: 78
End: 2023-04-19

## 2023-04-19 NOTE — CARE COORDINATION
HC phoned the patient to inquire if she's heard anything from Mr. Sherif Otto regarding   Housing. Patient stated that she was going to ask the [facility]. the patient the same thing. Patient stated that she hasn't heard anything yet. She stated that she's doing pretty  well considering, there's been a lot going on in her family. Someone called the patient,  she stated that she was expecting a visitor. HC told the patient that she will speak   with her at another time.     Plan of Care  [facility] will speak with the patient in a few weeks

## 2023-05-02 RX ORDER — GABAPENTIN 300 MG/1
CAPSULE ORAL
Qty: 180 CAPSULE | Refills: 5 | OUTPATIENT
Start: 2023-05-02

## 2023-05-03 ENCOUNTER — TELEPHONE (OUTPATIENT)
Dept: PAIN MANAGEMENT | Age: 78
End: 2023-05-03

## 2023-05-08 RX ORDER — LIDOCAINE 50 MG/G
1 PATCH TOPICAL DAILY
Qty: 30 PATCH | Refills: 0 | Status: SHIPPED | OUTPATIENT
Start: 2023-05-08 | End: 2023-05-11 | Stop reason: SDUPTHER

## 2023-05-08 RX ORDER — GABAPENTIN 300 MG/1
300 CAPSULE ORAL 3 TIMES DAILY
Qty: 90 CAPSULE | Refills: 2 | Status: SHIPPED | OUTPATIENT
Start: 2023-05-08 | End: 2023-05-11 | Stop reason: SDUPTHER

## 2023-05-09 ENCOUNTER — HOSPITAL ENCOUNTER (OUTPATIENT)
Dept: PAIN MANAGEMENT | Age: 78
Discharge: HOME | End: 2023-05-09
Payer: MEDICARE

## 2023-05-09 VITALS — WEIGHT: 278 LBS | HEIGHT: 64 IN | TEMPERATURE: 97.3 F | BODY MASS INDEX: 47.46 KG/M2

## 2023-05-09 DIAGNOSIS — M48.062 SPINAL STENOSIS OF LUMBAR REGION WITH NEUROGENIC CLAUDICATION: Primary | Chronic | ICD-10-CM

## 2023-05-09 DIAGNOSIS — Z79.891 CHRONIC USE OF OPIATE FOR THERAPEUTIC PURPOSE: ICD-10-CM

## 2023-05-09 PROCEDURE — 99213 OFFICE O/P EST LOW 20 MIN: CPT | Performed by: NURSE PRACTITIONER

## 2023-05-09 PROCEDURE — 99213 OFFICE O/P EST LOW 20 MIN: CPT

## 2023-05-09 RX ORDER — OXYCODONE HYDROCHLORIDE 5 MG/1
5 TABLET ORAL 4 TIMES DAILY PRN
Qty: 120 TABLET | Refills: 0 | Status: SHIPPED | OUTPATIENT
Start: 2023-05-15 | End: 2023-05-11 | Stop reason: SDUPTHER

## 2023-05-09 ASSESSMENT — ENCOUNTER SYMPTOMS
SHORTNESS OF BREATH: 0
CONSTIPATION: 0
COUGH: 0
BOWEL INCONTINENCE: 0
BACK PAIN: 1

## 2023-05-09 ASSESSMENT — PAIN SCALES - GENERAL: PAINLEVEL_OUTOF10: 6

## 2023-05-09 NOTE — PROGRESS NOTES
Chief Complaint   Patient presents with    Back Pain     Med refill         PMH     Pt reports chronic history of low back pain that radiates into right hip and buttocks at times. She is s/p bilateral knee and right hip replacement She has tried NSAIDS heat chiropractic care and with little relief. Last imaging 2020 lumbar XR that showed previous L1 compression fracture which was corrected with kyphoplasty with multilevel degenerative changes and Lumbar Ct 2018 with diffuse facet arthopathy  Has [facility]  Bilateral lumbar L3, L4, L5 medial branch block with steroid 11/21/22 and reports  almost 75% relief of pain and improved activity tolerance. Feels majority of her pain is nerve related and that back pain is well controlled at this time. Not interested in increase of gabapentin d/t risk of side effects. Plans to discuss increasing cymbalta dose with her PCP        HPI:     Back Pain  This is a chronic problem. The current episode started more than 1 year ago. The problem occurs intermittently. The problem is unchanged. The pain is present in the lumbar spine. The quality of the pain is described as aching. The pain radiates to the left thigh and left knee. The pain is at a severity of 6/10. The pain is moderate. The pain is The same all the time. The symptoms are aggravated by bending, sitting and standing. Associated symptoms include numbness, tingling and weakness. Pertinent negatives include no bladder incontinence, bowel incontinence, chest pain, fever or headaches. Risk factors include menopause, obesity, poor posture, sedentary lifestyle and lack of exercise. She has tried ice, heat and analgesics for the symptoms. The treatment provided mild relief. Patient denies any new neurological symptoms. No bowel or bladder incontinence, no weakness, and no falling.     Pill count: appropriate / Oxycodone - 26 5/15    Morphine equivalent: 30    Periodic Controlled Substance

## 2023-05-10 ENCOUNTER — CARE COORDINATION (OUTPATIENT)
Dept: CARE COORDINATION | Age: 78
End: 2023-05-10

## 2023-05-11 ENCOUNTER — TELEPHONE (OUTPATIENT)
Dept: PAIN MANAGEMENT | Age: 78
End: 2023-05-11

## 2023-05-11 DIAGNOSIS — M48.062 SPINAL STENOSIS OF LUMBAR REGION WITH NEUROGENIC CLAUDICATION: Chronic | ICD-10-CM

## 2023-05-11 RX ORDER — GABAPENTIN 300 MG/1
300 CAPSULE ORAL 3 TIMES DAILY
Qty: 90 CAPSULE | Refills: 2 | Status: SHIPPED | OUTPATIENT
Start: 2023-05-11 | End: 2023-08-09

## 2023-05-11 RX ORDER — OXYCODONE HYDROCHLORIDE 5 MG/1
5 TABLET ORAL 4 TIMES DAILY PRN
Qty: 120 TABLET | Refills: 0 | Status: SHIPPED | OUTPATIENT
Start: 2023-05-15 | End: 2023-06-14

## 2023-05-11 RX ORDER — LIDOCAINE 50 MG/G
1 PATCH TOPICAL DAILY
Qty: 30 PATCH | Refills: 0 | Status: SHIPPED | OUTPATIENT
Start: 2023-05-11

## 2023-05-11 NOTE — TELEPHONE ENCOUNTER
Meds went to the wrong pharmacy please send gabapentin [facility]ocaine [facility] and [facility] to [facility]

## 2023-05-12 ENCOUNTER — CARE COORDINATION (OUTPATIENT)
Dept: CARE COORDINATION | Age: 78
End: 2023-05-12

## 2023-05-12 NOTE — CARE COORDINATION
HC phoned the patient today and found that she is busy preparing dinner  for her neighbor. Patient stated that she could talk briefly, but will have to   get back on with her project. HC informed the patient that we can get an   Update on her housing situation and the status of her application for the   [facility].     Plan of Care  [facility]. will reach out to the patient next week

## 2023-05-23 ENCOUNTER — TELEPHONE (OUTPATIENT)
Dept: PAIN MANAGEMENT | Age: 78
End: 2023-05-23

## 2023-05-23 ENCOUNTER — CARE COORDINATION (OUTPATIENT)
Dept: CARE COORDINATION | Age: 78
End: 2023-05-23

## 2023-05-23 NOTE — CARE COORDINATION
HC attempted to contact the patient. There was no answer, HC left a message  For the patient .     Plan of Care  [facility]. will reach out the patient later this week

## 2023-05-25 ENCOUNTER — CARE COORDINATION (OUTPATIENT)
Dept: CARE COORDINATION | Age: 78
End: 2023-05-25

## 2023-05-25 NOTE — CARE COORDINATION
HC phoned the patient today, noticing that she had called late yesterday. Patient stated that she wondered if the [facility]. had spoken to the Housing   Manager @ the [address] [place] [address]. HC informed the patient that she   hasn't heard anything from Mr. Sharan Santiago, and will attempt to reach out to  him next week after the holiday.     Plan of Care  [facility] will follow up with Mr. Yee Peguero of the [facility] next week

## 2023-06-07 ENCOUNTER — HOSPITAL ENCOUNTER (OUTPATIENT)
Dept: PAIN MANAGEMENT | Age: 78
Discharge: HOME | End: 2023-06-07
Payer: MEDICARE

## 2023-06-07 VITALS
TEMPERATURE: 97.3 F | SYSTOLIC BLOOD PRESSURE: 130 MMHG | BODY MASS INDEX: 47.46 KG/M2 | OXYGEN SATURATION: 96 % | RESPIRATION RATE: 20 BRPM | HEIGHT: 64 IN | WEIGHT: 278 LBS | HEART RATE: 50 BPM | DIASTOLIC BLOOD PRESSURE: 57 MMHG

## 2023-06-07 DIAGNOSIS — M48.02 CERVICAL SPINAL STENOSIS: ICD-10-CM

## 2023-06-07 DIAGNOSIS — M51.36 DDD (DEGENERATIVE DISC DISEASE), LUMBAR: ICD-10-CM

## 2023-06-07 DIAGNOSIS — M47.817 LUMBOSACRAL SPONDYLOSIS WITHOUT MYELOPATHY: Primary | ICD-10-CM

## 2023-06-07 DIAGNOSIS — E66.01 CLASS 3 SEVERE OBESITY WITH BODY MASS INDEX (BMI) OF 45.0 TO 49.9 IN ADULT, UNSPECIFIED OBESITY TYPE, UNSPECIFIED WHETHER SERIOUS COMORBIDITY PRESENT (HCC): ICD-10-CM

## 2023-06-07 DIAGNOSIS — M48.062 SPINAL STENOSIS OF LUMBAR REGION WITH NEUROGENIC CLAUDICATION: ICD-10-CM

## 2023-06-07 DIAGNOSIS — Z79.891 CHRONIC USE OF OPIATE FOR THERAPEUTIC PURPOSE: ICD-10-CM

## 2023-06-07 DIAGNOSIS — K59.03 THERAPEUTIC OPIOID INDUCED CONSTIPATION: ICD-10-CM

## 2023-06-07 DIAGNOSIS — T40.2X5A THERAPEUTIC OPIOID INDUCED CONSTIPATION: ICD-10-CM

## 2023-06-07 PROCEDURE — 99214 OFFICE O/P EST MOD 30 MIN: CPT | Performed by: STUDENT IN AN ORGANIZED HEALTH CARE EDUCATION/TRAINING PROGRAM

## 2023-06-07 PROCEDURE — 99213 OFFICE O/P EST LOW 20 MIN: CPT

## 2023-06-07 RX ORDER — OXYCODONE HYDROCHLORIDE AND ACETAMINOPHEN 5; 325 MG/1; MG/1
1 TABLET ORAL EVERY 6 HOURS PRN
Qty: 120 TABLET | Refills: 0 | Status: SHIPPED | OUTPATIENT
Start: 2023-06-19 | End: 2023-07-19

## 2023-06-07 NOTE — PROGRESS NOTES
with body mass index (BMI) of 45.0 to 49.9 in adult, unspecified obesity type, unspecified whether serious comorbidity present ([facility]  E66.01     Z68.42       5. DDD (degenerative disc disease), lumbar  M51.36       6. Therapeutic opioid induced constipation  K59.03     T40.2X5A       7. Cervical spinal stenosis  M48.02           ASSESSMENT:    Deidre Chan is a 65 y. o.female presenting to the pain clinic for evaluation of chronic low back pain, neck pain and neuropathy as well as opioid management visit. She denies any previous low back surgeries. To review, patient has had previous lumbar facet therapeutic injections with great relief in her low back pain. The patient's history and physical examination are consistent with lumbar spondylosis as she has tenderness to palpation along the lumbar paraspinal musculature with positive lumbar facet loading bilaterally. Additionally, her lumbar CT on 9/28/2018 reveals multilevel degenerative changes with facet hypertrophy throughout. Neurologically, it appears the patient has full strength and normal sensation. There is no evidence of radiculopathy or myelopathy on examination. There are no red flags in the patient's history. The patient continues to take opioid medications to improve pain, function and quality of life. The patient denies any side effects from the medications including constipation or respiratory depression. The patient reports adequate analgesia with the medication. There is no evidence of aberrant behavior. At today's visit, I discontinued oxycodone and restarted her on Percocet at the same dose. PLAN:  Medications:     For nonopioid therapy, the following medications were prescribed:    -Continue gabapentin 300 mg 3 times daily    Opioid therapy:  -Start Percocet 5/325 mg 4 times daily as needed  -Discontinue oxycodone 5 mg 4 times daily as needed  -Pain Treatment agreement: Signed 11/1/2022  -Urine Drug Screen: 1/11/2023,

## 2023-06-20 ENCOUNTER — CARE COORDINATION (OUTPATIENT)
Dept: CARE COORDINATION | Age: 78
End: 2023-06-20

## 2023-06-20 NOTE — CARE COORDINATION
HC attempted to contact the patient, there was no answer. HC left a message and provided  contact information for a return call.     Plan of Care  [facility] [place]. will attempt to reach out to patient again soon

## 2023-06-25 NOTE — PROGRESS NOTES
Subjective:      Patient ID: Raul Nguyen is a 67 y.o. female. Urinary Frequency   This is a new problem. The current episode started in the past 7 days. The problem occurs every urination. The problem has been gradually worsening. The quality of the pain is described as aching and burning. The pain is mild. There has been no fever. Associated symptoms include frequency. She has tried nothing for the symptoms. Review of Systems   Constitutional: Positive for fatigue. HENT: Negative. Eyes: Negative. Respiratory: Negative. Cardiovascular: Negative. Gastrointestinal: Negative. Endocrine: Negative. Genitourinary: Positive for dysuria and frequency. Musculoskeletal: Positive for arthralgias and myalgias. Allergic/Immunologic: Negative. Neurological: Negative. Hematological: Negative. Psychiatric/Behavioral: Negative. Objective:   Physical Exam  Constitutional:       Appearance: She is obese. Cardiovascular:      Rate and Rhythm: Regular rhythm. Heart sounds: No murmur heard. Pulmonary:      Breath sounds: No rhonchi. Musculoskeletal:      Right lower leg: No edema. Left lower leg: No edema. Skin:     General: Skin is warm and dry. Neurological:      General: No focal deficit present. Mental Status: She is alert and oriented to person, place, and time. Psychiatric:         Mood and Affect: Mood normal.         Behavior: Behavior normal.         Thought Content: Thought content normal.         Judgment: Judgment normal.         Assessment / Plan:      Diagnosis Orders   1. Post-menopausal     2. Acute on chronic diastolic heart failure ([address]     3. Essential hypertension = controlled    4. Sleep apnea, unspecified type     5. Obstructive sleep apnea syndrome     6. Panlobular emphysema ([address]     7. Primary osteoarthritis of both hips     8. Lumbar spondylosis     9. Acute cystitis without hematuria = she will be treated for UTI    10.  Morbid obesity with BMI of 45.0-49.9, adult ([facility] = she was advised to lose weight  Objective:     Patient requires a hospital bed for positioning of body not feasible with an ordinary bed. Patient requires elevation of head more than 30 degrees due to CHF. Documentation for wheel chair:   Mobility limitation impairs ability to perform MRADL within reasonable amount of time. Patient requires a hospital bed for positioning of body not feasible with an ordinary bed. Patient requires elevation of head more than 30 degrees due to CHF. Documentation for wheel chair:   Mobility limitation impairs ability to perform MRADL within reasonable amount of time. Return in about 12 weeks (around 3/9/2022), or if symptoms worsen or fail to improve, for Follow Up. No orders of the defined types were placed in this encounter. No orders of the defined types were placed in this encounter. Visit Information    Have you changed or started any medications since your last visit including any over-the-counter medicines, vitamins, or herbal medicines? no   Are you having any side effects from any of your medications? -  no  Have you stopped taking any of your medications? Is so, why? -  no    Have you seen any other physician or provider since your last visit? Yes - Records Obtained  Have you had any other diagnostic tests since your last visit? Yes - Records Obtained  Have you been seen in the emergency room and/or had an admission to a hospital since we last saw you? No  Have you had your routine dental cleaning in the past 6 months? no    Have you activated your [employer] account? If not, what are your barriers?  Yes     Patient Care Team:  Sylvia Patel MD as PCP - General (Internal Medicine)  Sylvia Patel MD as PCP - [facility] Provider  Kim Cuevas MD as Consulting Physician (Gastroenterology)  Peter Uribe MD as Consulting Physician (Cardiology)  Sharan Su MD as Consulting Physician (Pulmonology)  Venkata Khan RN as Care Transitions Nurse    Medical History Review  Past Medical, Family, and Social History reviewed and does not contribute to the patient presenting condition    Health Maintenance   Topic Date Due    Hepatitis C screen  Never done    Shingles Vaccine (1 of 2) Never done    DEXA (modify frequency per FRAX score)  Never done    Annual Wellness Visit (AWV)  Never done    Flu vaccine (1) Never done    Potassium monitoring  10/03/2022    Creatinine monitoring  10/03/2022    DTaP/Tdap/Td vaccine (2 - Td or Tdap) 06/18/2030    Pneumococcal 65+ years Vaccine  Completed    COVID-19 Vaccine  Completed    Hepatitis A vaccine  Aged Out    Hepatitis B vaccine  Aged Out    Hib vaccine  Aged Out    Meningococcal (ACWY) vaccine  Aged Out   The patient is morbidly obese, has bilateral hip osteoarthritis. She also has CHF and COPD.   Her mobility is totally impaired she is a perfect candidate for hospital bed and wheelchair None

## 2023-06-30 NOTE — TELEPHONE ENCOUNTER
Faxed to [facility]
Order signed
Patient called stating she was having some trouble catching her breath earlier but is doing ok now. Per patient she used to have a nebulizer for when she needed it but no longer has one.    ACM will route to PCP  DME equipment patient preference  [facility] fax:[phone]
Pt on course of Nitrofurantoin by PMD for UTI symptoms.  Pt advised to see PMD for preop medical  clearance. Dr. Yoo informed by email

## 2023-07-05 ENCOUNTER — CARE COORDINATION (OUTPATIENT)
Dept: CARE COORDINATION | Age: 78
End: 2023-07-05

## 2023-07-05 NOTE — CARE COORDINATION
HC contacted the patient today, and found that she was doing well. She reported that she has had several losses in her family, and she  is grieving behind both of those. Patient stated that she hasn't heard  anything from the  @ [facility] [address] [facility], yet. Patient  Is still waiting patiently to hear to from the .     Plan of Care  [facility] OF [place], [facility]. will follow with patient

## 2023-07-12 ENCOUNTER — CARE COORDINATION (OUTPATIENT)
Dept: CARE COORDINATION | Age: 78
End: 2023-07-12

## 2023-07-12 NOTE — CARE COORDINATION
Patient called today stating that she's not feeling well. She [facility]  [facility]. that she is having difficulty laying on her mattress that   Isn't old at all. She reported that it's making her back hurt. Patient  And HC discussed mattress' and she stated that she has had some  Really expensive mattresses and wonders if her body is so sensitive  That she can't tolerate the mattress. Patient and HC discussed the   mattresses that are used in hospital beds to keep patient's from getting  bed sores. Patient is asking if [facility]. will research some of the mattresses  and what they are designed for and how valuable they are.      Plan of Care  [facility]. will contact some [employer] for patient

## 2023-07-17 ENCOUNTER — CARE COORDINATION (OUTPATIENT)
Dept: CARE COORDINATION | Age: 78
End: 2023-07-17

## 2023-07-17 RX ORDER — GABAPENTIN 300 MG/1
CAPSULE ORAL
Qty: 270 CAPSULE | Refills: 0 | Status: SHIPPED | OUTPATIENT
Start: 2023-07-17 | End: 2023-10-15

## 2023-07-18 ENCOUNTER — CARE COORDINATION (OUTPATIENT)
Dept: CARE COORDINATION | Age: 78
End: 2023-07-18

## 2023-07-18 ENCOUNTER — HOSPITAL ENCOUNTER (OUTPATIENT)
Age: 78
Setting detail: SPECIMEN
Discharge: HOME | End: 2023-07-18

## 2023-07-18 ENCOUNTER — OFFICE VISIT (OUTPATIENT)
Dept: FAMILY MEDICINE CLINIC | Age: 78
End: 2023-07-18
Payer: MEDICARE

## 2023-07-18 ENCOUNTER — HOSPITAL ENCOUNTER (OUTPATIENT)
Dept: PAIN MANAGEMENT | Age: 78
Discharge: HOME | End: 2023-07-18
Payer: MEDICARE

## 2023-07-18 VITALS — BODY MASS INDEX: 46.32 KG/M2 | HEIGHT: 65 IN | WEIGHT: 278 LBS | TEMPERATURE: 97.3 F

## 2023-07-18 VITALS
HEART RATE: 48 BPM | DIASTOLIC BLOOD PRESSURE: 55 MMHG | OXYGEN SATURATION: 96 % | SYSTOLIC BLOOD PRESSURE: 157 MMHG | TEMPERATURE: 98.1 F

## 2023-07-18 DIAGNOSIS — M47.817 LUMBOSACRAL SPONDYLOSIS WITHOUT MYELOPATHY: ICD-10-CM

## 2023-07-18 DIAGNOSIS — R30.0 DYSURIA: ICD-10-CM

## 2023-07-18 DIAGNOSIS — M48.062 SPINAL STENOSIS OF LUMBAR REGION WITH NEUROGENIC CLAUDICATION: Primary | Chronic | ICD-10-CM

## 2023-07-18 DIAGNOSIS — N30.00 ACUTE CYSTITIS WITHOUT HEMATURIA: Primary | ICD-10-CM

## 2023-07-18 LAB
BILIRUBIN, POC: ABNORMAL
BLOOD URINE, POC: ABNORMAL
CLARITY, POC: CLEAR
COLOR, POC: YELLOW
GLUCOSE URINE, POC: ABNORMAL
KETONES, POC: ABNORMAL
LEUKOCYTE EST, POC: ABNORMAL
NITRITE, POC: ABNORMAL
PH, POC: 6.5
PROTEIN, POC: ABNORMAL
SPECIFIC GRAVITY, POC: 1.01
UROBILINOGEN, POC: 0.2

## 2023-07-18 PROCEDURE — 99213 OFFICE O/P EST LOW 20 MIN: CPT

## 2023-07-18 PROCEDURE — G8417 CALC BMI ABV UP PARAM F/U: HCPCS

## 2023-07-18 PROCEDURE — 3078F DIAST BP <80 MM HG: CPT

## 2023-07-18 PROCEDURE — G8427 DOCREV CUR MEDS BY ELIG CLIN: HCPCS

## 2023-07-18 PROCEDURE — 1123F ACP DISCUSS/DSCN MKR DOCD: CPT

## 2023-07-18 PROCEDURE — 3077F SYST BP >= 140 MM HG: CPT

## 2023-07-18 PROCEDURE — G8400 PT W/DXA NO RESULTS DOC: HCPCS

## 2023-07-18 PROCEDURE — 99213 OFFICE O/P EST LOW 20 MIN: CPT | Performed by: NURSE PRACTITIONER

## 2023-07-18 PROCEDURE — 1036F TOBACCO NON-USER: CPT

## 2023-07-18 PROCEDURE — 81002 URINALYSIS NONAUTO W/O SCOPE: CPT

## 2023-07-18 PROCEDURE — 1090F PRES/ABSN URINE INCON ASSESS: CPT

## 2023-07-18 RX ORDER — OXYCODONE HYDROCHLORIDE AND ACETAMINOPHEN 5; 325 MG/1; MG/1
1 TABLET ORAL EVERY 6 HOURS PRN
Qty: 120 TABLET | Refills: 0 | Status: SHIPPED | OUTPATIENT
Start: 2023-07-20 | End: 2023-08-19

## 2023-07-18 RX ORDER — NITROFURANTOIN 25; 75 MG/1; MG/1
100 CAPSULE ORAL 2 TIMES DAILY
Qty: 14 CAPSULE | Refills: 0 | Status: SHIPPED | OUTPATIENT
Start: 2023-07-18 | End: 2023-07-25

## 2023-07-18 ASSESSMENT — ENCOUNTER SYMPTOMS
RESPIRATORY NEGATIVE: 1
PHOTOPHOBIA: 0
EYE DISCHARGE: 0
SHORTNESS OF BREATH: 0
ABDOMINAL DISTENTION: 0
BLOOD IN STOOL: 0
STRIDOR: 0
COLOR CHANGE: 0
EYES NEGATIVE: 1
SHORTNESS OF BREATH: 0
BOWEL INCONTINENCE: 0
RHINORRHEA: 0
CHOKING: 0
CHEST TIGHTNESS: 0
VOMITING: 0
SINUS PAIN: 0
BACK PAIN: 1
RECTAL PAIN: 0
TROUBLE SWALLOWING: 0
FACIAL SWELLING: 0
ANAL BLEEDING: 0
COUGH: 0
EYE ITCHING: 0
EYE PAIN: 0
ABDOMINAL PAIN: 0
SINUS PRESSURE: 0
COUGH: 0
DIARRHEA: 0
GASTROINTESTINAL NEGATIVE: 1
BACK PAIN: 0
WHEEZING: 0
NAUSEA: 0
SORE THROAT: 0
CONSTIPATION: 0
EYE REDNESS: 0
VOICE CHANGE: 0
CONSTIPATION: 0
APNEA: 0

## 2023-07-18 NOTE — PROGRESS NOTES
[address] [facility] WALK-IN FAMILY [facility]IC[facility]  [address] WALK[facility]IN FAMILY MEDICINE  [address] [address]  Dept: [phone]    Jamarcus Avelar is a 65 y.o. female Established patient, who presents to the walk-in clinic today with conditions/complaints as noted below:    Chief Complaint   Patient presents with    Dysuria     Burning onset 2 weeks          HPI:     Dysuria   This is a new problem. Episode onset: 2 weeks ago. The problem occurs every urination. The problem has been gradually worsening. The patient is experiencing no pain. There has been no fever. She is Not sexually active. There is No history of pyelonephritis. Associated symptoms include frequency and urgency. Pertinent negatives include no chills, discharge, flank pain, hematuria, hesitancy, nausea, possible pregnancy, sweats or vomiting. Associated symptoms comments: Cloudy urine, \" Feels shaky\"  . She has tried nothing for the symptoms.      Past Medical History:   Diagnosis Date    Abnormality of rib determined by X-ray 1/28/2016    Acute cystitis without hematuria 6/16/2018    Acute exacerbation of chronic bronchitis (HCC)     Acute on chronic diastolic heart failure ([address]) 1/28/2016    Adjustment disorder with mixed anxiety and depressed mood 6/26/2015    Mild     Anemia 3/5/2014    Back pain     Bronchiectasis with acute lower respiratory infection ([address]) 8/16/2017    Bronchitis     Chronic bronchitis ([address]) 1/28/2016    Chronic cough 7/23/2018    COPD (chronic obstructive pulmonary disease) (HCC)     asbestos related lung disease    Degenerative joint disease (DJD) of hip 5/14/2015    Dyslipidemia 3/5/2014    Encephalopathy acute 5/29/2016    Essential hypertension 1/28/2016    Fibromyalgia     GERD (gastroesophageal reflux disease)     History of total bilateral

## 2023-07-19 ENCOUNTER — CARE COORDINATION (OUTPATIENT)
Dept: CARE COORDINATION | Age: 78
End: 2023-07-19

## 2023-07-19 ENCOUNTER — TELEPHONE (OUTPATIENT)
Dept: FAMILY MEDICINE CLINIC | Age: 78
End: 2023-07-19

## 2023-07-19 DIAGNOSIS — N30.00 ACUTE CYSTITIS WITHOUT HEMATURIA: ICD-10-CM

## 2023-07-19 DIAGNOSIS — Z76.0 MEDICATION REFILL: ICD-10-CM

## 2023-07-19 RX ORDER — LEVOCETIRIZINE DIHYDROCHLORIDE 5 MG/1
TABLET, FILM COATED ORAL
Qty: 30 TABLET | Refills: 1 | Status: SHIPPED | OUTPATIENT
Start: 2023-07-19

## 2023-07-19 NOTE — TELEPHONE ENCOUNTER
Pt thinks it was her fibromyalgia flaring for the shoulder pain.  Could we call in a refill for her Xyzal?

## 2023-07-19 NOTE — TELEPHONE ENCOUNTER
Pt states that she was having pain under left shoulder blade. Should she be worried about this and get checked for a kidney stone, or will it pass?  Please advise

## 2023-07-19 NOTE — CARE COORDINATION
HC attempted to contact patient, there was no answer. HC left contact information for a return call.     Plan of Care  [facility] [place], [facility]. will follow up with patient if no return call within 1-3 days

## 2023-07-19 NOTE — CARE COORDINATION
Patient phoned HC to share why she wasn't available    to answer on yesterday. Patient mentioned that she   Had left the pain clinic and went from there to the [address] [place] [address] and was given a prescription. She reported that  she is feeling a lot better and will be up to discussing   information regarding mattresses.       Plan of Care  [facility] [place], [facility]. will check back in with patient regarding various mattresses

## 2023-07-21 LAB
MICROORGANISM SPEC CULT: ABNORMAL
MICROORGANISM SPEC CULT: ABNORMAL
SPECIMEN DESCRIPTION: ABNORMAL

## 2023-07-23 RX ORDER — LEVOFLOXACIN 250 MG/1
250 TABLET ORAL DAILY
Qty: 5 TABLET | Refills: 0 | Status: SHIPPED | OUTPATIENT
Start: 2023-07-23 | End: 2023-07-28

## 2023-07-24 ENCOUNTER — TELEPHONE (OUTPATIENT)
Dept: PAIN MANAGEMENT | Age: 78
End: 2023-07-24

## 2023-07-24 RX ORDER — LIDOCAINE 50 MG/G
1 PATCH TOPICAL DAILY
Qty: 30 PATCH | Refills: 2 | Status: SHIPPED | OUTPATIENT
Start: 2023-07-24

## 2023-07-31 ENCOUNTER — CARE COORDINATION (OUTPATIENT)
Dept: CARE COORDINATION | Age: 78
End: 2023-07-31

## 2023-07-31 RX ORDER — DULOXETIN HYDROCHLORIDE 30 MG/1
30 CAPSULE, DELAYED RELEASE ORAL 2 TIMES DAILY
Qty: 180 CAPSULE | Refills: 3 | OUTPATIENT
Start: 2023-07-31

## 2023-07-31 NOTE — CARE COORDINATION
Patient called asking for an appointment to discuss a hospital bed. ACM explained that she will have the office call her to schedule. Patient denied any other needs today.

## 2023-08-17 ENCOUNTER — HOSPITAL ENCOUNTER (OUTPATIENT)
Dept: PAIN MANAGEMENT | Age: 78
Discharge: HOME | End: 2023-08-17
Payer: MEDICARE

## 2023-08-17 ENCOUNTER — OFFICE VISIT (OUTPATIENT)
Dept: INTERNAL MEDICINE CLINIC | Age: 78
End: 2023-08-17
Payer: MEDICARE

## 2023-08-17 VITALS
SYSTOLIC BLOOD PRESSURE: 143 MMHG | WEIGHT: 278 LBS | HEIGHT: 65 IN | DIASTOLIC BLOOD PRESSURE: 67 MMHG | OXYGEN SATURATION: 97 % | BODY MASS INDEX: 46.32 KG/M2 | HEART RATE: 54 BPM

## 2023-08-17 VITALS
BODY MASS INDEX: 46.32 KG/M2 | HEIGHT: 65 IN | SYSTOLIC BLOOD PRESSURE: 130 MMHG | WEIGHT: 278 LBS | OXYGEN SATURATION: 97 % | DIASTOLIC BLOOD PRESSURE: 70 MMHG | HEART RATE: 51 BPM

## 2023-08-17 DIAGNOSIS — M47.816 LUMBAR SPONDYLOSIS: ICD-10-CM

## 2023-08-17 DIAGNOSIS — M48.02 CERVICAL SPINAL STENOSIS: ICD-10-CM

## 2023-08-17 DIAGNOSIS — T40.2X5A THERAPEUTIC OPIOID INDUCED CONSTIPATION: Primary | ICD-10-CM

## 2023-08-17 DIAGNOSIS — K59.03 THERAPEUTIC OPIOID INDUCED CONSTIPATION: Primary | ICD-10-CM

## 2023-08-17 DIAGNOSIS — Z78.0 POST-MENOPAUSAL: Primary | ICD-10-CM

## 2023-08-17 DIAGNOSIS — M47.817 LUMBOSACRAL SPONDYLOSIS WITHOUT MYELOPATHY: ICD-10-CM

## 2023-08-17 DIAGNOSIS — R26.89 IMPAIRED GAIT AND MOBILITY: ICD-10-CM

## 2023-08-17 DIAGNOSIS — M54.16 LUMBAR RADICULOPATHY: ICD-10-CM

## 2023-08-17 DIAGNOSIS — M48.062 SPINAL STENOSIS OF LUMBAR REGION WITH NEUROGENIC CLAUDICATION: Chronic | ICD-10-CM

## 2023-08-17 DIAGNOSIS — M54.40 LOW BACK PAIN WITH SCIATICA, SCIATICA LATERALITY UNSPECIFIED, UNSPECIFIED BACK PAIN LATERALITY, UNSPECIFIED CHRONICITY: ICD-10-CM

## 2023-08-17 DIAGNOSIS — Z79.891 CHRONIC USE OF OPIATE FOR THERAPEUTIC PURPOSE: ICD-10-CM

## 2023-08-17 DIAGNOSIS — Z00.00 INITIAL MEDICARE ANNUAL WELLNESS VISIT: ICD-10-CM

## 2023-08-17 DIAGNOSIS — M43.22 CERVICAL SPINE ANKYLOSIS: ICD-10-CM

## 2023-08-17 PROCEDURE — 99213 OFFICE O/P EST LOW 20 MIN: CPT | Performed by: NURSE PRACTITIONER

## 2023-08-17 PROCEDURE — 99213 OFFICE O/P EST LOW 20 MIN: CPT

## 2023-08-17 PROCEDURE — 3075F SYST BP GE 130 - 139MM HG: CPT | Performed by: INTERNAL MEDICINE

## 2023-08-17 PROCEDURE — 1123F ACP DISCUSS/DSCN MKR DOCD: CPT | Performed by: INTERNAL MEDICINE

## 2023-08-17 PROCEDURE — G0438 PPPS, INITIAL VISIT: HCPCS | Performed by: INTERNAL MEDICINE

## 2023-08-17 PROCEDURE — 3078F DIAST BP <80 MM HG: CPT | Performed by: INTERNAL MEDICINE

## 2023-08-17 RX ORDER — OXYCODONE HYDROCHLORIDE AND ACETAMINOPHEN 5; 325 MG/1; MG/1
1 TABLET ORAL EVERY 6 HOURS PRN
Qty: 120 TABLET | Refills: 0 | Status: SHIPPED | OUTPATIENT
Start: 2023-08-19 | End: 2023-09-18

## 2023-08-17 SDOH — ECONOMIC STABILITY: FOOD INSECURITY: WITHIN THE PAST 12 MONTHS, THE FOOD YOU BOUGHT JUST DIDN'T LAST AND YOU DIDN'T HAVE MONEY TO GET MORE.: NEVER TRUE

## 2023-08-17 SDOH — ECONOMIC STABILITY: FOOD INSECURITY: WITHIN THE PAST 12 MONTHS, YOU WORRIED THAT YOUR FOOD WOULD RUN OUT BEFORE YOU GOT MONEY TO BUY MORE.: NEVER TRUE

## 2023-08-17 SDOH — ECONOMIC STABILITY: INCOME INSECURITY: HOW HARD IS IT FOR YOU TO PAY FOR THE VERY BASICS LIKE FOOD, HOUSING, MEDICAL CARE, AND HEATING?: NOT HARD AT ALL

## 2023-08-17 ASSESSMENT — PATIENT HEALTH QUESTIONNAIRE - PHQ9
SUM OF ALL RESPONSES TO PHQ QUESTIONS 1-9: 0
10. IF YOU CHECKED OFF ANY PROBLEMS, HOW DIFFICULT HAVE THESE PROBLEMS MADE IT FOR YOU TO DO YOUR WORK, TAKE CARE OF THINGS AT HOME, OR GET ALONG WITH OTHER PEOPLE: 0
7. TROUBLE CONCENTRATING ON THINGS, SUCH AS READING THE NEWSPAPER OR WATCHING TELEVISION: 0
8. MOVING OR SPEAKING SO SLOWLY THAT OTHER PEOPLE COULD HAVE NOTICED. OR THE OPPOSITE, BEING SO FIGETY OR RESTLESS THAT YOU HAVE BEEN MOVING AROUND A LOT MORE THAN USUAL: 0
SUM OF ALL RESPONSES TO PHQ9 QUESTIONS 1 & 2: 0
1. LITTLE INTEREST OR PLEASURE IN DOING THINGS: 0
SUM OF ALL RESPONSES TO PHQ QUESTIONS 1-9: 0
3. TROUBLE FALLING OR STAYING ASLEEP: 0
5. POOR APPETITE OR OVEREATING: 0
SUM OF ALL RESPONSES TO PHQ QUESTIONS 1-9: 0
4. FEELING TIRED OR HAVING LITTLE ENERGY: 0
2. FEELING DOWN, DEPRESSED OR HOPELESS: 0
SUM OF ALL RESPONSES TO PHQ QUESTIONS 1-9: 0
6. FEELING BAD ABOUT YOURSELF - OR THAT YOU ARE A FAILURE OR HAVE LET YOURSELF OR YOUR FAMILY DOWN: 0
9. THOUGHTS THAT YOU WOULD BE BETTER OFF DEAD, OR OF HURTING YOURSELF: 0

## 2023-08-17 ASSESSMENT — ENCOUNTER SYMPTOMS
BOWEL INCONTINENCE: 0
COUGH: 0
BACK PAIN: 1
CONSTIPATION: 0
SHORTNESS OF BREATH: 0

## 2023-08-17 ASSESSMENT — PAIN DESCRIPTION - FREQUENCY: FREQUENCY: CONTINUOUS

## 2023-08-17 ASSESSMENT — PAIN DESCRIPTION - DESCRIPTORS: DESCRIPTORS: ACHING;BURNING;CRAMPING;SHOOTING;STABBING

## 2023-08-17 ASSESSMENT — PAIN SCALES - GENERAL: PAINLEVEL_OUTOF10: 8

## 2023-08-17 ASSESSMENT — PAIN DESCRIPTION - PAIN TYPE: TYPE: CHRONIC PAIN

## 2023-08-17 ASSESSMENT — PAIN DESCRIPTION - LOCATION: LOCATION: BACK

## 2023-08-17 NOTE — PROGRESS NOTES
Medicare Annual Wellness Visit    Cesar Pimentel is here for Extremity Weakness (Is not able to walk due to her spine, [facility] and needs documentation for a hospital bed. )    Assessment & Plan   Post-menopausal    Recommendations for Preventive Services Due: see orders and patient instructions/AVS.  Recommended screening schedule for the next 5-10 years is provided to the patient in written form: see Patient Instructions/AVS.   1. Post-menopausal  - DEXA BONE DENSITY AXIAL SKELETON; Future    2. Low back pain with sciatica, sciatica laterality unspecified, unspecified back pain laterality, unspecified chronicity  Has Sleep apnea , Uses Oxygen at Night   Has Frequent Urination , in and Out of bed , she will be benefited with Hospital bed , so that she can raise her head end  Has Morbid obesity and Chronic Back Pain , she will be benefited by Hospital Bed    - DME Order for Hospital Bed as OP    3. Impaired gait and mobility  - External Referral To Neurology      No follow-ups on file. Reviewed prior labs and health maintenance. Discussed use, benefit, and side effects of prescribed medications. Barriers to medication compliance addressed. All patient questions answered. Pt voiced understanding. MD JENARO [facility]  8/17/2023, 4:57 PM    Please note that this chart was generated using voice recognition Dragon dictation software. Although every effort was made to ensure the accuracy of this automated transcription, some errors in transcription may have occurred. No follow-ups on file. Subjective   The following acute and/or chronic problems were also addressed today:      Patient's complete Health Risk Assessment and screening values have been reviewed and are found in Flowsheets. The following problems were reviewed today and where indicated follow up appointments were made and/or referrals ordered.     Positive Risk Factor Screenings with

## 2023-08-17 NOTE — PROGRESS NOTES
Subjective:      Patient ID: Nurys Andre is a 65 y.o. female. HPI    Review of Systems    Objective:   Physical Exam    Assessment / Plan:       Visit Information    Have you changed or started any medications since your last visit including any over-the-counter medicines, vitamins, or herbal medicines? no   Are you having any side effects from any of your medications? -  no  Have you stopped taking any of your medications? Is so, why? -  no    Have you seen any other physician or provider since your last visit? Yes - Records Obtained  Have you had any other diagnostic tests since your last visit? Yes - Records Obtained  Have you been seen in the emergency room and/or had an admission to a hospital since we last saw you? Yes - Records Obtained  Have you had your routine dental cleaning in the past 6 months? no    Have you activated your [employer] account? If not, what are your barriers?  Yes     Patient Care Team:  Lacy Schultz MD as PCP - General (Internal Medicine)  [facility]ultz MD [facility]  Raheem George MD as Consulting Physician (Gastroenterology)  Sary Linares MD as Consulting Physician (Cardiology)  Trent Dennis MD as Consulting Physician (Pulmonology)  Karmen Reyes as Health     Medical History Review  Past Medical, Family, and Social History reviewed and does contribute to the patient presenting condition    Health Maintenance   Topic Date Due    Hepatitis C screen  Never done    Shingles vaccine (1 of 2) Never done    DEXA (modify frequency per FRAX score)  Never done    Annual Wellness Visit (AWV)  Never done    Depression Monitoring  05/16/2023    Flu vaccine (1) Never done    DTaP/Tdap/Td vaccine (2 - Td or Tdap) 06/18/2030    Pneumococcal 65+ years Vaccine  Completed    COVID-19 Vaccine  Completed    Hepatitis A vaccine  Aged Out    Hib vaccine  Aged Out    Meningococcal (ACWY) vaccine  Aged Out    Lipids  Discontinued    Colonoscopy  Discontinued

## 2023-08-21 ENCOUNTER — CARE COORDINATION (OUTPATIENT)
Dept: CARE COORDINATION | Age: 78
End: 2023-08-21

## 2023-08-21 NOTE — CARE COORDINATION
HC phoned the patient to follow up on her wellbeing. She is experiencing a lot of  pain, and misery but yet striving. She mentioned that she received a call from the  [facility], for an [place]. Patient stated that she  Will have to call back to inquire if she's still on the list for the Senior individual  homes. Patient will get back with the [facility]. after she speaks with the new property  manager.     Plan of Care  [facility] will follow up with patient if no response within a few weeks

## 2023-08-22 ENCOUNTER — TELEPHONE (OUTPATIENT)
Dept: INTERNAL MEDICINE CLINIC | Age: 78
End: 2023-08-22

## 2023-08-22 DIAGNOSIS — R26.89 IMPAIRED GAIT AND MOBILITY: Primary | ICD-10-CM

## 2023-08-22 NOTE — TELEPHONE ENCOUNTER
----- Message from Jason Singh sent at 8/22/2023  3:22 PM EDT -----  Subject: Referral Request    Reason for referral request? Patient and Mariah Moscoso from [facility] was calling to see   if a referral was sent to [address] [place] [address] [place]. They gave   her a paper referral and she wanted to know if it was sent over   electronically as well. There is a number of slips and she doesn't know   quite what to do with the, . One is for a hospital bed as well . Wants to   know if she is supposed to call or the Dr. Please call to advise. Provider patient wants to be referred to(if known):     Provider Phone Number(if known):     Additional Information for Provider?   ---------------------------------------------------------------------------  --------------  [address] [place], [place] [address]    [phone]; OK to leave message on voicemail  ---------------------------------------------------------------------------  --------------

## 2023-08-22 NOTE — TELEPHONE ENCOUNTER
Called and spoke with the patient, and she is going to call them tomorrow morning and I will fax the referral

## 2023-08-28 ENCOUNTER — TELEPHONE (OUTPATIENT)
Dept: INTERNAL MEDICINE CLINIC | Age: 78
End: 2023-08-28

## 2023-08-28 NOTE — TELEPHONE ENCOUNTER
[address] needs addend note stating why patient needs hospital bed and new order with a qualifying diagnosis. .      Please advise

## 2023-08-28 NOTE — TELEPHONE ENCOUNTER
Patient called stating that referral had not been received by Neurology.     Informed that I would refax referral    Provided fax #[phone]

## 2023-08-29 NOTE — TELEPHONE ENCOUNTER
Please copy and paste the below phrase into your note      Patient has a condition that requires the head of the bed to be elevated at least 30 degrees to prevent aspirations and the patient requires frequent changes in the body that are not feasible with an ordinary bed

## 2023-08-30 NOTE — TELEPHONE ENCOUNTER
Referrals addended for patient, please fax to appropriate locations.      [facility] Fax: [phone]    [facility] Fax: [phone]    Please let patient know when both referrals have been faxed

## 2023-08-30 NOTE — TELEPHONE ENCOUNTER
Attempted to contact patient to verify if she wanted to go to [facility], or if she wanted to go to the Neurologist.     NO answer, left message on machine to return call     If she wants to go to [facility], we will need to get the fax number, the fax number previously provided was for Neuro

## 2023-09-20 RX ORDER — LIDOCAINE 50 MG/G
1 PATCH TOPICAL DAILY
Qty: 30 PATCH | Refills: 2 | OUTPATIENT
Start: 2023-09-20

## 2023-09-21 ENCOUNTER — HOSPITAL ENCOUNTER (OUTPATIENT)
Dept: PAIN MANAGEMENT | Age: 78
Discharge: HOME | End: 2023-09-21
Payer: MEDICARE

## 2023-09-21 ENCOUNTER — HOSPITAL ENCOUNTER (OUTPATIENT)
Dept: WOMENS IMAGING | Age: 78
Discharge: HOME | End: 2023-09-23
Attending: INTERNAL MEDICINE
Payer: MEDICARE

## 2023-09-21 VITALS — HEIGHT: 64 IN | TEMPERATURE: 97.3 F | WEIGHT: 278 LBS | RESPIRATION RATE: 20 BRPM | BODY MASS INDEX: 47.46 KG/M2

## 2023-09-21 DIAGNOSIS — Z78.0 POST-MENOPAUSAL: ICD-10-CM

## 2023-09-21 DIAGNOSIS — M47.816 LUMBAR SPONDYLOSIS: ICD-10-CM

## 2023-09-21 DIAGNOSIS — M48.02 CERVICAL SPINAL STENOSIS: ICD-10-CM

## 2023-09-21 DIAGNOSIS — Z79.891 CHRONIC USE OF OPIATE FOR THERAPEUTIC PURPOSE: ICD-10-CM

## 2023-09-21 DIAGNOSIS — M47.817 LUMBOSACRAL SPONDYLOSIS WITHOUT MYELOPATHY: Primary | ICD-10-CM

## 2023-09-21 DIAGNOSIS — M43.22 CERVICAL SPINE ANKYLOSIS: ICD-10-CM

## 2023-09-21 DIAGNOSIS — M54.16 LUMBAR RADICULOPATHY: ICD-10-CM

## 2023-09-21 DIAGNOSIS — M51.36 DDD (DEGENERATIVE DISC DISEASE), LUMBAR: ICD-10-CM

## 2023-09-21 PROCEDURE — 77080 DXA BONE DENSITY AXIAL: CPT

## 2023-09-21 PROCEDURE — 99213 OFFICE O/P EST LOW 20 MIN: CPT

## 2023-09-21 PROCEDURE — 99213 OFFICE O/P EST LOW 20 MIN: CPT | Performed by: NURSE PRACTITIONER

## 2023-09-21 RX ORDER — DULOXETIN HYDROCHLORIDE 30 MG/1
30 CAPSULE, DELAYED RELEASE ORAL 2 TIMES DAILY
Qty: 180 CAPSULE | Refills: 5 | Status: SHIPPED | OUTPATIENT
Start: 2023-09-21

## 2023-09-21 RX ORDER — OXYCODONE HYDROCHLORIDE AND ACETAMINOPHEN 5; 325 MG/1; MG/1
1 TABLET ORAL EVERY 6 HOURS PRN
Qty: 120 TABLET | Refills: 0 | Status: CANCELLED | OUTPATIENT
Start: 2023-10-01 | End: 2023-10-31

## 2023-09-21 RX ORDER — OXYCODONE HYDROCHLORIDE AND ACETAMINOPHEN 5; 325 MG/1; MG/1
1 TABLET ORAL EVERY 6 HOURS PRN
Qty: 120 TABLET | Refills: 0 | Status: SHIPPED | OUTPATIENT
Start: 2023-09-30 | End: 2023-10-30

## 2023-09-21 ASSESSMENT — ENCOUNTER SYMPTOMS
BACK PAIN: 1
COUGH: 0
BOWEL INCONTINENCE: 0
CONSTIPATION: 0
SHORTNESS OF BREATH: 0

## 2023-09-25 ENCOUNTER — TELEPHONE (OUTPATIENT)
Dept: INTERNAL MEDICINE CLINIC | Age: 78
End: 2023-09-25

## 2023-09-25 NOTE — TELEPHONE ENCOUNTER
DEXA scan, suggestive osteopenia, patient need to take vitamin D calcium combination over-the-counter

## 2023-10-04 NOTE — CARE COORDINATION
Ambulatory Care Coordination Note  11/29/2021  CM Risk Score: 10  Charlson 10 Year Mortality Risk Score: 79%     ACC: Kelvin Payne, RN    Summary Note: Follow up on walk in visit for urinary frequency. Per patient the issue has resolved. She asked about where she can get her covid booster. ACM recommended a pharmacy for walk in availability. Patient will call to schedule an appointment with PCP to discuss need for a wheel chair. ACM will follow up in 1-2 weeks. Care Coordination Interventions    Program Enrollment: Complex Care  Referral from Primary Care Provider: No  Suggested Interventions and [address]: In Process  Zone Management Tools: In Process         Goals Addressed                 This Visit's Progress    [employer] Goal   On track     I will work with ACM and home care to establish more help. .    Barriers: overwhelmed by complexity of regimen and stress  Plan for overcoming my barriers: work with ACM  Confidence: 6/10  Anticipated Goal Completion Date: 12-21-21            Prior to Admission medications    Medication Sig Start Date End Date Taking? Authorizing Provider   nitrofurantoin, macrocrystal-monohydrate, (MACROBID) 100 MG capsule Take 1 capsule by mouth 2 times daily for 7 days 11/24/21 12/1/21  KARIN Ambrose - CNP   albuterol (PROVENTIL) (5 MG/ML) 0.5% nebulizer solution Take 1 mL by nebulization every 4 hours as needed for Wheezing  Patient not taking: Reported on 11/24/2021 11/12/21   Verito Moody MD   DULoxetine (CYMBALTA) 30 MG extended release capsule Take 1 capsule by mouth 2 times daily for 3 days 10/5/21 11/24/21  Verito Moody MD   gabapentin (NEURONTIN) 100 MG capsule Take 2 capsules by mouth 3 times daily for 3 days.  10/5/21 11/24/21  Verito Moody MD   metoprolol tartrate (LOPRESSOR) 50 MG tablet Take 50 mg by mouth 2 times daily    Historical Provider, MD   Cyanocobalamin (VITAMIN B-12) 1000 MCG/15ML LIQD Take 1,000 mcg by None mouth daily     Historical Provider, MD   levocetirizine (XYZAL) 5 MG tablet take 1 tablet by mouth once daily AT NIGHT 9/8/21   Shannan Fabian MD   donepezil (ARICEPT) 5 MG tablet take 1 tablet by mouth at bedtime 9/1/21   Shannan Fabian MD   psyllium (KONSYL) 28.3 % PACK Take 1 packet by mouth daily     Historical MD Shen   Catheters (STANDARD CARE EXTERNAL CATH) MISC 1 each by Does not apply route daily  Patient not taking: Reported on 11/24/2021 3/2/21   Pawel Cruz MD   Catheters (STANDARD CARE EXTERNAL CATH) MISC 1 Device by Does not apply route daily  Patient not taking: Reported on 11/24/2021 11/30/20   Shannan Fabian MD   albuterol sulfate  (90 Base) MCG/ACT inhaler Inhale 2 puffs into the lungs 4 times daily as needed for Wheezing 4/20/20   Shannan Fabian MD   Misc.  Devices [facility]) MISC 1 each by Does not apply route daily Upright walker with wheels and seat 11/12/18   Donna Gamboa MD   aspirin 81 MG EC tablet Take 81 mg by mouth daily     Historical Provider, MD       Future Appointments   Date Time Provider Mayo Crespoi   12/22/2021  2:30 PM [facility]     ,   Congestive Heart Failure Assessment    Are you currently restricting fluids?: Other  Do you understand a low sodium diet?: Yes  Do you understand how to read food labels?: Yes  Do you salt your food before tasting it?: No     No patient-reported symptoms      Symptoms:     Symptom course: stable  Weight trend: stable  Salt intake watch compared to last visit: stable     ,   COPD Assessment    Does the patient understand envrionmental exposure?: Yes  Is the patient able to verbalize Rescue vs. Long Acting medications?: Yes     No patient-reported symptoms         Symptoms:         and   General Assessment    Do you have any symptoms that are causing concern?: No

## 2023-10-09 RX ORDER — GABAPENTIN 300 MG/1
CAPSULE ORAL
Qty: 270 CAPSULE | Refills: 3 | OUTPATIENT
Start: 2023-10-09 | End: 2024-01-07

## 2023-10-12 ENCOUNTER — TELEPHONE (OUTPATIENT)
Dept: PAIN MANAGEMENT | Age: 78
End: 2023-10-12

## 2023-10-12 NOTE — TELEPHONE ENCOUNTER
Pt stated she is currently on percocet 5-325 wants to know if she can go back to Roxicodone 5mg at next visit?  she stated the 5-325 percocet is causing her to sleep a lot and the Roxicodone didn't do that

## 2023-10-21 NOTE — DISCHARGE INSTR - COC
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    Acute low back pain M54.5    Neck pain M54.2    Closed compression fracture of L1 lumbar vertebra S32.010A    Cervical spinal stenosis M48.02    BMI 40.0-44.9, adult ([facility]) Z68.41    Chronic prescription opiate use Z79.891    Age-related osteoporosis with current pathological fracture M80.00XA    S/P kyphoplasty Z98.890    Morbid obesity ([facility]) E66.01    DDD (degenerative disc disease), lumbar M51.36    Lumbar radiculopathy M54.16    Depression F32.9    Osteopenia of lumbar spine M85.88    Left leg pain M79.605       Isolation/Infection:   Isolation            No Isolation          Patient Infection Status       None to display            Nurse Assessment:  Last Vital Signs: BP (!) 174/73   Pulse 61   Temp 97.7 °F (36.5 °C) (Oral)   Resp 16   Ht 5' 4\" (1.626 m)   Wt 292 lb (132.5 kg)   SpO2 96%   BMI 50.12 kg/m²     Last documented pain score (0-10 scale): Pain Level: 5  Last Weight:   Wt Readings from Last 1 Encounters:   09/29/20 292 lb (132.5 kg)     Mental Status:  oriented and alert    IV Access:  - None    Nursing Mobility/ADLs:  Walking   Assisted  Transfer  Assisted  Bathing  Assisted  Dressing  Assisted  Toileting  Assisted  Feeding  Assisted  Med Admin  Assisted  Med Delivery   whole    Wound Care Documentation and Therapy:  Incision 10/29/18 Back Lower;Mid (Active)   Number of days: 701        Elimination:  Continence:   · Bowel:  Yes  · Bladder: Yes  Urinary Catheter: None   Colostomy/Ileostomy/Ileal Conduit: No       Intake/Output Summary (Last 24 hours) at 9/30/2020 0850  Last data filed at 9/30/2020 0737  Gross per 24 hour   Intake --   Output 1250 ml   Net -1250 ml     I/O last 3 completed shifts:  In: -   Out: 750 No

## 2023-10-24 ENCOUNTER — HOSPITAL ENCOUNTER (OUTPATIENT)
Dept: PAIN MANAGEMENT | Age: 78
Discharge: HOME | End: 2023-10-24
Payer: MEDICARE

## 2023-10-24 ENCOUNTER — TELEPHONE (OUTPATIENT)
Dept: PAIN MANAGEMENT | Age: 78
End: 2023-10-24

## 2023-10-24 VITALS
SYSTOLIC BLOOD PRESSURE: 134 MMHG | OXYGEN SATURATION: 92 % | TEMPERATURE: 97.3 F | BODY MASS INDEX: 47.46 KG/M2 | HEIGHT: 64 IN | HEART RATE: 60 BPM | WEIGHT: 278 LBS | DIASTOLIC BLOOD PRESSURE: 58 MMHG

## 2023-10-24 DIAGNOSIS — M47.816 LUMBAR SPONDYLOSIS: ICD-10-CM

## 2023-10-24 DIAGNOSIS — M54.16 LUMBAR RADICULOPATHY: ICD-10-CM

## 2023-10-24 DIAGNOSIS — M48.062 SPINAL STENOSIS OF LUMBAR REGION WITH NEUROGENIC CLAUDICATION: Primary | Chronic | ICD-10-CM

## 2023-10-24 DIAGNOSIS — M51.36 DDD (DEGENERATIVE DISC DISEASE), LUMBAR: ICD-10-CM

## 2023-10-24 DIAGNOSIS — Z79.891 CHRONIC USE OF OPIATE FOR THERAPEUTIC PURPOSE: ICD-10-CM

## 2023-10-24 PROCEDURE — 99214 OFFICE O/P EST MOD 30 MIN: CPT | Performed by: NURSE PRACTITIONER

## 2023-10-24 PROCEDURE — 99213 OFFICE O/P EST LOW 20 MIN: CPT

## 2023-10-24 RX ORDER — DULOXETINE 40 MG/1
40 CAPSULE, DELAYED RELEASE ORAL 2 TIMES DAILY
Qty: 60 CAPSULE | Refills: 0 | Status: SHIPPED | OUTPATIENT
Start: 2023-10-24 | End: 2023-11-23

## 2023-10-24 RX ORDER — LIDOCAINE 50 MG/G
1 PATCH TOPICAL DAILY
Qty: 30 PATCH | Refills: 2 | Status: SHIPPED | OUTPATIENT
Start: 2023-10-24

## 2023-10-24 RX ORDER — OXYCODONE HYDROCHLORIDE 5 MG/1
5 TABLET ORAL EVERY 6 HOURS PRN
Qty: 120 TABLET | Refills: 0 | Status: SHIPPED | OUTPATIENT
Start: 2023-10-30 | End: 2023-11-29

## 2023-10-24 ASSESSMENT — ENCOUNTER SYMPTOMS
COUGH: 0
BACK PAIN: 1
SHORTNESS OF BREATH: 0
BOWEL INCONTINENCE: 0
CONSTIPATION: 0

## 2023-10-24 NOTE — PROGRESS NOTES
Chief Complaint   Patient presents with    Back Pain     Med refill         PMH     Pt reports chronic history of low back pain that radiates into right hip and buttocks at times. She is s/p bilateral knee and right hip replacement She has tried NSAIDS heat chiropractic care and with little relief. Last imaging 2020 lumbar XR that showed previous L1 compression fracture which was corrected with kyphoplasty with multilevel degenerative changes and Lumbar CT 2018 with diffuse facet arthopathy  Has completed PT at [facility]  Bilateral lumbar L3, L4, L5 medial branch block with steroid 11/21/22 and reported almost 75% relief of pain and improved activity tolerance not ready to repeat. Feels majority of her pain is nerve related and that back pain is well controlled at this time. Not interested in increase of gabapentin d/t risk of side effects will try an increase in cymbalta. HPI:     Back Pain  This is a chronic problem. The current episode started more than 1 year ago. The problem occurs constantly. The problem is unchanged. The pain is present in the lumbar spine. The quality of the pain is described as shooting. The pain radiates to the left foot, left thigh and left knee. The pain is at a severity of 7/10. The pain is moderate. The pain is The same all the time. The symptoms are aggravated by bending, sitting, standing, lying down and position. Associated symptoms include headaches, numbness, tingling and weakness. Pertinent negatives include no bladder incontinence, bowel incontinence, chest pain or fever. Risk factors include menopause, obesity, poor posture, sedentary lifestyle and lack of exercise. She has tried ice, heat and analgesics for the symptoms. The treatment provided mild relief. Patient denies any new neurological symptoms. No bowel or bladder incontinence, no weakness, and no falling.     Pill count: appropriate / Oxycodone - 29.5 due 10/30     Morphine equivalent:

## 2023-10-24 NOTE — TELEPHONE ENCOUNTER
I returned patient's call; JADYN on her VM. Patient LM overnight stating she is still not feeling well; wanted to know if she should come in today. I advised in my voice message I can reschedule her to Thursday same time if she would prefer. I asked patient to call the office.

## 2023-11-02 ENCOUNTER — CARE COORDINATION (OUTPATIENT)
Dept: CARE COORDINATION | Age: 78
End: 2023-11-02

## 2023-11-02 NOTE — CARE COORDINATION
Patient called asking what options she has to have a mammogram without having to stand and stated she needs some testing on her stomach. She has been having some GI issues. I recommended an appointment with PCP to discuss. Patient agreed to have Alejandro Kelly at PCP office call her to schedule.

## 2023-11-02 NOTE — CARE COORDINATION
Patient left VM requesting a return call to \"discuss some things she needs\". I attempted to call back, one number rings busy the other, no answer and no VM available.

## 2023-11-16 ENCOUNTER — CARE COORDINATION (OUTPATIENT)
Dept: CARE COORDINATION | Age: 78
End: 2023-11-16

## 2023-11-16 NOTE — CARE COORDINATION
Ambulatory Care Coordination Note  2023    Patient Current Location:  Home: [address] [address]    ACM contacted the patient by telephone. Verified name and  with patient as identifiers. Provided introduction to self, and explanation of the ACM role. ACM: Toshia James RN    Challenges to be reviewed by the provider   Additional needs identified to be addressed with provider: No  none               Method of communication with provider: none. Spoke with patient, explained care coordination. Patient is accepting of program.  Follow up call on GI concerns and the need for a PCP appointment. Patient has been scheduled and stated she is doing ok, just needs to see PCP for some concerns. No urgent needs at this time. Patient had visitors at her home so call was kept short. She was encouraged to call back with any concerns. Offered patient enrollment in the Remote Patient Monitoring (RPM) program for in-home monitoring: Patient declined. Ambulatory Care Coordination Assessment    Care Coordination Protocol  Week 1 - Initial Assessment     Do you have all of your prescriptions and are they filled?: Yes  Are you able to afford your medications?: Yes  How often do you have trouble taking your medications the way you have been told to take them?: I always take them as prescribed. Do you have Home O2 Therapy?: Yes      Ability to seek help/take action for Emergent Urgent situations i.e. fire, crime, inclement weather or health crisis. : Independent  Ability to ambulate to restroom: Independent  Ability handle personal hygeine needs (bathing/dressing/grooming): Independent  Ability to manage Medications: Independent  Ability to prepare Food Preparation: Independent  Ability to maintain home (clean home, laundry): Independent  Ability to drive and/or has transportation: Dependent  Ability to do shopping: Dependent  Ability to manage finances:  Independent  Is patient able to

## 2023-12-01 ENCOUNTER — TRANSCRIBE ORDERS (OUTPATIENT)
Dept: ADMINISTRATIVE | Age: 78
End: 2023-12-01

## 2023-12-05 ENCOUNTER — HOSPITAL ENCOUNTER (OUTPATIENT)
Age: 78
Discharge: HOME | End: 2023-12-05
Payer: MEDICARE

## 2023-12-05 ENCOUNTER — CARE COORDINATION (OUTPATIENT)
Dept: CARE COORDINATION | Age: 78
End: 2023-12-05

## 2023-12-05 ENCOUNTER — HOSPITAL ENCOUNTER (OUTPATIENT)
Dept: PAIN MANAGEMENT | Age: 78
Discharge: HOME | End: 2023-12-05
Payer: MEDICARE

## 2023-12-05 VITALS — HEIGHT: 65 IN | WEIGHT: 278 LBS | BODY MASS INDEX: 46.32 KG/M2

## 2023-12-05 DIAGNOSIS — Z79.891 CHRONIC USE OF OPIATE FOR THERAPEUTIC PURPOSE: ICD-10-CM

## 2023-12-05 DIAGNOSIS — M47.817 LUMBOSACRAL SPONDYLOSIS WITHOUT MYELOPATHY: ICD-10-CM

## 2023-12-05 DIAGNOSIS — M48.062 SPINAL STENOSIS OF LUMBAR REGION WITH NEUROGENIC CLAUDICATION: Primary | Chronic | ICD-10-CM

## 2023-12-05 LAB — TSH SERPL DL<=0.05 MIU/L-ACNC: 2.11 UIU/ML (ref 0.3–5)

## 2023-12-05 PROCEDURE — G0481 DRUG TEST DEF 8-14 CLASSES: HCPCS

## 2023-12-05 PROCEDURE — 82607 VITAMIN B-12: CPT

## 2023-12-05 PROCEDURE — 86225 DNA ANTIBODY NATIVE: CPT

## 2023-12-05 PROCEDURE — 86038 ANTINUCLEAR ANTIBODIES: CPT

## 2023-12-05 PROCEDURE — 99214 OFFICE O/P EST MOD 30 MIN: CPT | Performed by: NURSE PRACTITIONER

## 2023-12-05 PROCEDURE — 84443 ASSAY THYROID STIM HORMONE: CPT

## 2023-12-05 PROCEDURE — 36415 COLL VENOUS BLD VENIPUNCTURE: CPT

## 2023-12-05 PROCEDURE — 80307 DRUG TEST PRSMV CHEM ANLYZR: CPT

## 2023-12-05 PROCEDURE — 84207 ASSAY OF VITAMIN B-6: CPT

## 2023-12-05 PROCEDURE — 99213 OFFICE O/P EST LOW 20 MIN: CPT

## 2023-12-05 RX ORDER — OXYCODONE HYDROCHLORIDE AND ACETAMINOPHEN 5; 325 MG/1; MG/1
1 TABLET ORAL EVERY 6 HOURS PRN
Qty: 120 TABLET | Refills: 0 | Status: SHIPPED | OUTPATIENT
Start: 2023-12-05 | End: 2024-01-04

## 2023-12-05 RX ORDER — DULOXETINE 40 MG/1
40 CAPSULE, DELAYED RELEASE ORAL 2 TIMES DAILY
Qty: 180 CAPSULE | Refills: 0 | Status: SHIPPED | OUTPATIENT
Start: 2023-12-05 | End: 2024-03-04

## 2023-12-05 RX ORDER — GABAPENTIN 300 MG/1
300 CAPSULE ORAL 3 TIMES DAILY
Qty: 270 CAPSULE | Refills: 0 | Status: SHIPPED | OUTPATIENT
Start: 2023-12-05 | End: 2024-03-04

## 2023-12-05 RX ORDER — LIDOCAINE 50 MG/G
1 PATCH TOPICAL DAILY
Qty: 30 PATCH | Refills: 2 | Status: SHIPPED | OUTPATIENT
Start: 2023-12-05

## 2023-12-05 ASSESSMENT — PAIN SCALES - GENERAL: PAINLEVEL_OUTOF10: 6

## 2023-12-05 ASSESSMENT — ENCOUNTER SYMPTOMS
COUGH: 0
SHORTNESS OF BREATH: 0
CONSTIPATION: 0
BACK PAIN: 1
BOWEL INCONTINENCE: 0

## 2023-12-05 NOTE — CARE COORDINATION
Ambulatory Care Coordination Note  2023    Patient Current Location: [place]     ACM contacted the patient by telephone. Verified name and  with patient as identifiers. Provided introduction to self, and explanation of the ACM role. Challenges to be reviewed by the provider   Additional needs identified to be addressed with provider: No  none               Method of communication with provider: none. Summary  Date Care Coordination Episode Started:  23      Reason patient is in Care Coordination:     PCP referral    Topics Discussed Today:     Upcoming appointments reminders, per patient she has transportation. Medications, no concerns. Labs and imaging ordered by neurology, patient will wait for scheduling to call and go have labs done soon. Patient denied any needs today. Assessments completed today:     Fall risk  Initial assessment  Medication reconciliation  SDOH  COPD (Health assessments)      Care Coordinator plan of care: Follow up call in 1-2 weeks, continue education and support. Offered patient enrollment in the Remote Patient Monitoring (RPM) program for in-home monitoring: Patient declined. Care Coordination Interventions    Referral from Primary Care Provider: No  Suggested Interventions and Community Resources          Goals Addressed                   This Visit's Progress     Community Resource Goal   No change     Patient needs assistance with getting transportation to her medical appointments. Patient also wanted assistance with finding senior housing. Patient is now seeking  someone to assist with her housekeeping.       Barriers: fear of failure, lack of motivation, financial, lack of support, overwhelmed by complexity of regimen, stress, time constraints, medication side effects, and lack of education    Plan for overcoming my barriers: [facility] [employer]. completed an application for the [employer]    Confidence: 9/10    Anticipated Goal Completion Date:

## 2023-12-05 NOTE — PROGRESS NOTES
Chief Complaint   Patient presents with    Back Pain     Med refill         PMH     Pt reports chronic history of low back pain that radiates into right hip and buttocks at times. She is s/p bilateral knee and right hip replacement She has tried NSAIDS heat chiropractic care and with little relief. Last imaging 2020 lumbar XR that showed previous L1 compression fracture which was corrected with kyphoplasty with multilevel degenerative changes and Lumbar CT 2018 with diffuse facet arthopathy  Has completed PT at [facility]  Bilateral lumbar L3, L4, L5 medial branch block with steroid 11/21/22 and reported almost 75% relief of pain and improved activity tolerance not ready to repeat. Feels majority of her pain is nerve related and that back pain is well controlled at this time states hip pain and neuropathy is the main c/o. Not interested in increase of gabapentin d/t risk of side effects has tolerated  an increase in cymbalta. HPI:     Back Pain  This is a chronic problem. The current episode started more than 1 year ago. The problem occurs intermittently. The pain is present in the lumbar spine. Radiates to: hips. The pain is at a severity of 6/10. The symptoms are aggravated by bending, sitting and standing. Associated symptoms include headaches, leg pain, numbness (leg and feet R>L), tingling and weakness. Pertinent negatives include no bladder incontinence, bowel incontinence, chest pain or fever. Risk factors include menopause, obesity, poor posture, sedentary lifestyle and lack of exercise. She has tried ice, heat and analgesics for the symptoms. The treatment provided mild relief. Patient denies any new neurological symptoms. No bowel or bladder incontinence, no weakness, and no falling.     Pill count: appropriate / Percocet - 2 was due 12/1    Morphine equivalent: 30    Controlled Substance Monitoring:    Acute and Chronic Pain Monitoring:   RX Monitoring Periodic Controlled

## 2023-12-06 LAB — VIT B12 SERPL-MCNC: 1918 PG/ML (ref 232–1245)

## 2023-12-07 ENCOUNTER — CARE COORDINATION (OUTPATIENT)
Dept: CARE COORDINATION | Age: 78
End: 2023-12-07

## 2023-12-07 LAB
ANA SER QL IA: NEGATIVE
DSDNA IGG SER QL IA: 1.1 IU/ML
NUCLEAR IGG SER IA-RTO: 0.2 U/ML

## 2023-12-07 NOTE — CARE COORDINATION
Returned patients call, she is needing some assistance at home with ADL's. She stated she was told by [facility]k there are no resources at this time. She cannot afford to pay out of pocket more than she is, which is 1 time a month. Patient is very defeated and can not continue to live in her home, it takes up more than her monthly income. She would like JOSE Medrano to touch base with her and see if she knows what kind of wait [facility] is having on their aides. I explained there are no guarantees that Odette Gonzalez will have any additional resources but I will have her call.

## 2023-12-08 ENCOUNTER — CARE COORDINATION (OUTPATIENT)
Dept: CARE COORDINATION | Age: 78
End: 2023-12-08

## 2023-12-08 LAB — PYRIDOXAL PHOS SERPL-SCNC: 28.6 NMOL/L (ref 20–125)

## 2023-12-08 NOTE — CARE COORDINATION
[facility] phoned the patient to follow up on a request that the patient had made to Arturo Bird/LATHA. Patient shared the same thing with the [facility] that she shared with the  [facility]. Patient is in dire need of a HHA, as her health is good at this time. HC had  prayer with the patient and believing with the patient that God will make a way for  her.     Plan of Care  [facility] will follow up with patient next week

## 2023-12-09 LAB
6-ACETYLMORPHINE, UR: NOT DETECTED
7-AMINOCLONAZEPAM, URINE: NOT DETECTED
ALPHA-OH-ALPRAZ, URINE: NOT DETECTED
ALPHA-OH-MIDAZOLAM, URINE: NOT DETECTED
ALPRAZOLAM, URINE: NOT DETECTED
AMPHETAMINES, URINE: NOT DETECTED
BARBITURATES, URINE: NEGATIVE
BENZOYLECGONINE, UR: NEGATIVE
BUPRENORPHINE URINE: NOT DETECTED
CARISOPRODOL, UR: NEGATIVE
CLONAZEPAM, URINE: NOT DETECTED
CODEINE, URINE: NOT DETECTED
CREATININE URINE: 101 MG/DL (ref 20–400)
DIAZEPAM, URINE: NOT DETECTED
DRUGS EXPECTED, UR: NORMAL
EER HI RES INTERP UR: NORMAL
ETHYL GLUCURONIDE UR: NEGATIVE
FENTANYL URINE: NOT DETECTED
GABAPENTIN: PRESENT
HYDROCODONE, OPI6M: NOT DETECTED
HYDROMORPHONE, OPI3M: NOT DETECTED
LORAZEPAM, URINE: NOT DETECTED
MARIJUANA METAB, UR: NEGATIVE
MDA, UR: NOT DETECTED
MDEA, EVE, UR: NOT DETECTED
MDMA URINE: NOT DETECTED
MEPERIDINE METAB, UR: NOT DETECTED
METHADONE, URINE: NEGATIVE
METHAMPHETAMINE, URINE: NOT DETECTED
METHYLPHENIDATE: NOT DETECTED
MIDAZOLAM, URINE: NOT DETECTED
MORPHINE, OPI1M: NOT DETECTED
NALOXONE URINE: NOT DETECTED
NORBUPRENORPHINE, URINE: NOT DETECTED
NORDIAZEPAM, URINE: NOT DETECTED
NORFENTANYL, URINE: NOT DETECTED
NORHYDROCODONE, URINE: NOT DETECTED
NOROXYCODONE, URINE: PRESENT
NOROXYMORPHONE, URINE: PRESENT
OXAZEPAM, URINE: NOT DETECTED
OXYCODONE URINE: PRESENT
OXYMORPHONE, URINE: PRESENT
PAIN MANAGEMENT DRUG PANEL INTERP, URINE: NORMAL
PAIN MGT DRUG PANEL, HI RES, UR: NORMAL
PCP,URINE: NEGATIVE
PHENTERMINE, UR: NOT DETECTED
PREGABALIN: NOT DETECTED
TAPENTADOL, URINE: NOT DETECTED
TAPENTADOL-O-SULFATE, URINE: NOT DETECTED
TEMAZEPAM, URINE: NOT DETECTED
TRAMADOL, URINE: NEGATIVE
ZOLPIDEM METABOLITE (ZCA), URINE: NOT DETECTED
ZOLPIDEM, URINE: NOT DETECTED

## 2023-12-13 ENCOUNTER — CARE COORDINATION (OUTPATIENT)
Dept: CARE COORDINATION | Age: 78
End: 2023-12-13

## 2023-12-13 NOTE — CARE COORDINATION
Ambulatory Care Coordination Note  2023    Patient Current Location:  Home: [address] [place] [address]     ACM contacted the patient by telephone. Verified name and  with patient as identifiers. Provided introduction to self, and explanation of the ACM role. Challenges to be reviewed by the provider   Additional needs identified to be addressed with provider: No  none               Method of communication with provider: none. Summary  Date Care Coordination Episode Started:  23      Reason patient is in Care Coordination:     PCP referral    Topics Discussed Today:     Medications, no needs. Patient reports cutting her pain pills from pain management in half due to them causing drowsiness. Neurology, patient reported that they called her to schedule some testing(EMG). Housing, patient has decided to stay where she is for now. Home care, patient has a list of self pay agencies if needed. Denied any needs from PCP or ACM today. Assessments completed today:     Fall risk  Initial assessment  Medication reconciliation  SDOH      Care Coordinator plan of care: Follow up in 1-2 weeks, continue education and support. Offered patient enrollment in the Remote Patient Monitoring (RPM) program for in-home monitoring: Patient declined. Care Coordination Interventions    Referral from Primary Care Provider: No  Suggested Interventions and Community Resources          Goals Addressed                   This Visit's Progress     Conditions and Symptoms   Improving     I will schedule office visits, as directed by my provider. I will keep my appointment or reschedule if I have to cancel. I will notify my provider of any barriers to my plan of care. I will follow my Zone Management tool to seek urgent or emergent care. I will notify my provider of any symptoms that indicate a worsening of my condition.     Barriers: overwhelmed by complexity of regimen  Plan for

## 2023-12-26 NOTE — TELEPHONE ENCOUNTER
Meng Lafleur,     I did send a note for Dr. Lianne Davis to include documentation to support the medical necessity and request he sign his note. Unfortunately, I cannot guarantee that this will be done today as he is not in the office. I will fax the orders and the note to [address] [place] [address] [address] [place] [address], as dicussed with the patient, as soon as possible. I also have her [place] form, I am waiting on a signature from Dr. Lianne Davis and will fax on Monday when he is in the office, as well as mail the original copy to [place]. If there is anything further I can do for [place], please let me know.     Thank you,   [place], [address] done

## 2023-12-28 ENCOUNTER — CARE COORDINATION (OUTPATIENT)
Dept: CARE COORDINATION | Age: 78
End: 2023-12-28

## 2023-12-28 NOTE — CARE COORDINATION
HC phoned the patient to follow up on her wellbeing and holidays. Patient mentioned that she's feeling very gloomy maybe because   of the day and also because her health is challenging today. Patient  acknowledged that she is blessed and very grateful, however she isn't  feeling well today. HC offered encouragement to the patient and hoped  that she left her feeling better.     Plan of Care  [facility] [place], [facility]. will follow up with patient in the coming days

## 2024-01-02 ENCOUNTER — CARE COORDINATION (OUTPATIENT)
Dept: CARE COORDINATION | Age: 79
End: 2024-01-02

## 2024-01-02 DIAGNOSIS — Z76.0 MEDICATION REFILL: ICD-10-CM

## 2024-01-02 RX ORDER — LEVOCETIRIZINE DIHYDROCHLORIDE 5 MG/1
TABLET, FILM COATED ORAL
Qty: 30 TABLET | Refills: 1 | Status: SHIPPED | OUTPATIENT
Start: 2024-01-02

## 2024-01-02 NOTE — CARE COORDINATION
Ambulatory Care Coordination Note  2024    Patient Current Location:  Home: [facility] [address]  [place] [address]     ACM contacted the patient by telephone. Verified name and  with patient as identifiers. Provided introduction to self, and explanation of the ACM role.     Challenges to be reviewed by the provider   Additional needs identified to be addressed with provider: No  none               Method of communication with provider: none.   Summary  Date Care Coordination Episode Started:  23      Reason patient is in Care Coordination:     PCP REFERRAL    Topics Discussed Today:     Sinus-Allergy issues reported by patient, requesting a refill of xyzal, will pend to PCP. Patient stated she may test herself for covid but does not think she has it.  Vaccine questions answered.   Patient denied any other needs, encouraged to call if any symptoms arise.       Assessments completed today:     Fall risk  Initial assessment  Medication reconciliation  SDOH      Care Coordinator plan of care:     Follow up in 1-2 weeks, continue education and support.       Offered patient enrollment in the Remote Patient Monitoring (RPM) program for in-home monitoring: Patient declined.      Care Coordination Interventions    Referral from Primary Care Provider: No  Suggested Interventions and Community Resources          Goals Addressed                   This Visit's Progress     Conditions and Symptoms   No change     I will schedule office visits, as directed by my provider.  I will keep my appointment or reschedule if I have to cancel.  I will notify my provider of any barriers to my plan of care.  I will follow my Zone Management tool to seek urgent or emergent care.  I will notify my provider of any symptoms that indicate a worsening of my condition.    Barriers: overwhelmed by complexity of regimen  Plan for overcoming my barriers: work with ACM  Confidence: 10/10  Anticipated Goal Completion Date: 24

## 2024-01-04 ENCOUNTER — CARE COORDINATION (OUTPATIENT)
Dept: CARE COORDINATION | Age: 79
End: 2024-01-04

## 2024-01-05 NOTE — CARE COORDINATION
HC phoned and spoke with the patient, she stated that she was   feeling quite well today.  HC wished the patient a Happy New Year.  Patient exchanged the greeting.  HC informed patient that she was   calling to inquire if the plans that had been mentioned of her nephew  coming to assist her with her housekeeping.  She stated that her nephew  will come as often as he can due to assisting his elderly mother as well.  Patient reported that her nephew is always willing to assist her whenever  he gets the opportunity.    Plan of Care  HC will follow up with patient regarding other social needs

## 2024-01-08 RX ORDER — METOPROLOL TARTRATE 50 MG/1
50 TABLET, FILM COATED ORAL 2 TIMES DAILY
Qty: 180 TABLET | Refills: 3 | Status: SHIPPED | OUTPATIENT
Start: 2024-01-08

## 2024-01-11 ENCOUNTER — HOSPITAL ENCOUNTER (OUTPATIENT)
Dept: MRI IMAGING | Age: 79
Discharge: HOME | End: 2024-01-13
Payer: MEDICARE

## 2024-01-11 ENCOUNTER — HOSPITAL ENCOUNTER (OUTPATIENT)
Dept: PAIN MANAGEMENT | Age: 79
Discharge: HOME | End: 2024-01-11
Payer: MEDICARE

## 2024-01-11 VITALS — HEIGHT: 65 IN | BODY MASS INDEX: 46.32 KG/M2 | WEIGHT: 278 LBS

## 2024-01-11 DIAGNOSIS — K59.03 THERAPEUTIC OPIOID INDUCED CONSTIPATION: ICD-10-CM

## 2024-01-11 DIAGNOSIS — M47.12 SPONDYLOSIS OF CERVICAL SPINE WITH MYELOPATHY AND RADICULOPATHY: ICD-10-CM

## 2024-01-11 DIAGNOSIS — T40.2X5A THERAPEUTIC OPIOID INDUCED CONSTIPATION: ICD-10-CM

## 2024-01-11 DIAGNOSIS — M51.36 DDD (DEGENERATIVE DISC DISEASE), LUMBAR: ICD-10-CM

## 2024-01-11 DIAGNOSIS — M47.22 SPONDYLOSIS OF CERVICAL SPINE WITH MYELOPATHY AND RADICULOPATHY: ICD-10-CM

## 2024-01-11 DIAGNOSIS — M48.062 SPINAL STENOSIS OF LUMBAR REGION WITH NEUROGENIC CLAUDICATION: Primary | Chronic | ICD-10-CM

## 2024-01-11 DIAGNOSIS — M47.817 LUMBOSACRAL SPONDYLOSIS WITHOUT MYELOPATHY: ICD-10-CM

## 2024-01-11 DIAGNOSIS — M54.16 LUMBAR RADICULOPATHY: ICD-10-CM

## 2024-01-11 DIAGNOSIS — M47.816 LUMBAR SPONDYLOSIS: ICD-10-CM

## 2024-01-11 PROCEDURE — 72141 MRI NECK SPINE W/O DYE: CPT

## 2024-01-11 PROCEDURE — 99213 OFFICE O/P EST LOW 20 MIN: CPT | Performed by: NURSE PRACTITIONER

## 2024-01-11 PROCEDURE — 72148 MRI LUMBAR SPINE W/O DYE: CPT

## 2024-01-11 PROCEDURE — 99213 OFFICE O/P EST LOW 20 MIN: CPT

## 2024-01-11 RX ORDER — LUBIPROSTONE 8 UG/1
8 CAPSULE ORAL DAILY
Qty: 30 CAPSULE | Refills: 3 | Status: SHIPPED | OUTPATIENT
Start: 2024-01-11

## 2024-01-11 RX ORDER — LIDOCAINE 50 MG/G
1 PATCH TOPICAL DAILY
Qty: 90 PATCH | Refills: 0 | Status: SHIPPED | OUTPATIENT
Start: 2024-01-11 | End: 2024-04-10

## 2024-01-11 RX ORDER — OXYCODONE HYDROCHLORIDE 5 MG/1
5 TABLET ORAL EVERY 6 HOURS PRN
Qty: 120 TABLET | Refills: 0 | Status: SHIPPED | OUTPATIENT
Start: 2024-01-11 | End: 2024-02-10

## 2024-01-11 ASSESSMENT — ENCOUNTER SYMPTOMS
BACK PAIN: 1
CONSTIPATION: 1
BOWEL INCONTINENCE: 0
SHORTNESS OF BREATH: 0
COUGH: 0

## 2024-01-11 NOTE — PROGRESS NOTES
illness (including ROS and PFSH) and vital documentation by my staff and I agree with their documentation and have added where applicable.

## 2024-01-16 ENCOUNTER — CARE COORDINATION (OUTPATIENT)
Dept: CARE COORDINATION | Age: 79
End: 2024-01-16

## 2024-01-16 NOTE — CARE COORDINATION
Ambulatory Care Coordination Note  2024    Patient Current Location:  Home: [facility] [address]  [place] [address]     ACM contacted the patient by telephone. Verified name and  with patient as identifiers. Provided introduction to self, and explanation of the ACM role.     Challenges to be reviewed by the provider   Additional needs identified to be addressed with provider: No  none               Method of communication with provider: none.  Summary  Date Care Coordination Episode Started:  23      Reason patient is in Care Coordination:     PCP REFERRAL    Topics Discussed Today:     Medications, no needs or concerns today.  Constipation, per patient she started a new medication to help with this, she will call if there is no improvement. Patient believes it is related to her pain medication.   COPD, no concerns today. Reminder to avoid the extreme cold, if she has to go out she should cover her nose and mouth.   Patient denied any needs today from PCP or ACM, she spoke about a recent loss in her family. ACM listened and offered support.       Assessments completed today:     Fall risk  Initial assessment  Medication reconciliation  SDOH  COPD (Health assessments)      Care Coordinator plan of care:     Follow up in 1-2 weeks, continue education and support.      Offered patient enrollment in the Remote Patient Monitoring (RPM) program for in-home monitoring: Patient declined.        Care Coordination Interventions    Referral from Primary Care Provider: No  Suggested Interventions and Community Resources          Goals Addressed                   This Visit's Progress     Conditions and Symptoms   Improving     I will schedule office visits, as directed by my provider.  I will keep my appointment or reschedule if I have to cancel.  I will notify my provider of any barriers to my plan of care.  I will follow my Zone Management tool to seek urgent or emergent care.  I will notify my provider of

## 2024-01-19 ENCOUNTER — CARE COORDINATION (OUTPATIENT)
Dept: CARE COORDINATION | Age: 79
End: 2024-01-19

## 2024-01-19 NOTE — CARE COORDINATION
Patient called and left a message for the HC stating that she would like to receive  a return call.  Patient stated that she had received a letter from her complex stating  that her lease is about to be up.  HC phoned the patient to inquire of her need. She  stated that she has less than two months to decide if she's going to stay at her current  complex.  She stated that her rent has increased to the point that she can't afford it   anymore.  She also stated that her daughter and granddaughter have given her a list  of senior housing locations.  Patient will be pursuing that task and inquire if the HC   knows of any available places.    HC consented to inform the patient of any listings   that she hears of.    Plan of Care  HC will follow up with the patient in a couple of weeks

## 2024-02-05 RX ORDER — DULOXETINE 40 MG/1
1 CAPSULE, DELAYED RELEASE ORAL 2 TIMES DAILY
Qty: 180 CAPSULE | Refills: 3 | OUTPATIENT
Start: 2024-02-05

## 2024-02-07 ENCOUNTER — CARE COORDINATION (OUTPATIENT)
Dept: INTERNAL MEDICINE CLINIC | Age: 79
End: 2024-02-07

## 2024-02-07 NOTE — CARE COORDINATION
Attempting to reach patient for a follow up care coordination call. Left detailed message for the patient to return my call with any questions or concerns.  ADAM ChoudharyN, RN ambulatory care manager [phone].

## 2024-02-08 ENCOUNTER — HOSPITAL ENCOUNTER (OUTPATIENT)
Dept: PAIN MANAGEMENT | Age: 79
Discharge: HOME | End: 2024-02-08
Payer: MEDICARE

## 2024-02-08 ENCOUNTER — CARE COORDINATION (OUTPATIENT)
Dept: CARE COORDINATION | Age: 79
End: 2024-02-08

## 2024-02-08 VITALS — HEIGHT: 65 IN | WEIGHT: 278 LBS | BODY MASS INDEX: 46.32 KG/M2

## 2024-02-08 DIAGNOSIS — M51.36 DDD (DEGENERATIVE DISC DISEASE), LUMBAR: Primary | ICD-10-CM

## 2024-02-08 DIAGNOSIS — M54.16 LUMBAR RADICULOPATHY: ICD-10-CM

## 2024-02-08 DIAGNOSIS — M48.062 SPINAL STENOSIS OF LUMBAR REGION WITH NEUROGENIC CLAUDICATION: Chronic | ICD-10-CM

## 2024-02-08 DIAGNOSIS — T40.2X5A THERAPEUTIC OPIOID INDUCED CONSTIPATION: ICD-10-CM

## 2024-02-08 DIAGNOSIS — M47.816 LUMBAR SPONDYLOSIS: ICD-10-CM

## 2024-02-08 DIAGNOSIS — K59.03 THERAPEUTIC OPIOID INDUCED CONSTIPATION: ICD-10-CM

## 2024-02-08 PROCEDURE — 99214 OFFICE O/P EST MOD 30 MIN: CPT | Performed by: NURSE PRACTITIONER

## 2024-02-08 PROCEDURE — 99213 OFFICE O/P EST LOW 20 MIN: CPT

## 2024-02-08 RX ORDER — OXYCODONE HYDROCHLORIDE 5 MG/1
5 TABLET ORAL EVERY 6 HOURS PRN
Qty: 120 TABLET | Refills: 0 | Status: SHIPPED | OUTPATIENT
Start: 2024-02-10 | End: 2024-03-11

## 2024-02-08 RX ORDER — GABAPENTIN 300 MG/1
300 CAPSULE ORAL 3 TIMES DAILY
Qty: 270 CAPSULE | Refills: 0 | Status: SHIPPED | OUTPATIENT
Start: 2024-02-08 | End: 2024-05-08

## 2024-02-08 RX ORDER — LUBIPROSTONE 8 UG/1
16 CAPSULE ORAL DAILY
Qty: 60 CAPSULE | Refills: 0 | Status: SHIPPED | OUTPATIENT
Start: 2024-02-08 | End: 2024-03-09

## 2024-02-08 RX ORDER — DULOXETINE 40 MG/1
40 CAPSULE, DELAYED RELEASE ORAL 2 TIMES DAILY
Qty: 180 CAPSULE | Refills: 0 | Status: SHIPPED | OUTPATIENT
Start: 2024-02-08 | End: 2024-05-08

## 2024-02-08 ASSESSMENT — ENCOUNTER SYMPTOMS
SHORTNESS OF BREATH: 0
BOWEL INCONTINENCE: 0
BACK PAIN: 1
CONSTIPATION: 1
COUGH: 0

## 2024-02-08 NOTE — PROGRESS NOTES
Chief Complaint   Patient presents with    Medication Refill     Roxicodone due 2/10    Back Pain         PMH     Pt reports chronic history of low back pain that radiates into right hip and buttocks at times. She is s/p bilateral knee and right hip replacement   She has tried NSAIDS heat chiropractic care and with little relief.  Last imaging 2020 lumbar XR that showed previous L1 compression fracture which was corrected with kyphoplasty with multilevel degenerative changes   Lumbar CT 2018 with diffuse facet arthopathy  Has completed PT at [facility]  Bilateral lumbar L3, L4, L5 medial branch block with steroid 11/21/22 and reported almost 75% relief of pain and improved activity tolerance not ready to repeat.   Feels majority of her pain is nerve related and that back pain is well controlled at this time states hip pain and neuropathy is the main c/o.  Not interested in increase of gabapentin d/t risk of side effects has tolerated  an increase in cymbalta.       HPI:   Back Pain  This is a chronic problem. The current episode started more than 1 year ago. The problem occurs constantly. The problem is unchanged. The pain is present in the lumbar spine. The quality of the pain is described as shooting and stabbing. Radiates to: Bilateral Groin/Hips. The pain is at a severity of 8/10. The pain is severe. The pain is Worse during the day. The symptoms are aggravated by bending, sitting, standing and twisting. Stiffness is present All day. Associated symptoms include leg pain, numbness, paresthesias and weakness. Pertinent negatives include no bladder incontinence, bowel incontinence, chest pain or fever. Risk factors include menopause, obesity, poor posture, sedentary lifestyle and lack of exercise. She has tried heat, ice, home exercises, chiropractic manipulation and analgesics for the symptoms. The treatment provided significant relief.        Patient denies any new neurological symptoms. No bowel or

## 2024-02-08 NOTE — CARE COORDINATION
Spoke with patient briefly who stated she is trying to get ready for an appointment, she will call back with any needs.

## 2024-02-13 ENCOUNTER — CARE COORDINATION (OUTPATIENT)
Dept: CARE COORDINATION | Age: 79
End: 2024-02-13

## 2024-02-13 NOTE — CARE COORDINATION
Patient called asking if she could try ozempic for weight loss. I explained that I could route this to her PCP but that insurance will likely decline it due to her not being diabetic.   Patient stated that she has decided to try, GOLO, another weight loss option.   She denied any other needs today.

## 2024-02-14 ENCOUNTER — CARE COORDINATION (OUTPATIENT)
Dept: CARE COORDINATION | Age: 79
End: 2024-02-14

## 2024-02-14 NOTE — CARE COORDINATION
Patient phoned today stating that she is trying hard to find housing, stated that she will need to make some decisions by or before March1st.  Patient was inquiring if the HC had any knowledge of [facility] and how they operate along with cost.  HC shared with the patient that she doesn't have any concrete information and have been in touch with them since prior to COVID, which soon will be four years.  Patient stated that she spoke with someone at [facility] and they had given her information regarding functionality of how things go with finding housing, Assisted Living, etc....    Plan of Care  Patient will get back in touch with the HC after she hears from [facility]

## 2024-02-19 ENCOUNTER — CARE COORDINATION (OUTPATIENT)
Dept: CARE COORDINATION | Age: 79
End: 2024-02-19

## 2024-02-19 NOTE — CARE COORDINATION
HC phoned the patient to follow up on her plans for finding another living situation, more on the order of a Handicap Accessible Apartment.  She stated that she has a signed her lease again, and will be looking for other housing.    Patient is researching other options.    Plan of Care   HC will check in with patient regarding her housing situation.

## 2024-02-23 ENCOUNTER — OFFICE VISIT (OUTPATIENT)
Dept: FAMILY MEDICINE CLINIC | Age: 79
End: 2024-02-23
Payer: MEDICARE

## 2024-02-23 VITALS
TEMPERATURE: 98.2 F | OXYGEN SATURATION: 96 % | DIASTOLIC BLOOD PRESSURE: 67 MMHG | SYSTOLIC BLOOD PRESSURE: 161 MMHG | HEART RATE: 64 BPM

## 2024-02-23 DIAGNOSIS — R35.0 URINARY FREQUENCY: ICD-10-CM

## 2024-02-23 DIAGNOSIS — R09.82 PND (POST-NASAL DRIP): ICD-10-CM

## 2024-02-23 DIAGNOSIS — R39.9 UTI SYMPTOMS: Primary | ICD-10-CM

## 2024-02-23 PROCEDURE — 1090F PRES/ABSN URINE INCON ASSESS: CPT | Performed by: NURSE PRACTITIONER

## 2024-02-23 PROCEDURE — 1123F ACP DISCUSS/DSCN MKR DOCD: CPT | Performed by: NURSE PRACTITIONER

## 2024-02-23 PROCEDURE — G8484 FLU IMMUNIZE NO ADMIN: HCPCS | Performed by: NURSE PRACTITIONER

## 2024-02-23 PROCEDURE — 1036F TOBACCO NON-USER: CPT | Performed by: NURSE PRACTITIONER

## 2024-02-23 PROCEDURE — 3077F SYST BP >= 140 MM HG: CPT | Performed by: NURSE PRACTITIONER

## 2024-02-23 PROCEDURE — G8417 CALC BMI ABV UP PARAM F/U: HCPCS | Performed by: NURSE PRACTITIONER

## 2024-02-23 PROCEDURE — 3078F DIAST BP <80 MM HG: CPT | Performed by: NURSE PRACTITIONER

## 2024-02-23 PROCEDURE — G8427 DOCREV CUR MEDS BY ELIG CLIN: HCPCS | Performed by: NURSE PRACTITIONER

## 2024-02-23 PROCEDURE — G8399 PT W/DXA RESULTS DOCUMENT: HCPCS | Performed by: NURSE PRACTITIONER

## 2024-02-23 PROCEDURE — 99214 OFFICE O/P EST MOD 30 MIN: CPT | Performed by: NURSE PRACTITIONER

## 2024-02-23 RX ORDER — FLUTICASONE PROPIONATE 50 MCG
2 SPRAY, SUSPENSION (ML) NASAL DAILY
Qty: 16 G | Refills: 0 | Status: SHIPPED | OUTPATIENT
Start: 2024-02-23

## 2024-02-23 RX ORDER — LEVOFLOXACIN 250 MG/1
250 TABLET, FILM COATED ORAL DAILY
Qty: 5 TABLET | Refills: 0 | Status: SHIPPED | OUTPATIENT
Start: 2024-02-23 | End: 2024-02-28

## 2024-02-23 ASSESSMENT — ENCOUNTER SYMPTOMS
COUGH: 1
SORE THROAT: 0
VOMITING: 0
HOARSE VOICE: 0
SINUS COMPLAINT: 1
SWOLLEN GLANDS: 0
SINUS PRESSURE: 0
NAUSEA: 0
SHORTNESS OF BREATH: 0

## 2024-02-24 ENCOUNTER — HOSPITAL ENCOUNTER (OUTPATIENT)
Age: 79
Setting detail: SPECIMEN
Discharge: HOME | End: 2024-02-24

## 2024-02-24 NOTE — PROGRESS NOTES
[facility] PHYSICIANS [facility] WALK-IN FAMILY MEDICINE  [address]  [address]  [facility] [address]  Dept: [phone]  Dept Fax: [phone]    Sunshine Hill is a 78 y.o. female who presents to the urgent care today for her medical conditions/complaints as notedbelow.  Sunshine Hill is c/o of Urinary Frequency (Onset 1 week )      HPI:     78 yr old female presents for uti sx  Also having sinus sx, with pnd, left ear pain 3 days  Neg home covid test  No fevers, feels well      Dysuria   This is a new problem. The current episode started in the past 7 days. The problem occurs every urination. The problem has been unchanged. The pain is at a severity of 0/10. The patient is experiencing no pain. There has been no fever. She is Not sexually active. Associated symptoms include frequency, hesitancy and urgency. Pertinent negatives include no chills, discharge, flank pain, hematuria, nausea, possible pregnancy, sweats or vomiting. She has tried nothing for the symptoms. Her past medical history is significant for recurrent UTIs (hx of).   Sinus Problem  This is a new problem. The current episode started in the past 7 days (3 days). The problem is unchanged. There has been no fever. She is experiencing no pain. Associated symptoms include congestion, coughing and ear pain. Pertinent negatives include no chills, diaphoresis, headaches, hoarse voice, neck pain, shortness of breath, sinus pressure, sneezing, sore throat or swollen glands. Treatments tried: mucinex. The treatment provided mild relief.       Past Medical History:   Diagnosis Date    Abnormality of rib determined by X-ray 1/28/2016    Acute cystitis without hematuria 6/16/2018    Acute exacerbation of chronic bronchitis (HCC)     Acute on chronic diastolic heart failure (HCC) 1/28/2016    Adjustment disorder with mixed anxiety and depressed mood 6/26/2015    Mild     Anemia 3/5/2014    Back pain

## 2024-02-25 LAB
MICROORGANISM SPEC CULT: ABNORMAL
SPECIMEN DESCRIPTION: ABNORMAL

## 2024-02-26 LAB
MICROORGANISM SPEC CULT: ABNORMAL
SPECIMEN DESCRIPTION: ABNORMAL

## 2024-02-29 ENCOUNTER — CARE COORDINATION (OUTPATIENT)
Dept: CARE COORDINATION | Age: 79
End: 2024-02-29

## 2024-02-29 NOTE — CARE COORDINATION
provider of any symptoms that indicate a worsening of my condition.    Barriers: overwhelmed by complexity of regimen  Plan for overcoming my barriers: work with ACM  Confidence: 10/10  Anticipated Goal Completion Date: 2.2.24                Future Appointments   Date Time Provider Department Center   3/6/2024  2:00 PM Zeus Webster DO [facility] [place]   4/9/2024  2:00 PM [facility]  [facility] [place]   4/9/2024  2:00 PM Jarrod Mendez MD [facility]   4/25/2024  1:30 PM [facility]  [facility] [place]   4/25/2024  1:30 PM Jarrod Mendez MD [facility]   ,   COPD Assessment    Does the patient understand envrionmental exposure?: Yes  Is the patient able to verbalize Rescue vs. Long Acting medications?: Yes  Does the patient have a nebulizer?: No     No patient-reported symptoms         Symptoms:          , and   General Assessment    Do you have any symptoms that are causing concern?: Yes  Progression since Onset: Intermittent - Waxing/Waning  Reported Symptoms: Fatigue

## 2024-03-01 ENCOUNTER — CARE COORDINATION (OUTPATIENT)
Dept: CARE COORDINATION | Age: 79
End: 2024-03-01

## 2024-03-01 NOTE — CARE COORDINATION
Patient called stating that her home oxygen is not working. She is unsure where she received the equipment from, can not find the number. She thought Medical supply sounded familiar.   Her oxygen tank is over 6 years old. Per patient she does not send payments to anyone for it.     She does not use CPAP or Bi PAP. Oxygen at night only, it has been broke for 2 nights and she is starting to feel like she needs it. I can not find any old information on where the O2 is from.   She agreed to have PCP office call to get an appointment to be evaluated for O2 and start the process over.   [facility] is due out today for an assessment.     I spoke with [facility] who stated they do not have her in their system.     I called Alpesh who stated she is no longer a patient of theirs since 2018 and that was for a walker.     I spoke again with patient who agreed to an appointment with PCP to re evaluate. Per patient she no longer sees pulmonology either.   Will route to [facility] at PCP office to call to schedule.   Patient educated on S&S to report to the ED for, she does have a pulse ox at home.

## 2024-03-04 ENCOUNTER — CARE COORDINATION (OUTPATIENT)
Dept: CARE COORDINATION | Age: 79
End: 2024-03-04

## 2024-03-04 NOTE — TELEPHONE ENCOUNTER
Patient returned call and scheduled an OV. Patient has the contact info for DME company and will be calling to request a new order or a repair to her current machine. Informed for new orders insurance will likely require updated documentation which can be done at upcoming appt. Patient will follow up with company and have info sent to out office if needed. Fax number provided.

## 2024-03-04 NOTE — CARE COORDINATION
Called medical services company, per patient this is the place that was supposed to call her back. They did verify that they do see she was a patient that they inherited her from [facility]. They will need a new order. To continue service but will put in a service ticket to have her concentrator serviced in the mean time.   I explained that we will be getting her scheduled for an appt with PCP for an updated O2 order.     Spoke with patient and explained that they would be calling her and to keep her phone handy.     She has been scheduled with PCP this week per patient.

## 2024-03-04 NOTE — CARE COORDINATION
Patient called back and stated she found the number for her oxygen and someone was supposed to be calling her. She will also accept an appointment when Genoveva from PCP office calls her. She would like to get a new home O2 order and talk to PCP about her thyroid.   Patient will call ACM when she gets up with the number to her O2 company. She stated her O2 was averaging 93% this weekend.

## 2024-03-06 ENCOUNTER — HOSPITAL ENCOUNTER (OUTPATIENT)
Dept: PAIN MANAGEMENT | Age: 79
Discharge: HOME | End: 2024-03-06
Payer: MEDICARE

## 2024-03-06 VITALS — BODY MASS INDEX: 46.32 KG/M2 | HEIGHT: 65 IN | WEIGHT: 278 LBS

## 2024-03-06 DIAGNOSIS — K59.03 THERAPEUTIC OPIOID INDUCED CONSTIPATION: ICD-10-CM

## 2024-03-06 DIAGNOSIS — T40.2X5A THERAPEUTIC OPIOID INDUCED CONSTIPATION: ICD-10-CM

## 2024-03-06 DIAGNOSIS — Z79.891 CHRONIC USE OF OPIATE FOR THERAPEUTIC PURPOSE: Primary | ICD-10-CM

## 2024-03-06 DIAGNOSIS — E66.01 CLASS 3 SEVERE OBESITY WITH BODY MASS INDEX (BMI) OF 45.0 TO 49.9 IN ADULT, UNSPECIFIED OBESITY TYPE, UNSPECIFIED WHETHER SERIOUS COMORBIDITY PRESENT (HCC): ICD-10-CM

## 2024-03-06 DIAGNOSIS — M48.062 SPINAL STENOSIS OF LUMBAR REGION WITH NEUROGENIC CLAUDICATION: ICD-10-CM

## 2024-03-06 DIAGNOSIS — M47.817 LUMBOSACRAL SPONDYLOSIS WITHOUT MYELOPATHY: ICD-10-CM

## 2024-03-06 DIAGNOSIS — M51.36 DDD (DEGENERATIVE DISC DISEASE), LUMBAR: ICD-10-CM

## 2024-03-06 PROCEDURE — 99214 OFFICE O/P EST MOD 30 MIN: CPT | Performed by: STUDENT IN AN ORGANIZED HEALTH CARE EDUCATION/TRAINING PROGRAM

## 2024-03-06 PROCEDURE — 99213 OFFICE O/P EST LOW 20 MIN: CPT

## 2024-03-06 RX ORDER — LUBIPROSTONE 8 UG/1
16 CAPSULE ORAL DAILY
Qty: 60 CAPSULE | Refills: 2 | Status: SHIPPED | OUTPATIENT
Start: 2024-03-06 | End: 2024-06-04

## 2024-03-06 RX ORDER — OXYCODONE HYDROCHLORIDE 5 MG/1
5 TABLET ORAL EVERY 6 HOURS PRN
Qty: 120 TABLET | Refills: 0 | Status: SHIPPED | OUTPATIENT
Start: 2024-03-09 | End: 2024-04-08

## 2024-03-06 ASSESSMENT — PAIN SCALES - GENERAL: PAINLEVEL_OUTOF10: 7

## 2024-03-06 ASSESSMENT — PAIN DESCRIPTION - LOCATION: LOCATION: BACK

## 2024-03-06 NOTE — PROGRESS NOTES
Chronic Pain Clinic Note     Encounter Date: 3/6/2024     SUBJECTIVE:  Chief Complaint   Patient presents with    Back Pain    Medication Refill     Amatiza due 3/9  Roxicodone due 3/11       History of Present Illness:   Sunshine Hill is a 78 y.o. female who presents with back pain    Medication Refill: Oxycodone - 5    Current Complaints of Pain:   Location: Low back  Radiation: left leg  Severity: moderate  Pain Numerical Score - 7   Average: 8     Highest: 10+  Lowest: 4  Character/Quality: Complains of pain that is aching, burning, cramping, shooting, stabbing  Timing: Constant  Associated symptoms: none  Numbness: yes  Weakness: yes  Exacerbating factors: walking, standing, bending  Alleviating factors: sitting with back support  Length of time pain has been present: Started years ago  Inciting event/injury: work injury  Bowel/Bladder incontinence: no  Falls: no  Physical Therapy: no    History of Interventions:   Surgery: No previous lumbar/cervical surgeries  Injections: None    Imaging:    Lumbar CT 9/28/2018    CT LUMBAR SPINE:       Superior endplate compression of L1.  Although this was present on prior   plain film imaging in March 2018, it has worsened, and there are findings   suggesting acute exacerbation of the pre-existing compression.  Severe   degenerative disc disease is noted with severe bilateral neural foraminal   narrowing and canal stenosis as above.       Past Medical History:   Diagnosis Date    Abnormality of rib determined by X-ray 1/28/2016    Acute cystitis without hematuria 6/16/2018    Acute exacerbation of chronic bronchitis (HCC)     Acute on chronic diastolic heart failure (HCC) 1/28/2016    Adjustment disorder with mixed anxiety and depressed mood 6/26/2015    Mild     Anemia 3/5/2014    Back pain     Bronchiectasis with acute lower respiratory infection (HCC) 8/16/2017    Bronchitis     Chronic bronchitis (HCC) 1/28/2016    Chronic cough 7/23/2018    COPD (chronic obstructive

## 2024-03-08 ENCOUNTER — OFFICE VISIT (OUTPATIENT)
Dept: INTERNAL MEDICINE CLINIC | Age: 79
End: 2024-03-08

## 2024-03-08 VITALS
DIASTOLIC BLOOD PRESSURE: 78 MMHG | BODY MASS INDEX: 46.32 KG/M2 | SYSTOLIC BLOOD PRESSURE: 134 MMHG | WEIGHT: 278 LBS | HEIGHT: 65 IN | OXYGEN SATURATION: 97 % | HEART RATE: 60 BPM

## 2024-03-08 DIAGNOSIS — I71.40 ABDOMINAL AORTIC ANEURYSM (AAA) WITHOUT RUPTURE, UNSPECIFIED PART (HCC): Primary | ICD-10-CM

## 2024-03-08 DIAGNOSIS — G47.33 OSA (OBSTRUCTIVE SLEEP APNEA): ICD-10-CM

## 2024-03-08 DIAGNOSIS — Z00.00 MEDICARE ANNUAL WELLNESS VISIT, SUBSEQUENT: ICD-10-CM

## 2024-03-08 DIAGNOSIS — J43.1 PANLOBULAR EMPHYSEMA (HCC): ICD-10-CM

## 2024-03-08 DIAGNOSIS — I50.33 ACUTE ON CHRONIC DIASTOLIC HEART FAILURE (HCC): ICD-10-CM

## 2024-03-08 DIAGNOSIS — M51.36 DDD (DEGENERATIVE DISC DISEASE), LUMBAR: ICD-10-CM

## 2024-03-08 DIAGNOSIS — J44.9 CHRONIC OBSTRUCTIVE PULMONARY DISEASE, UNSPECIFIED COPD TYPE (HCC): ICD-10-CM

## 2024-03-08 ASSESSMENT — PATIENT HEALTH QUESTIONNAIRE - PHQ9
SUM OF ALL RESPONSES TO PHQ QUESTIONS 1-9: 0
5. POOR APPETITE OR OVEREATING: 0
2. FEELING DOWN, DEPRESSED OR HOPELESS: 0
SUM OF ALL RESPONSES TO PHQ9 QUESTIONS 1 & 2: 0
SUM OF ALL RESPONSES TO PHQ QUESTIONS 1-9: 0
SUM OF ALL RESPONSES TO PHQ QUESTIONS 1-9: 0
7. TROUBLE CONCENTRATING ON THINGS, SUCH AS READING THE NEWSPAPER OR WATCHING TELEVISION: 0
10. IF YOU CHECKED OFF ANY PROBLEMS, HOW DIFFICULT HAVE THESE PROBLEMS MADE IT FOR YOU TO DO YOUR WORK, TAKE CARE OF THINGS AT HOME, OR GET ALONG WITH OTHER PEOPLE: 0
1. LITTLE INTEREST OR PLEASURE IN DOING THINGS: 0
3. TROUBLE FALLING OR STAYING ASLEEP: 0
6. FEELING BAD ABOUT YOURSELF - OR THAT YOU ARE A FAILURE OR HAVE LET YOURSELF OR YOUR FAMILY DOWN: 0
8. MOVING OR SPEAKING SO SLOWLY THAT OTHER PEOPLE COULD HAVE NOTICED. OR THE OPPOSITE, BEING SO FIGETY OR RESTLESS THAT YOU HAVE BEEN MOVING AROUND A LOT MORE THAN USUAL: 0
SUM OF ALL RESPONSES TO PHQ QUESTIONS 1-9: 0
4. FEELING TIRED OR HAVING LITTLE ENERGY: 0
9. THOUGHTS THAT YOU WOULD BE BETTER OFF DEAD, OR OF HURTING YOURSELF: 0

## 2024-03-08 ASSESSMENT — LIFESTYLE VARIABLES
HOW MANY STANDARD DRINKS CONTAINING ALCOHOL DO YOU HAVE ON A TYPICAL DAY: PATIENT DOES NOT DRINK
HOW OFTEN DO YOU HAVE A DRINK CONTAINING ALCOHOL: NEVER

## 2024-03-08 NOTE — PROGRESS NOTES
Medicare Annual Wellness Visit    Sunshine Hill is here for Medicare AWV (Needs a new oxygen order )    Assessment & Plan   Abdominal aortic aneurysm (AAA) without rupture, unspecified part (HCC)  Acute on chronic diastolic heart failure (HCC)    Recommendations for Preventive Services Due: see orders and patient instructions/AVS.  Recommended screening schedule for the next 5-10 years is provided to the patient in written form: see Patient Instructions/AVS.   1. Abdominal aortic aneurysm (AAA) without rupture, unspecified part (HCC)      2. Acute on chronic diastolic heart failure (HCC)  - TSH With Reflex Ft4; Future    3. DDD (degenerative disc disease), lumbar  - Basic Metabolic Panel; Future  - DME Order for Wheel Chair as OP    4. Panlobular emphysema (HCC)  Stable     5. Chronic obstructive pulmonary disease, unspecified COPD type (HCC)  - DME Order for Home Oxygen as OP    6. LUZ (obstructive sleep apnea)    - TSH With Reflex Ft4; Future      No follow-ups on file.    Reviewed prior labs and health maintenance.      Discussed use, benefit, and side effects of prescribed medications.  Barriers to medication compliance addressed.  All patient questions answered.  Pt voiced understanding.         Juan Pittman MD  [facility]  3/8/2024, 3:56 PM    Please note that this chart was generated using voice recognition Dragon dictation software.  Although every effort was made to ensure the accuracy of this automated transcription, some errors in transcription may have occurred.      Subjective       Patient's complete Health Risk Assessment and screening values have been reviewed and are found in Flowsheets. The following problems were reviewed today and where indicated follow up appointments were made and/or referrals ordered.    Positive Risk Factor Screenings with Interventions:            Controlled Medication Review:      Today's Pain Level: No data recorded   Opioid Risk: (Low risk score <55) Opioid

## 2024-03-08 NOTE — PROGRESS NOTES
Subjective:      Patient ID: Sunshine Hill is a 78 y.o. female.    HPI    Review of Systems    Objective:   Physical Exam    Assessment / Plan:           Visit Information    Have you changed or started any medications since your last visit including any over-the-counter medicines, vitamins, or herbal medicines? Yes, cymbalta increase    Are you having any side effects from any of your medications? -  no  Have you stopped taking any of your medications? Is so, why? -  no    Have you seen any other physician or provider since your last visit? Yes - Records Obtained  Have you had any other diagnostic tests since your last visit? No  Have you been seen in the emergency room and/or had an admission to a hospital since we last saw you? No  Have you had your routine dental cleaning in the past 6 months? no    Have you activated your [employer] account? If not, what are your barriers? Yes     Patient Care Team:  Juan Pittman MD as PCP - General (Internal Medicine)  Juan Pittman MD [facility]  Vilma Edmonds MD as Consulting Physician (Gastroenterology)  Polly Prieto MD as Consulting Physician (Cardiology)  Julio César Masters MD as Consulting Physician (Pulmonology)  Anant Tan as Health   Amaris Bird RN as Ambulatory Care Manager    Medical History Review  Past Medical, Family, and Social History reviewed and does not contribute to the patient presenting condition    Health Maintenance   Topic Date Due    Hepatitis C screen  Never done    Shingles vaccine (1 of 2) Never done    Respiratory Syncytial Virus (RSV) Pregnant or age 60 yrs+ (1 - 1-dose 60+ series) Never done    Flu vaccine (1) Never done    COVID-19 Vaccine (6 - 2023-24 season) 09/01/2023    Annual Wellness Visit (Medicare Advantage)  01/01/2024    Depression Monitoring  08/17/2024    DTaP/Tdap/Td vaccine (2 - Td or Tdap) 06/18/2030    DEXA (modify frequency per FRAX score)  Completed    Pneumococcal 65+ years Vaccine

## 2024-03-08 NOTE — PATIENT INSTRUCTIONS
These can increase your chances of quitting for good. Quitting is one of the most important things you can do to protect your heart. It is never too late to quit. Try to avoid secondhand smoke too.     Stay at a weight that's healthy for you. Talk to your doctor if you need help losing weight.     Try to get 7 to 9 hours of sleep each night.     Limit alcohol to 2 drinks a day for men and 1 drink a day for women. Too much alcohol can cause health problems.     Manage other health problems such as diabetes, high blood pressure, and high cholesterol. If you think you may have a problem with alcohol or drug use, talk to your doctor.   Medicines    Take your medicines exactly as prescribed. Call your doctor if you think you are having a problem with your medicine.     If your doctor recommends aspirin, take the amount directed each day. Make sure you take aspirin and not another kind of pain reliever, such as acetaminophen (Tylenol).   When should you call for help?   Call 911 if you have symptoms of a heart attack. These may include:    Chest pain or pressure, or a strange feeling in the chest.     Sweating.     Shortness of breath.     Pain, pressure, or a strange feeling in the back, neck, jaw, or upper belly or in one or both shoulders or arms.     Lightheadedness or sudden weakness.     A fast or irregular heartbeat.   After you call 911, the  may tell you to chew 1 adult-strength or 2 to 4 low-dose aspirin. Wait for an ambulance. Do not try to drive yourself.  Watch closely for changes in your health, and be sure to contact your doctor if you have any problems.  Where can you learn more?  Go to https://www.Royalty[employer].net/patientEd and enter F075 to learn more about \"A Healthy Heart: Care Instructions.\"  Current as of: June 24, 2023               Content Version: 14.0  © 1235-8854 [employer], [employer].   Care instructions adapted under license by [employer]. If you have questions about a medical

## 2024-03-12 ENCOUNTER — HOSPITAL ENCOUNTER (OUTPATIENT)
Age: 79
Discharge: HOME | End: 2024-03-12
Payer: MEDICARE

## 2024-03-12 LAB
ANION GAP SERPL CALCULATED.3IONS-SCNC: 11 MMOL/L (ref 9–17)
BUN SERPL-MCNC: 26 MG/DL (ref 8–23)
CALCIUM SERPL-MCNC: 9.5 MG/DL (ref 8.6–10.4)
CHLORIDE SERPL-SCNC: 103 MMOL/L (ref 98–107)
CO2 SERPL-SCNC: 25 MMOL/L (ref 20–31)
CREAT SERPL-MCNC: 1.1 MG/DL (ref 0.5–0.9)
GFR SERPL CREATININE-BSD FRML MDRD: 51 ML/MIN/1.73M2
GLUCOSE SERPL-MCNC: 93 MG/DL (ref 70–99)
POTASSIUM SERPL-SCNC: 4.6 MMOL/L (ref 3.7–5.3)
SODIUM SERPL-SCNC: 139 MMOL/L (ref 135–144)
TSH SERPL DL<=0.05 MIU/L-ACNC: 1.89 UIU/ML (ref 0.3–5)

## 2024-03-12 PROCEDURE — 84443 ASSAY THYROID STIM HORMONE: CPT

## 2024-03-12 PROCEDURE — 80048 BASIC METABOLIC PNL TOTAL CA: CPT

## 2024-03-12 PROCEDURE — 36415 COLL VENOUS BLD VENIPUNCTURE: CPT

## 2024-03-13 ENCOUNTER — CARE COORDINATION (OUTPATIENT)
Dept: CARE COORDINATION | Age: 79
End: 2024-03-13

## 2024-03-13 NOTE — CARE COORDINATION
the patient have a nebulizer?: No     No patient-reported symptoms         Symptoms:          , and   General Assessment    Do you have any symptoms that are causing concern?: No

## 2024-03-15 ENCOUNTER — TELEPHONE (OUTPATIENT)
Dept: INTERNAL MEDICINE CLINIC | Age: 79
End: 2024-03-15

## 2024-03-15 ENCOUNTER — CARE COORDINATION (OUTPATIENT)
Dept: CARE COORDINATION | Age: 79
End: 2024-03-15

## 2024-03-15 NOTE — CARE COORDINATION
Patient called stating that she wants her wheel chair order to go to [employer] and her home O2 order to go to [facility]. Will have Arlene at PCP office fax.  If already faxed, she did not hear from either so please fax again at patients request.

## 2024-03-15 NOTE — TELEPHONE ENCOUNTER
Called and informed patient her wheelchair and oxygen order has been sent off the the proper companies. Patient verbalized understanding and has no questions at this time.

## 2024-03-19 ENCOUNTER — CARE COORDINATION (OUTPATIENT)
Dept: CARE COORDINATION | Age: 79
End: 2024-03-19

## 2024-03-19 ENCOUNTER — TELEPHONE (OUTPATIENT)
Dept: INTERNAL MEDICINE CLINIC | Age: 79
End: 2024-03-19

## 2024-03-19 NOTE — TELEPHONE ENCOUNTER
Called and spoke with [facility] where I sent patient's oxygen and inquired about what else needs to be done in order to complete the order for the patient's oxygen. I was informed by the employee handling the case that if she has not done so recently they would need her to complete a 3-part excursion test to insure the need for oxygen. The test must show her oxygen being below 88% after excursion. [facility] employee is faxing me the paperwork for this. I called and informed the patient on the progress of this orders completion and she verbalized understanding of where we are with this. Patient is wanting to know if they are able to perform this test at home or if she has to go somewhere to have this done. I informed patient I would inquire about this once I received the fax from [facility] to let her know all the details of this. Patient verbalized understanding.

## 2024-03-19 NOTE — CARE COORDINATION
Patient left VM stating that the DME company stated they need more information for the Oxygen order, she was unsure where it is.   I spoke with Arlene at PCP office and she will follow up with them and call patient.  Called patient back and explained.   She did not have other needs.

## 2024-03-20 ENCOUNTER — TELEPHONE (OUTPATIENT)
Dept: INTERNAL MEDICINE CLINIC | Age: 79
End: 2024-03-20

## 2024-03-20 DIAGNOSIS — I50.33 ACUTE ON CHRONIC DIASTOLIC HEART FAILURE (HCC): Primary | ICD-10-CM

## 2024-03-20 NOTE — TELEPHONE ENCOUNTER
Karrie from [facility] called and stated that the patient called her yesterday looking for home health services. Karrie sees that the referral has been closed and would like to know if the PCP feels that the patient is still in need of having home health, if so they would need a updated referral. Please advise.

## 2024-03-21 ENCOUNTER — TELEPHONE (OUTPATIENT)
Dept: INTERNAL MEDICINE CLINIC | Age: 79
End: 2024-03-21

## 2024-03-21 ENCOUNTER — CARE COORDINATION (OUTPATIENT)
Dept: CARE COORDINATION | Age: 79
End: 2024-03-21

## 2024-03-21 DIAGNOSIS — R79.89 ELEVATED SERUM CREATININE: Primary | ICD-10-CM

## 2024-03-21 NOTE — TELEPHONE ENCOUNTER
Jayy went out to see the patient today and the patient told them she was not interested in them coming out.    They were to see her for PT, OT Nursing. She thought they would come out and do cleaning and meal prep.    Patient does not want PT at this time she wants to wait until she see's her Neurosurgeon.

## 2024-03-21 NOTE — CARE COORDINATION
Patient left  requesting her results of her most recent labs. Will route to PCP to review and make any recommendations.

## 2024-03-21 NOTE — TELEPHONE ENCOUNTER
Please inform patient that thyroid function testing is within normal limits, BMP shows elevated creatinine however this is slightly better as what it was back on 8/8/2023.  Would recommend to continue to avoid NSAIDs.    Also she will benefit from establishing with nephrology, I have requested some urine testing and imaging and will refer to nephrology.

## 2024-03-25 ENCOUNTER — CARE COORDINATION (OUTPATIENT)
Dept: CARE COORDINATION | Age: 79
End: 2024-03-25

## 2024-03-25 NOTE — CARE COORDINATION
Spoke with patient, explained that DAISY Jacobs is working on this and will get back with her. I explained that she will try to get someone to evaluate her at her home. Patient was understanding.

## 2024-03-25 NOTE — CARE COORDINATION
Patient left  for ACM asking for an update on her Oxygen and supplies. Red Jacobs at PCP office is working on this. Will request that she follows up with patient with an update.

## 2024-03-26 NOTE — TELEPHONE ENCOUNTER
Contacted [facility] informed new referral has been placed. They stated that they tried to start patient back up on home care and the patient then refused.     I attempted to contact patient and no answer. If patient calls back please ask her if she would like home care services through [facility]

## 2024-03-27 ENCOUNTER — CARE COORDINATION (OUTPATIENT)
Dept: CARE COORDINATION | Age: 79
End: 2024-03-27

## 2024-03-27 NOTE — CARE COORDINATION
Patient called and stated that Dr. La referred her to nephrology and she would like Dr Pittman's opinion on this. She stated at her last visit that Dr. Pittman had said that since her labs had not changed in many yeas, she could hold off on Nephrology consult. She stated she would go if Dr. Pittman thinks that she needs too.     She asked to have ACP documents mailed to her. Will ask JOSE Medrano to mail these.    Amaris Bird, BSN, RN ambulatory care manager [phone].

## 2024-03-29 ENCOUNTER — CARE COORDINATION (OUTPATIENT)
Dept: CARE COORDINATION | Age: 79
End: 2024-03-29

## 2024-03-29 NOTE — CARE COORDINATION
HC made a request to Jorge Alberto Taylor/ANGELA, to mail out an ACP packet.      Plan of Care  HC will wait four weeks to follow up with patient regarding receipt of the ACP document.

## 2024-04-02 ENCOUNTER — TELEPHONE (OUTPATIENT)
Dept: INTERNAL MEDICINE CLINIC | Age: 79
End: 2024-04-02

## 2024-04-02 NOTE — TELEPHONE ENCOUNTER
AARON Kee with [facility] completed an annual well check in the patients home today, 4/3/24.Completed an A1C which was 6.7. Patient is asymptomatic.     Please advise if anything further is needed at this time. NOV 6/6/24.

## 2024-04-03 NOTE — TELEPHONE ENCOUNTER
Patient returned call and stated she does not want to make an appt. She states with the holiday she ate bad and that is probably why her A1C was high.    She states she has too much going on but might call back to schedule an appt at a later time.

## 2024-04-04 ENCOUNTER — CARE COORDINATION (OUTPATIENT)
Dept: CARE COORDINATION | Age: 79
End: 2024-04-04

## 2024-04-05 ENCOUNTER — CARE COORDINATION (OUTPATIENT)
Dept: CARE COORDINATION | Age: 79
End: 2024-04-05

## 2024-04-09 ENCOUNTER — CARE COORDINATION (OUTPATIENT)
Dept: CARE COORDINATION | Age: 79
End: 2024-04-09

## 2024-04-09 ENCOUNTER — TELEPHONE (OUTPATIENT)
Dept: INTERNAL MEDICINE CLINIC | Age: 79
End: 2024-04-09

## 2024-04-09 NOTE — TELEPHONE ENCOUNTER
Placed call to patient to get her scheduled for her 6 minute excursion test needed for oxygen order to be completed. Patient confirmed the is able to come in to complete this via MA visit next week. Patient needs a reminder call for this. No further questions or help needed at this time.

## 2024-04-09 NOTE — CARE COORDINATION
HC attempted to contact the patient, there was no answer.  HC was unable to leave a message as the phone just rang and rang.    Plan of Care  HC will attempt to reach patient at another time.

## 2024-04-11 ENCOUNTER — CARE COORDINATION (OUTPATIENT)
Dept: CARE COORDINATION | Age: 79
End: 2024-04-11

## 2024-04-12 ENCOUNTER — CARE COORDINATION (OUTPATIENT)
Dept: CARE COORDINATION | Age: 79
End: 2024-04-12

## 2024-04-12 NOTE — CARE COORDINATION
HC attempted to reach out to the patient to ask if she has received her ACP documents. There was no answer, HC was unable to leave a message for the patient.    Plan of Care  HC will follow up with patient next week.

## 2024-04-15 ENCOUNTER — TELEPHONE (OUTPATIENT)
Dept: PAIN MANAGEMENT | Age: 79
End: 2024-04-15

## 2024-04-15 ENCOUNTER — CARE COORDINATION (OUTPATIENT)
Dept: CARE COORDINATION | Age: 79
End: 2024-04-15

## 2024-04-15 NOTE — TELEPHONE ENCOUNTER
Discussed lid hygiene, warm compress and eyelid wash. I returned patients call; JADYN on her VM.

## 2024-04-15 NOTE — CARE COORDINATION
Spoke with patient briefly who was sleeping at the time of this call. Patient stated she has her breathing test this week for her oxygen and denied any other needs.  Will call back if she has any issues.

## 2024-04-16 ENCOUNTER — CARE COORDINATION (OUTPATIENT)
Dept: CARE COORDINATION | Age: 79
End: 2024-04-16

## 2024-04-16 ENCOUNTER — NURSE ONLY (OUTPATIENT)
Dept: INTERNAL MEDICINE CLINIC | Age: 79
End: 2024-04-16

## 2024-04-16 VITALS — HEART RATE: 60 BPM | OXYGEN SATURATION: 81 % | TEMPERATURE: 97.3 F

## 2024-04-16 DIAGNOSIS — J44.9 CHRONIC OBSTRUCTIVE PULMONARY DISEASE, UNSPECIFIED COPD TYPE (HCC): Primary | ICD-10-CM

## 2024-04-16 DIAGNOSIS — G47.33 OSA (OBSTRUCTIVE SLEEP APNEA): ICD-10-CM

## 2024-04-16 DIAGNOSIS — Z96.653 HISTORY OF TOTAL BILATERAL KNEE REPLACEMENT: ICD-10-CM

## 2024-04-16 DIAGNOSIS — M48.062 SPINAL STENOSIS OF LUMBAR REGION WITH NEUROGENIC CLAUDICATION: ICD-10-CM

## 2024-04-16 LAB
DISTANCE WALKED: NORMAL FT
SPO2: 81 %

## 2024-04-16 NOTE — TELEPHONE ENCOUNTER
Pt called stating that she missed her appt with pain management and needs a refill of her medication for 30 days, pain management told pt to ask her PCP to refill

## 2024-04-16 NOTE — PROGRESS NOTES
Patient walked for 1 minute and was not able to continue, O2 level started at 90 and after 1 minute was 81

## 2024-04-16 NOTE — CARE COORDINATION
Patient left a VM stating that she missed her pain management appointment due to not feeling well and that Jessica from pain management told her that her PCP can fill her pain medication.She did not leave the name of the medication that she is requesting.   VM left for patient requesting a return call.

## 2024-04-17 ENCOUNTER — TELEPHONE (OUTPATIENT)
Dept: PAIN MANAGEMENT | Age: 79
End: 2024-04-17

## 2024-04-17 DIAGNOSIS — M48.062 SPINAL STENOSIS OF LUMBAR REGION WITH NEUROGENIC CLAUDICATION: ICD-10-CM

## 2024-04-17 RX ORDER — OXYCODONE HYDROCHLORIDE 5 MG/1
5 TABLET ORAL EVERY 6 HOURS PRN
Qty: 120 TABLET | Refills: 0 | OUTPATIENT
Start: 2024-04-17 | End: 2024-05-17

## 2024-04-17 RX ORDER — OXYCODONE HYDROCHLORIDE 5 MG/1
5 TABLET ORAL EVERY 6 HOURS PRN
Qty: 28 TABLET | Refills: 0 | Status: SHIPPED | OUTPATIENT
Start: 2024-04-17 | End: 2024-04-24

## 2024-04-17 NOTE — TELEPHONE ENCOUNTER
Patient called in regards to medication Roxicodone, patient missed appointment due to being sick and was unable to come in. Patient asking for refill. Missed OV with pablito on 4/10. Please advise

## 2024-04-17 NOTE — TELEPHONE ENCOUNTER
Patient returned call and was informed of message. She verbalized understanding and will follow up with pain management.

## 2024-04-19 RX ORDER — METOPROLOL TARTRATE 50 MG/1
50 TABLET, FILM COATED ORAL 2 TIMES DAILY
Qty: 180 TABLET | Refills: 3 | Status: SHIPPED | OUTPATIENT
Start: 2024-04-19

## 2024-04-22 RX ORDER — GABAPENTIN 300 MG/1
300 CAPSULE ORAL 3 TIMES DAILY
Qty: 270 CAPSULE | Refills: 3 | Status: SHIPPED | OUTPATIENT
Start: 2024-04-22 | End: 2024-07-21

## 2024-04-23 ENCOUNTER — CARE COORDINATION (OUTPATIENT)
Dept: CARE COORDINATION | Age: 79
End: 2024-04-23

## 2024-04-23 NOTE — CARE COORDINATION
Patient called stating she has not heard anything about her home O2 equipment, she had the 6 minute walk test on 4.16.24.  Spoke with Felicita at [facility] who stated they are missing some info from the walk test documentation, will need documentation for what she was on room air and recovery SPo2. Transferred me to Helena from intake who stated she sent fax to Kathleen at PCP office that it was not valid per medicare guidelines. Missing room air or at rest and what the percentage of recovery time was needed and what liters she was on during exertion.   Needs recovery percentage.  MA just needs to plugin information Helena provided her direct number for any questions [phone].     Spoke with Kathleen in the office who stated that she received it but it is waiting on PCP signature. Helena was updated that PCP will not be in the office until 5.3.24. She was ok with that and will still provide services to patient in the meantime.     Explained to Helena patient is waiting on O2 supplies, she stated that they will call patient to get her taken care of today.

## 2024-04-24 RX ORDER — DULOXETINE 40 MG/1
1 CAPSULE, DELAYED RELEASE ORAL 2 TIMES DAILY
Qty: 180 CAPSULE | Refills: 3 | OUTPATIENT
Start: 2024-04-24

## 2024-05-01 ENCOUNTER — HOSPITAL ENCOUNTER (OUTPATIENT)
Dept: PAIN MANAGEMENT | Age: 79
Discharge: HOME | End: 2024-05-01
Payer: MEDICARE

## 2024-05-01 VITALS — BODY MASS INDEX: 46.32 KG/M2 | HEIGHT: 65 IN | WEIGHT: 278 LBS

## 2024-05-01 DIAGNOSIS — M54.16 LUMBAR RADICULOPATHY: ICD-10-CM

## 2024-05-01 DIAGNOSIS — M79.7 FIBROMYALGIA: ICD-10-CM

## 2024-05-01 DIAGNOSIS — M48.02 CERVICAL SPINAL STENOSIS: ICD-10-CM

## 2024-05-01 DIAGNOSIS — M47.817 LUMBOSACRAL SPONDYLOSIS WITHOUT MYELOPATHY: Primary | ICD-10-CM

## 2024-05-01 DIAGNOSIS — M25.552 BILATERAL HIP PAIN: ICD-10-CM

## 2024-05-01 DIAGNOSIS — M43.22 CERVICAL SPINE ANKYLOSIS: ICD-10-CM

## 2024-05-01 DIAGNOSIS — M47.816 LUMBAR SPONDYLOSIS: ICD-10-CM

## 2024-05-01 DIAGNOSIS — M48.062 SPINAL STENOSIS OF LUMBAR REGION WITH NEUROGENIC CLAUDICATION: ICD-10-CM

## 2024-05-01 DIAGNOSIS — M25.551 BILATERAL HIP PAIN: ICD-10-CM

## 2024-05-01 PROCEDURE — 99213 OFFICE O/P EST LOW 20 MIN: CPT

## 2024-05-01 PROCEDURE — 99214 OFFICE O/P EST MOD 30 MIN: CPT | Performed by: NURSE PRACTITIONER

## 2024-05-01 RX ORDER — OXYCODONE HYDROCHLORIDE 5 MG/1
5 TABLET ORAL EVERY 6 HOURS PRN
Qty: 120 TABLET | Refills: 0 | Status: SHIPPED | OUTPATIENT
Start: 2024-05-01 | End: 2024-05-31

## 2024-05-01 ASSESSMENT — PAIN SCALES - GENERAL: PAINLEVEL_OUTOF10: 5

## 2024-05-01 ASSESSMENT — PAIN DESCRIPTION - LOCATION: LOCATION: BACK

## 2024-05-01 ASSESSMENT — ENCOUNTER SYMPTOMS
SHORTNESS OF BREATH: 0
CONSTIPATION: 0
COUGH: 0
BOWEL INCONTINENCE: 0
BACK PAIN: 1

## 2024-05-01 ASSESSMENT — PAIN DESCRIPTION - FREQUENCY: FREQUENCY: CONTINUOUS

## 2024-05-01 ASSESSMENT — PAIN DESCRIPTION - DESCRIPTORS: DESCRIPTORS: CRAMPING

## 2024-05-01 ASSESSMENT — PAIN DESCRIPTION - ORIENTATION: ORIENTATION: LOWER

## 2024-05-01 ASSESSMENT — PAIN DESCRIPTION - PAIN TYPE: TYPE: CHRONIC PAIN

## 2024-05-01 NOTE — PROGRESS NOTES
Chief Complaint   Patient presents with    Back Pain    Medication Refill     Roxicodone due 4/24         PMH    Pt reports chronic history of low back pain that radiates into right hip and buttocks at times. She is s/p bilateral knee and right hip replacement   She has tried NSAIDS heat chiropractic care and with little relief.  Last imaging 2020 lumbar XR that showed previous L1 compression fracture which was corrected with kyphoplasty with multilevel degenerative changes   Lumbar CT 2018 with diffuse facet arthopathy  Has completed PT at [facility]  Bilateral lumbar L3, L4, L5 medial branch block with steroid 11/21/22 and reported almost 75% relief of pain and improved activity tolerance not ready to repeat.   Feels majority of her pain is nerve related and that back pain is well controlled at this time states hip pain and neuropathy is the main c/o.  She reports worsening pain in both hips - s/p right hip replacement  Not interested in increase of gabapentin d/t risk of side effects has tolerated  an increase in cymbalta.      Back Pain  This is a chronic problem. The current episode started more than 1 year ago. The problem occurs constantly. The problem is unchanged. The pain is present in the lumbar spine. The quality of the pain is described as cramping. The pain does not radiate. The pain is at a severity of 5/10. The pain is moderate. The pain is Worse during the day. The symptoms are aggravated by bending, twisting, sitting, position and lying down. Stiffness is present All day. Pertinent negatives include no bladder incontinence, bowel incontinence, chest pain or fever. She has tried heat, ice, NSAIDs and chiropractic manipulation for the symptoms. The treatment provided mild relief.   Hip Pain   The incident occurred more than 1 week ago. There was no injury mechanism. The pain is present in the left hip and right hip (s/p right hip replacement). The quality of the pain is described as aching.

## 2024-05-03 ENCOUNTER — CARE COORDINATION (OUTPATIENT)
Dept: CARE COORDINATION | Age: 79
End: 2024-05-03

## 2024-05-03 NOTE — CARE COORDINATION
CHF tuck in Acute episode 8 years ago per patient. No issues at this time but would like the information mailed to her.     Heart Failure Education outreach Date/Time: 5/3/2024 12:49 PM    Ambulatory Care Manager (ACM) contacted the patient by telephone to perform Ambulatory Care Coordination. Verified name and  with patient as identifiers. Provided introduction to self, and explanation of the Ambulatory Care Manager's role.     ACM reviewed that a Heart Healthy tips for the Summer packet has been mailed to the them. ACM reviewed CHF zones, daily weights, fluid restriction, the importance of low sodium diet, and healthy tips packet with the patient. Instructed patient to call their PCP if they have a weight gain of 3 lbs in 2 days or 5 lbs in a week.     Patient reminded that there is a physician on call 24 hours a day / 7 days a week should the patient have questions or concerns. The patient verbalized understanding.    Patient provided with the contact information for her home O2 supplies. [place] with [phone].

## 2024-05-13 ENCOUNTER — CARE COORDINATION (OUTPATIENT)
Dept: CARE COORDINATION | Age: 79
End: 2024-05-13

## 2024-05-13 NOTE — CARE COORDINATION
Patient called asking for a return call. I called back and she could not remember what she needed.   She did report that she received her home O2 supplies.   Encouraged patient to call back if she thinks of any needs.

## 2024-05-29 ENCOUNTER — HOSPITAL ENCOUNTER (OUTPATIENT)
Dept: PAIN MANAGEMENT | Age: 79
Discharge: HOME | End: 2024-05-29
Payer: MEDICARE

## 2024-05-29 VITALS — BODY MASS INDEX: 46.32 KG/M2 | WEIGHT: 278 LBS | HEIGHT: 65 IN

## 2024-05-29 DIAGNOSIS — T40.2X5A THERAPEUTIC OPIOID INDUCED CONSTIPATION: Primary | ICD-10-CM

## 2024-05-29 DIAGNOSIS — M79.7 FIBROMYALGIA: ICD-10-CM

## 2024-05-29 DIAGNOSIS — M48.02 CERVICAL SPINAL STENOSIS: ICD-10-CM

## 2024-05-29 DIAGNOSIS — M51.36 DDD (DEGENERATIVE DISC DISEASE), LUMBAR: ICD-10-CM

## 2024-05-29 DIAGNOSIS — K59.03 THERAPEUTIC OPIOID INDUCED CONSTIPATION: Primary | ICD-10-CM

## 2024-05-29 DIAGNOSIS — M48.062 SPINAL STENOSIS OF LUMBAR REGION WITH NEUROGENIC CLAUDICATION: Chronic | ICD-10-CM

## 2024-05-29 DIAGNOSIS — M17.12 PRIMARY OSTEOARTHRITIS OF LEFT KNEE: ICD-10-CM

## 2024-05-29 DIAGNOSIS — M47.817 LUMBOSACRAL SPONDYLOSIS WITHOUT MYELOPATHY: ICD-10-CM

## 2024-05-29 DIAGNOSIS — M47.816 LUMBAR SPONDYLOSIS: ICD-10-CM

## 2024-05-29 DIAGNOSIS — M54.16 LUMBAR RADICULOPATHY: ICD-10-CM

## 2024-05-29 PROCEDURE — 99213 OFFICE O/P EST LOW 20 MIN: CPT | Performed by: NURSE PRACTITIONER

## 2024-05-29 PROCEDURE — 99213 OFFICE O/P EST LOW 20 MIN: CPT

## 2024-05-29 RX ORDER — OXYCODONE HYDROCHLORIDE 5 MG/1
5 TABLET ORAL EVERY 6 HOURS PRN
Qty: 120 TABLET | Refills: 0 | Status: SHIPPED | OUTPATIENT
Start: 2024-05-31 | End: 2024-06-30

## 2024-05-29 ASSESSMENT — ENCOUNTER SYMPTOMS
SHORTNESS OF BREATH: 0
COUGH: 0
BOWEL INCONTINENCE: 0
CONSTIPATION: 0
BACK PAIN: 1

## 2024-05-29 ASSESSMENT — PAIN SCALES - GENERAL: PAINLEVEL_OUTOF10: 9

## 2024-05-29 NOTE — PROGRESS NOTES
Chief Complaint   Patient presents with    Back Pain     Medication Refill        PMH   Pt reports chronic history of low back pain that radiates into right hip and buttocks at times. She is s/p bilateral knee and right hip replacement   She has tried NSAIDS heat chiropractic care and with little relief.  Last imaging 2020 lumbar XR that showed previous L1 compression fracture which was corrected with kyphoplasty with multilevel degenerative changes   Lumbar CT 2018 with diffuse facet arthopathy  Has completed PT at [place]  Bilateral lumbar L3, L4, L5 medial branch block with steroid 11/21/22 and reported almost 75% relief of pain and improved activity tolerance not ready to repeat.   Feels majority of her pain is nerve related and that back pain is well controlled at this time states hip pain and neuropathy is the main c/o.  She reports worsening pain in both hips - s/p right hip replacement  Not interested in increase of gabapentin d/t risk of side effects has tolerated  an increase in cymbalta.      Back Pain  This is a chronic problem. The current episode started today. The problem occurs constantly. The problem has been gradually worsening (weather) since onset. The pain is present in the lumbar spine. The quality of the pain is described as aching (swelling). The pain radiates to the left knee, left thigh, right foot, right knee, right thigh and left foot (worse on the left side). The pain is at a severity of 9/10. The pain is severe. The pain is The same all the time. The symptoms are aggravated by bending, twisting and standing. Associated symptoms include bladder incontinence, numbness, tingling and weakness. Pertinent negatives include no bowel incontinence, chest pain, fever or headaches. She has tried bed rest, ice and heat (pain meds) for the symptoms. The treatment provided mild relief.      Patient denies any new neurological symptoms. No bowel or bladder incontinence, no weakness, and no

## 2024-06-03 RX ORDER — LUBIPROSTONE 8 UG/1
16 CAPSULE ORAL DAILY
Qty: 60 CAPSULE | Refills: 0 | OUTPATIENT
Start: 2024-06-03

## 2024-06-13 ENCOUNTER — CARE COORDINATION (OUTPATIENT)
Dept: CARE COORDINATION | Age: 79
End: 2024-06-13

## 2024-06-13 ENCOUNTER — HOSPITAL ENCOUNTER (OUTPATIENT)
Age: 79
Setting detail: SPECIMEN
Discharge: HOME | End: 2024-06-13

## 2024-06-13 ENCOUNTER — OFFICE VISIT (OUTPATIENT)
Dept: FAMILY MEDICINE CLINIC | Age: 79
End: 2024-06-13
Payer: MEDICARE

## 2024-06-13 VITALS
SYSTOLIC BLOOD PRESSURE: 133 MMHG | TEMPERATURE: 98.1 F | OXYGEN SATURATION: 96 % | HEART RATE: 60 BPM | DIASTOLIC BLOOD PRESSURE: 54 MMHG

## 2024-06-13 DIAGNOSIS — N30.01 ACUTE CYSTITIS WITH HEMATURIA: Primary | ICD-10-CM

## 2024-06-13 DIAGNOSIS — N30.01 ACUTE CYSTITIS WITH HEMATURIA: ICD-10-CM

## 2024-06-13 LAB
APPEARANCE FLUID: ABNORMAL
BILIRUBIN, POC: ABNORMAL
BLOOD URINE, POC: ABNORMAL
CLARITY, POC: ABNORMAL
COLOR, POC: YELLOW
GLUCOSE URINE, POC: ABNORMAL
KETONES, POC: ABNORMAL
LEUKOCYTE EST, POC: ABNORMAL
NITRITE, POC: POSITIVE
PH, POC: 6
PROTEIN, POC: ABNORMAL
SPECIFIC GRAVITY, POC: 1.01
UROBILINOGEN, POC: 0.2

## 2024-06-13 PROCEDURE — G8399 PT W/DXA RESULTS DOCUMENT: HCPCS | Performed by: FAMILY MEDICINE

## 2024-06-13 PROCEDURE — G8427 DOCREV CUR MEDS BY ELIG CLIN: HCPCS | Performed by: FAMILY MEDICINE

## 2024-06-13 PROCEDURE — 99213 OFFICE O/P EST LOW 20 MIN: CPT | Performed by: FAMILY MEDICINE

## 2024-06-13 PROCEDURE — 81002 URINALYSIS NONAUTO W/O SCOPE: CPT | Performed by: FAMILY MEDICINE

## 2024-06-13 PROCEDURE — 1036F TOBACCO NON-USER: CPT | Performed by: FAMILY MEDICINE

## 2024-06-13 PROCEDURE — 3078F DIAST BP <80 MM HG: CPT | Performed by: FAMILY MEDICINE

## 2024-06-13 PROCEDURE — 1090F PRES/ABSN URINE INCON ASSESS: CPT | Performed by: FAMILY MEDICINE

## 2024-06-13 PROCEDURE — G8417 CALC BMI ABV UP PARAM F/U: HCPCS | Performed by: FAMILY MEDICINE

## 2024-06-13 PROCEDURE — 3075F SYST BP GE 130 - 139MM HG: CPT | Performed by: FAMILY MEDICINE

## 2024-06-13 PROCEDURE — 1123F ACP DISCUSS/DSCN MKR DOCD: CPT | Performed by: FAMILY MEDICINE

## 2024-06-13 RX ORDER — NITROFURANTOIN 25; 75 MG/1; MG/1
100 CAPSULE ORAL 2 TIMES DAILY
Qty: 10 CAPSULE | Refills: 0 | Status: SHIPPED | OUTPATIENT
Start: 2024-06-13 | End: 2024-06-18

## 2024-06-13 ASSESSMENT — ENCOUNTER SYMPTOMS
VOMITING: 0
ABDOMINAL PAIN: 0

## 2024-06-13 NOTE — PROGRESS NOTES
Disp: 180 capsule, Rfl: 0    guaiFENesin (ROBITUSSIN) 100 MG/5ML liquid, take 10 milliliters by mouth every 4 hours if needed for cough (Patient not taking: Reported on 3/8/2024), Disp: , Rfl:     Misc. Devices (WALKER) MISC, 1 each by Does not apply route daily Upright walker with wheels and seat (Patient not taking: Reported on 3/8/2024), Disp: 1 each, Rfl: 0    Subjective:     Review of Systems   Constitutional:  Negative for fever.   Gastrointestinal:  Negative for abdominal pain and vomiting.   Genitourinary:  Positive for frequency and urgency. Negative for dysuria, genital sores, hematuria, vaginal bleeding, vaginal discharge and vaginal pain.       Objective:     BP (!) 133/54 (Site: Left Lower Arm, Position: Sitting, Cuff Size: Medium Adult)   Pulse 60   Temp 98.1 °F (36.7 °C) (Infrared)   SpO2 96%     Physical Exam  Constitutional:       Appearance: Normal appearance. She is well-developed. She is not ill-appearing, toxic-appearing or diaphoretic.   HENT:      Head: Normocephalic.   Eyes:      General:         Right eye: No discharge.         Left eye: No discharge.      Conjunctiva/sclera: Conjunctivae normal.   Cardiovascular:      Rate and Rhythm: Normal rate and regular rhythm.      Heart sounds: Normal heart sounds. No murmur heard.  Pulmonary:      Effort: Pulmonary effort is normal. No respiratory distress.      Breath sounds: Normal breath sounds. No wheezing.   Abdominal:      Palpations: Abdomen is soft.      Tenderness: There is no abdominal tenderness. There is no right CVA tenderness or left CVA tenderness.   Neurological:      Mental Status: She is alert.   Psychiatric:         Behavior: Behavior normal.         Thought Content: Thought content normal.         Judgment: Judgment normal.         Assessment & Plan:       Diagnosis Orders   1. Acute cystitis with hematuria  Culture, Urine    POCT Urinalysis no Micro    nitrofurantoin, macrocrystal-monohydrate, (MACROBID) 100 MG capsule

## 2024-06-13 NOTE — CARE COORDINATION
tool to seek urgent or emergent care.  I will notify my provider of any symptoms that indicate a worsening of my condition.    Barriers: overwhelmed by complexity of regimen  Plan for overcoming my barriers: work with ACM  Confidence: 10/10  Anticipated Goal Completion Date: 2.2.24                 PCP/Specialist follow up:   Future Appointments         Provider Specialty Dept Phone    6/27/2024 2:20 PM Mary Batista APRN - CNP Pain Management [phone]            Follow Up:   No further Ambulatory Care Management follow-up scheduled at this time.  patient  has Ambulatory Care Manager's contact information for any further questions, concerns or needs.

## 2024-06-15 LAB
MICROORGANISM SPEC CULT: ABNORMAL
SERVICE CMNT-IMP: ABNORMAL
SPECIMEN DESCRIPTION: ABNORMAL

## 2024-06-27 ENCOUNTER — CARE COORDINATION (OUTPATIENT)
Dept: CARE COORDINATION | Age: 79
End: 2024-06-27

## 2024-06-27 ENCOUNTER — HOSPITAL ENCOUNTER (OUTPATIENT)
Dept: PAIN MANAGEMENT | Age: 79
Discharge: HOME | End: 2024-06-27
Payer: MEDICARE

## 2024-06-27 VITALS — HEIGHT: 65 IN | WEIGHT: 278 LBS | BODY MASS INDEX: 46.32 KG/M2

## 2024-06-27 DIAGNOSIS — M48.062 SPINAL STENOSIS OF LUMBAR REGION WITH NEUROGENIC CLAUDICATION: Primary | Chronic | ICD-10-CM

## 2024-06-27 DIAGNOSIS — Z79.891 CHRONIC USE OF OPIATE FOR THERAPEUTIC PURPOSE: ICD-10-CM

## 2024-06-27 DIAGNOSIS — M47.816 LUMBAR SPONDYLOSIS: ICD-10-CM

## 2024-06-27 DIAGNOSIS — M54.16 LUMBAR RADICULOPATHY: ICD-10-CM

## 2024-06-27 DIAGNOSIS — M51.36 DDD (DEGENERATIVE DISC DISEASE), LUMBAR: ICD-10-CM

## 2024-06-27 PROBLEM — G89.29 CHRONIC LOW BACK PAIN: Status: ACTIVE | Noted: 2018-10-02

## 2024-06-27 PROBLEM — S32.010A CLOSED COMPRESSION FRACTURE OF L1 VERTEBRA (HCC): Status: RESOLVED | Noted: 2018-10-02 | Resolved: 2024-06-27

## 2024-06-27 PROCEDURE — 99213 OFFICE O/P EST LOW 20 MIN: CPT

## 2024-06-27 PROCEDURE — 99213 OFFICE O/P EST LOW 20 MIN: CPT | Performed by: NURSE PRACTITIONER

## 2024-06-27 RX ORDER — OXYCODONE HYDROCHLORIDE 5 MG/1
5 TABLET ORAL EVERY 6 HOURS PRN
Qty: 120 TABLET | Refills: 0 | Status: SHIPPED | OUTPATIENT
Start: 2024-06-30 | End: 2024-07-30

## 2024-06-27 ASSESSMENT — ENCOUNTER SYMPTOMS
CONSTIPATION: 0
BACK PAIN: 1
SHORTNESS OF BREATH: 0
COUGH: 0

## 2024-06-27 NOTE — CARE COORDINATION
Patient called asking if it is true that her PCP office can no longer come outside to get her when she arrives at her appointment. I explained that this is a new protocol that is in place. She is concerned about how she will come to her appointments. She is going to work on getting an aide that can come with her.   She stated she has been having an ear ache and that she can go to the walk in clinic for this.   She denied any other needs.

## 2024-06-27 NOTE — PROGRESS NOTES
Periodic Controlled Substance Monitoring   6/27/2024   2:03 PM Possible medication side effects, risk of tolerance/dependence & alternative treatments discussed.;No signs of potential drug abuse or diversion identified.;Assessed functional status (ability to engage in work or other purposeful activities, the pain intensity and its interference with activities of daily living, quality of family life and social activities, and the physical activity);Obtaining appropriate analgesic effect of treatment.          Periodic Controlled Substance Monitoring: Possible medication side effects, risk of tolerance/dependence & alternative treatments discussed., No signs of potential drug abuse or diversion identified., Assessed functional status (ability to engage in work or other purposeful activities, the pain intensity and its interference with activities of daily living, quality of family life and social activities, and the physical activity), Obtaining appropriate analgesic effect of treatment. (Mary Batista, APRN - CNP)      Past Medical History:   Diagnosis Date    Abnormality of rib determined by X-ray 01/28/2016    Acute cystitis without hematuria 06/16/2018    Acute exacerbation of chronic bronchitis (HCC)     Acute on chronic diastolic heart failure (HCC) 01/28/2016    Adjustment disorder with mixed anxiety and depressed mood 06/26/2015    Mild     Anemia 03/05/2014    Back pain     Bronchiectasis with acute lower respiratory infection (HCC) 08/16/2017    Bronchitis     Chronic bronchitis (HCC) 01/28/2016    Chronic cough 07/23/2018    Closed compression fracture of L1 lumbar vertebra 10/02/2018    Replacing deprecated diagnoses    COPD (chronic obstructive pulmonary disease) (HCC)     asbestos related lung disease    Degenerative joint disease (DJD) of hip 05/14/2015    Dyslipidemia 03/05/2014    Encephalopathy acute 05/29/2016    Essential hypertension 01/28/2016    Fibromyalgia     GERD (gastroesophageal reflux

## 2024-07-10 ENCOUNTER — TELEPHONE (OUTPATIENT)
Dept: FAMILY MEDICINE CLINIC | Age: 79
End: 2024-07-10

## 2024-07-17 ENCOUNTER — TELEPHONE (OUTPATIENT)
Dept: PAIN MANAGEMENT | Age: 79
End: 2024-07-17

## 2024-07-18 ENCOUNTER — CARE COORDINATION (OUTPATIENT)
Dept: CARE COORDINATION | Age: 79
End: 2024-07-18

## 2024-07-18 DIAGNOSIS — M51.36 DDD (DEGENERATIVE DISC DISEASE), LUMBAR: ICD-10-CM

## 2024-07-18 DIAGNOSIS — M47.817 LUMBOSACRAL SPONDYLOSIS WITHOUT MYELOPATHY: Primary | ICD-10-CM

## 2024-07-18 DIAGNOSIS — M48.062 SPINAL STENOSIS OF LUMBAR REGION WITH NEUROGENIC CLAUDICATION: ICD-10-CM

## 2024-07-18 NOTE — CARE COORDINATION
Per [facility] request, writer called patient and provided phone number to DME- [employer] @ [phone]. Writer asked if patient needed assistance with DME and patient states no and states she has her issue resolved. Informed patient if we could be of any further assistance to please reach out.    HRCM/closed.

## 2024-07-26 ENCOUNTER — TELEPHONE (OUTPATIENT)
Dept: INTERNAL MEDICINE CLINIC | Age: 79
End: 2024-07-26

## 2024-07-26 DIAGNOSIS — J44.9 CHRONIC OBSTRUCTIVE PULMONARY DISEASE, UNSPECIFIED COPD TYPE (HCC): Primary | ICD-10-CM

## 2024-07-26 NOTE — TELEPHONE ENCOUNTER
Patient called stating she never received the nebulizer that was ordered. The last order I see place for a nebulizer was back in . Order is now . Patient states she is having a hard time with her breathing and would like to know if you can re-order one for her.    Patient also states that if her breathing gets any worse she will be going to the ER.    Please advise

## 2024-07-29 NOTE — TELEPHONE ENCOUNTER
Please schedule appoint with office  Meanwhile we can pend order for nebulizer  Agree to go to ER if symptoms worsen.

## 2024-07-30 NOTE — TELEPHONE ENCOUNTER
I notified patient who stated she will call back to schedule appt because she has to find someone to bring her to appt and needs to know what there schedule is like.

## 2024-08-02 ENCOUNTER — TELEPHONE (OUTPATIENT)
Dept: INTERNAL MEDICINE CLINIC | Age: 79
End: 2024-08-02

## 2024-08-02 DIAGNOSIS — J44.9 CHRONIC OBSTRUCTIVE PULMONARY DISEASE, UNSPECIFIED COPD TYPE (HCC): Primary | ICD-10-CM

## 2024-08-02 NOTE — TELEPHONE ENCOUNTER
Patient called stating that she found a pulmonologist she wants to see so I have pended the referral.    Also patient wanted you to know that she is really have a difficult time breathing and she is going to the Er. She is going to go to [facility] as that is where the pulmonologist goes.

## 2024-08-10 ENCOUNTER — HOSPITAL ENCOUNTER (EMERGENCY)
Age: 79
Discharge: HOME | End: 2024-08-10
Attending: EMERGENCY MEDICINE
Payer: MEDICARE

## 2024-08-10 VITALS
SYSTOLIC BLOOD PRESSURE: 151 MMHG | HEART RATE: 70 BPM | RESPIRATION RATE: 18 BRPM | WEIGHT: 277.78 LBS | BODY MASS INDEX: 46.22 KG/M2 | TEMPERATURE: 97.9 F | DIASTOLIC BLOOD PRESSURE: 77 MMHG | OXYGEN SATURATION: 96 %

## 2024-08-10 DIAGNOSIS — Z91.138 MEDICATION DOSE MISSED: Primary | ICD-10-CM

## 2024-08-10 PROCEDURE — 6370000000 HC RX 637 (ALT 250 FOR IP): Performed by: NURSE PRACTITIONER

## 2024-08-10 PROCEDURE — 99284 EMERGENCY DEPT VISIT MOD MDM: CPT | Performed by: EMERGENCY MEDICINE

## 2024-08-10 RX ORDER — METOPROLOL TARTRATE 50 MG/1
50 TABLET, FILM COATED ORAL ONCE
Status: COMPLETED | OUTPATIENT
Start: 2024-08-10 | End: 2024-08-10

## 2024-08-10 RX ORDER — OXYCODONE HYDROCHLORIDE 5 MG/1
5 TABLET ORAL ONCE
Status: COMPLETED | OUTPATIENT
Start: 2024-08-10 | End: 2024-08-10

## 2024-08-10 RX ORDER — GABAPENTIN 300 MG/1
300 CAPSULE ORAL ONCE
Status: COMPLETED | OUTPATIENT
Start: 2024-08-10 | End: 2024-08-10

## 2024-08-10 RX ORDER — LISINOPRIL 10 MG/1
10 TABLET ORAL ONCE
Status: COMPLETED | OUTPATIENT
Start: 2024-08-10 | End: 2024-08-10

## 2024-08-10 RX ADMIN — LISINOPRIL 10 MG: 10 TABLET ORAL at 10:47

## 2024-08-10 RX ADMIN — GABAPENTIN 300 MG: 300 CAPSULE ORAL at 10:47

## 2024-08-10 RX ADMIN — APIXABAN 10 MG: 5 TABLET, FILM COATED ORAL at 10:47

## 2024-08-10 RX ADMIN — METOPROLOL TARTRATE 50 MG: 50 TABLET, FILM COATED ORAL at 10:48

## 2024-08-10 RX ADMIN — OXYCODONE HYDROCHLORIDE 5 MG: 5 TABLET ORAL at 10:48

## 2024-08-10 ASSESSMENT — PAIN - FUNCTIONAL ASSESSMENT
PAIN_FUNCTIONAL_ASSESSMENT: 0-10
PAIN_FUNCTIONAL_ASSESSMENT: NONE - DENIES PAIN

## 2024-08-10 ASSESSMENT — PAIN SCALES - GENERAL
PAINLEVEL_OUTOF10: 7
PAINLEVEL_OUTOF10: 7

## 2024-08-10 ASSESSMENT — PAIN DESCRIPTION - LOCATION: LOCATION: HAND;WRIST

## 2024-08-10 ASSESSMENT — PAIN DESCRIPTION - ORIENTATION: ORIENTATION: RIGHT;LEFT

## 2024-08-10 NOTE — ED PROVIDER NOTES
SCREENINGS         Isha Coma Scale  Eye Opening: Spontaneous  Best Verbal Response: Oriented  Best Motor Response: Obeys commands  Isha Coma Scale Score: 15                     CIWA Assessment  BP: (!) 151/77  Pulse: 70                 PHYSICAL EXAM       ED Triage Vitals [08/10/24 0818]   BP Systolic BP Percentile Diastolic BP Percentile Temp Temp Source Pulse Respirations SpO2   (!) 144/63 -- -- 97.9 °F (36.6 °C) Oral 75 18 96 %      Height Weight - Scale         -- 126 kg (277 lb 12.5 oz)             Physical Exam  Vitals and nursing note reviewed.   Constitutional:       General: She is not in acute distress.     Appearance: Normal appearance. She is obese. She is not ill-appearing or toxic-appearing.   HENT:      Head: Normocephalic and atraumatic.      Right Ear: External ear normal.      Left Ear: External ear normal.      Nose: Nose normal.      Mouth/Throat:      Mouth: Mucous membranes are moist.      Pharynx: Oropharynx is clear.   Eyes:      General:         Right eye: No discharge.         Left eye: No discharge.      Extraocular Movements: Extraocular movements intact.      Conjunctiva/sclera: Conjunctivae normal.   Cardiovascular:      Rate and Rhythm: Normal rate and regular rhythm.      Pulses: Normal pulses.      Heart sounds: Normal heart sounds. No murmur heard.  Pulmonary:      Effort: Pulmonary effort is normal. No respiratory distress.      Breath sounds: Normal breath sounds.   Abdominal:      General: Abdomen is flat. There is no distension.      Palpations: Abdomen is soft. There is no mass (no pulsitile mass).      Tenderness: There is no abdominal tenderness.   Musculoskeletal:         General: No swelling or tenderness. Normal range of motion.      Cervical back: Normal range of motion and neck supple. No rigidity or tenderness.   Skin:     General: Skin is warm and dry.      Capillary Refill: Capillary refill takes less than 2 seconds.   Neurological:      General: No focal  skilled nursing.     At this point, the patient has no reason to be admitted to the hospital.  She was just discharged yesterday and has no new complaints.  I discussed with her that I am not able to get her to a new facility on the weekend and she will have to discuss this with her care coordinator.  This is not the appropriate way to go about changing facilities.    She asked what her options were and I discussed that she can call her daughter and have her daughter take her back home or she can go back to [facility] and tried to allow some time to sort through her medications and treatments there.    I will provide her with her morning doses of medications here.    Awaiting daughter's arrival.    In the end, the patient does decide to go back to [facility] and can sort through new placement if necessary with a       REASSESSMENT          CRITICAL CARE TIME       CONSULTS:  None    PROCEDURES:  Unless otherwise noted below, none     Procedures        FINAL IMPRESSION      1. Medication dose missed          DISPOSITION/PLAN   DISPOSITION Decision To Discharge 08/10/2024 10:32:42 AM      PATIENT REFERRED TO:  Juan Pittman MD  [address] [place] [address]  [phone]    In 2 days      [facility]  [address].  [place] [id number] [phone]    If symptoms worsen      DISCHARGE MEDICATIONS:  Discharge Medication List as of 8/10/2024  1:09 PM        Controlled Substances Monitoring:     RX Monitoring Periodic Controlled Substance Monitoring   6/27/2024   2:03 PM Possible medication side effects, risk of tolerance/dependence & alternative treatments discussed.;No signs of potential drug abuse or diversion identified.;Assessed functional status (ability to engage in work or other purposeful activities, the pain intensity and its interference with activities of daily living, quality of family life and social activities,

## 2024-08-10 NOTE — ED NOTES
Report given to Prescious RN at [facility]. Updated her on all the morning meds that we gave pt.

## 2024-08-10 NOTE — ED PROVIDER NOTES
[facility] EMERGENCY DEPARTMENT  eMERGENCY dEPARTMENT eNCOUnter   Independent Attestation     Pt Name: Sunshine Hill  MRN: [id number]  Birthdate 1945  Date of evaluation: 8/10/24       Sunshine Hill is a 78 y.o. female who presents with medication issue (Pt admitted to [facility] from the [facility], per pt she is not receiving her medications for her P.E's.  pt requested ems to take her to [facility] but ems brought her here instead.)        Based on the medical record, the care appears appropriate. I was personally available for consultation in the Emergency Department.    Sobeida Mojica DO  Attending Emergency  Physician               Sobeida Mojica DO  08/10/24 1037

## 2024-08-30 ENCOUNTER — TELEPHONE (OUTPATIENT)
Dept: INTERNAL MEDICINE CLINIC | Age: 79
End: 2024-08-30

## 2024-08-30 NOTE — TELEPHONE ENCOUNTER
Patient called from [facility] stating that they aren't giving her medications to her. I called the facility and spoke with patient's nurse and she states that is not true and she was going to go speak with the patient.

## 2024-09-06 ENCOUNTER — TELEPHONE (OUTPATIENT)
Dept: INTERNAL MEDICINE CLINIC | Age: 79
End: 2024-09-06

## 2024-09-12 ENCOUNTER — CARE COORDINATION (OUTPATIENT)
Dept: CARE COORDINATION | Age: 79
End: 2024-09-12

## 2024-09-17 ENCOUNTER — TELEPHONE (OUTPATIENT)
Dept: INTERNAL MEDICINE CLINIC | Age: 79
End: 2024-09-17

## 2024-09-18 ENCOUNTER — TELEPHONE (OUTPATIENT)
Dept: PAIN MANAGEMENT | Age: 79
End: 2024-09-18

## 2024-09-20 RX ORDER — DULOXETINE 40 MG/1
1 CAPSULE, DELAYED RELEASE ORAL 2 TIMES DAILY
Qty: 180 CAPSULE | Refills: 3 | OUTPATIENT
Start: 2024-09-20

## 2024-09-25 ENCOUNTER — TELEPHONE (OUTPATIENT)
Dept: PAIN MANAGEMENT | Age: 79
End: 2024-09-25

## 2024-09-25 NOTE — TELEPHONE ENCOUNTER
pt has blood clots in lungs and in a string of blood clots in left leg.. pt not feeling well to come to office was wondering will you send in a courtesy refill for her cymbalta. Pt was in hospital for 7 days, then pt was in rehab for 28 days.  Please send to the [facility] pharmacy in chart

## 2024-09-26 RX ORDER — DULOXETINE 40 MG/1
40 CAPSULE, DELAYED RELEASE ORAL 2 TIMES DAILY
Qty: 60 CAPSULE | Refills: 0 | Status: SHIPPED | OUTPATIENT
Start: 2024-09-26 | End: 2024-10-26

## 2024-09-27 ENCOUNTER — CARE COORDINATION (OUTPATIENT)
Dept: CARE COORDINATION | Age: 79
End: 2024-09-27

## 2024-10-16 ENCOUNTER — HOSPITAL ENCOUNTER (OUTPATIENT)
Dept: PAIN MANAGEMENT | Age: 79
Discharge: HOME | End: 2024-10-16
Payer: MEDICARE

## 2024-10-16 VITALS — BODY MASS INDEX: 46.15 KG/M2 | HEIGHT: 65 IN | WEIGHT: 277 LBS

## 2024-10-16 DIAGNOSIS — M51.369 DEGENERATION OF INTERVERTEBRAL DISC OF LUMBAR REGION, UNSPECIFIED WHETHER PAIN PRESENT: ICD-10-CM

## 2024-10-16 DIAGNOSIS — M47.816 LUMBAR SPONDYLOSIS: ICD-10-CM

## 2024-10-16 DIAGNOSIS — E66.813 CLASS 3 SEVERE OBESITY WITH BODY MASS INDEX (BMI) OF 45.0 TO 49.9 IN ADULT, UNSPECIFIED OBESITY TYPE, UNSPECIFIED WHETHER SERIOUS COMORBIDITY PRESENT: ICD-10-CM

## 2024-10-16 DIAGNOSIS — E66.01 CLASS 3 SEVERE OBESITY WITH BODY MASS INDEX (BMI) OF 45.0 TO 49.9 IN ADULT, UNSPECIFIED OBESITY TYPE, UNSPECIFIED WHETHER SERIOUS COMORBIDITY PRESENT: ICD-10-CM

## 2024-10-16 DIAGNOSIS — Z79.891 CHRONIC USE OF OPIATE FOR THERAPEUTIC PURPOSE: Primary | ICD-10-CM

## 2024-10-16 DIAGNOSIS — M79.7 FIBROMYALGIA: ICD-10-CM

## 2024-10-16 PROCEDURE — 99213 OFFICE O/P EST LOW 20 MIN: CPT

## 2024-10-16 PROCEDURE — 99214 OFFICE O/P EST MOD 30 MIN: CPT | Performed by: STUDENT IN AN ORGANIZED HEALTH CARE EDUCATION/TRAINING PROGRAM

## 2024-10-16 RX ORDER — DULOXETINE 40 MG/1
40 CAPSULE, DELAYED RELEASE ORAL 2 TIMES DAILY
Qty: 60 CAPSULE | Refills: 0 | Status: SHIPPED | OUTPATIENT
Start: 2024-10-16 | End: 2024-11-15

## 2024-10-16 NOTE — PROGRESS NOTES
Chronic Pain Clinic Note     Encounter Date: 10/16/2024     SUBJECTIVE:  Chief Complaint   Patient presents with    Back Pain     Med refill       History of Present Illness:   Sunshine Hill is a 78 y.o. female who presents with back pain    Medication Refill: Oxycodone - 0 pt was in hospital got out on 9/7/24    Current Complaints of Pain:   Location: Low back  Radiation: left leg  Severity: moderate  Pain Numerical Score - 8 today    Average: 8     Highest: 10+  Lowest: 4  Character/Quality: Complains of pain that is aching, burning, cramping, shooting, stabbing  Timing: Constant  Associated symptoms: none  Numbness: yes  Weakness: yes  Exacerbating factors: walking, standing, bending  Alleviating factors: sitting with back support  Length of time pain has been present: Started years ago  Inciting event/injury: work injury  Bowel/Bladder incontinence: no  Falls: no  Physical Therapy: no    History of Interventions:   Surgery: No previous lumbar/cervical surgeries  Injections: None    Imaging:    Lumbar CT 9/28/2018    CT LUMBAR SPINE:       Superior endplate compression of L1.  Although this was present on prior   plain film imaging in March 2018, it has worsened, and there are findings   suggesting acute exacerbation of the pre-existing compression.  Severe   degenerative disc disease is noted with severe bilateral neural foraminal   narrowing and canal stenosis as above.       Past Medical History:   Diagnosis Date    Abnormality of rib determined by X-ray 01/28/2016    Acute cystitis without hematuria 06/16/2018    Acute exacerbation of chronic bronchitis (HCC)     Acute on chronic diastolic heart failure (HCC) 01/28/2016    Adjustment disorder with mixed anxiety and depressed mood 06/26/2015    Mild     Anemia 03/05/2014    Back pain     Bronchiectasis with acute lower respiratory infection (HCC) 08/16/2017    Bronchitis     Chronic bronchitis (HCC) 01/28/2016    Chronic cough 07/23/2018    Closed

## 2024-10-17 ENCOUNTER — TELEPHONE (OUTPATIENT)
Age: 79
End: 2024-10-17

## 2024-10-17 DIAGNOSIS — Z79.891 CHRONIC USE OF OPIATE FOR THERAPEUTIC PURPOSE: Primary | ICD-10-CM

## 2024-10-17 NOTE — TELEPHONE ENCOUNTER
----- Message from DAISY VAUGHN MA sent at 10/17/2024  8:36 AM EDT -----  Good morning Scrib, can you please put in a blood order for Sunshine Hill 1945 the pt from yesterday that you ordered the urine on, so much was happening and I forgot to put the label on the cup. I can't believe I forgot to do that especially since all I went through lol

## 2024-10-30 ENCOUNTER — HOSPITAL ENCOUNTER (OUTPATIENT)
Dept: PAIN MANAGEMENT | Age: 79
Discharge: HOME | End: 2024-10-30
Payer: MEDICARE

## 2024-10-30 VITALS — BODY MASS INDEX: 46.15 KG/M2 | HEIGHT: 65 IN | WEIGHT: 277 LBS

## 2024-10-30 DIAGNOSIS — M79.7 FIBROMYALGIA: ICD-10-CM

## 2024-10-30 DIAGNOSIS — M47.816 LUMBAR SPONDYLOSIS: ICD-10-CM

## 2024-10-30 DIAGNOSIS — M51.369 DEGENERATION OF INTERVERTEBRAL DISC OF LUMBAR REGION, UNSPECIFIED WHETHER PAIN PRESENT: ICD-10-CM

## 2024-10-30 DIAGNOSIS — E66.813 CLASS 3 SEVERE OBESITY WITH BODY MASS INDEX (BMI) OF 45.0 TO 49.9 IN ADULT, UNSPECIFIED OBESITY TYPE, UNSPECIFIED WHETHER SERIOUS COMORBIDITY PRESENT: Primary | ICD-10-CM

## 2024-10-30 DIAGNOSIS — E66.01 CLASS 3 SEVERE OBESITY WITH BODY MASS INDEX (BMI) OF 45.0 TO 49.9 IN ADULT, UNSPECIFIED OBESITY TYPE, UNSPECIFIED WHETHER SERIOUS COMORBIDITY PRESENT: Primary | ICD-10-CM

## 2024-10-30 DIAGNOSIS — M54.16 LUMBAR RADICULOPATHY: ICD-10-CM

## 2024-10-30 PROCEDURE — 99213 OFFICE O/P EST LOW 20 MIN: CPT

## 2024-10-30 PROCEDURE — 99215 OFFICE O/P EST HI 40 MIN: CPT | Performed by: STUDENT IN AN ORGANIZED HEALTH CARE EDUCATION/TRAINING PROGRAM

## 2024-10-30 NOTE — PROGRESS NOTES
Chronic Pain Clinic Note     Encounter Date: 10/30/2024     SUBJECTIVE:  Chief Complaint   Patient presents with    Back Pain     2 week f/u       History of Present Illness:   Sunshine Hill is a 78 y.o. female who presents with back pain    Medication Refill: Oxycodone - 0 pt was in hospital got out on 9/7/24    Current Complaints of Pain:   Location: Low back  Radiation: left leg  Severity: moderate  Pain Numerical Score - 8 today    Average: 8     Highest: 10+  Lowest: 4  Character/Quality: Complains of pain that is aching, burning, cramping, shooting, stabbing  Timing: Constant  Associated symptoms: none  Numbness: yes  Weakness: yes  Exacerbating factors: walking, standing, bending  Alleviating factors: sitting with back support  Length of time pain has been present: Started years ago  Inciting event/injury: work injury  Bowel/Bladder incontinence: no  Falls: no  Physical Therapy: no    History of Interventions:   Surgery: No previous lumbar/cervical surgeries  Injections: None    Imaging:    Lumbar CT 9/28/2018    CT LUMBAR SPINE:       Superior endplate compression of L1.  Although this was present on prior   plain film imaging in March 2018, it has worsened, and there are findings   suggesting acute exacerbation of the pre-existing compression.  Severe   degenerative disc disease is noted with severe bilateral neural foraminal   narrowing and canal stenosis as above.       Past Medical History:   Diagnosis Date    Abnormality of rib determined by X-ray 01/28/2016    Acute cystitis without hematuria 06/16/2018    Acute exacerbation of chronic bronchitis (HCC)     Acute on chronic diastolic heart failure (HCC) 01/28/2016    Adjustment disorder with mixed anxiety and depressed mood 06/26/2015    Mild     Anemia 03/05/2014    Back pain     Bronchiectasis with acute lower respiratory infection (HCC) 08/16/2017    Bronchitis     Chronic bronchitis (HCC) 01/28/2016    Chronic cough 07/23/2018    Closed

## 2024-10-31 RX ORDER — DULOXETINE 40 MG/1
1 CAPSULE, DELAYED RELEASE ORAL 2 TIMES DAILY
Qty: 60 CAPSULE | Refills: 11 | OUTPATIENT
Start: 2024-10-31

## 2024-11-07 ENCOUNTER — TELEPHONE (OUTPATIENT)
Dept: INTERNAL MEDICINE CLINIC | Age: 79
End: 2024-11-07

## 2024-11-07 DIAGNOSIS — N39.0 URINARY TRACT INFECTION WITHOUT HEMATURIA, SITE UNSPECIFIED: Primary | ICD-10-CM

## 2024-11-07 NOTE — TELEPHONE ENCOUNTER
Patient calling upset about urine; states we were suppose to test urine at the walk in clinic because they dropped it off.     Patient was made aware that we are unable to test urine without her physically being seen in clinic.    Patient got upset and stated her feet are swollen and is unable to get up to come in.   I advised patient to call PCP to get an order so we could send it out for her and she stated she already called them.      The urine is here in our specimen fridge; after 4 pm the urine will no longer be able to process.

## 2024-11-07 NOTE — TELEPHONE ENCOUNTER
Patient was seen 3/24. She is wheelchair bound and has a lot of difficulty getting places.     Patient had a friend who helps her take of herself take over a urine sample to the walk in clinic because she thinks she has a UTI.     Sx x about 2 weeks  Smell  Frequency  Silvestre when urinating    She asked for a VV appt and we are booked through December.     The walk in informed her that if they do not get an order for a UA in the next couple of  hours they will have to throw it away.     They received it around 4 pm yesterday.     Patient is in need of an order for a UA sent to the Walk in on [address] here in [place]

## 2024-11-12 ENCOUNTER — HOSPITAL ENCOUNTER (OUTPATIENT)
Age: 79
Setting detail: SPECIMEN
Discharge: HOME | End: 2024-11-12

## 2024-11-12 DIAGNOSIS — N39.0 URINARY TRACT INFECTION WITHOUT HEMATURIA, SITE UNSPECIFIED: ICD-10-CM

## 2024-11-12 LAB
BACTERIA URNS QL MICRO: ABNORMAL
BILIRUB UR QL STRIP: NEGATIVE
CASTS #/AREA URNS LPF: ABNORMAL /LPF (ref 0–2)
CASTS #/AREA URNS LPF: ABNORMAL /LPF (ref 0–2)
CLARITY UR: ABNORMAL
COLOR UR: YELLOW
CRYSTALS URNS MICRO: ABNORMAL /HPF
CRYSTALS URNS MICRO: ABNORMAL /HPF
EPI CELLS #/AREA URNS HPF: ABNORMAL /HPF (ref 0–5)
GLUCOSE UR STRIP-MCNC: NEGATIVE MG/DL
HGB UR QL STRIP.AUTO: NEGATIVE
KETONES UR STRIP-MCNC: NEGATIVE MG/DL
LEUKOCYTE ESTERASE UR QL STRIP: ABNORMAL
NITRITE UR QL STRIP: NEGATIVE
PH UR STRIP: 9 [PH] (ref 5–8)
PROT UR STRIP-MCNC: ABNORMAL MG/DL
RBC #/AREA URNS HPF: ABNORMAL /HPF (ref 0–2)
SP GR UR STRIP: 1.01 (ref 1–1.03)
UROBILINOGEN UR STRIP-ACNC: NORMAL EU/DL (ref 0–1)
WBC #/AREA URNS HPF: ABNORMAL /HPF (ref 0–5)

## 2024-11-21 ENCOUNTER — CARE COORDINATION (OUTPATIENT)
Dept: CARE COORDINATION | Age: 79
End: 2024-11-21

## 2024-11-21 NOTE — CARE COORDINATION
HC phoned the patient to follow up on her well being.  Patient stated that she is still recuperating and still moving  quite slowly.  She had celebrated her 79th birthday and was  excited to have gotten her wish to have had a great number  of her great grands to visit and spend time with her.  HC   inquired of patient's resources, she stated that she still is  lacking an aide, however she stated that she isn't financially  able to afford one, nor has [facility] sent one to her.  A call  came in and the patient had to get off  quickly to answer.    Plan of Care  HC will follow up with patient after a few weeks to   Inquire about her ACP .

## 2024-12-24 ENCOUNTER — HOSPITAL ENCOUNTER (OUTPATIENT)
Age: 79
Setting detail: SPECIMEN
Discharge: HOME | End: 2024-12-24

## 2024-12-24 LAB
25(OH)D3 SERPL-MCNC: 37.4 NG/ML (ref 30–100)
CHOLEST SERPL-MCNC: 225 MG/DL (ref 0–199)
CHOLESTEROL/HDL RATIO: 6.8
ERYTHROCYTE [DISTWIDTH] IN BLOOD BY AUTOMATED COUNT: 14.6 % (ref 11.8–14.4)
HCT VFR BLD AUTO: 31.3 % (ref 36.3–47.1)
HDLC SERPL-MCNC: 33 MG/DL
HGB BLD-MCNC: 9.8 G/DL (ref 11.9–15.1)
LDLC SERPL CALC-MCNC: 160 MG/DL (ref 0–100)
MCH RBC QN AUTO: 25.9 PG (ref 25.2–33.5)
MCHC RBC AUTO-ENTMCNC: 31.3 G/DL (ref 28.4–34.8)
MCV RBC AUTO: 82.6 FL (ref 82.6–102.9)
NRBC BLD-RTO: 0 PER 100 WBC
PLATELET # BLD AUTO: 467 K/UL (ref 138–453)
PMV BLD AUTO: 10.6 FL (ref 8.1–13.5)
RBC # BLD AUTO: 3.79 M/UL (ref 3.95–5.11)
TRIGL SERPL-MCNC: 159 MG/DL
VLDLC SERPL CALC-MCNC: 32 MG/DL (ref 1–30)
WBC OTHER # BLD: 6.9 K/UL (ref 3.5–11.3)

## 2024-12-24 PROCEDURE — 80061 LIPID PANEL: CPT

## 2024-12-24 PROCEDURE — 36415 COLL VENOUS BLD VENIPUNCTURE: CPT

## 2024-12-24 PROCEDURE — 85027 COMPLETE CBC AUTOMATED: CPT

## 2024-12-24 PROCEDURE — P9603 ONE-WAY ALLOW PRORATED MILES: HCPCS

## 2024-12-24 PROCEDURE — 82306 VITAMIN D 25 HYDROXY: CPT

## 2024-12-26 ENCOUNTER — HOSPITAL ENCOUNTER (OUTPATIENT)
Age: 79
Setting detail: SPECIMEN
Discharge: HOME | End: 2024-12-26

## 2024-12-26 LAB
ALBUMIN SERPL-MCNC: 3.3 G/DL (ref 3.5–5.2)
ALBUMIN/GLOB SERPL: 1 {RATIO} (ref 1–2.5)
ALP SERPL-CCNC: 64 U/L (ref 35–104)
ALT SERPL-CCNC: 14 U/L (ref 10–35)
ANION GAP SERPL CALCULATED.3IONS-SCNC: 10 MMOL/L (ref 9–16)
AST SERPL-CCNC: 14 U/L (ref 10–35)
BILIRUB SERPL-MCNC: 0.2 MG/DL (ref 0–1.2)
BUN SERPL-MCNC: 23 MG/DL (ref 8–23)
CALCIUM SERPL-MCNC: 9.1 MG/DL (ref 8.8–10.2)
CHLORIDE SERPL-SCNC: 103 MMOL/L (ref 98–107)
CO2 SERPL-SCNC: 26 MMOL/L (ref 20–31)
CREAT SERPL-MCNC: 1.3 MG/DL (ref 0.5–0.9)
GFR, ESTIMATED: 44 ML/MIN/1.73M2
GLUCOSE SERPL-MCNC: 125 MG/DL (ref 82–115)
POTASSIUM SERPL-SCNC: 4.4 MMOL/L (ref 3.7–5.3)
PROT SERPL-MCNC: 6.7 G/DL (ref 6.6–8.7)
SODIUM SERPL-SCNC: 139 MMOL/L (ref 136–145)

## 2024-12-26 PROCEDURE — 36415 COLL VENOUS BLD VENIPUNCTURE: CPT

## 2024-12-26 PROCEDURE — 80053 COMPREHEN METABOLIC PANEL: CPT

## 2024-12-26 PROCEDURE — P9603 ONE-WAY ALLOW PRORATED MILES: HCPCS

## 2025-01-15 RX ORDER — DULOXETINE 40 MG/1
40 CAPSULE, DELAYED RELEASE ORAL 2 TIMES DAILY
Qty: 60 CAPSULE | Refills: 0 | Status: SHIPPED | OUTPATIENT
Start: 2025-01-15 | End: 2025-02-14

## 2025-01-23 ENCOUNTER — CARE COORDINATION (OUTPATIENT)
Dept: CARE COORDINATION | Age: 80
End: 2025-01-23

## 2025-01-23 NOTE — CARE COORDINATION
HC phoned the patient and found that she is recuperating from a   bout of pneumonia, and is trying to rebound.  Patient presently   has therapy coming into her home.  She reported that a nurse will  be coming on 01/24/25.  Patient still has need for a HHA, and is  hoping that she might get one with having services with therapy  and a nurse coming to her home.  HC will follow up with patient  regarding other social needs.    Plan of Care  HC will contact patient regarding social needs, as well as  status of her ACP

## 2025-01-30 ENCOUNTER — OFFICE VISIT (OUTPATIENT)
Dept: INTERNAL MEDICINE CLINIC | Age: 80
End: 2025-01-30
Payer: MEDICARE

## 2025-01-30 VITALS — SYSTOLIC BLOOD PRESSURE: 138 MMHG | OXYGEN SATURATION: 98 % | HEART RATE: 66 BPM | DIASTOLIC BLOOD PRESSURE: 66 MMHG

## 2025-01-30 DIAGNOSIS — E66.813 CLASS 3 SEVERE OBESITY DUE TO EXCESS CALORIES WITH SERIOUS COMORBIDITY AND BODY MASS INDEX (BMI) OF 45.0 TO 49.9 IN ADULT: ICD-10-CM

## 2025-01-30 DIAGNOSIS — E66.01 CLASS 3 SEVERE OBESITY DUE TO EXCESS CALORIES WITH SERIOUS COMORBIDITY AND BODY MASS INDEX (BMI) OF 45.0 TO 49.9 IN ADULT: ICD-10-CM

## 2025-01-30 DIAGNOSIS — N39.3 STRESS INCONTINENCE (FEMALE) (MALE): ICD-10-CM

## 2025-01-30 DIAGNOSIS — I26.99 PULMONARY EMBOLISM, UNSPECIFIED CHRONICITY, UNSPECIFIED PULMONARY EMBOLISM TYPE, UNSPECIFIED WHETHER ACUTE COR PULMONALE PRESENT (HCC): ICD-10-CM

## 2025-01-30 DIAGNOSIS — J42 CHRONIC BRONCHITIS, UNSPECIFIED CHRONIC BRONCHITIS TYPE (HCC): Primary | ICD-10-CM

## 2025-01-30 DIAGNOSIS — J43.1 PANLOBULAR EMPHYSEMA (HCC): ICD-10-CM

## 2025-01-30 DIAGNOSIS — J96.11 CHRONIC HYPOXIC RESPIRATORY FAILURE, ON HOME OXYGEN THERAPY: ICD-10-CM

## 2025-01-30 DIAGNOSIS — Z99.81 CHRONIC HYPOXIC RESPIRATORY FAILURE, ON HOME OXYGEN THERAPY: ICD-10-CM

## 2025-01-30 PROCEDURE — 3023F SPIROM DOC REV: CPT | Performed by: INTERNAL MEDICINE

## 2025-01-30 PROCEDURE — 1123F ACP DISCUSS/DSCN MKR DOCD: CPT | Performed by: INTERNAL MEDICINE

## 2025-01-30 PROCEDURE — G8427 DOCREV CUR MEDS BY ELIG CLIN: HCPCS | Performed by: INTERNAL MEDICINE

## 2025-01-30 PROCEDURE — G8399 PT W/DXA RESULTS DOCUMENT: HCPCS | Performed by: INTERNAL MEDICINE

## 2025-01-30 PROCEDURE — 1090F PRES/ABSN URINE INCON ASSESS: CPT | Performed by: INTERNAL MEDICINE

## 2025-01-30 PROCEDURE — G8417 CALC BMI ABV UP PARAM F/U: HCPCS | Performed by: INTERNAL MEDICINE

## 2025-01-30 PROCEDURE — 3078F DIAST BP <80 MM HG: CPT | Performed by: INTERNAL MEDICINE

## 2025-01-30 PROCEDURE — 3075F SYST BP GE 130 - 139MM HG: CPT | Performed by: INTERNAL MEDICINE

## 2025-01-30 PROCEDURE — 99214 OFFICE O/P EST MOD 30 MIN: CPT | Performed by: INTERNAL MEDICINE

## 2025-01-30 PROCEDURE — 1159F MED LIST DOCD IN RCRD: CPT | Performed by: INTERNAL MEDICINE

## 2025-01-30 PROCEDURE — 1036F TOBACCO NON-USER: CPT | Performed by: INTERNAL MEDICINE

## 2025-01-30 RX ORDER — APIXABAN 5 MG/1
TABLET, FILM COATED ORAL
COMMUNITY

## 2025-01-30 RX ORDER — IPRATROPIUM BROMIDE AND ALBUTEROL SULFATE 2.5; .5 MG/3ML; MG/3ML
SOLUTION RESPIRATORY (INHALATION) EVERY 4 HOURS PRN
COMMUNITY
Start: 2024-12-18

## 2025-01-30 RX ORDER — FUROSEMIDE 40 MG/1
40 TABLET ORAL EVERY MORNING
COMMUNITY
Start: 2024-11-27 | End: 2025-01-30

## 2025-01-30 RX ORDER — DULOXETINE 40 MG/1
1 CAPSULE, DELAYED RELEASE ORAL 2 TIMES DAILY
Qty: 60 CAPSULE | Refills: 11 | OUTPATIENT
Start: 2025-01-30

## 2025-01-30 RX ORDER — DULOXETINE 40 MG/1
40 CAPSULE, DELAYED RELEASE ORAL 2 TIMES DAILY
Qty: 180 CAPSULE | Refills: 3 | Status: SHIPPED | OUTPATIENT
Start: 2025-01-30 | End: 2025-04-30

## 2025-01-30 RX ORDER — PANTOPRAZOLE SODIUM 40 MG/1
40 TABLET, DELAYED RELEASE ORAL
COMMUNITY
Start: 2024-08-09 | End: 2025-01-30

## 2025-01-30 SDOH — ECONOMIC STABILITY: FOOD INSECURITY: WITHIN THE PAST 12 MONTHS, THE FOOD YOU BOUGHT JUST DIDN'T LAST AND YOU DIDN'T HAVE MONEY TO GET MORE.: NEVER TRUE

## 2025-01-30 SDOH — ECONOMIC STABILITY: FOOD INSECURITY: WITHIN THE PAST 12 MONTHS, YOU WORRIED THAT YOUR FOOD WOULD RUN OUT BEFORE YOU GOT MONEY TO BUY MORE.: NEVER TRUE

## 2025-01-30 ASSESSMENT — PATIENT HEALTH QUESTIONNAIRE - PHQ9
8. MOVING OR SPEAKING SO SLOWLY THAT OTHER PEOPLE COULD HAVE NOTICED. OR THE OPPOSITE, BEING SO FIGETY OR RESTLESS THAT YOU HAVE BEEN MOVING AROUND A LOT MORE THAN USUAL: NOT AT ALL
4. FEELING TIRED OR HAVING LITTLE ENERGY: NOT AT ALL
SUM OF ALL RESPONSES TO PHQ QUESTIONS 1-9: 0
9. THOUGHTS THAT YOU WOULD BE BETTER OFF DEAD, OR OF HURTING YOURSELF: NOT AT ALL
3. TROUBLE FALLING OR STAYING ASLEEP: NOT AT ALL
5. POOR APPETITE OR OVEREATING: NOT AT ALL
SUM OF ALL RESPONSES TO PHQ QUESTIONS 1-9: 0
10. IF YOU CHECKED OFF ANY PROBLEMS, HOW DIFFICULT HAVE THESE PROBLEMS MADE IT FOR YOU TO DO YOUR WORK, TAKE CARE OF THINGS AT HOME, OR GET ALONG WITH OTHER PEOPLE: NOT DIFFICULT AT ALL
7. TROUBLE CONCENTRATING ON THINGS, SUCH AS READING THE NEWSPAPER OR WATCHING TELEVISION: NOT AT ALL
1. LITTLE INTEREST OR PLEASURE IN DOING THINGS: NOT AT ALL
SUM OF ALL RESPONSES TO PHQ9 QUESTIONS 1 & 2: 0
2. FEELING DOWN, DEPRESSED OR HOPELESS: NOT AT ALL
SUM OF ALL RESPONSES TO PHQ QUESTIONS 1-9: 0
SUM OF ALL RESPONSES TO PHQ QUESTIONS 1-9: 0
6. FEELING BAD ABOUT YOURSELF - OR THAT YOU ARE A FAILURE OR HAVE LET YOURSELF OR YOUR FAMILY DOWN: NOT AT ALL

## 2025-01-30 NOTE — PROGRESS NOTES
OFFICE VISIT  PROGRESS NOTE         Date of patient's visit: 1/30/25  Patient's Name:  Sunshine Hill  YOB: 1945       Patient Care Team:  Juan Pittman MD as PCP - General (Internal Medicine)  [facility]  Vilma Edmonds MD as Consulting Physician (Gastroenterology)  Polly Prieto MD as Consulting Physician (Cardiology)  Julio César Masters MD as Consulting Physician (Pulmonology)  Anant Tan as Health         SUBJECTIVE     HISTORY           History of present illness     Pertinent details  added to ,       Chief Complaint   Patient presents with    Incontinence     Requesting order for external female catheters.     Back Pain     Requesting Oxycodone, states pain management doctor stopped prescribing.           Encounter Diagnoses   Name Primary?    Chronic bronchitis, unspecified chronic bronchitis type (HCC) Yes    Class 3 severe obesity due to excess calories with serious comorbidity and body mass index (BMI) of 45.0 to 49.9 in adult     Panlobular emphysema (HCC)     Chronic hypoxic respiratory failure, on home oxygen therapy     Stress incontinence (female) (male)     Pulmonary embolism, unspecified chronicity, unspecified pulmonary embolism type, unspecified whether acute cor pulmonale present (HCC)         Symptom / problem          --history obtained from patient.-   Patient of Dr. Pittman is here for a follow-up visit  Patient has stress incontinence  Anytime she sneezes cough etc. she has incontinence  But what bothers her the most is that every time she stands up she has a leak  Most bothersome at night she gets up about 3 or 4 times and every time she is going  She uses incontinence supplies for that and   Patient has history of pulmonary embolism

## 2025-01-30 NOTE — PROGRESS NOTES
\"Have you been to the ER, urgent care clinic since your last visit?  Hospitalized since your last visit?\"    Multiple hospital stays since last office visit.     “Have you seen or consulted any other health care providers outside our system since your last visit?”    NO

## 2025-02-03 ENCOUNTER — CARE COORDINATION (OUTPATIENT)
Dept: CARE COORDINATION | Age: 80
End: 2025-02-03

## 2025-02-04 NOTE — CARE COORDINATION
HC reached out to patient to inquire if patient had decided to update   her ACP documents.  Patient stated that she wanted to update her   documents and got sidetracked.   Patient requested documents be  mailed out to her.    Plan of Care  HC will request that ACP documents are   mailed out to patient .

## 2025-02-05 ENCOUNTER — CARE COORDINATION (OUTPATIENT)
Dept: CARE COORDINATION | Age: 80
End: 2025-02-05

## 2025-03-27 ENCOUNTER — CARE COORDINATION (OUTPATIENT)
Dept: CARE COORDINATION | Age: 80
End: 2025-03-27

## 2025-03-27 NOTE — CARE COORDINATION
HC phoned the patient to follow up on her receipt of   the ACP documents.   Patient stated that she has   received the documents, and will have her daughter  Assist with the ACP.      Plan of Care  HC will sign off of the patient at this time

## 2025-04-07 RX ORDER — METOPROLOL TARTRATE 50 MG
50 TABLET ORAL 2 TIMES DAILY
Qty: 180 TABLET | Refills: 3 | Status: SHIPPED | OUTPATIENT
Start: 2025-04-07

## 2025-04-09 ENCOUNTER — TELEPHONE (OUTPATIENT)
Dept: INTERNAL MEDICINE CLINIC | Age: 80
End: 2025-04-09

## 2025-04-09 DIAGNOSIS — J43.1 PANLOBULAR EMPHYSEMA (HCC): ICD-10-CM

## 2025-04-09 DIAGNOSIS — M25.551 BILATERAL HIP PAIN: Primary | ICD-10-CM

## 2025-04-09 DIAGNOSIS — E66.813 CLASS 3 SEVERE OBESITY DUE TO EXCESS CALORIES WITH SERIOUS COMORBIDITY AND BODY MASS INDEX (BMI) OF 45.0 TO 49.9 IN ADULT: ICD-10-CM

## 2025-04-09 DIAGNOSIS — Z99.81 CHRONIC HYPOXIC RESPIRATORY FAILURE, ON HOME OXYGEN THERAPY: ICD-10-CM

## 2025-04-09 DIAGNOSIS — J96.11 CHRONIC HYPOXIC RESPIRATORY FAILURE, ON HOME OXYGEN THERAPY: ICD-10-CM

## 2025-04-09 DIAGNOSIS — M25.552 BILATERAL HIP PAIN: Primary | ICD-10-CM

## 2025-04-09 DIAGNOSIS — N39.3 STRESS INCONTINENCE (FEMALE) (MALE): ICD-10-CM

## 2025-04-09 NOTE — TELEPHONE ENCOUNTER
Patient called with many concerns that were not covered at her last appt with Dr. Martino.    Patient needs referral for pain management to [facility], I have pended the referral. This is for her hips and needing injections in them.    Patient also need her handicap placards renewed. If okay I can print letters for this.    Patient also needs [facility] referral for aids to come to her home and medicare will cover for up to 35 hours a week. Pend referral for [facility]    Patient will need order for electric wheelchair during next appt on 4/22/25. Due to needing office notes for this order.    Patient needs order for female catheters. Medicare should pay for them    I called [employer] to see if patient can have new tubing sent. They are sending supplies to patient and she should receive them by Friday or Saturday.

## 2025-04-09 NOTE — TELEPHONE ENCOUNTER
I notified patient who stated the order for the incontinence supplies needs to go to [facility] and they would take it from there.     I also printed handicap placards and placed in the brown envelope.

## 2025-04-10 NOTE — TELEPHONE ENCOUNTER
Unfortunately the Pain Management at [facility] is no longer open. Patient states she can go to [facility]. I have pended the new referral and will faxed once signed.

## 2025-04-10 NOTE — CARE COORDINATION
Patient called and stated she would like [facility] to help her get an order for external catheters. She used them when she was in the hospital and it was very helpful. Patient provided [facility] with a number of where she would like the order sent [phone]. ACM called [facility] and was told that they will need a RX, demographics and a F2F for documentation for insurance. They will fax all necessary paperwork to PCP office, [facility], [address] [place] [address] Penny. Florina notified. Patient notified of the need for an appointment. She will call to schedule. The patient [id number] score is 1.  He was given the option of staying on Eliquis, the patient is on Eliquis 5 mg p.o. twice daily.  The patient was diagnosed with cirrhosis of the liver.  Will discuss about oral anticoagulant and his bleeding risks with cirrhosis should be discussed with GI specialist.  The patient can go off Eliquis as his Floyd Vascor is 1.  The patient will continue Eliquis 5 mg p.o. twice daily, he has mild liver issues, he has checked with the GI specialist.  We have discussed pros and cons of the medicine patient wants to continue Eliquis.    Orders:    Adult Transthoracic Echo Complete W/ Cont if Necessary Per Protocol; Future

## 2025-04-16 ENCOUNTER — TELEPHONE (OUTPATIENT)
Dept: INTERNAL MEDICINE CLINIC | Age: 80
End: 2025-04-16

## 2025-04-16 DIAGNOSIS — J96.11 CHRONIC HYPOXIC RESPIRATORY FAILURE, ON HOME OXYGEN THERAPY: Primary | ICD-10-CM

## 2025-04-16 DIAGNOSIS — Z99.81 CHRONIC HYPOXIC RESPIRATORY FAILURE, ON HOME OXYGEN THERAPY: Primary | ICD-10-CM

## 2025-04-16 NOTE — TELEPHONE ENCOUNTER
Patient needs home health aid referral changed to [facility] for home health aid services.    Please fax to [phone]

## 2025-04-17 NOTE — TELEPHONE ENCOUNTER
Order pending for [facility] home health aid. If you want patient to have any additional medical services please add to order.

## 2025-04-18 ENCOUNTER — PATIENT MESSAGE (OUTPATIENT)
Dept: FAMILY MEDICINE CLINIC | Age: 80
End: 2025-04-18

## 2025-04-22 ENCOUNTER — TELEPHONE (OUTPATIENT)
Dept: INTERNAL MEDICINE CLINIC | Age: 80
End: 2025-04-22

## 2025-04-22 ENCOUNTER — OFFICE VISIT (OUTPATIENT)
Dept: INTERNAL MEDICINE CLINIC | Age: 80
End: 2025-04-22
Payer: MEDICARE

## 2025-04-22 VITALS
HEIGHT: 65 IN | BODY MASS INDEX: 46.1 KG/M2 | DIASTOLIC BLOOD PRESSURE: 60 MMHG | HEART RATE: 69 BPM | OXYGEN SATURATION: 94 % | SYSTOLIC BLOOD PRESSURE: 136 MMHG

## 2025-04-22 DIAGNOSIS — N18.30 STAGE 3 CHRONIC KIDNEY DISEASE, UNSPECIFIED WHETHER STAGE 3A OR 3B CKD (HCC): ICD-10-CM

## 2025-04-22 DIAGNOSIS — Z99.81 CHRONIC HYPOXIC RESPIRATORY FAILURE, ON HOME OXYGEN THERAPY (HCC): Primary | ICD-10-CM

## 2025-04-22 DIAGNOSIS — E66.813 CLASS 3 SEVERE OBESITY DUE TO EXCESS CALORIES WITH SERIOUS COMORBIDITY AND BODY MASS INDEX (BMI) OF 45.0 TO 49.9 IN ADULT (HCC): ICD-10-CM

## 2025-04-22 DIAGNOSIS — G47.33 OSA (OBSTRUCTIVE SLEEP APNEA): ICD-10-CM

## 2025-04-22 DIAGNOSIS — D50.9 IRON DEFICIENCY ANEMIA, UNSPECIFIED IRON DEFICIENCY ANEMIA TYPE: ICD-10-CM

## 2025-04-22 DIAGNOSIS — J96.11 CHRONIC HYPOXIC RESPIRATORY FAILURE, ON HOME OXYGEN THERAPY (HCC): Primary | ICD-10-CM

## 2025-04-22 DIAGNOSIS — J43.1 PANLOBULAR EMPHYSEMA (HCC): ICD-10-CM

## 2025-04-22 DIAGNOSIS — I50.33 ACUTE ON CHRONIC DIASTOLIC HEART FAILURE (HCC): ICD-10-CM

## 2025-04-22 DIAGNOSIS — N39.0 URINARY TRACT INFECTION WITHOUT HEMATURIA, SITE UNSPECIFIED: Primary | ICD-10-CM

## 2025-04-22 PROCEDURE — 3075F SYST BP GE 130 - 139MM HG: CPT | Performed by: INTERNAL MEDICINE

## 2025-04-22 PROCEDURE — G8417 CALC BMI ABV UP PARAM F/U: HCPCS | Performed by: INTERNAL MEDICINE

## 2025-04-22 PROCEDURE — 3023F SPIROM DOC REV: CPT | Performed by: INTERNAL MEDICINE

## 2025-04-22 PROCEDURE — 1090F PRES/ABSN URINE INCON ASSESS: CPT | Performed by: INTERNAL MEDICINE

## 2025-04-22 PROCEDURE — 1036F TOBACCO NON-USER: CPT | Performed by: INTERNAL MEDICINE

## 2025-04-22 PROCEDURE — 3078F DIAST BP <80 MM HG: CPT | Performed by: INTERNAL MEDICINE

## 2025-04-22 PROCEDURE — 1159F MED LIST DOCD IN RCRD: CPT | Performed by: INTERNAL MEDICINE

## 2025-04-22 PROCEDURE — 99214 OFFICE O/P EST MOD 30 MIN: CPT | Performed by: INTERNAL MEDICINE

## 2025-04-22 PROCEDURE — G8427 DOCREV CUR MEDS BY ELIG CLIN: HCPCS | Performed by: INTERNAL MEDICINE

## 2025-04-22 PROCEDURE — 1123F ACP DISCUSS/DSCN MKR DOCD: CPT | Performed by: INTERNAL MEDICINE

## 2025-04-22 PROCEDURE — G8399 PT W/DXA RESULTS DOCUMENT: HCPCS | Performed by: INTERNAL MEDICINE

## 2025-04-22 NOTE — PROGRESS NOTES
\"Have you been to the ER, urgent care clinic since your last visit?  Hospitalized since your last visit?\"    NO    “Have you seen or consulted any other health care providers outside our system since your last visit?”    YES - When: approximately 2  weeks ago.  Where and Why: Pulmonology.

## 2025-04-22 NOTE — TELEPHONE ENCOUNTER
Patient called stating she was just in the office today and when she went home she believes she is starting to get a UTI.    She states she usually gets a zpak ordered when she has a UTI, please advise.     [facility].

## 2025-04-23 RX ORDER — AZITHROMYCIN 250 MG/1
TABLET, FILM COATED ORAL
Qty: 6 TABLET | Refills: 0 | Status: SHIPPED | OUTPATIENT
Start: 2025-04-23 | End: 2025-05-03

## 2025-04-23 NOTE — PROGRESS NOTES
[facility]  [address] [address]  Dept: [phone]  Dept Fax: [phone]     Name: Sunshine Hill  : 1945           Chief Complaint   Patient presents with    Orders     Needs orders for female cath for patient who is bed / chair bound. Sent to [facility].     Wheelchair Evaluation     Electric Wheelchair assessment - [facility]       History of Present Illness  The patient presents for evaluation of weight management, blood clots, COPD, low hemoglobin, and urinary incontinence. She is accompanied by her son.    Interest in exploring medication options for weight loss is expressed. Common side effects of these medications, including nausea, vomiting, diarrhea, constipation, increased risk of gallstone problems, pancreatitis, and familial cancers, are discussed. No familial history of thyroid or pancreatic cancer is reported. Persistent fatigue is attributed to dehydration, and a machine to increase water intake has been purchased.    Eliquis is taken due to a history of blood clots in the lungs. Previous care by a pain management specialist was discontinued following the diagnosis of pulmonary embolism. A referral to a comprehensive pain management program has been made, and an appointment is scheduled.    A CPAP machine is not currently used. However, the pulmonologist consulted a few weeks ago plans to order one following a sleep study. A history of asbestos exposure and COPD is reported. A consultation with the pulmonologist occurred on 2025. A history of pneumonia, which occurred a month after the blood clot episode, is noted. Smoking cessation occurred 32 years ago. Oxygen is used at home.    A new wheelchair that will allow performance of daily activities independently is sought, as standing is not possible due to hip issues. Preauthorization for home health care and female catheters is required. A diagnosis of urinary

## 2025-05-05 ENCOUNTER — TELEPHONE (OUTPATIENT)
Dept: INTERNAL MEDICINE CLINIC | Age: 80
End: 2025-05-05

## 2025-05-05 NOTE — TELEPHONE ENCOUNTER
Tahira from [facility] called stating that they need and addended note for the patient to have physical therapy

## 2025-05-07 ENCOUNTER — TELEPHONE (OUTPATIENT)
Dept: INTERNAL MEDICINE CLINIC | Age: 80
End: 2025-05-07

## 2025-05-07 NOTE — TELEPHONE ENCOUNTER
Jayy is in need of reasons for PT, OT orders with the dx of degenerative disk and joint disease.    It was discussed on the order but not in the notes.    Please advise

## 2025-05-16 DIAGNOSIS — J30.2 SEASONAL ALLERGIES: Primary | ICD-10-CM

## 2025-05-16 RX ORDER — CETIRIZINE HYDROCHLORIDE 10 MG/1
10 TABLET ORAL DAILY
Qty: 30 TABLET | Refills: 0 | Status: SHIPPED | OUTPATIENT
Start: 2025-05-16

## 2025-05-16 NOTE — TELEPHONE ENCOUNTER
Patient is having allergies of post nasal drip which is causing a cough. Requesting Rx [facility]      [facility]

## 2025-05-23 NOTE — TELEPHONE ENCOUNTER
1. Cellulitis of finger of right hand  Lets make sure we start the antibiotics, 1 pill twice a day to cover the laceration, and the retained sutures  - cefadroxil 500 MG Oral Cap; Take 1 capsule (500 mg total) by mouth 2 (two) times daily.  Dispense: 20 capsule; Refill: 0    2. Laceration of left middle finger without foreign body with damage to nail, subsequent encounter  Removal of the sutures next week Thursday, 12:30 PM with me here in the office    3. Depression, unspecified depression type  Let stay on the current medications no changes    4. Memory loss  Instead of Aricept, Namenda makes more sense to me, less side effects.  1 tablet twice daily to help preserve the memory  - memantine 5 MG Oral Tab; Take 1 tablet (5 mg total) by mouth 2 (two) times daily.  Dispense: 60 tablet; Refill: 2    5. Essential hypertension  New prescription for metoprolol sent  - metoprolol succinate ER 25 MG Oral Tablet 24 Hr; Take 1 tablet (25 mg total) by mouth daily.  Dispense: 30 tablet; Refill: 3    6. Nausea  Zofran refilled  - ondansetron 4 MG Oral Tablet Dispersible; Take 1 tablet (4 mg total) by mouth every 6 (six) hours as needed for Nausea.  Dispense: 30 tablet; Refill: 1    7. Dysphonia  I love the idea of pursuing [facility] for outpatient physical therapy, lets make the phone call to them  - Physical Therapy Referral - External    8. Parkinson's disease with dyskinesia and fluctuating manifestations (HCC)  Outpatient therapy at [facility]  - Physical Therapy Referral - External    9. Anorexia  Increase Megace to 80 mg daily.  Track weight  - megestrol 40 MG Oral Tab; Take 2 tablets (80 mg total) by mouth daily.  Dispense: 60 tablet; Refill: 3     Patient informed and voiced understanding, no questions at this time

## 2025-06-03 ENCOUNTER — TELEPHONE (OUTPATIENT)
Dept: INTERNAL MEDICINE CLINIC | Age: 80
End: 2025-06-03

## 2025-06-03 NOTE — TELEPHONE ENCOUNTER
Patient is calling asking about the weight loss medication that was supposed to be sent in for her.      Patient informed to call her insurance and find out what is covered. It looks like the Zepbound was denied by insurance.    Please advise

## 2025-06-05 NOTE — TELEPHONE ENCOUNTER
Patient called the office in a 3 way call with her insurance company.   Zepbound was denied by insurance.   agreed to start an appeal that will be faxerd to the office.     Dx used was Class 3 severe obesity due to excess calories with serious comorbidity and body mass index (BMI) of 45.0 to 49.9 in adult (HCC) [E66.813, Z68.42]    Please addend OV note from 4/22/25 to include the following:  Patient will be taking Zepbound in addition to lifestyle modifications including increasing exercise and diet changes.   Patient has sleep apnea and the use of Zepbound will medically benefit patient.     Will fax updated OV note once completed.   [facility] Dept phone number if needed [phone]

## 2025-06-25 ENCOUNTER — TELEPHONE (OUTPATIENT)
Dept: INTERNAL MEDICINE CLINIC | Age: 80
End: 2025-06-25

## 2025-06-25 DIAGNOSIS — D50.9 IRON DEFICIENCY ANEMIA, UNSPECIFIED IRON DEFICIENCY ANEMIA TYPE: Primary | ICD-10-CM

## 2025-06-25 NOTE — TELEPHONE ENCOUNTER
[facility] called inquiring  if fax from 6/24 for an appeal had been received, please return call at earliest convenience.

## 2025-06-25 NOTE — TELEPHONE ENCOUNTER
Patient states she was supposed to have an order for her HGB to be checked. She states at her appt it was said it was low.    Her home nurse already sona the blood they just need the order to [facility].    Please advise

## 2025-07-02 ENCOUNTER — TELEPHONE (OUTPATIENT)
Dept: INTERNAL MEDICINE CLINIC | Age: 80
End: 2025-07-02

## 2025-07-02 NOTE — TELEPHONE ENCOUNTER
Attempted to contact to notify of cancelled appointment, unable to reach. Lvm and sent [employer] message

## 2025-07-29 ENCOUNTER — OFFICE VISIT (OUTPATIENT)
Dept: INTERNAL MEDICINE CLINIC | Age: 80
End: 2025-07-29
Payer: MEDICARE

## 2025-07-29 VITALS
SYSTOLIC BLOOD PRESSURE: 134 MMHG | OXYGEN SATURATION: 97 % | TEMPERATURE: 98.4 F | RESPIRATION RATE: 20 BRPM | HEART RATE: 58 BPM | DIASTOLIC BLOOD PRESSURE: 68 MMHG

## 2025-07-29 DIAGNOSIS — Z00.00 MEDICARE ANNUAL WELLNESS VISIT, SUBSEQUENT: Primary | ICD-10-CM

## 2025-07-29 PROCEDURE — 1123F ACP DISCUSS/DSCN MKR DOCD: CPT | Performed by: STUDENT IN AN ORGANIZED HEALTH CARE EDUCATION/TRAINING PROGRAM

## 2025-07-29 PROCEDURE — 3075F SYST BP GE 130 - 139MM HG: CPT | Performed by: STUDENT IN AN ORGANIZED HEALTH CARE EDUCATION/TRAINING PROGRAM

## 2025-07-29 PROCEDURE — 3078F DIAST BP <80 MM HG: CPT | Performed by: STUDENT IN AN ORGANIZED HEALTH CARE EDUCATION/TRAINING PROGRAM

## 2025-07-29 PROCEDURE — G0439 PPPS, SUBSEQ VISIT: HCPCS | Performed by: STUDENT IN AN ORGANIZED HEALTH CARE EDUCATION/TRAINING PROGRAM

## 2025-07-29 PROCEDURE — 1159F MED LIST DOCD IN RCRD: CPT | Performed by: STUDENT IN AN ORGANIZED HEALTH CARE EDUCATION/TRAINING PROGRAM

## 2025-07-29 RX ORDER — ALBUTEROL SULFATE 90 UG/1
2 INHALANT RESPIRATORY (INHALATION) 4 TIMES DAILY PRN
Qty: 1 EACH | Refills: 2 | Status: SHIPPED | OUTPATIENT
Start: 2025-07-29

## 2025-07-29 ASSESSMENT — PATIENT HEALTH QUESTIONNAIRE - PHQ9
7. TROUBLE CONCENTRATING ON THINGS, SUCH AS READING THE NEWSPAPER OR WATCHING TELEVISION: MORE THAN HALF THE DAYS
SUM OF ALL RESPONSES TO PHQ QUESTIONS 1-9: 11
6. FEELING BAD ABOUT YOURSELF - OR THAT YOU ARE A FAILURE OR HAVE LET YOURSELF OR YOUR FAMILY DOWN: NOT AT ALL
8. MOVING OR SPEAKING SO SLOWLY THAT OTHER PEOPLE COULD HAVE NOTICED. OR THE OPPOSITE, BEING SO FIGETY OR RESTLESS THAT YOU HAVE BEEN MOVING AROUND A LOT MORE THAN USUAL: NOT AT ALL
SUM OF ALL RESPONSES TO PHQ QUESTIONS 1-9: 11
2. FEELING DOWN, DEPRESSED OR HOPELESS: SEVERAL DAYS
5. POOR APPETITE OR OVEREATING: MORE THAN HALF THE DAYS
SUM OF ALL RESPONSES TO PHQ QUESTIONS 1-9: 11
9. THOUGHTS THAT YOU WOULD BE BETTER OFF DEAD, OR OF HURTING YOURSELF: NOT AT ALL
10. IF YOU CHECKED OFF ANY PROBLEMS, HOW DIFFICULT HAVE THESE PROBLEMS MADE IT FOR YOU TO DO YOUR WORK, TAKE CARE OF THINGS AT HOME, OR GET ALONG WITH OTHER PEOPLE: NOT DIFFICULT AT ALL
3. TROUBLE FALLING OR STAYING ASLEEP: NEARLY EVERY DAY
4. FEELING TIRED OR HAVING LITTLE ENERGY: NEARLY EVERY DAY
1. LITTLE INTEREST OR PLEASURE IN DOING THINGS: NOT AT ALL
SUM OF ALL RESPONSES TO PHQ QUESTIONS 1-9: 11

## 2025-07-29 NOTE — PROGRESS NOTES
Medicare Annual Wellness Visit    Sunshine Hill is here for Medicare AWV and Immunizations (Due for covid, shingles, and RSV vaccines)    Assessment & Plan        No follow-ups on file.     Subjective   Medicare Annual Wellness Visit  Refused weight and height d/t unable to stand    Patient's complete Health Risk Assessment and screening values have been reviewed and are found in Flowsheets. The following problems were reviewed today and where indicated follow up appointments were made and/or referrals ordered.    Positive Risk Factor Screenings with Interventions:    Fall Risk:  Do you feel unsteady or are you worried about falling? : (!) yes  2 or more falls in past year?: no  Fall with injury in past year?: no  Interventions:    Reviewed medications, home hazards, visual acuity, and co-morbidities that can increase risk for falls  See AVS for additional education material     Depression:  PHQ-2 Score: 1  PHQ-9 Total Score: 11  Total Score Interpretation: 10-14 = moderate depression    Interventions:  Monitored by specialist. No acute findings meriting change in the plan          General HRA Questions:  Select all that apply: (!) New or Increased Pain (chronic nerve pain worsening)  Interventions - Pain:  Patient comments: follows pain management      Inactivity:  On average, how many days per week do you engage in moderate to strenuous exercise (like a brisk walk)?: 2 days (!) Abnormal  On average, how many minutes do you engage in exercise at this level?: 20 min    Interventions:  See AVS for additional education material    Poor Eating Habits/Diet:  Do you eat balanced/healthy meals regularly?: (!) No    Interventions:  See AVS for additional education material    Abnormal BMI (obese):  There is no height or weight on file to calculate BMI. (!) Abnormal    Interventions:  See AVS for additional education material          Vision Screen:  Do you have difficulty driving, watching TV, or doing any of your daily

## 2025-08-05 ENCOUNTER — TELEPHONE (OUTPATIENT)
Dept: INTERNAL MEDICINE CLINIC | Age: 80
End: 2025-08-05

## 2025-08-05 DIAGNOSIS — J43.1 PANLOBULAR EMPHYSEMA (HCC): Primary | ICD-10-CM

## 2025-08-28 ENCOUNTER — TELEPHONE (OUTPATIENT)
Dept: INTERNAL MEDICINE CLINIC | Age: 80
End: 2025-08-28

## 2025-08-28 DIAGNOSIS — J96.11 CHRONIC HYPOXIC RESPIRATORY FAILURE, ON HOME OXYGEN THERAPY (HCC): ICD-10-CM

## 2025-08-28 DIAGNOSIS — Z99.81 CHRONIC HYPOXIC RESPIRATORY FAILURE, ON HOME OXYGEN THERAPY (HCC): ICD-10-CM

## 2025-08-28 DIAGNOSIS — G47.33 OSA (OBSTRUCTIVE SLEEP APNEA): ICD-10-CM

## 2025-08-28 DIAGNOSIS — J43.1 PANLOBULAR EMPHYSEMA (HCC): Primary | ICD-10-CM

## 2025-08-28 DIAGNOSIS — N39.0 URINARY TRACT INFECTION WITHOUT HEMATURIA, SITE UNSPECIFIED: ICD-10-CM

## 2025-08-28 DIAGNOSIS — Z00.00 MEDICARE ANNUAL WELLNESS VISIT, SUBSEQUENT: ICD-10-CM

## 2025-08-28 DIAGNOSIS — D50.9 IRON DEFICIENCY ANEMIA, UNSPECIFIED IRON DEFICIENCY ANEMIA TYPE: ICD-10-CM

## 2025-08-28 DIAGNOSIS — J30.2 SEASONAL ALLERGIES: ICD-10-CM

## (undated) DEVICE — SYRINGE A08E KIS INFLATION HP: Brand: KYPHON®  INFLATION SYRINGE

## (undated) DEVICE — COVER,MAYO STAND,XL,STERILE: Brand: MEDLINE

## (undated) DEVICE — STERILE LATEX POWDER-FREE SURGICAL GLOVESWITH NITRILE COATING: Brand: PROTEXIS

## (undated) DEVICE — DRESSING TRNSPAR W2XL2.75IN FLM SHT SEMIPERMEABLE WIND

## (undated) DEVICE — AIRLIFE™ ADULT OXYGEN MASK VINYL, UNDER THE CHIN STYLE, 3 IN 1 MASK WITH SAFETY VENT, WITH 7 FEET (2.1 M) CRUSH RESISTANT OXYGEN TUBING: Brand: AIRLIFE™

## (undated) DEVICE — SPONGE LAP W18XL18IN WHT COT 4 PLY FLD STRUNG RADPQ DISP ST

## (undated) DEVICE — TRAP SPEC COLL 40CC MUCOUS CALIB UNIV CONN FOR OPN SUCT

## (undated) DEVICE — CONMED PEDIATRIC GASTROSCOPE SHEATHED CYTOLOGY BRUSH, RING HANDLE, 3 MM X 160 CM: Brand: CONMED

## (undated) DEVICE — NEEDLE SPINAL 22GA L3.5IN SPINOCAN

## (undated) DEVICE — COVER,TABLE,60X90,STERILE: Brand: MEDLINE

## (undated) DEVICE — MARKER,SKIN,WI/RULER AND LABELS: Brand: MEDLINE

## (undated) DEVICE — BONE TAMP KIT KPX203PB FF X2 20/3 1 STP: Brand: KYPHOPAK™ TRAY

## (undated) DEVICE — GOWN,AURORA,NONREINFORCED,LARGE: Brand: MEDLINE

## (undated) DEVICE — CHLORAPREP 26ML ORANGE

## (undated) DEVICE — DRAPE IRRIG FLD WRM W44XL44IN W/ AORN STD PRTBL INTRATEMP

## (undated) DEVICE — 3M™ WARMING BLANKET, LOWER BODY, 10 PER CASE, 42568: Brand: BAIR HUGGER™

## (undated) DEVICE — 60 ML SYRINGE REGULAR TIP: Brand: MONOJECT

## (undated) DEVICE — SOLUTION IV IRRIG POUR BRL 0.9% SODIUM CHL 2F7124

## (undated) DEVICE — INTENDED FOR TISSUE SEPARATION, AND OTHER PROCEDURES THAT REQUIRE A SHARP SURGICAL BLADE TO PUNCTURE OR CUT.: Brand: BARD-PARKER ® CARBON RIB-BACK BLADES

## (undated) DEVICE — MEDI-VAC YANKAUER SUCTION HANDLE W/BULBOUS TIP: Brand: CARDINAL HEALTH

## (undated) DEVICE — STRIP,CLOSURE,WOUND,MEDI-STRIP,1/2X4: Brand: MEDLINE

## (undated) DEVICE — 3M™ STERI-STRIP™ COMPOUND BENZOIN TINCTURE 40 BAGS/CARTON 4 CARTONS/CASE C1544: Brand: 3M™ STERI-STRIP™

## (undated) DEVICE — FORCEPS BX L100CM DIA1.8MM WRK CHN 2MM PULM S STL RAD JAW 4

## (undated) DEVICE — PAD,NON-ADHERENT,3X8,STERILE,LF,1/PK: Brand: MEDLINE

## (undated) DEVICE — GLOVE SURG SZ 8 L12IN FNGR THK11.8MIL KHAKI LTX BEAD CUF LT

## (undated) DEVICE — COUNTER NDL 10 COUNT HLD 20 FOAM BLK SGL MAG

## (undated) DEVICE — AIRLIFE™ NASAL OXYGEN CANNULA CURVED, FLARED TIP, WITH 7 FEET (2.1 M) CRUSH RESISTANT TUBING, OVER-THE-EAR STYLE: Brand: AIRLIFE™

## (undated) DEVICE — MEDI-VAC NON-CONDUCTIVE SUCTION TUBING: Brand: CARDINAL HEALTH

## (undated) DEVICE — ST CHARLES BRONCHOSCOPY PACK: Brand: MEDLINE INDUSTRIES, INC.

## (undated) DEVICE — Z DUP USE 2392665 BLADE LARYNGOSCOPE STAT GLIDESCOPE GVL 4

## (undated) DEVICE — SYRINGE, LUER LOCK, 10ML: Brand: MEDLINE

## (undated) DEVICE — 3M™ IOBAN™ 2 ANTIMICROBIAL INCISE DRAPE 6650EZ: Brand: IOBAN™ 2

## (undated) DEVICE — MIXER A07A CEMENT

## (undated) DEVICE — BONE BIOPSY DEVICE F05A BBD SIZE 3: Brand: MEDTRONIC REUSABLE INSTRUMENTS

## (undated) DEVICE — CONMED DISPOSABLE MICROBIOLOGY BRUSH, Ø1 MM, 1.8 MM WORKING DIAMETER, 110 CM LENGTH: Brand: CONMED

## (undated) DEVICE — CURETTE A13A SIZE 2 T-TIP: Brand: KYPHON® EXPRESS ™ CURETTE

## (undated) DEVICE — SOLUTION IV IRRIG WATER 1000ML POUR BRL 2F7114

## (undated) DEVICE — DRAPE,LAP,CHOLE,W/TROUGHS,STERILE: Brand: MEDLINE

## (undated) DEVICE — GLOVE SURG SZ 75 STD WHT LTX SYN POLYMER BEAD REINF ANTI RL

## (undated) DEVICE — TOWEL,OR,DSP,ST,BLUE,STD,6/PK,12PK/CS: Brand: MEDLINE

## (undated) DEVICE — DRAPE,REIN 53X77,STERILE: Brand: MEDLINE

## (undated) DEVICE — DRAPE C ARM W104XL188CM FLD MOB XR

## (undated) DEVICE — Z INACTIVE USE 2653177 SPONGE GZ W2XL2IN NONWOVEN 4 PLY FASTER WICKING ABIL AVANT

## (undated) DEVICE — JACKSON / MODULAR SPINAL TABLE STYLE POSITIONING KIT - STANDARD: Brand: SOULE MEDICAL

## (undated) DEVICE — COVER LT HNDL BLU PLAS

## (undated) DEVICE — STANDARD HYPODERMIC NEEDLE,POLYPROPYLENE HUB: Brand: MONOJECT